# Patient Record
Sex: MALE | Race: WHITE | NOT HISPANIC OR LATINO | Employment: OTHER | URBAN - METROPOLITAN AREA
[De-identification: names, ages, dates, MRNs, and addresses within clinical notes are randomized per-mention and may not be internally consistent; named-entity substitution may affect disease eponyms.]

---

## 2017-01-03 ENCOUNTER — LAB (OUTPATIENT)
Dept: INTERNAL MEDICINE | Facility: CLINIC | Age: 67
End: 2017-01-03

## 2017-01-03 DIAGNOSIS — I48.91 ATRIAL FIBRILLATION, UNSPECIFIED TYPE (HCC): ICD-10-CM

## 2017-01-03 LAB — INR PPP: 4 (ref 2.5–3.5)

## 2017-01-03 PROCEDURE — 85610 PROTHROMBIN TIME: CPT | Performed by: INTERNAL MEDICINE

## 2017-01-03 PROCEDURE — 36415 COLL VENOUS BLD VENIPUNCTURE: CPT | Performed by: INTERNAL MEDICINE

## 2017-01-09 ENCOUNTER — LAB (OUTPATIENT)
Dept: INTERNAL MEDICINE | Facility: CLINIC | Age: 67
End: 2017-01-09

## 2017-01-09 DIAGNOSIS — I48.91 ATRIAL FIBRILLATION, UNSPECIFIED TYPE (HCC): ICD-10-CM

## 2017-01-09 LAB — INR PPP: 2.8 (ref 2.5–3.5)

## 2017-01-09 PROCEDURE — 36415 COLL VENOUS BLD VENIPUNCTURE: CPT | Performed by: INTERNAL MEDICINE

## 2017-01-09 PROCEDURE — 85610 PROTHROMBIN TIME: CPT | Performed by: INTERNAL MEDICINE

## 2017-01-26 ENCOUNTER — OFFICE VISIT (OUTPATIENT)
Dept: CARDIOLOGY | Facility: CLINIC | Age: 67
End: 2017-01-26

## 2017-01-26 VITALS
DIASTOLIC BLOOD PRESSURE: 98 MMHG | BODY MASS INDEX: 20.52 KG/M2 | WEIGHT: 146.6 LBS | HEIGHT: 71 IN | SYSTOLIC BLOOD PRESSURE: 166 MMHG | HEART RATE: 70 BPM

## 2017-01-26 DIAGNOSIS — I10 ESSENTIAL HYPERTENSION: ICD-10-CM

## 2017-01-26 DIAGNOSIS — I48.0 PAROXYSMAL ATRIAL FIBRILLATION (HCC): Primary | ICD-10-CM

## 2017-01-26 DIAGNOSIS — Z95.2 S/P AVR (AORTIC VALVE REPLACEMENT): ICD-10-CM

## 2017-01-26 DIAGNOSIS — E78.00 HYPERCHOLESTEROLEMIA: ICD-10-CM

## 2017-01-26 PROCEDURE — 93288 INTERROG EVL PM/LDLS PM IP: CPT | Performed by: INTERNAL MEDICINE

## 2017-01-26 PROCEDURE — 99212 OFFICE O/P EST SF 10 MIN: CPT | Performed by: INTERNAL MEDICINE

## 2017-01-26 NOTE — PROGRESS NOTES
OFFICE FOLLOW UP     Date of Encounter:2017     Name: Dillon Vo  : 1950  Address: [address] [facility] [address]  Phone: [phone]    PCP: Ez Cintron MD  [address] [place] [address] [address]  [facility] [address]    Dillon Vo is a 66 y.o. male.      Chief Complaint: follow up for Afib  PROBLEM LIST:  1. “Mixed” heart disease:  a. Aortic valve disease with mechanical prosthesis, IDB.  b. Everolimus-eluting stent placed mid-LAD for single-vessel CAD, 2013.    c. Ejection fraction less than 20% with global hypokinesia, 2013:  Only modestly reduced EF by echocardiogram, 2013.    d. “Absent” clopidogrel therapy following stent placement, “corrected,” 08/15/2013.  2. Atrial fibrillation, chronic:  a. Difficult to control rate.  b. RFA of AV node with implantation of left Bi-V pacemaker, 2013.  c. Hematoma requiring single-chamber pacemaker implanted, 2013.   3. Tobacco use, chronic.      No Known Allergies      Current Outpatient Prescriptions:   •  aspirin 81 MG EC tablet daily  •  carvedilol 25 MG tablet, Take 0.5 tablets by mouth 2 (two) times a day.  •  citalopram 20 MG tablet daily  •  digoxin 250 MCG tablet daily  •  famotidine 20 MG tablet daily  •  finasteride (PROSCAR) 5 MG tablet at bedtime  •  folic acid 400 MCG tablet daily  •  lisinopril 10 MG tablet daily   •  metroNIDAZOLE 500 MG tablet, Take one tablet twice daily for 14 days, then off for 3 days. Repeat x 3 courses.  •  Probiotic Product, daily  •  vitamin B-12 1000 MCG tablet daily  •  warfarin (COUMADIN) 5 MG tablet daily    History of Present Illness:            Mr. Vo presents today as a follow up for atrial fibrillation and also for a pacemaker check. Since last visit, patient has been feeling well. He has not had any chest pain. He has not had any issues with his pacemaker. He does not monitor his blood pressure at home. He notes a time when he was in [place] last year  "where his blood pressure dropped low. He remains active by walking his dog. 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 He is still smoking approximately half a pack per day. He has no SOA with his current activity level.    The following portions of the patient's history were reviewed and updated as appropriate: allergies, current medications and problem list.    HPI: Pertinent positives as listed in the HPI.  All other systems reviewed and negative.      Objective:    Vitals:    01/26/17 1303 01/26/17 1304   BP: 156/97 166/98   BP Location: Right arm Right arm   Patient Position: Sitting Standing   Pulse: 71 70   Weight: 146 lb 9.6 oz (66.5 kg)    Height: 71\" (180.3 cm)        Physical Exam   Constitutional: He is oriented to person, place, and time.   Neck: No JVD present. No thyromegaly present.   Cardiovascular: Intact distal pulses.    Murmur heard.   Systolic murmur is present with a grade of 1/6  At the base of the heart. Normal prosthetic valve sounds  Exam reveals no gallop, murmur, or rub.   Pulmonary/Chest:   No wheezes, crackles, or rhonchi.   Musculoskeletal:   No peripheral edema, no clubbing, or cyanosis    Neurological: He is alert and oriented to person, place, and time.   No focal abnormalities in sensation or strength    Vitals reviewed.         Diagnostic Data:      Lab Results   Component Value Date    INR 2.80 01/09/2017    INR 4.00 (A) 01/03/2017    INR 2.80 12/28/2016    PROTIME 23.1 (H) 05/09/2014     Lab Results   Component Value Date    GLUCOSE 101 (H) 12/08/2016    BUN 12 12/08/2016    CREATININE 0.70 12/08/2016    EGFRIFNONA 113 12/08/2016    BCR 17.1 12/08/2016    CO2 31.0 12/08/2016    CALCIUM 9.1 12/08/2016    ALBUMIN 3.60 12/08/2016    LABIL2 1.2 12/08/2016    AST 20 12/08/2016    ALT 11 12/08/2016     Lab Results   Component Value Date    WBC 3.68 12/08/2016    HGB 12.6 (L) 12/08/2016    HCT 38.3 (L) 12/08/2016    .3 (H) 12/08/2016    PLT 99 (L) 12/08/2016     Lab Results   Component " Value Date    CHLPL 189 01/21/2016    TRIG 52 06/16/2016    HDL 61 06/16/2016    LDLDIRECT 95 01/21/2016    AST 20 12/08/2016    ALT 11 12/08/2016        Procedures  DEVICE INTERROGATION:  1/26/2017, [facility] [place] PPM: RV pacing >99%. R wave with a threshold of 0.87 V at 0.5 msec and an impedance of 440 ohms. Battery voltage is 2.96.      Assessment and Plan:  He is instructed to stop smoking. His PM function is WNL. He has SBE protocol instructions. We will f/u on an annual basis.

## 2017-01-26 NOTE — MR AVS SNAPSHOT
Dillon Vo   1/26/2017 12:45 PM   Office Visit    Dept Phone:  [phone]   Encounter #:  [id number]    Provider:  Ez Tucker MD   Department:  [facility] CARDIOLOGY                Your Full Care Plan              Today's Medication Changes          These changes are accurate as of: 1/26/17  1:58 PM.  If you have any questions, ask your nurse or doctor.               Stop taking medication(s)listed here:     metroNIDAZOLE 500 MG tablet   Commonly known as:  FLAGYL   Stopped by:  Ez Tucker MD                      Your Updated Medication List          This list is accurate as of: 1/26/17  1:58 PM.  Always use your most recent med list.                aspirin 81 MG EC tablet       carvedilol 25 MG tablet   Commonly known as:  COREG       citalopram 20 MG tablet   Commonly known as:  CeleXA   TAKE ONE TABLET BY MOUTH ONCE DAILY       digoxin 250 MCG tablet   Commonly known as:  LANOXIN   TAKE ONE TABLET BY MOUTH ONCE DAILY       famotidine 20 MG tablet   Commonly known as:  PEPCID   Take 1 tablet by mouth daily.       finasteride 5 MG tablet   Commonly known as:  PROSCAR   TAKE ONE TABLET BY MOUTH AT BEDTIME       folic acid 400 MCG tablet   Commonly known as:  FOLVITE   Take 1 tablet by mouth Daily.       lisinopril 10 MG tablet   Commonly known as:  PRINIVIL,ZESTRIL       PROBIOTIC DAILY PO       vitamin B-12 1000 MCG tablet   Commonly known as:  CYANOCOBALAMIN   Take 1 tablet by mouth Daily.       warfarin 5 MG tablet   Commonly known as:  COUMADIN   TAKE ONE TABLET BY MOUTH ONCE DAILY               You Were Diagnosed With        Codes Comments    Paroxysmal atrial fibrillation    -  Primary ICD-10-CM: I48.0  ICD-9-CM: 427.31     Essential hypertension     ICD-10-CM: I10  ICD-9-CM: 401.9     Hypercholesterolemia     ICD-10-CM: E78.00  ICD-9-CM: 272.0       Instructions     None    Patient Instructions History      Upcoming Appointments     "Visit Type Date Time Department    FOLLOW UP 1/26/2017 12:45 PM [facility]      [facility] Signup     Our records indicate that you have an active [facility] [employer] account.    You can view your After Visit Summary by going to [employer] and logging in with your Daily[employer] username and password.  If you don't have a [employer] username and password but a parent or guardian has access to your record, the parent or guardian should login with their own [employer] username and password and access your record to view the After Visit Summary.    If you have questions, you can email [email or web address] or call [phone] to talk to our Daily[employer] staff.  Remember, Daily[employer] is NOT to be used for urgent needs.  For medical emergencies, dial 911.               Other Info from Your Visit           Your Appointments     Jan 30, 2018 12:30 PM EST   Follow Up with Ez Tucker MD   [facility] (--)    [address] [place] [address] [address] [place] [id number] [phone]           Arrive 15 minutes prior to appointment.              Allergies     No Known Allergies      Reason for Visit     Follow-up HTN/AFIB*SJM*      Vital Signs     Blood Pressure Pulse Height Weight Body Mass Index Smoking Status    166/98 (BP Location: Right arm, Patient Position: Standing) 70 71\" (180.3 cm) 146 lb 9.6 oz (66.5 kg) 20.45 kg/m2 Current Every Day Smoker      Problems and Diagnoses Noted     Atrial fibrillation (irregular heartbeat)    High blood pressure    High cholesterol            "

## 2017-02-06 ENCOUNTER — LAB (OUTPATIENT)
Dept: INTERNAL MEDICINE | Facility: CLINIC | Age: 67
End: 2017-02-06

## 2017-02-06 DIAGNOSIS — I48.91 ATRIAL FIBRILLATION, UNSPECIFIED TYPE (HCC): ICD-10-CM

## 2017-02-06 LAB — INR PPP: 3.3 (ref 2.5–3.5)

## 2017-02-06 PROCEDURE — 85610 PROTHROMBIN TIME: CPT | Performed by: INTERNAL MEDICINE

## 2017-02-06 RX ORDER — FINASTERIDE 5 MG/1
TABLET, FILM COATED ORAL
Qty: 30 TABLET | Refills: 5 | Status: SHIPPED | OUTPATIENT
Start: 2017-02-06 | End: 2018-01-23 | Stop reason: SDUPTHER

## 2017-03-06 ENCOUNTER — LAB (OUTPATIENT)
Dept: INTERNAL MEDICINE | Facility: CLINIC | Age: 67
End: 2017-03-06

## 2017-03-06 DIAGNOSIS — I48.91 ATRIAL FIBRILLATION, UNSPECIFIED TYPE (HCC): ICD-10-CM

## 2017-03-06 LAB — INR PPP: 3.6 (ref 2.5–3.5)

## 2017-03-06 PROCEDURE — 85610 PROTHROMBIN TIME: CPT | Performed by: INTERNAL MEDICINE

## 2017-04-17 RX ORDER — WARFARIN SODIUM 5 MG/1
TABLET ORAL
Qty: 30 TABLET | Refills: 5 | Status: SHIPPED | OUTPATIENT
Start: 2017-04-17 | End: 2017-04-19 | Stop reason: SDUPTHER

## 2017-04-19 RX ORDER — WARFARIN SODIUM 5 MG/1
5 TABLET ORAL
Qty: 30 TABLET | Refills: 5 | Status: SHIPPED | OUTPATIENT
Start: 2017-04-19 | End: 2018-05-12 | Stop reason: SDUPTHER

## 2017-05-09 ENCOUNTER — TELEPHONE (OUTPATIENT)
Dept: INTERNAL MEDICINE | Facility: CLINIC | Age: 67
End: 2017-05-09

## 2017-06-07 ENCOUNTER — TELEPHONE (OUTPATIENT)
Dept: INTERNAL MEDICINE | Facility: CLINIC | Age: 67
End: 2017-06-07

## 2017-06-14 NOTE — TELEPHONE ENCOUNTER
I have called patient several times and mailed a reminder letter.  I have not heard from patient.  Can you please mail a certified letter to patient .

## 2017-07-18 ENCOUNTER — LAB (OUTPATIENT)
Dept: INTERNAL MEDICINE | Facility: CLINIC | Age: 67
End: 2017-07-18

## 2017-07-18 DIAGNOSIS — I48.91 ATRIAL FIBRILLATION, UNSPECIFIED TYPE (HCC): Primary | ICD-10-CM

## 2017-07-18 LAB — INR PPP: 2.5 (ref 2.5–3.5)

## 2017-07-18 PROCEDURE — 85610 PROTHROMBIN TIME: CPT | Performed by: INTERNAL MEDICINE

## 2017-07-24 RX ORDER — CARVEDILOL 25 MG/1
12.5 TABLET ORAL 2 TIMES DAILY
Qty: 90 TABLET | Refills: 1 | Status: SHIPPED | OUTPATIENT
Start: 2017-07-24 | End: 2017-11-03 | Stop reason: SDUPTHER

## 2017-07-24 RX ORDER — FAMOTIDINE 20 MG/1
TABLET, FILM COATED ORAL
Qty: 30 TABLET | Refills: 5 | Status: SHIPPED | OUTPATIENT
Start: 2017-07-24 | End: 2018-02-09 | Stop reason: SDUPTHER

## 2017-08-21 ENCOUNTER — LAB (OUTPATIENT)
Dept: INTERNAL MEDICINE | Facility: CLINIC | Age: 67
End: 2017-08-21

## 2017-08-21 DIAGNOSIS — I48.91 ATRIAL FIBRILLATION, UNSPECIFIED TYPE (HCC): Primary | ICD-10-CM

## 2017-08-21 LAB — INR PPP: 2.3 (ref 2.5–3.5)

## 2017-08-21 PROCEDURE — 85610 PROTHROMBIN TIME: CPT | Performed by: INTERNAL MEDICINE

## 2017-08-28 RX ORDER — CITALOPRAM 20 MG/1
TABLET ORAL
Qty: 30 TABLET | Refills: 5 | Status: SHIPPED | OUTPATIENT
Start: 2017-08-28 | End: 2018-03-02 | Stop reason: SDUPTHER

## 2017-09-05 ENCOUNTER — LAB (OUTPATIENT)
Dept: INTERNAL MEDICINE | Facility: CLINIC | Age: 67
End: 2017-09-05

## 2017-09-05 DIAGNOSIS — I48.91 ATRIAL FIBRILLATION, UNSPECIFIED TYPE (HCC): Primary | ICD-10-CM

## 2017-09-05 LAB — INR PPP: 3.3 (ref 2.5–3.5)

## 2017-09-05 PROCEDURE — 85610 PROTHROMBIN TIME: CPT | Performed by: INTERNAL MEDICINE

## 2017-10-24 ENCOUNTER — CLINICAL SUPPORT (OUTPATIENT)
Dept: INTERNAL MEDICINE | Facility: CLINIC | Age: 67
End: 2017-10-24

## 2017-10-24 ENCOUNTER — LAB (OUTPATIENT)
Dept: INTERNAL MEDICINE | Facility: CLINIC | Age: 67
End: 2017-10-24

## 2017-10-24 DIAGNOSIS — I48.91 ATRIAL FIBRILLATION, UNSPECIFIED TYPE (HCC): Primary | ICD-10-CM

## 2017-10-24 LAB — INR PPP: 2.5 (ref 2.5–3.5)

## 2017-10-24 PROCEDURE — 90662 IIV NO PRSV INCREASED AG IM: CPT | Performed by: INTERNAL MEDICINE

## 2017-10-24 PROCEDURE — G0008 ADMIN INFLUENZA VIRUS VAC: HCPCS | Performed by: INTERNAL MEDICINE

## 2017-10-24 PROCEDURE — 85610 PROTHROMBIN TIME: CPT | Performed by: INTERNAL MEDICINE

## 2017-11-03 RX ORDER — LISINOPRIL 10 MG/1
TABLET ORAL
Qty: 180 TABLET | Refills: 1 | Status: SHIPPED | OUTPATIENT
Start: 2017-11-03 | End: 2018-02-27 | Stop reason: SDUPTHER

## 2017-11-06 RX ORDER — CARVEDILOL 25 MG/1
TABLET ORAL
Qty: 90 TABLET | Refills: 1 | Status: SHIPPED | OUTPATIENT
Start: 2017-11-06 | End: 2018-09-24 | Stop reason: SDUPTHER

## 2017-11-21 ENCOUNTER — TELEPHONE (OUTPATIENT)
Dept: CARDIOLOGY | Facility: CLINIC | Age: 67
End: 2017-11-21

## 2017-11-27 ENCOUNTER — TELEPHONE (OUTPATIENT)
Dept: CARDIOLOGY | Facility: CLINIC | Age: 67
End: 2017-11-27

## 2017-11-27 DIAGNOSIS — Z95.2 S/P AVR (AORTIC VALVE REPLACEMENT): Primary | ICD-10-CM

## 2017-11-27 NOTE — TELEPHONE ENCOUNTER
Called pt regarding [employer] home monitoring box.  Pt does not have plugged up and doesn't want to use at this time.

## 2017-12-12 ENCOUNTER — LAB (OUTPATIENT)
Dept: INTERNAL MEDICINE | Facility: CLINIC | Age: 67
End: 2017-12-12

## 2017-12-12 DIAGNOSIS — I48.91 ATRIAL FIBRILLATION, UNSPECIFIED TYPE (HCC): Primary | ICD-10-CM

## 2017-12-12 LAB — INR PPP: 3.3 (ref 2.5–3.5)

## 2017-12-12 PROCEDURE — 85610 PROTHROMBIN TIME: CPT | Performed by: INTERNAL MEDICINE

## 2018-01-17 ENCOUNTER — LAB (OUTPATIENT)
Dept: INTERNAL MEDICINE | Facility: CLINIC | Age: 68
End: 2018-01-17

## 2018-01-17 DIAGNOSIS — I48.91 ATRIAL FIBRILLATION, UNSPECIFIED TYPE (HCC): Primary | ICD-10-CM

## 2018-01-17 LAB — INR PPP: 2.3 (ref 2.5–3.5)

## 2018-01-17 PROCEDURE — 85610 PROTHROMBIN TIME: CPT | Performed by: INTERNAL MEDICINE

## 2018-01-23 RX ORDER — FINASTERIDE 5 MG/1
TABLET, FILM COATED ORAL
Qty: 30 TABLET | Refills: 5 | Status: SHIPPED | OUTPATIENT
Start: 2018-01-23 | End: 2018-08-17 | Stop reason: SDUPTHER

## 2018-01-30 ENCOUNTER — APPOINTMENT (OUTPATIENT)
Dept: CARDIOLOGY | Facility: HOSPITAL | Age: 68
End: 2018-01-30
Attending: INTERNAL MEDICINE

## 2018-02-09 RX ORDER — FAMOTIDINE 20 MG/1
20 TABLET, FILM COATED ORAL DAILY
Qty: 30 TABLET | Refills: 1 | Status: SHIPPED | OUTPATIENT
Start: 2018-02-09

## 2018-02-16 RX ORDER — FAMOTIDINE 20 MG/1
TABLET, FILM COATED ORAL
Qty: 90 TABLET | Refills: 0 | Status: SHIPPED | OUTPATIENT
Start: 2018-02-16 | End: 2018-02-27 | Stop reason: SDUPTHER

## 2018-02-19 ENCOUNTER — LAB (OUTPATIENT)
Dept: INTERNAL MEDICINE | Facility: CLINIC | Age: 68
End: 2018-02-19

## 2018-02-19 DIAGNOSIS — I48.91 ATRIAL FIBRILLATION, UNSPECIFIED TYPE (HCC): Primary | ICD-10-CM

## 2018-02-19 LAB — INR PPP: 3.1 (ref 2.5–3.5)

## 2018-02-19 PROCEDURE — 85610 PROTHROMBIN TIME: CPT | Performed by: INTERNAL MEDICINE

## 2018-02-27 ENCOUNTER — HOSPITAL ENCOUNTER (OUTPATIENT)
Dept: CARDIOLOGY | Facility: HOSPITAL | Age: 68
Discharge: HOME | End: 2018-02-27
Attending: INTERNAL MEDICINE | Admitting: INTERNAL MEDICINE

## 2018-02-27 ENCOUNTER — OFFICE VISIT (OUTPATIENT)
Dept: CARDIOLOGY | Facility: CLINIC | Age: 68
End: 2018-02-27

## 2018-02-27 VITALS
HEIGHT: 70 IN | DIASTOLIC BLOOD PRESSURE: 56 MMHG | WEIGHT: 167 LBS | HEART RATE: 73 BPM | BODY MASS INDEX: 23.91 KG/M2 | SYSTOLIC BLOOD PRESSURE: 90 MMHG

## 2018-02-27 VITALS — WEIGHT: 146 LBS | HEIGHT: 71 IN | BODY MASS INDEX: 20.44 KG/M2

## 2018-02-27 DIAGNOSIS — I25.10 CORONARY ARTERY DISEASE INVOLVING NATIVE CORONARY ARTERY OF NATIVE HEART WITHOUT ANGINA PECTORIS: ICD-10-CM

## 2018-02-27 DIAGNOSIS — I48.0 PAROXYSMAL ATRIAL FIBRILLATION (HCC): ICD-10-CM

## 2018-02-27 DIAGNOSIS — I10 ESSENTIAL HYPERTENSION: ICD-10-CM

## 2018-02-27 DIAGNOSIS — Z95.2 S/P AVR (AORTIC VALVE REPLACEMENT): ICD-10-CM

## 2018-02-27 DIAGNOSIS — Z95.2 H/O AORTIC VALVE REPLACEMENT: Primary | ICD-10-CM

## 2018-02-27 LAB
BH CV ECHO MEAS - AO MAX PG (FULL): 12.8 MMHG
BH CV ECHO MEAS - AO MAX PG: 15 MMHG
BH CV ECHO MEAS - AO MEAN PG (FULL): 6.8 MMHG
BH CV ECHO MEAS - AO MEAN PG: 8 MMHG
BH CV ECHO MEAS - AO ROOT AREA (BSA CORRECTED): 1.5
BH CV ECHO MEAS - AO ROOT AREA: 6.3 CM^2
BH CV ECHO MEAS - AO ROOT DIAM: 2.8 CM
BH CV ECHO MEAS - AO V2 MAX: 191.1 CM/SEC
BH CV ECHO MEAS - AO V2 MEAN: 129.6 CM/SEC
BH CV ECHO MEAS - AO V2 VTI: 38.3 CM
BH CV ECHO MEAS - AVA(I,A): 1.4 CM^2
BH CV ECHO MEAS - AVA(I,D): 1.4 CM^2
BH CV ECHO MEAS - AVA(V,A): 1.4 CM^2
BH CV ECHO MEAS - AVA(V,D): 1.4 CM^2
BH CV ECHO MEAS - BSA(HAYCOCK): 1.8 M^2
BH CV ECHO MEAS - BSA: 1.8 M^2
BH CV ECHO MEAS - BZI_BMI: 20.4 KILOGRAMS/M^2
BH CV ECHO MEAS - BZI_METRIC_HEIGHT: 180.3 CM
BH CV ECHO MEAS - BZI_METRIC_WEIGHT: 66.2 KG
BH CV ECHO MEAS - CONTRAST EF (2CH): 56.6 ML/M^2
BH CV ECHO MEAS - CONTRAST EF 4CH: 54.9 ML/M^2
BH CV ECHO MEAS - EDV(CUBED): 143.5 ML
BH CV ECHO MEAS - EDV(MOD-SP2): 83 ML
BH CV ECHO MEAS - EDV(MOD-SP4): 82 ML
BH CV ECHO MEAS - EDV(TEICH): 131.6 ML
BH CV ECHO MEAS - EF(CUBED): 74.6 %
BH CV ECHO MEAS - EF(MOD-SP2): 56.6 %
BH CV ECHO MEAS - EF(MOD-SP4): 55 %
BH CV ECHO MEAS - EF(TEICH): 66.1 %
BH CV ECHO MEAS - ESV(CUBED): 36.5 ML
BH CV ECHO MEAS - ESV(MOD-SP2): 36 ML
BH CV ECHO MEAS - ESV(MOD-SP4): 37 ML
BH CV ECHO MEAS - ESV(TEICH): 44.7 ML
BH CV ECHO MEAS - FS: 36.7 %
BH CV ECHO MEAS - IVS/LVPW: 0.99
BH CV ECHO MEAS - IVSD: 1.2 CM
BH CV ECHO MEAS - LA DIMENSION: 4.8 CM
BH CV ECHO MEAS - LA/AO: 1.7
BH CV ECHO MEAS - LAT PEAK E' VEL: 12.8 CM/SEC
BH CV ECHO MEAS - LV DIASTOLIC VOL/BSA (35-75): 44.4 ML/M^2
BH CV ECHO MEAS - LV MASS(C)D: 251.6 GRAMS
BH CV ECHO MEAS - LV MASS(C)DI: 136.4 GRAMS/M^2
BH CV ECHO MEAS - LV MAX PG: 2.2 MMHG
BH CV ECHO MEAS - LV MEAN PG: 1.2 MMHG
BH CV ECHO MEAS - LV SYSTOLIC VOL/BSA (12-30): 20.1 ML/M^2
BH CV ECHO MEAS - LV V1 MAX: 74.7 CM/SEC
BH CV ECHO MEAS - LV V1 MEAN: 49.6 CM/SEC
BH CV ECHO MEAS - LV V1 VTI: 14.7 CM
BH CV ECHO MEAS - LVIDD: 5.2 CM
BH CV ECHO MEAS - LVIDS: 3.3 CM
BH CV ECHO MEAS - LVLD AP2: 7.5 CM
BH CV ECHO MEAS - LVLD AP4: 7.4 CM
BH CV ECHO MEAS - LVLS AP2: 6.1 CM
BH CV ECHO MEAS - LVLS AP4: 5.9 CM
BH CV ECHO MEAS - LVOT AREA (M): 3.8 CM^2
BH CV ECHO MEAS - LVOT AREA: 3.7 CM^2
BH CV ECHO MEAS - LVOT DIAM: 2.2 CM
BH CV ECHO MEAS - LVPWD: 1.2 CM
BH CV ECHO MEAS - MED PEAK E' VEL: 8.12 CM/SEC
BH CV ECHO MEAS - MV E MAX VEL: 101.8 CM/SEC
BH CV ECHO MEAS - PA ACC SLOPE: 405.8 CM/SEC^2
BH CV ECHO MEAS - PA ACC TIME: 0.13 SEC
BH CV ECHO MEAS - PA PR(ACCEL): 18.8 MMHG
BH CV ECHO MEAS - RAP SYSTOLE: 15 MMHG
BH CV ECHO MEAS - RVDD: 2.6 CM
BH CV ECHO MEAS - RVSP: 43 MMHG
BH CV ECHO MEAS - SI(AO): 130.1 ML/M^2
BH CV ECHO MEAS - SI(CUBED): 58 ML/M^2
BH CV ECHO MEAS - SI(LVOT): 29.5 ML/M^2
BH CV ECHO MEAS - SI(MOD-SP2): 25.5 ML/M^2
BH CV ECHO MEAS - SI(MOD-SP4): 24.4 ML/M^2
BH CV ECHO MEAS - SI(TEICH): 47.1 ML/M^2
BH CV ECHO MEAS - SV(AO): 240.1 ML
BH CV ECHO MEAS - SV(CUBED): 107 ML
BH CV ECHO MEAS - SV(LVOT): 54.5 ML
BH CV ECHO MEAS - SV(MOD-SP2): 47 ML
BH CV ECHO MEAS - SV(MOD-SP4): 45 ML
BH CV ECHO MEAS - SV(TEICH): 86.9 ML
BH CV ECHO MEAS - TAPSE (>1.6): 1.5 CM2
BH CV ECHO MEAS - TR MAX VEL: 262.9 CM/SEC
BH CV VAS BP LEFT ARM: NORMAL MMHG
BH CV XLRA - RV BASE: 4.1 CM
BH CV XLRA - RV LENGTH: 7.9 CM
BH CV XLRA - RV MID: 3.2 CM
BH CV XLRA - TDI S': 12.9 CM/SEC
E/E' RATIO: 10.2
LEFT ATRIUM VOLUME INDEX: 39.7 ML/M2
LEFT ATRIUM VOLUME: 73 CM3
MAXIMAL PREDICTED HEART RATE: 153 BPM
STRESS TARGET HR: 130 BPM

## 2018-02-27 PROCEDURE — 93279 PRGRMG DEV EVAL PM/LDLS PM: CPT | Performed by: INTERNAL MEDICINE

## 2018-02-27 PROCEDURE — 93306 TTE W/DOPPLER COMPLETE: CPT | Performed by: INTERNAL MEDICINE

## 2018-02-27 PROCEDURE — 99213 OFFICE O/P EST LOW 20 MIN: CPT | Performed by: INTERNAL MEDICINE

## 2018-02-27 PROCEDURE — 93306 TTE W/DOPPLER COMPLETE: CPT

## 2018-03-01 DIAGNOSIS — D53.9 MACROCYTIC ANEMIA: ICD-10-CM

## 2018-03-01 RX ORDER — LANOLIN ALCOHOL/MO/W.PET/CERES
400 CREAM (GRAM) TOPICAL DAILY
Qty: 90 TABLET | Refills: 3 | Status: SHIPPED | OUTPATIENT
Start: 2018-03-01 | End: 2019-03-29 | Stop reason: SDUPTHER

## 2018-03-02 RX ORDER — CITALOPRAM 20 MG/1
TABLET ORAL
Qty: 30 TABLET | Refills: 5 | Status: SHIPPED | OUTPATIENT
Start: 2018-03-02 | End: 2018-10-21 | Stop reason: SDUPTHER

## 2018-04-17 LAB — INR PPP: 3.5 (ref 2.5–3.5)

## 2018-04-18 ENCOUNTER — LAB (OUTPATIENT)
Dept: INTERNAL MEDICINE | Facility: CLINIC | Age: 68
End: 2018-04-18

## 2018-04-18 DIAGNOSIS — I48.0 PAROXYSMAL ATRIAL FIBRILLATION (HCC): Primary | ICD-10-CM

## 2018-04-18 LAB
INR PPP: 3.5 (ref 2.5–3.5)
INR PPP: 3.8 (ref 2.5–3.5)

## 2018-04-18 PROCEDURE — 85610 PROTHROMBIN TIME: CPT | Performed by: INTERNAL MEDICINE

## 2018-04-23 ENCOUNTER — LAB (OUTPATIENT)
Dept: INTERNAL MEDICINE | Facility: CLINIC | Age: 68
End: 2018-04-23

## 2018-04-23 DIAGNOSIS — I48.0 PAROXYSMAL ATRIAL FIBRILLATION (HCC): Primary | ICD-10-CM

## 2018-04-23 LAB — INR PPP: 3.2 (ref 2.5–3.5)

## 2018-04-23 PROCEDURE — 85610 PROTHROMBIN TIME: CPT | Performed by: INTERNAL MEDICINE

## 2018-05-01 RX ORDER — LISINOPRIL 10 MG/1
TABLET ORAL
Qty: 180 TABLET | Refills: 1 | Status: SHIPPED | OUTPATIENT
Start: 2018-05-01 | End: 2018-12-26 | Stop reason: SDUPTHER

## 2018-05-14 RX ORDER — WARFARIN SODIUM 5 MG/1
TABLET ORAL
Qty: 30 TABLET | Refills: 5 | Status: SHIPPED | OUTPATIENT
Start: 2018-05-14 | End: 2019-02-22 | Stop reason: SDUPTHER

## 2018-05-21 ENCOUNTER — LAB (OUTPATIENT)
Dept: INTERNAL MEDICINE | Facility: CLINIC | Age: 68
End: 2018-05-21

## 2018-05-21 DIAGNOSIS — I48.0 PAROXYSMAL ATRIAL FIBRILLATION (HCC): Primary | ICD-10-CM

## 2018-05-21 LAB — INR PPP: 5 (ref 2.5–3.5)

## 2018-05-21 PROCEDURE — 85610 PROTHROMBIN TIME: CPT | Performed by: INTERNAL MEDICINE

## 2018-05-29 ENCOUNTER — LAB (OUTPATIENT)
Dept: INTERNAL MEDICINE | Facility: CLINIC | Age: 68
End: 2018-05-29

## 2018-05-29 DIAGNOSIS — I48.0 PAROXYSMAL ATRIAL FIBRILLATION (HCC): Primary | ICD-10-CM

## 2018-05-29 LAB — INR PPP: 6.4 (ref 2.5–3.5)

## 2018-05-29 PROCEDURE — 85610 PROTHROMBIN TIME: CPT | Performed by: INTERNAL MEDICINE

## 2018-06-01 ENCOUNTER — LAB (OUTPATIENT)
Dept: INTERNAL MEDICINE | Facility: CLINIC | Age: 68
End: 2018-06-01

## 2018-06-01 DIAGNOSIS — I48.0 PAROXYSMAL ATRIAL FIBRILLATION (HCC): Primary | ICD-10-CM

## 2018-06-01 LAB — INR PPP: 2.9 (ref 2.5–3.5)

## 2018-06-01 PROCEDURE — 85610 PROTHROMBIN TIME: CPT | Performed by: INTERNAL MEDICINE

## 2018-06-08 ENCOUNTER — LAB (OUTPATIENT)
Dept: INTERNAL MEDICINE | Facility: CLINIC | Age: 68
End: 2018-06-08

## 2018-06-08 DIAGNOSIS — I48.0 PAROXYSMAL ATRIAL FIBRILLATION (HCC): Primary | ICD-10-CM

## 2018-06-08 LAB — INR PPP: 6 (ref 2.5–3.5)

## 2018-06-08 PROCEDURE — 85610 PROTHROMBIN TIME: CPT | Performed by: INTERNAL MEDICINE

## 2018-06-11 ENCOUNTER — LAB (OUTPATIENT)
Dept: INTERNAL MEDICINE | Facility: CLINIC | Age: 68
End: 2018-06-11

## 2018-06-11 LAB — INR PPP: 2.7 (ref 2.5–3.5)

## 2018-06-11 PROCEDURE — 85610 PROTHROMBIN TIME: CPT | Performed by: INTERNAL MEDICINE

## 2018-06-21 ENCOUNTER — LAB (OUTPATIENT)
Dept: INTERNAL MEDICINE | Facility: CLINIC | Age: 68
End: 2018-06-21

## 2018-06-21 DIAGNOSIS — I48.91 ATRIAL FIBRILLATION, UNSPECIFIED TYPE (HCC): Primary | ICD-10-CM

## 2018-06-21 LAB — INR PPP: 3.2 (ref 2.5–3.5)

## 2018-06-21 PROCEDURE — 85610 PROTHROMBIN TIME: CPT | Performed by: INTERNAL MEDICINE

## 2018-06-29 ENCOUNTER — LAB (OUTPATIENT)
Dept: INTERNAL MEDICINE | Facility: CLINIC | Age: 68
End: 2018-06-29

## 2018-06-29 DIAGNOSIS — I48.91 ATRIAL FIBRILLATION, UNSPECIFIED TYPE (HCC): Primary | ICD-10-CM

## 2018-06-29 LAB — INR PPP: 2.9 (ref 2.5–3.5)

## 2018-06-29 PROCEDURE — 85610 PROTHROMBIN TIME: CPT | Performed by: INTERNAL MEDICINE

## 2018-07-16 ENCOUNTER — LAB (OUTPATIENT)
Dept: INTERNAL MEDICINE | Facility: CLINIC | Age: 68
End: 2018-07-16

## 2018-07-16 DIAGNOSIS — I48.91 ATRIAL FIBRILLATION, UNSPECIFIED TYPE (HCC): Primary | ICD-10-CM

## 2018-07-16 LAB — INR PPP: 2.4 (ref 2.5–3.5)

## 2018-07-16 PROCEDURE — 85610 PROTHROMBIN TIME: CPT | Performed by: INTERNAL MEDICINE

## 2018-07-31 ENCOUNTER — LAB (OUTPATIENT)
Dept: INTERNAL MEDICINE | Facility: CLINIC | Age: 68
End: 2018-07-31

## 2018-07-31 DIAGNOSIS — I48.91 ATRIAL FIBRILLATION, UNSPECIFIED TYPE (HCC): Primary | ICD-10-CM

## 2018-07-31 LAB — INR PPP: 2.2 (ref 2.5–3.5)

## 2018-07-31 PROCEDURE — 85610 PROTHROMBIN TIME: CPT | Performed by: INTERNAL MEDICINE

## 2018-08-05 ENCOUNTER — TELEPHONE (OUTPATIENT)
Dept: INTERNAL MEDICINE | Facility: CLINIC | Age: 68
End: 2018-08-05

## 2018-08-05 NOTE — TELEPHONE ENCOUNTER
----- Message from Marilee Perez sent at 8/2/2018  3:49 PM EDT -----  PATIENT CALLED AND STATED THAT HE'S HAVING A COLONOSCOPY ON 08/07/2018 AND IS NEEDING TO KNOW HOW LONG TO BE OFF warfarin (COUMADIN) 5 MG tablet.     PLEASE CALL PATIENT AT : [phone]    THANK YOU

## 2018-08-06 NOTE — TELEPHONE ENCOUNTER
S/W PT, INFORMED DR RIVERO SAID TO HOLD FOR 5 DAYS PRIOR.  STATES THE PAPER THAT THE GASTROENTEROLOGIST GAVE HIM SAID 3-5 DAYS SO HE STARTED HOLDING IT 3 DAYS AGO AND IS SCHEDULED FOR COLONOSCOPY TOMORROW.

## 2018-08-13 ENCOUNTER — LAB (OUTPATIENT)
Dept: INTERNAL MEDICINE | Facility: CLINIC | Age: 68
End: 2018-08-13

## 2018-08-13 DIAGNOSIS — I48.91 ATRIAL FIBRILLATION, UNSPECIFIED TYPE (HCC): Primary | ICD-10-CM

## 2018-08-13 LAB — INR PPP: 1.3 (ref 2.5–3.5)

## 2018-08-13 PROCEDURE — 85610 PROTHROMBIN TIME: CPT | Performed by: INTERNAL MEDICINE

## 2018-08-17 ENCOUNTER — LAB (OUTPATIENT)
Dept: INTERNAL MEDICINE | Facility: CLINIC | Age: 68
End: 2018-08-17

## 2018-08-17 DIAGNOSIS — I48.91 ATRIAL FIBRILLATION, UNSPECIFIED TYPE (HCC): Primary | ICD-10-CM

## 2018-08-17 LAB — INR PPP: 2.6 (ref 2.5–3.5)

## 2018-08-17 PROCEDURE — 85610 PROTHROMBIN TIME: CPT | Performed by: INTERNAL MEDICINE

## 2018-08-17 RX ORDER — FINASTERIDE 5 MG/1
5 TABLET, FILM COATED ORAL
Qty: 30 TABLET | Refills: 5 | Status: SHIPPED | OUTPATIENT
Start: 2018-08-17 | End: 2019-02-22 | Stop reason: SDUPTHER

## 2018-08-28 ENCOUNTER — CLINICAL SUPPORT NO REQUIREMENTS (OUTPATIENT)
Dept: CARDIOLOGY | Facility: CLINIC | Age: 68
End: 2018-08-28

## 2018-08-28 VITALS
BODY MASS INDEX: 22.84 KG/M2 | WEIGHT: 159.2 LBS | SYSTOLIC BLOOD PRESSURE: 150 MMHG | HEART RATE: 70 BPM | DIASTOLIC BLOOD PRESSURE: 80 MMHG

## 2018-08-28 DIAGNOSIS — I48.21 PERMANENT ATRIAL FIBRILLATION (HCC): Primary | ICD-10-CM

## 2018-08-28 DIAGNOSIS — I44.30 AV BLOCK: ICD-10-CM

## 2018-08-28 PROCEDURE — 93296 REM INTERROG EVL PM/IDS: CPT | Performed by: INTERNAL MEDICINE

## 2018-08-28 PROCEDURE — 93294 REM INTERROG EVL PM/LDLS PM: CPT | Performed by: INTERNAL MEDICINE

## 2018-09-04 ENCOUNTER — LAB (OUTPATIENT)
Dept: INTERNAL MEDICINE | Facility: CLINIC | Age: 68
End: 2018-09-04

## 2018-09-04 DIAGNOSIS — I48.21 PERMANENT ATRIAL FIBRILLATION (HCC): Primary | ICD-10-CM

## 2018-09-04 LAB — INR PPP: 5.5 (ref 2.5–3.5)

## 2018-09-04 PROCEDURE — 85610 PROTHROMBIN TIME: CPT | Performed by: INTERNAL MEDICINE

## 2018-09-07 ENCOUNTER — LAB (OUTPATIENT)
Dept: INTERNAL MEDICINE | Facility: CLINIC | Age: 68
End: 2018-09-07

## 2018-09-07 DIAGNOSIS — I48.21 PERMANENT ATRIAL FIBRILLATION (HCC): Primary | ICD-10-CM

## 2018-09-07 LAB — INR PPP: 2 (ref 2.5–3.5)

## 2018-09-07 PROCEDURE — 85610 PROTHROMBIN TIME: CPT | Performed by: INTERNAL MEDICINE

## 2018-09-14 ENCOUNTER — LAB (OUTPATIENT)
Dept: INTERNAL MEDICINE | Facility: CLINIC | Age: 68
End: 2018-09-14

## 2018-09-14 DIAGNOSIS — I48.21 PERMANENT ATRIAL FIBRILLATION (HCC): Primary | ICD-10-CM

## 2018-09-14 LAB — INR PPP: 1.5 (ref 2.5–3.5)

## 2018-09-14 PROCEDURE — 85610 PROTHROMBIN TIME: CPT | Performed by: INTERNAL MEDICINE

## 2018-09-20 ENCOUNTER — LAB (OUTPATIENT)
Dept: INTERNAL MEDICINE | Facility: CLINIC | Age: 68
End: 2018-09-20

## 2018-09-20 DIAGNOSIS — I48.21 PERMANENT ATRIAL FIBRILLATION (HCC): Primary | ICD-10-CM

## 2018-09-20 LAB — INR PPP: 2.1 (ref 2.5–3.5)

## 2018-09-20 PROCEDURE — 85610 PROTHROMBIN TIME: CPT | Performed by: INTERNAL MEDICINE

## 2018-09-24 RX ORDER — CARVEDILOL 25 MG/1
12.5 TABLET ORAL 2 TIMES DAILY
Qty: 90 TABLET | Refills: 1 | Status: SHIPPED | OUTPATIENT
Start: 2018-09-24 | End: 2019-03-29 | Stop reason: SDUPTHER

## 2018-09-27 ENCOUNTER — LAB (OUTPATIENT)
Dept: INTERNAL MEDICINE | Facility: CLINIC | Age: 68
End: 2018-09-27

## 2018-09-27 DIAGNOSIS — I48.21 PERMANENT ATRIAL FIBRILLATION (HCC): Primary | ICD-10-CM

## 2018-09-27 LAB — INR PPP: 7 (ref 2.5–3.5)

## 2018-09-27 PROCEDURE — 85610 PROTHROMBIN TIME: CPT | Performed by: INTERNAL MEDICINE

## 2018-10-01 ENCOUNTER — LAB (OUTPATIENT)
Dept: INTERNAL MEDICINE | Facility: CLINIC | Age: 68
End: 2018-10-01

## 2018-10-01 DIAGNOSIS — I48.21 PERMANENT ATRIAL FIBRILLATION (HCC): Primary | ICD-10-CM

## 2018-10-01 LAB — INR PPP: 2.2 (ref 2.5–3.5)

## 2018-10-01 PROCEDURE — 85610 PROTHROMBIN TIME: CPT | Performed by: INTERNAL MEDICINE

## 2018-10-08 ENCOUNTER — LAB (OUTPATIENT)
Dept: INTERNAL MEDICINE | Facility: CLINIC | Age: 68
End: 2018-10-08

## 2018-10-08 DIAGNOSIS — I48.21 PERMANENT ATRIAL FIBRILLATION (HCC): Primary | ICD-10-CM

## 2018-10-08 LAB — INR PPP: 2.8 (ref 2.5–3.5)

## 2018-10-08 PROCEDURE — 85610 PROTHROMBIN TIME: CPT | Performed by: INTERNAL MEDICINE

## 2018-10-22 RX ORDER — CITALOPRAM 20 MG/1
TABLET ORAL
Qty: 30 TABLET | Refills: 5 | Status: SHIPPED | OUTPATIENT
Start: 2018-10-22 | End: 2019-04-30 | Stop reason: SDUPTHER

## 2018-11-08 ENCOUNTER — LAB (OUTPATIENT)
Dept: INTERNAL MEDICINE | Facility: CLINIC | Age: 68
End: 2018-11-08

## 2018-11-08 DIAGNOSIS — I48.21 PERMANENT ATRIAL FIBRILLATION (HCC): Primary | ICD-10-CM

## 2018-11-08 LAB — INR PPP: 2.5 (ref 2.5–3.5)

## 2018-11-08 PROCEDURE — 85610 PROTHROMBIN TIME: CPT | Performed by: INTERNAL MEDICINE

## 2018-12-06 ENCOUNTER — LAB (OUTPATIENT)
Dept: INTERNAL MEDICINE | Facility: CLINIC | Age: 68
End: 2018-12-06

## 2018-12-06 DIAGNOSIS — I48.21 PERMANENT ATRIAL FIBRILLATION (HCC): Primary | ICD-10-CM

## 2018-12-06 LAB — INR PPP: 2.8 (ref 2.5–3.5)

## 2018-12-06 PROCEDURE — 85610 PROTHROMBIN TIME: CPT | Performed by: INTERNAL MEDICINE

## 2018-12-26 RX ORDER — LISINOPRIL 10 MG/1
TABLET ORAL
Qty: 180 TABLET | Refills: 1 | Status: SHIPPED | OUTPATIENT
Start: 2018-12-26 | End: 2019-06-25 | Stop reason: SDUPTHER

## 2019-01-09 ENCOUNTER — TELEPHONE (OUTPATIENT)
Dept: INTERNAL MEDICINE | Facility: CLINIC | Age: 69
End: 2019-01-09

## 2019-01-09 ENCOUNTER — LAB (OUTPATIENT)
Dept: INTERNAL MEDICINE | Facility: CLINIC | Age: 69
End: 2019-01-09

## 2019-01-09 DIAGNOSIS — I48.21 PERMANENT ATRIAL FIBRILLATION (HCC): Primary | ICD-10-CM

## 2019-01-09 LAB — INR PPP: 2.4 (ref 2.5–3.5)

## 2019-01-09 PROCEDURE — 85610 PROTHROMBIN TIME: CPT | Performed by: INTERNAL MEDICINE

## 2019-01-09 RX ORDER — AMOXICILLIN AND CLAVULANATE POTASSIUM 875; 125 MG/1; MG/1
1 TABLET, FILM COATED ORAL 2 TIMES DAILY
Qty: 20 TABLET | Refills: 0 | Status: SHIPPED | OUTPATIENT
Start: 2019-01-09 | End: 2019-01-19

## 2019-01-09 NOTE — TELEPHONE ENCOUNTER
Please call in augmentin 875 mg po BID x 10 days.   If symptoms worsen, don't improve, develops a fever needs to be seen.  Ez Cintron MD  5:23 PM  01/09/19

## 2019-01-09 NOTE — TELEPHONE ENCOUNTER
1-9-19  Patient came in today complaining of cold symptoms like sinus pressure .  Can you call something in for a sinus infection .  Pharmacy is [facility] [place] .  Thanks Patient is aware that you are calling something in and that he needs his INR checked every 2-3 days .  He gave verbal understanding.

## 2019-01-10 NOTE — TELEPHONE ENCOUNTER
ATTEMPTED TO REACH PT X 2 AND REACH VM BUT VM BOX IS FULL AND CANNOT ACCEPT NEW CALLS.      JUST NEED TO MAKE SURE PT PICKED UP RX AND TO CALL IF WORSENS OR DOESN'T IMPROVE.    CORINNE, CAN YOU FOLLOW UP WITH PT AS I WILL BE OUT OF OFC TOMORROW?  THANK YOU.

## 2019-01-11 NOTE — TELEPHONE ENCOUNTER
Called patients wife's cell number that was in chart she answered ask for patient she stated that I could tell her what information that was needed for Dillon. I informed her of information below she stated will relay and gave verbal understanding.

## 2019-01-14 ENCOUNTER — LAB (OUTPATIENT)
Dept: INTERNAL MEDICINE | Facility: CLINIC | Age: 69
End: 2019-01-14

## 2019-01-14 DIAGNOSIS — I48.21 PERMANENT ATRIAL FIBRILLATION (HCC): Primary | ICD-10-CM

## 2019-01-14 LAB — INR PPP: 2 (ref 2.5–3.5)

## 2019-01-14 PROCEDURE — 85610 PROTHROMBIN TIME: CPT | Performed by: INTERNAL MEDICINE

## 2019-01-17 ENCOUNTER — LAB (OUTPATIENT)
Dept: INTERNAL MEDICINE | Facility: CLINIC | Age: 69
End: 2019-01-17

## 2019-01-17 DIAGNOSIS — I48.21 PERMANENT ATRIAL FIBRILLATION (HCC): Primary | ICD-10-CM

## 2019-01-17 LAB — INR PPP: 2.5 (ref 2.5–3.5)

## 2019-01-17 PROCEDURE — 85610 PROTHROMBIN TIME: CPT | Performed by: INTERNAL MEDICINE

## 2019-01-21 ENCOUNTER — LAB (OUTPATIENT)
Dept: INTERNAL MEDICINE | Facility: CLINIC | Age: 69
End: 2019-01-21

## 2019-01-21 DIAGNOSIS — I48.21 PERMANENT ATRIAL FIBRILLATION (HCC): Primary | ICD-10-CM

## 2019-01-21 LAB — INR PPP: 1.7 (ref 2.5–3.5)

## 2019-01-21 PROCEDURE — 85610 PROTHROMBIN TIME: CPT | Performed by: INTERNAL MEDICINE

## 2019-01-24 ENCOUNTER — LAB (OUTPATIENT)
Dept: INTERNAL MEDICINE | Facility: CLINIC | Age: 69
End: 2019-01-24

## 2019-01-24 DIAGNOSIS — I48.21 PERMANENT ATRIAL FIBRILLATION (HCC): Primary | ICD-10-CM

## 2019-01-24 LAB — INR PPP: 2.1 (ref 2.5–3.5)

## 2019-01-24 PROCEDURE — 85610 PROTHROMBIN TIME: CPT | Performed by: INTERNAL MEDICINE

## 2019-01-31 ENCOUNTER — LAB (OUTPATIENT)
Dept: INTERNAL MEDICINE | Facility: CLINIC | Age: 69
End: 2019-01-31

## 2019-01-31 DIAGNOSIS — I48.21 PERMANENT ATRIAL FIBRILLATION (HCC): Primary | ICD-10-CM

## 2019-01-31 LAB — INR PPP: 2.7 (ref 2.5–3.5)

## 2019-01-31 PROCEDURE — 85610 PROTHROMBIN TIME: CPT | Performed by: INTERNAL MEDICINE

## 2019-02-13 ENCOUNTER — OFFICE VISIT (OUTPATIENT)
Dept: INTERNAL MEDICINE | Facility: CLINIC | Age: 69
End: 2019-02-13

## 2019-02-13 VITALS
HEART RATE: 72 BPM | BODY MASS INDEX: 23.39 KG/M2 | WEIGHT: 163 LBS | RESPIRATION RATE: 18 BRPM | TEMPERATURE: 98.1 F | SYSTOLIC BLOOD PRESSURE: 142 MMHG | DIASTOLIC BLOOD PRESSURE: 72 MMHG

## 2019-02-13 DIAGNOSIS — Z23 NEED FOR IMMUNIZATION AGAINST INFLUENZA: Primary | ICD-10-CM

## 2019-02-13 DIAGNOSIS — I48.91 ATRIAL FIBRILLATION, UNSPECIFIED TYPE (HCC): ICD-10-CM

## 2019-02-13 DIAGNOSIS — I10 ESSENTIAL HYPERTENSION: ICD-10-CM

## 2019-02-13 DIAGNOSIS — Z12.5 PROSTATE CANCER SCREENING: ICD-10-CM

## 2019-02-13 DIAGNOSIS — Z23 NEED FOR PNEUMOCOCCAL VACCINE: ICD-10-CM

## 2019-02-13 DIAGNOSIS — E78.5 HYPERLIPIDEMIA, UNSPECIFIED HYPERLIPIDEMIA TYPE: ICD-10-CM

## 2019-02-13 LAB
ALBUMIN SERPL-MCNC: 3.9 G/DL (ref 3.2–4.8)
ALBUMIN/GLOB SERPL: 1.6 G/DL (ref 1.5–2.5)
ALP SERPL-CCNC: 76 U/L (ref 25–100)
ALT SERPL W P-5'-P-CCNC: 13 U/L (ref 7–40)
ANION GAP SERPL CALCULATED.3IONS-SCNC: 2 MMOL/L (ref 3–11)
ARTICHOKE IGE QN: 75 MG/DL (ref 0–130)
AST SERPL-CCNC: 18 U/L (ref 0–33)
BILIRUB SERPL-MCNC: 1.4 MG/DL (ref 0.3–1.2)
BUN BLD-MCNC: 10 MG/DL (ref 9–23)
BUN/CREAT SERPL: 9.7 (ref 7–25)
CALCIUM SPEC-SCNC: 9.1 MG/DL (ref 8.7–10.4)
CHLORIDE SERPL-SCNC: 108 MMOL/L (ref 99–109)
CHOLEST SERPL-MCNC: 133 MG/DL (ref 0–200)
CO2 SERPL-SCNC: 29 MMOL/L (ref 20–31)
CREAT BLD-MCNC: 1.03 MG/DL (ref 0.6–1.3)
GFR SERPL CREATININE-BSD FRML MDRD: 72 ML/MIN/1.73
GLOBULIN UR ELPH-MCNC: 2.4 GM/DL
GLUCOSE BLD-MCNC: 116 MG/DL (ref 70–100)
HDLC SERPL-MCNC: 48 MG/DL (ref 40–60)
POTASSIUM BLD-SCNC: 4.2 MMOL/L (ref 3.5–5.5)
PROT SERPL-MCNC: 6.3 G/DL (ref 5.7–8.2)
PSA SERPL-MCNC: 0.11 NG/ML (ref 0–4)
SODIUM BLD-SCNC: 139 MMOL/L (ref 132–146)
T4 FREE SERPL-MCNC: 0.99 NG/DL (ref 0.89–1.76)
TRIGL SERPL-MCNC: 44 MG/DL (ref 0–150)
TSH SERPL DL<=0.05 MIU/L-ACNC: 1.04 MIU/ML (ref 0.35–5.35)

## 2019-02-13 PROCEDURE — 84443 ASSAY THYROID STIM HORMONE: CPT | Performed by: INTERNAL MEDICINE

## 2019-02-13 PROCEDURE — 80061 LIPID PANEL: CPT | Performed by: INTERNAL MEDICINE

## 2019-02-13 PROCEDURE — G0009 ADMIN PNEUMOCOCCAL VACCINE: HCPCS | Performed by: INTERNAL MEDICINE

## 2019-02-13 PROCEDURE — 80053 COMPREHEN METABOLIC PANEL: CPT | Performed by: INTERNAL MEDICINE

## 2019-02-13 PROCEDURE — 90662 IIV NO PRSV INCREASED AG IM: CPT | Performed by: INTERNAL MEDICINE

## 2019-02-13 PROCEDURE — G0103 PSA SCREENING: HCPCS | Performed by: INTERNAL MEDICINE

## 2019-02-13 PROCEDURE — G0008 ADMIN INFLUENZA VIRUS VAC: HCPCS | Performed by: INTERNAL MEDICINE

## 2019-02-13 PROCEDURE — 84439 ASSAY OF FREE THYROXINE: CPT | Performed by: INTERNAL MEDICINE

## 2019-02-13 PROCEDURE — 90670 PCV13 VACCINE IM: CPT | Performed by: INTERNAL MEDICINE

## 2019-02-13 PROCEDURE — 36415 COLL VENOUS BLD VENIPUNCTURE: CPT | Performed by: INTERNAL MEDICINE

## 2019-02-13 PROCEDURE — 99214 OFFICE O/P EST MOD 30 MIN: CPT | Performed by: INTERNAL MEDICINE

## 2019-02-13 NOTE — PROGRESS NOTES
Chief Complaint   Patient presents with   • Follow-up     FOLLOW UP CHRONIC MEDICAL PROBLEMS       History of Present Illness      The patient presents for a follow-up related to hypertension. The patient reports that he has had no headaches, chest pain, dyspnea, edema, syncope, blurred vision or palpitations. He states that he is taking his medication as prescribed. He is not having medication side effects.    The patient presents for a follow-up related to hyperlipidemia. He is following a low fat diet. He reports that he is exercising. He is not taking medication for hyperlipidemia. He denies orthopnea, paroxysmal nocturnal dyspnea or dyspnea on exertion.    The patient presents for a follow-up of chronic atrial fibrillation and he denies palpitations. The patient denies fatigue, dizziness, nausea or exercise intolerance.    Review of Systems    HENT- Denies Nasal Discharge, Sore Throat, Ear Pain, Ear Drainage, Sinus Pain, Nasal Congestion, Decreased Hearing or Tinnitus.    GASTROINTESTINAL- Denies Abdominal Pain, Vomiting, Diarrhea, Blood per Rectum, Constipation or Heartburn.    PULMONARY- Denies Wheezing, Sputum Production, Cough, Hemoptysis or Pleuritic Chest Pain.    CARDIOVASCULAR- Denies Claudication or Irregular Heart Beat.    Medications      Current Outpatient Medications:   •  aspirin 81 MG EC tablet, Take 1 tablet by mouth daily., Disp: , Rfl:   •  carvedilol (COREG) 25 MG tablet, Take 0.5 tablets by mouth 2 (Two) Times a Day., Disp: 90 tablet, Rfl: 1  •  citalopram (CeleXA) 20 MG tablet, TAKE 1 TABLET BY MOUTH ONCE DAILY, Disp: 30 tablet, Rfl: 5  •  famotidine (PEPCID) 20 MG tablet, Take 1 tablet by mouth Daily., Disp: 30 tablet, Rfl: 1  •  finasteride (PROSCAR) 5 MG tablet, Take 1 tablet by mouth every night at bedtime., Disp: 30 tablet, Rfl: 5  •  folic acid (FOLVITE) 400 MCG tablet, Take 1 tablet by mouth Daily., Disp: 90 tablet, Rfl: 3  •  lisinopril (PRINIVIL,ZESTRIL) 10 MG tablet, TAKE 1 TABLET  BY MOUTH TWICE DAILY, Disp: 180 tablet, Rfl: 1  •  Probiotic Product (PROBIOTIC DAILY PO), Take 1 capsule by mouth daily., Disp: , Rfl:   •  vitamin B-12 (CYANOCOBALAMIN) 1000 MCG tablet, Take 1 tablet by mouth Daily., Disp: 90 tablet, Rfl: 3  •  warfarin (COUMADIN) 5 MG tablet, TAKE ONE TABLET BY MOUTH ONCE DAILY OR  AS  DIRECTED  BY  PHYSICIAN  OFFICE, Disp: 30 tablet, Rfl: 5     Allergies    No Known Allergies    Problem List    Patient Active Problem List   Diagnosis   • Colitis due to Clostridium difficile   • Atrial fibrillation (CMS/HCC)   • Depression   • Hyperbilirubinemia   • Hyperlipidemia   • Hypertension   • Macrocytic anemia   • Orthostatic hypotension   • Malignant neoplasm of overlapping sites of colon (CMS/HCC)   • Dehydration   • History of artificial heart valve   • Neurogenic bladder   • Heart disease   • Tobacco use   • Coronary artery disease involving native coronary artery of native heart without angina pectoris   • H/O aortic valve replacement       Medications, Allergies, Problems List and Past History were reviewed and updated.    Physical Examination    /72 (BP Location: Left arm, Patient Position: Sitting, Cuff Size: Adult)   Pulse 72   Temp 98.1 °F (36.7 °C) (Temporal)   Resp 18   Wt 73.9 kg (163 lb)   BMI 23.39 kg/m²     HEENT: Head- Normocephalic Atraumatic. Facies- Within normal limits. Pinnas- Normal texture and shape bilaterally. Canals- Normal bilaterally. TMs- Normal bilaterally. Nares- Patent bilaterally. Nasal Septum- is normal. There is no tenderness to palpation over the frontal or maxillary sinuses. Lids- Normal bilaterally. Conjunctiva- Clear bilaterally. Sclera- Anicteric bilaterally. Oropharynx- Moist with no lesions. Tonsils- No enlargement, erythema or exudate.    Neck: Thyroid- non enlarged, symmetric and has no nodules. No bruits are detected. ROM- Normal Range of Motion with no rigidity.    Lungs: Auscultation- Clear to auscultation bilaterally. There are  no retractions, clubbing or cyanosis. The Expiratory to Inspiratory ratio is equal.    Cardiovascular: There are no carotid bruits. Heart- Normal Rate with Irregularly Irregular rhythm and no murmurs. There are no gallops. There are no rubs. In the lower extremities there is no edema. The upper extremities do not have edema.    Abdomen: Soft, benign, non-tender with no masses, hernias, organomegaly or scars.    Impression and Assessment    Encounter for Immunization Administration.    Essential Hypertension.    Hyperlipidemia.    Atrial Fibrillation.    Plan    Essential Hypertension Plan: The current plan was continued.    Hyperlipidemia Plan: The patient was instructed to exercise daily and eat a low fat diet.    Atrial Fibrillation Plan: The current plan was continued.    Counseled regarding immunizations and applicable VIS given.    Immunizations Ordered and Administered: Influenza and Prevnar 13.    Dillon was seen today for follow-up.    Diagnoses and all orders for this visit:    Need for immunization against influenza  -     Fluzone High Dose =>65Years; Standing  -     Fluzone High Dose =>65Years    Atrial fibrillation, unspecified type (CMS/HCC)  -     Comprehensive Metabolic Panel  -     TSH  -     T4, Free    Essential hypertension  -     Comprehensive Metabolic Panel  -     TSH  -     T4, Free    Hyperlipidemia, unspecified hyperlipidemia type  -     Comprehensive Metabolic Panel  -     Lipid Panel    Need for pneumococcal vaccine  -     Pneumococcal Conjugate Vaccine 13-Valent All (PCV13)    Prostate cancer screening  -     PSA Screen        Return to Office    The patient was instructed to return for follow-up in 6 months. Next Visit: Physical.    The patient was instructed to return sooner if the condition changes, worsens, or does not resolve.

## 2019-02-22 RX ORDER — FINASTERIDE 5 MG/1
TABLET, FILM COATED ORAL
Qty: 30 TABLET | Refills: 5 | Status: SHIPPED | OUTPATIENT
Start: 2019-02-22 | End: 2019-08-22 | Stop reason: SDUPTHER

## 2019-02-22 RX ORDER — WARFARIN SODIUM 5 MG/1
TABLET ORAL
Qty: 30 TABLET | Refills: 5 | Status: SHIPPED | OUTPATIENT
Start: 2019-02-22 | End: 2019-10-01 | Stop reason: SDUPTHER

## 2019-02-28 ENCOUNTER — OFFICE VISIT (OUTPATIENT)
Dept: CARDIOLOGY | Facility: CLINIC | Age: 69
End: 2019-02-28

## 2019-02-28 VITALS
DIASTOLIC BLOOD PRESSURE: 65 MMHG | HEART RATE: 71 BPM | BODY MASS INDEX: 22.75 KG/M2 | SYSTOLIC BLOOD PRESSURE: 104 MMHG | WEIGHT: 168 LBS | HEIGHT: 72 IN

## 2019-02-28 DIAGNOSIS — I10 ESSENTIAL HYPERTENSION: Primary | ICD-10-CM

## 2019-02-28 DIAGNOSIS — E78.5 HYPERLIPIDEMIA, UNSPECIFIED HYPERLIPIDEMIA TYPE: ICD-10-CM

## 2019-02-28 DIAGNOSIS — I51.9 HEART DISEASE: ICD-10-CM

## 2019-02-28 DIAGNOSIS — I48.21 PERMANENT ATRIAL FIBRILLATION (HCC): ICD-10-CM

## 2019-02-28 DIAGNOSIS — I25.10 CORONARY ARTERY DISEASE INVOLVING NATIVE CORONARY ARTERY OF NATIVE HEART WITHOUT ANGINA PECTORIS: ICD-10-CM

## 2019-02-28 PROCEDURE — 99214 OFFICE O/P EST MOD 30 MIN: CPT | Performed by: INTERNAL MEDICINE

## 2019-02-28 PROCEDURE — 93279 PRGRMG DEV EVAL PM/LDLS PM: CPT | Performed by: INTERNAL MEDICINE

## 2019-02-28 NOTE — PROGRESS NOTES
OFFICE FOLLOW UP     Date of Encounter:2019     Name: Dillon Vo  : 1950  Address: [address] [facility] [address]  Home Phone: [phone]    PCP: Ez Cintron MD  [address] [place] [address] 200  [facility] [address]    Dillon Vo is a 68 y.o. male.      Chief Complaint: Follow up of VHD, CAD, Afib    Problem List:   1. “Mixed” heart disease:  a. Aortic valve disease with mechanical prosthesis, IDB.  b. Everolimus-eluting stent placed mid-LAD for single-vessel CAD, 2013.    c. Ejection fraction less than 20% with global hypokinesia, 2013:  Only modestly reduced EF by echocardiogram, 2013.    d. “Absent” clopidogrel therapy following stent placement, “corrected,” 08/15/2013.  e. Echo, 2018: mild LVH, normal global and segmental LV function, normal functioning bileaflet aortic mechanical valve   2. Atrial fibrillation, chronic:  a. Difficult to control rate.  b. Chads-vasc= 3, chronic warfarin  c. RFA of AV node with implantation of left Bi-V pacemaker, 2013.  d. Hematoma requiring single-chamber pacemaker implanted, 2013.   3. Tobacco use, chronic.  4. Dyslipidemia      Allergies:  No Known Allergies    Current Medications:  •  aspirin 81 MG EC tablet, Take 1 tablet by mouth daily  •  carvedilol (COREG) 25 MG tablet, Take 0.5 tablets by mouth 2 (Two) Times a Day.  •  citalopram (CeleXA) 20 MG tablet, TAKE 1 TABLET BY MOUTH ONCE DAILY  •  famotidine (PEPCID) 20 MG tablet, Take 1 tablet by mouth Daily.  •  finasteride (PROSCAR) 5 MG tablet, TAKE 1 TABLET BY MOUTH AT NIGHT  •  folic acid (FOLVITE) 400 MCG tablet, Take 1 tablet by mouth Daily  •  lisinopril (PRINIVIL,ZESTRIL) 10 MG tablet, TAKE 1 TABLET BY MOUTH TWICE DAILY  •  Probiotic Product (PROBIOTIC DAILY PO), Take 1 capsule by mouth daily  •  vitamin B-12 (CYANOCOBALAMIN) 1000 MCG tablet, Take 1 tablet by mouth Daily  •  warfarin (COUMADIN) 5 MG tablet, TAKE 1 TABLET BY MOUTH ONCE DAILY  "OR  AS  DIRECTED  BY  YOUR  PHYSICIANS  OFFICE    History of Present Illness:   The patient returns for scheduled follow-up today.  He now has a part-time security job, working in the evenings.  He is active.  He is asymptomatic.  He has had no \"issues\" since I stopped his Lanoxin at the time of his last visit.  He has had no issues with Coumadin management.             The following portions of the patient's history were reviewed and updated as appropriate: allergies, current medications and problem list.    ROS: Pertinent positives as listed in the HPI.  All other systems reviewed and negative.    Objective:    Vitals:    02/28/19 1142 02/28/19 1143   BP: 103/72 104/65   BP Location: Right arm Right arm   Patient Position: Sitting Standing   Pulse: 78 71   Weight: 76.2 kg (168 lb) 76.2 kg (168 lb)   Height: 182.9 cm (72\") 182.9 cm (72\")       Physical Exam:  GENERAL: Alert, cooperative, in no acute distress.   HEENT: Normocephalic, no adenopathy, no jugular venous distention  HEART: No discrete PMI is noted. Regular rhythm, normal rate, and no murmurs, gallops, or rubs.   LUNGS: Clear to auscultation bilaterally. No wheezing, rales or ronchi.  ABDOMEN: Soft, bowel sounds present, non-tender   NEUROLOGIC: No focal abnormalities involving strength or sensation are noted.   EXTREMITIES: No clubbing, cyanosis, or edema noted.     Diagnostic Data:    Lab Results   Component Value Date    TSH 1.045 02/13/2019     Lab Results   Component Value Date    CHOL 133 02/13/2019    CHLPL 189 01/21/2016    TRIG 44 02/13/2019    HDL 48 02/13/2019    LDL 75 02/13/2019     Lab Results   Component Value Date    GLUCOSE 116 (H) 02/13/2019    BUN 10 02/13/2019    CREATININE 1.03 02/13/2019    EGFRIFNONA 72 02/13/2019    BCR 9.7 02/13/2019    K 4.2 02/13/2019    CO2 29.0 02/13/2019    CALCIUM 9.1 02/13/2019    ALBUMIN 3.90 02/13/2019    AST 18 02/13/2019    ALT 13 02/13/2019     Lab Results   Component Value Date    INR 2.70 (A) " 01/31/2019    INR 2.10 (A) 01/24/2019    INR 1.70 (A) 01/21/2019       Procedures  BiV PPM interrogation today: VVIR, RV paced >99%, battery life 10 years, No events    Assessment and Plan:   1.  CAD: NYHA I on BMT.  2.  VHD: Normally functioning valve on Warfarin for life. He knows about SBE prophylaxis.  3.  HLD: His LDL cholesterol is at target (75).  4.  Afib: Under treatment and NYHA I.    I, Ez uTcker MD, [facility], [facility], personally performed the services described in this documentation as scribed by the above named individual in my presence, and it is both accurate and complete. At 12:30 PM on 02/28/2019    I will see Dillon Vo back in one year or sooner on an as needed basis.        Scribed for Ez Tucker MD by Taylor Bellamy RN. 02/28/2019 11:45 AM.        EMR Dragon/Transcription Disclaimer:  Much of this encounter note is an electronic transcription/translation of spoken language to printed text.  The electronic translation of spoken language may permit erroneous, or at times, nonsensical words or phrases to be inadvertently transcribed.  Although I have reviewed the note for such errors, some may still exist.

## 2019-03-01 ENCOUNTER — LAB (OUTPATIENT)
Dept: INTERNAL MEDICINE | Facility: CLINIC | Age: 69
End: 2019-03-01

## 2019-03-01 DIAGNOSIS — I48.21 PERMANENT ATRIAL FIBRILLATION (HCC): Primary | ICD-10-CM

## 2019-03-01 LAB — INR PPP: 2.7 (ref 2.5–3.5)

## 2019-03-01 PROCEDURE — 85610 PROTHROMBIN TIME: CPT | Performed by: INTERNAL MEDICINE

## 2019-03-28 ENCOUNTER — LAB (OUTPATIENT)
Dept: INTERNAL MEDICINE | Facility: CLINIC | Age: 69
End: 2019-03-28

## 2019-03-28 DIAGNOSIS — I48.21 PERMANENT ATRIAL FIBRILLATION (HCC): Primary | ICD-10-CM

## 2019-03-28 LAB — INR PPP: 2.7 (ref 2.5–3.5)

## 2019-03-28 PROCEDURE — 85610 PROTHROMBIN TIME: CPT | Performed by: INTERNAL MEDICINE

## 2019-03-29 DIAGNOSIS — D53.9 MACROCYTIC ANEMIA: ICD-10-CM

## 2019-03-29 RX ORDER — LANOLIN ALCOHOL/MO/W.PET/CERES
CREAM (GRAM) TOPICAL
Qty: 90 TABLET | Refills: 3 | Status: SHIPPED | OUTPATIENT
Start: 2019-03-29

## 2019-03-29 RX ORDER — CARVEDILOL 25 MG/1
TABLET ORAL
Qty: 90 TABLET | Refills: 3 | Status: SHIPPED | OUTPATIENT
Start: 2019-03-29 | End: 2020-04-29

## 2019-04-29 ENCOUNTER — LAB (OUTPATIENT)
Dept: INTERNAL MEDICINE | Facility: CLINIC | Age: 69
End: 2019-04-29

## 2019-04-29 DIAGNOSIS — I48.21 PERMANENT ATRIAL FIBRILLATION (HCC): Primary | ICD-10-CM

## 2019-04-29 LAB — INR PPP: 3 (ref 2.5–3.5)

## 2019-04-29 PROCEDURE — 85610 PROTHROMBIN TIME: CPT | Performed by: INTERNAL MEDICINE

## 2019-04-30 RX ORDER — CITALOPRAM 20 MG/1
TABLET ORAL
Qty: 30 TABLET | Refills: 5 | Status: SHIPPED | OUTPATIENT
Start: 2019-04-30 | End: 2019-10-17 | Stop reason: SDUPTHER

## 2019-05-29 ENCOUNTER — LAB (OUTPATIENT)
Dept: INTERNAL MEDICINE | Facility: CLINIC | Age: 69
End: 2019-05-29

## 2019-05-29 DIAGNOSIS — I48.21 PERMANENT ATRIAL FIBRILLATION (HCC): Primary | ICD-10-CM

## 2019-05-29 LAB — INR PPP: 2.4 (ref 2.5–3.5)

## 2019-05-29 PROCEDURE — 85610 PROTHROMBIN TIME: CPT | Performed by: INTERNAL MEDICINE

## 2019-06-25 RX ORDER — LISINOPRIL 10 MG/1
TABLET ORAL
Qty: 180 TABLET | Refills: 1 | Status: SHIPPED | OUTPATIENT
Start: 2019-06-25 | End: 2019-12-16 | Stop reason: SDUPTHER

## 2019-07-01 ENCOUNTER — LAB (OUTPATIENT)
Dept: INTERNAL MEDICINE | Facility: CLINIC | Age: 69
End: 2019-07-01

## 2019-07-01 DIAGNOSIS — I48.21 PERMANENT ATRIAL FIBRILLATION (HCC): Primary | ICD-10-CM

## 2019-07-01 LAB — INR PPP: 2.5 (ref 2.5–3.5)

## 2019-07-01 PROCEDURE — 85610 PROTHROMBIN TIME: CPT | Performed by: INTERNAL MEDICINE

## 2019-07-31 ENCOUNTER — LAB (OUTPATIENT)
Dept: INTERNAL MEDICINE | Facility: CLINIC | Age: 69
End: 2019-07-31

## 2019-07-31 DIAGNOSIS — I48.21 PERMANENT ATRIAL FIBRILLATION (HCC): Primary | ICD-10-CM

## 2019-07-31 LAB — INR PPP: 2.3 (ref 2.5–3.5)

## 2019-07-31 PROCEDURE — 85610 PROTHROMBIN TIME: CPT | Performed by: INTERNAL MEDICINE

## 2019-08-15 ENCOUNTER — OFFICE VISIT (OUTPATIENT)
Dept: INTERNAL MEDICINE | Facility: CLINIC | Age: 69
End: 2019-08-15

## 2019-08-15 VITALS
RESPIRATION RATE: 18 BRPM | HEIGHT: 71 IN | SYSTOLIC BLOOD PRESSURE: 136 MMHG | WEIGHT: 161 LBS | BODY MASS INDEX: 22.54 KG/M2 | HEART RATE: 68 BPM | DIASTOLIC BLOOD PRESSURE: 68 MMHG | TEMPERATURE: 97.4 F

## 2019-08-15 DIAGNOSIS — N39.0 URINARY TRACT INFECTION WITH HEMATURIA, SITE UNSPECIFIED: ICD-10-CM

## 2019-08-15 DIAGNOSIS — I48.91 ATRIAL FIBRILLATION, UNSPECIFIED TYPE (HCC): ICD-10-CM

## 2019-08-15 DIAGNOSIS — L57.0 ACTINIC KERATOSIS: ICD-10-CM

## 2019-08-15 DIAGNOSIS — I10 ESSENTIAL HYPERTENSION: Primary | ICD-10-CM

## 2019-08-15 DIAGNOSIS — Z12.5 PROSTATE CANCER SCREENING: ICD-10-CM

## 2019-08-15 DIAGNOSIS — F17.210 CIGARETTE SMOKER: ICD-10-CM

## 2019-08-15 DIAGNOSIS — R31.9 URINARY TRACT INFECTION WITH HEMATURIA, SITE UNSPECIFIED: ICD-10-CM

## 2019-08-15 DIAGNOSIS — E78.5 HYPERLIPIDEMIA, UNSPECIFIED HYPERLIPIDEMIA TYPE: ICD-10-CM

## 2019-08-15 DIAGNOSIS — Z72.0 TOBACCO USE: ICD-10-CM

## 2019-08-15 LAB
ALBUMIN SERPL-MCNC: 4.2 G/DL (ref 3.5–5.2)
ALBUMIN/GLOB SERPL: 1.6 G/DL
ALP SERPL-CCNC: 59 U/L (ref 39–117)
ALT SERPL W P-5'-P-CCNC: 7 U/L (ref 1–41)
ANION GAP SERPL CALCULATED.3IONS-SCNC: 10.8 MMOL/L (ref 5–15)
ANISOCYTOSIS BLD QL: ABNORMAL
AST SERPL-CCNC: 15 U/L (ref 1–40)
BILIRUB BLD-MCNC: NEGATIVE MG/DL
BILIRUB SERPL-MCNC: 1 MG/DL (ref 0.2–1.2)
BUN BLD-MCNC: 9 MG/DL (ref 8–23)
BUN/CREAT SERPL: 8.9 (ref 7–25)
BURR CELLS BLD QL SMEAR: ABNORMAL
CALCIUM SPEC-SCNC: 9.6 MG/DL (ref 8.6–10.5)
CHLORIDE SERPL-SCNC: 104 MMOL/L (ref 98–107)
CHOLEST SERPL-MCNC: 117 MG/DL (ref 0–200)
CLARITY, POC: ABNORMAL
CLUMPED PLATELETS: PRESENT
CO2 SERPL-SCNC: 27.2 MMOL/L (ref 22–29)
COLOR UR: YELLOW
CREAT BLD-MCNC: 1.01 MG/DL (ref 0.76–1.27)
DEPRECATED RDW RBC AUTO: 55.5 FL (ref 37–54)
ERYTHROCYTE [DISTWIDTH] IN BLOOD BY AUTOMATED COUNT: 13.2 % (ref 12.3–15.4)
EXPIRATION DATE: ABNORMAL
GFR SERPL CREATININE-BSD FRML MDRD: 73 ML/MIN/1.73
GLOBULIN UR ELPH-MCNC: 2.7 GM/DL
GLUCOSE BLD-MCNC: 94 MG/DL (ref 65–99)
GLUCOSE UR STRIP-MCNC: NEGATIVE MG/DL
HCT VFR BLD AUTO: 43.6 % (ref 37.5–51)
HDLC SERPL-MCNC: 47 MG/DL (ref 40–60)
HGB BLD-MCNC: 13.8 G/DL (ref 13–17.7)
INR PPP: 2.6 (ref 2.5–3.5)
KETONES UR QL: NEGATIVE
LDLC SERPL CALC-MCNC: 60 MG/DL (ref 0–100)
LDLC/HDLC SERPL: 1.28 {RATIO}
LEUKOCYTE EST, POC: ABNORMAL
LYMPHOCYTES # BLD MANUAL: 1.31 10*3/MM3 (ref 0.7–3.1)
LYMPHOCYTES NFR BLD MANUAL: 14.4 % (ref 5–12)
LYMPHOCYTES NFR BLD MANUAL: 32 % (ref 19.6–45.3)
Lab: ABNORMAL
MACROCYTES BLD QL SMEAR: ABNORMAL
MCH RBC QN AUTO: 35.5 PG (ref 26.6–33)
MCHC RBC AUTO-ENTMCNC: 31.7 G/DL (ref 31.5–35.7)
MCV RBC AUTO: 112.1 FL (ref 79–97)
MONOCYTES # BLD AUTO: 0.59 10*3/MM3 (ref 0.1–0.9)
NEUTROPHILS # BLD AUTO: 2.19 10*3/MM3 (ref 1.7–7)
NEUTROPHILS NFR BLD MANUAL: 53.6 % (ref 42.7–76)
NITRITE UR-MCNC: POSITIVE MG/ML
PH UR: 5 [PH] (ref 5–8)
PLATELET # BLD AUTO: ABNORMAL 10*3/MM3 (ref 140–450)
PMV BLD AUTO: 13.3 FL (ref 6–12)
POIKILOCYTOSIS BLD QL SMEAR: ABNORMAL
POTASSIUM BLD-SCNC: 4.3 MMOL/L (ref 3.5–5.2)
PROT SERPL-MCNC: 6.9 G/DL (ref 6–8.5)
PROT UR STRIP-MCNC: NEGATIVE MG/DL
PSA SERPL-MCNC: 0.14 NG/ML (ref 0–4)
RBC # BLD AUTO: 3.89 10*6/MM3 (ref 4.14–5.8)
RBC # UR STRIP: ABNORMAL /UL
SODIUM BLD-SCNC: 142 MMOL/L (ref 136–145)
SP GR UR: 1.02 (ref 1–1.03)
TRIGL SERPL-MCNC: 50 MG/DL (ref 0–150)
TSH SERPL DL<=0.05 MIU/L-ACNC: 1.86 MIU/ML (ref 0.27–4.2)
UROBILINOGEN UR QL: ABNORMAL
VLDLC SERPL-MCNC: 10 MG/DL (ref 5–40)
WBC MORPH BLD: NORMAL
WBC NRBC COR # BLD: 4.09 10*3/MM3 (ref 3.4–10.8)

## 2019-08-15 PROCEDURE — 85610 PROTHROMBIN TIME: CPT | Performed by: INTERNAL MEDICINE

## 2019-08-15 PROCEDURE — 85007 BL SMEAR W/DIFF WBC COUNT: CPT | Performed by: INTERNAL MEDICINE

## 2019-08-15 PROCEDURE — 87086 URINE CULTURE/COLONY COUNT: CPT | Performed by: INTERNAL MEDICINE

## 2019-08-15 PROCEDURE — 85025 COMPLETE CBC W/AUTO DIFF WBC: CPT | Performed by: INTERNAL MEDICINE

## 2019-08-15 PROCEDURE — 36415 COLL VENOUS BLD VENIPUNCTURE: CPT | Performed by: INTERNAL MEDICINE

## 2019-08-15 PROCEDURE — 99406 BEHAV CHNG SMOKING 3-10 MIN: CPT | Performed by: INTERNAL MEDICINE

## 2019-08-15 PROCEDURE — 80053 COMPREHEN METABOLIC PANEL: CPT | Performed by: INTERNAL MEDICINE

## 2019-08-15 PROCEDURE — 87186 SC STD MICRODIL/AGAR DIL: CPT | Performed by: INTERNAL MEDICINE

## 2019-08-15 PROCEDURE — 81003 URINALYSIS AUTO W/O SCOPE: CPT | Performed by: INTERNAL MEDICINE

## 2019-08-15 PROCEDURE — G0103 PSA SCREENING: HCPCS | Performed by: INTERNAL MEDICINE

## 2019-08-15 PROCEDURE — 99214 OFFICE O/P EST MOD 30 MIN: CPT | Performed by: INTERNAL MEDICINE

## 2019-08-15 PROCEDURE — 80061 LIPID PANEL: CPT | Performed by: INTERNAL MEDICINE

## 2019-08-15 PROCEDURE — 87077 CULTURE AEROBIC IDENTIFY: CPT | Performed by: INTERNAL MEDICINE

## 2019-08-15 PROCEDURE — 84443 ASSAY THYROID STIM HORMONE: CPT | Performed by: INTERNAL MEDICINE

## 2019-08-15 NOTE — PROGRESS NOTES
Chief Complaint   Patient presents with   • Extended follow up       History of Present Illness    The patient presents for a follow-up related to hypertension. The patient reports that he has had no headaches, chest pain, dyspnea, edema, syncope, blurred vision or palpitations. He states that he is taking his medication as prescribed. He is not having medication side effects.    The patient presents for a follow-up related to hyperlipidemia. He is following a low fat diet. He reports that he is exercising. He is not taking medication for hyperlipidemia. He denies orthopnea, paroxysmal nocturnal dyspnea or dyspnea on exertion.    The patient presents for a follow-up of chronic atrial fibrillation and he denies palpitations. The patient denies fatigue, dizziness, nausea or exercise intolerance. The patient denies using diet pills, decongestants, alcohol, caffeine or cocaine.    The patient smokes. He smokes every day. He smokes 1 pack per day. He has smoked for more than 50 years.    Review of Systems    CONSTITUTIONAL- Denies Unexplained Weight Loss, Fever, Chills, Sweats, Weakness or Malaise.    NECK- Denies Decreased Range of Motion, Stiffness, Thyroid Nodules, Enlarged Lymph Nodes or Goiter.    EYES- Denies Eye Pain, Eye Drainage, Eye Redness, Eye Discharge, Decreased Vision, Visual Disturbance, Diplopia, Visual Loss or Swollen Eyelid.    HENT- Denies Nasal Discharge, Sore Throat, Ear Pain, Ear Drainage, Sinus Pain, Nasal Congestion, Decreased Hearing or Tinnitus.    PULMONARY- Denies Wheezing, Sputum Production, Cough, Hemoptysis or Pleuritic Chest Pain.    GASTROINTESTINAL- Denies Abdominal Pain, Vomiting, Diarrhea, Blood per Rectum, Constipation or Heartburn.    GENITOURINARY- Denies Penile Discharge, Erectile Dysfunction, Testicular Mass, Testicular Pain, Hernia, Nocturia, Decreased Urine Stream, Incomplete Voiding, Urinary Frequency, Urinary Urgency, Dysuria or Hematuria.    CARDIOVASCULAR- Denies  Claudication or Irregular Heart Beat.    ENDOCRINE- Denies Cold Intolerance, Depression, Hair Loss, Heat Intolerance, Memory Loss, Polydypsia, Polyphagia, Polyuria, Sleep Disturbance or Weight Gain.    NEUROLOGIC- Denies Seizures, Confusion or Excessive Daytime Sleepiness.    MUSCULOSKELETAL- Denies Joint Pain, Joint Stiffness, Decreased Range of Motion, Joint Swelling or Erythema of Joints.    INTEGUMENTARY- Denies Rash, Lumps, Sores, Itching, Dryness, Color Change, Changes in Hair, Brittle Nails, Discolored Nails, Thickened Nails, Growths or Alopecia.    Medications      Current Outpatient Medications:   •  aspirin 81 MG EC tablet, Take 1 tablet by mouth daily., Disp: , Rfl:   •  carvedilol (COREG) 25 MG tablet, TAKE 1/2 (ONE-HALF) TABLET BY MOUTH TWICE DAILY, Disp: 90 tablet, Rfl: 3  •  citalopram (CeleXA) 20 MG tablet, TAKE 1 TABLET BY MOUTH ONCE DAILY, Disp: 30 tablet, Rfl: 5  •  famotidine (PEPCID) 20 MG tablet, Take 1 tablet by mouth Daily., Disp: 30 tablet, Rfl: 1  •  finasteride (PROSCAR) 5 MG tablet, TAKE 1 TABLET BY MOUTH AT NIGHT, Disp: 30 tablet, Rfl: 5  •  folic acid (FOLVITE) 400 MCG tablet, TAKE 1 TABLET BY MOUTH ONCE DAILY, Disp: 90 tablet, Rfl: 3  •  lisinopril (PRINIVIL,ZESTRIL) 10 MG tablet, TAKE 1 TABLET BY MOUTH TWICE DAILY, Disp: 180 tablet, Rfl: 1  •  Probiotic Product (PROBIOTIC DAILY PO), Take 1 capsule by mouth daily., Disp: , Rfl:   •  vitamin B-12 (CYANOCOBALAMIN) 1000 MCG tablet, Take 1 tablet by mouth Daily., Disp: 90 tablet, Rfl: 3  •  warfarin (COUMADIN) 5 MG tablet, TAKE 1 TABLET BY MOUTH ONCE DAILY OR  AS  DIRECTED  BY  YOUR  PHYSICIANS  OFFICE, Disp: 30 tablet, Rfl: 5     Allergies    No Known Allergies    Problem List    Patient Active Problem List   Diagnosis   • Colitis due to Clostridium difficile   • Atrial fibrillation (CMS/HCC)   • Depression   • Hyperbilirubinemia   • Hyperlipidemia   • Hypertension   • Macrocytic anemia   • Orthostatic hypotension   • Malignant neoplasm  "of overlapping sites of colon (CMS/HCC)   • Dehydration   • History of artificial heart valve   • Neurogenic bladder   • Heart disease   • Tobacco use   • Coronary artery disease involving native coronary artery of native heart without angina pectoris   • H/O aortic valve replacement       Medications, Allergies, Problems List and Past History were reviewed and updated.    Physical Examination    /68   Pulse 68   Temp 97.4 °F (36.3 °C) (Temporal)   Resp 18   Ht 180.3 cm (71\")   Wt 73 kg (161 lb)   BMI 22.45 kg/m²     HEENT: Head- Normocephalic Atraumatic. Facies- Within normal limits. Pinnas- Normal texture and shape bilaterally. Canals- Normal bilaterally. TMs- Normal bilaterally. Nares- Patent bilaterally. Nasal Septum- is normal. There is no tenderness to palpation over the frontal or maxillary sinuses. Lids- Normal bilaterally. Conjunctiva- Clear bilaterally. Sclera- Anicteric bilaterally. Oropharynx- Moist with no lesions. Tonsils- No enlargement, erythema or exudate.    Neck: Thyroid- non enlarged, symmetric and has no nodules. No bruits are detected. ROM- Normal Range of Motion with no rigidity.    Lungs: Auscultation- Clear to auscultation bilaterally. There are no retractions, clubbing or cyanosis. The Expiratory to Inspiratory ratio is equal.    Lymph Nodes: Cervical- no enlarged lymph nodes noted. Clavicular- no enlarged supraclavicular lymph nodes noted. Axillary- no enlarged axillary lymph nodes noted. Inguinal- no enlarged inguinal lymph nodes noted.    Cardiovascular: There are no carotid bruits. Heart- Normal Rate with Irregularly Irregular rhythm and no murmurs. There are no gallops. There are no rubs. In the lower extremities there is no edema. The upper extremities do not have edema.    Abdomen: Soft, benign, non-tender with no masses, hernias, organomegaly or scars.    Male Genitourinary: The penis is circumcised with testicles found in the scrotum bilaterally. Testicular Size is " normal bilaterally. The penis has no anatomic abnormalities.    Rectal: reveals normal external sphincter tone with no external lesions.    Prostate: The prostate gland is symmetric and smooth with no nodularity.    Dermatologic: The patient has no worrisome or suspicious skin lesions noted. He has numerous actinic keratoses over his entire body.    Impression and Assessment    Encounter for Screening for Malignant Neoplasm of the Prostate.    Essential Hypertension.    Hyperlipidemia.    Atrial Fibrillation.    Tobacco Abuse Disorder.    Actinic keratosis.    Plan    Essential Hypertension Plan: The current plan was continued.    Hyperlipidemia Plan: The patient was instructed to exercise daily and eat a low fat diet.    Atrial Fibrillation Plan: The current plan was continued.    Tobacco Abuse Disorder Plan: Shared decision making regarding a lung cancer screening CT was performed. The patient was encouraged to stop smoking. The patient was counseled regarding stopping smoking for between 3 and 10 minutes. Chantix was discussed. Nicotine gum was discussed. Nicotine lozenges were discussed. The nicotine patch was discussed with the patient. Wellbutrin was discussed. The following tests were ordered: CT Chest Low Dose Lung Cancer Screening.       Lung Cancer Screening Checklist   Lung Cancer Screening Qualifications (must meet all):   The patient meets the following criteria:   • Age 55-80   • Current smoker or former smoker who quit within last 15 years   • 30 pack year smoking history   • Asymptomatic for lung cancer   • No personal history of lung cancer   • Healthy enough and willing to have lung cancer treatment      Due to meeting all criteria, the patient qualifies for a Low Dose Lung Cancer Screening CT.       Shared Decision Making Checklist   • Appropriate decision aids were utilized.   • Counseling was provided that annual screening will be needed until the patient has been smoking free for 15 years or  until age 80.      Decision aids included information regarding:   • Benefits of Screening   • Reduced Mortality   • Harms of Screening   • False Positive Results   • Need for Additional Tests   • Need for Additional Treatment   • Radiation Exposure   • Unknown Factors Including How Long to Test and Impact of Long Term Screening      Actinic keratosis Plan: A dermatology referral was arranged.    The following was ordered for screening and health maintenance: Low Dose Lung Cancer Screening CT and PSA.    Dillon was seen today for extended follow up.    Diagnoses and all orders for this visit:    Essential hypertension  -     Comprehensive Metabolic Panel  -     TSH  -     CBC & Differential  -     POC Urinalysis Dipstick, Automated    Hyperlipidemia, unspecified hyperlipidemia type  -     Comprehensive Metabolic Panel  -     Lipid Panel    Atrial fibrillation, unspecified type (CMS/HCC)  -     Comprehensive Metabolic Panel    Prostate cancer screening  -     PSA Screen    Tobacco use  -     CT Chest Low Dose Wo; Future    Cigarette smoker  -     CT Chest Low Dose Wo; Future    Actinic keratosis  -     Ambulatory Referral to Dermatology        Return to Office    The patient was instructed to return for follow-up in 6 months. Next Visit: Follow-up.    The patient was instructed to return sooner if the condition changes, worsens, or does not resolve.

## 2019-08-17 LAB — BACTERIA SPEC AEROBE CULT: ABNORMAL

## 2019-08-20 ENCOUNTER — HOSPITAL ENCOUNTER (OUTPATIENT)
Dept: CT IMAGING | Facility: HOSPITAL | Age: 69
Discharge: HOME | End: 2019-08-20
Admitting: INTERNAL MEDICINE

## 2019-08-20 DIAGNOSIS — Z72.0 TOBACCO USE: ICD-10-CM

## 2019-08-20 DIAGNOSIS — F17.210 CIGARETTE SMOKER: ICD-10-CM

## 2019-08-20 PROCEDURE — G0297 LDCT FOR LUNG CA SCREEN: HCPCS

## 2019-08-22 RX ORDER — FINASTERIDE 5 MG/1
TABLET, FILM COATED ORAL
Qty: 90 TABLET | Refills: 1 | Status: SHIPPED | OUTPATIENT
Start: 2019-08-22 | End: 2020-02-19

## 2019-08-26 DIAGNOSIS — R91.1 PULMONARY NODULE: Primary | ICD-10-CM

## 2019-09-03 ENCOUNTER — LAB (OUTPATIENT)
Dept: INTERNAL MEDICINE | Facility: CLINIC | Age: 69
End: 2019-09-03

## 2019-09-03 DIAGNOSIS — I48.21 PERMANENT ATRIAL FIBRILLATION (HCC): Primary | ICD-10-CM

## 2019-09-03 LAB — INR PPP: 5.2 (ref 2.5–3.5)

## 2019-09-03 PROCEDURE — 85610 PROTHROMBIN TIME: CPT | Performed by: INTERNAL MEDICINE

## 2019-09-06 ENCOUNTER — LAB (OUTPATIENT)
Dept: INTERNAL MEDICINE | Facility: CLINIC | Age: 69
End: 2019-09-06

## 2019-09-06 DIAGNOSIS — I48.21 PERMANENT ATRIAL FIBRILLATION (HCC): Primary | ICD-10-CM

## 2019-09-06 LAB — INR PPP: 2.6 (ref 2.5–3.5)

## 2019-09-06 PROCEDURE — 85610 PROTHROMBIN TIME: CPT | Performed by: INTERNAL MEDICINE

## 2019-09-11 ENCOUNTER — LAB (OUTPATIENT)
Dept: INTERNAL MEDICINE | Facility: CLINIC | Age: 69
End: 2019-09-11

## 2019-09-11 DIAGNOSIS — I48.21 PERMANENT ATRIAL FIBRILLATION (HCC): Primary | ICD-10-CM

## 2019-09-11 LAB — INR PPP: 3.6 (ref 2.5–3.5)

## 2019-09-11 PROCEDURE — 85610 PROTHROMBIN TIME: CPT | Performed by: INTERNAL MEDICINE

## 2019-09-16 ENCOUNTER — LAB (OUTPATIENT)
Dept: INTERNAL MEDICINE | Facility: CLINIC | Age: 69
End: 2019-09-16

## 2019-09-16 DIAGNOSIS — I48.21 PERMANENT ATRIAL FIBRILLATION (HCC): Primary | ICD-10-CM

## 2019-09-16 LAB — INR PPP: 2.1 (ref 2.5–3.5)

## 2019-09-16 PROCEDURE — 85610 PROTHROMBIN TIME: CPT | Performed by: INTERNAL MEDICINE

## 2019-09-17 ENCOUNTER — DOCUMENTATION (OUTPATIENT)
Dept: ONCOLOGY | Facility: CLINIC | Age: 69
End: 2019-09-17

## 2019-09-17 ENCOUNTER — OFFICE VISIT (OUTPATIENT)
Dept: PULMONOLOGY | Facility: CLINIC | Age: 69
End: 2019-09-17

## 2019-09-17 VITALS
SYSTOLIC BLOOD PRESSURE: 136 MMHG | OXYGEN SATURATION: 97 % | HEART RATE: 70 BPM | TEMPERATURE: 98.6 F | HEIGHT: 71 IN | DIASTOLIC BLOOD PRESSURE: 72 MMHG | BODY MASS INDEX: 21.98 KG/M2 | WEIGHT: 157 LBS

## 2019-09-17 DIAGNOSIS — Z72.0 TOBACCO ABUSE: ICD-10-CM

## 2019-09-17 DIAGNOSIS — R91.1 LUNG NODULE: Primary | ICD-10-CM

## 2019-09-17 PROCEDURE — 94726 PLETHYSMOGRAPHY LUNG VOLUMES: CPT | Performed by: INTERNAL MEDICINE

## 2019-09-17 PROCEDURE — 94060 EVALUATION OF WHEEZING: CPT | Performed by: INTERNAL MEDICINE

## 2019-09-17 PROCEDURE — 94729 DIFFUSING CAPACITY: CPT | Performed by: INTERNAL MEDICINE

## 2019-09-17 PROCEDURE — 99205 OFFICE O/P NEW HI 60 MIN: CPT | Performed by: INTERNAL MEDICINE

## 2019-09-17 RX ORDER — ALBUTEROL SULFATE 90 UG/1
4 AEROSOL, METERED RESPIRATORY (INHALATION) ONCE
Status: COMPLETED | OUTPATIENT
Start: 2019-09-17 | End: 2019-09-17

## 2019-09-17 RX ADMIN — ALBUTEROL SULFATE 4 PUFF: 90 AEROSOL, METERED RESPIRATORY (INHALATION) at 10:05

## 2019-09-17 NOTE — PROGRESS NOTES
Met patient in lung nodule clinic with Dr. KATIA Kenney. Patient started smoking at age 12 and smoked up to 4ppd. He has significant cardiac history with mechanical valve, stent and a-fib. He is on Coumadin. Smoking cessation discussed and he is interested in quitting. States he has quit previously using the nicotine gum and will do that again. History of colon cancer treated with surgery only. He denies new SOA, weight loss, or hemoptysis. LDCT done for lung cancer screening revealing 9mm MARKO spiculated nodule. Scans and PFT's reviewed. Per Dr. Kenney PET now with f/u in a couple weeks to review. If PET positive possible options are surgery and [facility]. Would need to discuss with cardiologist to help better determine if he would be a good surgical candidate. If PET is negative plan would be to repeat CT chest with a short 1mo interval. Introduced lung navigator role and provided contact information. He v/u and agreeable to plan. He knows to call with questions or concerns.

## 2019-09-17 NOTE — PROGRESS NOTES
CC:    Abnormal CT    HPI:    68 y/o WM w/ h/o tobacco abuse ~100 py plus, prior colon cancer s/p resection 2001, mechanical AVR, CAD w/ prior stent, Afib, who presents for evaluation of abnormal CT Chest. Patient had a lung cancer screening CT 8/20/19 showing a spiculated 12 mm (I disagree with radiology reading of 9 mm) spiculated MARKO lesion.  No mediastinal or hilar LAD.  No symptoms attributable to this nodule.    PMH:    Past Medical History:   Diagnosis Date   • A-fib (CMS/HCC)    • Anemia    • Atrial fibrillation (CMS/HCC) 5/5/2016    Difficult to control rate. RFA of AV node with implantation of left Bi-V pacemaker, 06/18/2013. Hematoma requiring single-chamber pacemaker implanted, 07/29/2013.    • Colitis    • Colon cancer (CMS/HCC)    • Depression    • HTN (hypertension)    • Hyperbilirubinemia    • Hyperlipidemia    • Orthostatic hypotension      PSH:    Past Surgical History:   Procedure Laterality Date   • APPENDECTOMY     • CARDIAC SURGERY     • COLON SURGERY     • EXTRACORPOREAL SHOCKWAVE LITHOTRIPSY (ESWL), STENT INSERTION/REMOVAL     • PACEMAKER IMPLANTATION     • VARICOSE VEIN SURGERY       FH:    Family History   Problem Relation Age of Onset   • Cancer Mother    • Hypertension Father    • Heart disease Father      SH:    Social History     Socioeconomic History   • Marital status:      Spouse name: Not on file   • Number of children: Not on file   • Years of education: Not on file   • Highest education level: Not on file   Tobacco Use   • Smoking status: Current Every Day Smoker     Packs/day: 1.50     Years: 57.00     Pack years: 85.50     Types: Cigarettes   • Smokeless tobacco: Never Used   Substance and Sexual Activity   • Alcohol use: Yes     Alcohol/week: 1.2 oz     Types: 2 Shots of liquor per week     Frequency: 4 or more times a week     Drinks per session: 1 or 2     Comment: Daily   • Drug use: No   • Sexual activity: Yes     Partners: Female     Comment:        ALLERGIES:    No Known Allergies  MEDICATIONS:      Current Outpatient Medications:   •  aspirin 81 MG EC tablet, Take 1 tablet by mouth daily., Disp: , Rfl:   •  carvedilol (COREG) 25 MG tablet, TAKE 1/2 (ONE-HALF) TABLET BY MOUTH TWICE DAILY, Disp: 90 tablet, Rfl: 3  •  citalopram (CeleXA) 20 MG tablet, TAKE 1 TABLET BY MOUTH ONCE DAILY, Disp: 30 tablet, Rfl: 5  •  famotidine (PEPCID) 20 MG tablet, Take 1 tablet by mouth Daily., Disp: 30 tablet, Rfl: 1  •  finasteride (PROSCAR) 5 MG tablet, TAKE 1 TABLET BY MOUTH AT NIGHT, Disp: 90 tablet, Rfl: 1  •  folic acid (FOLVITE) 400 MCG tablet, TAKE 1 TABLET BY MOUTH ONCE DAILY, Disp: 90 tablet, Rfl: 3  •  lisinopril (PRINIVIL,ZESTRIL) 10 MG tablet, TAKE 1 TABLET BY MOUTH TWICE DAILY, Disp: 180 tablet, Rfl: 1  •  Probiotic Product (PROBIOTIC DAILY PO), Take 1 capsule by mouth daily., Disp: , Rfl:   •  vitamin B-12 (CYANOCOBALAMIN) 1000 MCG tablet, Take 1 tablet by mouth Daily., Disp: 90 tablet, Rfl: 3  •  warfarin (COUMADIN) 5 MG tablet, TAKE 1 TABLET BY MOUTH ONCE DAILY OR  AS  DIRECTED  BY  YOUR  PHYSICIANS  OFFICE, Disp: 30 tablet, Rfl: 5  No current facility-administered medications for this visit.   ROS:  Per HPI, otherwise all systems reviewed and negative.    DIAGNOSTIC DATA (Reviewed and interpreted by me unless otherwise specified):    CT Chest 8/20/19 - emphysema, scarring RUL, scattered sub-centimeter nodules, 12 mm spiculated nodule in MARKO    PFT 9/17/19 - borderline mild obstruction, +BD change, hyperinflation, +air trapping, normal DLCO    Vitals:    09/17/19 1019   BP: 136/72   Pulse: 70   Temp: 98.6 °F (37 °C)   SpO2: 97%       Physical Exam   Constitutional: Oriented to person, place, and time. Appears well-developed and well-nourished.   Head: Normocephalic and atraumatic.   Nose: Nose normal.   Mouth/Throat: Oropharynx is clear and moist.   Eyes: Conjunctivae are normal.  Pupils normal.  Neck: No tracheal deviation present.   Cardiovascular: Normal  rate, regular rhythm, normal heart sounds and intact distal pulses.  Exam reveals no gallop and no friction rub.  No thrill.  No JVD.  No edema.  No murmur heard.  Pulmonary/Chest: Effort normal and breath sounds normal.  No tenderness to palpation.  No clubbing.   Abdominal: Soft. Bowel sounds are normal. No distension. No tenderness. There is no guarding.   Musculoskeletal: Normal range of motion.  No tenderness.  Lymphadenopathy:  No cervical adenopathy.   Neurological:  No new focal neurological deficits observed   Skin: Skin is warm and dry. No rash noted.   Psychiatric: Normal mood and affect.  Behavior is normal. Judgment normal.    Assessment/Plan     1)  Lung Nodule - pre-test probability for malignancy extremely high.  I disagree with radiology reading, I think the nodule is closer to 12 mm.  Will start with PET-CT and bring back in 2 weeks.  If PET positive, will need to discuss with Dr. Tucker to see if he is a surgical candidate (he is from a pulmonary standpoint).  If not a surgical candidate would do SBRT.  If PET negative will discuss roula bronch vs close interval scan in 6 weeks, then 3 months.    2) Tobacco Abuse - counseled patient, recommended NRT    RTC 2 weeks to go over PET-CT    Payam Kenney MD  Pulmonology and Critical Care Medicine  09/17/19 11:00 AM  Electronically Signed    C.C.:  Ez Cintron MD, Ez Cintron MD

## 2019-09-20 DIAGNOSIS — R91.1 PULMONARY NODULE: Primary | ICD-10-CM

## 2019-09-23 ENCOUNTER — LAB (OUTPATIENT)
Dept: INTERNAL MEDICINE | Facility: CLINIC | Age: 69
End: 2019-09-23

## 2019-09-23 DIAGNOSIS — I48.21 PERMANENT ATRIAL FIBRILLATION (HCC): Primary | ICD-10-CM

## 2019-09-23 LAB — INR PPP: 2.3 (ref 2.5–3.5)

## 2019-09-23 PROCEDURE — 85610 PROTHROMBIN TIME: CPT | Performed by: INTERNAL MEDICINE

## 2019-09-25 ENCOUNTER — APPOINTMENT (OUTPATIENT)
Dept: PET IMAGING | Facility: HOSPITAL | Age: 69
End: 2019-09-25

## 2019-09-25 ENCOUNTER — HOSPITAL ENCOUNTER (OUTPATIENT)
Dept: PET IMAGING | Facility: HOSPITAL | Age: 69
Discharge: HOME | End: 2019-09-25
Admitting: INTERNAL MEDICINE

## 2019-09-25 ENCOUNTER — HOSPITAL ENCOUNTER (OUTPATIENT)
Dept: PET IMAGING | Facility: HOSPITAL | Age: 69
Discharge: HOME | End: 2019-09-25

## 2019-09-25 DIAGNOSIS — R91.1 PULMONARY NODULE: ICD-10-CM

## 2019-09-25 LAB — GLUCOSE BLDC GLUCOMTR-MCNC: 101 MG/DL (ref 70–130)

## 2019-09-25 PROCEDURE — A9552 F18 FDG: HCPCS | Performed by: INTERNAL MEDICINE

## 2019-09-25 PROCEDURE — 0 FLUDEOXYGLUCOSE F18 SOLUTION: Performed by: INTERNAL MEDICINE

## 2019-09-25 PROCEDURE — 78815 PET IMAGE W/CT SKULL-THIGH: CPT

## 2019-09-25 PROCEDURE — 82962 GLUCOSE BLOOD TEST: CPT

## 2019-09-25 RX ADMIN — FLUDEOXYGLUCOSE F18 1 DOSE: 300 INJECTION INTRAVENOUS at 08:15

## 2019-09-27 ENCOUNTER — TELEPHONE (OUTPATIENT)
Dept: ONCOLOGY | Facility: CLINIC | Age: 69
End: 2019-09-27

## 2019-09-27 NOTE — TELEPHONE ENCOUNTER
Called patient per Dr. Kenney's request to review PET results and discuss further plan. No answer, LVM with return contact. Will notify office of need for iLogic CT x6-8wks with OV.

## 2019-10-01 RX ORDER — WARFARIN SODIUM 5 MG/1
TABLET ORAL
Qty: 90 TABLET | Refills: 1 | Status: SHIPPED | OUTPATIENT
Start: 2019-10-01 | End: 2020-02-24 | Stop reason: HOSPADM

## 2019-10-02 DIAGNOSIS — R91.1 NODULE OF UPPER LOBE OF LEFT LUNG: Primary | ICD-10-CM

## 2019-10-07 ENCOUNTER — TELEPHONE (OUTPATIENT)
Dept: ONCOLOGY | Facility: CLINIC | Age: 69
End: 2019-10-07

## 2019-10-07 NOTE — TELEPHONE ENCOUNTER
Confirmed patient's new appointment with Dr. Kenney 11/11/19 at 10am. He v/u and agreeable to plan. He knows to call with questions or concerns.

## 2019-10-17 RX ORDER — CITALOPRAM 20 MG/1
TABLET ORAL
Qty: 30 TABLET | Refills: 5 | Status: SHIPPED | OUTPATIENT
Start: 2019-10-17 | End: 2020-04-06

## 2019-10-23 ENCOUNTER — LAB (OUTPATIENT)
Dept: INTERNAL MEDICINE | Facility: CLINIC | Age: 69
End: 2019-10-23

## 2019-10-23 DIAGNOSIS — I48.21 PERMANENT ATRIAL FIBRILLATION (HCC): Primary | ICD-10-CM

## 2019-10-23 LAB — INR PPP: 2.9 (ref 2.5–3.5)

## 2019-10-23 PROCEDURE — 85610 PROTHROMBIN TIME: CPT | Performed by: INTERNAL MEDICINE

## 2019-11-06 ENCOUNTER — HOSPITAL ENCOUNTER (OUTPATIENT)
Dept: CT IMAGING | Facility: HOSPITAL | Age: 69
Discharge: HOME | End: 2019-11-06
Admitting: NURSE PRACTITIONER

## 2019-11-06 DIAGNOSIS — R91.1 NODULE OF UPPER LOBE OF LEFT LUNG: ICD-10-CM

## 2019-11-06 PROCEDURE — 71250 CT THORAX DX C-: CPT

## 2019-11-13 ENCOUNTER — OFFICE VISIT (OUTPATIENT)
Dept: PULMONOLOGY | Facility: CLINIC | Age: 69
End: 2019-11-13

## 2019-11-13 VITALS
HEIGHT: 71 IN | OXYGEN SATURATION: 92 % | TEMPERATURE: 97.9 F | BODY MASS INDEX: 22.51 KG/M2 | DIASTOLIC BLOOD PRESSURE: 70 MMHG | HEART RATE: 68 BPM | SYSTOLIC BLOOD PRESSURE: 110 MMHG | WEIGHT: 160.8 LBS

## 2019-11-13 DIAGNOSIS — J44.9 CHRONIC OBSTRUCTIVE PULMONARY DISEASE, UNSPECIFIED COPD TYPE (HCC): ICD-10-CM

## 2019-11-13 DIAGNOSIS — Z72.0 TOBACCO ABUSE: ICD-10-CM

## 2019-11-13 DIAGNOSIS — R91.1 LUNG NODULE: Primary | ICD-10-CM

## 2019-11-13 PROCEDURE — 99214 OFFICE O/P EST MOD 30 MIN: CPT | Performed by: INTERNAL MEDICINE

## 2019-11-13 RX ORDER — BUPROPION HYDROCHLORIDE 300 MG/1
300 TABLET ORAL DAILY
Qty: 30 TABLET | Refills: 4 | Status: SHIPPED | OUTPATIENT
Start: 2019-11-13 | End: 2020-02-18 | Stop reason: SDUPTHER

## 2019-11-13 NOTE — PROGRESS NOTES
CC:    Abnormal CT    HPI:    70 y/o WM w/ h/o tobacco abuse ~100 py plus, prior colon cancer s/p resection 2001, mechanical AVR, CAD w/ prior stent, Afib, who presents for evaluation of abnormal CT Chest. Patient had a lung cancer screening CT 8/20/19 showing a spiculated 12 mm (I disagree with radiology reading of 9 mm) spiculated MARKO lesion.  No mediastinal or hilar LAD.  No symptoms attributable to this nodule.    INTERVAL HISTORY:    Patient returns today for follow up.  No breathing issues.  Had a PET which was negative.  Repeat CT also stable.    PMH:    Past Medical History:   Diagnosis Date   • A-fib (CMS/HCC)    • Anemia    • Atrial fibrillation (CMS/HCC) 5/5/2016    Difficult to control rate. RFA of AV node with implantation of left Bi-V pacemaker, 06/18/2013. Hematoma requiring single-chamber pacemaker implanted, 07/29/2013.    • Colitis    • Colon cancer (CMS/HCC)    • Depression    • HTN (hypertension)    • Hyperbilirubinemia    • Hyperlipidemia    • Orthostatic hypotension      PSH:    Past Surgical History:   Procedure Laterality Date   • APPENDECTOMY     • CARDIAC SURGERY     • COLON SURGERY     • EXTRACORPOREAL SHOCKWAVE LITHOTRIPSY (ESWL), STENT INSERTION/REMOVAL     • PACEMAKER IMPLANTATION     • VARICOSE VEIN SURGERY       FH:    Family History   Problem Relation Age of Onset   • Cancer Mother    • Hypertension Father    • Heart disease Father      SH:    Social History     Socioeconomic History   • Marital status:      Spouse name: Not on file   • Number of children: Not on file   • Years of education: Not on file   • Highest education level: Not on file   Tobacco Use   • Smoking status: Current Every Day Smoker     Packs/day: 1.50     Years: 57.00     Pack years: 85.50     Types: Cigarettes   • Smokeless tobacco: Never Used   Substance and Sexual Activity   • Alcohol use: Yes     Alcohol/week: 1.2 oz     Types: 2 Shots of liquor per week     Frequency: 4 or more times a week     Drinks  per session: 1 or 2     Comment: Daily   • Drug use: No   • Sexual activity: Yes     Partners: Female     Comment:       ALLERGIES:    No Known Allergies  MEDICATIONS:      Current Outpatient Medications:   •  aspirin 81 MG EC tablet, Take 1 tablet by mouth daily., Disp: , Rfl:   •  carvedilol (COREG) 25 MG tablet, TAKE 1/2 (ONE-HALF) TABLET BY MOUTH TWICE DAILY, Disp: 90 tablet, Rfl: 3  •  citalopram (CeleXA) 20 MG tablet, TAKE 1 TABLET BY MOUTH ONCE DAILY, Disp: 30 tablet, Rfl: 5  •  famotidine (PEPCID) 20 MG tablet, Take 1 tablet by mouth Daily., Disp: 30 tablet, Rfl: 1  •  finasteride (PROSCAR) 5 MG tablet, TAKE 1 TABLET BY MOUTH AT NIGHT, Disp: 90 tablet, Rfl: 1  •  folic acid (FOLVITE) 400 MCG tablet, TAKE 1 TABLET BY MOUTH ONCE DAILY, Disp: 90 tablet, Rfl: 3  •  lisinopril (PRINIVIL,ZESTRIL) 10 MG tablet, TAKE 1 TABLET BY MOUTH TWICE DAILY, Disp: 180 tablet, Rfl: 1  •  Probiotic Product (PROBIOTIC DAILY PO), Take 1 capsule by mouth daily., Disp: , Rfl:   •  vitamin B-12 (CYANOCOBALAMIN) 1000 MCG tablet, Take 1 tablet by mouth Daily., Disp: 90 tablet, Rfl: 3  •  warfarin (COUMADIN) 5 MG tablet, TAKE 1 TABLET BY MOUTH ONCE DAILY OR  AS  DIRECTED  BY  YOUR  PHYSICIANS  OFFICE, Disp: 90 tablet, Rfl: 1  •  buPROPion XL (WELLBUTRIN XL) 300 MG 24 hr tablet, Take 1 tablet by mouth Daily., Disp: 30 tablet, Rfl: 4  ROS:  Per HPI, otherwise all systems reviewed and negative.    DIAGNOSTIC DATA (Reviewed and interpreted by me unless otherwise specified):    CT Chest 8/20/19 - emphysema, scarring RUL, scattered sub-centimeter nodules, 12 mm spiculated nodule in MARKO    CT PET 9/25/19 - SUV 1.0, negative    CT Chest 11/6/19 - stable MARKO nodule, also a RUL nodule that appears inflammatory that needs to be followed    PFT 9/17/19 - borderline mild obstruction, +BD change, hyperinflation, +air trapping, normal DLCO    Vitals:    11/13/19 1105   BP: 110/70   Pulse: 68   Temp: 97.9 °F (36.6 °C)   SpO2: 92%       Physical  Exam   Constitutional: Oriented to person, place, and time. Appears well-developed and well-nourished.   Head: Normocephalic and atraumatic.   Nose: Nose normal.   Mouth/Throat: Oropharynx is clear and moist.   Eyes: Conjunctivae are normal.  Pupils normal.  Neck: No tracheal deviation present.   Cardiovascular: Normal rate, regular rhythm, normal heart sounds and intact distal pulses.  Exam reveals no gallop and no friction rub.  No thrill.  No JVD.  No edema.  No murmur heard.  Pulmonary/Chest: Effort normal and breath sounds normal.  No tenderness to palpation.  No clubbing.   Abdominal: Soft. Bowel sounds are normal. No distension. No tenderness. There is no guarding.   Musculoskeletal: Normal range of motion.  No tenderness.  Lymphadenopathy:  No cervical adenopathy.   Neurological:  No new focal neurological deficits observed   Skin: Skin is warm and dry. No rash noted.   Psychiatric: Normal mood and affect.  Behavior is normal. Judgment normal.    Assessment/Plan     1)  Lung Nodule - pre-test probability for malignancy high.  Fortunately, PET negative (though on threshold of size for PET-avidity), and repeat CT 11/6/19.  This puts his post test probability in the intermediate range in my opinion.  Still a possibility of malignancy, but less likely.  He does have calcifications in spleen and calcified nodes making histo a possibility.  Will follow closely with another CT in 3 months.  If stable do one in 6 months, then go back to annual screening.  If any growth I would recommend resection.    2) Tobacco Abuse - counseled patient, recommended NRT and Wellbutrin.  He is agreeable.    RTC 3 months to f/u smoking cessation and ilogic protocol CT    Payam Kenney MD  Pulmonology and Critical Care Medicine  11/13/19 11:27 AM  Electronically Signed    C.C.:  Ez Cintron MD, Ez Cintron MD

## 2019-11-25 ENCOUNTER — HOSPITAL ENCOUNTER (OUTPATIENT)
Dept: GENERAL RADIOLOGY | Facility: HOSPITAL | Age: 69
Discharge: HOME | End: 2019-11-25
Admitting: INTERNAL MEDICINE

## 2019-11-25 ENCOUNTER — OFFICE VISIT (OUTPATIENT)
Dept: INTERNAL MEDICINE | Facility: CLINIC | Age: 69
End: 2019-11-25

## 2019-11-25 VITALS
HEART RATE: 70 BPM | RESPIRATION RATE: 18 BRPM | DIASTOLIC BLOOD PRESSURE: 80 MMHG | WEIGHT: 163 LBS | BODY MASS INDEX: 23.06 KG/M2 | TEMPERATURE: 98 F | SYSTOLIC BLOOD PRESSURE: 130 MMHG

## 2019-11-25 DIAGNOSIS — I48.21 PERMANENT ATRIAL FIBRILLATION (HCC): ICD-10-CM

## 2019-11-25 DIAGNOSIS — M79.671 RIGHT FOOT PAIN: Primary | ICD-10-CM

## 2019-11-25 LAB — INR PPP: 3.5 (ref 2.5–3.5)

## 2019-11-25 PROCEDURE — 85610 PROTHROMBIN TIME: CPT | Performed by: INTERNAL MEDICINE

## 2019-11-25 PROCEDURE — 99213 OFFICE O/P EST LOW 20 MIN: CPT | Performed by: INTERNAL MEDICINE

## 2019-11-25 PROCEDURE — 73630 X-RAY EXAM OF FOOT: CPT

## 2019-11-25 NOTE — PROGRESS NOTES
Subjective       Dillon Vo is a 69 y.o. male.     Chief Complaint   Patient presents with   • Foot Swelling     right foot/        History obtained from the patient.      Foot Injury    Incident onset: Last night. The incident occurred at home. The injury mechanism was a direct blow (dropped a log on the right foot). The pain is present in the right foot. Quality: was throbbing, now dull. The pain is mild. The pain has been improving since onset. Pertinent negatives include no inability to bear weight, loss of motion, loss of sensation, muscle weakness, numbness or tingling. Associated symptoms comments: He has had some swelling.. He reports no foreign bodies present. The symptoms are aggravated by weight bearing. He has tried ice for the symptoms. The treatment provided mild relief.        The following portions of the patient's history were reviewed and updated as appropriate: allergies, current medications, past family history, past medical history, past social history, past surgical history and problem list.      Review of Systems   Constitutional: Negative for chills and fever.   Musculoskeletal: Negative for back pain.   Skin: Negative for rash.   Neurological: Negative for tingling and numbness.           Objective     Blood pressure 130/80, pulse 70, temperature 98 °F (36.7 °C), temperature source Temporal, resp. rate 18, weight 73.9 kg (163 lb).    Physical Exam   Constitutional: He appears well-developed and well-nourished.   Cardiovascular:   Pulses:       Dorsalis pedis pulses are 2+ on the right side, and 2+ on the left side.        Posterior tibial pulses are 2+ on the right side, and 2+ on the left side.   Musculoskeletal:   Right foot bruised and edematous, and tender to touch, distally on the dorsal surface.  There is no pain with flexion and extension of the right ankle.  There is pain with eversion, but not inversion,  of the right foot.   Neurological: He is alert.   Skin: No rash noted.    Psychiatric: He has a normal mood and affect.   Nursing note and vitals reviewed.    Imaging:  Right foot x-ray showed no fracture, per my interpretation.  The patient was informed of this result at the time of the visit.        Assessment/Plan   Dillon was seen today for foot swelling.    Diagnoses and all orders for this visit:    Right foot pain  -     XR Foot 3+ View Right   Recommend Tylenol, rest, and ice.    Permanent atrial fibrillation  -     POC INR          Return if symptoms worsen or fail to improve.

## 2019-12-10 ENCOUNTER — TELEPHONE (OUTPATIENT)
Dept: ONCOLOGY | Facility: CLINIC | Age: 69
End: 2019-12-10

## 2019-12-10 NOTE — TELEPHONE ENCOUNTER
Per Dr. Kenney patient needs to be seen in clinic this week. Pulmonary office to schedule this and notify patient. Informed him of this plan. He is agreeable. He denies any new or worsening complaints but does continue to have hemoptysis.

## 2019-12-11 ENCOUNTER — OFFICE VISIT (OUTPATIENT)
Dept: PULMONOLOGY | Facility: CLINIC | Age: 69
End: 2019-12-11

## 2019-12-11 VITALS
BODY MASS INDEX: 23.18 KG/M2 | WEIGHT: 165.6 LBS | TEMPERATURE: 97.4 F | HEART RATE: 79 BPM | DIASTOLIC BLOOD PRESSURE: 62 MMHG | OXYGEN SATURATION: 97 % | HEIGHT: 71 IN | SYSTOLIC BLOOD PRESSURE: 120 MMHG

## 2019-12-11 DIAGNOSIS — R04.2 HEMOPTYSIS: Primary | ICD-10-CM

## 2019-12-11 LAB
INR PPP: 3.22 (ref 0.85–1.16)
PROTHROMBIN TIME: 31.6 SECONDS (ref 11.2–14.3)

## 2019-12-11 PROCEDURE — 87205 SMEAR GRAM STAIN: CPT | Performed by: INTERNAL MEDICINE

## 2019-12-11 PROCEDURE — 99214 OFFICE O/P EST MOD 30 MIN: CPT | Performed by: INTERNAL MEDICINE

## 2019-12-11 PROCEDURE — 87070 CULTURE OTHR SPECIMN AEROBIC: CPT | Performed by: INTERNAL MEDICINE

## 2019-12-11 PROCEDURE — 85610 PROTHROMBIN TIME: CPT | Performed by: INTERNAL MEDICINE

## 2019-12-11 RX ORDER — AMOXICILLIN AND CLAVULANATE POTASSIUM 875; 125 MG/1; MG/1
1 TABLET, FILM COATED ORAL 2 TIMES DAILY
Qty: 10 TABLET | Refills: 0 | Status: SHIPPED | OUTPATIENT
Start: 2019-12-11 | End: 2019-12-16

## 2019-12-11 RX ORDER — PREDNISONE 20 MG/1
40 TABLET ORAL DAILY
Qty: 10 TABLET | Refills: 0 | Status: SHIPPED | OUTPATIENT
Start: 2019-12-11 | End: 2020-02-19

## 2019-12-11 NOTE — PROGRESS NOTES
CC:    Abnormal CT    HPI:    68 y/o WM w/ h/o tobacco abuse ~100 py plus, prior colon cancer s/p resection 2001, mechanical AVR, CAD w/ prior stent, Afib, who presents for evaluation of abnormal CT Chest. Patient had a lung cancer screening CT 8/20/19 showing a spiculated 12 mm (I disagree with radiology reading of 9 mm) spiculated MARKO lesion.  No mediastinal or hilar LAD.  No symptoms attributable to this nodule.    INTERVAL HISTORY:    Patient returns today for sick visit.  Had been coughing up productive sputum a week or two ago.  For the past several days has had hemoptysis.  Initially blood streaked.  Now some bright red blood but small amount.  Was worse a day or two ago now improving.  Has MV on coumadin, last INR 3.5.  No fever, chills, chest pain, etc.    PMH:    Past Medical History:   Diagnosis Date   • A-fib (CMS/HCC)    • Anemia    • Atrial fibrillation (CMS/HCC) 5/5/2016    Difficult to control rate. RFA of AV node with implantation of left Bi-V pacemaker, 06/18/2013. Hematoma requiring single-chamber pacemaker implanted, 07/29/2013.    • Colitis    • Colon cancer (CMS/HCC)    • Depression    • HTN (hypertension)    • Hyperbilirubinemia    • Hyperlipidemia    • Orthostatic hypotension      PSH:    Past Surgical History:   Procedure Laterality Date   • APPENDECTOMY     • CARDIAC SURGERY     • COLON SURGERY     • EXTRACORPOREAL SHOCKWAVE LITHOTRIPSY (ESWL), STENT INSERTION/REMOVAL     • PACEMAKER IMPLANTATION     • VARICOSE VEIN SURGERY       FH:    Family History   Problem Relation Age of Onset   • Cancer Mother    • Hypertension Father    • Heart disease Father      SH:    Social History     Socioeconomic History   • Marital status:      Spouse name: Not on file   • Number of children: Not on file   • Years of education: Not on file   • Highest education level: Not on file   Tobacco Use   • Smoking status: Current Every Day Smoker     Packs/day: 1.50     Years: 57.00     Pack years: 85.50      Types: Cigarettes   • Smokeless tobacco: Never Used   Substance and Sexual Activity   • Alcohol use: Yes     Alcohol/week: 2.0 standard drinks     Types: 2 Shots of liquor per week     Frequency: 4 or more times a week     Drinks per session: 1 or 2     Comment: Daily   • Drug use: No   • Sexual activity: Yes     Partners: Female     Comment:       ALLERGIES:    No Known Allergies  MEDICATIONS:      Current Outpatient Medications:   •  aspirin 81 MG EC tablet, Take 1 tablet by mouth daily., Disp: , Rfl:   •  buPROPion XL (WELLBUTRIN XL) 300 MG 24 hr tablet, Take 1 tablet by mouth Daily., Disp: 30 tablet, Rfl: 4  •  carvedilol (COREG) 25 MG tablet, TAKE 1/2 (ONE-HALF) TABLET BY MOUTH TWICE DAILY, Disp: 90 tablet, Rfl: 3  •  citalopram (CeleXA) 20 MG tablet, TAKE 1 TABLET BY MOUTH ONCE DAILY, Disp: 30 tablet, Rfl: 5  •  famotidine (PEPCID) 20 MG tablet, Take 1 tablet by mouth Daily., Disp: 30 tablet, Rfl: 1  •  finasteride (PROSCAR) 5 MG tablet, TAKE 1 TABLET BY MOUTH AT NIGHT, Disp: 90 tablet, Rfl: 1  •  folic acid (FOLVITE) 400 MCG tablet, TAKE 1 TABLET BY MOUTH ONCE DAILY, Disp: 90 tablet, Rfl: 3  •  lisinopril (PRINIVIL,ZESTRIL) 10 MG tablet, TAKE 1 TABLET BY MOUTH TWICE DAILY, Disp: 180 tablet, Rfl: 1  •  Probiotic Product (PROBIOTIC DAILY PO), Take 1 capsule by mouth daily., Disp: , Rfl:   •  vitamin B-12 (CYANOCOBALAMIN) 1000 MCG tablet, Take 1 tablet by mouth Daily., Disp: 90 tablet, Rfl: 3  •  warfarin (COUMADIN) 5 MG tablet, TAKE 1 TABLET BY MOUTH ONCE DAILY OR  AS  DIRECTED  BY  YOUR  PHYSICIANS  OFFICE, Disp: 90 tablet, Rfl: 1  •  amoxicillin-clavulanate (AUGMENTIN) 875-125 MG per tablet, Take 1 tablet by mouth 2 (Two) Times a Day for 5 days., Disp: 10 tablet, Rfl: 0  •  predniSONE (DELTASONE) 20 MG tablet, Take 2 tablets by mouth Daily., Disp: 10 tablet, Rfl: 0  ROS:  Per HPI, otherwise all systems reviewed and negative.    DIAGNOSTIC DATA (Reviewed and interpreted by me unless otherwise  specified):    CT Chest 8/20/19 - emphysema, scarring RUL, scattered sub-centimeter nodules, 12 mm spiculated nodule in MARKO    CT PET 9/25/19 - SUV 1.0, negative    CT Chest 11/6/19 - stable MARKO nodule, also a RUL nodule that appears inflammatory that needs to be followed    CXR 12/11/19 - RUL and RML consolidation    PFT 9/17/19 - borderline mild obstruction, +BD change, hyperinflation, +air trapping, normal DLCO    Vitals:    12/11/19 1448   BP: 120/62   Pulse: 79   Temp: 97.4 °F (36.3 °C)   SpO2: 97%       Physical Exam   Constitutional: Oriented to person, place, and time. Appears well-developed and well-nourished.   Head: Normocephalic and atraumatic.   Nose: Nose normal.   Mouth/Throat: Oropharynx is clear and moist.   Eyes: Conjunctivae are normal.  Pupils normal.  Neck: No tracheal deviation present.   Cardiovascular: Normal rate, regular rhythm, normal heart sounds and intact distal pulses.  Exam reveals no gallop and no friction rub.  No thrill.  No JVD.  No edema.  No murmur heard.  Pulmonary/Chest: Effort normal and breath sounds normal.  No tenderness to palpation.  No clubbing.   Abdominal: Soft. Bowel sounds are normal. No distension. No tenderness. There is no guarding.   Musculoskeletal: Normal range of motion.  No tenderness.  Lymphadenopathy:  No cervical adenopathy.   Neurological:  No new focal neurological deficits observed   Skin: Skin is warm and dry. No rash noted.   Psychiatric: Normal mood and affect.  Behavior is normal. Judgment normal.    Assessment/Plan     1) Hemoptysis - likely CAP with bronchitis given recent normal airways on CT imaging.  Treat with augmentin and prednisone x 5 days.  Check INR now and again early next week (Tuesday).  Get sputum culture today.  Come back in 2 weeks with CXR, if no improvement plan on doing bronchoscopy.    2)  Lung Nodule - pre-test probability for malignancy high.  Fortunately, PET negative (though on threshold of size for PET-avidity), and  repeat CT 11/6/19.  This puts his post test probability in the intermediate range in my opinion.  Still a possibility of malignancy, but less likely.  He does have calcifications in spleen and calcified nodes making histo a possibility.  Will follow closely with another CT in 3 months.  If stable do one in 6 months, then go back to annual screening.  If any growth I would recommend resection.    3) Tobacco Abuse - counseled patient, recommended NRT and Wellbutrin.  He is agreeable.    RTC 2 weeks with CXR    then    RTC 3 months to f/u smoking cessation and ilogic protocol CT as originally planned    Payam Kenney MD  Pulmonology and Critical Care Medicine  12/11/19 5:27 PM  Electronically Signed    C.C.:  No ref. provider found, Ez Cintron MD

## 2019-12-13 ENCOUNTER — ANTICOAGULATION VISIT (OUTPATIENT)
Dept: INTERNAL MEDICINE | Facility: CLINIC | Age: 69
End: 2019-12-13

## 2019-12-13 DIAGNOSIS — I48.21 PERMANENT ATRIAL FIBRILLATION (HCC): Primary | ICD-10-CM

## 2019-12-13 LAB
BACTERIA SPEC RESP CULT: NORMAL
GRAM STN SPEC: NORMAL

## 2019-12-16 ENCOUNTER — LAB (OUTPATIENT)
Dept: INTERNAL MEDICINE | Facility: CLINIC | Age: 69
End: 2019-12-16

## 2019-12-16 DIAGNOSIS — I48.21 PERMANENT ATRIAL FIBRILLATION (HCC): Primary | ICD-10-CM

## 2019-12-16 LAB — INR PPP: 4.3 (ref 2.5–3.5)

## 2019-12-16 PROCEDURE — 85610 PROTHROMBIN TIME: CPT | Performed by: INTERNAL MEDICINE

## 2019-12-16 RX ORDER — LISINOPRIL 10 MG/1
TABLET ORAL
Qty: 180 TABLET | Refills: 1 | Status: SHIPPED | OUTPATIENT
Start: 2019-12-16 | End: 2020-04-20 | Stop reason: SDUPTHER

## 2019-12-19 ENCOUNTER — LAB (OUTPATIENT)
Dept: INTERNAL MEDICINE | Facility: CLINIC | Age: 69
End: 2019-12-19

## 2019-12-19 DIAGNOSIS — I48.21 PERMANENT ATRIAL FIBRILLATION (HCC): Primary | ICD-10-CM

## 2019-12-19 LAB — INR PPP: 5.2 (ref 2.5–3.5)

## 2019-12-19 PROCEDURE — 85610 PROTHROMBIN TIME: CPT | Performed by: INTERNAL MEDICINE

## 2019-12-23 ENCOUNTER — LAB (OUTPATIENT)
Dept: INTERNAL MEDICINE | Facility: CLINIC | Age: 69
End: 2019-12-23

## 2019-12-23 DIAGNOSIS — I48.21 PERMANENT ATRIAL FIBRILLATION (HCC): Primary | ICD-10-CM

## 2019-12-23 LAB — INR PPP: 2 (ref 2.5–3.5)

## 2019-12-23 PROCEDURE — 85610 PROTHROMBIN TIME: CPT | Performed by: INTERNAL MEDICINE

## 2019-12-27 ENCOUNTER — OFFICE VISIT (OUTPATIENT)
Dept: PULMONOLOGY | Facility: CLINIC | Age: 69
End: 2019-12-27

## 2019-12-27 ENCOUNTER — LAB (OUTPATIENT)
Dept: INTERNAL MEDICINE | Facility: CLINIC | Age: 69
End: 2019-12-27

## 2019-12-27 VITALS
TEMPERATURE: 97.6 F | BODY MASS INDEX: 23.1 KG/M2 | HEIGHT: 71 IN | OXYGEN SATURATION: 97 % | HEART RATE: 77 BPM | DIASTOLIC BLOOD PRESSURE: 76 MMHG | WEIGHT: 165 LBS | SYSTOLIC BLOOD PRESSURE: 124 MMHG

## 2019-12-27 DIAGNOSIS — J44.9 COPD, MILD (HCC): ICD-10-CM

## 2019-12-27 DIAGNOSIS — I48.21 PERMANENT ATRIAL FIBRILLATION (HCC): Primary | ICD-10-CM

## 2019-12-27 DIAGNOSIS — R04.2 HEMOPTYSIS: Primary | ICD-10-CM

## 2019-12-27 DIAGNOSIS — Z72.0 TOBACCO USE: ICD-10-CM

## 2019-12-27 LAB — INR PPP: 4.8 (ref 2.5–3.5)

## 2019-12-27 PROCEDURE — 99213 OFFICE O/P EST LOW 20 MIN: CPT | Performed by: NURSE PRACTITIONER

## 2019-12-27 PROCEDURE — 85610 PROTHROMBIN TIME: CPT | Performed by: INTERNAL MEDICINE

## 2019-12-27 NOTE — PROGRESS NOTES
[facility] Pulmonary Follow up    CHIEF COMPLAINT    2-week follow-up of CAP    HISTORY OF PRESENT ILLNESS    Dillon Vo is a 69 y.o.male here today for a two-week follow-up.  He was seen by Dr. Payam Kenney 2 weeks ago and was treated for community-acquired pneumonia with antibiotics and prednisone.  He is here today for follow-up.    He states that he feels much better than he did.  He completed his antibiotic and steroids and no longer has any hemoptysis.  He continues to have some shortness of breath with activity but is improved when compared to his last visit.    He denies fever, chills, sputum production, hemoptysis, night sweats, weight loss, chest pain or palpitations.  He denies any lower extremity edema.  He denies any sinus or allergy symptoms.  He denies any reflux symptoms.  He remains on Pepcid daily.    He has yet to receive his influenza vaccination.    He was originally seen in our office for an abnormal CT scan.  He has a repeat CT scan scheduled in February and follow-up with Dr. Kenney for a spiculated 9 mm left upper lobe lesion.  He also had a negative PET scan in September 2019.    He continues to smoke daily.  He has an 85.5-pack-year history.    Patient Active Problem List   Diagnosis   • Colitis due to Clostridium difficile   • Atrial fibrillation (CMS/HCC)   • Depression   • Hyperbilirubinemia   • Hyperlipidemia   • Hypertension   • Macrocytic anemia   • Orthostatic hypotension   • Malignant neoplasm of overlapping sites of colon (CMS/HCC)   • Dehydration   • History of artificial heart valve   • Neurogenic bladder   • Heart disease   • Tobacco use   • Coronary artery disease involving native coronary artery of native heart without angina pectoris   • H/O aortic valve replacement   • COPD, mild (CMS/HCC)       No Known Allergies    Current Outpatient Medications:   •  aspirin 81 MG EC tablet, Take 1 tablet by mouth daily., Disp: , Rfl:   •  buPROPion XL (WELLBUTRIN XL) 300 MG 24  hr tablet, Take 1 tablet by mouth Daily., Disp: 30 tablet, Rfl: 4  •  carvedilol (COREG) 25 MG tablet, TAKE 1/2 (ONE-HALF) TABLET BY MOUTH TWICE DAILY, Disp: 90 tablet, Rfl: 3  •  citalopram (CeleXA) 20 MG tablet, TAKE 1 TABLET BY MOUTH ONCE DAILY, Disp: 30 tablet, Rfl: 5  •  famotidine (PEPCID) 20 MG tablet, Take 1 tablet by mouth Daily., Disp: 30 tablet, Rfl: 1  •  finasteride (PROSCAR) 5 MG tablet, TAKE 1 TABLET BY MOUTH AT NIGHT, Disp: 90 tablet, Rfl: 1  •  folic acid (FOLVITE) 400 MCG tablet, TAKE 1 TABLET BY MOUTH ONCE DAILY, Disp: 90 tablet, Rfl: 3  •  lisinopril (PRINIVIL,ZESTRIL) 10 MG tablet, TAKE 1 TABLET BY MOUTH TWICE DAILY, Disp: 180 tablet, Rfl: 1  •  Probiotic Product (PROBIOTIC DAILY PO), Take 1 capsule by mouth daily., Disp: , Rfl:   •  vitamin B-12 (CYANOCOBALAMIN) 1000 MCG tablet, Take 1 tablet by mouth Daily., Disp: 90 tablet, Rfl: 3  •  warfarin (COUMADIN) 5 MG tablet, TAKE 1 TABLET BY MOUTH ONCE DAILY OR  AS  DIRECTED  BY  YOUR  PHYSICIANS  OFFICE, Disp: 90 tablet, Rfl: 1  •  predniSONE (DELTASONE) 20 MG tablet, Take 2 tablets by mouth Daily., Disp: 10 tablet, Rfl: 0  MEDICATION LIST AND ALLERGIES REVIEWED.    Social History     Tobacco Use   • Smoking status: Current Every Day Smoker     Packs/day: 1.50     Years: 57.00     Pack years: 85.50     Types: Cigarettes   • Smokeless tobacco: Never Used   Substance Use Topics   • Alcohol use: Yes     Alcohol/week: 2.0 standard drinks     Types: 2 Shots of liquor per week     Frequency: 4 or more times a week     Drinks per session: 1 or 2     Comment: Daily   • Drug use: No       FAMILY AND SOCIAL HISTORY REVIEWED.    Review of Systems   Constitutional: Negative for activity change, appetite change, fatigue, fever and unexpected weight change.   HENT: Negative for congestion, postnasal drip, rhinorrhea, sinus pressure, sore throat and voice change.    Eyes: Negative for visual disturbance.   Respiratory: Negative for cough, chest tightness,  "shortness of breath and wheezing.    Cardiovascular: Positive for palpitations. Negative for chest pain and leg swelling.   Gastrointestinal: Negative for abdominal distention, abdominal pain, nausea and vomiting.   Endocrine: Negative for cold intolerance and heat intolerance.   Genitourinary: Negative for difficulty urinating and urgency.   Musculoskeletal: Negative for arthralgias, back pain and neck pain.   Skin: Negative for color change and pallor.   Allergic/Immunologic: Negative for environmental allergies and food allergies.   Neurological: Negative for dizziness, syncope, weakness and light-headedness.   Hematological: Negative for adenopathy. Does not bruise/bleed easily.   Psychiatric/Behavioral: Negative for agitation and behavioral problems.   .    /76   Pulse 77   Temp 97.6 °F (36.4 °C)   Ht 179.1 cm (70.5\")   Wt 74.8 kg (165 lb)   SpO2 97% Comment: resting, room air  BMI 23.34 kg/m²     Immunization History   Administered Date(s) Administered   • Flu Mist 10/26/2015   • Fluzone High Dose =>65 Years ([facility] ONLY) 10/03/2016, 10/24/2017, 02/13/2019   • Influenza Quad Vaccine (Inpatient) 10/03/2014   • Pneumococcal Conjugate 13-Valent (PCV13) 02/13/2019   • Zostavax 12/30/2015       Physical Exam   Constitutional: He is oriented to person, place, and time. He appears well-developed and well-nourished.   HENT:   Head: Normocephalic and atraumatic.   Eyes: Pupils are equal, round, and reactive to light.   Neck: Normal range of motion. Neck supple. No thyromegaly present.   Cardiovascular: Normal rate, regular rhythm, normal heart sounds and intact distal pulses. Exam reveals no gallop and no friction rub.   No murmur heard.  Pulmonary/Chest: Effort normal and breath sounds normal. No respiratory distress. He has no wheezes. He has no rales. He exhibits no tenderness.   Abdominal: Soft. Bowel sounds are normal. There is no tenderness.   Musculoskeletal: Normal range of motion. "   Lymphadenopathy:     He has no cervical adenopathy.   Neurological: He is alert and oriented to person, place, and time.   Skin: Skin is warm and dry. Capillary refill takes less than 2 seconds. He is not diaphoretic.   Psychiatric: He has a normal mood and affect. His behavior is normal.   Nursing note and vitals reviewed.        RESULTS    Chest PA/lateral: Official report pending    PROBLEM LIST    Problem List Items Addressed This Visit        Respiratory    COPD, mild (CMS/HCC)       Other    Tobacco use      Other Visit Diagnoses     Hemoptysis    -  Primary    Relevant Orders    XR Chest PA & Lateral            DISCUSSION    Mr. Vo was here for follow-up of his community-acquired pneumonia.  We reviewed his chest x-ray in the office today and it appears that his chest x-ray has improved however the official report is pending.  We also looked at his previous chest x-ray and it looks like the antibiotics and steroids helped.  He seems to have recovered from his current illness.  He denies any breathing difficulties currently.    We discussed smoking cessation in the office for 3 to 10 minutes.  He does not have a stop date planned currently.  His neck CT scan is due in February before his follow-up with Dr. Kenney.    He will continue to follow-up in February with Dr. Kenney.  I did advise him to call with any worsening or new symptoms prior to this appointment.  He will call with any additional concerns or questions.  I spent 15 minutes with the patient. I spent > 50% percent of this time counseling and discussing diagnosis, prognosis, diagnostic testing, evaluation, current status, treatment options and management.    Sherron Bell, DAGOBERTO  12/27/20193:02 PM  Electronically signed     Please note that portions of this note were completed with a voice recognition program. Efforts were made to edit the dictations, but occasionally words are mistranscribed.      CC: Ez Cintron MD

## 2019-12-30 ENCOUNTER — LAB (OUTPATIENT)
Dept: INTERNAL MEDICINE | Facility: CLINIC | Age: 69
End: 2019-12-30

## 2019-12-30 DIAGNOSIS — Z23 NEED FOR INFLUENZA VACCINATION: ICD-10-CM

## 2019-12-30 DIAGNOSIS — I48.21 PERMANENT ATRIAL FIBRILLATION (HCC): Primary | ICD-10-CM

## 2019-12-30 LAB — INR PPP: 5.4 (ref 2.5–3.5)

## 2019-12-30 PROCEDURE — G0008 ADMIN INFLUENZA VIRUS VAC: HCPCS | Performed by: INTERNAL MEDICINE

## 2019-12-30 PROCEDURE — 90653 IIV ADJUVANT VACCINE IM: CPT | Performed by: INTERNAL MEDICINE

## 2019-12-30 PROCEDURE — 85610 PROTHROMBIN TIME: CPT | Performed by: INTERNAL MEDICINE

## 2020-01-02 ENCOUNTER — LAB (OUTPATIENT)
Dept: INTERNAL MEDICINE | Facility: CLINIC | Age: 70
End: 2020-01-02

## 2020-01-02 DIAGNOSIS — I48.21 PERMANENT ATRIAL FIBRILLATION (HCC): Primary | ICD-10-CM

## 2020-01-02 LAB — INR PPP: 5.6 (ref 2.5–3.5)

## 2020-01-02 PROCEDURE — 85610 PROTHROMBIN TIME: CPT | Performed by: INTERNAL MEDICINE

## 2020-01-06 ENCOUNTER — LAB (OUTPATIENT)
Dept: INTERNAL MEDICINE | Facility: CLINIC | Age: 70
End: 2020-01-06

## 2020-01-06 DIAGNOSIS — I48.21 PERMANENT ATRIAL FIBRILLATION (HCC): Primary | ICD-10-CM

## 2020-01-06 LAB — INR PPP: 1.5 (ref 2.5–3.5)

## 2020-01-06 PROCEDURE — 85610 PROTHROMBIN TIME: CPT | Performed by: INTERNAL MEDICINE

## 2020-01-13 ENCOUNTER — LAB (OUTPATIENT)
Dept: INTERNAL MEDICINE | Facility: CLINIC | Age: 70
End: 2020-01-13

## 2020-01-13 DIAGNOSIS — I48.21 PERMANENT ATRIAL FIBRILLATION (HCC): Primary | ICD-10-CM

## 2020-01-13 LAB — INR PPP: 2.5 (ref 2.5–3.5)

## 2020-01-13 PROCEDURE — 85610 PROTHROMBIN TIME: CPT | Performed by: INTERNAL MEDICINE

## 2020-01-27 ENCOUNTER — LAB (OUTPATIENT)
Dept: INTERNAL MEDICINE | Facility: CLINIC | Age: 70
End: 2020-01-27

## 2020-01-27 DIAGNOSIS — I48.21 PERMANENT ATRIAL FIBRILLATION (HCC): Primary | ICD-10-CM

## 2020-01-27 LAB — INR PPP: 2.4 (ref 2.5–3.5)

## 2020-01-27 PROCEDURE — 36416 COLLJ CAPILLARY BLOOD SPEC: CPT | Performed by: INTERNAL MEDICINE

## 2020-01-27 PROCEDURE — 85610 PROTHROMBIN TIME: CPT | Performed by: INTERNAL MEDICINE

## 2020-02-06 ENCOUNTER — HOSPITAL ENCOUNTER (OUTPATIENT)
Dept: CT IMAGING | Facility: HOSPITAL | Age: 70
Discharge: HOME | End: 2020-02-06
Admitting: INTERNAL MEDICINE

## 2020-02-06 DIAGNOSIS — R91.1 LUNG NODULE: ICD-10-CM

## 2020-02-06 PROCEDURE — 71250 CT THORAX DX C-: CPT

## 2020-02-18 ENCOUNTER — OFFICE VISIT (OUTPATIENT)
Dept: PULMONOLOGY | Facility: CLINIC | Age: 70
End: 2020-02-18

## 2020-02-18 VITALS
OXYGEN SATURATION: 96 % | WEIGHT: 166.38 LBS | HEART RATE: 70 BPM | RESPIRATION RATE: 16 BRPM | HEIGHT: 71 IN | BODY MASS INDEX: 23.29 KG/M2 | TEMPERATURE: 98.9 F | DIASTOLIC BLOOD PRESSURE: 66 MMHG | SYSTOLIC BLOOD PRESSURE: 130 MMHG

## 2020-02-18 DIAGNOSIS — R91.8 LUNG NODULES: Primary | ICD-10-CM

## 2020-02-18 PROCEDURE — 99214 OFFICE O/P EST MOD 30 MIN: CPT | Performed by: INTERNAL MEDICINE

## 2020-02-18 RX ORDER — BUPROPION HYDROCHLORIDE 300 MG/1
300 TABLET ORAL DAILY
Qty: 30 TABLET | Refills: 4 | Status: SHIPPED | OUTPATIENT
Start: 2020-02-18 | End: 2020-08-19 | Stop reason: SDUPTHER

## 2020-02-18 NOTE — PROGRESS NOTES
CC:    Abnormal CT    HPI:    70 y/o WM w/ h/o tobacco abuse ~100 py plus, prior colon cancer s/p resection 2001, mechanical AVR, CAD w/ prior stent, Afib, who presents for evaluation of abnormal CT Chest. Patient had a lung cancer screening CT 8/20/19 showing a spiculated 12 mm (I disagree with radiology reading of 9 mm) spiculated MARKO lesion.  No mediastinal or hilar LAD.  No symptoms attributable to this nodule.    Last time I saw patient in 12/11/19 he had some mild hemoptysis.  This was felt to be due to bronchitis and resolved with abx / steroids.  INR was slightly supra-therapeutic.  This resolved on follow up visit 12/27/19.    INTERVAL HISTORY:    Patient returns today for follow up after repeat CT Chest.      PMH:    Past Medical History:   Diagnosis Date   • A-fib (CMS/HCC)    • Anemia    • Atrial fibrillation (CMS/HCC) 5/5/2016    Difficult to control rate. RFA of AV node with implantation of left Bi-V pacemaker, 06/18/2013. Hematoma requiring single-chamber pacemaker implanted, 07/29/2013.    • Colitis    • Colon cancer (CMS/HCC)    • Depression    • HTN (hypertension)    • Hyperbilirubinemia    • Hyperlipidemia    • Orthostatic hypotension      PSH:    Past Surgical History:   Procedure Laterality Date   • APPENDECTOMY     • CARDIAC SURGERY     • COLON SURGERY     • EXTRACORPOREAL SHOCKWAVE LITHOTRIPSY (ESWL), STENT INSERTION/REMOVAL     • PACEMAKER IMPLANTATION     • VARICOSE VEIN SURGERY       FH:    Family History   Problem Relation Age of Onset   • Cancer Mother    • Hypertension Father    • Heart disease Father      SH:    Social History     Socioeconomic History   • Marital status:      Spouse name: Not on file   • Number of children: Not on file   • Years of education: Not on file   • Highest education level: Not on file   Tobacco Use   • Smoking status: Current Every Day Smoker     Packs/day: 1.50     Years: 57.00     Pack years: 85.50     Types: Cigarettes   • Smokeless tobacco: Never  Used   Substance and Sexual Activity   • Alcohol use: Yes     Alcohol/week: 2.0 standard drinks     Types: 2 Shots of liquor per week     Frequency: 4 or more times a week     Drinks per session: 1 or 2     Comment: Daily   • Drug use: No   • Sexual activity: Yes     Partners: Female     Comment:       ALLERGIES:    No Known Allergies  MEDICATIONS:      Current Outpatient Medications:   •  aspirin 81 MG EC tablet, Take 1 tablet by mouth daily., Disp: , Rfl:   •  buPROPion XL (WELLBUTRIN XL) 300 MG 24 hr tablet, Take 1 tablet by mouth Daily., Disp: 30 tablet, Rfl: 4  •  carvedilol (COREG) 25 MG tablet, TAKE 1/2 (ONE-HALF) TABLET BY MOUTH TWICE DAILY, Disp: 90 tablet, Rfl: 3  •  citalopram (CeleXA) 20 MG tablet, TAKE 1 TABLET BY MOUTH ONCE DAILY, Disp: 30 tablet, Rfl: 5  •  famotidine (PEPCID) 20 MG tablet, Take 1 tablet by mouth Daily., Disp: 30 tablet, Rfl: 1  •  finasteride (PROSCAR) 5 MG tablet, TAKE 1 TABLET BY MOUTH AT NIGHT, Disp: 90 tablet, Rfl: 1  •  folic acid (FOLVITE) 400 MCG tablet, TAKE 1 TABLET BY MOUTH ONCE DAILY, Disp: 90 tablet, Rfl: 3  •  lisinopril (PRINIVIL,ZESTRIL) 10 MG tablet, TAKE 1 TABLET BY MOUTH TWICE DAILY, Disp: 180 tablet, Rfl: 1  •  predniSONE (DELTASONE) 20 MG tablet, Take 2 tablets by mouth Daily., Disp: 10 tablet, Rfl: 0  •  Probiotic Product (PROBIOTIC DAILY PO), Take 1 capsule by mouth daily., Disp: , Rfl:   •  vitamin B-12 (CYANOCOBALAMIN) 1000 MCG tablet, Take 1 tablet by mouth Daily., Disp: 90 tablet, Rfl: 3  •  warfarin (COUMADIN) 5 MG tablet, TAKE 1 TABLET BY MOUTH ONCE DAILY OR  AS  DIRECTED  BY  YOUR  PHYSICIANS  OFFICE, Disp: 90 tablet, Rfl: 1  ROS:  Per HPI, otherwise all systems reviewed and negative.    DIAGNOSTIC DATA (Reviewed and interpreted by me unless otherwise specified):    CT Chest 8/20/19 - emphysema, scarring RUL, scattered sub-centimeter nodules, 12 mm spiculated nodule in MARKO    CT PET 9/25/19 - SUV 1.0, negative    CT Chest 11/6/19 - stable MARKO  nodule, also a RUL nodule that appears inflammatory that needs to be followed    CT Chest 2/6/20 - stable MARKO and RUL nodules when going back to 8/20/19    CXR 12/11/19 - RUL and RML consolidation    PFT 9/17/19 - borderline mild obstruction, +BD change, hyperinflation, +air trapping, normal DLCO    There were no vitals filed for this visit.    Physical Exam   Constitutional: Oriented to person, place, and time. Appears well-developed and well-nourished.   Head: Normocephalic and atraumatic.   Nose: Nose normal.   Mouth/Throat: Oropharynx is clear and moist.   Eyes: Conjunctivae are normal.  Pupils normal.  Neck: No tracheal deviation present.   Cardiovascular: Normal rate, regular rhythm, normal heart sounds and intact distal pulses.  Exam reveals no gallop and no friction rub.  No thrill.  No JVD.  No edema.  No murmur heard.  Pulmonary/Chest: Effort normal and breath sounds normal.  No tenderness to palpation.  No clubbing.   Abdominal: Soft. Bowel sounds are normal. No distension. No tenderness. There is no guarding.   Musculoskeletal: Normal range of motion.  No tenderness.  Lymphadenopathy:  No cervical adenopathy.   Neurological:  No new focal neurological deficits observed   Skin: Skin is warm and dry. No rash noted.   Psychiatric: Normal mood and affect.  Behavior is normal. Judgment normal.    Assessment/Plan     1)  Lung Nodules - stable from 8/2019 to 2/2020.  Will continue to follow.  Get repeat CT in 6 months (august 2020).  If they remain stable will switch to annual screening at that point.    2)  Hemoptysis - as mentioned in HPI patient had a bout of self-limited hemoptysis in December that resolved.  Last night patient had an episode with wheezing and very mild hemoptysis x 1 (slightly pink tinged sputum x 1).  This again has resolved.  Last INR 2.4.  I still think this is chronic bronchitis and he is still smoking - no change in CT imaging over 6 month period.  I think an airway exam would be low  yield.  Will add Trelegy.  Patient encouraged to call if hemoptysis re-occurs / persists and at that point we will plan on doing a bronch.    3) Tobacco Abuse - counseled patient, recommended NRT and Wellbutrin.  He is agreeable.  Currently down to 4-5 cigs a day.  I think this is contributing to mild intermittent hemoptysis.    RTC August 2020 with CT Chest w/o contrast    Payam Kenney MD  Pulmonology and Critical Care Medicine  02/18/20 10:52 AM  Electronically Signed    C.C.:  Ez Cintron MD, Ez Cintron MD

## 2020-02-19 ENCOUNTER — OFFICE VISIT (OUTPATIENT)
Dept: INTERNAL MEDICINE | Facility: CLINIC | Age: 70
End: 2020-02-19

## 2020-02-19 ENCOUNTER — HOSPITAL ENCOUNTER (OUTPATIENT)
Dept: GENERAL RADIOLOGY | Facility: HOSPITAL | Age: 70
Discharge: HOME | End: 2020-02-19

## 2020-02-19 ENCOUNTER — HOSPITAL ENCOUNTER (INPATIENT)
Facility: HOSPITAL | Age: 70
LOS: 5 days | Discharge: HOME | End: 2020-02-24
Attending: FAMILY MEDICINE | Admitting: HOSPITALIST

## 2020-02-19 VITALS
TEMPERATURE: 102.4 F | RESPIRATION RATE: 20 BRPM | BODY MASS INDEX: 23.48 KG/M2 | HEART RATE: 92 BPM | SYSTOLIC BLOOD PRESSURE: 156 MMHG | DIASTOLIC BLOOD PRESSURE: 70 MMHG | WEIGHT: 166 LBS

## 2020-02-19 DIAGNOSIS — J18.9 PNEUMONIA OF BOTH LUNGS DUE TO INFECTIOUS ORGANISM, UNSPECIFIED PART OF LUNG: Primary | ICD-10-CM

## 2020-02-19 DIAGNOSIS — R50.9 FEVER, UNSPECIFIED FEVER CAUSE: ICD-10-CM

## 2020-02-19 PROBLEM — A41.9 SEPSIS (HCC): Status: ACTIVE | Noted: 2020-02-19

## 2020-02-19 PROBLEM — R09.02 HYPOXIA: Status: ACTIVE | Noted: 2020-02-19

## 2020-02-19 LAB
ALBUMIN SERPL-MCNC: 3.5 G/DL (ref 3.5–5.2)
ALBUMIN/GLOB SERPL: 1.3 G/DL
ALP SERPL-CCNC: 57 U/L (ref 39–117)
ALT SERPL W P-5'-P-CCNC: 7 U/L (ref 1–41)
ANION GAP SERPL CALCULATED.3IONS-SCNC: 12 MMOL/L (ref 5–15)
AST SERPL-CCNC: 18 U/L (ref 1–40)
B PARAPERT DNA SPEC QL NAA+PROBE: NOT DETECTED
B PERT DNA SPEC QL NAA+PROBE: NOT DETECTED
BILIRUB SERPL-MCNC: 2.4 MG/DL (ref 0.2–1.2)
BUN BLD-MCNC: 13 MG/DL (ref 8–23)
BUN/CREAT SERPL: 15.3 (ref 7–25)
C PNEUM DNA NPH QL NAA+NON-PROBE: NOT DETECTED
CALCIUM SPEC-SCNC: 8.4 MG/DL (ref 8.6–10.5)
CHLORIDE SERPL-SCNC: 100 MMOL/L (ref 98–107)
CO2 SERPL-SCNC: 23 MMOL/L (ref 22–29)
CREAT BLD-MCNC: 0.85 MG/DL (ref 0.76–1.27)
D-LACTATE SERPL-SCNC: 1.1 MMOL/L (ref 0.5–2)
DEPRECATED RDW RBC AUTO: 52.6 FL (ref 37–54)
ERYTHROCYTE [DISTWIDTH] IN BLOOD BY AUTOMATED COUNT: 13.2 % (ref 12.3–15.4)
EXPIRATION DATE: NORMAL
FLUAV AG NPH QL: NEGATIVE
FLUAV H1 2009 PAND RNA NPH QL NAA+PROBE: DETECTED
FLUAV H1 HA GENE NPH QL NAA+PROBE: NOT DETECTED
FLUAV H3 RNA NPH QL NAA+PROBE: NOT DETECTED
FLUBV AG NPH QL: NEGATIVE
FLUBV RNA ISLT QL NAA+PROBE: NOT DETECTED
GFR SERPL CREATININE-BSD FRML MDRD: 89 ML/MIN/1.73
GLOBULIN UR ELPH-MCNC: 2.8 GM/DL
GLUCOSE BLD-MCNC: 102 MG/DL (ref 65–99)
HADV DNA SPEC NAA+PROBE: NOT DETECTED
HCOV 229E RNA SPEC QL NAA+PROBE: NOT DETECTED
HCOV HKU1 RNA SPEC QL NAA+PROBE: NOT DETECTED
HCOV NL63 RNA SPEC QL NAA+PROBE: NOT DETECTED
HCOV OC43 RNA SPEC QL NAA+PROBE: NOT DETECTED
HCT VFR BLD AUTO: 35.8 % (ref 37.5–51)
HGB BLD-MCNC: 11.6 G/DL (ref 13–17.7)
HMPV RNA NPH QL NAA+NON-PROBE: NOT DETECTED
HPIV1 RNA SPEC QL NAA+PROBE: NOT DETECTED
HPIV2 RNA SPEC QL NAA+PROBE: NOT DETECTED
HPIV3 RNA NPH QL NAA+PROBE: NOT DETECTED
HPIV4 P GENE NPH QL NAA+PROBE: NOT DETECTED
INR PPP: 2.28 (ref 0.85–1.16)
INTERNAL CONTROL: NORMAL
Lab: NORMAL
M PNEUMO IGG SER IA-ACNC: NOT DETECTED
MCH RBC QN AUTO: 35.3 PG (ref 26.6–33)
MCHC RBC AUTO-ENTMCNC: 32.4 G/DL (ref 31.5–35.7)
MCV RBC AUTO: 108.8 FL (ref 79–97)
PLATELET # BLD AUTO: 73 10*3/MM3 (ref 140–450)
PMV BLD AUTO: 12.1 FL (ref 6–12)
POTASSIUM BLD-SCNC: 3.5 MMOL/L (ref 3.5–5.2)
PROCALCITONIN SERPL-MCNC: 0.18 NG/ML (ref 0.1–0.25)
PROT SERPL-MCNC: 6.3 G/DL (ref 6–8.5)
PROTHROMBIN TIME: 24.2 SECONDS (ref 11.2–14.3)
RBC # BLD AUTO: 3.29 10*6/MM3 (ref 4.14–5.8)
RHINOVIRUS RNA SPEC NAA+PROBE: NOT DETECTED
RSV RNA NPH QL NAA+NON-PROBE: NOT DETECTED
SODIUM BLD-SCNC: 135 MMOL/L (ref 136–145)
WBC NRBC COR # BLD: 3.33 10*3/MM3 (ref 3.4–10.8)

## 2020-02-19 PROCEDURE — 85027 COMPLETE CBC AUTOMATED: CPT | Performed by: INTERNAL MEDICINE

## 2020-02-19 PROCEDURE — 0100U HC BIOFIRE FILMARRAY RESP PANEL 2: CPT | Performed by: INTERNAL MEDICINE

## 2020-02-19 PROCEDURE — 99223 1ST HOSP IP/OBS HIGH 75: CPT | Performed by: INTERNAL MEDICINE

## 2020-02-19 PROCEDURE — 25010000002 CEFTRIAXONE PER 250 MG: Performed by: INTERNAL MEDICINE

## 2020-02-19 PROCEDURE — 94640 AIRWAY INHALATION TREATMENT: CPT

## 2020-02-19 PROCEDURE — 83605 ASSAY OF LACTIC ACID: CPT | Performed by: INTERNAL MEDICINE

## 2020-02-19 PROCEDURE — 85610 PROTHROMBIN TIME: CPT | Performed by: INTERNAL MEDICINE

## 2020-02-19 PROCEDURE — 84145 PROCALCITONIN (PCT): CPT | Performed by: INTERNAL MEDICINE

## 2020-02-19 PROCEDURE — 87040 BLOOD CULTURE FOR BACTERIA: CPT | Performed by: INTERNAL MEDICINE

## 2020-02-19 PROCEDURE — 87804 INFLUENZA ASSAY W/OPTIC: CPT | Performed by: INTERNAL MEDICINE

## 2020-02-19 PROCEDURE — 99214 OFFICE O/P EST MOD 30 MIN: CPT | Performed by: INTERNAL MEDICINE

## 2020-02-19 PROCEDURE — 71046 X-RAY EXAM CHEST 2 VIEWS: CPT

## 2020-02-19 PROCEDURE — 80053 COMPREHEN METABOLIC PANEL: CPT | Performed by: INTERNAL MEDICINE

## 2020-02-19 RX ORDER — FINASTERIDE 5 MG/1
TABLET, FILM COATED ORAL
Qty: 90 TABLET | Refills: 0 | Status: SHIPPED | OUTPATIENT
Start: 2020-02-19 | End: 2020-06-12 | Stop reason: SDUPTHER

## 2020-02-19 RX ORDER — CARVEDILOL 12.5 MG/1
12.5 TABLET ORAL 2 TIMES DAILY
Status: DISCONTINUED | OUTPATIENT
Start: 2020-02-19 | End: 2020-02-24 | Stop reason: HOSPADM

## 2020-02-19 RX ORDER — FAMOTIDINE 20 MG/1
20 TABLET, FILM COATED ORAL DAILY
Status: DISCONTINUED | OUTPATIENT
Start: 2020-02-20 | End: 2020-02-24 | Stop reason: HOSPADM

## 2020-02-19 RX ORDER — LISINOPRIL 10 MG/1
10 TABLET ORAL 2 TIMES DAILY
Status: DISCONTINUED | OUTPATIENT
Start: 2020-02-19 | End: 2020-02-24 | Stop reason: HOSPADM

## 2020-02-19 RX ORDER — FINASTERIDE 5 MG/1
5 TABLET, FILM COATED ORAL DAILY
Status: DISCONTINUED | OUTPATIENT
Start: 2020-02-20 | End: 2020-02-24 | Stop reason: HOSPADM

## 2020-02-19 RX ORDER — ACETAMINOPHEN 325 MG/1
650 TABLET ORAL EVERY 6 HOURS PRN
Status: DISCONTINUED | OUTPATIENT
Start: 2020-02-19 | End: 2020-02-24 | Stop reason: HOSPADM

## 2020-02-19 RX ORDER — CITALOPRAM 20 MG/1
20 TABLET ORAL DAILY
Status: DISCONTINUED | OUTPATIENT
Start: 2020-02-20 | End: 2020-02-24 | Stop reason: HOSPADM

## 2020-02-19 RX ORDER — LANOLIN ALCOHOL/MO/W.PET/CERES
400 CREAM (GRAM) TOPICAL DAILY
Status: DISCONTINUED | OUTPATIENT
Start: 2020-02-20 | End: 2020-02-24 | Stop reason: HOSPADM

## 2020-02-19 RX ORDER — IPRATROPIUM BROMIDE AND ALBUTEROL SULFATE 2.5; .5 MG/3ML; MG/3ML
3 SOLUTION RESPIRATORY (INHALATION) EVERY 4 HOURS PRN
Status: DISCONTINUED | OUTPATIENT
Start: 2020-02-19 | End: 2020-02-24 | Stop reason: HOSPADM

## 2020-02-19 RX ORDER — SODIUM CHLORIDE 0.9 % (FLUSH) 0.9 %
10 SYRINGE (ML) INJECTION EVERY 12 HOURS SCHEDULED
Status: DISCONTINUED | OUTPATIENT
Start: 2020-02-19 | End: 2020-02-24 | Stop reason: HOSPADM

## 2020-02-19 RX ORDER — NICOTINE 21 MG/24HR
1 PATCH, TRANSDERMAL 24 HOURS TRANSDERMAL
Status: DISCONTINUED | OUTPATIENT
Start: 2020-02-19 | End: 2020-02-24 | Stop reason: HOSPADM

## 2020-02-19 RX ORDER — CHOLECALCIFEROL (VITAMIN D3) 125 MCG
5 CAPSULE ORAL NIGHTLY PRN
Status: DISCONTINUED | OUTPATIENT
Start: 2020-02-19 | End: 2020-02-24 | Stop reason: HOSPADM

## 2020-02-19 RX ORDER — BUPROPION HYDROCHLORIDE 150 MG/1
300 TABLET ORAL DAILY
Status: DISCONTINUED | OUTPATIENT
Start: 2020-02-20 | End: 2020-02-24 | Stop reason: HOSPADM

## 2020-02-19 RX ORDER — SODIUM CHLORIDE 0.9 % (FLUSH) 0.9 %
10 SYRINGE (ML) INJECTION AS NEEDED
Status: DISCONTINUED | OUTPATIENT
Start: 2020-02-19 | End: 2020-02-24 | Stop reason: HOSPADM

## 2020-02-19 RX ORDER — LANOLIN ALCOHOL/MO/W.PET/CERES
1000 CREAM (GRAM) TOPICAL DAILY
Status: DISCONTINUED | OUTPATIENT
Start: 2020-02-20 | End: 2020-02-24 | Stop reason: HOSPADM

## 2020-02-19 RX ORDER — ASPIRIN 81 MG/1
81 TABLET ORAL DAILY
Status: DISCONTINUED | OUTPATIENT
Start: 2020-02-20 | End: 2020-02-24 | Stop reason: HOSPADM

## 2020-02-19 RX ORDER — WARFARIN SODIUM 5 MG/1
5 TABLET ORAL
Status: DISCONTINUED | OUTPATIENT
Start: 2020-02-19 | End: 2020-02-22

## 2020-02-19 RX ORDER — KETOROLAC TROMETHAMINE 15 MG/ML
15 INJECTION, SOLUTION INTRAMUSCULAR; INTRAVENOUS ONCE
Status: COMPLETED | OUTPATIENT
Start: 2020-02-20 | End: 2020-02-19

## 2020-02-19 RX ORDER — ONDANSETRON 2 MG/ML
4 INJECTION INTRAMUSCULAR; INTRAVENOUS EVERY 6 HOURS PRN
Status: DISCONTINUED | OUTPATIENT
Start: 2020-02-19 | End: 2020-02-24 | Stop reason: HOSPADM

## 2020-02-19 RX ORDER — ONDANSETRON 4 MG/1
4 TABLET, FILM COATED ORAL EVERY 6 HOURS PRN
Status: DISCONTINUED | OUTPATIENT
Start: 2020-02-19 | End: 2020-02-24 | Stop reason: HOSPADM

## 2020-02-19 RX ADMIN — NICOTINE 1 PATCH: 14 PATCH, EXTENDED RELEASE TRANSDERMAL at 17:32

## 2020-02-19 RX ADMIN — CARVEDILOL 12.5 MG: 12.5 TABLET, FILM COATED ORAL at 20:31

## 2020-02-19 RX ADMIN — WARFARIN SODIUM 5 MG: 5 TABLET ORAL at 17:32

## 2020-02-19 RX ADMIN — CEFTRIAXONE SODIUM 1 G: 1 INJECTION, POWDER, FOR SOLUTION INTRAMUSCULAR; INTRAVENOUS at 18:16

## 2020-02-19 RX ADMIN — ACETAMINOPHEN 650 MG: 325 TABLET, FILM COATED ORAL at 23:50

## 2020-02-19 RX ADMIN — SODIUM CHLORIDE, PRESERVATIVE FREE 10 ML: 5 INJECTION INTRAVENOUS at 20:31

## 2020-02-19 RX ADMIN — ACETAMINOPHEN 650 MG: 325 TABLET, FILM COATED ORAL at 17:32

## 2020-02-19 RX ADMIN — MELATONIN TAB 5 MG 5 MG: 5 TAB at 23:50

## 2020-02-19 RX ADMIN — DOXYCYCLINE 100 MG: 100 INJECTION, POWDER, LYOPHILIZED, FOR SOLUTION INTRAVENOUS at 18:16

## 2020-02-19 RX ADMIN — LISINOPRIL 10 MG: 10 TABLET ORAL at 20:31

## 2020-02-19 RX ADMIN — KETOROLAC TROMETHAMINE 15 MG: 15 INJECTION, SOLUTION INTRAMUSCULAR; INTRAVENOUS at 23:50

## 2020-02-19 RX ADMIN — IPRATROPIUM BROMIDE AND ALBUTEROL SULFATE 3 ML: 2.5; .5 SOLUTION RESPIRATORY (INHALATION) at 23:37

## 2020-02-19 NOTE — PROGRESS NOTES
Chief Complaint   Patient presents with   • Follow-up     6 month follow up chronic medical   • Cough     Dr Kenney diagnosed with bronchitis yesterday       History of Present Illness    The patient presents with a 1 day history of feeling ill. He has a wet cough, wheezing, fever, myalgias, sore throat, nasal discharge, nasal congestion, decreased appetite and chills but does not have a dry cough, facial pain, a headache, eye drainage, ear pain, ear drainage, nausea, vomiting, diarrhea, abdominal pain or rash. The nasal discharge is clear. The fever is between 102-103. The patient reports that he has shortness of breath associated with the wheezing. He has not tried anything to treat his illness.  His Oxygen saturation was 96% RA yesterday at pulmonary office.    Review of Systems    CONSTITUTIONAL- Denies Unexplained Weight Loss, Sweats, Fatigue or Weakness.    HENT- Denies Sinus Pain or Decreased Hearing.    GASTROINTESTINAL- Denies Blood per Rectum, Constipation or Heartburn.    PULMONARY- Reports: Cough. Denies: Sputum Production or Hemoptysis.    CARDIOVASCULAR- Denies Chest Pain, Claudication, Edema, Syncope, Palpitations or Irregular Heart Beat.    Medications      Current Outpatient Medications:   •  aspirin 81 MG EC tablet, Take 1 tablet by mouth daily., Disp: , Rfl:   •  buPROPion XL (WELLBUTRIN XL) 300 MG 24 hr tablet, Take 1 tablet by mouth Daily., Disp: 30 tablet, Rfl: 4  •  carvedilol (COREG) 25 MG tablet, TAKE 1/2 (ONE-HALF) TABLET BY MOUTH TWICE DAILY, Disp: 90 tablet, Rfl: 3  •  citalopram (CeleXA) 20 MG tablet, TAKE 1 TABLET BY MOUTH ONCE DAILY, Disp: 30 tablet, Rfl: 5  •  famotidine (PEPCID) 20 MG tablet, Take 1 tablet by mouth Daily., Disp: 30 tablet, Rfl: 1  •  finasteride (PROSCAR) 5 MG tablet, TAKE 1 TABLET BY MOUTH AT NIGHT, Disp: 90 tablet, Rfl: 0  •  Fluticasone-Umeclidin-Vilant (TRELEGY ELLIPTA) 100-62.5-25 MCG/INH aerosol powder , Inhale 1 puff Daily., Disp: 1 each, Rfl: 11  •  folic  acid (FOLVITE) 400 MCG tablet, TAKE 1 TABLET BY MOUTH ONCE DAILY, Disp: 90 tablet, Rfl: 3  •  lisinopril (PRINIVIL,ZESTRIL) 10 MG tablet, TAKE 1 TABLET BY MOUTH TWICE DAILY, Disp: 180 tablet, Rfl: 1  •  Probiotic Product (PROBIOTIC DAILY PO), Take 1 capsule by mouth daily., Disp: , Rfl:   •  vitamin B-12 (CYANOCOBALAMIN) 1000 MCG tablet, Take 1 tablet by mouth Daily., Disp: 90 tablet, Rfl: 3  •  warfarin (COUMADIN) 5 MG tablet, TAKE 1 TABLET BY MOUTH ONCE DAILY OR  AS  DIRECTED  BY  YOUR  PHYSICIANS  OFFICE, Disp: 90 tablet, Rfl: 1     Allergies    No Known Allergies    Problem List    Patient Active Problem List   Diagnosis   • Atrial fibrillation (CMS/HCC)   • Depression   • Hyperbilirubinemia   • Hyperlipidemia   • Hypertension   • Macrocytic anemia   • Malignant neoplasm of overlapping sites of colon (CMS/HCC)   • History of artificial heart valve   • Neurogenic bladder   • Tobacco use   • Coronary artery disease involving native coronary artery of native heart without angina pectoris   • H/O aortic valve replacement   • COPD, mild (CMS/HCC)       Medications, Allergies, Problems List and Past History were reviewed and updated.    Physical Examination    /70 (BP Location: Right arm, Patient Position: Sitting, Cuff Size: Adult)   Pulse 92   Temp (!) 102.4 °F (39.1 °C) (Temporal)   Resp 20   Wt 75.3 kg (166 lb)   BMI 23.48 kg/m²     Oxygen Saturation: 87 % on Room Air.    HEENT: Head- Normocephalic Atraumatic. Facies- Within normal limits. Pinnas- Normal texture and shape bilaterally. Canals- Normal bilaterally. TMs- Normal bilaterally. Nares- Patent bilaterally. Nasal Septum- is normal. There is no tenderness to palpation over the frontal or maxillary sinuses. Lids- Normal bilaterally. Conjunctiva- Clear bilaterally. Sclera- Anicteric bilaterally. Oropharynx- Moist with no lesions. Tonsils- No enlargement, erythema or exudate.    Neck: Thyroid- non enlarged, symmetric and has no nodules. No bruits are  detected.    Lungs: Auscultation- Clear to auscultation bilaterally. There are no retractions, clubbing or cyanosis. The Expiratory to Inspiratory ratio is equal.    Lymph Nodes: Cervical- no enlarged lymph nodes noted.    Cardiovascular: There are no carotid bruits. Heart- Normal Rate with Regular rhythm and no murmurs. There are no gallops. There are no rubs. In the lower extremities there is no edema. The upper extremities do not have edema.    Abdomen: Soft, benign, non-tender with no masses, hernias, organomegaly or scars.    Laboratory Data    Influenza A (Rapid) 02/19/2020 : Negative.    Influenza B (Rapid) 02/19/2020 : Negative.    Radiology    My personal interpretation of the x-rays is as stated below:    Chest X-ray (PA and Lateral) 02/19/2020 : The CXR reveals a normal cardiac silhoutte, a normal mediastinum and multiple infiltrates and no other abnormalities. Infiltrates are seen in the right middle lobe, right lower lobe and left lower lobe.    Impression and Assessment    Pneumonia.    Plan    Pneumonia Plan: The patient was admitted.    Dillon was seen today for follow-up and cough.    Diagnoses and all orders for this visit:    Pneumonia of both lungs due to infectious organism, unspecified part of lung  -     POCT Influenza A/B  -     XR Chest PA & Lateral; Future        Return to Office    The patient was instructed to return for follow-up in 1 month.    The patient was instructed to return sooner if the condition changes, worsens, or does not resolve.

## 2020-02-20 PROBLEM — J18.9 BILATERAL PNEUMONIA: Status: ACTIVE | Noted: 2020-02-20

## 2020-02-20 PROBLEM — J10.1 INFLUENZA A: Status: ACTIVE | Noted: 2020-02-20

## 2020-02-20 LAB
INR PPP: 2.39 (ref 0.85–1.16)
PROTHROMBIN TIME: 25.1 SECONDS (ref 11.2–14.3)

## 2020-02-20 PROCEDURE — 94799 UNLISTED PULMONARY SVC/PX: CPT

## 2020-02-20 PROCEDURE — 85610 PROTHROMBIN TIME: CPT | Performed by: INTERNAL MEDICINE

## 2020-02-20 PROCEDURE — 25010000002 KETOROLAC TROMETHAMINE PER 15 MG: Performed by: NURSE PRACTITIONER

## 2020-02-20 PROCEDURE — 99233 SBSQ HOSP IP/OBS HIGH 50: CPT | Performed by: HOSPITALIST

## 2020-02-20 PROCEDURE — 25010000002 CEFTRIAXONE PER 250 MG: Performed by: INTERNAL MEDICINE

## 2020-02-20 RX ORDER — ARFORMOTEROL TARTRATE 15 UG/2ML
15 SOLUTION RESPIRATORY (INHALATION)
Status: DISCONTINUED | OUTPATIENT
Start: 2020-02-20 | End: 2020-02-24 | Stop reason: HOSPADM

## 2020-02-20 RX ORDER — BUDESONIDE 0.5 MG/2ML
0.5 INHALANT ORAL
Status: DISCONTINUED | OUTPATIENT
Start: 2020-02-20 | End: 2020-02-24 | Stop reason: HOSPADM

## 2020-02-20 RX ORDER — OSELTAMIVIR PHOSPHATE 75 MG/1
75 CAPSULE ORAL EVERY 12 HOURS SCHEDULED
Status: DISCONTINUED | OUTPATIENT
Start: 2020-02-20 | End: 2020-02-24 | Stop reason: HOSPADM

## 2020-02-20 RX ADMIN — WARFARIN SODIUM 5 MG: 5 TABLET ORAL at 17:54

## 2020-02-20 RX ADMIN — CARVEDILOL 12.5 MG: 12.5 TABLET, FILM COATED ORAL at 09:26

## 2020-02-20 RX ADMIN — FINASTERIDE 5 MG: 5 TABLET, FILM COATED ORAL at 09:27

## 2020-02-20 RX ADMIN — ARFORMOTEROL TARTRATE 15 MCG: 15 SOLUTION RESPIRATORY (INHALATION) at 19:19

## 2020-02-20 RX ADMIN — BUPROPION HYDROCHLORIDE 300 MG: 150 TABLET, FILM COATED, EXTENDED RELEASE ORAL at 09:26

## 2020-02-20 RX ADMIN — MELATONIN TAB 5 MG 5 MG: 5 TAB at 21:50

## 2020-02-20 RX ADMIN — LISINOPRIL 10 MG: 10 TABLET ORAL at 09:27

## 2020-02-20 RX ADMIN — DOXYCYCLINE 100 MG: 100 INJECTION, POWDER, LYOPHILIZED, FOR SOLUTION INTRAVENOUS at 17:54

## 2020-02-20 RX ADMIN — OSELTAMIVIR PHOSPHATE 75 MG: 75 CAPSULE ORAL at 21:43

## 2020-02-20 RX ADMIN — CEFTRIAXONE SODIUM 1 G: 1 INJECTION, POWDER, FOR SOLUTION INTRAMUSCULAR; INTRAVENOUS at 16:04

## 2020-02-20 RX ADMIN — CYANOCOBALAMIN TAB 1000 MCG 1000 MCG: 1000 TAB at 09:34

## 2020-02-20 RX ADMIN — NICOTINE 1 PATCH: 14 PATCH, EXTENDED RELEASE TRANSDERMAL at 09:28

## 2020-02-20 RX ADMIN — IPRATROPIUM BROMIDE 0.5 MG: 0.5 SOLUTION RESPIRATORY (INHALATION) at 19:20

## 2020-02-20 RX ADMIN — ARFORMOTEROL TARTRATE 15 MCG: 15 SOLUTION RESPIRATORY (INHALATION) at 13:34

## 2020-02-20 RX ADMIN — SODIUM CHLORIDE, PRESERVATIVE FREE 10 ML: 5 INJECTION INTRAVENOUS at 21:42

## 2020-02-20 RX ADMIN — LISINOPRIL 10 MG: 10 TABLET ORAL at 21:43

## 2020-02-20 RX ADMIN — FAMOTIDINE 20 MG: 20 TABLET ORAL at 09:26

## 2020-02-20 RX ADMIN — OSELTAMIVIR PHOSPHATE 75 MG: 75 CAPSULE ORAL at 09:26

## 2020-02-20 RX ADMIN — BUDESONIDE 0.5 MG: 0.5 INHALANT RESPIRATORY (INHALATION) at 13:35

## 2020-02-20 RX ADMIN — IPRATROPIUM BROMIDE 0.5 MG: 0.5 SOLUTION RESPIRATORY (INHALATION) at 13:35

## 2020-02-20 RX ADMIN — DOXYCYCLINE 100 MG: 100 INJECTION, POWDER, LYOPHILIZED, FOR SOLUTION INTRAVENOUS at 05:40

## 2020-02-20 RX ADMIN — ACETAMINOPHEN 650 MG: 325 TABLET, FILM COATED ORAL at 13:34

## 2020-02-20 RX ADMIN — FOLIC ACID TAB 400 MCG 400 MCG: 400 TAB at 09:26

## 2020-02-20 RX ADMIN — CITALOPRAM HYDROBROMIDE 20 MG: 20 TABLET ORAL at 09:25

## 2020-02-20 RX ADMIN — CARVEDILOL 12.5 MG: 12.5 TABLET, FILM COATED ORAL at 21:43

## 2020-02-20 RX ADMIN — ASPIRIN 81 MG: 81 TABLET, COATED ORAL at 09:26

## 2020-02-21 ENCOUNTER — APPOINTMENT (OUTPATIENT)
Dept: CARDIOLOGY | Facility: HOSPITAL | Age: 70
End: 2020-02-21

## 2020-02-21 PROBLEM — J96.01 SEPSIS WITH ACUTE HYPOXIC RESPIRATORY FAILURE: Status: ACTIVE | Noted: 2020-02-21

## 2020-02-21 PROBLEM — R04.2 HEMOPTYSIS: Status: ACTIVE | Noted: 2020-02-21

## 2020-02-21 PROBLEM — D69.6 THROMBOCYTOPENIA: Status: ACTIVE | Noted: 2020-02-21

## 2020-02-21 PROBLEM — IMO0001 LUNG NODULE < 6CM ON CT: Status: ACTIVE | Noted: 2020-02-21

## 2020-02-21 PROBLEM — A41.9 SEPSIS WITH ACUTE HYPOXIC RESPIRATORY FAILURE: Status: ACTIVE | Noted: 2020-02-21

## 2020-02-21 PROBLEM — R65.20 SEPSIS WITH ACUTE HYPOXIC RESPIRATORY FAILURE: Status: ACTIVE | Noted: 2020-02-21

## 2020-02-21 LAB
ANION GAP SERPL CALCULATED.3IONS-SCNC: 11 MMOL/L (ref 5–15)
BUN BLD-MCNC: 15 MG/DL (ref 8–23)
BUN/CREAT SERPL: 19.2 (ref 7–25)
CALCIUM SPEC-SCNC: 8.2 MG/DL (ref 8.6–10.5)
CHLORIDE SERPL-SCNC: 100 MMOL/L (ref 98–107)
CO2 SERPL-SCNC: 25 MMOL/L (ref 22–29)
CREAT BLD-MCNC: 0.78 MG/DL (ref 0.76–1.27)
DEPRECATED RDW RBC AUTO: 50.9 FL (ref 37–54)
ERYTHROCYTE [DISTWIDTH] IN BLOOD BY AUTOMATED COUNT: 13 % (ref 12.3–15.4)
GFR SERPL CREATININE-BSD FRML MDRD: 99 ML/MIN/1.73
GLUCOSE BLD-MCNC: 110 MG/DL (ref 65–99)
HCT VFR BLD AUTO: 33.4 % (ref 37.5–51)
HGB BLD-MCNC: 11.1 G/DL (ref 13–17.7)
INR PPP: 2.47 (ref 0.85–1.16)
MAGNESIUM SERPL-MCNC: 1.6 MG/DL (ref 1.6–2.4)
MCH RBC QN AUTO: 35.5 PG (ref 26.6–33)
MCHC RBC AUTO-ENTMCNC: 33.2 G/DL (ref 31.5–35.7)
MCV RBC AUTO: 106.7 FL (ref 79–97)
NT-PROBNP SERPL-MCNC: 3883 PG/ML (ref 5–900)
PLATELET # BLD AUTO: 63 10*3/MM3 (ref 140–450)
PMV BLD AUTO: 12.7 FL (ref 6–12)
POTASSIUM BLD-SCNC: 2.9 MMOL/L (ref 3.5–5.2)
POTASSIUM BLD-SCNC: 4.1 MMOL/L (ref 3.5–5.2)
PROTHROMBIN TIME: 25.7 SECONDS (ref 11.2–14.3)
RBC # BLD AUTO: 3.13 10*6/MM3 (ref 4.14–5.8)
SODIUM BLD-SCNC: 136 MMOL/L (ref 136–145)
WBC NRBC COR # BLD: 3.09 10*3/MM3 (ref 3.4–10.8)

## 2020-02-21 PROCEDURE — 80048 BASIC METABOLIC PNL TOTAL CA: CPT | Performed by: HOSPITALIST

## 2020-02-21 PROCEDURE — 93306 TTE W/DOPPLER COMPLETE: CPT

## 2020-02-21 PROCEDURE — 86256 FLUORESCENT ANTIBODY TITER: CPT | Performed by: INTERNAL MEDICINE

## 2020-02-21 PROCEDURE — 94799 UNLISTED PULMONARY SVC/PX: CPT

## 2020-02-21 PROCEDURE — 25010000002 CEFTRIAXONE PER 250 MG: Performed by: INTERNAL MEDICINE

## 2020-02-21 PROCEDURE — 84132 ASSAY OF SERUM POTASSIUM: CPT | Performed by: HOSPITALIST

## 2020-02-21 PROCEDURE — 83520 IMMUNOASSAY QUANT NOS NONAB: CPT | Performed by: INTERNAL MEDICINE

## 2020-02-21 PROCEDURE — 93306 TTE W/DOPPLER COMPLETE: CPT | Performed by: INTERNAL MEDICINE

## 2020-02-21 PROCEDURE — 85027 COMPLETE CBC AUTOMATED: CPT | Performed by: HOSPITALIST

## 2020-02-21 PROCEDURE — 83880 ASSAY OF NATRIURETIC PEPTIDE: CPT | Performed by: INTERNAL MEDICINE

## 2020-02-21 PROCEDURE — 87205 SMEAR GRAM STAIN: CPT | Performed by: INTERNAL MEDICINE

## 2020-02-21 PROCEDURE — 83516 IMMUNOASSAY NONANTIBODY: CPT | Performed by: INTERNAL MEDICINE

## 2020-02-21 PROCEDURE — 25010000003 MAGNESIUM SULFATE 4 GM/100ML SOLUTION: Performed by: HOSPITALIST

## 2020-02-21 PROCEDURE — 87070 CULTURE OTHR SPECIMN AEROBIC: CPT | Performed by: INTERNAL MEDICINE

## 2020-02-21 PROCEDURE — 99232 SBSQ HOSP IP/OBS MODERATE 35: CPT | Performed by: PHYSICIAN ASSISTANT

## 2020-02-21 PROCEDURE — 83735 ASSAY OF MAGNESIUM: CPT

## 2020-02-21 PROCEDURE — 99222 1ST HOSP IP/OBS MODERATE 55: CPT | Performed by: INTERNAL MEDICINE

## 2020-02-21 PROCEDURE — 85610 PROTHROMBIN TIME: CPT | Performed by: INTERNAL MEDICINE

## 2020-02-21 RX ORDER — POTASSIUM CHLORIDE 1.5 G/1.77G
40 POWDER, FOR SOLUTION ORAL AS NEEDED
Status: DISCONTINUED | OUTPATIENT
Start: 2020-02-21 | End: 2020-02-24 | Stop reason: HOSPADM

## 2020-02-21 RX ORDER — GUAIFENESIN AND CODEINE PHOSPHATE 100; 10 MG/5ML; MG/5ML
5 SOLUTION ORAL EVERY 4 HOURS PRN
Status: DISCONTINUED | OUTPATIENT
Start: 2020-02-21 | End: 2020-02-24 | Stop reason: HOSPADM

## 2020-02-21 RX ORDER — MAGNESIUM SULFATE HEPTAHYDRATE 40 MG/ML
4 INJECTION, SOLUTION INTRAVENOUS AS NEEDED
Status: DISCONTINUED | OUTPATIENT
Start: 2020-02-21 | End: 2020-02-24 | Stop reason: HOSPADM

## 2020-02-21 RX ORDER — MAGNESIUM SULFATE HEPTAHYDRATE 40 MG/ML
2 INJECTION, SOLUTION INTRAVENOUS AS NEEDED
Status: DISCONTINUED | OUTPATIENT
Start: 2020-02-21 | End: 2020-02-24 | Stop reason: HOSPADM

## 2020-02-21 RX ORDER — POTASSIUM CHLORIDE 750 MG/1
40 CAPSULE, EXTENDED RELEASE ORAL AS NEEDED
Status: DISCONTINUED | OUTPATIENT
Start: 2020-02-21 | End: 2020-02-24 | Stop reason: HOSPADM

## 2020-02-21 RX ORDER — BENZONATATE 100 MG/1
200 CAPSULE ORAL 3 TIMES DAILY PRN
Status: DISCONTINUED | OUTPATIENT
Start: 2020-02-21 | End: 2020-02-24 | Stop reason: HOSPADM

## 2020-02-21 RX ORDER — POTASSIUM CHLORIDE 7.45 MG/ML
10 INJECTION INTRAVENOUS
Status: DISCONTINUED | OUTPATIENT
Start: 2020-02-21 | End: 2020-02-24 | Stop reason: HOSPADM

## 2020-02-21 RX ADMIN — POTASSIUM CHLORIDE 40 MEQ: 750 CAPSULE, EXTENDED RELEASE ORAL at 08:33

## 2020-02-21 RX ADMIN — IPRATROPIUM BROMIDE AND ALBUTEROL SULFATE 3 ML: 2.5; .5 SOLUTION RESPIRATORY (INHALATION) at 14:11

## 2020-02-21 RX ADMIN — NICOTINE 1 PATCH: 14 PATCH, EXTENDED RELEASE TRANSDERMAL at 08:36

## 2020-02-21 RX ADMIN — POTASSIUM CHLORIDE 40 MEQ: 750 CAPSULE, EXTENDED RELEASE ORAL at 12:31

## 2020-02-21 RX ADMIN — POTASSIUM CHLORIDE 40 MEQ: 750 CAPSULE, EXTENDED RELEASE ORAL at 16:21

## 2020-02-21 RX ADMIN — IPRATROPIUM BROMIDE 0.5 MG: 0.5 SOLUTION RESPIRATORY (INHALATION) at 07:15

## 2020-02-21 RX ADMIN — MAGNESIUM SULFATE HEPTAHYDRATE 4 G: 40 INJECTION, SOLUTION INTRAVENOUS at 12:31

## 2020-02-21 RX ADMIN — ASPIRIN 81 MG: 81 TABLET, COATED ORAL at 08:36

## 2020-02-21 RX ADMIN — DOXYCYCLINE 100 MG: 100 INJECTION, POWDER, LYOPHILIZED, FOR SOLUTION INTRAVENOUS at 06:41

## 2020-02-21 RX ADMIN — BUPROPION HYDROCHLORIDE 300 MG: 150 TABLET, FILM COATED, EXTENDED RELEASE ORAL at 08:33

## 2020-02-21 RX ADMIN — BUDESONIDE 0.5 MG: 0.5 INHALANT RESPIRATORY (INHALATION) at 00:19

## 2020-02-21 RX ADMIN — BUDESONIDE 0.5 MG: 0.5 INHALANT RESPIRATORY (INHALATION) at 14:11

## 2020-02-21 RX ADMIN — SODIUM CHLORIDE, PRESERVATIVE FREE 10 ML: 5 INJECTION INTRAVENOUS at 08:37

## 2020-02-21 RX ADMIN — ARFORMOTEROL TARTRATE 15 MCG: 15 SOLUTION RESPIRATORY (INHALATION) at 07:16

## 2020-02-21 RX ADMIN — FAMOTIDINE 20 MG: 20 TABLET ORAL at 08:36

## 2020-02-21 RX ADMIN — DOXYCYCLINE 100 MG: 100 INJECTION, POWDER, LYOPHILIZED, FOR SOLUTION INTRAVENOUS at 17:14

## 2020-02-21 RX ADMIN — WARFARIN SODIUM 5 MG: 5 TABLET ORAL at 17:14

## 2020-02-21 RX ADMIN — CITALOPRAM HYDROBROMIDE 20 MG: 20 TABLET ORAL at 08:36

## 2020-02-21 RX ADMIN — LISINOPRIL 10 MG: 10 TABLET ORAL at 08:36

## 2020-02-21 RX ADMIN — OSELTAMIVIR PHOSPHATE 75 MG: 75 CAPSULE ORAL at 08:41

## 2020-02-21 RX ADMIN — BENZONATATE 200 MG: 100 CAPSULE ORAL at 16:20

## 2020-02-21 RX ADMIN — CARVEDILOL 12.5 MG: 12.5 TABLET, FILM COATED ORAL at 08:36

## 2020-02-21 RX ADMIN — FINASTERIDE 5 MG: 5 TABLET, FILM COATED ORAL at 08:36

## 2020-02-21 RX ADMIN — CEFTRIAXONE SODIUM 1 G: 1 INJECTION, POWDER, FOR SOLUTION INTRAMUSCULAR; INTRAVENOUS at 16:21

## 2020-02-21 RX ADMIN — IPRATROPIUM BROMIDE 0.5 MG: 0.5 SOLUTION RESPIRATORY (INHALATION) at 19:24

## 2020-02-21 RX ADMIN — FOLIC ACID TAB 400 MCG 400 MCG: 400 TAB at 08:36

## 2020-02-21 RX ADMIN — ARFORMOTEROL TARTRATE 15 MCG: 15 SOLUTION RESPIRATORY (INHALATION) at 19:25

## 2020-02-21 RX ADMIN — CYANOCOBALAMIN TAB 1000 MCG 1000 MCG: 1000 TAB at 08:36

## 2020-02-21 RX ADMIN — IPRATROPIUM BROMIDE 0.5 MG: 0.5 SOLUTION RESPIRATORY (INHALATION) at 00:19

## 2020-02-22 ENCOUNTER — APPOINTMENT (OUTPATIENT)
Dept: GENERAL RADIOLOGY | Facility: HOSPITAL | Age: 70
End: 2020-02-22

## 2020-02-22 LAB
ALBUMIN SERPL-MCNC: 2.9 G/DL (ref 3.5–5.2)
ALBUMIN/GLOB SERPL: 1 G/DL
ALP SERPL-CCNC: 44 U/L (ref 39–117)
ALT SERPL W P-5'-P-CCNC: 7 U/L (ref 1–41)
ANION GAP SERPL CALCULATED.3IONS-SCNC: 14 MMOL/L (ref 5–15)
AST SERPL-CCNC: 16 U/L (ref 1–40)
BACTERIA SPEC RESP CULT: ABNORMAL
BACTERIA SPEC RESP CULT: ABNORMAL
BASOPHILS # BLD AUTO: 0 10*3/MM3 (ref 0–0.2)
BASOPHILS NFR BLD AUTO: 0 % (ref 0–1.5)
BH CV ECHO MEAS - AI DEC SLOPE: 296 CM/SEC^2
BH CV ECHO MEAS - AI MAX PG: 82.4 MMHG
BH CV ECHO MEAS - AI MAX VEL: 454 CM/SEC
BH CV ECHO MEAS - AI P1/2T: 449.2 MSEC
BH CV ECHO MEAS - AO MAX PG (FULL): 24.7 MMHG
BH CV ECHO MEAS - AO MAX PG: 28.3 MMHG
BH CV ECHO MEAS - AO MEAN PG (FULL): 13 MMHG
BH CV ECHO MEAS - AO MEAN PG: 15 MMHG
BH CV ECHO MEAS - AO ROOT AREA (BSA CORRECTED): 1.7
BH CV ECHO MEAS - AO ROOT AREA: 8 CM^2
BH CV ECHO MEAS - AO ROOT DIAM: 3.2 CM
BH CV ECHO MEAS - AO V2 MAX: 266 CM/SEC
BH CV ECHO MEAS - AO V2 MEAN: 181 CM/SEC
BH CV ECHO MEAS - AO V2 VTI: 41.3 CM
BH CV ECHO MEAS - AVA(I,A): 1.2 CM^2
BH CV ECHO MEAS - AVA(I,D): 1.2 CM^2
BH CV ECHO MEAS - AVA(V,A): 1.1 CM^2
BH CV ECHO MEAS - AVA(V,D): 1.1 CM^2
BH CV ECHO MEAS - BSA(HAYCOCK): 1.9 M^2
BH CV ECHO MEAS - BSA: 1.9 M^2
BH CV ECHO MEAS - BZI_BMI: 21.9 KILOGRAMS/M^2
BH CV ECHO MEAS - BZI_METRIC_HEIGHT: 180.3 CM
BH CV ECHO MEAS - BZI_METRIC_WEIGHT: 71.2 KG
BH CV ECHO MEAS - EDV(CUBED): 147.2 ML
BH CV ECHO MEAS - EDV(MOD-SP2): 135 ML
BH CV ECHO MEAS - EDV(MOD-SP4): 89 ML
BH CV ECHO MEAS - EDV(TEICH): 134.2 ML
BH CV ECHO MEAS - EF(CUBED): 66.7 %
BH CV ECHO MEAS - EF(MOD-BP): 46 %
BH CV ECHO MEAS - EF(MOD-SP2): 52.6 %
BH CV ECHO MEAS - EF(MOD-SP4): 43.8 %
BH CV ECHO MEAS - EF(TEICH): 57.8 %
BH CV ECHO MEAS - ESV(CUBED): 49 ML
BH CV ECHO MEAS - ESV(MOD-SP2): 64 ML
BH CV ECHO MEAS - ESV(MOD-SP4): 50 ML
BH CV ECHO MEAS - ESV(TEICH): 56.6 ML
BH CV ECHO MEAS - FS: 30.7 %
BH CV ECHO MEAS - IVS/LVPW: 0.97
BH CV ECHO MEAS - IVSD: 1.2 CM
BH CV ECHO MEAS - LA DIMENSION: 5.9 CM
BH CV ECHO MEAS - LA/AO: 1.8
BH CV ECHO MEAS - LAD MAJOR: 7.7 CM
BH CV ECHO MEAS - LV DIASTOLIC VOL/BSA (35-75): 46.8 ML/M^2
BH CV ECHO MEAS - LV MASS(C)D: 269.9 GRAMS
BH CV ECHO MEAS - LV MASS(C)DI: 141.9 GRAMS/M^2
BH CV ECHO MEAS - LV MAX PG: 3.6 MMHG
BH CV ECHO MEAS - LV MEAN PG: 2 MMHG
BH CV ECHO MEAS - LV SYSTOLIC VOL/BSA (12-30): 26.3 ML/M^2
BH CV ECHO MEAS - LV V1 MAX: 95.5 CM/SEC
BH CV ECHO MEAS - LV V1 MEAN: 63.9 CM/SEC
BH CV ECHO MEAS - LV V1 VTI: 16.4 CM
BH CV ECHO MEAS - LVIDD: 5.3 CM
BH CV ECHO MEAS - LVIDS: 3.7 CM
BH CV ECHO MEAS - LVLD AP2: 8.3 CM
BH CV ECHO MEAS - LVLD AP4: 8.1 CM
BH CV ECHO MEAS - LVLS AP2: 8 CM
BH CV ECHO MEAS - LVLS AP4: 7.2 CM
BH CV ECHO MEAS - LVOT AREA (M): 3.1 CM^2
BH CV ECHO MEAS - LVOT AREA: 3.1 CM^2
BH CV ECHO MEAS - LVOT DIAM: 2 CM
BH CV ECHO MEAS - LVPWD: 1.3 CM
BH CV ECHO MEAS - MR ALIAS VEL: 37.4 CM/SEC
BH CV ECHO MEAS - MR ERO: 0.3 CM^2
BH CV ECHO MEAS - MR FLOW RATE: 150.4 CM^3/SEC
BH CV ECHO MEAS - MR MAX PG: 102 MMHG
BH CV ECHO MEAS - MR MAX VEL: 504.5 CM/SEC
BH CV ECHO MEAS - MR MEAN PG: 72 MMHG
BH CV ECHO MEAS - MR MEAN VEL: 403.5 CM/SEC
BH CV ECHO MEAS - MR PISA RADIUS: 0.8 CM
BH CV ECHO MEAS - MR PISA: 4 CM^2
BH CV ECHO MEAS - MR VOLUME: 43.1 ML
BH CV ECHO MEAS - MR VTI: 144.5 CM
BH CV ECHO MEAS - MV AREA (1 DIAM): 14.5 CM^2
BH CV ECHO MEAS - MV DEC TIME: 0.21 SEC
BH CV ECHO MEAS - MV DIAM: 4.3 CM
BH CV ECHO MEAS - MV FLOW AREA(1DIAM): 14.5 CM^2
BH CV ECHO MEAS - MV MAX PG: 8.5 MMHG
BH CV ECHO MEAS - MV MEAN PG: 3 MMHG
BH CV ECHO MEAS - MV V2 MAX: 146 CM/SEC
BH CV ECHO MEAS - MV V2 MEAN: 78.7 CM/SEC
BH CV ECHO MEAS - MV V2 VTI: 34.6 CM
BH CV ECHO MEAS - MVA(VTI): 1.5 CM^2
BH CV ECHO MEAS - PA ACC SLOPE: 620 CM/SEC^2
BH CV ECHO MEAS - PA ACC TIME: 0.09 SEC
BH CV ECHO MEAS - PA PR(ACCEL): 37.6 MMHG
BH CV ECHO MEAS - RAP SYSTOLE: 15 MMHG
BH CV ECHO MEAS - RF(MV,AO)(1 DIAM): 0.34
BH CV ECHO MEAS - RF(MV,LVOT)(1DIAM): 0.9
BH CV ECHO MEAS - RVSP: 59 MMHG
BH CV ECHO MEAS - SI(AO): 174.6 ML/M^2
BH CV ECHO MEAS - SI(CUBED): 51.6 ML/M^2
BH CV ECHO MEAS - SI(LVOT): 27.1 ML/M^2
BH CV ECHO MEAS - SI(MOD-SP2): 37.3 ML/M^2
BH CV ECHO MEAS - SI(MOD-SP4): 20.5 ML/M^2
BH CV ECHO MEAS - SI(MV 1 DIAM): 264.1 ML/M^2
BH CV ECHO MEAS - SI(TEICH): 40.7 ML/M^2
BH CV ECHO MEAS - SV(AO): 332.2 ML
BH CV ECHO MEAS - SV(CUBED): 98.2 ML
BH CV ECHO MEAS - SV(LVOT): 51.5 ML
BH CV ECHO MEAS - SV(MOD-SP2): 71 ML
BH CV ECHO MEAS - SV(MOD-SP4): 39 ML
BH CV ECHO MEAS - SV(MV 1 DIAM): 502.5 ML
BH CV ECHO MEAS - SV(TEICH): 77.5 ML
BH CV ECHO MEAS - TAPSE (>1.6): 1.7 CM2
BH CV ECHO MEAS - TR MAX PG: 44 MMHG
BH CV ECHO MEAS - TR MAX VEL: 331 CM/SEC
BH CV VAS BP RIGHT ARM: NORMAL MMHG
BH CV XLRA - RV BASE: 4.4 CM
BH CV XLRA - RV LENGTH: 8.4 CM
BH CV XLRA - RV MID: 3 CM
BH CV XLRA - TDI S': 7 CM/SEC
BILIRUB SERPL-MCNC: 1.8 MG/DL (ref 0.2–1.2)
BUN BLD-MCNC: 12 MG/DL (ref 8–23)
BUN/CREAT SERPL: 19.7 (ref 7–25)
CALCIUM SPEC-SCNC: 8.1 MG/DL (ref 8.6–10.5)
CHLORIDE SERPL-SCNC: 101 MMOL/L (ref 98–107)
CO2 SERPL-SCNC: 20 MMOL/L (ref 22–29)
CREAT BLD-MCNC: 0.61 MG/DL (ref 0.76–1.27)
DEPRECATED RDW RBC AUTO: 53.3 FL (ref 37–54)
EOSINOPHIL # BLD AUTO: 0 10*3/MM3 (ref 0–0.4)
EOSINOPHIL NFR BLD AUTO: 0 % (ref 0.3–6.2)
ERYTHROCYTE [DISTWIDTH] IN BLOOD BY AUTOMATED COUNT: 13.3 % (ref 12.3–15.4)
GFR SERPL CREATININE-BSD FRML MDRD: 131 ML/MIN/1.73
GLOBULIN UR ELPH-MCNC: 2.8 GM/DL
GLUCOSE BLD-MCNC: 110 MG/DL (ref 65–99)
GRAM STN SPEC: ABNORMAL
HCT VFR BLD AUTO: 34.3 % (ref 37.5–51)
HGB BLD-MCNC: 11.1 G/DL (ref 13–17.7)
IMM GRANULOCYTES # BLD AUTO: 0.02 10*3/MM3 (ref 0–0.05)
IMM GRANULOCYTES NFR BLD AUTO: 0.5 % (ref 0–0.5)
INR PPP: 4.03 (ref 0.85–1.16)
LYMPHOCYTES # BLD AUTO: 0.55 10*3/MM3 (ref 0.7–3.1)
LYMPHOCYTES NFR BLD AUTO: 12.6 % (ref 19.6–45.3)
MAXIMAL PREDICTED HEART RATE: 151 BPM
MCH RBC QN AUTO: 35.2 PG (ref 26.6–33)
MCHC RBC AUTO-ENTMCNC: 32.4 G/DL (ref 31.5–35.7)
MCV RBC AUTO: 108.9 FL (ref 79–97)
MONOCYTES # BLD AUTO: 0.4 10*3/MM3 (ref 0.1–0.9)
MONOCYTES NFR BLD AUTO: 9.2 % (ref 5–12)
NEUTROPHILS # BLD AUTO: 3.4 10*3/MM3 (ref 1.7–7)
NEUTROPHILS NFR BLD AUTO: 77.7 % (ref 42.7–76)
NRBC BLD AUTO-RTO: 0 /100 WBC (ref 0–0.2)
NT-PROBNP SERPL-MCNC: 3782 PG/ML (ref 5–900)
PLATELET # BLD AUTO: 61 10*3/MM3 (ref 140–450)
PMV BLD AUTO: 12.9 FL (ref 6–12)
POTASSIUM BLD-SCNC: 3.9 MMOL/L (ref 3.5–5.2)
PROT SERPL-MCNC: 5.7 G/DL (ref 6–8.5)
PROTHROMBIN TIME: 37.7 SECONDS (ref 11.2–14.3)
RBC # BLD AUTO: 3.15 10*6/MM3 (ref 4.14–5.8)
SODIUM BLD-SCNC: 135 MMOL/L (ref 136–145)
STRESS TARGET HR: 128 BPM
WBC NRBC COR # BLD: 4.37 10*3/MM3 (ref 3.4–10.8)

## 2020-02-22 PROCEDURE — 25010000002 CEFTRIAXONE PER 250 MG: Performed by: INTERNAL MEDICINE

## 2020-02-22 PROCEDURE — 85025 COMPLETE CBC W/AUTO DIFF WBC: CPT | Performed by: PHYSICIAN ASSISTANT

## 2020-02-22 PROCEDURE — 71045 X-RAY EXAM CHEST 1 VIEW: CPT

## 2020-02-22 PROCEDURE — 99233 SBSQ HOSP IP/OBS HIGH 50: CPT | Performed by: HOSPITALIST

## 2020-02-22 PROCEDURE — 94799 UNLISTED PULMONARY SVC/PX: CPT

## 2020-02-22 PROCEDURE — 85610 PROTHROMBIN TIME: CPT | Performed by: INTERNAL MEDICINE

## 2020-02-22 PROCEDURE — 25010000002 FUROSEMIDE PER 20 MG: Performed by: NURSE PRACTITIONER

## 2020-02-22 PROCEDURE — 83880 ASSAY OF NATRIURETIC PEPTIDE: CPT | Performed by: INTERNAL MEDICINE

## 2020-02-22 PROCEDURE — 80053 COMPREHEN METABOLIC PANEL: CPT | Performed by: PHYSICIAN ASSISTANT

## 2020-02-22 PROCEDURE — 99232 SBSQ HOSP IP/OBS MODERATE 35: CPT | Performed by: INTERNAL MEDICINE

## 2020-02-22 RX ORDER — FUROSEMIDE 10 MG/ML
40 INJECTION INTRAMUSCULAR; INTRAVENOUS ONCE
Status: COMPLETED | OUTPATIENT
Start: 2020-02-22 | End: 2020-02-22

## 2020-02-22 RX ADMIN — GUAIFENESIN AND CODEINE PHOSPHATE 5 ML: 100; 10 SOLUTION ORAL at 20:54

## 2020-02-22 RX ADMIN — GUAIFENESIN AND CODEINE PHOSPHATE 5 ML: 100; 10 SOLUTION ORAL at 00:07

## 2020-02-22 RX ADMIN — FINASTERIDE 5 MG: 5 TABLET, FILM COATED ORAL at 09:42

## 2020-02-22 RX ADMIN — BUDESONIDE 0.5 MG: 0.5 INHALANT RESPIRATORY (INHALATION) at 00:41

## 2020-02-22 RX ADMIN — DOXYCYCLINE 100 MG: 100 INJECTION, POWDER, LYOPHILIZED, FOR SOLUTION INTRAVENOUS at 20:05

## 2020-02-22 RX ADMIN — LISINOPRIL 10 MG: 10 TABLET ORAL at 09:41

## 2020-02-22 RX ADMIN — MELATONIN TAB 5 MG 5 MG: 5 TAB at 00:07

## 2020-02-22 RX ADMIN — OSELTAMIVIR PHOSPHATE 75 MG: 75 CAPSULE ORAL at 09:40

## 2020-02-22 RX ADMIN — FAMOTIDINE 20 MG: 20 TABLET ORAL at 09:42

## 2020-02-22 RX ADMIN — LISINOPRIL 10 MG: 10 TABLET ORAL at 00:07

## 2020-02-22 RX ADMIN — OSELTAMIVIR PHOSPHATE 75 MG: 75 CAPSULE ORAL at 20:45

## 2020-02-22 RX ADMIN — IPRATROPIUM BROMIDE 0.5 MG: 0.5 SOLUTION RESPIRATORY (INHALATION) at 13:51

## 2020-02-22 RX ADMIN — CARVEDILOL 12.5 MG: 12.5 TABLET, FILM COATED ORAL at 00:07

## 2020-02-22 RX ADMIN — BUPROPION HYDROCHLORIDE 300 MG: 150 TABLET, FILM COATED, EXTENDED RELEASE ORAL at 09:42

## 2020-02-22 RX ADMIN — NICOTINE 1 PATCH: 14 PATCH, EXTENDED RELEASE TRANSDERMAL at 09:44

## 2020-02-22 RX ADMIN — IPRATROPIUM BROMIDE 0.5 MG: 0.5 SOLUTION RESPIRATORY (INHALATION) at 07:16

## 2020-02-22 RX ADMIN — BUDESONIDE 0.5 MG: 0.5 INHALANT RESPIRATORY (INHALATION) at 13:50

## 2020-02-22 RX ADMIN — SODIUM CHLORIDE, PRESERVATIVE FREE 10 ML: 5 INJECTION INTRAVENOUS at 00:07

## 2020-02-22 RX ADMIN — LISINOPRIL 10 MG: 10 TABLET ORAL at 20:45

## 2020-02-22 RX ADMIN — CYANOCOBALAMIN TAB 1000 MCG 1000 MCG: 1000 TAB at 09:42

## 2020-02-22 RX ADMIN — FOLIC ACID TAB 400 MCG 400 MCG: 400 TAB at 09:42

## 2020-02-22 RX ADMIN — CITALOPRAM HYDROBROMIDE 20 MG: 20 TABLET ORAL at 09:42

## 2020-02-22 RX ADMIN — CARVEDILOL 12.5 MG: 12.5 TABLET, FILM COATED ORAL at 20:45

## 2020-02-22 RX ADMIN — CARVEDILOL 12.5 MG: 12.5 TABLET, FILM COATED ORAL at 09:41

## 2020-02-22 RX ADMIN — OSELTAMIVIR PHOSPHATE 75 MG: 75 CAPSULE ORAL at 00:07

## 2020-02-22 RX ADMIN — ASPIRIN 81 MG: 81 TABLET, COATED ORAL at 09:41

## 2020-02-22 RX ADMIN — IPRATROPIUM BROMIDE 0.5 MG: 0.5 SOLUTION RESPIRATORY (INHALATION) at 00:41

## 2020-02-22 RX ADMIN — ARFORMOTEROL TARTRATE 15 MCG: 15 SOLUTION RESPIRATORY (INHALATION) at 07:16

## 2020-02-22 RX ADMIN — BENZONATATE 200 MG: 100 CAPSULE ORAL at 20:51

## 2020-02-22 RX ADMIN — FUROSEMIDE 40 MG: 10 INJECTION, SOLUTION INTRAMUSCULAR; INTRAVENOUS at 14:15

## 2020-02-22 RX ADMIN — CEFTRIAXONE SODIUM 1 G: 1 INJECTION, POWDER, FOR SOLUTION INTRAMUSCULAR; INTRAVENOUS at 21:40

## 2020-02-22 RX ADMIN — MELATONIN TAB 5 MG 5 MG: 5 TAB at 20:45

## 2020-02-22 RX ADMIN — DOXYCYCLINE 100 MG: 100 INJECTION, POWDER, LYOPHILIZED, FOR SOLUTION INTRAVENOUS at 05:06

## 2020-02-23 ENCOUNTER — APPOINTMENT (OUTPATIENT)
Dept: GENERAL RADIOLOGY | Facility: HOSPITAL | Age: 70
End: 2020-02-23

## 2020-02-23 LAB
ANION GAP SERPL CALCULATED.3IONS-SCNC: 12 MMOL/L (ref 5–15)
BUN BLD-MCNC: 13 MG/DL (ref 8–23)
BUN/CREAT SERPL: 20.3 (ref 7–25)
CALCIUM SPEC-SCNC: 8.4 MG/DL (ref 8.6–10.5)
CHLORIDE SERPL-SCNC: 98 MMOL/L (ref 98–107)
CO2 SERPL-SCNC: 24 MMOL/L (ref 22–29)
CREAT BLD-MCNC: 0.64 MG/DL (ref 0.76–1.27)
DEPRECATED RDW RBC AUTO: 50.7 FL (ref 37–54)
ERYTHROCYTE [DISTWIDTH] IN BLOOD BY AUTOMATED COUNT: 13 % (ref 12.3–15.4)
GFR SERPL CREATININE-BSD FRML MDRD: 124 ML/MIN/1.73
GLUCOSE BLD-MCNC: 104 MG/DL (ref 65–99)
HCT VFR BLD AUTO: 34.6 % (ref 37.5–51)
HGB BLD-MCNC: 11.3 G/DL (ref 13–17.7)
INR PPP: 4.39 (ref 0.85–1.16)
MAGNESIUM SERPL-MCNC: 1.8 MG/DL (ref 1.6–2.4)
MCH RBC QN AUTO: 34.2 PG (ref 26.6–33)
MCHC RBC AUTO-ENTMCNC: 32.7 G/DL (ref 31.5–35.7)
MCV RBC AUTO: 104.8 FL (ref 79–97)
PLATELET # BLD AUTO: 65 10*3/MM3 (ref 140–450)
PMV BLD AUTO: 13.3 FL (ref 6–12)
POTASSIUM BLD-SCNC: 3.3 MMOL/L (ref 3.5–5.2)
PROCALCITONIN SERPL-MCNC: 0.36 NG/ML (ref 0.1–0.25)
PROTHROMBIN TIME: 40.3 SECONDS (ref 11.2–14.3)
RBC # BLD AUTO: 3.3 10*6/MM3 (ref 4.14–5.8)
SODIUM BLD-SCNC: 134 MMOL/L (ref 136–145)
WBC NRBC COR # BLD: 4.84 10*3/MM3 (ref 3.4–10.8)

## 2020-02-23 PROCEDURE — 99232 SBSQ HOSP IP/OBS MODERATE 35: CPT | Performed by: HOSPITALIST

## 2020-02-23 PROCEDURE — 25010000003 MAGNESIUM SULFATE 4 GM/100ML SOLUTION: Performed by: HOSPITALIST

## 2020-02-23 PROCEDURE — 94799 UNLISTED PULMONARY SVC/PX: CPT

## 2020-02-23 PROCEDURE — 80048 BASIC METABOLIC PNL TOTAL CA: CPT | Performed by: HOSPITALIST

## 2020-02-23 PROCEDURE — 85610 PROTHROMBIN TIME: CPT | Performed by: INTERNAL MEDICINE

## 2020-02-23 PROCEDURE — 25010000002 FUROSEMIDE PER 20 MG: Performed by: HOSPITALIST

## 2020-02-23 PROCEDURE — 85027 COMPLETE CBC AUTOMATED: CPT | Performed by: HOSPITALIST

## 2020-02-23 PROCEDURE — 84145 PROCALCITONIN (PCT): CPT | Performed by: HOSPITALIST

## 2020-02-23 PROCEDURE — 71045 X-RAY EXAM CHEST 1 VIEW: CPT

## 2020-02-23 PROCEDURE — 25010000002 CEFTRIAXONE PER 250 MG: Performed by: INTERNAL MEDICINE

## 2020-02-23 PROCEDURE — 83735 ASSAY OF MAGNESIUM: CPT | Performed by: HOSPITALIST

## 2020-02-23 RX ORDER — FUROSEMIDE 10 MG/ML
40 INJECTION INTRAMUSCULAR; INTRAVENOUS ONCE
Status: COMPLETED | OUTPATIENT
Start: 2020-02-23 | End: 2020-02-23

## 2020-02-23 RX ADMIN — BUPROPION HYDROCHLORIDE 300 MG: 150 TABLET, FILM COATED, EXTENDED RELEASE ORAL at 09:57

## 2020-02-23 RX ADMIN — NICOTINE 1 PATCH: 14 PATCH, EXTENDED RELEASE TRANSDERMAL at 09:56

## 2020-02-23 RX ADMIN — FUROSEMIDE 40 MG: 10 INJECTION, SOLUTION INTRAMUSCULAR; INTRAVENOUS at 12:46

## 2020-02-23 RX ADMIN — BUDESONIDE 0.5 MG: 0.5 INHALANT RESPIRATORY (INHALATION) at 13:43

## 2020-02-23 RX ADMIN — POTASSIUM CHLORIDE 40 MEQ: 750 CAPSULE, EXTENDED RELEASE ORAL at 19:19

## 2020-02-23 RX ADMIN — LISINOPRIL 10 MG: 10 TABLET ORAL at 09:57

## 2020-02-23 RX ADMIN — FINASTERIDE 5 MG: 5 TABLET, FILM COATED ORAL at 09:58

## 2020-02-23 RX ADMIN — IPRATROPIUM BROMIDE 0.5 MG: 0.5 SOLUTION RESPIRATORY (INHALATION) at 23:35

## 2020-02-23 RX ADMIN — CITALOPRAM HYDROBROMIDE 20 MG: 20 TABLET ORAL at 09:57

## 2020-02-23 RX ADMIN — DOXYCYCLINE 100 MG: 100 INJECTION, POWDER, LYOPHILIZED, FOR SOLUTION INTRAVENOUS at 18:28

## 2020-02-23 RX ADMIN — IPRATROPIUM BROMIDE 0.5 MG: 0.5 SOLUTION RESPIRATORY (INHALATION) at 13:43

## 2020-02-23 RX ADMIN — ARFORMOTEROL TARTRATE 15 MCG: 15 SOLUTION RESPIRATORY (INHALATION) at 07:37

## 2020-02-23 RX ADMIN — CARVEDILOL 12.5 MG: 12.5 TABLET, FILM COATED ORAL at 09:56

## 2020-02-23 RX ADMIN — ARFORMOTEROL TARTRATE 15 MCG: 15 SOLUTION RESPIRATORY (INHALATION) at 19:42

## 2020-02-23 RX ADMIN — OSELTAMIVIR PHOSPHATE 75 MG: 75 CAPSULE ORAL at 21:49

## 2020-02-23 RX ADMIN — DOXYCYCLINE 100 MG: 100 INJECTION, POWDER, LYOPHILIZED, FOR SOLUTION INTRAVENOUS at 07:22

## 2020-02-23 RX ADMIN — FOLIC ACID TAB 400 MCG 400 MCG: 400 TAB at 09:57

## 2020-02-23 RX ADMIN — CARVEDILOL 12.5 MG: 12.5 TABLET, FILM COATED ORAL at 21:49

## 2020-02-23 RX ADMIN — CYANOCOBALAMIN TAB 1000 MCG 1000 MCG: 1000 TAB at 09:58

## 2020-02-23 RX ADMIN — CEFTRIAXONE SODIUM 1 G: 1 INJECTION, POWDER, FOR SOLUTION INTRAMUSCULAR; INTRAVENOUS at 17:26

## 2020-02-23 RX ADMIN — BUDESONIDE 0.5 MG: 0.5 INHALANT RESPIRATORY (INHALATION) at 00:41

## 2020-02-23 RX ADMIN — IPRATROPIUM BROMIDE 0.5 MG: 0.5 SOLUTION RESPIRATORY (INHALATION) at 00:41

## 2020-02-23 RX ADMIN — LISINOPRIL 10 MG: 10 TABLET ORAL at 21:49

## 2020-02-23 RX ADMIN — IPRATROPIUM BROMIDE 0.5 MG: 0.5 SOLUTION RESPIRATORY (INHALATION) at 07:37

## 2020-02-23 RX ADMIN — ASPIRIN 81 MG: 81 TABLET, COATED ORAL at 09:56

## 2020-02-23 RX ADMIN — OSELTAMIVIR PHOSPHATE 75 MG: 75 CAPSULE ORAL at 09:58

## 2020-02-23 RX ADMIN — BUDESONIDE 0.5 MG: 0.5 INHALANT RESPIRATORY (INHALATION) at 23:35

## 2020-02-23 RX ADMIN — FAMOTIDINE 20 MG: 20 TABLET ORAL at 09:57

## 2020-02-23 RX ADMIN — IPRATROPIUM BROMIDE 0.5 MG: 0.5 SOLUTION RESPIRATORY (INHALATION) at 19:42

## 2020-02-23 RX ADMIN — MAGNESIUM SULFATE HEPTAHYDRATE 4 G: 40 INJECTION, SOLUTION INTRAVENOUS at 19:19

## 2020-02-23 RX ADMIN — SODIUM CHLORIDE, PRESERVATIVE FREE 10 ML: 5 INJECTION INTRAVENOUS at 21:50

## 2020-02-24 VITALS
OXYGEN SATURATION: 95 % | WEIGHT: 157 LBS | DIASTOLIC BLOOD PRESSURE: 83 MMHG | RESPIRATION RATE: 18 BRPM | BODY MASS INDEX: 21.98 KG/M2 | HEIGHT: 71 IN | HEART RATE: 69 BPM | TEMPERATURE: 97.8 F | SYSTOLIC BLOOD PRESSURE: 145 MMHG

## 2020-02-24 PROBLEM — A41.9 SEPSIS: Status: RESOLVED | Noted: 2020-02-19 | Resolved: 2020-02-24

## 2020-02-24 PROBLEM — R09.02 HYPOXIA: Status: RESOLVED | Noted: 2020-02-19 | Resolved: 2020-02-24

## 2020-02-24 LAB
ANION GAP SERPL CALCULATED.3IONS-SCNC: 12 MMOL/L (ref 5–15)
BACTERIA SPEC AEROBE CULT: NORMAL
BACTERIA SPEC AEROBE CULT: NORMAL
BUN BLD-MCNC: 13 MG/DL (ref 8–23)
BUN/CREAT SERPL: 20.6 (ref 7–25)
C-ANCA TITR SER IF: NORMAL TITER
CALCIUM SPEC-SCNC: 8 MG/DL (ref 8.6–10.5)
CHLORIDE SERPL-SCNC: 98 MMOL/L (ref 98–107)
CO2 SERPL-SCNC: 26 MMOL/L (ref 22–29)
CREAT BLD-MCNC: 0.63 MG/DL (ref 0.76–1.27)
DEPRECATED RDW RBC AUTO: 51.3 FL (ref 37–54)
ERYTHROCYTE [DISTWIDTH] IN BLOOD BY AUTOMATED COUNT: 13.1 % (ref 12.3–15.4)
GFR SERPL CREATININE-BSD FRML MDRD: 126 ML/MIN/1.73
GLUCOSE BLD-MCNC: 96 MG/DL (ref 65–99)
HCT VFR BLD AUTO: 34.8 % (ref 37.5–51)
HGB BLD-MCNC: 11.6 G/DL (ref 13–17.7)
INR PPP: 3.71 (ref 0.85–1.16)
MAGNESIUM SERPL-MCNC: 2.6 MG/DL (ref 1.6–2.4)
MCH RBC QN AUTO: 35 PG (ref 26.6–33)
MCHC RBC AUTO-ENTMCNC: 33.3 G/DL (ref 31.5–35.7)
MCV RBC AUTO: 105.1 FL (ref 79–97)
MYELOPEROXIDASE AB SER-ACNC: <9 U/ML (ref 0–9)
P-ANCA ATYPICAL TITR SER IF: NORMAL TITER
P-ANCA TITR SER IF: NORMAL TITER
PLATELET # BLD AUTO: 86 10*3/MM3 (ref 140–450)
PMV BLD AUTO: 13.1 FL (ref 6–12)
POTASSIUM BLD-SCNC: 3.9 MMOL/L (ref 3.5–5.2)
PROTEINASE3 AB SER IA-ACNC: <3.5 U/ML (ref 0–3.5)
PROTHROMBIN TIME: 35.4 SECONDS (ref 11.2–14.3)
RBC # BLD AUTO: 3.31 10*6/MM3 (ref 4.14–5.8)
SODIUM BLD-SCNC: 136 MMOL/L (ref 136–145)
WBC NRBC COR # BLD: 4.92 10*3/MM3 (ref 3.4–10.8)

## 2020-02-24 PROCEDURE — 97162 PT EVAL MOD COMPLEX 30 MIN: CPT

## 2020-02-24 PROCEDURE — 99239 HOSP IP/OBS DSCHRG MGMT >30: CPT | Performed by: HOSPITALIST

## 2020-02-24 PROCEDURE — 94799 UNLISTED PULMONARY SVC/PX: CPT

## 2020-02-24 PROCEDURE — 83735 ASSAY OF MAGNESIUM: CPT | Performed by: HOSPITALIST

## 2020-02-24 PROCEDURE — 85610 PROTHROMBIN TIME: CPT | Performed by: INTERNAL MEDICINE

## 2020-02-24 PROCEDURE — 85027 COMPLETE CBC AUTOMATED: CPT | Performed by: HOSPITALIST

## 2020-02-24 PROCEDURE — 80048 BASIC METABOLIC PNL TOTAL CA: CPT | Performed by: HOSPITALIST

## 2020-02-24 PROCEDURE — 99232 SBSQ HOSP IP/OBS MODERATE 35: CPT | Performed by: INTERNAL MEDICINE

## 2020-02-24 RX ORDER — DOXYCYCLINE HYCLATE 100 MG/1
100 TABLET, DELAYED RELEASE ORAL 2 TIMES DAILY
Qty: 4 TABLET | Refills: 0 | Status: SHIPPED | OUTPATIENT
Start: 2020-02-24 | End: 2020-03-07 | Stop reason: HOSPADM

## 2020-02-24 RX ORDER — WARFARIN SODIUM 2.5 MG/1
2.5 TABLET ORAL
Status: DISCONTINUED | OUTPATIENT
Start: 2020-02-24 | End: 2020-02-24 | Stop reason: HOSPADM

## 2020-02-24 RX ORDER — CEFDINIR 300 MG/1
300 CAPSULE ORAL 2 TIMES DAILY
Qty: 4 CAPSULE | Refills: 0 | Status: SHIPPED | OUTPATIENT
Start: 2020-02-24 | End: 2020-03-07 | Stop reason: HOSPADM

## 2020-02-24 RX ORDER — OSELTAMIVIR PHOSPHATE 75 MG/1
75 CAPSULE ORAL NIGHTLY
Qty: 1 CAPSULE | Refills: 0 | Status: SHIPPED | OUTPATIENT
Start: 2020-02-24 | End: 2020-03-07 | Stop reason: HOSPADM

## 2020-02-24 RX ORDER — WARFARIN SODIUM 2.5 MG/1
TABLET ORAL
Qty: 30 TABLET | Refills: 0 | Status: SHIPPED | OUTPATIENT
Start: 2020-02-24 | End: 2020-03-07 | Stop reason: HOSPADM

## 2020-02-24 RX ORDER — DOXYCYCLINE 100 MG/1
100 CAPSULE ORAL EVERY 12 HOURS SCHEDULED
Status: DISCONTINUED | OUTPATIENT
Start: 2020-02-24 | End: 2020-02-24 | Stop reason: HOSPADM

## 2020-02-24 RX ORDER — ALBUTEROL SULFATE 90 UG/1
2 AEROSOL, METERED RESPIRATORY (INHALATION) EVERY 4 HOURS PRN
Qty: 1 INHALER | Refills: 0 | Status: SHIPPED | OUTPATIENT
Start: 2020-02-24 | End: 2021-04-06

## 2020-02-24 RX ADMIN — IPRATROPIUM BROMIDE 0.5 MG: 0.5 SOLUTION RESPIRATORY (INHALATION) at 07:02

## 2020-02-24 RX ADMIN — CITALOPRAM HYDROBROMIDE 20 MG: 20 TABLET ORAL at 08:30

## 2020-02-24 RX ADMIN — SODIUM CHLORIDE, PRESERVATIVE FREE 10 ML: 5 INJECTION INTRAVENOUS at 08:31

## 2020-02-24 RX ADMIN — ASPIRIN 81 MG: 81 TABLET, COATED ORAL at 08:30

## 2020-02-24 RX ADMIN — FAMOTIDINE 20 MG: 20 TABLET ORAL at 08:30

## 2020-02-24 RX ADMIN — POTASSIUM CHLORIDE 40 MEQ: 750 CAPSULE, EXTENDED RELEASE ORAL at 00:17

## 2020-02-24 RX ADMIN — CYANOCOBALAMIN TAB 1000 MCG 1000 MCG: 1000 TAB at 08:30

## 2020-02-24 RX ADMIN — IPRATROPIUM BROMIDE 0.5 MG: 0.5 SOLUTION RESPIRATORY (INHALATION) at 12:58

## 2020-02-24 RX ADMIN — OSELTAMIVIR PHOSPHATE 75 MG: 75 CAPSULE ORAL at 08:36

## 2020-02-24 RX ADMIN — NICOTINE 1 PATCH: 14 PATCH, EXTENDED RELEASE TRANSDERMAL at 08:31

## 2020-02-24 RX ADMIN — CARVEDILOL 12.5 MG: 12.5 TABLET, FILM COATED ORAL at 08:30

## 2020-02-24 RX ADMIN — BUPROPION HYDROCHLORIDE 300 MG: 150 TABLET, FILM COATED, EXTENDED RELEASE ORAL at 08:30

## 2020-02-24 RX ADMIN — ARFORMOTEROL TARTRATE 15 MCG: 15 SOLUTION RESPIRATORY (INHALATION) at 07:02

## 2020-02-24 RX ADMIN — LISINOPRIL 10 MG: 10 TABLET ORAL at 08:30

## 2020-02-24 RX ADMIN — FOLIC ACID TAB 400 MCG 400 MCG: 400 TAB at 08:30

## 2020-02-24 RX ADMIN — BUDESONIDE 0.5 MG: 0.5 INHALANT RESPIRATORY (INHALATION) at 12:58

## 2020-02-24 RX ADMIN — FINASTERIDE 5 MG: 5 TABLET, FILM COATED ORAL at 08:30

## 2020-02-24 RX ADMIN — DOXYCYCLINE 100 MG: 100 INJECTION, POWDER, LYOPHILIZED, FOR SOLUTION INTRAVENOUS at 05:12

## 2020-02-25 ENCOUNTER — READMISSION MANAGEMENT (OUTPATIENT)
Dept: CALL CENTER | Facility: HOSPITAL | Age: 70
End: 2020-02-25

## 2020-02-25 ENCOUNTER — TRANSITIONAL CARE MANAGEMENT TELEPHONE ENCOUNTER (OUTPATIENT)
Dept: CALL CENTER | Facility: HOSPITAL | Age: 70
End: 2020-02-25

## 2020-02-25 LAB — GBM IGG SER-ACNC: 2 UNITS (ref 0–20)

## 2020-02-25 NOTE — OUTREACH NOTE
TCM call completed.  Patient says he is feeling some better, noted generalized weakness and fatigue but states he is taking it easy.  Able to eat and drink, no nausea or vomiting noted, no complaints of pain.  Patient says he was able to get all medications ordered and has been taking them as directed.  Reviewed signs and symptoms of when to call MD or go to ER.  Reviewed follow up appts, will see Dr. Cintron on 2/27 at 8 am.  No questions or concerns at this time.

## 2020-02-26 ENCOUNTER — READMISSION MANAGEMENT (OUTPATIENT)
Dept: CALL CENTER | Facility: HOSPITAL | Age: 70
End: 2020-02-26

## 2020-02-26 ENCOUNTER — OFFICE VISIT (OUTPATIENT)
Dept: INTERNAL MEDICINE | Facility: CLINIC | Age: 70
End: 2020-02-26

## 2020-02-26 ENCOUNTER — HOSPITAL ENCOUNTER (INPATIENT)
Facility: HOSPITAL | Age: 70
LOS: 10 days | Discharge: HOME | End: 2020-03-07
Attending: EMERGENCY MEDICINE | Admitting: FAMILY MEDICINE

## 2020-02-26 ENCOUNTER — APPOINTMENT (OUTPATIENT)
Dept: CT IMAGING | Facility: HOSPITAL | Age: 70
End: 2020-02-26

## 2020-02-26 VITALS
WEIGHT: 157 LBS | HEART RATE: 72 BPM | RESPIRATION RATE: 18 BRPM | DIASTOLIC BLOOD PRESSURE: 52 MMHG | TEMPERATURE: 97.7 F | SYSTOLIC BLOOD PRESSURE: 90 MMHG | BODY MASS INDEX: 21.9 KG/M2 | OXYGEN SATURATION: 95 %

## 2020-02-26 DIAGNOSIS — T45.515A ADVERSE EFFECT OF ANTICOAGULANT, INITIAL ENCOUNTER: ICD-10-CM

## 2020-02-26 DIAGNOSIS — I48.21 PERMANENT ATRIAL FIBRILLATION (HCC): ICD-10-CM

## 2020-02-26 DIAGNOSIS — J18.9 PNEUMONIA OF BOTH LUNGS DUE TO INFECTIOUS ORGANISM, UNSPECIFIED PART OF LUNG: ICD-10-CM

## 2020-02-26 DIAGNOSIS — S30.1XXA HEMATOMA OF RECTUS SHEATH, INITIAL ENCOUNTER: Primary | ICD-10-CM

## 2020-02-26 DIAGNOSIS — R10.31 RLQ ABDOMINAL PAIN: Primary | ICD-10-CM

## 2020-02-26 PROBLEM — I27.20 PULMONARY HYPERTENSION (HCC): Status: ACTIVE | Noted: 2020-02-26

## 2020-02-26 PROBLEM — R79.1 SUPRATHERAPEUTIC INR: Status: ACTIVE | Noted: 2020-02-26

## 2020-02-26 PROBLEM — Z78.9 SELF-CATHETERIZES URINARY BLADDER: Status: ACTIVE | Noted: 2020-02-26

## 2020-02-26 PROBLEM — D75.89 MACROCYTOSIS: Status: ACTIVE | Noted: 2020-02-26

## 2020-02-26 PROBLEM — Z79.01 CHRONIC ANTICOAGULATION: Status: ACTIVE | Noted: 2020-02-26

## 2020-02-26 LAB
ABO GROUP BLD: NORMAL
ABO GROUP BLD: NORMAL
ALBUMIN SERPL-MCNC: 3 G/DL (ref 3.5–5.2)
ALBUMIN SERPL-MCNC: 3.2 G/DL (ref 3.5–5.2)
ALBUMIN/GLOB SERPL: 0.9 G/DL
ALBUMIN/GLOB SERPL: 0.9 G/DL
ALP SERPL-CCNC: 55 U/L (ref 39–117)
ALP SERPL-CCNC: 56 U/L (ref 39–117)
ALT SERPL W P-5'-P-CCNC: 23 U/L (ref 1–41)
ALT SERPL W P-5'-P-CCNC: 24 U/L (ref 1–41)
ANION GAP SERPL CALCULATED.3IONS-SCNC: 10 MMOL/L (ref 5–15)
ANION GAP SERPL CALCULATED.3IONS-SCNC: 11 MMOL/L (ref 5–15)
AST SERPL-CCNC: 29 U/L (ref 1–40)
AST SERPL-CCNC: 41 U/L (ref 1–40)
BASOPHILS # BLD AUTO: 0.04 10*3/MM3 (ref 0–0.2)
BASOPHILS # BLD AUTO: 0.06 10*3/MM3 (ref 0–0.2)
BASOPHILS NFR BLD AUTO: 0.6 % (ref 0–1.5)
BASOPHILS NFR BLD AUTO: 0.8 % (ref 0–1.5)
BILIRUB SERPL-MCNC: 1.2 MG/DL (ref 0.2–1.2)
BILIRUB SERPL-MCNC: 1.4 MG/DL (ref 0.2–1.2)
BLD GP AB SCN SERPL QL: NEGATIVE
BUN BLD-MCNC: 11 MG/DL (ref 8–23)
BUN BLD-MCNC: 11 MG/DL (ref 8–23)
BUN/CREAT SERPL: 12.6 (ref 7–25)
BUN/CREAT SERPL: 13.4 (ref 7–25)
CALCIUM SPEC-SCNC: 8.5 MG/DL (ref 8.6–10.5)
CALCIUM SPEC-SCNC: 8.8 MG/DL (ref 8.6–10.5)
CHLORIDE SERPL-SCNC: 100 MMOL/L (ref 98–107)
CHLORIDE SERPL-SCNC: 101 MMOL/L (ref 98–107)
CO2 SERPL-SCNC: 26 MMOL/L (ref 22–29)
CO2 SERPL-SCNC: 26 MMOL/L (ref 22–29)
CREAT BLD-MCNC: 0.82 MG/DL (ref 0.76–1.27)
CREAT BLD-MCNC: 0.87 MG/DL (ref 0.76–1.27)
DEPRECATED RDW RBC AUTO: 52.6 FL (ref 37–54)
DEPRECATED RDW RBC AUTO: 54 FL (ref 37–54)
EOSINOPHIL # BLD AUTO: 0.12 10*3/MM3 (ref 0–0.4)
EOSINOPHIL # BLD AUTO: 0.15 10*3/MM3 (ref 0–0.4)
EOSINOPHIL NFR BLD AUTO: 1.8 % (ref 0.3–6.2)
EOSINOPHIL NFR BLD AUTO: 1.9 % (ref 0.3–6.2)
ERYTHROCYTE [DISTWIDTH] IN BLOOD BY AUTOMATED COUNT: 13.3 % (ref 12.3–15.4)
ERYTHROCYTE [DISTWIDTH] IN BLOOD BY AUTOMATED COUNT: 13.5 % (ref 12.3–15.4)
ETHANOL BLD-MCNC: <10 MG/DL (ref 0–10)
EXPIRATION DATE: ABNORMAL
GFR SERPL CREATININE-BSD FRML MDRD: 87 ML/MIN/1.73
GFR SERPL CREATININE-BSD FRML MDRD: 93 ML/MIN/1.73
GLOBULIN UR ELPH-MCNC: 3.4 GM/DL
GLOBULIN UR ELPH-MCNC: 3.5 GM/DL
GLUCOSE BLD-MCNC: 148 MG/DL (ref 65–99)
GLUCOSE BLD-MCNC: 99 MG/DL (ref 65–99)
HCT VFR BLD AUTO: 31.6 % (ref 37.5–51)
HCT VFR BLD AUTO: 32.4 % (ref 37.5–51)
HCT VFR BLD AUTO: 34 % (ref 37.5–51)
HGB BLD-MCNC: 10.3 G/DL (ref 13–17.7)
HGB BLD-MCNC: 11.1 G/DL (ref 13–17.7)
HGB BLD-MCNC: 11.4 G/DL (ref 13–17.7)
HGB BLDA-MCNC: 9.2 G/DL (ref 12–17)
HOLD SPECIMEN: NORMAL
HOLD SPECIMEN: NORMAL
IMM GRANULOCYTES # BLD AUTO: 0.03 10*3/MM3 (ref 0–0.05)
IMM GRANULOCYTES # BLD AUTO: 0.05 10*3/MM3 (ref 0–0.05)
IMM GRANULOCYTES NFR BLD AUTO: 0.5 % (ref 0–0.5)
IMM GRANULOCYTES NFR BLD AUTO: 0.6 % (ref 0–0.5)
INR PPP: 5 (ref 0.85–1.16)
INR PPP: 7.1 (ref 2.5–3.5)
LYMPHOCYTES # BLD AUTO: 1.02 10*3/MM3 (ref 0.7–3.1)
LYMPHOCYTES # BLD AUTO: 1.1 10*3/MM3 (ref 0.7–3.1)
LYMPHOCYTES NFR BLD AUTO: 13.9 % (ref 19.6–45.3)
LYMPHOCYTES NFR BLD AUTO: 15.7 % (ref 19.6–45.3)
Lab: ABNORMAL
MCH RBC QN AUTO: 34.9 PG (ref 26.6–33)
MCH RBC QN AUTO: 38.6 PG (ref 26.6–33)
MCHC RBC AUTO-ENTMCNC: 32.6 G/DL (ref 31.5–35.7)
MCHC RBC AUTO-ENTMCNC: 35.2 G/DL (ref 31.5–35.7)
MCV RBC AUTO: 106.9 FL (ref 79–97)
MCV RBC AUTO: 109.8 FL (ref 79–97)
MONOCYTES # BLD AUTO: 0.69 10*3/MM3 (ref 0.1–0.9)
MONOCYTES # BLD AUTO: 0.7 10*3/MM3 (ref 0.1–0.9)
MONOCYTES NFR BLD AUTO: 10.8 % (ref 5–12)
MONOCYTES NFR BLD AUTO: 8.7 % (ref 5–12)
NEUTROPHILS # BLD AUTO: 4.58 10*3/MM3 (ref 1.7–7)
NEUTROPHILS # BLD AUTO: 5.86 10*3/MM3 (ref 1.7–7)
NEUTROPHILS NFR BLD AUTO: 70.6 % (ref 42.7–76)
NEUTROPHILS NFR BLD AUTO: 74.1 % (ref 42.7–76)
NRBC BLD AUTO-RTO: 0 /100 WBC (ref 0–0.2)
NRBC BLD AUTO-RTO: 0 /100 WBC (ref 0–0.2)
NT-PROBNP SERPL-MCNC: 1341 PG/ML (ref 5–900)
PLATELET # BLD AUTO: 190 10*3/MM3 (ref 140–450)
PLATELET # BLD AUTO: 218 10*3/MM3 (ref 140–450)
PMV BLD AUTO: 12.2 FL (ref 6–12)
PMV BLD AUTO: 12.9 FL (ref 6–12)
POTASSIUM BLD-SCNC: 4.2 MMOL/L (ref 3.5–5.2)
POTASSIUM BLD-SCNC: 4.7 MMOL/L (ref 3.5–5.2)
PROT SERPL-MCNC: 6.5 G/DL (ref 6–8.5)
PROT SERPL-MCNC: 6.6 G/DL (ref 6–8.5)
PROTHROMBIN TIME: 46.3 SECONDS (ref 11.5–14)
RBC # BLD AUTO: 2.95 10*6/MM3 (ref 4.14–5.8)
RBC # BLD AUTO: 3.18 10*6/MM3 (ref 4.14–5.8)
RH BLD: POSITIVE
RH BLD: POSITIVE
SODIUM BLD-SCNC: 137 MMOL/L (ref 136–145)
SODIUM BLD-SCNC: 137 MMOL/L (ref 136–145)
T&S EXPIRATION DATE: NORMAL
WBC NRBC COR # BLD: 6.49 10*3/MM3 (ref 3.4–10.8)
WBC NRBC COR # BLD: 7.91 10*3/MM3 (ref 3.4–10.8)
WHOLE BLOOD HOLD SPECIMEN: NORMAL
WHOLE BLOOD HOLD SPECIMEN: NORMAL

## 2020-02-26 PROCEDURE — 86850 RBC ANTIBODY SCREEN: CPT | Performed by: EMERGENCY MEDICINE

## 2020-02-26 PROCEDURE — 85014 HEMATOCRIT: CPT | Performed by: INTERNAL MEDICINE

## 2020-02-26 PROCEDURE — 99285 EMERGENCY DEPT VISIT HI MDM: CPT

## 2020-02-26 PROCEDURE — 86901 BLOOD TYPING SEROLOGIC RH(D): CPT | Performed by: EMERGENCY MEDICINE

## 2020-02-26 PROCEDURE — 85018 HEMOGLOBIN: CPT | Performed by: INTERNAL MEDICINE

## 2020-02-26 PROCEDURE — 85610 PROTHROMBIN TIME: CPT | Performed by: EMERGENCY MEDICINE

## 2020-02-26 PROCEDURE — C9132 KCENTRA, PER I.U.: HCPCS | Performed by: EMERGENCY MEDICINE

## 2020-02-26 PROCEDURE — 80053 COMPREHEN METABOLIC PANEL: CPT | Performed by: INTERNAL MEDICINE

## 2020-02-26 PROCEDURE — 25010000002 VITAMIN K1 PER 1 MG: Performed by: EMERGENCY MEDICINE

## 2020-02-26 PROCEDURE — 85025 COMPLETE CBC W/AUTO DIFF WBC: CPT | Performed by: EMERGENCY MEDICINE

## 2020-02-26 PROCEDURE — 36415 COLL VENOUS BLD VENIPUNCTURE: CPT | Performed by: INTERNAL MEDICINE

## 2020-02-26 PROCEDURE — 25010000002 PROTHROMBIN COMPLEX CONC HUMAN 1000 UNITS KIT: Performed by: EMERGENCY MEDICINE

## 2020-02-26 PROCEDURE — 30283B1 TRANSFUSION OF NONAUTOLOGOUS 4-FACTOR PROTHROMBIN COMPLEX CONCENTRATE INTO VEIN, PERCUTANEOUS APPROACH: ICD-10-PCS | Performed by: EMERGENCY MEDICINE

## 2020-02-26 PROCEDURE — 86900 BLOOD TYPING SEROLOGIC ABO: CPT | Performed by: EMERGENCY MEDICINE

## 2020-02-26 PROCEDURE — 99291 CRITICAL CARE FIRST HOUR: CPT | Performed by: INTERNAL MEDICINE

## 2020-02-26 PROCEDURE — 85610 PROTHROMBIN TIME: CPT | Performed by: INTERNAL MEDICINE

## 2020-02-26 PROCEDURE — 99214 OFFICE O/P EST MOD 30 MIN: CPT | Performed by: INTERNAL MEDICINE

## 2020-02-26 PROCEDURE — 25010000002 PROTHROMBIN COMPLEX CONC HUMAN 500 UNITS KIT: Performed by: EMERGENCY MEDICINE

## 2020-02-26 PROCEDURE — 80053 COMPREHEN METABOLIC PANEL: CPT | Performed by: EMERGENCY MEDICINE

## 2020-02-26 PROCEDURE — 86900 BLOOD TYPING SEROLOGIC ABO: CPT

## 2020-02-26 PROCEDURE — 74174 CTA ABD&PLVS W/CONTRAST: CPT

## 2020-02-26 PROCEDURE — 86901 BLOOD TYPING SEROLOGIC RH(D): CPT

## 2020-02-26 PROCEDURE — 93005 ELECTROCARDIOGRAM TRACING: CPT | Performed by: EMERGENCY MEDICINE

## 2020-02-26 PROCEDURE — 80307 DRUG TEST PRSMV CHEM ANLYZR: CPT | Performed by: NURSE PRACTITIONER

## 2020-02-26 PROCEDURE — 86923 COMPATIBILITY TEST ELECTRIC: CPT

## 2020-02-26 PROCEDURE — 83880 ASSAY OF NATRIURETIC PEPTIDE: CPT | Performed by: EMERGENCY MEDICINE

## 2020-02-26 PROCEDURE — 0 IOPAMIDOL PER 1 ML: Performed by: INTERNAL MEDICINE

## 2020-02-26 PROCEDURE — 85025 COMPLETE CBC W/AUTO DIFF WBC: CPT | Performed by: INTERNAL MEDICINE

## 2020-02-26 RX ORDER — IPRATROPIUM BROMIDE AND ALBUTEROL SULFATE 2.5; .5 MG/3ML; MG/3ML
3 SOLUTION RESPIRATORY (INHALATION) EVERY 6 HOURS PRN
Status: DISCONTINUED | OUTPATIENT
Start: 2020-02-26 | End: 2020-03-07 | Stop reason: HOSPADM

## 2020-02-26 RX ORDER — FINASTERIDE 5 MG/1
5 TABLET, FILM COATED ORAL DAILY
Status: DISCONTINUED | OUTPATIENT
Start: 2020-02-27 | End: 2020-03-07 | Stop reason: HOSPADM

## 2020-02-26 RX ORDER — SODIUM CHLORIDE 0.9 % (FLUSH) 0.9 %
10 SYRINGE (ML) INJECTION AS NEEDED
Status: DISCONTINUED | OUTPATIENT
Start: 2020-02-26 | End: 2020-03-07 | Stop reason: HOSPADM

## 2020-02-26 RX ORDER — ONDANSETRON 2 MG/ML
4 INJECTION INTRAMUSCULAR; INTRAVENOUS EVERY 6 HOURS PRN
Status: DISCONTINUED | OUTPATIENT
Start: 2020-02-26 | End: 2020-03-07 | Stop reason: HOSPADM

## 2020-02-26 RX ORDER — LIDOCAINE HYDROCHLORIDE 20 MG/ML
JELLY TOPICAL ONCE
Status: COMPLETED | OUTPATIENT
Start: 2020-02-26 | End: 2020-02-26

## 2020-02-26 RX ORDER — CITALOPRAM 20 MG/1
20 TABLET ORAL DAILY
Status: DISCONTINUED | OUTPATIENT
Start: 2020-02-27 | End: 2020-03-07 | Stop reason: HOSPADM

## 2020-02-26 RX ADMIN — SODIUM CHLORIDE 1000 ML: 9 INJECTION, SOLUTION INTRAVENOUS at 15:56

## 2020-02-26 RX ADMIN — PHYTONADIONE 10 MG: 10 INJECTION, EMULSION INTRAMUSCULAR; INTRAVENOUS; SUBCUTANEOUS at 15:30

## 2020-02-26 RX ADMIN — IOPAMIDOL 99 ML: 755 INJECTION, SOLUTION INTRAVENOUS at 22:30

## 2020-02-26 RX ADMIN — LIDOCAINE HYDROCHLORIDE 10 ML: 20 JELLY TOPICAL at 23:04

## 2020-02-26 RX ADMIN — LIDOCAINE HYDROCHLORIDE: 20 JELLY TOPICAL at 20:56

## 2020-02-26 NOTE — OUTREACH NOTE
Sepsis Week 2 Survey      Responses   Facility patient discharged from?  [place]   Does the patient have one of the following disease processes/diagnoses(primary or secondary)?  Sepsis   Week 2 attempt successful?  No   Revoke  Readmitted          Lisa Hamm RN

## 2020-02-26 NOTE — OUTREACH NOTE
Sepsis Week 1 Survey      Responses   Facility patient discharged from?  [place]   Does the patient have one of the following disease processes/diagnoses(primary or secondary)?  Sepsis   Is there a successful TCM telephone encounter documented?  Yes          Maryjane Schneider RN

## 2020-02-26 NOTE — OUTREACH NOTE
Prep Survey      Responses   Facility patient discharged from?  [place]   Is patient eligible?  Yes   Discharge diagnosis  Sepsis   Does the patient have one of the following disease processes/diagnoses(primary or secondary)?  Sepsis   Does the patient have Home health ordered?  No   Is there a DME ordered?  No   Prep survey completed?  Yes          Carmelina Patel RN

## 2020-02-27 ENCOUNTER — APPOINTMENT (OUTPATIENT)
Dept: GENERAL RADIOLOGY | Facility: HOSPITAL | Age: 70
End: 2020-02-27

## 2020-02-27 LAB
AMPHET+METHAMPHET UR QL: NEGATIVE
AMPHETAMINES UR QL: NEGATIVE
ANION GAP SERPL CALCULATED.3IONS-SCNC: 11 MMOL/L (ref 5–15)
APTT PPP: 34.7 SECONDS (ref 24–37)
BARBITURATES UR QL SCN: NEGATIVE
BENZODIAZ UR QL SCN: NEGATIVE
BUN BLD-MCNC: 8 MG/DL (ref 8–23)
BUN/CREAT SERPL: 13.8 (ref 7–25)
BUPRENORPHINE SERPL-MCNC: NEGATIVE NG/ML
CALCIUM SPEC-SCNC: 8.4 MG/DL (ref 8.6–10.5)
CANNABINOIDS SERPL QL: NEGATIVE
CHLORIDE SERPL-SCNC: 100 MMOL/L (ref 98–107)
CO2 SERPL-SCNC: 22 MMOL/L (ref 22–29)
COCAINE UR QL: NEGATIVE
CREAT BLD-MCNC: 0.58 MG/DL (ref 0.76–1.27)
DEPRECATED RDW RBC AUTO: 51.8 FL (ref 37–54)
ERYTHROCYTE [DISTWIDTH] IN BLOOD BY AUTOMATED COUNT: 13.2 % (ref 12.3–15.4)
GFR SERPL CREATININE-BSD FRML MDRD: 139 ML/MIN/1.73
GLUCOSE BLD-MCNC: 91 MG/DL (ref 65–99)
HCT VFR BLD AUTO: 30 % (ref 37.5–51)
HCT VFR BLD AUTO: 30.4 % (ref 37.5–51)
HCT VFR BLD AUTO: 30.9 % (ref 37.5–51)
HCT VFR BLD AUTO: 32 % (ref 37.5–51)
HCT VFR BLD AUTO: 32.1 % (ref 37.5–51)
HGB BLD-MCNC: 10.2 G/DL (ref 13–17.7)
HGB BLD-MCNC: 10.2 G/DL (ref 13–17.7)
HGB BLD-MCNC: 10.5 G/DL (ref 13–17.7)
HGB BLD-MCNC: 10.6 G/DL (ref 13–17.7)
HGB BLD-MCNC: 10.8 G/DL (ref 13–17.7)
INR PPP: 1.25 (ref 0.85–1.16)
INR PPP: 1.27 (ref 0.85–1.16)
MAGNESIUM SERPL-MCNC: 1.8 MG/DL (ref 1.6–2.4)
MCH RBC QN AUTO: 37.1 PG (ref 26.6–33)
MCHC RBC AUTO-ENTMCNC: 34.3 G/DL (ref 31.5–35.7)
MCV RBC AUTO: 108 FL (ref 79–97)
METHADONE UR QL SCN: NEGATIVE
OPIATES UR QL: NEGATIVE
OXYCODONE UR QL SCN: POSITIVE
PCP UR QL SCN: NEGATIVE
PHOSPHATE SERPL-MCNC: 2.9 MG/DL (ref 2.5–4.5)
PLATELET # BLD AUTO: 178 10*3/MM3 (ref 140–450)
PMV BLD AUTO: 12 FL (ref 6–12)
POTASSIUM BLD-SCNC: 4.1 MMOL/L (ref 3.5–5.2)
PROPOXYPH UR QL: NEGATIVE
PROTHROMBIN TIME: 15.4 SECONDS (ref 11.5–14)
PROTHROMBIN TIME: 15.6 SECONDS (ref 11.5–14)
RBC # BLD AUTO: 2.86 10*6/MM3 (ref 4.14–5.8)
SODIUM BLD-SCNC: 133 MMOL/L (ref 136–145)
TRICYCLICS UR QL SCN: NEGATIVE
WBC NRBC COR # BLD: 5.81 10*3/MM3 (ref 3.4–10.8)

## 2020-02-27 PROCEDURE — 85014 HEMATOCRIT: CPT | Performed by: INTERNAL MEDICINE

## 2020-02-27 PROCEDURE — 85610 PROTHROMBIN TIME: CPT | Performed by: NURSE PRACTITIONER

## 2020-02-27 PROCEDURE — 99233 SBSQ HOSP IP/OBS HIGH 50: CPT | Performed by: INTERNAL MEDICINE

## 2020-02-27 PROCEDURE — 85730 THROMBOPLASTIN TIME PARTIAL: CPT | Performed by: NURSE PRACTITIONER

## 2020-02-27 PROCEDURE — 84100 ASSAY OF PHOSPHORUS: CPT | Performed by: NURSE PRACTITIONER

## 2020-02-27 PROCEDURE — 85610 PROTHROMBIN TIME: CPT | Performed by: EMERGENCY MEDICINE

## 2020-02-27 PROCEDURE — 80048 BASIC METABOLIC PNL TOTAL CA: CPT | Performed by: NURSE PRACTITIONER

## 2020-02-27 PROCEDURE — 71045 X-RAY EXAM CHEST 1 VIEW: CPT

## 2020-02-27 PROCEDURE — 85027 COMPLETE CBC AUTOMATED: CPT | Performed by: NURSE PRACTITIONER

## 2020-02-27 PROCEDURE — 83735 ASSAY OF MAGNESIUM: CPT | Performed by: NURSE PRACTITIONER

## 2020-02-27 PROCEDURE — 85018 HEMOGLOBIN: CPT | Performed by: INTERNAL MEDICINE

## 2020-02-27 PROCEDURE — 80306 DRUG TEST PRSMV INSTRMNT: CPT | Performed by: NURSE PRACTITIONER

## 2020-02-27 RX ORDER — BUPROPION HYDROCHLORIDE 150 MG/1
300 TABLET ORAL DAILY
Status: DISCONTINUED | OUTPATIENT
Start: 2020-02-27 | End: 2020-03-07 | Stop reason: HOSPADM

## 2020-02-27 RX ORDER — LISINOPRIL 10 MG/1
10 TABLET ORAL 2 TIMES DAILY
Status: DISCONTINUED | OUTPATIENT
Start: 2020-02-27 | End: 2020-03-07 | Stop reason: HOSPADM

## 2020-02-27 RX ORDER — CARVEDILOL 12.5 MG/1
12.5 TABLET ORAL 2 TIMES DAILY
Status: DISCONTINUED | OUTPATIENT
Start: 2020-02-27 | End: 2020-03-07 | Stop reason: HOSPADM

## 2020-02-27 RX ADMIN — LISINOPRIL 10 MG: 10 TABLET ORAL at 21:28

## 2020-02-27 RX ADMIN — CITALOPRAM HYDROBROMIDE 20 MG: 20 TABLET ORAL at 09:28

## 2020-02-27 RX ADMIN — FINASTERIDE 5 MG: 5 TABLET, FILM COATED ORAL at 09:29

## 2020-02-27 RX ADMIN — CARVEDILOL 12.5 MG: 12.5 TABLET, FILM COATED ORAL at 09:29

## 2020-02-27 RX ADMIN — BUPROPION HYDROCHLORIDE 300 MG: 150 TABLET, FILM COATED, EXTENDED RELEASE ORAL at 09:28

## 2020-02-27 RX ADMIN — LISINOPRIL 10 MG: 10 TABLET ORAL at 09:28

## 2020-02-27 RX ADMIN — CARVEDILOL 12.5 MG: 12.5 TABLET, FILM COATED ORAL at 21:28

## 2020-02-28 LAB
APTT PPP: 33.7 SECONDS (ref 85–120)
HCT VFR BLD AUTO: 30.8 % (ref 37.5–51)
HCT VFR BLD AUTO: 31.7 % (ref 37.5–51)
HCT VFR BLD AUTO: 32.4 % (ref 37.5–51)
HGB BLD-MCNC: 10.3 G/DL (ref 13–17.7)
HGB BLD-MCNC: 10.6 G/DL (ref 13–17.7)
HGB BLD-MCNC: 10.7 G/DL (ref 13–17.7)
INR PPP: 1.27 (ref 0.85–1.16)
PROTHROMBIN TIME: 15.6 SECONDS (ref 11.5–14)
UFH PPP CHRO-ACNC: 0.1 IU/ML (ref 0.3–0.7)
UFH PPP CHRO-ACNC: 0.1 IU/ML (ref 0.3–0.7)

## 2020-02-28 PROCEDURE — 85520 HEPARIN ASSAY: CPT

## 2020-02-28 PROCEDURE — 85018 HEMOGLOBIN: CPT | Performed by: INTERNAL MEDICINE

## 2020-02-28 PROCEDURE — 99232 SBSQ HOSP IP/OBS MODERATE 35: CPT | Performed by: INTERNAL MEDICINE

## 2020-02-28 PROCEDURE — 85520 HEPARIN ASSAY: CPT | Performed by: INTERNAL MEDICINE

## 2020-02-28 PROCEDURE — 85014 HEMATOCRIT: CPT | Performed by: INTERNAL MEDICINE

## 2020-02-28 PROCEDURE — 25010000002 HEPARIN (PORCINE) 25000-0.45 UT/250ML-% SOLUTION: Performed by: INTERNAL MEDICINE

## 2020-02-28 PROCEDURE — 85730 THROMBOPLASTIN TIME PARTIAL: CPT | Performed by: INTERNAL MEDICINE

## 2020-02-28 PROCEDURE — 85610 PROTHROMBIN TIME: CPT | Performed by: INTERNAL MEDICINE

## 2020-02-28 PROCEDURE — 25010000002 HEPARIN (PORCINE) PER 1000 UNITS

## 2020-02-28 RX ORDER — HEPARIN SODIUM 1000 [USP'U]/ML
2000 INJECTION, SOLUTION INTRAVENOUS; SUBCUTANEOUS ONCE
Status: COMPLETED | OUTPATIENT
Start: 2020-02-28 | End: 2020-02-28

## 2020-02-28 RX ORDER — LIDOCAINE HYDROCHLORIDE 20 MG/ML
JELLY TOPICAL AS NEEDED
Status: DISCONTINUED | OUTPATIENT
Start: 2020-02-28 | End: 2020-03-07 | Stop reason: HOSPADM

## 2020-02-28 RX ORDER — HEPARIN SODIUM 10000 [USP'U]/100ML
20 INJECTION, SOLUTION INTRAVENOUS
Status: DISCONTINUED | OUTPATIENT
Start: 2020-02-28 | End: 2020-02-29

## 2020-02-28 RX ADMIN — FINASTERIDE 5 MG: 5 TABLET, FILM COATED ORAL at 08:10

## 2020-02-28 RX ADMIN — HEPARIN SODIUM 2000 UNITS: 1000 INJECTION, SOLUTION INTRAVENOUS; SUBCUTANEOUS at 22:03

## 2020-02-28 RX ADMIN — HEPARIN SODIUM 12 UNITS/KG/HR: 10000 INJECTION, SOLUTION INTRAVENOUS at 12:52

## 2020-02-28 RX ADMIN — LISINOPRIL 10 MG: 10 TABLET ORAL at 08:10

## 2020-02-28 RX ADMIN — LIDOCAINE HYDROCHLORIDE 1 ML: 20 JELLY TOPICAL at 11:20

## 2020-02-28 RX ADMIN — CARVEDILOL 12.5 MG: 12.5 TABLET, FILM COATED ORAL at 20:26

## 2020-02-28 RX ADMIN — CITALOPRAM HYDROBROMIDE 20 MG: 20 TABLET ORAL at 08:10

## 2020-02-28 RX ADMIN — BUPROPION HYDROCHLORIDE 300 MG: 150 TABLET, FILM COATED, EXTENDED RELEASE ORAL at 08:10

## 2020-02-28 RX ADMIN — LISINOPRIL 10 MG: 10 TABLET ORAL at 20:25

## 2020-02-28 RX ADMIN — CARVEDILOL 12.5 MG: 12.5 TABLET, FILM COATED ORAL at 08:10

## 2020-02-29 LAB
ANION GAP SERPL CALCULATED.3IONS-SCNC: 12 MMOL/L (ref 5–15)
BUN BLD-MCNC: 9 MG/DL (ref 8–23)
BUN/CREAT SERPL: 14.1 (ref 7–25)
CALCIUM SPEC-SCNC: 8.7 MG/DL (ref 8.6–10.5)
CHLORIDE SERPL-SCNC: 99 MMOL/L (ref 98–107)
CO2 SERPL-SCNC: 23 MMOL/L (ref 22–29)
CREAT BLD-MCNC: 0.64 MG/DL (ref 0.76–1.27)
DEPRECATED RDW RBC AUTO: 52.2 FL (ref 37–54)
ERYTHROCYTE [DISTWIDTH] IN BLOOD BY AUTOMATED COUNT: 13.2 % (ref 12.3–15.4)
GFR SERPL CREATININE-BSD FRML MDRD: 124 ML/MIN/1.73
GLUCOSE BLD-MCNC: 101 MG/DL (ref 65–99)
HCT VFR BLD AUTO: 27.3 % (ref 37.5–51)
HCT VFR BLD AUTO: 30.4 % (ref 37.5–51)
HCT VFR BLD AUTO: 32 % (ref 37.5–51)
HCT VFR BLD AUTO: 32.1 % (ref 37.5–51)
HGB BLD-MCNC: 10 G/DL (ref 13–17.7)
HGB BLD-MCNC: 10.2 G/DL (ref 13–17.7)
HGB BLD-MCNC: 10.8 G/DL (ref 13–17.7)
HGB BLD-MCNC: 10.8 G/DL (ref 13–17.7)
INR PPP: 1.27 (ref 0.85–1.16)
MAGNESIUM SERPL-MCNC: 1.9 MG/DL (ref 1.6–2.4)
MCH RBC QN AUTO: 36.7 PG (ref 26.6–33)
MCHC RBC AUTO-ENTMCNC: 33.6 G/DL (ref 31.5–35.7)
MCV RBC AUTO: 109.4 FL (ref 79–97)
PHOSPHATE SERPL-MCNC: 3.4 MG/DL (ref 2.5–4.5)
PLATELET # BLD AUTO: 188 10*3/MM3 (ref 140–450)
PMV BLD AUTO: 11.5 FL (ref 6–12)
POTASSIUM BLD-SCNC: 3.8 MMOL/L (ref 3.5–5.2)
PROTHROMBIN TIME: 15.6 SECONDS (ref 11.5–14)
RBC # BLD AUTO: 2.78 10*6/MM3 (ref 4.14–5.8)
SODIUM BLD-SCNC: 134 MMOL/L (ref 136–145)
UFH PPP CHRO-ACNC: 0.19 IU/ML (ref 0.3–0.7)
UFH PPP CHRO-ACNC: 0.35 IU/ML (ref 0.3–0.7)
UFH PPP CHRO-ACNC: 0.52 IU/ML (ref 0.3–0.7)
WBC NRBC COR # BLD: 5.28 10*3/MM3 (ref 3.4–10.8)

## 2020-02-29 PROCEDURE — 85027 COMPLETE CBC AUTOMATED: CPT | Performed by: INTERNAL MEDICINE

## 2020-02-29 PROCEDURE — 85520 HEPARIN ASSAY: CPT

## 2020-02-29 PROCEDURE — 85018 HEMOGLOBIN: CPT | Performed by: INTERNAL MEDICINE

## 2020-02-29 PROCEDURE — 80048 BASIC METABOLIC PNL TOTAL CA: CPT | Performed by: INTERNAL MEDICINE

## 2020-02-29 PROCEDURE — 99232 SBSQ HOSP IP/OBS MODERATE 35: CPT | Performed by: INTERNAL MEDICINE

## 2020-02-29 PROCEDURE — 85014 HEMATOCRIT: CPT | Performed by: INTERNAL MEDICINE

## 2020-02-29 PROCEDURE — 25010000003 MAGNESIUM SULFATE 4 GM/100ML SOLUTION: Performed by: NURSE PRACTITIONER

## 2020-02-29 PROCEDURE — 84100 ASSAY OF PHOSPHORUS: CPT | Performed by: INTERNAL MEDICINE

## 2020-02-29 PROCEDURE — 25010000002 HEPARIN (PORCINE) 25000-0.45 UT/250ML-% SOLUTION

## 2020-02-29 PROCEDURE — 83735 ASSAY OF MAGNESIUM: CPT | Performed by: INTERNAL MEDICINE

## 2020-02-29 PROCEDURE — 25010000002 HEPARIN (PORCINE) PER 1000 UNITS

## 2020-02-29 PROCEDURE — 85610 PROTHROMBIN TIME: CPT | Performed by: INTERNAL MEDICINE

## 2020-02-29 RX ORDER — MAGNESIUM SULFATE HEPTAHYDRATE 40 MG/ML
2 INJECTION, SOLUTION INTRAVENOUS AS NEEDED
Status: DISCONTINUED | OUTPATIENT
Start: 2020-02-29 | End: 2020-03-07 | Stop reason: HOSPADM

## 2020-02-29 RX ORDER — MAGNESIUM SULFATE HEPTAHYDRATE 40 MG/ML
4 INJECTION, SOLUTION INTRAVENOUS AS NEEDED
Status: DISCONTINUED | OUTPATIENT
Start: 2020-02-29 | End: 2020-03-07 | Stop reason: HOSPADM

## 2020-02-29 RX ORDER — WARFARIN SODIUM 5 MG/1
5 TABLET ORAL
Status: DISCONTINUED | OUTPATIENT
Start: 2020-02-29 | End: 2020-03-04

## 2020-02-29 RX ORDER — HEPARIN SODIUM 1000 [USP'U]/ML
3500 INJECTION, SOLUTION INTRAVENOUS; SUBCUTANEOUS ONCE
Status: COMPLETED | OUTPATIENT
Start: 2020-02-29 | End: 2020-02-29

## 2020-02-29 RX ORDER — HEPARIN SODIUM 10000 [USP'U]/100ML
21 INJECTION, SOLUTION INTRAVENOUS
Status: DISCONTINUED | OUTPATIENT
Start: 2020-02-29 | End: 2020-03-02

## 2020-02-29 RX ORDER — WARFARIN SODIUM 2.5 MG/1
2.5 TABLET ORAL
Status: DISCONTINUED | OUTPATIENT
Start: 2020-03-01 | End: 2020-03-04 | Stop reason: DRUGHIGH

## 2020-02-29 RX ADMIN — LISINOPRIL 10 MG: 10 TABLET ORAL at 09:04

## 2020-02-29 RX ADMIN — MAGNESIUM SULFATE HEPTAHYDRATE 4 G: 40 INJECTION, SOLUTION INTRAVENOUS at 12:03

## 2020-02-29 RX ADMIN — WARFARIN SODIUM 5 MG: 5 TABLET ORAL at 18:01

## 2020-02-29 RX ADMIN — FINASTERIDE 5 MG: 5 TABLET, FILM COATED ORAL at 09:01

## 2020-02-29 RX ADMIN — CITALOPRAM HYDROBROMIDE 20 MG: 20 TABLET ORAL at 09:01

## 2020-02-29 RX ADMIN — CARVEDILOL 12.5 MG: 12.5 TABLET, FILM COATED ORAL at 09:01

## 2020-02-29 RX ADMIN — BUPROPION HYDROCHLORIDE 300 MG: 150 TABLET, FILM COATED, EXTENDED RELEASE ORAL at 09:01

## 2020-02-29 RX ADMIN — CARVEDILOL 12.5 MG: 12.5 TABLET, FILM COATED ORAL at 20:28

## 2020-02-29 RX ADMIN — LISINOPRIL 10 MG: 10 TABLET ORAL at 20:28

## 2020-02-29 RX ADMIN — HEPARIN SODIUM 3500 UNITS: 1000 INJECTION, SOLUTION INTRAVENOUS; SUBCUTANEOUS at 06:04

## 2020-02-29 RX ADMIN — HEPARIN SODIUM 20 UNITS/KG/HR: 10000 INJECTION, SOLUTION INTRAVENOUS at 08:36

## 2020-03-01 LAB
ABO + RH BLD: NORMAL
ABO + RH BLD: NORMAL
ANION GAP SERPL CALCULATED.3IONS-SCNC: 10 MMOL/L (ref 5–15)
BH BB BLOOD EXPIRATION DATE: NORMAL
BH BB BLOOD EXPIRATION DATE: NORMAL
BH BB BLOOD TYPE BARCODE: 8400
BH BB BLOOD TYPE BARCODE: 8400
BH BB DISPENSE STATUS: NORMAL
BH BB DISPENSE STATUS: NORMAL
BH BB PRODUCT CODE: NORMAL
BH BB PRODUCT CODE: NORMAL
BH BB UNIT NUMBER: NORMAL
BH BB UNIT NUMBER: NORMAL
BUN BLD-MCNC: 10 MG/DL (ref 8–23)
BUN/CREAT SERPL: 13.9 (ref 7–25)
CALCIUM SPEC-SCNC: 8.8 MG/DL (ref 8.6–10.5)
CHLORIDE SERPL-SCNC: 100 MMOL/L (ref 98–107)
CO2 SERPL-SCNC: 23 MMOL/L (ref 22–29)
CREAT BLD-MCNC: 0.72 MG/DL (ref 0.76–1.27)
DEPRECATED RDW RBC AUTO: 54.1 FL (ref 37–54)
ERYTHROCYTE [DISTWIDTH] IN BLOOD BY AUTOMATED COUNT: 13.6 % (ref 12.3–15.4)
GFR SERPL CREATININE-BSD FRML MDRD: 108 ML/MIN/1.73
GLUCOSE BLD-MCNC: 102 MG/DL (ref 65–99)
HCT VFR BLD AUTO: 29.7 % (ref 37.5–51)
HCT VFR BLD AUTO: 32.8 % (ref 37.5–51)
HGB BLD-MCNC: 10.7 G/DL (ref 13–17.7)
HGB BLD-MCNC: 9.8 G/DL (ref 13–17.7)
INR PPP: 1.27 (ref 0.85–1.16)
MAGNESIUM SERPL-MCNC: 2.2 MG/DL (ref 1.6–2.4)
MCH RBC QN AUTO: 35.8 PG (ref 26.6–33)
MCHC RBC AUTO-ENTMCNC: 32.6 G/DL (ref 31.5–35.7)
MCV RBC AUTO: 109.7 FL (ref 79–97)
PHOSPHATE SERPL-MCNC: 3.5 MG/DL (ref 2.5–4.5)
PLATELET # BLD AUTO: 232 10*3/MM3 (ref 140–450)
PMV BLD AUTO: 11.3 FL (ref 6–12)
POTASSIUM BLD-SCNC: 4.3 MMOL/L (ref 3.5–5.2)
PROTHROMBIN TIME: 15.6 SECONDS (ref 11.5–14)
RBC # BLD AUTO: 2.99 10*6/MM3 (ref 4.14–5.8)
SODIUM BLD-SCNC: 133 MMOL/L (ref 136–145)
UFH PPP CHRO-ACNC: 0.56 IU/ML (ref 0.3–0.7)
UFH PPP CHRO-ACNC: 0.57 IU/ML (ref 0.3–0.7)
UNIT  ABO: NORMAL
UNIT  ABO: NORMAL
UNIT  RH: NORMAL
UNIT  RH: NORMAL
WBC NRBC COR # BLD: 7.22 10*3/MM3 (ref 3.4–10.8)

## 2020-03-01 PROCEDURE — 84100 ASSAY OF PHOSPHORUS: CPT | Performed by: INTERNAL MEDICINE

## 2020-03-01 PROCEDURE — 85018 HEMOGLOBIN: CPT | Performed by: INTERNAL MEDICINE

## 2020-03-01 PROCEDURE — 80048 BASIC METABOLIC PNL TOTAL CA: CPT | Performed by: INTERNAL MEDICINE

## 2020-03-01 PROCEDURE — 25010000002 HEPARIN (PORCINE) 25000-0.45 UT/250ML-% SOLUTION

## 2020-03-01 PROCEDURE — 25010000002 HEPARIN (PORCINE) 25000-0.45 UT/250ML-% SOLUTION: Performed by: INTERNAL MEDICINE

## 2020-03-01 PROCEDURE — 83735 ASSAY OF MAGNESIUM: CPT | Performed by: INTERNAL MEDICINE

## 2020-03-01 PROCEDURE — 85520 HEPARIN ASSAY: CPT

## 2020-03-01 PROCEDURE — 85014 HEMATOCRIT: CPT | Performed by: INTERNAL MEDICINE

## 2020-03-01 PROCEDURE — 99232 SBSQ HOSP IP/OBS MODERATE 35: CPT | Performed by: INTERNAL MEDICINE

## 2020-03-01 PROCEDURE — 85610 PROTHROMBIN TIME: CPT | Performed by: INTERNAL MEDICINE

## 2020-03-01 PROCEDURE — 85027 COMPLETE CBC AUTOMATED: CPT | Performed by: INTERNAL MEDICINE

## 2020-03-01 RX ORDER — WARFARIN SODIUM 2.5 MG/1
2.5 TABLET ORAL
Status: COMPLETED | OUTPATIENT
Start: 2020-03-01 | End: 2020-03-01

## 2020-03-01 RX ADMIN — WARFARIN SODIUM 2.5 MG: 2.5 TABLET ORAL at 09:34

## 2020-03-01 RX ADMIN — FINASTERIDE 5 MG: 5 TABLET, FILM COATED ORAL at 08:47

## 2020-03-01 RX ADMIN — LISINOPRIL 10 MG: 10 TABLET ORAL at 08:47

## 2020-03-01 RX ADMIN — HEPARIN SODIUM 21 UNITS/KG/HR: 10000 INJECTION, SOLUTION INTRAVENOUS at 00:03

## 2020-03-01 RX ADMIN — CITALOPRAM HYDROBROMIDE 20 MG: 20 TABLET ORAL at 08:47

## 2020-03-01 RX ADMIN — CARVEDILOL 12.5 MG: 12.5 TABLET, FILM COATED ORAL at 08:47

## 2020-03-01 RX ADMIN — SODIUM CHLORIDE, PRESERVATIVE FREE 10 ML: 5 INJECTION INTRAVENOUS at 20:20

## 2020-03-01 RX ADMIN — BUPROPION HYDROCHLORIDE 300 MG: 150 TABLET, FILM COATED, EXTENDED RELEASE ORAL at 08:47

## 2020-03-01 RX ADMIN — WARFARIN SODIUM 2.5 MG: 2.5 TABLET ORAL at 18:01

## 2020-03-01 RX ADMIN — CARVEDILOL 12.5 MG: 12.5 TABLET, FILM COATED ORAL at 20:19

## 2020-03-01 RX ADMIN — HEPARIN SODIUM 21 UNITS/KG/HR: 10000 INJECTION, SOLUTION INTRAVENOUS at 17:59

## 2020-03-01 NOTE — PLAN OF CARE
Pt VSS. !00% V-paced on monitor with underlying a-flutter. Pt sleeping between care. UOP > 800. Heparin therapeutic x 2. Currently infusing at 21 u/kg/h. Received coumadin 5mg yesterday. Plan to monitor labs to dose anticoagulants. Penis and scrotum remain bruised, but pt states pain is improved.

## 2020-03-01 NOTE — PROGRESS NOTES
"Pharmacy Consult  -  Warfarin  Dillon Vo is a  69 y.o. male   Height - 182.9 cm (72\")  Weight - 73.4 kg (161 lb 13.1 oz)    Consulting Provider: Case  Indication: Mechanical Aortic Valve  Goal INR: 2.5 - 3.5  Home Regimen:   - 5mg daily except 2.5 mg on Sundays and Tuesdays     Bridge Therapy: Yes   and unfractionated heparin    Drug-Drug Interactions with current regimen:       Warfarin Dosing During Admission:  Date  2/29 3/1          INR  1.27 1.27          Dose  5 mg 2.5 + 2.5             Education Provided:    Discharge Follow up: [facility]   Following Provider - Shruti Sigala, for Dr Arrington   Follow up time range or appointment - 2-3 post discharge    Labs:  Results from last 7 days   Lab Units 03/01/20  0806 02/29/20  1807 02/29/20  1125 02/29/20  0849 02/29/20  0338 02/28/20  2349 02/28/20  1637 02/28/20  1155 02/28/20  0853  02/27/20  0346 02/27/20  0000  02/26/20  1437  02/26/20  0926 02/26/20  0913 02/24/20  0413   INR  1.27*  --  1.27*  --   --   --   --  1.27*  --   --  1.25* 1.27*  --  5.00*  --  7.10*  --  3.71*   APTT seconds  --   --   --   --   --   --   --  33.7*  --   --  34.7  --   --   --   --   --   --   --    HEMOGLOBIN g/dL 10.7* 10.8*  --  10.8* 10.2* 10.0* 10.6*  --  10.7*   < > 10.6* 10.2*   < > 11.1*  --   --  11.4* 11.6*   HEMOGLOBIN, POC   --   --   --   --   --   --   --   --   --   --   --   --   --   --    < >  --   --   --    HEMATOCRIT % 32.8* 32.1*  --  32.0* 30.4* 27.3* 32.4*  --  31.7*   < > 30.9* 30.4*   < > 34.0*  --   --  32.4* 34.8*   PLATELETS 10*3/mm3 232  --   --   --  188  --   --   --   --   --  178  --   --  190  --   --  218 86*    < > = values in this interval not displayed.     Results from last 7 days   Lab Units 03/01/20  0806 02/29/20  0338 02/27/20  0346 02/26/20  1437 02/26/20  0913   SODIUM mmol/L 133* 134* 133* 137 137   POTASSIUM mmol/L 4.3 3.8 4.1 4.7 4.2   CHLORIDE mmol/L 100 99 100 100 101   CO2 mmol/L 23.0 " 23.0 22.0 26.0 26.0   BUN mg/dL 10 9 8 11 11   CREATININE mg/dL 0.72* 0.64* 0.58* 0.82 0.87   CALCIUM mg/dL 8.8 8.7 8.4* 8.5* 8.8   BILIRUBIN mg/dL  --   --   --  1.4* 1.2   ALK PHOS U/L  --   --   --  56 55   ALT (SGPT) U/L  --   --   --  23 24   AST (SGOT) U/L  --   --   --  41* 29   GLUCOSE mg/dL 102* 101* 91 148* 99     2/26 Received 3500 units of KCentra, 10mg IV VitK    Assessment/Plan:   Add an extra 2.5mg warfarin this AM  Continue home dosing of warfarin     Thank you  Marine BajwaD BCPS

## 2020-03-01 NOTE — PROGRESS NOTES
HEPARIN INFUSION  Dillon Vo is a  69 y.o. male receiving heparin infusion.     Therapy for (VTE/Cardiac):   cardiac  Patient Weight: 77.9 kg  Initial Bolus (Y/N):   N  Any Bolus (Y/N):  Y     Signs or Symptoms of Bleeding: admitted with hematoma; monitor closely  Cardiac or Other (Not VTE)   Initial Bolus: 60 units/kg (Max 4,000 units)  Initial rate: 12 units/kg/hr (Max 1,000 units/hr)   Anti-Xa (IU/mL) Bolus Dose Stop Infusion Rate Change Repeat Anti-Xa      ?0.19 60 units/kg 0 hrs Increase rate by   4 units/kg/hr 6 hrs       0.2 - 0.29  30 units/kg 0 hrs Increase rate by 2 units/kg/hr 6 hrs    0.3 - 0.7 0 0 hrs No change 6 hrs      0.71 - 0.99 0  0 hrs Decrease rate by 2 units/kg/hr 6 hrs            ?1 0 Hold 1 hr Decrease rate by 3 units/kg/hr 6 hrs      Results from last 7 days   Lab Units 03/01/20  0806 02/29/20  1807 02/29/20  1125 02/29/20  0849 02/29/20  0338  02/28/20  1155  02/27/20  0346   INR  1.27*  --  1.27*  --   --   --  1.27*  --  1.25*   HEMOGLOBIN g/dL 10.7* 10.8*  --  10.8* 10.2*   < >  --    < > 10.6*   HEMATOCRIT % 32.8* 32.1*  --  32.0* 30.4*   < >  --    < > 30.9*   PLATELETS 10*3/mm3 232  --   --   --  188  --   --   --  178    < > = values in this interval not displayed.        Date   Time   Anti-Xa Current Rate (Unit/kg/hr) Bolus   (Units) Rate Change   (Unit/kg/hr) New Rate (Unit/kg/hr) Next   Anti-Xa Comments  Pump Check Daily   2/28 1154 0.10 +12u  -- -- +12u 1900  S/w Argelia   2/28 1937 0.1 12 2000 +4 16 0400 Dw RN   2/29 0338 0.19 16 3500 +4 20 1200 Reny 2461 -acb   2/29 1125 0.52 20 - - 20 1800 [address] Carol   2/29 1807 0.35 20 -- +1 21 0100 DW RN Reny   3/1 0103 0.57 21 -- -- 21 0600 Reny 8289 -b   3/1 0806 0.56 21 - - 21 0600 Sw Carol                                                                                                                                                             Marine Calderon [facility]  3/1/2020  8:58 AM

## 2020-03-01 NOTE — PLAN OF CARE
Problem: Patient Care Overview  Goal: Plan of Care Review  Outcome: Ongoing (interventions implemented as appropriate)  Flowsheets (Taken 3/1/2020 2973)  Outcome Summary: patient was received from ICU around noon. a/o x4 pleasent. Patient aware INR needs to be therapic. continue on heparin gtt. No s/s of bleeding. wife at the bedside.

## 2020-03-01 NOTE — PROGRESS NOTES
INTENSIVIST   PROGRESS NOTE     Hospital:  LOS: 4 days     Mr. Dillon Vo, 69 y.o. male is followed for a Chief Complaint of: Acute Hemorrhage      Subjective   S     Interval History:  No events overnight. Hemoglobin is stable. INR 1.27 this AM.        The patient's relevant past medical, surgical and social history were reviewed and updated in Epic as appropriate.      ROS:   Constitutional: Negative for fever.   Respiratory: Negative for dyspnea.   Cardiovascular: Negative for chest pain.   Gastrointestinal: Negative for  nausea, vomiting and diarrhea.     Objective   O     Vitals:  Temp  Min: 97.5 °F (36.4 °C)  Max: 98.4 °F (36.9 °C)  BP  Min: 85/49  Max: 132/69  Pulse  Min: 70  Max: 73  Resp  Min: 16  Max: 18  SpO2  Min: 94 %  Max: 99 % No data recorded    Intake/Ouptut 24 hrs (7:00AM - 6:59 AM)  Intake & Output (last 3 days)       02/27 0701 - 02/28 0700 02/28 0701 - 02/29 0700 02/29 0701 - 03/01 0700 03/01 0701 - 03/02 0700    P.O.  30 1030     I.V. (mL/kg)  177.2 (2.9) 452.6 (6.2)     Total Intake(mL/kg)  207.2 (3.3) 1482.6 (20.2)     Urine (mL/kg/hr) 2460 (1.3) 1800 (1.2) 1835 (1) 375 (1.4)    Stool    0    Total Output 2460 1800 1835 375    Net -2460 -1592.8 -352.4 -375            Stool Unmeasured Occurrence    1 x            Physical Examination  Telemetry:  Normal sinus rhythm.    Constitutional:  No acute distress.  Sitting up in a chair.     Cardiovascular: Normal rate, regular and rhythm. Mechanical click.   No murmurs, gallop or rub.   Respiratory: No respiratory distress. Normal respiratory effort.  Normal breath sounds  Clear to ascultation   Abdominal:  Soft. No masses. Non-tender. No distension. No HSM.   Extremities: No digital cyanosis. No clubbing.  No peripheral edema.   Neurological:   Alert and Oriented to person, place, and time.              Results from last 7 days   Lab Units 03/01/20  0806 02/29/20  1807 02/29/20  0849 02/29/20  0338  02/27/20  0346   WBC 10*3/mm3 7.22  --    --  5.28  --  5.81   HEMOGLOBIN g/dL 10.7* 10.8* 10.8* 10.2*   < > 10.6*   MCV fL 109.7*  --   --  109.4*  --  108.0*   PLATELETS 10*3/mm3 232  --   --  188  --  178    < > = values in this interval not displayed.     Results from last 7 days   Lab Units 03/01/20  0806 02/29/20  0338 02/27/20  0346   SODIUM mmol/L 133* 134* 133*   POTASSIUM mmol/L 4.3 3.8 4.1   CO2 mmol/L 23.0 23.0 22.0   CREATININE mg/dL 0.72* 0.64* 0.58*   GLUCOSE mg/dL 102* 101* 91   MAGNESIUM mg/dL 2.2 1.9 1.8   PHOSPHORUS mg/dL 3.5 3.4 2.9     Estimated Creatinine Clearance: 90.5 mL/min (A) (by C-G formula based on SCr of 0.72 mg/dL (L)).  Results from last 7 days   Lab Units 02/26/20  1437 02/26/20  0913   ALK PHOS U/L 56 55   BILIRUBIN mg/dL 1.4* 1.2   ALT (SGPT) U/L 23 24   AST (SGOT) U/L 41* 29             Images:  Imaging Results (Last 24 Hours)     ** No results found for the last 24 hours. **            Results: Reviewed.  I reviewed the patient's new laboratory and imaging results.  I independently reviewed the patient's new images.    Medications: Reviewed.    Assessment/Plan   A / P     Mr. Vo is a 70yo M who presented to [facility] on 2/26 with abdominal pain. He was recently admitted to [facility] for acute respiratory failure secondary to pneumonia and influenza and was discharged on 2/19/20. He was seen by his PCP on the day of admission for a rectus sheath hematoma and was found to be hypotensive with an INR of 7.1. His repeat hemoglobin decreased to 9.6 within 3 hours and he was instructed to come to the ED for further evaluation. He is on warfarin for a mechanical aortic valve. Upon arrival to the ED, he had repeat labs which showed a INR of 5 and a hemoglobin of 11.1. He was administered Kcentra and vitamin K and admitted to the ICU. He has not had any further signs of bleeding and his hemoglobin has been stable. He was started on a Heparin drip on 2/28. Coumadin was restarted on 2/29.     Nutrition:   Diet Regular; Cardiac,  Consistent Carbohydrate  Advance Directives:   Code Status and Medical Interventions:   Ordered at: 02/26/20 1817     Limited Support to NOT Include:    Intubation     Level Of Support Discussed With:    Patient     Code Status:    No CPR     Medical Interventions (Level of Support Prior to Arrest):    Limited       Active Hospital Problems    Diagnosis   • **Large rectus sheath hematoma   • Chronic anticoagulation on warfarin    • Supratherapeutic INR    • Suspected pulmonary HTN      Echo 2/21/2020: The estimated RV systolic pressure is 59 mmHg, consistent with SEVERE pulmonary artery hypertension.          • Macrocytosis   • Self-catheterizes urinary bladder   • Hematoma of rectus sheath   • Recent influenza A PNA (treated w/Tamiflu)    • CAD    • H/O mechanical AVR    • AF s/p RFA and BiV PPM      · Difficult to control rate.  · RFA of AV node with implantation of left Bi-V pacemaker, 06/18/2013.  · Hematoma requiring single-chamber pacemaker implanted, 07/29/2013.      • Dyslipidemia   • Hypertension       Assessment / Plan:    1. Continue heparin drip until INR therapeutic x 2 days.   2. Increase Coumadin.    3. Continue to monitor H/H  4. Continue chen catheter.    5. Pain control as needed  6. OOB to chair  7. AM labs  8. Okay to transfer to telemetry when a bed is available.     High level of risk due to:  severe exacerbation of chronic illness and illness with threat to life or bodily function.    Plan of care and goals reviewed during interdisciplinary rounds.  I discussed the patient's findings and my recommendations with patient and nursing staff      Margot Wakefield,     Intensive Care Medicine and Pulmonary Medicine

## 2020-03-02 LAB
DEPRECATED RDW RBC AUTO: 52.7 FL (ref 37–54)
ERYTHROCYTE [DISTWIDTH] IN BLOOD BY AUTOMATED COUNT: 13.3 % (ref 12.3–15.4)
HCT VFR BLD AUTO: 29.9 % (ref 37.5–51)
HCT VFR BLD AUTO: 31.4 % (ref 37.5–51)
HGB BLD-MCNC: 10.2 G/DL (ref 13–17.7)
HGB BLD-MCNC: 10.4 G/DL (ref 13–17.7)
INR PPP: 1.47 (ref 0.85–1.16)
MCH RBC QN AUTO: 36 PG (ref 26.6–33)
MCHC RBC AUTO-ENTMCNC: 33.1 G/DL (ref 31.5–35.7)
MCV RBC AUTO: 108.7 FL (ref 79–97)
PLATELET # BLD AUTO: 198 10*3/MM3 (ref 140–450)
PMV BLD AUTO: 11.5 FL (ref 6–12)
PROTHROMBIN TIME: 17.5 SECONDS (ref 11.5–14)
RBC # BLD AUTO: 2.89 10*6/MM3 (ref 4.14–5.8)
UFH PPP CHRO-ACNC: 0.26 IU/ML (ref 0.3–0.7)
UFH PPP CHRO-ACNC: 0.38 IU/ML (ref 0.3–0.7)
WBC NRBC COR # BLD: 6.44 10*3/MM3 (ref 3.4–10.8)

## 2020-03-02 PROCEDURE — 85520 HEPARIN ASSAY: CPT | Performed by: INTERNAL MEDICINE

## 2020-03-02 PROCEDURE — 25010000002 HEPARIN (PORCINE) 25000-0.45 UT/250ML-% SOLUTION: Performed by: INTERNAL MEDICINE

## 2020-03-02 PROCEDURE — 85520 HEPARIN ASSAY: CPT

## 2020-03-02 PROCEDURE — 99232 SBSQ HOSP IP/OBS MODERATE 35: CPT | Performed by: INTERNAL MEDICINE

## 2020-03-02 PROCEDURE — 85610 PROTHROMBIN TIME: CPT | Performed by: INTERNAL MEDICINE

## 2020-03-02 PROCEDURE — 85014 HEMATOCRIT: CPT | Performed by: INTERNAL MEDICINE

## 2020-03-02 PROCEDURE — 85027 COMPLETE CBC AUTOMATED: CPT | Performed by: INTERNAL MEDICINE

## 2020-03-02 PROCEDURE — 85018 HEMOGLOBIN: CPT | Performed by: INTERNAL MEDICINE

## 2020-03-02 RX ORDER — HEPARIN SODIUM 10000 [USP'U]/100ML
20 INJECTION, SOLUTION INTRAVENOUS
Status: DISCONTINUED | OUTPATIENT
Start: 2020-03-02 | End: 2020-03-07 | Stop reason: HOSPADM

## 2020-03-02 RX ADMIN — BUPROPION HYDROCHLORIDE 300 MG: 150 TABLET, FILM COATED, EXTENDED RELEASE ORAL at 08:04

## 2020-03-02 RX ADMIN — CARVEDILOL 12.5 MG: 12.5 TABLET, FILM COATED ORAL at 08:04

## 2020-03-02 RX ADMIN — LISINOPRIL 10 MG: 10 TABLET ORAL at 21:34

## 2020-03-02 RX ADMIN — CITALOPRAM HYDROBROMIDE 20 MG: 20 TABLET ORAL at 08:04

## 2020-03-02 RX ADMIN — SODIUM CHLORIDE, PRESERVATIVE FREE 10 ML: 5 INJECTION INTRAVENOUS at 21:35

## 2020-03-02 RX ADMIN — LISINOPRIL 10 MG: 10 TABLET ORAL at 08:04

## 2020-03-02 RX ADMIN — HEPARIN SODIUM 21 UNITS/KG/HR: 10000 INJECTION, SOLUTION INTRAVENOUS at 11:05

## 2020-03-02 RX ADMIN — SODIUM CHLORIDE, PRESERVATIVE FREE 10 ML: 5 INJECTION INTRAVENOUS at 08:04

## 2020-03-02 RX ADMIN — WARFARIN SODIUM 5 MG: 5 TABLET ORAL at 17:27

## 2020-03-02 RX ADMIN — FINASTERIDE 5 MG: 5 TABLET, FILM COATED ORAL at 08:04

## 2020-03-02 NOTE — PROGRESS NOTES
"Pharmacy Consult - Warfarin    Dillon Vo is a 69 y.o. male on warfarin therapy.    Height - 182.9 cm (72\")  Weight - 73.4 kg (161 lb 13.1 oz)    Consulting Provider:  Case  Indication: Mechanical AVR, atrial fibrillation  Goal INR: 2.5-3.5  Home Regimen:   -Warfarin 5mg daily except 2.5mg on Sundays and Tuesdays     Bridge Therapy: Yes - heparin drip    Drug-Drug Interactions with current regimen:  No major DDIs    Warfarin Dosing During Admission:  Date  2/26 2/27 2/28 2/29 3/1 3/2      INR  5 1.25 1.27 1.27 1.27 1.47      Dose  Kcentra + Vit K 10mg IV HOLD Hep gtt started 5mg 5mg (5mg)        Education Provided: Patient is on warfarin prior to admission with appropriate follow-up. Written education provided on 2/20/20 during recent last admission.       Discharge Follow up:    Following Provider - Shruti Sigala (Dr. Cintron's office) - [facility] [place] [facility]   Follow up time range or appointment - 2-3 days after discharge    Labs:  Results from last 7 days   Lab Units 03/02/20  0540 03/01/20  1932 03/01/20  0806 02/29/20  1807 02/29/20  1125 02/29/20  0849 02/29/20  0338 02/28/20  2349  02/28/20  1155  02/27/20  0346 02/27/20  0000  02/26/20  1437  02/26/20  0913   INR  1.47*  --  1.27*  --  1.27*  --   --   --   --  1.27*  --  1.25* 1.27*  --  5.00*   < >  --    APTT seconds  --   --   --   --   --   --   --   --   --  33.7*  --  34.7  --   --   --   --   --    HEMOGLOBIN g/dL 10.4* 9.8* 10.7* 10.8*  --  10.8* 10.2* 10.0*   < >  --    < > 10.6* 10.2*   < > 11.1*  --  11.4*   HEMOGLOBIN, POC   --   --   --   --   --   --   --   --   --   --   --   --   --   --   --    < >  --    HEMATOCRIT % 31.4* 29.7* 32.8* 32.1*  --  32.0* 30.4* 27.3*   < >  --    < > 30.9* 30.4*   < > 34.0*  --  32.4*   PLATELETS 10*3/mm3 198  --  232  --   --   --  188  --   --   --   --  178  --   --  190  --  218    < > = values in this interval not displayed.     Results from last 7 days   Lab Units " 03/01/20  0806 02/29/20  0338 02/27/20  0346 02/26/20  1437 02/26/20  0913   SODIUM mmol/L 133* 134* 133* 137 137   POTASSIUM mmol/L 4.3 3.8 4.1 4.7 4.2   CHLORIDE mmol/L 100 99 100 100 101   CO2 mmol/L 23.0 23.0 22.0 26.0 26.0   BUN mg/dL 10 9 8 11 11   CREATININE mg/dL 0.72* 0.64* 0.58* 0.82 0.87   CALCIUM mg/dL 8.8 8.7 8.4* 8.5* 8.8   BILIRUBIN mg/dL  --   --   --  1.4* 1.2   ALK PHOS U/L  --   --   --  56 55   ALT (SGPT) U/L  --   --   --  23 24   AST (SGOT) U/L  --   --   --  41* 29   GLUCOSE mg/dL 102* 101* 91 148* 99     Current dietary intake: ~75% of documented meals  Diet Order   Procedures    Diet Regular; Cardiac, Consistent Carbohydrate     Assessment/Plan:   1. Pharmacy dosing warfarin for mechanical AVR + atrial fibrillation, goal INR 2.5-3.5. Warfarin restarted 2/29 following hematoma w/ supratherapeutic INR requiring reversal.  2. Patient's INR is subtherapeutic today at 1.49 but increasing. Will continue home regimen with warfarin 5mg due tonight.  3. Will follow daily PT/INR.  4. Pharmacy will continue to follow closely and adjust warfarin as needed based on drug interactions, daily INR trend, and clinical status.    Thank you,  Jean Johnson, PharmD, BCPS  3/2/2020  11:34 AM

## 2020-03-02 NOTE — PROGRESS NOTES
HEPARIN INFUSION    Dillon Vo is a 69 y.o. male receiving heparin infusion.     Therapy for (VTE/Cardiac): Cardiac (mechanical AVR, bridge therapy for warfarin)  Patient Weight: 73.4 kg  Initial Bolus (Y/N): No  Any Bolus (Y/N): Yes     Signs or Symptoms of Bleeding: admitted with hematoma; monitor closely    Cardiac or Other (Not VTE)   Initial Bolus: 60 units/kg (Max 4,000 units)  Initial rate: 12 units/kg/hr (Max 1,000 units/hr)   Anti-Xa (IU/mL) Bolus Dose Stop Infusion Rate Change Repeat Anti-Xa      ?0.19 60 units/kg 0 hrs Increase rate by 4 units/kg/hr 6 hrs       0.2 - 0.29  30 units/kg 0 hrs Increase rate by 2 units/kg/hr 6 hrs    0.3 - 0.7 0 0 hrs No change 6 hrs      0.71 - 0.99 0  0 hrs Decrease rate by 2 units/kg/hr 6 hrs            ?1 0 Hold 1 hr Decrease rate by 3 units/kg/hr 6 hrs      Results from last 7 days   Lab Units 03/02/20  0540 03/01/20  1932 03/01/20  0806  02/29/20  1125  02/29/20  0338   INR  1.47*  --  1.27*  --  1.27*  --   --    HEMOGLOBIN g/dL 10.4* 9.8* 10.7*   < >  --    < > 10.2*   HEMATOCRIT % 31.4* 29.7* 32.8*   < >  --    < > 30.4*   PLATELETS 10*3/mm3 198  --  232  --   --   --  188    < > = values in this interval not displayed.        Date   Time   Anti-Xa Current Rate (Unit/kg/hr) Bolus   (Units) Rate Change   (Unit/kg/hr) New Rate (Unit/kg/hr) Next   Anti-Xa Comments  Pump Check Daily   2/28 1154 0.10 New start  -- -- 12 1900  S/w Argelia   2/28 1937 0.1 12 2000 +4 16 0400 Dale LIMON   2/29 0338 0.19 16 3500 +4 20 1200 Reny 2461 -acb   2/29 1125 0.52 20 -- -- 20 1800 [address]   2/29 1807 0.35 20 -- +1 21 0100 DALE Oglesby   3/1 0103 0.57 21 -- -- 21 0600 Reny 8289 -acb   3/1 0806 0.56 21 -- -- 21 0600 Micha Medina   3/2 0540 0.38 21 -- -- 21 1800 Recheck tonight given trend down; d/w RN; pump verified - wt on order updated                                                                                                                                                    Jean  Alex, PharmD, BCPS  3/2/2020  8:32 AM

## 2020-03-02 NOTE — PROGRESS NOTES
[facility] Medicine Services  PROGRESS NOTE    Patient Name: Dillon Vo  : 1950  MRN: [id number]    Date of Admission: 2020  Primary Care Physician: Ez Cintron MD    Subjective   Subjective     CC:  F/u rectus sheath hematoma    HPI:  Patient resting in bed. No acute events overnight. He has no complaints. No abdominal pain.    Review of Systems  Gen- No fevers, chills  CV- No chest pain, palpitations  Resp- No cough, dyspnea  GI- No N/V/D, abd pain    Objective   Objective     Vital Signs:   Temp:  [98 °F (36.7 °C)-98.4 °F (36.9 °C)] 98.2 °F (36.8 °C)  Heart Rate:  [69] 69  Resp:  [16-18] 16  BP: (104-127)/(57-77) 127/73        Physical Exam:  Constitutional: No acute distress, awake, alert, thin appearing male  HENT: NCAT, mucous membranes moist  Respiratory: Clear to auscultation bilaterally, respiratory effort normal   Cardiovascular: RRR, no murmurs, rubs, or gallops, palpable pedal pulses bilaterally  Gastrointestinal: Positive bowel sounds, soft, nontender, nondistended  Musculoskeletal: No bilateral ankle edema  Psychiatric: Appropriate affect, cooperative  Neurologic: Oriented x 3, strength symmetric in all extremities, Cranial Nerves grossly intact to confrontation, speech clear  Skin: No rashes      Results Reviewed:  Results from last 7 days   Lab Units 20  0540 20  1932 20  0806  20  1125  20  0338   WBC 10*3/mm3 6.44  --  7.22  --   --   --  5.28   HEMOGLOBIN g/dL 10.4* 9.8* 10.7*   < >  --    < > 10.2*   HEMATOCRIT % 31.4* 29.7* 32.8*   < >  --    < > 30.4*   PLATELETS 10*3/mm3 198  --  232  --   --   --  188   INR  1.47*  --  1.27*  --  1.27*  --   --     < > = values in this interval not displayed.     Results from last 7 days   Lab Units 20  0806 20  0338 20  0346 20  1437 20  0913   SODIUM mmol/L 133* 134* 133* 137 137   POTASSIUM mmol/L 4.3 3.8 4.1 4.7 4.2   CHLORIDE mmol/L  100 99 100 100 101   CO2 mmol/L 23.0 23.0 22.0 26.0 26.0   BUN mg/dL 10 9 8 11 11   CREATININE mg/dL 0.72* 0.64* 0.58* 0.82 0.87   GLUCOSE mg/dL 102* 101* 91 148* 99   CALCIUM mg/dL 8.8 8.7 8.4* 8.5* 8.8   ALT (SGPT) U/L  --   --   --  23 24   AST (SGOT) U/L  --   --   --  41* 29   PROBNP pg/mL  --   --   --  1,341.0*  --      Estimated Creatinine Clearance: 90.5 mL/min (A) (by C-G formula based on SCr of 0.72 mg/dL (L)).    Microbiology Results Abnormal     None          Imaging Results (Last 24 Hours)     ** No results found for the last 24 hours. **          Results for orders placed during the hospital encounter of 02/19/20   Adult Transthoracic Echo Complete W/ Cont if Necessary Per Protocol    Narrative · There is biatrial and right ventricular enlargement.  · Mild concentric LVH is present.  · There is mild global hypokinesia of the left ventricle. RV systolic   function appears normal.  · An electronic lead is noted in the right ventricle.  · There is a normally functioning mechanical aortic valve.  · There is mitral annular calcification. MV visualization is subotpimal;   however, mild-moderate mitral regurgitation is noted.  · There is moderate tricuspid regurgitation. The estimated RV systolic   pressure is 59 mmHg, consistent with SEVERE pulmonary artery hypertension.  · No other important findings are noted on this study.          I have reviewed the medications:  Scheduled Meds:  buPROPion  mg Oral Daily   carvedilol 12.5 mg Oral BID   citalopram 20 mg Oral Daily   finasteride 5 mg Oral Daily   lisinopril 10 mg Oral BID   warfarin 2.5 mg Oral Once per day on Sun Tue   warfarin 5 mg Oral Once per day on Mon Wed Thu Fri Sat     Continuous Infusions:  heparin 21 Units/kg/hr Last Rate: 21 Units/kg/hr (03/02/20 1105)   Pharmacy to Dose Heparin     Pharmacy to dose warfarin       PRN Meds:.ipratropium-albuterol  •  Lidocaine HCl Urethral/Mucosal 2%  •  magnesium sulfate **OR** magnesium sulfate **OR**  magnesium sulfate  •  ondansetron  •  Pharmacy to Dose Heparin  •  Pharmacy to dose warfarin  •  sodium chloride    Assessment/Plan   Assessment & Plan     Active Hospital Problems    Diagnosis  POA   • **Large rectus sheath hematoma [S30.1XXA]  Yes   • Chronic anticoagulation on warfarin  [Z79.01]  Not Applicable   • Supratherapeutic INR  [R79.1]  Yes   • Suspected pulmonary HTN  [I27.20]  Yes   • Macrocytosis [D75.89]  Yes   • Self-catheterizes urinary bladder [Z78.9]  Yes   • Hematoma of rectus sheath [S30.1XXA]  Yes   • Recent influenza A PNA (treated w/Tamiflu)  [J10.1]  Yes   • CAD  [I25.10]  Yes   • H/O mechanical AVR  [Z95.2]  Not Applicable   • AF s/p RFA and BiV PPM  [I48.91]  Yes   • Dyslipidemia [E78.5]  Yes   • Hypertension [I10]  Yes      Resolved Hospital Problems   No resolved problems to display.        Brief Hospital Course to date:  Dillon Vo is a 69 y.o. male with PMHx significant for mechanical Ao Valve on Coumadin, PAF sp RFA and BiV PPM, tobacco abuse, and recent admission for Flu A and secondary pneumonia who presented to the ED with large rectus sheath hematoma with drop in hemoglobin and hypotension. CT abdomen/pelvis with contrast performed showed a large rectus sheath hematoma with apparent active bleeding compressing the bladder with mesenteric extension. He was given Kcentra and Vitamin K on admission. He was initially managed in the ICU and downgraded  To the floor on 3/2 for hospital medicine to resume care.    Large Rectus Sheath Hematoma   Mechanical Ao Valve on Coumadin  - s/p Kcentra and Vitamin K on admission, now back on heparin gtt with bridge to coumadin with goal INR 2.5-3.5, pharmacy following  - Hemoglobin remains stable, monitor    Urinary Retention:  - self caths at home, remove chen today and monitor  - continue proscar    PAF s/p RFA and BiV PPM:  - continue coumadin  - continue coreg    DVT Prophylaxis:  Heparin bridge to coumadin    Disposition: I expect  the patient to be discharged when INR therapeutic    CODE STATUS:   Code Status and Medical Interventions:   Ordered at: 02/26/20 1817     Limited Support to NOT Include:    Intubation     Level Of Support Discussed With:    Patient     Code Status:    No CPR     Medical Interventions (Level of Support Prior to Arrest):    Limited         Electronically signed by Johanna Slaughter DO, 03/02/20, 12:40 PM.

## 2020-03-02 NOTE — PLAN OF CARE
Problem: Patient Care Overview  Goal: Plan of Care Review  Outcome: Ongoing (interventions implemented as appropriate)  Flowsheets (Taken 3/2/2020 0249)  Outcome Summary: Marium d/c'd this am around 1100. Patient sates he only self cathes twice a day. Wife brought home catheter at bedside. Patient states he will self cathes tonight.

## 2020-03-02 NOTE — PROGRESS NOTES
Continued Stay Note  [facility]     Patient Name: Dillon Vo  MRN: [id number]  Today's Date: 3/2/2020    Admit Date: 2/26/2020    Discharge Plan     Row Name 03/02/20 1357       Plan    Plan  Home    Patient/Family in Agreement with Plan  yes    Plan Comments  Spoke with pt at bedside. pt reports plan remains at this time to discharge home when medically ready. Pt had no current discharge needs or concerns.     Final Discharge Disposition Code  01 - home or self-care        Discharge Codes    No documentation.       Expected Discharge Date and Time     Expected Discharge Date Expected Discharge Time    Mar 3, 2020             ADRIAN Leblanc

## 2020-03-03 LAB
BASOPHILS # BLD AUTO: 0.05 10*3/MM3 (ref 0–0.2)
BASOPHILS NFR BLD AUTO: 0.8 % (ref 0–1.5)
DEPRECATED RDW RBC AUTO: 52.7 FL (ref 37–54)
EOSINOPHIL # BLD AUTO: 0.05 10*3/MM3 (ref 0–0.4)
EOSINOPHIL NFR BLD AUTO: 0.8 % (ref 0.3–6.2)
ERYTHROCYTE [DISTWIDTH] IN BLOOD BY AUTOMATED COUNT: 13.4 % (ref 12.3–15.4)
HCT VFR BLD AUTO: 31.2 % (ref 37.5–51)
HGB BLD-MCNC: 10.3 G/DL (ref 13–17.7)
IMM GRANULOCYTES # BLD AUTO: 0.05 10*3/MM3 (ref 0–0.05)
IMM GRANULOCYTES NFR BLD AUTO: 0.8 % (ref 0–0.5)
INR PPP: 1.66 (ref 0.85–1.16)
LYMPHOCYTES # BLD AUTO: 1.22 10*3/MM3 (ref 0.7–3.1)
LYMPHOCYTES NFR BLD AUTO: 20.3 % (ref 19.6–45.3)
MCH RBC QN AUTO: 35.3 PG (ref 26.6–33)
MCHC RBC AUTO-ENTMCNC: 33 G/DL (ref 31.5–35.7)
MCV RBC AUTO: 106.8 FL (ref 79–97)
MONOCYTES # BLD AUTO: 0.65 10*3/MM3 (ref 0.1–0.9)
MONOCYTES NFR BLD AUTO: 10.8 % (ref 5–12)
NEUTROPHILS # BLD AUTO: 4 10*3/MM3 (ref 1.7–7)
NEUTROPHILS NFR BLD AUTO: 66.5 % (ref 42.7–76)
NRBC BLD AUTO-RTO: 0 /100 WBC (ref 0–0.2)
PLATELET # BLD AUTO: 209 10*3/MM3 (ref 140–450)
PMV BLD AUTO: 11.7 FL (ref 6–12)
PROTHROMBIN TIME: 19.2 SECONDS (ref 11.5–14)
RBC # BLD AUTO: 2.92 10*6/MM3 (ref 4.14–5.8)
UFH PPP CHRO-ACNC: 0.29 IU/ML (ref 0.3–0.7)
UFH PPP CHRO-ACNC: 0.37 IU/ML (ref 0.3–0.7)
UFH PPP CHRO-ACNC: 0.4 IU/ML (ref 0.3–0.7)
WBC NRBC COR # BLD: 6.02 10*3/MM3 (ref 3.4–10.8)

## 2020-03-03 PROCEDURE — 99231 SBSQ HOSP IP/OBS SF/LOW 25: CPT | Performed by: HOSPITALIST

## 2020-03-03 PROCEDURE — 25010000002 HEPARIN (PORCINE) 25000-0.45 UT/250ML-% SOLUTION

## 2020-03-03 PROCEDURE — 85520 HEPARIN ASSAY: CPT

## 2020-03-03 PROCEDURE — 85025 COMPLETE CBC W/AUTO DIFF WBC: CPT | Performed by: INTERNAL MEDICINE

## 2020-03-03 PROCEDURE — 85610 PROTHROMBIN TIME: CPT | Performed by: INTERNAL MEDICINE

## 2020-03-03 RX ORDER — WARFARIN SODIUM 2.5 MG/1
2.5 TABLET ORAL
Status: COMPLETED | OUTPATIENT
Start: 2020-03-03 | End: 2020-03-03

## 2020-03-03 RX ADMIN — CARVEDILOL 12.5 MG: 12.5 TABLET, FILM COATED ORAL at 21:29

## 2020-03-03 RX ADMIN — WARFARIN SODIUM 2.5 MG: 2.5 TABLET ORAL at 17:18

## 2020-03-03 RX ADMIN — HEPARIN SODIUM 25 UNITS/KG/HR: 10000 INJECTION, SOLUTION INTRAVENOUS at 02:22

## 2020-03-03 RX ADMIN — LISINOPRIL 10 MG: 10 TABLET ORAL at 21:29

## 2020-03-03 RX ADMIN — BUPROPION HYDROCHLORIDE 300 MG: 150 TABLET, FILM COATED, EXTENDED RELEASE ORAL at 09:47

## 2020-03-03 RX ADMIN — LISINOPRIL 10 MG: 10 TABLET ORAL at 09:47

## 2020-03-03 RX ADMIN — CITALOPRAM HYDROBROMIDE 20 MG: 20 TABLET ORAL at 09:47

## 2020-03-03 RX ADMIN — FINASTERIDE 5 MG: 5 TABLET, FILM COATED ORAL at 09:47

## 2020-03-03 RX ADMIN — HEPARIN SODIUM 25 UNITS/KG/HR: 10000 INJECTION, SOLUTION INTRAVENOUS at 17:22

## 2020-03-03 RX ADMIN — CARVEDILOL 12.5 MG: 12.5 TABLET, FILM COATED ORAL at 09:48

## 2020-03-03 NOTE — PROGRESS NOTES
[facility] Medicine Services  PROGRESS NOTE    Patient Name: Dillon Vo  : 1950  MRN: [id number]    Date of Admission: 2020  Primary Care Physician: Ez Cintron MD    Subjective   Subjective     CC:  Follow-up rectus sheath hematoma    HPI:  Patient resting comfortably in bed.  He denies significant abdominal pain.  No new bruising or bleeding.  No active cough or recurrent hemoptysis.    Review of Systems  General: No fevers or chills  Respiratory: No cough or dyspnea  GI: No nausea or vomiting    Objective   Objective     Vital Signs:   Temp:  [98.2 °F (36.8 °C)] 98.2 °F (36.8 °C)  Heart Rate:  [69-72] 69  Resp:  [18] 18  BP: (100)/(50) 100/50        Physical Exam:  Constitutional: No acute distress, awake, alert  HENT: NCAT, mucous membranes moist  Respiratory: Clear to auscultation bilaterally, no wheezing, nonlabored  Cardiovascular: RRR, no murmurs, rubs, or gallops, palpable pedal pulses bilaterally, A2 mechanical click  Gastrointestinal: Positive bowel sounds, soft, nontender, nondistended  Musculoskeletal: No bilateral ankle edema  Psychiatric: Appropriate affect, cooperative  Neurologic: Oriented x 3, strength symmetric in all extremities  Skin: Midline suprapubic incisional scar with some ecchymosis but otherwise no clear ecchymosis to abdominal wall      Results Reviewed:  Results from last 7 days   Lab Units 20  0458 20  1948 20  0540  20  0806   WBC 10*3/mm3 6.02  --  6.44  --  7.22   HEMOGLOBIN g/dL 10.3* 10.2* 10.4*   < > 10.7*   HEMATOCRIT % 31.2* 29.9* 31.4*   < > 32.8*   PLATELETS 10*3/mm3 209  --  198  --  232   INR  1.66*  --  1.47*  --  1.27*    < > = values in this interval not displayed.     Results from last 7 days   Lab Units 20  0806 20  0338 20  0346 20  1437 20  0913   SODIUM mmol/L 133* 134* 133* 137 137   POTASSIUM mmol/L 4.3 3.8 4.1 4.7 4.2   CHLORIDE mmol/L 100 99 100  100 101   CO2 mmol/L 23.0 23.0 22.0 26.0 26.0   BUN mg/dL 10 9 8 11 11   CREATININE mg/dL 0.72* 0.64* 0.58* 0.82 0.87   GLUCOSE mg/dL 102* 101* 91 148* 99   CALCIUM mg/dL 8.8 8.7 8.4* 8.5* 8.8   ALT (SGPT) U/L  --   --   --  23 24   AST (SGOT) U/L  --   --   --  41* 29   PROBNP pg/mL  --   --   --  1,341.0*  --      Estimated Creatinine Clearance: 92.2 mL/min (A) (by C-G formula based on SCr of 0.72 mg/dL (L)).    Microbiology Results Abnormal     None          Imaging Results (Last 24 Hours)     ** No results found for the last 24 hours. **          Results for orders placed during the hospital encounter of 02/19/20   Adult Transthoracic Echo Complete W/ Cont if Necessary Per Protocol    Narrative · There is biatrial and right ventricular enlargement.  · Mild concentric LVH is present.  · There is mild global hypokinesia of the left ventricle. RV systolic   function appears normal.  · An electronic lead is noted in the right ventricle.  · There is a normally functioning mechanical aortic valve.  · There is mitral annular calcification. MV visualization is subotpimal;   however, mild-moderate mitral regurgitation is noted.  · There is moderate tricuspid regurgitation. The estimated RV systolic   pressure is 59 mmHg, consistent with SEVERE pulmonary artery hypertension.  · No other important findings are noted on this study.          I have reviewed the medications:  Scheduled Meds:  buPROPion  mg Oral Daily   carvedilol 12.5 mg Oral BID   citalopram 20 mg Oral Daily   finasteride 5 mg Oral Daily   lisinopril 10 mg Oral BID   warfarin 2.5 mg Oral Once per day on Sun Tue   warfarin 5 mg Oral Once per day on Mon Wed Thu Fri Sat     Continuous Infusions:  heparin 25 Units/kg/hr Last Rate: 25 Units/kg/hr (03/03/20 0222)   Pharmacy to Dose Heparin     Pharmacy to dose warfarin       PRN Meds:.ipratropium-albuterol  •  Lidocaine HCl Urethral/Mucosal 2%  •  magnesium sulfate **OR** magnesium sulfate **OR** magnesium  sulfate  •  ondansetron  •  Pharmacy to Dose Heparin  •  Pharmacy to dose warfarin  •  sodium chloride    Assessment/Plan   Assessment & Plan     Active Hospital Problems    Diagnosis  POA   • **Large rectus sheath hematoma [S30.1XXA]  Yes   • Chronic anticoagulation on warfarin  [Z79.01]  Not Applicable   • Supratherapeutic INR  [R79.1]  Yes   • Suspected pulmonary HTN  [I27.20]  Yes   • Macrocytosis [D75.89]  Yes   • Self-catheterizes urinary bladder [Z78.9]  Yes   • Hematoma of rectus sheath [S30.1XXA]  Yes   • Recent influenza A PNA (treated w/Tamiflu)  [J10.1]  Yes   • CAD  [I25.10]  Yes   • H/O mechanical AVR  [Z95.2]  Not Applicable   • AF s/p RFA and BiV PPM  [I48.91]  Yes   • Dyslipidemia [E78.5]  Yes   • Hypertension [I10]  Yes      Resolved Hospital Problems   No resolved problems to display.        Brief Hospital Course to date:  Dillon Vo is a 69 y.o. male with a past medical history of hypertension, hyperlipidemia, chronic tobacco abuse, atrial fibrillation, CAD status post CABG, VHD with history of mechanical aortic valve who was admitted on 2/26 with supratherapeutic INR and rectus sheath hematoma, requiring Kcentra and reversal of anticoagulation.  Patient was monitored in the intensive care unit and been restarted on anticoagulation with heparin infusion and warfarin.  Plan to monitor until INR is therapeutic with close outpatient follow-up with PCP.  He was recently admitted from 2/19-2/24 with respiratory failure, pneumonia, and influenza A complicated by hemoptysis.    Right Rectus Sheath Hematoma, improved  Supratherapeutic INR, s/p Kcentra and Vitamin K on admission  - monitor on heparin gtt, warfarin restarted. Goal INR 2.5-3.5. Pharmacy following  - The cause of hematoma appears to be spontaneous due to anticoagulation with supratherapeutic INR.  I took care of patient during last hospitalization and we closely discussed his anticoagulation at time of discharge with input of  pharmacist.  He was to take 2.5 mg nightly until repeat INR (half of home dose).  On discussion today, he believes that he may not have taken any Coumadin after discharge.  He denies change in his diet that would have caused the INR to increase.    Pulmonary Hypertension  VHD with hx of Mechanical Aortic Valve  Moderate Tricuspid Regurgitation   - respiratory status stable, euvolemic  - ambulate as able    Chronic Urinary Retention with self cath  - chen out  - continue proscar    PAF with hx of RFA  BiV PPM  - continue carvedilol, lisinopril     Chronic Pulmonary Nodule  Chronic tobacco Abuse, continue bupropion  Macrocytic Anemia, stable since admission  CAD with hx of CABG, last LVEF 46%    DVT Prophylaxis:  Heparin gtt   Disposition: I expect the patient to be discharged home once INR is therapeutic.    CODE STATUS:   Code Status and Medical Interventions:   Ordered at: 02/26/20 1817     Limited Support to NOT Include:    Intubation     Level Of Support Discussed With:    Patient     Code Status:    No CPR     Medical Interventions (Level of Support Prior to Arrest):    Limited         Electronically signed by Paco Barraza MD, 03/03/20, 9:21 AM.

## 2020-03-03 NOTE — PROGRESS NOTES
HEPARIN INFUSION    Dillon Vo is a 69 y.o. male receiving heparin infusion.     Therapy for (VTE/Cardiac): Cardiac (mechanical AVR, bridge therapy for warfarin)  Patient Weight: 73.4 kg  Initial Bolus (Y/N): No  Any Bolus (Y/N): Yes     Signs or Symptoms of Bleeding: admitted with hematoma; monitor closely    Cardiac or Other (Not VTE)   Initial Bolus: 60 units/kg (Max 4,000 units)  Initial rate: 12 units/kg/hr (Max 1,000 units/hr)   Anti-Xa (IU/mL) Bolus Dose Stop Infusion Rate Change Repeat Anti-Xa      ?0.19 60 units/kg 0 hrs Increase rate by 4 units/kg/hr 6 hrs       0.2 - 0.29  30 units/kg 0 hrs Increase rate by 2 units/kg/hr 6 hrs    0.3 - 0.7 0 0 hrs No change 6 hrs      0.71 - 0.99 0  0 hrs Decrease rate by 2 units/kg/hr 6 hrs            ?1 0 Hold 1 hr Decrease rate by 3 units/kg/hr 6 hrs      Results from last 7 days   Lab Units 03/03/20  0458 03/02/20  1948 03/02/20  0540  03/01/20  0806   INR  1.66*  --  1.47*  --  1.27*   HEMOGLOBIN g/dL 10.3* 10.2* 10.4*   < > 10.7*   HEMATOCRIT % 31.2* 29.9* 31.4*   < > 32.8*   PLATELETS 10*3/mm3 209  --  198  --  232    < > = values in this interval not displayed.        Date   Time   Anti-Xa Current Rate (Unit/kg/hr) Bolus   (Units) Rate Change   (Unit/kg/hr) New Rate (Unit/kg/hr) Next   Anti-Xa Comments  Pump Check Daily   2/28 1154 0.10 New start  -- -- 12 1900  S/w [place]   2/28 1937 0.1 12 2000 +4 16 0400 Dale RN   2/29 0338 0.19 16 3500 +4 20 [address] [address] [id number] [facility]   2/29 1125 0.52 20 -- -- 20 [address]   2/29 1807 0.35 20 -- +1 21 0100 DALE [address]   3/1 0103 0.57 21 -- -- 21 0600 [address] [facility]   3/1 0806 0.56 21 -- -- 21 [address]   3/2 0540 0.38 21 -- -- 21 1800 Recheck tonight given trend down; d/w RN; pump verified - wt on order updated   3/2 1754 0.26 21 -- +2 23 0100 DW  RN   3/3 0028 0.29 23 -- +2 25 0800 Discussed w/ nurse   3/3 0817 0.37 25 -- -- 25 1400 D/w RN   3/3 1349 0.4 25 -- -- 25 0600 D/w RN; pump verified                                                                                                         Jean Johnson, PharmD, BCPS  3/3/2020  3:08 PM

## 2020-03-03 NOTE — PROGRESS NOTES
"Pharmacy Consult - Warfarin    Dillon Vo is a 69 y.o. male on warfarin therapy.    Height - 182.9 cm (72\")  Weight - 74.8 kg (164 lb 12.8 oz)    Consulting Provider:  Case  Indication: Mechanical AVR, atrial fibrillation  Goal INR: 2.5-3.5  Home Regimen:   -Warfarin 5mg daily except 2.5mg on Sundays and Tuesdays     Bridge Therapy: Yes - heparin drip    Drug-Drug Interactions with current regimen:  No major DDIs    Warfarin Dosing During Admission:  Date  2/26 2/27 2/28 2/29 3/1 3/2 3/3     INR  5 1.25 1.27 1.27 1.27 1.47 1.66     Dose  Kcentra + Vit K 10mg IV HOLD Hep gtt started 5mg 5mg 5mg (5mg)       Education Provided: Patient is on warfarin prior to admission with appropriate follow-up. Written education provided on 2/20/20 during recent last admission.    Discharge Follow up:    Following Provider - Shruti Sigala (Dr. Cintron's office) - [facility] at [facility]   Follow up time range or appointment - 2-3 days after discharge    Labs:  Results from last 7 days   Lab Units 03/03/20  0458 03/02/20  1948 03/02/20  0540 03/01/20  1932 03/01/20  0806 02/29/20  1807 02/29/20  1125 02/29/20  0849 02/29/20  0338  02/28/20  1155  02/27/20  0346 02/27/20  0000  02/26/20  1437  02/26/20  0913   INR  1.66*  --  1.47*  --  1.27*  --  1.27*  --   --   --  1.27*  --  1.25* 1.27*  --  5.00*   < >  --    APTT seconds  --   --   --   --   --   --   --   --   --   --  33.7*  --  34.7  --   --   --   --   --    HEMOGLOBIN g/dL 10.3* 10.2* 10.4* 9.8* 10.7* 10.8*  --  10.8* 10.2*   < >  --    < > 10.6* 10.2*   < > 11.1*  --  11.4*   HEMOGLOBIN, POC   --   --   --   --   --   --   --   --   --   --   --   --   --   --   --   --    < >  --    HEMATOCRIT % 31.2* 29.9* 31.4* 29.7* 32.8* 32.1*  --  32.0* 30.4*   < >  --    < > 30.9* 30.4*   < > 34.0*  --  32.4*   PLATELETS 10*3/mm3 209  --  198  --  232  --   --   --  188  --   --   --  178  --   --  190  --  218    < > = values in this interval not " displayed.     Results from last 7 days   Lab Units 03/01/20  0806 02/29/20  0338 02/27/20  0346 02/26/20  1437 02/26/20  0913   SODIUM mmol/L 133* 134* 133* 137 137   POTASSIUM mmol/L 4.3 3.8 4.1 4.7 4.2   CHLORIDE mmol/L 100 99 100 100 101   CO2 mmol/L 23.0 23.0 22.0 26.0 26.0   BUN mg/dL 10 9 8 11 11   CREATININE mg/dL 0.72* 0.64* 0.58* 0.82 0.87   CALCIUM mg/dL 8.8 8.7 8.4* 8.5* 8.8   BILIRUBIN mg/dL  --   --   --  1.4* 1.2   ALK PHOS U/L  --   --   --  56 55   ALT (SGPT) U/L  --   --   --  23 24   AST (SGOT) U/L  --   --   --  41* 29   GLUCOSE mg/dL 102* 101* 91 148* 99     Current dietary intake: ~75% of documented meals  Diet Order   Procedures    Diet Regular; Cardiac, Consistent Carbohydrate     Assessment/Plan:   1. Pharmacy dosing warfarin for mechanical AVR + atrial fibrillation, goal INR 2.5-3.5. Warfarin restarted 2/29 following hematoma w/ supratherapeutic INR requiring reversal.  2. Patient's INR is subtherapeutic today at 1.66 but continues to increase. Will give warfarin 5mg again tonight.  3. Will follow daily PT/INR.  4. Pharmacy will continue to follow closely and adjust warfarin as needed based on drug interactions, daily INR trend, and clinical status.    Thank you,  Jean Johnosn, PharmD, BCPS  3/3/2020  1:15 PM

## 2020-03-03 NOTE — PLAN OF CARE
Problem: Patient Care Overview  Goal: Plan of Care Review  Outcome: Ongoing (interventions implemented as appropriate)  Flowsheets  Taken 3/3/2020 7046  Progress: improving  Outcome Summary: Patient self catheritized himself around 2100. urine dark sohan in color. Oral hydration encouragd. Patient slept throughout night and did not complain of pain or n/v. Will attempt to get patient to self catherize in the AM. Will continue to monitor.  Taken 3/2/2020 2000  Plan of Care Reviewed With: patient

## 2020-03-04 ENCOUNTER — APPOINTMENT (OUTPATIENT)
Dept: CT IMAGING | Facility: HOSPITAL | Age: 70
End: 2020-03-04

## 2020-03-04 LAB
HCT VFR BLD AUTO: 32 % (ref 37.5–51)
HGB BLD-MCNC: 10.5 G/DL (ref 13–17.7)
INR PPP: 1.8 (ref 0.85–1.16)
PROTHROMBIN TIME: 20.6 SECONDS (ref 11.5–14)
UFH PPP CHRO-ACNC: 0.41 IU/ML (ref 0.3–0.7)

## 2020-03-04 PROCEDURE — 99232 SBSQ HOSP IP/OBS MODERATE 35: CPT | Performed by: HOSPITALIST

## 2020-03-04 PROCEDURE — 85018 HEMOGLOBIN: CPT | Performed by: HOSPITALIST

## 2020-03-04 PROCEDURE — 85014 HEMATOCRIT: CPT | Performed by: HOSPITALIST

## 2020-03-04 PROCEDURE — 25010000002 HEPARIN (PORCINE) 25000-0.45 UT/250ML-% SOLUTION

## 2020-03-04 PROCEDURE — 85610 PROTHROMBIN TIME: CPT | Performed by: INTERNAL MEDICINE

## 2020-03-04 PROCEDURE — 70450 CT HEAD/BRAIN W/O DYE: CPT

## 2020-03-04 PROCEDURE — 85520 HEPARIN ASSAY: CPT

## 2020-03-04 RX ORDER — WARFARIN SODIUM 5 MG/1
5 TABLET ORAL
Status: DISCONTINUED | OUTPATIENT
Start: 2020-03-05 | End: 2020-03-06

## 2020-03-04 RX ORDER — WARFARIN SODIUM 7.5 MG/1
7.5 TABLET ORAL
Status: COMPLETED | OUTPATIENT
Start: 2020-03-04 | End: 2020-03-04

## 2020-03-04 RX ADMIN — WARFARIN SODIUM 7.5 MG: 7.5 TABLET ORAL at 17:12

## 2020-03-04 RX ADMIN — CARVEDILOL 12.5 MG: 12.5 TABLET, FILM COATED ORAL at 21:32

## 2020-03-04 RX ADMIN — FINASTERIDE 5 MG: 5 TABLET, FILM COATED ORAL at 09:16

## 2020-03-04 RX ADMIN — HEPARIN SODIUM 25 UNITS/KG/HR: 10000 INJECTION, SOLUTION INTRAVENOUS at 23:11

## 2020-03-04 RX ADMIN — LISINOPRIL 10 MG: 10 TABLET ORAL at 09:16

## 2020-03-04 RX ADMIN — BUPROPION HYDROCHLORIDE 300 MG: 150 TABLET, FILM COATED, EXTENDED RELEASE ORAL at 09:16

## 2020-03-04 RX ADMIN — CITALOPRAM HYDROBROMIDE 20 MG: 20 TABLET ORAL at 09:17

## 2020-03-04 RX ADMIN — LISINOPRIL 10 MG: 10 TABLET ORAL at 21:32

## 2020-03-04 RX ADMIN — HEPARIN SODIUM 25 UNITS/KG/HR: 10000 INJECTION, SOLUTION INTRAVENOUS at 09:19

## 2020-03-04 RX ADMIN — CARVEDILOL 12.5 MG: 12.5 TABLET, FILM COATED ORAL at 09:17

## 2020-03-04 NOTE — PLAN OF CARE
Problem: Patient Care Overview  Goal: Plan of Care Review  Outcome: Ongoing (interventions implemented as appropriate)  Flowsheets  Taken 3/4/2020 0306  Progress: improving  Outcome Summary: Patient rested througout shift. Patient refused to self cath before bed and stated he would self cath himself in the AM. VSS. Will continue to monitor.  Taken 3/3/2020 2000  Plan of Care Reviewed With: patient

## 2020-03-04 NOTE — PROGRESS NOTES
"Pharmacy Consult - Warfarin    Dillon Vo is a 69 y.o. male on warfarin therapy.    Height - 182.9 cm (72\")  Weight - 74.8 kg (164 lb 12.8 oz)    Consulting Provider:  Case  Indication: Mechanical AVR, atrial fibrillation  Goal INR: 2.5-3.5  Home Regimen:   -Warfarin 5mg daily except 2.5mg on Sundays and Tuesdays     Bridge Therapy: Yes - heparin drip    Drug-Drug Interactions with current regimen:  No major DDIs    Warfarin Dosing During Admission:  Date  2/26 2/27 2/28 2/29 3/1 3/2 3/3 3/4    INR  5 1.25 1.27 1.27 1.27 1.47 1.66 1.80    Dose  Kcentra + Vit K 10mg IV HOLD Hep gtt started 5mg 5mg 5mg 5mg (7.5mg)      Education Provided: Patient is on warfarin prior to admission with appropriate follow-up. Written education provided on 2/20/20 during recent last admission.    Discharge Follow up:    Following Provider - Shruti Sigala (Dr. Cintron's office) - [facility] at [facility]   Follow up time range or appointment - 2-3 days after discharge    Labs:  Results from last 7 days   Lab Units 03/04/20  0501 03/04/20  0500 03/03/20  0458 03/02/20  1948 03/02/20  0540 03/01/20  1932 03/01/20  0806 02/29/20  1807 02/29/20  1125  02/29/20  0338  02/28/20  1155  02/27/20  0346  02/26/20  1437   INR   --  1.80* 1.66*  --  1.47*  --  1.27*  --  1.27*  --   --   --  1.27*  --  1.25*   < > 5.00*   APTT seconds  --   --   --   --   --   --   --   --   --   --   --   --  33.7*  --  34.7  --   --    HEMOGLOBIN g/dL 10.5*  --  10.3* 10.2* 10.4* 9.8* 10.7* 10.8*  --    < > 10.2*   < >  --    < > 10.6*   < > 11.1*   HEMATOCRIT % 32.0*  --  31.2* 29.9* 31.4* 29.7* 32.8* 32.1*  --    < > 30.4*   < >  --    < > 30.9*   < > 34.0*   PLATELETS 10*3/mm3  --   --  209  --  198  --  232  --   --   --  188  --   --   --  178  --  190    < > = values in this interval not displayed.     Results from last 7 days   Lab Units 03/01/20  0806 02/29/20  0338 02/27/20  0346 02/26/20  1437   SODIUM mmol/L 133* 134* 133* " 137   POTASSIUM mmol/L 4.3 3.8 4.1 4.7   CHLORIDE mmol/L 100 99 100 100   CO2 mmol/L 23.0 23.0 22.0 26.0   BUN mg/dL 10 9 8 11   CREATININE mg/dL 0.72* 0.64* 0.58* 0.82   CALCIUM mg/dL 8.8 8.7 8.4* 8.5*   BILIRUBIN mg/dL  --   --   --  1.4*   ALK PHOS U/L  --   --   --  56   ALT (SGPT) U/L  --   --   --  23   AST (SGOT) U/L  --   --   --  41*   GLUCOSE mg/dL 102* 101* 91 148*     Current dietary intake: ~25-50% of documented meals  Diet Order   Procedures    Diet Regular; Cardiac, Consistent Carbohydrate     Assessment/Plan:   1. Pharmacy dosing warfarin for mechanical AVR + atrial fibrillation, goal INR 2.5-3.5. Warfarin restarted 2/29 following hematoma w/ supratherapeutic INR requiring reversal.  2. Patient's INR is subtherapeutic today at 1.8 but continues to increase. Given slow increase toward therapeutic range, will give boosted dose of warfarin 7.5mg tonight.  3. Will follow daily PT/INR.  4. Pharmacy will continue to follow closely and adjust warfarin as needed based on drug interactions, daily INR trend, and clinical status.    Thank you,  Jean Johnson, PharmD, BCPS  3/4/2020  12:23 PM

## 2020-03-04 NOTE — PROGRESS NOTES
Continued Stay Note   [place]     Patient Name: Dillon Vo  MRN: [id number]  Today's Date: 3/4/2020    Admit Date: 2/26/2020    Discharge Plan     Row Name 03/04/20 1434       Plan    Plan  Home with family    Plan Comments  Spoke with patient at bedside to discuss discharge plan. He explains he plans to go home with his spouse who will transport. Accord to physician note patient to be discharged home once INR is therapeutic.  Patient denied needing home health and denied any concerns at this time.        Discharge Codes    No documentation.       Expected Discharge Date and Time     Expected Discharge Date Expected Discharge Time    Mar 3, 2020             ADRIAN Neves (Kay)

## 2020-03-04 NOTE — PROGRESS NOTES
HEPARIN INFUSION    Dillon Vo is a 69 y.o. male receiving heparin infusion.     Therapy for (VTE/Cardiac): Cardiac (mechanical AVR, bridge therapy for warfarin)  Patient Weight: 73.4 kg  Initial Bolus (Y/N): No  Any Bolus (Y/N): Yes     Signs or Symptoms of Bleeding: admitted with hematoma; monitor closely    Cardiac or Other (Not VTE)   Initial Bolus: 60 units/kg (Max 4,000 units)  Initial rate: 12 units/kg/hr (Max 1,000 units/hr)   Anti-Xa (IU/mL) Bolus Dose Stop Infusion Rate Change Repeat Anti-Xa      ?0.19 60 units/kg 0 hrs Increase rate by 4 units/kg/hr 6 hrs       0.2 - 0.29  30 units/kg 0 hrs Increase rate by 2 units/kg/hr 6 hrs    0.3 - 0.7 0 0 hrs No change 6 hrs      0.71 - 0.99 0  0 hrs Decrease rate by 2 units/kg/hr 6 hrs            ?1 0 Hold 1 hr Decrease rate by 3 units/kg/hr 6 hrs      Results from last 7 days   Lab Units 03/04/20  0501 03/04/20  0500 03/03/20  0458 03/02/20  1948 03/02/20  0540  03/01/20  0806   INR   --  1.80* 1.66*  --  1.47*  --  1.27*   HEMOGLOBIN g/dL 10.5*  --  10.3* 10.2* 10.4*   < > 10.7*   HEMATOCRIT % 32.0*  --  31.2* 29.9* 31.4*   < > 32.8*   PLATELETS 10*3/mm3  --   --  209  --  198  --  232    < > = values in this interval not displayed.        Date   Time   Anti-Xa Current Rate (Unit/kg/hr) Bolus   (Units) Rate Change   (Unit/kg/hr) New Rate (Unit/kg/hr) Next   Anti-Xa Comments  Pump Check Daily   2/28 1154 0.10 New start  -- -- 12 1900  S/w Argelia   2/28 1937 0.1 12 2000 +4 16 0400 Dw RN   2/29 0338 0.19 16 3500 +4 20 1200 Reny 2461 -acb   2/29 1125 0.52 20 -- -- 20 1800 Sw Carol   2/29 1807 0.35 20 -- +1 21 0100 DW RN Reny   3/1 0103 0.57 21 -- -- 21 0600 Reny 8289 -acb   3/1 0806 0.56 21 -- -- 21 0600 Sw Carol   3/2 0540 0.38 21 -- -- 21 1800 Recheck tonight given trend down; d/w RN; pump verified - wt on order updated   3/2 1754 0.26 21 -- +2 23 0100 DW  RN   3/3 0028 0.29 23 -- +2 25 0800 Discussed w/ nurse   3/3 0817 0.37 25 -- -- 25 1400 D/w RN   3/3 1349  0.4 25 -- -- 25 0600 D/w RN; pump verified   3/4 0500 0.41 25 -- -- 25 0600 D/w RN; pump verified                                                                                             Jean Johnson PharmD, BCPS  3/4/2020  1:02 PM

## 2020-03-04 NOTE — PROGRESS NOTES
Clinical Nutrition   Reason For Visit: LOS    Patient Name: Dillon Vo  YOB: 1950  MRN: [id number]  Date of Encounter: 03/04/20 10:18 AM  Admission date: 2/26/2020      Nutrition Assessment     Admission Problem List:  Large rectus sheath hematoma with decreased H&H  Supratherapeutic INR  Pulmonary HTN      PMH: He  has a past medical history of A-fib (CMS/HCC), Anemia, Atrial fibrillation (CMS/HCC) (5/5/2016), Colitis, Colon cancer (CMS/HCC), Depression, HTN (hypertension), Hyperbilirubinemia, Hyperlipidemia, and Orthostatic hypotension.   PSxH: He  has a past surgical history that includes Cardiac surgery; Pacemaker Implantation; Colon surgery; Varicose vein surgery; extracorporeal shockwave lithotripsy (eswl), stent insertion/removal; and Appendectomy.        Reported/Observed/Food/Nutrition Related History       Anthropometrics   Height: 72 in  Weight: 164 lbs (bed scale weight 3/3 per ns doc)  BMI: 22.4  BMI classification: Normal: 18.5-24.9kg/m2   IBW: 178 lbs    Labs reviewed   Labs reviewed: Yes    Medications reviewed   Medications reviewed: Yes  Pertinent: coumadin    Current Nutrition Prescription   PO: Diet Regular; Cardiac, Consistent Carbohydrate    Evaluation of Received Nutrient/Fluid Intake: 77% / 12 meals      Nutrition Diagnosis     Problem No nutrition diagnosis at this time   Etiology    Signs/Symptoms      Intervention   Intervention: Follow treatment progress, Care plan reviewed    Goal:   General: Nutrition support treatment  PO: Maintain intake    Monitoring/Evaluation:       Monitoring/Evaluation: Per protocol, PO intake, Weight  Will Continue to follow per protocol  Kanika Menjivar RD  Time Spent: 15 min

## 2020-03-04 NOTE — PROGRESS NOTES
[facility]  PROGRESS NOTE    Patient Name: Dillon Vo  : 1950  MRN: [id number]    Date of Admission: 2020  Primary Care Physician: Ez Cintron MD    Subjective   Subjective     CC:  Follow-up rectus sheath hematoma    HPI:  Pt and family have noted some intermittent confusion and memory loss over the past few days. During my visit today, he is awake, alert, and fully oriented, able to provide details of current events and recall our discussions yesterday. No fevers or chills. No cough or dyspnea. Abdominal pain is minimal. No weakness to arms or legs.       Review of Systems  Gen- No fevers, chills  Resp- No cough, dyspnea      Objective   Objective     Vital Signs:   Temp:  [97.8 °F (36.6 °C)-98.2 °F (36.8 °C)] 97.8 °F (36.6 °C)  Heart Rate:  [71] 71  Resp:  [18] 18  BP: (109-122)/(62-67) 122/67    Physical Exam:  Constitutional: No acute distress, awake, alert  HENT: NCAT, mucous membranes moist  Respiratory: Clear to auscultation bilaterally, no wheezing, nonlabored  Cardiovascular: RRR, no murmurs, rubs, or gallops, palpable pedal pulses bilaterally, A2 mechanical click  Gastrointestinal: Positive bowel sounds, soft, nontender, nondistended  Musculoskeletal: No bilateral ankle edema  Psychiatric: Appropriate affect, cooperative  Neurologic: Oriented x 3 (able to to tell me name, , date, current events), strength symmetric in all extremities  Skin: Midline suprapubic incisional scar with some ecchymosis but otherwise no clear ecchymosis to abdominal wall (stable)      Results Reviewed:  Results from last 7 days   Lab Units 20  0501 20  0500 20  0458 20  1948 20  0540  20  0806   WBC 10*3/mm3  --   --  6.02  --  6.44  --  7.22   HEMOGLOBIN g/dL 10.5*  --  10.3* 10.2* 10.4*   < > 10.7*   HEMATOCRIT % 32.0*  --  31.2* 29.9* 31.4*   < > 32.8*   PLATELETS 10*3/mm3  --   --  209  --  198  --  232   INR    --  1.80* 1.66*  --  1.47*  --  1.27*    < > = values in this interval not displayed.     Results from last 7 days   Lab Units 03/01/20  0806 02/29/20  0338 02/27/20  0346   SODIUM mmol/L 133* 134* 133*   POTASSIUM mmol/L 4.3 3.8 4.1   CHLORIDE mmol/L 100 99 100   CO2 mmol/L 23.0 23.0 22.0   BUN mg/dL 10 9 8   CREATININE mg/dL 0.72* 0.64* 0.58*   GLUCOSE mg/dL 102* 101* 91   CALCIUM mg/dL 8.8 8.7 8.4*     Estimated Creatinine Clearance: 92.2 mL/min (A) (by C-G formula based on SCr of 0.72 mg/dL (L)).    Microbiology Results Abnormal     None          Imaging Results (Last 24 Hours)     ** No results found for the last 24 hours. **          Results for orders placed during the hospital encounter of 02/19/20   Adult Transthoracic Echo Complete W/ Cont if Necessary Per Protocol    Narrative · There is biatrial and right ventricular enlargement.  · Mild concentric LVH is present.  · There is mild global hypokinesia of the left ventricle. RV systolic   function appears normal.  · An electronic lead is noted in the right ventricle.  · There is a normally functioning mechanical aortic valve.  · There is mitral annular calcification. MV visualization is subotpimal;   however, mild-moderate mitral regurgitation is noted.  · There is moderate tricuspid regurgitation. The estimated RV systolic   pressure is 59 mmHg, consistent with SEVERE pulmonary artery hypertension.  · No other important findings are noted on this study.          I have reviewed the medications:  Scheduled Meds:    buPROPion  mg Oral Daily   carvedilol 12.5 mg Oral BID   citalopram 20 mg Oral Daily   finasteride 5 mg Oral Daily   lisinopril 10 mg Oral BID   [START ON 3/5/2020] warfarin 5 mg Oral Daily     Continuous Infusions:    heparin 25 Units/kg/hr Last Rate: 25 Units/kg/hr (03/04/20 0919)   Pharmacy to Dose Heparin     Pharmacy to dose warfarin       PRN Meds:.ipratropium-albuterol  •  Lidocaine HCl Urethral/Mucosal 2%  •  magnesium sulfate  **OR** magnesium sulfate **OR** magnesium sulfate  •  ondansetron  •  Pharmacy to Dose Heparin  •  Pharmacy to dose warfarin  •  sodium chloride    Assessment/Plan   Assessment & Plan     Active Hospital Problems    Diagnosis  POA   • **Large rectus sheath hematoma [S30.1XXA]  Yes   • Chronic anticoagulation on warfarin  [Z79.01]  Not Applicable   • Supratherapeutic INR  [R79.1]  Yes   • Suspected pulmonary HTN  [I27.20]  Yes   • Macrocytosis [D75.89]  Yes   • Self-catheterizes urinary bladder [Z78.9]  Yes   • Hematoma of rectus sheath [S30.1XXA]  Yes   • Recent influenza A PNA (treated w/Tamiflu)  [J10.1]  Yes   • CAD  [I25.10]  Yes   • H/O mechanical AVR  [Z95.2]  Not Applicable   • AF s/p RFA and BiV PPM  [I48.91]  Yes   • Dyslipidemia [E78.5]  Yes   • Hypertension [I10]  Yes      Resolved Hospital Problems   No resolved problems to display.        Brief Hospital Course to date:  Dillon Vo is a 69 y.o. male with a past medical history of hypertension, hyperlipidemia, chronic tobacco abuse, atrial fibrillation, CAD status post CABG, VHD with history of mechanical aortic valve who was admitted on 2/26 with supratherapeutic INR and rectus sheath hematoma, requiring Kcentra and reversal of anticoagulation.  Patient was monitored in the intensive care unit and been restarted on anticoagulation with heparin infusion and warfarin.  Plan to monitor until INR is therapeutic with close outpatient follow-up with PCP.  He was recently admitted from 2/19-2/24 with respiratory failure, pneumonia, and influenza A complicated by hemoptysis.    Hospitalization has been complicated by some intermittent confusion.     Right Rectus Sheath Hematoma, improved  Supratherapeutic INR, s/p Kcentra and Vitamin K on admission  - monitor on heparin gtt, pharmacy dosing warfarin. Goal INR 2.5-3.5. Pharmacy following. D/w them today   - The cause of hematoma appears to be spontaneous due to anticoagulation with supratherapeutic  INR and coughing. He may not have even taken coumadin after dc    Intermittent Confusion  - no findings on my exam, will monitor. D/w brother yesterday and wife today. Suspect this is due to recurrent hospitalizations and sleep deprivation however will monitor closely for clinical changes  - UPDATE: Notified by RN that patient has some peripheral visual field deficit in right peripheral vision. Will check CT Head     Pulmonary Hypertension  VHD with hx of Mechanical Aortic Valve  Moderate Tricuspid Regurgitation   -Remains on room air, continue carvedilol and lisinopril  -Anticoagulation as above    Chronic Urinary Retention with self cath  - chen out, continue intermittent self-catheterization  - continue proscar    PAF with hx of RFA  BiV PPM  - continue rate control with carvedilol, full anticoagulation as noted above    Chronic Pulmonary Nodule  Chronic tobacco Abuse, continue bupropion, patient plans to stop smoking at discharge  Macrocytic Anemia, remained stable  CAD with hx of CABG, last LVEF 46%    DVT Prophylaxis:  Heparin gtt   Disposition: I expect the patient to be discharged home once INR is therapeutic.  Hopefully in the next 1-2 days.    CODE STATUS:   Code Status and Medical Interventions:   Ordered at: 02/26/20 1817     Limited Support to NOT Include:    Intubation     Level Of Support Discussed With:    Patient     Code Status:    No CPR     Medical Interventions (Level of Support Prior to Arrest):    Limited     I have prepared this progress note with copied portions of the prior day's progress note of my own authorship to preserve accuracy and maintain consistency of documentation. I have reviewed these portions and edited them for correctness. I verify that the above documentation accurately and truly represents the evaluation and management performed on today's date.       Electronically signed by Paco Barraza MD, 03/04/20, 5:31 PM.

## 2020-03-05 ENCOUNTER — APPOINTMENT (OUTPATIENT)
Dept: CT IMAGING | Facility: HOSPITAL | Age: 70
End: 2020-03-05

## 2020-03-05 PROBLEM — I63.9 ISCHEMIC STROKE (HCC): Status: ACTIVE | Noted: 2020-03-05

## 2020-03-05 LAB
CHOLEST SERPL-MCNC: 132 MG/DL (ref 0–200)
HCT VFR BLD AUTO: 32.5 % (ref 37.5–51)
HDLC SERPL-MCNC: 46 MG/DL (ref 40–60)
HGB BLD-MCNC: 10.6 G/DL (ref 13–17.7)
INR PPP: 2.05 (ref 0.85–1.16)
LDLC SERPL CALC-MCNC: 75 MG/DL (ref 0–100)
LDLC/HDLC SERPL: 1.63 {RATIO}
PROTHROMBIN TIME: 22.8 SECONDS (ref 11.5–14)
TRIGL SERPL-MCNC: 55 MG/DL (ref 0–150)
UFH PPP CHRO-ACNC: 0.71 IU/ML (ref 0.3–0.7)
UFH PPP CHRO-ACNC: 0.72 IU/ML (ref 0.3–0.7)
UFH PPP CHRO-ACNC: 0.76 IU/ML (ref 0.3–0.7)
VLDLC SERPL-MCNC: 11 MG/DL

## 2020-03-05 PROCEDURE — 70498 CT ANGIOGRAPHY NECK: CPT

## 2020-03-05 PROCEDURE — 85018 HEMOGLOBIN: CPT | Performed by: HOSPITALIST

## 2020-03-05 PROCEDURE — 80061 LIPID PANEL: CPT | Performed by: HOSPITALIST

## 2020-03-05 PROCEDURE — 0 IOPAMIDOL PER 1 ML: Performed by: HOSPITALIST

## 2020-03-05 PROCEDURE — 85014 HEMATOCRIT: CPT | Performed by: HOSPITALIST

## 2020-03-05 PROCEDURE — 25010000002 HEPARIN (PORCINE) 25000-0.45 UT/250ML-% SOLUTION

## 2020-03-05 PROCEDURE — 85520 HEPARIN ASSAY: CPT

## 2020-03-05 PROCEDURE — 70496 CT ANGIOGRAPHY HEAD: CPT

## 2020-03-05 PROCEDURE — 85610 PROTHROMBIN TIME: CPT | Performed by: INTERNAL MEDICINE

## 2020-03-05 PROCEDURE — 99223 1ST HOSP IP/OBS HIGH 75: CPT | Performed by: PSYCHIATRY & NEUROLOGY

## 2020-03-05 PROCEDURE — 99233 SBSQ HOSP IP/OBS HIGH 50: CPT | Performed by: HOSPITALIST

## 2020-03-05 RX ORDER — CARVEDILOL 25 MG/1
TABLET ORAL
Qty: 90 TABLET | Refills: 0 | OUTPATIENT
Start: 2020-03-05

## 2020-03-05 RX ORDER — ATORVASTATIN CALCIUM 40 MG/1
80 TABLET, FILM COATED ORAL NIGHTLY
Status: DISCONTINUED | OUTPATIENT
Start: 2020-03-05 | End: 2020-03-07 | Stop reason: HOSPADM

## 2020-03-05 RX ORDER — ATORVASTATIN CALCIUM 40 MG/1
40 TABLET, FILM COATED ORAL NIGHTLY
Status: DISCONTINUED | OUTPATIENT
Start: 2020-03-05 | End: 2020-03-05

## 2020-03-05 RX ORDER — ASPIRIN 81 MG/1
81 TABLET ORAL DAILY
Status: DISCONTINUED | OUTPATIENT
Start: 2020-03-06 | End: 2020-03-07 | Stop reason: HOSPADM

## 2020-03-05 RX ADMIN — HEPARIN SODIUM 24 UNITS/KG/HR: 10000 INJECTION, SOLUTION INTRAVENOUS at 12:28

## 2020-03-05 RX ADMIN — FINASTERIDE 5 MG: 5 TABLET, FILM COATED ORAL at 09:30

## 2020-03-05 RX ADMIN — CARVEDILOL 12.5 MG: 12.5 TABLET, FILM COATED ORAL at 09:30

## 2020-03-05 RX ADMIN — LISINOPRIL 10 MG: 10 TABLET ORAL at 09:30

## 2020-03-05 RX ADMIN — CARVEDILOL 12.5 MG: 12.5 TABLET, FILM COATED ORAL at 20:18

## 2020-03-05 RX ADMIN — ATORVASTATIN CALCIUM 80 MG: 40 TABLET, FILM COATED ORAL at 20:18

## 2020-03-05 RX ADMIN — LISINOPRIL 10 MG: 10 TABLET ORAL at 20:18

## 2020-03-05 RX ADMIN — WARFARIN SODIUM 5 MG: 5 TABLET ORAL at 17:22

## 2020-03-05 RX ADMIN — IOPAMIDOL 75 ML: 755 INJECTION, SOLUTION INTRAVENOUS at 06:15

## 2020-03-05 RX ADMIN — BUPROPION HYDROCHLORIDE 300 MG: 150 TABLET, FILM COATED, EXTENDED RELEASE ORAL at 09:30

## 2020-03-05 RX ADMIN — CITALOPRAM HYDROBROMIDE 20 MG: 20 TABLET ORAL at 09:30

## 2020-03-05 NOTE — PLAN OF CARE
Problem: Patient Care Overview  Goal: Plan of Care Review  Outcome: Ongoing (interventions implemented as appropriate)  Flowsheets  Taken 3/5/2020 0432  Progress: improving  Outcome Summary: Patient slept throughout shift. Patient stated he would self cath himself during morning med pass. VSS. Will continue to monitor.  Taken 3/4/2020 2000  Plan of Care Reviewed With: patient

## 2020-03-05 NOTE — CONSULTS
Neurology    Referring provider:   Johanna Slaughter,   [address]  [place] [address]    Reason for Consultation: Stroke    Chief complaint: Vision trouble    History of present illness: 69-year-old man seen for Dr. white for evaluation of stroke.    The has been hospitalized since  with rectus sheath hematoma secondary to hyper anticoagulation with warfarin.  He has mechanical heart valve and atrial fibrillation.    He is been confused during the hospital and realizes that that he has a new difficulty seeing off to the right-hand side.    He denies headaches or other symptoms.    He is also aware that somewhere in the last month or 2 he was having more difficulty with his coordination and a bit of imbalance in his gait.    Both of these events occurred when he was off of his warfarin for various medical procedures.    Otherwise he smokes a pack of cigarettes a day.    He has atrial fibrillation with biventricular permanent pacemaker.  He is hypertension coronary artery disease dyslipidemia.  His mechanical aortic valve.  He is a recent bout of influenza and suspected pulmonary hypertension.            Review of Systems: All other systems reviewed and are negative    Home meds:   Medications Prior to Admission   Medication Sig Dispense Refill Last Dose   • albuterol sulfate  (90 Base) MCG/ACT inhaler Inhale 2 puffs Every 4 (Four) Hours As Needed for Wheezing. 1 inhaler 0 Taking   • aspirin 81 MG EC tablet Take 1 tablet by mouth daily.   Taking   • buPROPion XL (WELLBUTRIN XL) 300 MG 24 hr tablet Take 1 tablet by mouth Daily. 30 tablet 4 Taking   • carvedilol (COREG) 25 MG tablet TAKE 1/2 (ONE-HALF) TABLET BY MOUTH TWICE DAILY 90 tablet 3 Taking   • [] cefdinir (OMNICEF) 300 MG capsule Take 1 capsule by mouth 2 (Two) Times a Day for 2 days. 4 capsule 0 Taking   • citalopram (CeleXA) 20 MG tablet TAKE 1 TABLET BY MOUTH ONCE DAILY 30 tablet 5 Taking   • doxycycline  (DORYX) 100 MG enteric coated tablet Take 1 tablet by mouth 2 (Two) Times a Day. 4 tablet 0 Taking   • famotidine (PEPCID) 20 MG tablet Take 1 tablet by mouth Daily. 30 tablet 1 Taking   • finasteride (PROSCAR) 5 MG tablet TAKE 1 TABLET BY MOUTH AT NIGHT 90 tablet 0 Taking   • Fluticasone-Umeclidin-Vilant (TRELEGY ELLIPTA) 100-62.5-25 MCG/INH aerosol powder  Inhale 1 puff Daily. 1 each 11 Taking   • folic acid (FOLVITE) 400 MCG tablet TAKE 1 TABLET BY MOUTH ONCE DAILY 90 tablet 3 Taking   • lisinopril (PRINIVIL,ZESTRIL) 10 MG tablet TAKE 1 TABLET BY MOUTH TWICE DAILY 180 tablet 1 Taking   • [] oseltamivir (TAMIFLU) 75 MG capsule Take 1 capsule by mouth Every Night for 1 dose. Last dose of 5 day course is tonight,  1 capsule 0    • Probiotic Product (PROBIOTIC DAILY PO) Take 1 capsule by mouth daily.   Taking   • vitamin B-12 (CYANOCOBALAMIN) 1000 MCG tablet Take 1 tablet by mouth Daily. 90 tablet 3 Taking   • warfarin (COUMADIN) 2.5 MG tablet Take 2.5mg nightly until instructed to return to previous dose by PCP  Indications: Presence of Mechanical Artificial Heart Valve 30 tablet 0 Taking       History  Past Medical History:   Diagnosis Date   • A-fib (CMS/HCC)    • Anemia    • Atrial fibrillation (CMS/HCC) 2016    Difficult to control rate. RFA of AV node with implantation of left Bi-V pacemaker, 2013. Hematoma requiring single-chamber pacemaker implanted, 2013.    • Colitis    • Colon cancer (CMS/HCC)    • Depression    • HTN (hypertension)    • Hyperbilirubinemia    • Hyperlipidemia    • Orthostatic hypotension    ,   Past Surgical History:   Procedure Laterality Date   • APPENDECTOMY     • CARDIAC SURGERY     • COLON SURGERY     • EXTRACORPOREAL SHOCKWAVE LITHOTRIPSY (ESWL), STENT INSERTION/REMOVAL     • PACEMAKER IMPLANTATION     • VARICOSE VEIN SURGERY     ,   Family History   Problem Relation Age of Onset   • Cancer Mother    • Hypertension Father    • Heart disease Father    ,    "  Social History     Tobacco Use   • Smoking status: Current Every Day Smoker     Packs/day: 1.50     Years: 57.00     Pack years: 85.50     Types: Cigarettes   • Smokeless tobacco: Never Used   Substance Use Topics   • Alcohol use: Yes     Alcohol/week: 2.0 standard drinks     Types: 2 Shots of liquor per week     Frequency: 4 or more times a week     Drinks per session: 1 or 2     Comment: Daily   • Drug use: No    and Allergies:  Patient has no known allergies.,    Vital Signs   Blood pressure 122/74, pulse 70, temperature 97.7 °F (36.5 °C), temperature source Oral, resp. rate 18, height 182.9 cm (72\"), weight 63.7 kg (140 lb 6.4 oz), SpO2 95 %.  Body mass index is 19.04 kg/m².    Physical Exam:   General: Pleasant white male              Head: No trauma              Neck: No bruit              Resp: Normal breath sound              Cor: Regular rhythm              Extremities: No edema              Skin: Warm and dry              Neuro: Mentally alert and oriented with normal memory attention and concentration.    Speech is articulate with no word finding problems.    Coordination is normal on finger-to-nose testing bilaterally.  Fine finger movements are normal.  Hall testing is done poorly on the left-hand side.        Cranial nerves show benign fundi equal pupils visual fields show a right homonymous hemianopsia.  He has mild right medial rectus weakness on eye movements with equal pupils with no ptosis.    Facial movement and sensation are normal.    Palate elevates normally tongue protrudes normally.    Reflexes are plus minus throughout.    Motor testing shows normal power and tone in all muscle groups.    Sensory testing shows a hint left leg parietal neglect which is inconsistent.  There is no neglect in his face or arm.          Results Review: CT of the head shows infarcts in both the left cerebellar hemisphere and in the left occipital parietal lobe in the PCA distribution.    CT angiogram of the " head and neck show a prominent arteriosclerotic calcification involving the extracranial carotids with no flow-limiting stenosis.  The intracranial proportions show prominent calcifications without flow-limiting stenosis.  The anterior and posterior circulation demonstrate symmetrical runoff with no flow-limiting stenosis    Labs:  Lab Results (last 72 hours)     Procedure Component Value Units Date/Time    Lipid Panel [[id number]] Collected:  03/05/20 0649    Specimen:  Blood Updated:  03/05/20 0719     Total Cholesterol 132 mg/dL      Triglycerides 55 mg/dL      HDL Cholesterol 46 mg/dL      LDL Cholesterol  75 mg/dL      VLDL Cholesterol 11 mg/dL      LDL/HDL Ratio 1.63    Narrative:       Cholesterol Reference Ranges  (U.S. Department of Health and Human Services ATP III Classifications)    Desirable          <200 mg/dL  Borderline High    200-239 mg/dL  High Risk          >240 mg/dL      Triglyceride Reference Ranges  (U.S. Department of Health and Human Services ATP III Classifications)    Normal           <150 mg/dL  Borderline High  150-199 mg/dL  High             200-499 mg/dL  Very High        >500 mg/dL    HDL Reference Ranges  (U.S. Department of Health and Human Services ATP III Classifcations)    Low     <40 mg/dl (major risk factor for CHD)  High    >60 mg/dl ('negative' risk factor for CHD)        LDL Reference Ranges  (U.S. Department of Health and Human Services ATP III Classifcations)    Optimal          <100 mg/dL  Near Optimal     100-129 mg/dL  Borderline High  130-159 mg/dL  High             160-189 mg/dL  Very High        >189 mg/dL    Heparin Anti-Xa [[id number]]  (Abnormal) Collected:  03/05/20 0430    Specimen:  Blood Updated:  03/05/20 0518     Heparin Anti-Xa (UFH) 0.71 IU/ml     Protime-INR [[id number]]  (Abnormal) Collected:  03/05/20 0430    Specimen:  Blood Updated:  03/05/20 0517     Protime 22.8 Seconds      INR 2.05    Hemoglobin & Hematocrit, Blood [[id number]]  (Abnormal) Collected:   03/05/20 0430    Specimen:  Blood Updated:  03/05/20 0501     Hemoglobin 10.6 g/dL      Hematocrit 32.5 %     Protime-INR [[id number]]  (Abnormal) Collected:  03/04/20 0500    Specimen:  Blood Updated:  03/04/20 0657     Protime 20.6 Seconds      INR 1.80    Heparin Anti-Xa [[id number]]  (Normal) Collected:  03/04/20 0500    Specimen:  Blood Updated:  03/04/20 0641     Heparin Anti-Xa (UFH) 0.41 IU/ml     Hemoglobin & Hematocrit, Blood [[id number]]  (Abnormal) Collected:  03/04/20 0501    Specimen:  Blood Updated:  03/04/20 0606     Hemoglobin 10.5 g/dL      Hematocrit 32.0 %     Heparin Anti-Xa [[id number]]  (Normal) Collected:  03/03/20 1349    Specimen:  Blood Updated:  03/03/20 1501     Heparin Anti-Xa (UFH) 0.40 IU/ml     Heparin Anti-Xa [[id number]]  (Normal) Collected:  03/03/20 0817    Specimen:  Blood Updated:  03/03/20 0857     Heparin Anti-Xa (UFH) 0.37 IU/ml     CBC & Differential [[id number]] Collected:  03/03/20 0458    Specimen:  Blood Updated:  03/03/20 0653    Narrative:       The following orders were created for panel order CBC & Differential.  Procedure                               Abnormality         Status                     ---------                               -----------         ------                     CBC Auto Differential[[id number]]        Abnormal            Final result                 Please view results for these tests on the individual orders.    CBC Auto Differential [[id number]]  (Abnormal) Collected:  03/03/20 0458    Specimen:  Blood Updated:  03/03/20 0653     WBC 6.02 10*3/mm3      RBC 2.92 10*6/mm3      Hemoglobin 10.3 g/dL      Hematocrit 31.2 %      .8 fL      MCH 35.3 pg      MCHC 33.0 g/dL      RDW 13.4 %      RDW-SD 52.7 fl      MPV 11.7 fL      Platelets 209 10*3/mm3      Neutrophil % 66.5 %      Lymphocyte % 20.3 %      Monocyte % 10.8 %      Eosinophil % 0.8 %      Basophil % 0.8 %      Immature Grans % 0.8 %      Neutrophils, Absolute 4.00 10*3/mm3       Lymphocytes, Absolute 1.22 10*3/mm3      Monocytes, Absolute 0.65 10*3/mm3      Eosinophils, Absolute 0.05 10*3/mm3      Basophils, Absolute 0.05 10*3/mm3      Immature Grans, Absolute 0.05 10*3/mm3      nRBC 0.0 /100 WBC     Protime-INR [[id number]]  (Abnormal) Collected:  03/03/20 0458    Specimen:  Blood Updated:  03/03/20 0602     Protime 19.2 Seconds      INR 1.66    Heparin Anti-Xa [[id number]]  (Abnormal) Collected:  03/03/20 0028    Specimen:  Blood Updated:  03/03/20 0114     Heparin Anti-Xa (UFH) 0.29 IU/ml     Hemoglobin & Hematocrit, Blood [[id number]]  (Abnormal) Collected:  03/02/20 1948    Specimen:  Blood Updated:  03/02/20 2010     Hemoglobin 10.2 g/dL      Hematocrit 29.9 %     Heparin Anti-Xa [[id number]]  (Abnormal) Collected:  03/02/20 1754    Specimen:  Blood Updated:  03/02/20 1825     Heparin Anti-Xa (UFH) 0.26 IU/ml           Rads:  Imaging Results (Last 72 Hours)     Procedure Component Value Units Date/Time    CT Angiogram Head [[id number]] Collected:  03/05/20 0712     Updated:  03/05/20 0958    Narrative:       CTA Head, CTA Neck     INDICATION:    69-year-old male with stroke and loss of right peripheral vision. Abnormal head CT last night. Involving medial temporal and occipital infarct in the left PCA distribution.    Head and neck CTA:    Neck - Patent cervical CCA/ICA/vertebral arteries. No apparent dissection. There is diffuse atherosclerotic change.  Head - No hemodynamically critical central stenosis or vessel cut off. No apparent aneurysm. No dural sinus occlusion. Better demonstrated on prior noncontrast imaging of the brain is an evolving subacute infarct in the medial temporal and occipital  lobes involving the left PCA distribution. To the extent visualized the posterior cerebral arteries appear symmetric. The source of the evolving infarct in the left PCA distribution is not clearly identified. There is extensive atherosclerotic change of  the cavernous and supraclinoid internal  carotid arteries bilaterally.    This is a preliminary wet read by Dr. Yahir Cotton at 0702 hours. Final interpretation will be provided by neuroradiology. Please refer to final interpretation for detailed findings and any further recommendations.    Final interpretation by neuroradiology:      CTA of the head and neck    Technique: CT angio head and neck following administration of 100 cc Isovue-370 IV contrast, including coronal and sagittal MIP 3-D reformatted images.     Indication: Acute onset of stroke with loss of right peripheral vision.    Comparison: No pertinent prior study    TECHNIQUE:   CTA of the head and neck with contrast. Coronal and sagittal reconstructions were obtained.  Radiation dose reduction techniques included automated exposure control or exposure modulation based on body size. Radiation audit for number of CT and nuclear  cardiology exams performed in the last year: 5.     Evaluation for a significant carotid arterial stenosis is based on the NASCET criteria.    Findings:      CT angiography neck:     The visualized aortic arch and its major branch vessels demonstrate prominent atherosclerotic calcification without convincing evidence of a flow-limiting stenosis.    Bilateral common and internal carotid arteries as well as both carotid bifurcations demonstrate atherosclerotic calcification without evidence of a flow-limiting stenosis. Moderate tortuosity of the vessels.    Extracranial vertebral arteries are of normal course and caliber. The vertebral arteries are codominant and both contribute to the basilar artery      CT angiography head:     The intracranial portions of the internal carotid arteries demonstrate prominent atherosclerotic calcification without convincing evidence of a flow-limiting stenosis.    MCA and TALAT vessels are patent bilaterally. There is a symmetric distal runoff intracranially without evidence of aneurysm or flow-limiting stenosis.    Basilar artery is widely  patent. Bilateral posterior cerebral arteries demonstrate symmetric distal runoff without evidence of aneurysm or flow-limiting stenosis.     Major dural venous sinuses show no evidence of filling defect.         Impression:           Prominent atherosclerotic calcification involving the extracranial carotid arteries without evidence of flow-limiting stenosis. The intracranial portions of the internal carotid arteries show prominent calcification without convincing evidence of  flow-limiting stenosis.    Anterior and posterior circulations demonstrate symmetric runoff without evidence of aneurysm or flow-limiting stenosis.    Signer Name: Nura Michaels MD   Signed: 3/5/2020 9:56 AM   Workstation Name: [id number]    [facility]    CT Angiogram Neck [[id number]] Collected:  03/05/20 0712     Updated:  03/05/20 0958    Narrative:       CTA Head, CTA Neck     INDICATION:    69-year-old male with stroke and loss of right peripheral vision. Abnormal head CT last night. Involving medial temporal and occipital infarct in the left PCA distribution.    Head and neck CTA:    Neck - Patent cervical CCA/ICA/vertebral arteries. No apparent dissection. There is diffuse atherosclerotic change.  Head - No hemodynamically critical central stenosis or vessel cut off. No apparent aneurysm. No dural sinus occlusion. Better demonstrated on prior noncontrast imaging of the brain is an evolving subacute infarct in the medial temporal and occipital  lobes involving the left PCA distribution. To the extent visualized the posterior cerebral arteries appear symmetric. The source of the evolving infarct in the left PCA distribution is not clearly identified. There is extensive atherosclerotic change of  the cavernous and supraclinoid internal carotid arteries bilaterally.    This is a preliminary wet read by Dr. Yahir Cotton at 0702 hours. Final interpretation will be provided by neuroradiology. Please refer to final  interpretation for detailed findings and any further recommendations.    Final interpretation by neuroradiology:      CTA of the head and neck    Technique: CT angio head and neck following administration of 100 cc Isovue-370 IV contrast, including coronal and sagittal MIP 3-D reformatted images.     Indication: Acute onset of stroke with loss of right peripheral vision.    Comparison: No pertinent prior study    TECHNIQUE:   CTA of the head and neck with contrast. Coronal and sagittal reconstructions were obtained.  Radiation dose reduction techniques included automated exposure control or exposure modulation based on body size. Radiation audit for number of CT and nuclear  cardiology exams performed in the last year: 5.     Evaluation for a significant carotid arterial stenosis is based on the NASCET criteria.    Findings:      CT angiography neck:     The visualized aortic arch and its major branch vessels demonstrate prominent atherosclerotic calcification without convincing evidence of a flow-limiting stenosis.    Bilateral common and internal carotid arteries as well as both carotid bifurcations demonstrate atherosclerotic calcification without evidence of a flow-limiting stenosis. Moderate tortuosity of the vessels.    Extracranial vertebral arteries are of normal course and caliber. The vertebral arteries are codominant and both contribute to the basilar artery      CT angiography head:     The intracranial portions of the internal carotid arteries demonstrate prominent atherosclerotic calcification without convincing evidence of a flow-limiting stenosis.    MCA and TALAT vessels are patent bilaterally. There is a symmetric distal runoff intracranially without evidence of aneurysm or flow-limiting stenosis.    Basilar artery is widely patent. Bilateral posterior cerebral arteries demonstrate symmetric distal runoff without evidence of aneurysm or flow-limiting stenosis.     Major dural venous sinuses show no  evidence of filling defect.         Impression:           Prominent atherosclerotic calcification involving the extracranial carotid arteries without evidence of flow-limiting stenosis. The intracranial portions of the internal carotid arteries show prominent calcification without convincing evidence of  flow-limiting stenosis.    Anterior and posterior circulations demonstrate symmetric runoff without evidence of aneurysm or flow-limiting stenosis.    Signer Name: Nura Michaels MD   Signed: 3/5/2020 9:56 AM   Workstation Name: [id number]    [facility]    CT Head Without Contrast [[id number]] Collected:  03/04/20 1915     Updated:  03/04/20 1917    Narrative:       CT HEAD, NONCONTRAST, 3/4/2020    HISTORY:  69-year-old male hospital inpatient with newly noted loss of a right-sided visual field.    TECHNIQUE:  CT imaging of the head without IV contrast. Radiation dose reduction techniques included automated exposure control. Radiation audit for CT and nuclear cardiology exams in the last 12 months: 5.    FINDINGS:  The images show a large subacute left medial temporal and occipital infarct involving posterior cerebral artery territory. There is a smaller subacute infarct involving the posterior portion of the left mid cerebellar hemisphere. No evidence of  hemorrhagic transformation.    Chronic infarcts involving the head and body of the left caudate nucleus and the right caudate nucleus head.    Mild chronic generalized cerebral volume loss. Mild diffuse chronic small vessel type white matter changes.    No evidence of acute intracranial hemorrhage, mass, mass effect, extra-axial fluid collection or hydrocephalus.      Impression:       1.  Subacute left medial temporal and occipital infarct in the left PCA distribution. No evidence of hemorrhagic transformation.  2.  Subacute or chronic small left posterior cerebellar infarct.  3.  Chronic appearing bilateral caudate nucleus infarcts.  4.   Diffuse chronic changes as noted above.    Signer Name: Kyaw Callaway MD   Signed: 3/4/2020 7:15 PM   Workstation Name: [email or web address]    [facility]            Assessment: Subacute left occipitoparietal infarct.    Subacute left cerebellar infarct.    Diffuse arterial sclerosis with no flow-limiting lesions in the head or neck       Plan:    PT and OT consult.    Increase Lipitor to 80 mg daily.    Consider adding aspirin 81 mg to his regimen when he appears to be stable given his significant degree of atherosclerosis    Comment:   It sounds like he had cerebellar infarct several weeks ago with collapsing of the Coumadin and again on this admission with his recent onset hemianopsia.    These are most likely cardioembolic given the temporal profile.    He certainly has arterial sclerosis based on coronary disease and I think he should probably be on an aspirin on a daily basis in addition to the warfarin.    I discussed the patients findings and my recommendations with patient and primary care team      Kapil Suresh MD  03/05/20  10:15 AM

## 2020-03-05 NOTE — PROGRESS NOTES
[facility] Medicine Services  PROGRESS NOTE    Patient Name: Dillon Vo  : 1950  MRN: [id number]    Date of Admission: 2020  Primary Care Physician: Ez Cintron MD    Subjective   Subjective     CC:  Right visual field deficit    HPI:  Yesterday afternoon, patient reported to nursing that he has been having loss of right visual field.  He thinks that this has been present since before his current hospitalization but is not sure.  He denies prior history of stroke.  During my visit he denies headache, double vision, dysarthria, aphasia, dysphagia, extremity paresthesias, difficulty walking, or extremity weakness.  No abdominal pain today.  No bruising/bleeding.  Looking forward to leaving hospital soon.    His memory seems to be improving and he has better recall of events today.    Review of Systems  Gen- No fevers, chills  CV- No chest pain, palpitations  Resp- No cough, dyspnea  GI- No N/V/D, abd pain, good appetite    Objective   Objective     Vital Signs:   Temp:  [97.7 °F (36.5 °C)] 97.7 °F (36.5 °C)  Heart Rate:  [70] 70  Resp:  [18] 18  BP: (111-122)/(63-74) 122/74    Physical Exam:  Constitutional: No acute distress, awake, alert  HENT: NCAT, mucous membranes moist  Respiratory: Clear to auscultation bilaterally, no wheezing, nonlabored  Cardiovascular: RRR, no murmurs, rubs, or gallops, palpable pedal pulses bilaterally, A2 mechanical click  Gastrointestinal: Positive bowel sounds, soft, nontender, nondistended  Musculoskeletal: No bilateral ankle edema  Psychiatric: Appropriate affect, cooperative  Neurologic: Oriented x 4, strength symmetric in all extremities, cranial nerves II through XII intact to confrontation, right homonymous hemianopsia on visual field testing, no pronator drift, finger-to-nose intact bilaterally, sensation intact to fine touch in all extremities  Skin: Midline suprapubic incisional scar with some ecchymosis but otherwise  no clear ecchymosis to abdominal wall (no change)      Results Reviewed:  Results from last 7 days   Lab Units 03/05/20  0430 03/04/20  0501 03/04/20  0500 03/03/20  0458  03/02/20  0540  03/01/20  0806   WBC 10*3/mm3  --   --   --  6.02  --  6.44  --  7.22   HEMOGLOBIN g/dL 10.6* 10.5*  --  10.3*   < > 10.4*   < > 10.7*   HEMATOCRIT % 32.5* 32.0*  --  31.2*   < > 31.4*   < > 32.8*   PLATELETS 10*3/mm3  --   --   --  209  --  198  --  232   INR  2.05*  --  1.80* 1.66*  --  1.47*  --  1.27*    < > = values in this interval not displayed.     Results from last 7 days   Lab Units 03/01/20  0806 02/29/20  0338   SODIUM mmol/L 133* 134*   POTASSIUM mmol/L 4.3 3.8   CHLORIDE mmol/L 100 99   CO2 mmol/L 23.0 23.0   BUN mg/dL 10 9   CREATININE mg/dL 0.72* 0.64*   GLUCOSE mg/dL 102* 101*   CALCIUM mg/dL 8.8 8.7     Estimated Creatinine Clearance: 78.5 mL/min (A) (by C-G formula based on SCr of 0.72 mg/dL (L)).    Microbiology Results Abnormal     None          Imaging Results (Last 24 Hours)     Procedure Component Value Units Date/Time    CT Angiogram Head [[id number]] Collected:  03/05/20 0712     Updated:  03/05/20 0958    Narrative:       CTA Head, CTA Neck     INDICATION:    69-year-old male with stroke and loss of right peripheral vision. Abnormal head CT last night. Involving medial temporal and occipital infarct in the left PCA distribution.    Head and neck CTA:    Neck - Patent cervical CCA/ICA/vertebral arteries. No apparent dissection. There is diffuse atherosclerotic change.  Head - No hemodynamically critical central stenosis or vessel cut off. No apparent aneurysm. No dural sinus occlusion. Better demonstrated on prior noncontrast imaging of the brain is an evolving subacute infarct in the medial temporal and occipital  lobes involving the left PCA distribution. To the extent visualized the posterior cerebral arteries appear symmetric. The source of the evolving infarct in the left PCA distribution is not  clearly identified. There is extensive atherosclerotic change of  the cavernous and supraclinoid internal carotid arteries bilaterally.    This is a preliminary wet read by Dr. Yahir Cotton at 0702 hours. Final interpretation will be provided by neuroradiology. Please refer to final interpretation for detailed findings and any further recommendations.    Final interpretation by neuroradiology:      CTA of the head and neck    Technique: CT angio head and neck following administration of 100 cc Isovue-370 IV contrast, including coronal and sagittal MIP 3-D reformatted images.     Indication: Acute onset of stroke with loss of right peripheral vision.    Comparison: No pertinent prior study    TECHNIQUE:   CTA of the head and neck with contrast. Coronal and sagittal reconstructions were obtained.  Radiation dose reduction techniques included automated exposure control or exposure modulation based on body size. Radiation audit for number of CT and nuclear  cardiology exams performed in the last year: 5.     Evaluation for a significant carotid arterial stenosis is based on the NASCET criteria.    Findings:      CT angiography neck:     The visualized aortic arch and its major branch vessels demonstrate prominent atherosclerotic calcification without convincing evidence of a flow-limiting stenosis.    Bilateral common and internal carotid arteries as well as both carotid bifurcations demonstrate atherosclerotic calcification without evidence of a flow-limiting stenosis. Moderate tortuosity of the vessels.    Extracranial vertebral arteries are of normal course and caliber. The vertebral arteries are codominant and both contribute to the basilar artery      CT angiography head:     The intracranial portions of the internal carotid arteries demonstrate prominent atherosclerotic calcification without convincing evidence of a flow-limiting stenosis.    MCA and TALAT vessels are patent bilaterally. There is a symmetric distal  runoff intracranially without evidence of aneurysm or flow-limiting stenosis.    Basilar artery is widely patent. Bilateral posterior cerebral arteries demonstrate symmetric distal runoff without evidence of aneurysm or flow-limiting stenosis.     Major dural venous sinuses show no evidence of filling defect.         Impression:           Prominent atherosclerotic calcification involving the extracranial carotid arteries without evidence of flow-limiting stenosis. The intracranial portions of the internal carotid arteries show prominent calcification without convincing evidence of  flow-limiting stenosis.    Anterior and posterior circulations demonstrate symmetric runoff without evidence of aneurysm or flow-limiting stenosis.    Signer Name: Nura Michaels MD   Signed: 3/5/2020 9:56 AM   Workstation Name: [id number]    [facility]    CT Angiogram Neck [[id number]] Collected:  03/05/20 0712     Updated:  03/05/20 0958    Narrative:       CTA Head, CTA Neck     INDICATION:    69-year-old male with stroke and loss of right peripheral vision. Abnormal head CT last night. Involving medial temporal and occipital infarct in the left PCA distribution.    Head and neck CTA:    Neck - Patent cervical CCA/ICA/vertebral arteries. No apparent dissection. There is diffuse atherosclerotic change.  Head - No hemodynamically critical central stenosis or vessel cut off. No apparent aneurysm. No dural sinus occlusion. Better demonstrated on prior noncontrast imaging of the brain is an evolving subacute infarct in the medial temporal and occipital  lobes involving the left PCA distribution. To the extent visualized the posterior cerebral arteries appear symmetric. The source of the evolving infarct in the left PCA distribution is not clearly identified. There is extensive atherosclerotic change of  the cavernous and supraclinoid internal carotid arteries bilaterally.    This is a preliminary wet read by Dr. Sinclair  Yury at 0702 hours. Final interpretation will be provided by neuroradiology. Please refer to final interpretation for detailed findings and any further recommendations.    Final interpretation by neuroradiology:      CTA of the head and neck    Technique: CT angio head and neck following administration of 100 cc Isovue-370 IV contrast, including coronal and sagittal MIP 3-D reformatted images.     Indication: Acute onset of stroke with loss of right peripheral vision.    Comparison: No pertinent prior study    TECHNIQUE:   CTA of the head and neck with contrast. Coronal and sagittal reconstructions were obtained.  Radiation dose reduction techniques included automated exposure control or exposure modulation based on body size. Radiation audit for number of CT and nuclear  cardiology exams performed in the last year: 5.     Evaluation for a significant carotid arterial stenosis is based on the NASCET criteria.    Findings:      CT angiography neck:     The visualized aortic arch and its major branch vessels demonstrate prominent atherosclerotic calcification without convincing evidence of a flow-limiting stenosis.    Bilateral common and internal carotid arteries as well as both carotid bifurcations demonstrate atherosclerotic calcification without evidence of a flow-limiting stenosis. Moderate tortuosity of the vessels.    Extracranial vertebral arteries are of normal course and caliber. The vertebral arteries are codominant and both contribute to the basilar artery      CT angiography head:     The intracranial portions of the internal carotid arteries demonstrate prominent atherosclerotic calcification without convincing evidence of a flow-limiting stenosis.    MCA and TALAT vessels are patent bilaterally. There is a symmetric distal runoff intracranially without evidence of aneurysm or flow-limiting stenosis.    Basilar artery is widely patent. Bilateral posterior cerebral arteries demonstrate symmetric distal  runoff without evidence of aneurysm or flow-limiting stenosis.     Major dural venous sinuses show no evidence of filling defect.         Impression:           Prominent atherosclerotic calcification involving the extracranial carotid arteries without evidence of flow-limiting stenosis. The intracranial portions of the internal carotid arteries show prominent calcification without convincing evidence of  flow-limiting stenosis.    Anterior and posterior circulations demonstrate symmetric runoff without evidence of aneurysm or flow-limiting stenosis.    Signer Name: Nura Michaels MD   Signed: 3/5/2020 9:56 AM   Workstation Name: [id number]    [facility]    CT Head Without Contrast [[id number]] Collected:  03/04/20 1915     Updated:  03/04/20 1917    Narrative:       CT HEAD, NONCONTRAST, 3/4/2020    HISTORY:  69-year-old male hospital inpatient with newly noted loss of a right-sided visual field.    TECHNIQUE:  CT imaging of the head without IV contrast. Radiation dose reduction techniques included automated exposure control. Radiation audit for CT and nuclear cardiology exams in the last 12 months: 5.    FINDINGS:  The images show a large subacute left medial temporal and occipital infarct involving posterior cerebral artery territory. There is a smaller subacute infarct involving the posterior portion of the left mid cerebellar hemisphere. No evidence of  hemorrhagic transformation.    Chronic infarcts involving the head and body of the left caudate nucleus and the right caudate nucleus head.    Mild chronic generalized cerebral volume loss. Mild diffuse chronic small vessel type white matter changes.    No evidence of acute intracranial hemorrhage, mass, mass effect, extra-axial fluid collection or hydrocephalus.      Impression:       1.  Subacute left medial temporal and occipital infarct in the left PCA distribution. No evidence of hemorrhagic transformation.  2.  Subacute or chronic small  left posterior cerebellar infarct.  3.  Chronic appearing bilateral caudate nucleus infarcts.  4.  Diffuse chronic changes as noted above.    Signer Name: Kyaw Callaway MD   Signed: 3/4/2020 7:15 PM   Workstation Name: [id number]    [facility] [place] [facility]          Results for orders placed during the hospital encounter of 02/19/20   Adult Transthoracic Echo Complete W/ Cont if Necessary Per Protocol    Narrative · There is biatrial and right ventricular enlargement.  · Mild concentric LVH is present.  · There is mild global hypokinesia of the left ventricle. RV systolic   function appears normal.  · An electronic lead is noted in the right ventricle.  · There is a normally functioning mechanical aortic valve.  · There is mitral annular calcification. MV visualization is subotpimal;   however, mild-moderate mitral regurgitation is noted.  · There is moderate tricuspid regurgitation. The estimated RV systolic   pressure is 59 mmHg, consistent with SEVERE pulmonary artery hypertension.  · No other important findings are noted on this study.          I have reviewed the medications:  Scheduled Meds:    atorvastatin 80 mg Oral Nightly   buPROPion  mg Oral Daily   carvedilol 12.5 mg Oral BID   citalopram 20 mg Oral Daily   finasteride 5 mg Oral Daily   lisinopril 10 mg Oral BID   warfarin 5 mg Oral Daily     Continuous Infusions:    heparin 22 Units/kg/hr Last Rate: 22 Units/kg/hr (03/05/20 1538)   Pharmacy to Dose Heparin     Pharmacy to dose warfarin       PRN Meds:.ipratropium-albuterol  •  Lidocaine HCl Urethral/Mucosal 2%  •  magnesium sulfate **OR** magnesium sulfate **OR** magnesium sulfate  •  ondansetron  •  Pharmacy to Dose Heparin  •  Pharmacy to dose warfarin  •  sodium chloride    Assessment/Plan   Assessment & Plan     Active Hospital Problems    Diagnosis  POA   • **Large rectus sheath hematoma [S30.1XXA]  Yes   • Subacute, multifocal ischemic strokes due to cardioembolism [I63.9]   Yes   • Chronic anticoagulation on warfarin  [Z79.01]  Not Applicable   • Supratherapeutic INR  [R79.1]  Yes   • Suspected pulmonary HTN  [I27.20]  Yes   • Macrocytosis [D75.89]  Yes   • Self-catheterizes urinary bladder [Z78.9]  Yes   • Hematoma of rectus sheath [S30.1XXA]  Yes   • Recent influenza A PNA (treated w/Tamiflu)  [J10.1]  Yes   • CAD  [I25.10]  Yes   • H/O mechanical AVR  [Z95.2]  Not Applicable   • AF s/p RFA and BiV PPM  [I48.91]  Yes   • Dyslipidemia [E78.5]  Yes   • Hypertension [I10]  Yes      Resolved Hospital Problems   No resolved problems to display.        Brief Hospital Course to date:  Dillon Vo is a 69 y.o. male with a past medical history of hypertension, hyperlipidemia, chronic tobacco abuse, atrial fibrillation, CAD status post CABG, VHD with history of mechanical aortic valve who was admitted on 2/26 with supratherapeutic INR and rectus sheath hematoma, requiring Kcentra and reversal of anticoagulation.  Patient was monitored in the intensive care unit and been restarted on anticoagulation with heparin infusion and warfarin.  Plan to monitor until INR is therapeutic with close outpatient follow-up with PCP.  He was recently admitted from 2/19-2/24 with respiratory failure, pneumonia, and influenza A complicated by hemoptysis. INR has slowly trended up and is nearing goal of 2.5-3.5.  Both staff and family have noted some intermittent confusion with short-term memory loss.  This is improved over the course of his stay however, on the afternoon of 3/4, patient noted some right visual field deficits, leading to CT of the head which shows evidence of multifocal cardioembolic subacute ischemic strokes.  He has no other clear neurologic deficits.  NIH stroke scale of 2.  Both neurology and stroke navigator have seen patient on today.    Right Rectus Sheath Hematoma, improved  Supratherapeutic INR, s/p Kcentra and Vitamin K on admission  -Continue heparin drip until INR 2.5-3.5.   Pharmacy dosing warfarin.  INR may be therapeutic tomorrow  - Because of rectus sheath hematoma appears to be spontaneous in the setting of significant coughing from recent pneumonia and supratherapeutic INR.  Patient is uncertain how much Coumadin he took or if he took any at all after discharge on 2/24.  His dose has been adjusted down due to antibiotics.    Subacute, cardioembolic CVAs of the left medial temporal and occipital lobe and PCA distribution, left posterior cerebellum, and bilateral caudate nuclei (new today)  Right homonymous hemianopsia  -It is unknown when these strokes occurred, likely within the past several weeks  -Remarkably, patient has few symptoms outside of hemianopsia  -We discussed that he should likely have formal evaluation of his ability to drive at [facility] after discharge  - CTA head and neck ordered this morning and negative for hemodynamically significant stenosis or atherosclerotic disease  -Imaging and patient's case discussed in detail with neurologist and stroke navigator  - Aspirin, high intensity atorvastatin, full anticoagulation  - PT/OT consult  - Lipid panel reviewed  -TTE from 2/19 reviewed    Pulmonary Hypertension  VHD with hx of Mechanical Aortic Valve  Moderate Tricuspid Regurgitation   -stable on carvedilol and lisinopril    Chronic Urinary Retention with self cath, continue intermittent self cath     PAF with hx of RFA  BiV PPM  - continue rate control with carvedilol, full anticoagulation as noted above    Chronic Pulmonary Nodule  Chronic tobacco Abuse, continue bupropion, patient plans to stop smoking  Macrocytic Anemia, Hgb stable  CAD with hx of CABG, last LVEF 46%    DVT Prophylaxis:  Heparin gtt   Disposition: I expect the patient to be discharged home in next 1-2 days pending therapeutic INR    CODE STATUS:   Code Status and Medical Interventions:   Ordered at: 02/26/20 8518     Limited Support to NOT Include:    Intubation     Level Of Support  Discussed With:    Patient     Code Status:    No CPR     Medical Interventions (Level of Support Prior to Arrest):    Limited     I have prepared this progress note with copied portions of the prior day's progress note of my own authorship to preserve accuracy and maintain consistency of documentation. I have reviewed these portions and edited them for correctness. I verify that the above documentation accurately and truly represents the evaluation and management performed on today's date.       Electronically signed by Paco Barraza MD, 03/05/20, 5:27 PM.

## 2020-03-05 NOTE — PROGRESS NOTES
HEPARIN INFUSION    Dillon Vo is a 69 y.o. male receiving heparin infusion.     Therapy for (VTE/Cardiac): Cardiac (mechanical AVR, bridge therapy for warfarin)  Patient Weight: 73.4 kg  Initial Bolus (Y/N): No  Any Bolus (Y/N): Yes     Signs or Symptoms of Bleeding: admitted with hematoma; monitor closely    Cardiac or Other (Not VTE)   Initial Bolus: 60 units/kg (Max 4,000 units)  Initial rate: 12 units/kg/hr (Max 1,000 units/hr)   Anti-Xa (IU/mL) Bolus Dose Stop Infusion Rate Change Repeat Anti-Xa      ?0.19 60 units/kg 0 hrs Increase rate by 4 units/kg/hr 6 hrs       0.2 - 0.29  30 units/kg 0 hrs Increase rate by 2 units/kg/hr 6 hrs    0.3 - 0.7 0 0 hrs No change 6 hrs      0.71 - 0.99 0  0 hrs Decrease rate by 2 units/kg/hr 6 hrs            ?1 0 Hold 1 hr Decrease rate by 3 units/kg/hr 6 hrs      Results from last 7 days   Lab Units 03/05/20  0430 03/04/20  0501 03/04/20  0500 03/03/20  0458  03/02/20  0540  03/01/20  0806   INR  2.05*  --  1.80* 1.66*  --  1.47*  --  1.27*   HEMOGLOBIN g/dL 10.6* 10.5*  --  10.3*   < > 10.4*   < > 10.7*   HEMATOCRIT % 32.5* 32.0*  --  31.2*   < > 31.4*   < > 32.8*   PLATELETS 10*3/mm3  --   --   --  209  --  198  --  232    < > = values in this interval not displayed.        Date   Time   Anti-Xa Current Rate (Unit/kg/hr) Bolus   (Units) Rate Change   (Unit/kg/hr) New Rate (Unit/kg/hr) Next   Anti-Xa Comments  Pump Check Daily   2/28 1154 0.10 New start  -- -- 12 1900  S/w [place]   2/28 1937 0.1 12 2000 +4 16 0400 Dw RN   2/29 0338 0.19 16 3500 +4 20 [address] [place] [id number] -b   2/29 1125 0.52 20 -- -- 20 [address]   2/29 1807 0.35 20 -- +1 21 0100 DW RN Reny   3/1 0103 0.57 21 -- -- 21 0600 Reny 8289 -acb   3/1 0806 0.56 21 -- -- 21 0600 [address]   3/2 0540 0.38 21 -- -- 21 1800 Recheck tonight given trend down; d/w RN; pump verified - wt on order updated   3/2 1754 0.26 21 -- +2 23 0100 DW  RN   3/3 0028 0.29 23 -- +2 25 0800 Discussed w/ nurse   3/3 0817 0.37 25 -- -- 25  1400 D/w RN   3/3 1349 0.4 25 -- -- 25 0600 D/w RN; pump verified   3/4 0500 0.41 25 -- -- 25 0600 D/w RN; pump verified   3/5 0430 0.71 25 -- -1 24 1400 D/w RN   3/5 1357 0.76 24 -- -2 22 2100 D/w RN; pump verified                                                                       Jean Johnson PharmD, BCPS  3/5/2020  2:53 PM

## 2020-03-05 NOTE — PROGRESS NOTES
"Pharmacy Consult - Warfarin    Dillon Vo is a 69 y.o. male on warfarin therapy.    Height - 182.9 cm (72\")  Weight - 63.7 kg (140 lb 6.4 oz)    Consulting Provider:  Case  Indication: Mechanical AVR, atrial fibrillation  Goal INR: 2.5-3.5  Home Regimen:   -Warfarin 5mg daily except 2.5mg on Sundays and Tuesdays     Bridge Therapy: Yes - heparin drip    Drug-Drug Interactions with current regimen:  No major DDIs    Warfarin Dosing During Admission:  Date  2/26 2/27 2/28 2/29 3/1 3/2 3/3 3/4 3/5   INR  5 1.25 1.27 1.27 1.27 1.47 1.66 1.80 2.05   Dose  Kcentra + Vit K 10mg IV HOLD Hep gtt started 5mg 5mg 5mg 5mg 7.5mg (5mg)     Education Provided: Patient is on warfarin prior to admission with appropriate follow-up. Written education provided on 2/20/20 during recent last admission.    Discharge Follow up:    Following Provider - Shruti Sigala (Dr. Cintron's office) - [facility] at [facility]   Follow up time range or appointment - 2-3 days after discharge    Labs:  Results from last 7 days   Lab Units 03/05/20  0430 03/04/20  0501 03/04/20  0500 03/03/20  0458 03/02/20  1948 03/02/20  0540 03/01/20  1932 03/01/20  0806  02/29/20  1125  02/29/20  0338  02/28/20  1155   INR  2.05*  --  1.80* 1.66*  --  1.47*  --  1.27*  --  1.27*  --   --   --  1.27*   APTT seconds  --   --   --   --   --   --   --   --   --   --   --   --   --  33.7*   HEMOGLOBIN g/dL 10.6* 10.5*  --  10.3* 10.2* 10.4* 9.8* 10.7*   < >  --    < > 10.2*   < >  --    HEMATOCRIT % 32.5* 32.0*  --  31.2* 29.9* 31.4* 29.7* 32.8*   < >  --    < > 30.4*   < >  --    PLATELETS 10*3/mm3  --   --   --  209  --  198  --  232  --   --   --  188  --   --     < > = values in this interval not displayed.     Results from last 7 days   Lab Units 03/01/20  0806 02/29/20  0338   SODIUM mmol/L 133* 134*   POTASSIUM mmol/L 4.3 3.8   CHLORIDE mmol/L 100 99   CO2 mmol/L 23.0 23.0   BUN mg/dL 10 9   CREATININE mg/dL 0.72* 0.64*   CALCIUM mg/dL " 8.8 8.7   GLUCOSE mg/dL 102* 101*     Current dietary intake: ~50-75% of documented meals  Diet Order   Procedures    Diet Regular; Cardiac, Consistent Carbohydrate     Assessment/Plan:   1. Pharmacy dosing warfarin for mechanical AVR + atrial fibrillation, goal INR 2.5-3.5. Warfarin restarted 2/29 following hematoma w/ supratherapeutic INR requiring reversal.  2. Patient's INR is subtherapeutic today at 2.05 but continues to increase. Received boosted dose of warfarin 7.5mg yesterday. Will resume warfarin 5mg tonight.  3. Will follow daily PT/INR.  4. Pharmacy will continue to follow closely and adjust warfarin as needed based on drug interactions, daily INR trend, and clinical status.    Thank you,  Jean Johnson, PharmD, BCPS  3/5/2020  11:48 AM

## 2020-03-05 NOTE — PLAN OF CARE
Heparin gtt titrated, see mar. Pt ready to get out of hospital. I&O cath performed by pt 1x this shift. Pt states he doesn't cath but 2-3xs a day. Will continue to monitor.

## 2020-03-06 LAB
HCT VFR BLD AUTO: 32.8 % (ref 37.5–51)
HGB BLD-MCNC: 10.7 G/DL (ref 13–17.7)
INR PPP: 2.26 (ref 0.85–1.16)
INR PPP: 2.36 (ref 0.85–1.16)
PROTHROMBIN TIME: 24.7 SECONDS (ref 11.5–14)
PROTHROMBIN TIME: 25.5 SECONDS (ref 11.5–14)
UFH PPP CHRO-ACNC: 0.54 IU/ML (ref 0.3–0.7)
UFH PPP CHRO-ACNC: 0.63 IU/ML (ref 0.3–0.7)

## 2020-03-06 PROCEDURE — 97166 OT EVAL MOD COMPLEX 45 MIN: CPT

## 2020-03-06 PROCEDURE — 25010000002 HEPARIN (PORCINE) 25000-0.45 UT/250ML-% SOLUTION

## 2020-03-06 PROCEDURE — 99233 SBSQ HOSP IP/OBS HIGH 50: CPT | Performed by: FAMILY MEDICINE

## 2020-03-06 PROCEDURE — 85520 HEPARIN ASSAY: CPT

## 2020-03-06 PROCEDURE — 97162 PT EVAL MOD COMPLEX 30 MIN: CPT | Performed by: PHYSICAL THERAPIST

## 2020-03-06 PROCEDURE — 85014 HEMATOCRIT: CPT | Performed by: HOSPITALIST

## 2020-03-06 PROCEDURE — 85018 HEMOGLOBIN: CPT | Performed by: HOSPITALIST

## 2020-03-06 PROCEDURE — 85610 PROTHROMBIN TIME: CPT

## 2020-03-06 PROCEDURE — 85610 PROTHROMBIN TIME: CPT | Performed by: INTERNAL MEDICINE

## 2020-03-06 RX ORDER — WARFARIN SODIUM 5 MG/1
5 TABLET ORAL
Status: DISCONTINUED | OUTPATIENT
Start: 2020-03-06 | End: 2020-03-07 | Stop reason: HOSPADM

## 2020-03-06 RX ADMIN — CITALOPRAM HYDROBROMIDE 20 MG: 20 TABLET ORAL at 09:07

## 2020-03-06 RX ADMIN — HEPARIN SODIUM 20 UNITS/KG/HR: 10000 INJECTION, SOLUTION INTRAVENOUS at 04:04

## 2020-03-06 RX ADMIN — CARVEDILOL 12.5 MG: 12.5 TABLET, FILM COATED ORAL at 21:37

## 2020-03-06 RX ADMIN — LISINOPRIL 10 MG: 10 TABLET ORAL at 09:06

## 2020-03-06 RX ADMIN — CARVEDILOL 12.5 MG: 12.5 TABLET, FILM COATED ORAL at 09:06

## 2020-03-06 RX ADMIN — WARFARIN SODIUM 5 MG: 5 TABLET ORAL at 15:43

## 2020-03-06 RX ADMIN — BUPROPION HYDROCHLORIDE 300 MG: 150 TABLET, FILM COATED, EXTENDED RELEASE ORAL at 09:06

## 2020-03-06 RX ADMIN — FINASTERIDE 5 MG: 5 TABLET, FILM COATED ORAL at 09:07

## 2020-03-06 RX ADMIN — ATORVASTATIN CALCIUM 80 MG: 40 TABLET, FILM COATED ORAL at 21:37

## 2020-03-06 RX ADMIN — HEPARIN SODIUM 20 UNITS/KG/HR: 10000 INJECTION, SOLUTION INTRAVENOUS at 21:17

## 2020-03-06 RX ADMIN — ASPIRIN 81 MG: 81 TABLET, COATED ORAL at 09:07

## 2020-03-06 RX ADMIN — LISINOPRIL 10 MG: 10 TABLET ORAL at 21:38

## 2020-03-06 NOTE — PLAN OF CARE
Problem: Patient Care Overview  Goal: Plan of Care Review  Outcome: Ongoing (interventions implemented as appropriate)  Flowsheets  Taken 3/6/2020 3673  Progress: improving  Outcome Summary: Patient asleep between care. patient self catheterized himself twice this shift. Heparin drip titrated down to 20 units/kg/hr. Patient denies pain and n/v. VSS. Will continue to monitor.  Taken 3/5/2020 2000  Plan of Care Reviewed With: patient

## 2020-03-06 NOTE — PROGRESS NOTES
Continued Stay Note  [facility]     Patient Name: Dillon Vo  MRN: [id number]  Today's Date: 3/6/2020    Admit Date: 2/26/2020    Discharge Plan     Row Name 03/06/20 1307       Plan    Plan  Home with family    Plan Comments  Met with patient at bedside to discuss discharge planning. Patient confirmed discharge plan as Home with spouse Meghana [phone] who will transport. Patient not medically ready medical staff monitoring INR levels.    Final Discharge Disposition Code  01 - home or self-care        Discharge Codes    No documentation.       Expected Discharge Date and Time     Expected Discharge Date Expected Discharge Time    Mar 3, 2020             ADRIAN Neves (Kay)

## 2020-03-06 NOTE — PLAN OF CARE
Problem: Patient Care Overview  Goal: Plan of Care Review  Outcome: Ongoing (interventions implemented as appropriate)  Flowsheets (Taken 3/6/2020 2930)  Outcome Summary: PT evaluation completed on this date.  Pt demonstrated conditional independence with supine-->sit, stood with SBA, and ambulated 500 feet with CGAx1 - pt slightly unsteady at times, but no LOB noted.  No strength, sensation, or coordination deficits noted.  Biggest deficit is pt's R homonymous hemianopsia - pt heavily educated on scanning environment during ambulation.  Skilled PT services warranted to improve mobility and safety.  Recommend outpatient neuro PT at d/c.

## 2020-03-06 NOTE — THERAPY EVALUATION
Patient Name: Dillon Vo  : 1950    MRN: [id number]                              Today's Date: 3/6/2020       Admit Date: 2020    Visit Dx:     ICD-10-CM ICD-9-CM   1. Hematoma of rectus sheath, initial encounter S30.1XXA 922.2   2. Adverse effect of anticoagulant, initial encounter T45.515A E934.2     Patient Active Problem List   Diagnosis   • AF s/p RFA and BiV PPM    • Depression   • Hyperbilirubinemia   • Dyslipidemia   • Hypertension   • Malignant neoplasm of overlapping sites of colon (CMS/HCC)   • History of artificial heart valve   • Neurogenic bladder   • Tobacco use   • CAD    • H/O mechanical AVR    • COPD, mild (CMS/HCC)   • Bilateral pneumonia   • Recent influenza A PNA (treated w/Tamiflu)    • Thrombocytopenia (CMS/HCC)   • Hemoptysis   • Lung nodule < 6cm on CT   • Chronic anticoagulation on warfarin    • Supratherapeutic INR    • Suspected pulmonary HTN    • Large rectus sheath hematoma   • Macrocytosis   • Self-catheterizes urinary bladder   • Hematoma of rectus sheath   • Subacute, multifocal ischemic strokes due to cardioembolism     Past Medical History:   Diagnosis Date   • A-fib (CMS/HCC)    • Anemia    • Atrial fibrillation (CMS/HCC) 2016    Difficult to control rate. RFA of AV node with implantation of left Bi-V pacemaker, 2013. Hematoma requiring single-chamber pacemaker implanted, 2013.    • Colitis    • Colon cancer (CMS/HCC)    • Depression    • HTN (hypertension)    • Hyperbilirubinemia    • Hyperlipidemia    • Orthostatic hypotension      Past Surgical History:   Procedure Laterality Date   • APPENDECTOMY     • CARDIAC SURGERY     • COLON SURGERY     • EXTRACORPOREAL SHOCKWAVE LITHOTRIPSY (ESWL), STENT INSERTION/REMOVAL     • PACEMAKER IMPLANTATION     • VARICOSE VEIN SURGERY       General Information     Row Name 20 0830          PT Evaluation Time/Intention    Document Type  evaluation  -LM     Mode of Treatment  individual  therapy;physical therapy  -LM     Row Name 03/06/20 0830          General Information    Patient Profile Reviewed?  yes  -LM     Prior Level of Function  independent:;all household mobility;gait;ADL's  -LM     Existing Precautions/Restrictions  fall;other (see comments) R homonymous hemianopsia  -LM     Barriers to Rehab  (S) visual deficit  -LM     Row Name 03/06/20 0830          Relationship/Environment    Lives With  spouse  -LM     Row Name 03/06/20 0830          Resource/Environmental Concerns    Current Living Arrangements  home/apartment/condo  -LM     Row Name 03/06/20 0830          Home Main Entrance    Number of Stairs, Main Entrance  five  -LM     Stair Railings, Main Entrance  -- 1 HR - however, pt states he usually does not use it  -LM     Row Name 03/06/20 0830          Stairs Within Home, Primary    Stairs, Within Home, Primary  Basement  -LM     Number of Stairs, Within Home, Primary  other (see comments) 12  -LM     Stair Railings, Within Home, Primary  railing on right side (ascending)  -LM     Row Name 03/06/20 0830          Cognitive Assessment/Intervention- PT/OT    Orientation Status (Cognition)  oriented x 3  -LM     Row Name 03/06/20 0830          Safety Issues, Functional Mobility    Safety Issues Affecting Function (Mobility)  safety precautions follow-through/compliance  -LM     Impairments Affecting Function (Mobility)  balance;visual/perceptual  -LM     Comment, Safety Issues/Impairments (Mobility)  R homonymous hemianopsia  -LM       User Key  (r) = Recorded By, (t) = Taken By, (c) = Cosigned By    Initials Name Provider Type    LM Sierra Stacy, PT Physical Therapist        Mobility     Row Name 03/06/20 0830          Bed Mobility Assessment/Treatment    Bed Mobility Assessment/Treatment  supine-sit  -LM     Supine-Sit [place] (Bed Mobility)  conditional independence  -LM     Assistive Device (Bed Mobility)  bed rails;head of bed elevated  -LM     Comment (Bed Mobility)  No issues  noted with bed mobility.  -LM     Row Name 03/06/20 0830          Transfer Assessment/Treatment    Comment (Transfers)  Good hand placement.  -LM     Row Name 03/06/20 0830          Sit-Stand Transfer    Sit-Stand [place] (Transfers)  stand by assist  -LM     Assistive Device (Sit-Stand Transfers)  -- No AD  -LM     Row Name 03/06/20 0830          Gait/Stairs Assessment/Training    Gait/Stairs Assessment/Training  gait/ambulation independence  -LM     [place] Level (Gait)  contact guard;1 person assist  -LM     Assistive Device (Gait)  -- No AD  -LM     Distance in Feet (Gait)  500 feet  -LM     Deviations/Abnormal Patterns (Gait)  frank decreased;stride length decreased  -LM     Comment (Gait/Stairs)  Due to R homonymous hemianopsia - pt heavily educated on scanning environment on the R side before turning - this was worked on multiple times throughout gait.  Pt slightly unsteady at times, but no LOB noted.  -LM       User Key  (r) = Recorded By, (t) = Taken By, (c) = Cosigned By    Initials Name Provider Type    LM Sierra Stacy, PT Physical Therapist        Obj/Interventions     Row Name 03/06/20 0830          General ROM    GENERAL ROM COMMENTS  BLE AROM WFL  -LM     Row Name 03/06/20 0830          MMT (Manual Muscle Testing)    General MMT Comments  BLEs grossly 5/5 throughout  -LM     Row Name 03/06/20 0830          Static Sitting Balance    Level of [place] (Unsupported Sitting, Static Balance)  independent  -LM     Sitting Position (Unsupported Sitting, Static Balance)  sitting on edge of bed  -LM     Time Able to Maintain Position (Unsupported Sitting, Static Balance)  2 to 3 minutes  -LM     Row Name 03/06/20 0830          Static Standing Balance    Level of [place] (Supported Standing, Static Balance)  standby assist  -LM     Time Able to Maintain Position (Supported Standing, Static Balance)  15 to 30 seconds  -LM     Row Name 03/06/20 0830          Dynamic Standing Balance    Level  of [place], Reaches Outside Midline (Standing, Dynamic Balance)  contact guard assist;1 person assist  -LM     Time Able to Maintain Position, Reaches Outside Midline (Standing, Dynamic Balance)  2 to 3 minutes  -LM     Assistive Device Utilized (Supported Standing, Dynamic Balance)  -- No AD  -LM     Row Name 03/06/20 0830          Sensory Assessment/Intervention    Sensory General Assessment  no sensation deficits identified BLEs  -LM       User Key  (r) = Recorded By, (t) = Taken By, (c) = Cosigned By    Initials Name Provider Type    LM Sierra Stacy PT Physical Therapist        Goals/Plan     Row Name 03/06/20 0830          Transfer Goal 1 (PT)    Activity/Assistive Device (Transfer Goal 1, PT)  bed-to-chair/chair-to-bed  -LM     [place] Level/Cues Needed (Transfer Goal 1, PT)  independent  -LM     Time Frame (Transfer Goal 1, PT)  long term goal (LTG);5 days  -LM     Row Name 03/06/20 0830          Gait Training Goal 1 (PT)    Activity/Assistive Device (Gait Training Goal 1, PT)  gait (walking locomotion)  -LM     [place] Level (Gait Training Goal 1, PT)  independent  -LM     Distance (Gait Goal 1, PT)  600 feet  -LM     Time Frame (Gait Training Goal 1, PT)  long term goal (LTG);1 week  -LM     Row Name 03/06/20 0830          Stairs Goal 1 (PT)    Activity/Assistive Device (Stairs Goal 1, PT)  ascending stairs;descending stairs;using handrail, right  -LM     [place] Level/Cues Needed (Stairs Goal 1, PT)  supervision required  -LM     Number of Stairs (Stairs Goal 1, PT)  12  -LM     Time Frame (Stairs Goal 1, PT)  long term goal (LTG);1 week  -LM       User Key  (r) = Recorded By, (t) = Taken By, (c) = Cosigned By    Initials Name Provider Type    Sierra Martinez PT Physical Therapist        Clinical Impression     Row Name 03/06/20 0830          Pain Assessment    Additional Documentation  Pain Scale: Numbers Pre/Post-Treatment (Group)  -LM     Row Name 03/06/20 0830          Pain  Scale: Numbers Pre/Post-Treatment    Pain Scale: Numbers, Pretreatment  0/10 - no pain  -LM     Pain Scale: Numbers, Post-Treatment  0/10 - no pain  -LM     Row Name 03/06/20 0830          Plan of Care Review    Plan of Care Reviewed With  patient  -LM     Row Name 03/06/20 0830          Physical Therapy Clinical Impression    Criteria for Skilled Interventions Met (PT Clinical Impression)  yes;treatment indicated  -LM     Rehab Potential (PT Clinical Summary)  good, to achieve stated therapy goals  -LM     Row Name 03/06/20 0830          Vital Signs    Pre Systolic BP Rehab  111  -LM     Pre Treatment Diastolic BP  69  -LM     Post Systolic BP Rehab  110  -LM     Post Treatment Diastolic BP  60  -LM     Pretreatment Heart Rate (beats/min)  69  -LM     Posttreatment Heart Rate (beats/min)  69  -LM     Pre Patient Position  Supine  -LM     Intra Patient Position  Standing  -LM     Post Patient Position  Sitting  -LM     Row Name 03/06/20 0830          Positioning and Restraints    Pre-Treatment Position  in bed  -LM     Post Treatment Position  chair  -LM     In Chair  reclined;call light within reach;encouraged to call for assist;notified nsg  -LM       User Key  (r) = Recorded By, (t) = Taken By, (c) = Cosigned By    Initials Name Provider Type    LM Sierra Stacy, PT Physical Therapist        Outcome Measures     Row Name 03/06/20 0830          How much help from another person do you currently need...    Turning from your back to your side while in flat bed without using bedrails?  4  -LM     Moving from lying on back to sitting on the side of a flat bed without bedrails?  4  -LM     Moving to and from a bed to a chair (including a wheelchair)?  3  -LM     Standing up from a chair using your arms (e.g., wheelchair, bedside chair)?  3  -LM     Climbing 3-5 steps with a railing?  3  -LM     To walk in hospital room?  3  -LM     AM-PAC 6 Clicks Score (PT)  20  -LM     Row Name 03/06/20 0830          Functional  Assessment    Outcome Measure Options  AM-PAC 6 Clicks Basic Mobility (PT)  -LM       User Key  (r) = Recorded By, (t) = Taken By, (c) = Cosigned By    Initials Name Provider Type    Sierra Martinez PT Physical Therapist          PT Recommendation and Plan  Planned Therapy Interventions (PT Eval): balance training, bed mobility training, gait training, home exercise program, motor coordination training, neuromuscular re-education, patient/family education, postural re-education, ROM (range of motion), stair training, strengthening, stretching, transfer training  Outcome Summary/Treatment Plan (PT)  Anticipated Discharge Disposition (PT): home with OP services  Plan of Care Reviewed With: patient  Outcome Summary: PT evaluation completed on this date.  Pt demonstrated conditional independence with supine-->sit, stood with SBA, and ambulated 500 feet with CGAx1 - pt slightly unsteady at times, but no LOB noted.  No strength, sensation, or coordination deficits noted.  Biggest deficit is pt's R homonymous hemianopsia - pt heavily educated on scanning environment during ambulation.  Skilled PT services warranted to improve mobility and safety.  Recommend outpatient neuro PT at d/c.     Time Calculation:   PT Charges     Row Name 03/06/20 0830             Time Calculation    Start Time  0830  -LM      PT Received On  03/06/20  -LM      PT Goal Re-Cert Due Date  03/16/20  -LM        User Key  (r) = Recorded By, (t) = Taken By, (c) = Cosigned By    Initials Name Provider Type    Sierra Martinez PT Physical Therapist        Therapy Charges for Today     Code Description Service Date Service Provider Modifiers Qty    [id number]  PT EVAL MOD COMPLEXITY 4 3/6/2020 Sierra Stacy PT GP 1          PT G-Codes  Outcome Measure Options: AM-PAC 6 Clicks Basic Mobility (PT)  AM-PAC 6 Clicks Score (PT): 20    Sierra Stacy PT  3/6/2020

## 2020-03-06 NOTE — PROGRESS NOTES
"Pharmacy Consult - Warfarin    Dillon Vo is a 69 y.o. male on warfarin therapy.    Height - 182.9 cm (72\")  Weight - 63 kg (138 lb 12.8 oz)    Consulting Provider:  Case  Indication: Mechanical AVR, atrial fibrillation  Goal INR: 2.5-3.5  Home Regimen:   -Warfarin 5mg daily except 2.5mg on Sundays and Tuesdays     Bridge Therapy: Yes - heparin drip    Drug-Drug Interactions with current regimen:  No major DDIs    Warfarin Dosing During Admission:  Date  2/26 2/27 2/28 2/29 3/1 3/2 3/3 3/4 3/5   INR  5 1.25 1.27 1.27 1.27 1.47 1.66 1.80 2.05   Dose  Kcentra + Vit K 10mg IV HOLD Hep gtt started 5mg 5mg 5mg 5mg 7.5mg 5mg     Date  3/6           INR  2.26, 2.36           Dose  (5mg)             Education Provided: Patient is on warfarin prior to admission with appropriate follow-up. Written education provided on 2/20/20 during recent last admission.    Discharge Follow up:    Following Provider - Shruti Sigala (Dr. Cintron's office) - [facility]   Follow up time range or appointment - 2-3 days after discharge    Labs:  Results from last 7 days   Lab Units 03/06/20  1320 03/06/20  0552 03/05/20  0430 03/04/20  0501 03/04/20  0500 03/03/20  0458 03/02/20  1948 03/02/20  0540 03/01/20  1932 03/01/20  0806  02/29/20  0338   INR  2.36* 2.26* 2.05*  --  1.80* 1.66*  --  1.47*  --  1.27*   < >  --    HEMOGLOBIN g/dL  --  10.7* 10.6* 10.5*  --  10.3* 10.2* 10.4* 9.8* 10.7*   < > 10.2*   HEMATOCRIT %  --  32.8* 32.5* 32.0*  --  31.2* 29.9* 31.4* 29.7* 32.8*   < > 30.4*   PLATELETS 10*3/mm3  --   --   --   --   --  209  --  198  --  232  --  188    < > = values in this interval not displayed.     Results from last 7 days   Lab Units 03/01/20  0806 02/29/20  0338   SODIUM mmol/L 133* 134*   POTASSIUM mmol/L 4.3 3.8   CHLORIDE mmol/L 100 99   CO2 mmol/L 23.0 23.0   BUN mg/dL 10 9   CREATININE mg/dL 0.72* 0.64*   CALCIUM mg/dL 8.8 8.7   GLUCOSE mg/dL 102* 101*     Current dietary intake: " ~25% of documented meals  Diet Order   Procedures    Diet Regular; Cardiac, Consistent Carbohydrate     Assessment/Plan:   1. Pharmacy dosing warfarin for mechanical AVR + atrial fibrillation, goal INR 2.5-3.5. Warfarin restarted 2/29 following hematoma w/ supratherapeutic INR requiring reversal.  2. Patient's INR is slightly subtherapeutic today but close to therapeutic range. Will give warfarin 5mg today.  3. Will follow daily PT/INR.  4. Pharmacy will continue to follow closely and adjust warfarin as needed based on drug interactions, daily INR trend, and clinical status.    Thank you,  Jean Johnson, PharmD, BCPS  3/6/2020  2:08 PM

## 2020-03-06 NOTE — PLAN OF CARE
Problem: Patient Care Overview  Goal: Plan of Care Review  Flowsheets (Taken 3/6/2020 0901)  Plan of Care Reviewed With: patient  Outcome Summary: OT eval complete.  Pt presents with decreased balance and R visual deficit limiting independence and safety with ADLs.  Pt required supervision to don/doff socks,  supervison STS,  CGA to ambulate 20 ft without AD.  Pt educated in visual compensatory strategies to increase safety and independence with self care.  OT will follow to advance pt toward increased independence with ADLs.  Recommend home with assist and OP OT upon d/c.

## 2020-03-06 NOTE — THERAPY EVALUATION
Acute Care - Occupational Therapy Initial Evaluation   [place]     Patient Name: Dillon Vo  : 1950  MRN: [id number]  Today's Date: 3/6/2020     Date of Referral to OT: 20  Referring Physician: MD Kerwin    Admit Date: 2020       ICD-10-CM ICD-9-CM   1. Hematoma of rectus sheath, initial encounter S30.1XXA 922.2   2. Adverse effect of anticoagulant, initial encounter T45.515A E934.2     Patient Active Problem List   Diagnosis   • AF s/p RFA and BiV PPM    • Depression   • Hyperbilirubinemia   • Dyslipidemia   • Hypertension   • Malignant neoplasm of overlapping sites of colon (CMS/HCC)   • History of artificial heart valve   • Neurogenic bladder   • Tobacco use   • CAD    • H/O mechanical AVR    • COPD, mild (CMS/HCC)   • Bilateral pneumonia   • Recent influenza A PNA (treated w/Tamiflu)    • Thrombocytopenia (CMS/HCC)   • Hemoptysis   • Lung nodule < 6cm on CT   • Chronic anticoagulation on warfarin    • Supratherapeutic INR    • Suspected pulmonary HTN    • Large rectus sheath hematoma   • Macrocytosis   • Self-catheterizes urinary bladder   • Hematoma of rectus sheath   • Subacute, multifocal ischemic strokes due to cardioembolism     Past Medical History:   Diagnosis Date   • A-fib (CMS/HCC)    • Anemia    • Atrial fibrillation (CMS/HCC) 2016    Difficult to control rate. RFA of AV node with implantation of left Bi-V pacemaker, 2013. Hematoma requiring single-chamber pacemaker implanted, 2013.    • Colitis    • Colon cancer (CMS/HCC)    • Depression    • HTN (hypertension)    • Hyperbilirubinemia    • Hyperlipidemia    • Orthostatic hypotension      Past Surgical History:   Procedure Laterality Date   • APPENDECTOMY     • CARDIAC SURGERY     • COLON SURGERY     • EXTRACORPOREAL SHOCKWAVE LITHOTRIPSY (ESWL), STENT INSERTION/REMOVAL     • PACEMAKER IMPLANTATION     • VARICOSE VEIN SURGERY            OT ASSESSMENT FLOWSHEET (last 12 hours)      Occupational  Therapy Evaluation     Row Name 03/06/20 0901                   OT Evaluation Time/Intention    Document Type  evaluation  -AC        Mode of Treatment  occupational therapy  -AC        Patient Effort  good  -AC           General Information    Patient Profile Reviewed?  yes  -AC        Referring Physician  MD Kerwin  -        Prior Level of Function  independent:;all household mobility;ADL's;driving  -        Pertinent History of Current Functional Problem   L CVA  -AC        Existing Precautions/Restrictions  (S) fall R homonymous hemianopsia  -AC        Barriers to Rehab  (S) visual deficit  -AC           Relationship/Environment    Lives With  spouse  -AC        Concerns About Impact on Relationships  spouse works during the day  -AC           Resource/Environmental Concerns    Current Living Arrangements  home/apartment/condo  -           Home Main Entrance    Number of Stairs, Main Entrance  five  -AC        Stair Railings, Main Entrance  railing on right side (ascending)  -AC           Stairs Within Home, Primary    Stairs, Within Home, Primary  basement  -AC        Number of Stairs, Within Home, Primary  other (see comments) 12  -AC        Stair Railings, Within Home, Primary  railings on both sides of stairs  -AC           Cognitive Assessment/Intervention- PT/OT    Orientation Status (Cognition)  oriented x 3  -AC        Follows Commands (Cognition)  WFL  -AC        Cognitive Assessment/Intervention Comment  difficulty finding words at times  -AC           Safety Issues, Functional Mobility    Safety Issues Affecting Function (Mobility)  safety precaution awareness;safety precautions follow-through/compliance  -        Impairments Affecting Function (Mobility)  balance;visual/perceptual  -        Comment, Safety Issues/Impairments (Mobility)  R homonymous hemianopsia  -AC           Bed Mobility Assessment/Treatment    Comment (Bed Mobility)  UIC  -           Functional Mobility    Functional  Mobility- Ind. Level  contact guard assist  -        Functional Mobility- Device  -- gt belt  -AC        Functional Mobility-Distance (Feet)  20  -AC           Transfer Assessment/Treatment    Transfer Assessment/Treatment  sit-stand transfer;stand-sit transfer  -           Sit-Stand Transfer    Sit-Stand [place] (Transfers)  supervision  -        Assistive Device (Sit-Stand Transfers)  -- gt belt  -AC           Stand-Sit Transfer    Stand-Sit [place] (Transfers)  supervision  -           ADL Assessment/Intervention    BADL Assessment/Intervention  lower body dressing  -           Lower Body Dressing Assessment/Training    Lower Body Dressing [place] Level  doff;don;socks;supervision  -        Lower Body Dressing Position  unsupported sitting  -           BADL Safety/Performance    Impairments, BADL Safety/Performance  balance;visual/perceptual  -        Skilled BADL Treatment/Intervention  sensory/visual deficit compensatory training  -           General ROM    GENERAL ROM COMMENTS  BUE AROM WFL  -AC           MMT (Manual Muscle Testing)    General MMT Comments  BUE grossly 4+/5  -AC           Sensory Assessment/Intervention    Sensory General Assessment  no sensation deficits identified BUE  -        Additional Documentation  Vision Assessment/Intervention (Group)  -           Vision Assessment/Intervention    Visual Impairment/Limitations  peripheral vision impaired right able to id objects ~25 degrees to R of midline  -        Visual Field Deficit  homonymous hemianopsia, right  -AC           Positioning and Restraints    Pre-Treatment Position  sitting in chair/recliner  -        Post Treatment Position  chair  -AC        In Chair  reclined;call light within reach;encouraged to call for assist  -           Pain Scale: Numbers Pre/Post-Treatment    Pain Scale: Numbers, Pretreatment  0/10 - no pain  -        Pain Scale: Numbers, Post-Treatment  0/10 - no pain  -            Plan of Care Review    Plan of Care Reviewed With  patient  -AC        Outcome Summary  OT eval complete.  Pt presents with decreased balance and R visuall deficit limiting independence and safety with ADLs.  Pt required supervision to don/doff socks,  supervison STS,  CGA to ambulate 20 ft without AD.  Pt educated in visual compensatory strategies to increase safety and independence with self care.  OT will follow to advance pt toward increased independence with ADLs.  Recommend home wiht assist and OP OT upon d/c.   -AC           Clinical Impression (OT)    Date of Referral to OT  03/05/20  -AC        OT Diagnosis  ADL decline  -AC        Patient/Family Goals Statement (OT Eval)  return home  -AC        Criteria for Skilled Therapeutic Interventions Met (OT Eval)  yes;treatment indicated  -AC        Rehab Potential (OT Eval)  good, to achieve stated therapy goals  -AC        Therapy Frequency (OT Eval)  daily  -AC        Care Plan Review (OT)  evaluation/treatment results reviewed  -AC        Anticipated Discharge Disposition (OT)  home with assist;home with OP services  -AC           Vital Signs    Intra Systolic BP Rehab  119  -AC        Intra Treatment Diastolic BP  65  -AC        Pretreatment Heart Rate (beats/min)  69  -AC        Posttreatment Heart Rate (beats/min)  69  -AC        Post SpO2 (%)  98  -AC        O2 Delivery Post Treatment  room air  -AC        Pre Patient Position  Sitting  -AC        Intra Patient Position  Standing  -AC        Post Patient Position  Sitting  -AC           Planned OT Interventions    Planned Therapy Interventions (OT Eval)  BADL retraining;functional balance retraining;IADL retraining;occupation/activity based interventions;patient/caregiver education/training;transfer/mobility retraining  -AC           OT Goals    Transfer Goal Selection (OT)  transfer, OT goal 1  -AC        Dressing Goal Selection (OT)  dressing, OT goal 1  -AC        Additional Documentation  Problem  Specific Goal Selection (OT) (Row)  -AC           Transfer Goal 1 (OT)    Activity/Assistive Device (Transfer Goal 1, OT)  bed-to-chair/chair-to-bed;toilet  -AC        [place] Level/Cues Needed (Transfer Goal 1, OT)  independent  -AC        Time Frame (Transfer Goal 1, OT)  by discharge  -AC        Progress/Outcome (Transfer Goal 1, OT)  goal ongoing  -AC           Dressing Goal 1 (OT)    Activity/Assistive Device (Dressing Goal 1, OT)  upper body dressing;lower body dressing  -AC        [place]/Cues Needed (Dressing Goal 1, OT)  set-up required  -AC        Time Frame (Dressing Goal 1, OT)  by discharge  -AC        Progress/Outcome (Dressing Goal 1, OT)  goal ongoing  -AC           Patient Education Goal (OT)    Activity (Patient Education Goal, OT)  Pt will incorporate visual compensatory strategies into self care and mobility to increase safety and independence with completion of daily tasks  -AC        [place]/Cues/Accuracy (Memory Goal 2, OT)  verbalizes understanding;demonstrates adequately  -AC        Time Frame (Patient Education Goal, OT)  by discharge  -AC        Progress/Outcome (Patient Education Goal, OT)  goal ongoing  -AC           Living Environment    Home Accessibility  tub/shower is not walk in;stairs to enter home  -AC          User Key  (r) = Recorded By, (t) = Taken By, (c) = Cosigned By    Initials Name Effective Dates    AC Marine Jo, OT 06/23/15 -                OT Recommendation and Plan  Outcome Summary/Treatment Plan (OT)  Anticipated Discharge Disposition (OT): home with assist, home with OP services  Planned Therapy Interventions (OT Eval): BADL retraining, functional balance retraining, IADL retraining, occupation/activity based interventions, patient/caregiver education/training, transfer/mobility retraining  Therapy Frequency (OT Eval): daily  Plan of Care Review  Plan of Care Reviewed With: patient  Plan of Care Reviewed With: patient  Outcome Summary: OT eval  complete.  Pt presents with decreased balance and R visuall deficit limiting independence and safety with ADLs.  Pt required supervision to don/doff socks,  supervison STS,  CGA to ambulate 20 ft without AD.  Pt educated in visual compensatory strategies to increase safety and independence with self care.  OT will follow to advance pt toward increased independence with ADLs.  Recommend home wiht assist and OP OT upon d/c.     Outcome Measures     Row Name 03/06/20 0901             How much help from another is currently needed...    Putting on and taking off regular lower body clothing?  3  -AC      Bathing (including washing, rinsing, and drying)  3  -AC      Toileting (which includes using toilet bed pan or urinal)  3  -AC      Putting on and taking off regular upper body clothing  4  -AC      Taking care of personal grooming (such as brushing teeth)  3  -AC      Eating meals  4  -AC      AM-PAC 6 Clicks Score (OT)  20  -AC         Functional Assessment    Outcome Measure Options  AM-PAC 6 Clicks Daily Activity (OT)  -AC        User Key  (r) = Recorded By, (t) = Taken By, (c) = Cosigned By    Initials Name Provider Type    Marine Taylor OT Occupational Therapist          Time Calculation:   Time Calculation- OT     Row Name 03/06/20 0901             Time Calculation- OT    OT Start Time  0901  -      OT Received On  03/06/20  -      OT Goal Re-Cert Due Date  03/16/20  -        User Key  (r) = Recorded By, (t) = Taken By, (c) = Cosigned By    Initials Name Provider Type    Marine Taylor OT Occupational Therapist        Therapy Charges for Today     Code Description Service Date Service Provider Modifiers Qty    [id number]  OT EVAL MOD COMPLEXITY 4 3/6/2020 Marine Jo OT GO 1               Marine Jo OT  3/6/2020

## 2020-03-06 NOTE — PROGRESS NOTES
HEPARIN INFUSION    Dillon Vo is a 69 y.o. male receiving heparin infusion.     Therapy for (VTE/Cardiac): Cardiac (mechanical AVR, bridge therapy for warfarin)  Patient Weight: 73.4 kg  Initial Bolus (Y/N): No  Any Bolus (Y/N): Yes     Signs or Symptoms of Bleeding: admitted with hematoma; monitor closely    Cardiac or Other (Not VTE)   Initial Bolus: 60 units/kg (Max 4,000 units)  Initial rate: 12 units/kg/hr (Max 1,000 units/hr)   Anti-Xa (IU/mL) Bolus Dose Stop Infusion Rate Change Repeat Anti-Xa      ?0.19 60 units/kg 0 hrs Increase rate by 4 units/kg/hr 6 hrs       0.2 - 0.29  30 units/kg 0 hrs Increase rate by 2 units/kg/hr 6 hrs    0.3 - 0.7 0 0 hrs No change 6 hrs      0.71 - 0.99 0  0 hrs Decrease rate by 2 units/kg/hr 6 hrs            ?1 0 Hold 1 hr Decrease rate by 3 units/kg/hr 6 hrs      Results from last 7 days   Lab Units 03/05/20  0430 03/04/20  0501 03/04/20  0500 03/03/20  0458  03/02/20  0540  03/01/20  0806   INR  2.05*  --  1.80* 1.66*  --  1.47*  --  1.27*   HEMOGLOBIN g/dL 10.6* 10.5*  --  10.3*   < > 10.4*   < > 10.7*   HEMATOCRIT % 32.5* 32.0*  --  31.2*   < > 31.4*   < > 32.8*   PLATELETS 10*3/mm3  --   --   --  209  --  198  --  232    < > = values in this interval not displayed.        Date   Time   Anti-Xa Current Rate (Unit/kg/hr) Bolus   (Units) Rate Change   (Unit/kg/hr) New Rate (Unit/kg/hr) Next   Anti-Xa Comments  Pump Check Daily   2/28 1154 0.10 New start  -- -- 12 1900  S/w [place]   2/28 1937 0.1 12 2000 +4 16 0400 Dw RN   2/29 0338 0.19 16 3500 +4 20 1200 [place] [id number] -b   2/29 1125 0.52 20 -- -- 20 [address]   2/29 1807 0.35 20 -- +1 21 0100 DW RN Reny   3/1 0103 0.57 21 -- -- 21 0600 Reny 8289 -acb   3/1 0806 0.56 21 -- -- 21 0[address] [place]   3/2 0540 0.38 21 -- -- 21 1800 Recheck tonight given trend down; d/w RN; pump verified - wt on order updated   3/2 1754 0.26 21 -- +2 23 0100 DW  RN   3/3 0028 0.29 23 -- +2 25 0800 Discussed w/ nurse   3/3 0817 0.37 25 -- -- 25  1400 D/w RN   3/3 1349 0.4 25 -- -- 25 0600 D/w RN; pump verified   3/4 0500 0.41 25 -- -- 25 0600 D/w RN; pump verified   3/5 0430 0.71 25 -- -1 24 1400 D/w RN   3/5 1357 0.76 24 -- -2 22 2100 D/w RN; pump verified   3/5 2034 0.72 22 -- - 2 20 0600 Anders Torres [id number]   3/6 0552 0.63 20 -- -- 20 1300 D/w Agapito RN; pump verified                                                 Jean Johnson PharmD, BCPS  3/6/2020  12:25 PM

## 2020-03-06 NOTE — PLAN OF CARE
Pt remains on heparin gtt. Labs are showing improvement. No complaints from pt. Will continue to monitor.

## 2020-03-06 NOTE — PROGRESS NOTES
Neurology       Patient Care Team:  Ez Cintron MD as PCP - General  Payam Kenney MD as PCP - Claims [facility]  Ez Tucker MD as Consulting Physician (Cardiology)    Chief complaint: No complaints    History: Doing well.    No change in his vision.    Working with physical therapy.  Past Medical History:   Diagnosis Date   • A-fib (CMS/[facility])    • Anemia    • Atrial fibrillation (CMS/HCC) 5/5/2016    Difficult to control rate. RFA of AV node with implantation of left Bi-V pacemaker, 06/18/2013. Hematoma requiring single-chamber pacemaker implanted, 07/29/2013.    • Colitis    • Colon cancer (CMS/[facility])    • Depression    • HTN (hypertension)    • Hyperbilirubinemia    • Hyperlipidemia    • Orthostatic hypotension        Vital Signs   Vitals:    03/06/20 0300 03/06/20 0410 03/06/20 0500 03/06/20 0810   BP:    111/69   BP Location:    Left arm   Patient Position:    Lying   Pulse: 69  69    Resp:    16   Temp:       TempSrc:       SpO2:       Weight:  63 kg (138 lb 12.8 oz)     Height:           Physical Exam:   General: Awake and alert              Neuro: Began with right field cut.  Working on [employer] puzzles.    Moves all extremities.        Results Review:  INR is 2.36  Results from last 7 days   Lab Units 03/06/20  0552  03/03/20  0458   WBC 10*3/mm3  --   --  6.02   HEMOGLOBIN g/dL 10.7*   < > 10.3*   HEMATOCRIT % 32.8*   < > 31.2*   PLATELETS 10*3/mm3  --   --  209    < > = values in this interval not displayed.     Results from last 7 days   Lab Units 03/01/20  0806 02/29/20  0338   SODIUM mmol/L 133* 134*   POTASSIUM mmol/L 4.3 3.8   CHLORIDE mmol/L 100 99   CO2 mmol/L 23.0 23.0   BUN mg/dL 10 9   CREATININE mg/dL 0.72* 0.64*   CALCIUM mg/dL 8.8 8.7   GLUCOSE mg/dL 102* 101*       Imaging Results (Last 24 Hours)     ** No results found for the last 24 hours. **          Assessment:  Acute stroke with right homonymous hemianopsia.    Subacute stroke with severe incoordination left  side    Plan:  Okay to discharge anytime with outpatient PT and OT.    Suggested that he see Dr. Cesar Barkley for visual field testing to help decide if he should be restricted in his driving.    In the meantime he was recommended not to operate a motor vehicle.    Comment:  Patient is doing fine on aspirin and statin.    See the patient on request         I discussed the patients findings and my recommendations with patient    Kapil Suresh MD  03/06/20  4:49 PM    followup

## 2020-03-07 ENCOUNTER — READMISSION MANAGEMENT (OUTPATIENT)
Dept: CALL CENTER | Facility: HOSPITAL | Age: 70
End: 2020-03-07

## 2020-03-07 VITALS
RESPIRATION RATE: 16 BRPM | HEART RATE: 69 BPM | DIASTOLIC BLOOD PRESSURE: 81 MMHG | OXYGEN SATURATION: 95 % | WEIGHT: 140.2 LBS | TEMPERATURE: 97.9 F | SYSTOLIC BLOOD PRESSURE: 125 MMHG | HEIGHT: 72 IN | BODY MASS INDEX: 18.99 KG/M2

## 2020-03-07 PROBLEM — S30.1XXA RECTUS SHEATH HEMATOMA: Status: RESOLVED | Noted: 2020-02-26 | Resolved: 2020-03-07

## 2020-03-07 PROBLEM — J10.1 INFLUENZA A: Status: RESOLVED | Noted: 2020-02-20 | Resolved: 2020-03-07

## 2020-03-07 PROBLEM — R79.1 SUPRATHERAPEUTIC INR: Status: RESOLVED | Noted: 2020-02-26 | Resolved: 2020-03-07

## 2020-03-07 PROBLEM — S30.1XXA HEMATOMA OF RECTUS SHEATH: Status: RESOLVED | Noted: 2020-02-26 | Resolved: 2020-03-07

## 2020-03-07 LAB
BASOPHILS # BLD AUTO: 0.05 10*3/MM3 (ref 0–0.2)
BASOPHILS NFR BLD AUTO: 1.2 % (ref 0–1.5)
DEPRECATED RDW RBC AUTO: 54.5 FL (ref 37–54)
EOSINOPHIL # BLD AUTO: 0.04 10*3/MM3 (ref 0–0.4)
EOSINOPHIL NFR BLD AUTO: 1 % (ref 0.3–6.2)
ERYTHROCYTE [DISTWIDTH] IN BLOOD BY AUTOMATED COUNT: 13.4 % (ref 12.3–15.4)
HCT VFR BLD AUTO: 32.5 % (ref 37.5–51)
HGB BLD-MCNC: 10.6 G/DL (ref 13–17.7)
IMM GRANULOCYTES # BLD AUTO: 0.03 10*3/MM3 (ref 0–0.05)
IMM GRANULOCYTES NFR BLD AUTO: 0.7 % (ref 0–0.5)
INR PPP: 2.54 (ref 0.85–1.16)
LYMPHOCYTES # BLD AUTO: 0.96 10*3/MM3 (ref 0.7–3.1)
LYMPHOCYTES NFR BLD AUTO: 23.3 % (ref 19.6–45.3)
MACROCYTES BLD QL SMEAR: NORMAL
MCH RBC QN AUTO: 36.2 PG (ref 26.6–33)
MCHC RBC AUTO-ENTMCNC: 32.6 G/DL (ref 31.5–35.7)
MCV RBC AUTO: 110.9 FL (ref 79–97)
MONOCYTES # BLD AUTO: 0.36 10*3/MM3 (ref 0.1–0.9)
MONOCYTES NFR BLD AUTO: 8.7 % (ref 5–12)
NEUTROPHILS # BLD AUTO: 2.68 10*3/MM3 (ref 1.7–7)
NEUTROPHILS NFR BLD AUTO: 65.1 % (ref 42.7–76)
NRBC BLD AUTO-RTO: 0 /100 WBC (ref 0–0.2)
PLAT MORPH BLD: NORMAL
PLATELET # BLD AUTO: 185 10*3/MM3 (ref 140–450)
PMV BLD AUTO: 11.1 FL (ref 6–12)
PROTHROMBIN TIME: 27 SECONDS (ref 11.5–14)
RBC # BLD AUTO: 2.93 10*6/MM3 (ref 4.14–5.8)
UFH PPP CHRO-ACNC: 0.56 IU/ML (ref 0.3–0.7)
WBC MORPH BLD: NORMAL
WBC NRBC COR # BLD: 4.12 10*3/MM3 (ref 3.4–10.8)

## 2020-03-07 PROCEDURE — 99239 HOSP IP/OBS DSCHRG MGMT >30: CPT | Performed by: FAMILY MEDICINE

## 2020-03-07 PROCEDURE — 85025 COMPLETE CBC W/AUTO DIFF WBC: CPT | Performed by: FAMILY MEDICINE

## 2020-03-07 PROCEDURE — 85610 PROTHROMBIN TIME: CPT | Performed by: INTERNAL MEDICINE

## 2020-03-07 PROCEDURE — 85007 BL SMEAR W/DIFF WBC COUNT: CPT | Performed by: FAMILY MEDICINE

## 2020-03-07 PROCEDURE — 85520 HEPARIN ASSAY: CPT

## 2020-03-07 RX ORDER — ATORVASTATIN CALCIUM 80 MG/1
80 TABLET, FILM COATED ORAL NIGHTLY
Qty: 30 TABLET | Refills: 0 | Status: SHIPPED | OUTPATIENT
Start: 2020-03-07 | End: 2021-02-12 | Stop reason: SDUPTHER

## 2020-03-07 RX ORDER — WARFARIN SODIUM 5 MG/1
TABLET ORAL
Qty: 30 TABLET | Refills: 0 | Status: SHIPPED | OUTPATIENT
Start: 2020-03-07 | End: 2020-08-03 | Stop reason: SDUPTHER

## 2020-03-07 RX ADMIN — LISINOPRIL 10 MG: 10 TABLET ORAL at 08:25

## 2020-03-07 RX ADMIN — CITALOPRAM HYDROBROMIDE 20 MG: 20 TABLET ORAL at 08:26

## 2020-03-07 RX ADMIN — BUPROPION HYDROCHLORIDE 300 MG: 150 TABLET, FILM COATED, EXTENDED RELEASE ORAL at 08:25

## 2020-03-07 RX ADMIN — CARVEDILOL 12.5 MG: 12.5 TABLET, FILM COATED ORAL at 08:25

## 2020-03-07 RX ADMIN — FINASTERIDE 5 MG: 5 TABLET, FILM COATED ORAL at 08:25

## 2020-03-07 RX ADMIN — ASPIRIN 81 MG: 81 TABLET, COATED ORAL at 08:25

## 2020-03-07 NOTE — PROGRESS NOTES
Case Management Discharge Note      Final Note: Plan is for pt. to dc to home with family. Voiced no needs. Family to transport.          Destination      No service has been selected for the patient.      Durable Medical Equipment      No service has been selected for the patient.      Dialysis/Infusion      No service has been selected for the patient.      Home Medical Care      No service has been selected for the patient.      Therapy      No service has been selected for the patient.      Community Resources      No service has been selected for the patient.             Final Discharge Disposition Code: 01 - home or self-care

## 2020-03-07 NOTE — DISCHARGE SUMMARY
[facility] Medicine Services  DISCHARGE SUMMARY    Patient Name: Dillon Vo  : 1950  MRN: [id number]    Date of Admission: 2020  2:12 PM  Date of Discharge:  3/7/2020  Primary Care Physician: Ez Cintron MD    Consults     Date and Time Order Name Status Description    3/5/2020 0534 Inpatient Neurology Consult Stroke Completed     2020 1221 Inpatient Pulmonology Consult Completed           Hospital Course     Presenting Problem:   Hematoma of rectus sheath, initial encounter [S30.1XXA]    Active Hospital Problems    Diagnosis  POA   • Subacute, multifocal ischemic strokes due to cardioembolism [I63.9]  Yes   • Chronic anticoagulation on warfarin  [Z79.01]  Not Applicable   • Suspected pulmonary HTN  [I27.20]  Yes   • Macrocytosis [D75.89]  Yes   • Self-catheterizes urinary bladder [Z78.9]  Yes   • CAD  [I25.10]  Yes   • H/O mechanical AVR  [Z95.2]  Not Applicable   • AF s/p RFA and BiV PPM  [I48.91]  Yes   • Dyslipidemia [E78.5]  Yes   • Hypertension [I10]  Yes      Resolved Hospital Problems    Diagnosis Date Resolved POA   • **Large rectus sheath hematoma [S30.1XXA] 2020 Yes   • Supratherapeutic INR  [R79.1] 2020 Yes   • Hematoma of rectus sheath [S30.1XXA] 2020 Yes   • Recent influenza A PNA (treated w/Tamiflu)  [J10.1] 2020 Yes          Hospital Course:  Dillon Vo is a 69 y.o. male with a past medical history of hypertension, hyperlipidemia, chronic tobacco abuse, atrial fibrillation, CAD status post CABG, VHD with history of mechanical aortic valve who was admitted on  with supratherapeutic INR and rectus sheath hematoma, requiring Kcentra and reversal of anticoagulation. He was initially  in the intensive care unit but improved and has  been restarted on anticoagulation with heparin infusion and warfarin. He was recently admitted from - with respiratory failure, pneumonia, and influenza A  complicated by hemoptysis.  Both staff and family have noted some intermittent confusion with short-term memory loss that did  improved over the course of his stay however, on the afternoon of 3/4, patient noted some right visual field deficits, leading to CT of the head which shows evidence of multifocal cardioembolic subacute ischemic strokes.  He has no other clear neurologic deficits. Neurology has evaluated and recommends continuing Asprin and statin. He also needs to be evaluated by dr paredes prior to being approved to drive. On day of discharge pt INR was at goal at 2.5. He will follow up for INR check and then later with pcp.       Discharge Follow Up Recommendations for outpatient labs/diagnostics:   INR check on 3/10/2020  PCP on 3/17/2020  Dr Cesar Paredes   Follow up with Neurology    Day of Discharge     HPI:   Patient is sitting up in bed. He continues to have vision loss of the right eye.     Review of Systems  Gen- No fevers, chills  CV- No chest pain, palpitations  Resp- No cough, dyspnea  GI- No N/V/D, abd pain        Vital Signs:   Temp:  [97.9 °F (36.6 °C)] 97.9 °F (36.6 °C)  Heart Rate:  [69-72] 69  Resp:  [16] 16  BP: (111-127)/(63-81) 125/81     Physical Exam:  Constitutional: No acute distress, awake, alert, male   HENT: NCAT, mucous membranes moist  Respiratory: Clear to auscultation bilaterally, respiratory effort normal, +click   Cardiovascular: RRR, no murmurs, rubs, or gallops, palpable pedal pulses bilaterally  Gastrointestinal: Positive bowel sounds, soft, nontender, nondistended  Musculoskeletal: No bilateral ankle edema  Psychiatric: Appropriate affect, cooperative  Neurologic: Oriented x 3, strength symmetric in all extremities, Cranial Nerves grossly intact to confrontation, speech clear  Skin: No rashes      Pertinent  and/or Most Recent Results     Results from last 7 days   Lab Units 03/07/20  0659 03/06/20  0552 03/05/20  0430 03/04/20  0501 03/03/20  0458 03/02/20  8151  03/02/20  0540  03/01/20  0806   WBC 10*3/mm3 4.12  --   --   --  6.02  --  6.44  --  7.22   HEMOGLOBIN g/dL 10.6* 10.7* 10.6* 10.5* 10.3* 10.2* 10.4*   < > 10.7*   HEMATOCRIT % 32.5* 32.8* 32.5* 32.0* 31.2* 29.9* 31.4*   < > 32.8*   PLATELETS 10*3/mm3 185  --   --   --  209  --  198  --  232   SODIUM mmol/L  --   --   --   --   --   --   --   --  133*   POTASSIUM mmol/L  --   --   --   --   --   --   --   --  4.3   CHLORIDE mmol/L  --   --   --   --   --   --   --   --  100   CO2 mmol/L  --   --   --   --   --   --   --   --  23.0   BUN mg/dL  --   --   --   --   --   --   --   --  10   CREATININE mg/dL  --   --   --   --   --   --   --   --  0.72*   GLUCOSE mg/dL  --   --   --   --   --   --   --   --  102*   CALCIUM mg/dL  --   --   --   --   --   --   --   --  8.8    < > = values in this interval not displayed.     Results from last 7 days   Lab Units 03/07/20  0659 03/06/20  1320 03/06/20  0552 03/05/20  0430 03/04/20  0500 03/03/20  0458 03/02/20  0540   PROTIME Seconds 27.0* 25.5* 24.7* 22.8* 20.6* 19.2* 17.5*   INR  2.54* 2.36* 2.26* 2.05* 1.80* 1.66* 1.47*     Results from last 7 days   Lab Units 03/05/20  0649   CHOLESTEROL mg/dL 132   TRIGLYCERIDES mg/dL 55   HDL CHOL mg/dL 46           Brief Urine Lab Results  (Last result in the past 365 days)      Color   Clarity   Blood   Leuk Est   Nitrite   Protein   CREAT   Urine HCG        08/15/19 0932 Yellow Cloudy 250 Charli/ul 500 Casey/ul Positive Negative               Microbiology Results Abnormal     None          Imaging Results (All)     Procedure Component Value Units Date/Time    CT Angiogram Head [[id number]] Collected:  03/05/20 0712     Updated:  03/05/20 0958    Narrative:       CTA Head, CTA Neck     INDICATION:    69-year-old male with stroke and loss of right peripheral vision. Abnormal head CT last night. Involving medial temporal and occipital infarct in the left PCA distribution.    Head and neck CTA:    Neck - Patent cervical CCA/ICA/vertebral  arteries. No apparent dissection. There is diffuse atherosclerotic change.  Head - No hemodynamically critical central stenosis or vessel cut off. No apparent aneurysm. No dural sinus occlusion. Better demonstrated on prior noncontrast imaging of the brain is an evolving subacute infarct in the medial temporal and occipital  lobes involving the left PCA distribution. To the extent visualized the posterior cerebral arteries appear symmetric. The source of the evolving infarct in the left PCA distribution is not clearly identified. There is extensive atherosclerotic change of  the cavernous and supraclinoid internal carotid arteries bilaterally.    This is a preliminary wet read by Dr. Yahir Cotton at 0702 hours. Final interpretation will be provided by neuroradiology. Please refer to final interpretation for detailed findings and any further recommendations.    Final interpretation by neuroradiology:      CTA of the head and neck    Technique: CT angio head and neck following administration of 100 cc Isovue-370 IV contrast, including coronal and sagittal MIP 3-D reformatted images.     Indication: Acute onset of stroke with loss of right peripheral vision.    Comparison: No pertinent prior study    TECHNIQUE:   CTA of the head and neck with contrast. Coronal and sagittal reconstructions were obtained.  Radiation dose reduction techniques included automated exposure control or exposure modulation based on body size. Radiation audit for number of CT and nuclear  cardiology exams performed in the last year: 5.     Evaluation for a significant carotid arterial stenosis is based on the NASCET criteria.    Findings:      CT angiography neck:     The visualized aortic arch and its major branch vessels demonstrate prominent atherosclerotic calcification without convincing evidence of a flow-limiting stenosis.    Bilateral common and internal carotid arteries as well as both carotid bifurcations demonstrate atherosclerotic  calcification without evidence of a flow-limiting stenosis. Moderate tortuosity of the vessels.    Extracranial vertebral arteries are of normal course and caliber. The vertebral arteries are codominant and both contribute to the basilar artery      CT angiography head:     The intracranial portions of the internal carotid arteries demonstrate prominent atherosclerotic calcification without convincing evidence of a flow-limiting stenosis.    MCA and TALAT vessels are patent bilaterally. There is a symmetric distal runoff intracranially without evidence of aneurysm or flow-limiting stenosis.    Basilar artery is widely patent. Bilateral posterior cerebral arteries demonstrate symmetric distal runoff without evidence of aneurysm or flow-limiting stenosis.     Major dural venous sinuses show no evidence of filling defect.         Impression:           Prominent atherosclerotic calcification involving the extracranial carotid arteries without evidence of flow-limiting stenosis. The intracranial portions of the internal carotid arteries show prominent calcification without convincing evidence of  flow-limiting stenosis.    Anterior and posterior circulations demonstrate symmetric runoff without evidence of aneurysm or flow-limiting stenosis.    Signer Name: Nura Michaels MD   Signed: 3/5/2020 9:56 AM   Workstation Name: [email or web address]    [facility]    CT Angiogram Neck [[id number]] Collected:  03/05/20 0712     Updated:  03/05/20 0958    Narrative:       CTA Head, CTA Neck     INDICATION:    69-year-old male with stroke and loss of right peripheral vision. Abnormal head CT last night. Involving medial temporal and occipital infarct in the left PCA distribution.    Head and neck CTA:    Neck - Patent cervical CCA/ICA/vertebral arteries. No apparent dissection. There is diffuse atherosclerotic change.  Head - No hemodynamically critical central stenosis or vessel cut off. No apparent aneurysm. No dural sinus  occlusion. Better demonstrated on prior noncontrast imaging of the brain is an evolving subacute infarct in the medial temporal and occipital  lobes involving the left PCA distribution. To the extent visualized the posterior cerebral arteries appear symmetric. The source of the evolving infarct in the left PCA distribution is not clearly identified. There is extensive atherosclerotic change of  the cavernous and supraclinoid internal carotid arteries bilaterally.    This is a preliminary wet read by Dr. Yahir Cotton at 0702 hours. Final interpretation will be provided by neuroradiology. Please refer to final interpretation for detailed findings and any further recommendations.    Final interpretation by neuroradiology:      CTA of the head and neck    Technique: CT angio head and neck following administration of 100 cc Isovue-370 IV contrast, including coronal and sagittal MIP 3-D reformatted images.     Indication: Acute onset of stroke with loss of right peripheral vision.    Comparison: No pertinent prior study    TECHNIQUE:   CTA of the head and neck with contrast. Coronal and sagittal reconstructions were obtained.  Radiation dose reduction techniques included automated exposure control or exposure modulation based on body size. Radiation audit for number of CT and nuclear  cardiology exams performed in the last year: 5.     Evaluation for a significant carotid arterial stenosis is based on the NASCET criteria.    Findings:      CT angiography neck:     The visualized aortic arch and its major branch vessels demonstrate prominent atherosclerotic calcification without convincing evidence of a flow-limiting stenosis.    Bilateral common and internal carotid arteries as well as both carotid bifurcations demonstrate atherosclerotic calcification without evidence of a flow-limiting stenosis. Moderate tortuosity of the vessels.    Extracranial vertebral arteries are of normal course and caliber. The vertebral  arteries are codominant and both contribute to the basilar artery      CT angiography head:     The intracranial portions of the internal carotid arteries demonstrate prominent atherosclerotic calcification without convincing evidence of a flow-limiting stenosis.    MCA and TALAT vessels are patent bilaterally. There is a symmetric distal runoff intracranially without evidence of aneurysm or flow-limiting stenosis.    Basilar artery is widely patent. Bilateral posterior cerebral arteries demonstrate symmetric distal runoff without evidence of aneurysm or flow-limiting stenosis.     Major dural venous sinuses show no evidence of filling defect.         Impression:           Prominent atherosclerotic calcification involving the extracranial carotid arteries without evidence of flow-limiting stenosis. The intracranial portions of the internal carotid arteries show prominent calcification without convincing evidence of  flow-limiting stenosis.    Anterior and posterior circulations demonstrate symmetric runoff without evidence of aneurysm or flow-limiting stenosis.    Signer Name: Nura Michaels MD   Signed: 3/5/2020 9:56 AM   Workstation Name: [id number]    [facility]    CT Head Without Contrast [[id number]] Collected:  03/04/20 1915     Updated:  03/04/20 1917    Narrative:       CT HEAD, NONCONTRAST, 3/4/2020    HISTORY:  69-year-old male hospital inpatient with newly noted loss of a right-sided visual field.    TECHNIQUE:  CT imaging of the head without IV contrast. Radiation dose reduction techniques included automated exposure control. Radiation audit for CT and nuclear cardiology exams in the last 12 months: 5.    FINDINGS:  The images show a large subacute left medial temporal and occipital infarct involving posterior cerebral artery territory. There is a smaller subacute infarct involving the posterior portion of the left mid cerebellar hemisphere. No evidence of  hemorrhagic  transformation.    Chronic infarcts involving the head and body of the left caudate nucleus and the right caudate nucleus head.    Mild chronic generalized cerebral volume loss. Mild diffuse chronic small vessel type white matter changes.    No evidence of acute intracranial hemorrhage, mass, mass effect, extra-axial fluid collection or hydrocephalus.      Impression:       1.  Subacute left medial temporal and occipital infarct in the left PCA distribution. No evidence of hemorrhagic transformation.  2.  Subacute or chronic small left posterior cerebellar infarct.  3.  Chronic appearing bilateral caudate nucleus infarcts.  4.  Diffuse chronic changes as noted above.    Signer Name: Kyaw Callaway MD   Signed: 3/4/2020 7:15 PM   Workstation Name: [email or web address]    [facility]    XR Chest 1 View [[id number]] Collected:  02/27/20 0748     Updated:  02/27/20 1747    Narrative:       EXAMINATION: XR CHEST 1 VW- 02/27/2020     INDICATION: Recent PNA/Flu A; S30.1XXA-Contusion of abdominal wall,  initial encounter; T45.515A-Adverse effect of anticoagulants, initial  encounter      COMPARISON: 02/23/2020     FINDINGS: Cardiac size enlarged. Decrease in midlung predominant  pulmonary opacifications greatest improvement on the left of improving  airspace disease. No pneumothorax or pleural effusion.       Impression:       Cardiac size enlarged. Decrease in midlung predominant  pulmonary opacifications greatest improvement on the left of improving  airspace disease. No pneumothorax or pleural effusion.     D:  02/27/2020  E:  02/27/2020     This report was finalized on 2/27/2020 5:44 PM by Dr. Maxim Herrera.       CT Angiogram Abdomen Pelvis [[id number]] Collected:  02/26/20 2226     Updated:  02/26/20 2229    Narrative:       CTA Abdomen Pelvis    INDICATION:   Patient on anticoagulation with abdominal rectus hematoma seen on prior CT scan with active bleeding.    TECHNIQUE:   CT angiogram of the  abdomen and pelvis without and with IV contrast. 3-D reconstructions were obtained and reviewed. Radiation dose reduction techniques included automated exposure control or exposure modulation based on body size. Count of known CT and  cardiac nuc med studies performed in previous 12 months: 4.     COMPARISON:   PET CT scan 9/25/2019    FINDINGS:   There is minimal bibasilar atelectasis. The liver, gallbladder, spleen, pancreas, adrenal glands and kidneys are normal except for a paracentesis in the left kidney measuring up to 2 cm in diameter. There is no adenopathy.    The upper abdominal aorta is normal in size. The celiac artery and SMA are patent. The renal arteries are patent. There are calcifications at the origins of these vessels. There is fusiform enlargement of the infrarenal abdominal aorta measuring up to  3.1 cm in transverse dimension.    There is a large hematoma in the lower right abdominal rectus muscle. No active contrast extravasation is visible on these angiographic images. On the precontrast images the hematoma margins are better appreciated. The hematoma appears to measure about  7.5 cm in width, 3.8 cm in AP dimension and about 7 cm from superior to inferior. There is some less well-defined hemorrhage in the soft tissues between the bladder and abdominal rectus muscle and there is mild compression of the bladder from the right  side.     PELVIS:   Bladder is distended.The bladder is distended measuring up to 15 cm from superior to inferior and 15 cm from right to left. It is filled with contrast. The prostate gland is normal.    No acute osseous abnormalities.      Impression:       There is a large inferior right abdominal rectus hematoma measuring about 7.5 x 3.8 x 7.0 cm.. I do not have the previous CT examination for comparison. Correlation with measurements on that study is recommended.    There is optimal opacification of the arterial structures and there is no evidence of active bleeding  "on this CT examination    There is also some ill-defined hemorrhage in the preperitoneal space between the abdominal wall and the bladder. There is mild compression of the anterior right side of the bladder.    Bladder is distended and filled with contrast from the previous CT examination.    History mentions \"mesenteric extension \". The mesentery is normal on this study. There is no hemorrhage within it    Signer Name: Anibal Turner MD   Signed: 2/26/2020 10:26 PM   Workstation Name: [email or web address]    [facility]          Results for orders placed in visit on 12/08/16   SCANNED VASCULAR STUDIES       Results for orders placed in visit on 12/08/16   SCANNED VASCULAR STUDIES       Results for orders placed during the hospital encounter of 02/19/20   Adult Transthoracic Echo Complete W/ Cont if Necessary Per Protocol    Narrative · There is biatrial and right ventricular enlargement.  · Mild concentric LVH is present.  · There is mild global hypokinesia of the left ventricle. RV systolic   function appears normal.  · An electronic lead is noted in the right ventricle.  · There is a normally functioning mechanical aortic valve.  · There is mitral annular calcification. MV visualization is subotpimal;   however, mild-moderate mitral regurgitation is noted.  · There is moderate tricuspid regurgitation. The estimated RV systolic   pressure is 59 mmHg, consistent with SEVERE pulmonary artery hypertension.  · No other important findings are noted on this study.          Plan for Follow-up of Pending Labs/Results:    Discharge Details        Discharge Medications      New Medications      Instructions Start Date   atorvastatin 80 MG tablet  Commonly known as:  LIPITOR   80 mg, Oral, Nightly         Changes to Medications      Instructions Start Date   warfarin 5 MG tablet  Commonly known as:  COUMADIN  What changed:    · medication strength  · additional instructions   Take one by mouth daily       "   Continue These Medications      Instructions Start Date   albuterol sulfate  (90 Base) MCG/ACT inhaler  Commonly known as:  PROVENTIL HFA;VENTOLIN HFA;PROAIR HFA   2 puffs, Inhalation, Every 4 Hours PRN      aspirin 81 MG EC tablet   1 tablet, Oral, Daily      buPROPion  MG 24 hr tablet  Commonly known as:  WELLBUTRIN XL   300 mg, Oral, Daily      carvedilol 25 MG tablet  Commonly known as:  COREG   TAKE 1/2 (ONE-HALF) TABLET BY MOUTH TWICE DAILY      citalopram 20 MG tablet  Commonly known as:  CeleXA   TAKE 1 TABLET BY MOUTH ONCE DAILY      famotidine 20 MG tablet  Commonly known as:  PEPCID   20 mg, Oral, Daily      finasteride 5 MG tablet  Commonly known as:  PROSCAR   TAKE 1 TABLET BY MOUTH AT NIGHT      Fluticasone-Umeclidin-Vilant 100-62.5-25 MCG/INH aerosol powder   Commonly known as:  TRELEGY ELLIPTA   1 puff, Inhalation, Daily      folic acid 400 MCG tablet  Commonly known as:  FOLVITE   TAKE 1 TABLET BY MOUTH ONCE DAILY      lisinopril 10 MG tablet  Commonly known as:  PRINIVIL,ZESTRIL   TAKE 1 TABLET BY MOUTH TWICE DAILY      PROBIOTIC DAILY PO   1 capsule, Oral, Daily      vitamin B-12 1000 MCG tablet  Commonly known as:  CYANOCOBALAMIN   1,000 mcg, Oral, Daily         Stop These Medications    cefdinir 300 MG capsule  Commonly known as:  OMNICEF     doxycycline 100 MG enteric coated tablet  Commonly known as:  DORYX     oseltamivir 75 MG capsule  Commonly known as:  TAMIFLU            No Known Allergies      Discharge Disposition:  Home or Self Care    Diet:  Hospital:  Diet Order   Procedures   • Diet Regular; Cardiac, Consistent Carbohydrate       Activity:  Activity Instructions     Activity as Tolerated      Driving Restrictions      Must be cleared by physician prior to driving again    Type of Restriction:  Driving    Driving Restrictions:  No Driving          Restrictions or Other Recommendations:         CODE STATUS:    Code Status and Medical Interventions:   Ordered at:  02/26/20 1817     Limited Support to NOT Include:    Intubation     Level Of Support Discussed With:    Patient     Code Status:    No CPR     Medical Interventions (Level of Support Prior to Arrest):    Limited       Future Appointments   Date Time Provider Department Center   3/17/2020 10:15 AM Ez Arrington MD [facility] None   3/26/2020 11:30 AM [facility] None   3/26/2020 12:15 PM Rj Kenney MD [facility] None       Additional Instructions for the Follow-ups that You Need to Schedule     Discharge Follow-up with PCP   As directed       Currently Documented PCP:    Ez Arrington MD    PCP Phone Number:    [phone]     Follow Up Details:  March 17th, already scheduled         Discharge Follow-up with Specialty: Dr Arrington office on 3/10/20 for INR check   As directed      Specialty:  Dr Arrington office on 3/10/20 for INR check         Discharge Follow-up with Specified Provider: Dr Cesar Barkley   As directed      To:  Dr Cesar Barkley    Follow Up Details:  first available, for vision field testing         Discharge Follow-up with Specified Provider: neurology; 1 Month   As directed      To:  neurology    Follow Up:  1 Month               Time Spent on Discharge:  35 minutes    Electronically signed by Gena Ulloa DO, 03/07/20, 1:25 PM.

## 2020-03-07 NOTE — PROGRESS NOTES
[facility] Medicine Services  PROGRESS NOTE    Patient Name: Dillon Vo  : 1950  MRN: [id number]    Date of Admission: 2020  Primary Care Physician: Ez Cintron MD    Subjective   Subjective     CC:  Right visual field deficit    HPI:  Patient is resting in chair he continues to have right vision field defect. He denies any pain. He did say he wishes he would remember more.         Review of Systems  Gen- No fevers, chills  CV- No chest pain, palpitations  Resp- No cough, dyspnea  GI- No N/V/D, abd pain    Objective   Objective     Vital Signs:   Heart Rate:  [69-72] 71  Resp:  [16] 16  BP: (111)/(63-69) 111/63    Physical Exam:  Constitutional: No acute distress, awake, alert, male  HENT: NCAT, mucous membranes moist  Respiratory: Clear to auscultation bilaterally, no wheezing, nonlabored  Cardiovascular: RRR, no murmurs, rubs, or gallops, palpable pedal pulses bilaterally, A2 mechanical click  Gastrointestinal: Positive bowel sounds, soft, nontender, nondistended  Musculoskeletal: No bilateral ankle edema  Psychiatric: Appropriate affect, cooperative  Neurologic: Oriented x 4, strength symmetric in all extremities, right homonymous hemianopsia on visual field testing  Skin: no rash       Results Reviewed:  Results from last 7 days   Lab Units 20  1320 20  0552 20  0430 20  0501  20  0458  20  0540  20  0806   WBC 10*3/mm3  --   --   --   --   --  6.02  --  6.44  --  7.22   HEMOGLOBIN g/dL  --  10.7* 10.6* 10.5*  --  10.3*   < > 10.4*   < > 10.7*   HEMATOCRIT %  --  32.8* 32.5* 32.0*  --  31.2*   < > 31.4*   < > 32.8*   PLATELETS 10*3/mm3  --   --   --   --   --  209  --  198  --  232   INR  2.36* 2.26* 2.05*  --    < > 1.66*  --  1.47*  --  1.27*    < > = values in this interval not displayed.     Results from last 7 days   Lab Units 20  0806 20  0338   SODIUM mmol/L 133* 134*   POTASSIUM mmol/L 4.3  3.8   CHLORIDE mmol/L 100 99   CO2 mmol/L 23.0 23.0   BUN mg/dL 10 9   CREATININE mg/dL 0.72* 0.64*   GLUCOSE mg/dL 102* 101*   CALCIUM mg/dL 8.8 8.7     Estimated Creatinine Clearance: 77.7 mL/min (A) (by C-G formula based on SCr of 0.72 mg/dL (L)).    Microbiology Results Abnormal     None          Imaging Results (Last 24 Hours)     ** No results found for the last 24 hours. **          Results for orders placed during the hospital encounter of 02/19/20   Adult Transthoracic Echo Complete W/ Cont if Necessary Per Protocol    Narrative · There is biatrial and right ventricular enlargement.  · Mild concentric LVH is present.  · There is mild global hypokinesia of the left ventricle. RV systolic   function appears normal.  · An electronic lead is noted in the right ventricle.  · There is a normally functioning mechanical aortic valve.  · There is mitral annular calcification. MV visualization is subotpimal;   however, mild-moderate mitral regurgitation is noted.  · There is moderate tricuspid regurgitation. The estimated RV systolic   pressure is 59 mmHg, consistent with SEVERE pulmonary artery hypertension.  · No other important findings are noted on this study.          I have reviewed the medications:  Scheduled Meds:    aspirin 81 mg Oral Daily   atorvastatin 80 mg Oral Nightly   buPROPion  mg Oral Daily   carvedilol 12.5 mg Oral BID   citalopram 20 mg Oral Daily   finasteride 5 mg Oral Daily   lisinopril 10 mg Oral BID   warfarin 5 mg Oral Daily     Continuous Infusions:    heparin 20 Units/kg/hr Last Rate: 20 Units/kg/hr (03/06/20 2117)   Pharmacy to Dose Heparin     Pharmacy to dose warfarin       PRN Meds:.ipratropium-albuterol  •  Lidocaine HCl Urethral/Mucosal 2%  •  magnesium sulfate **OR** magnesium sulfate **OR** magnesium sulfate  •  ondansetron  •  Pharmacy to Dose Heparin  •  Pharmacy to dose warfarin  •  sodium chloride    Assessment/Plan   Assessment & Plan     Active Hospital Problems     Diagnosis  POA   • **Large rectus sheath hematoma [S30.1XXA]  Yes   • Subacute, multifocal ischemic strokes due to cardioembolism [I63.9]  Yes   • Chronic anticoagulation on warfarin  [Z79.01]  Not Applicable   • Supratherapeutic INR  [R79.1]  Yes   • Suspected pulmonary HTN  [I27.20]  Yes   • Macrocytosis [D75.89]  Yes   • Self-catheterizes urinary bladder [Z78.9]  Yes   • Hematoma of rectus sheath [S30.1XXA]  Yes   • Recent influenza A PNA (treated w/Tamiflu)  [J10.1]  Yes   • CAD  [I25.10]  Yes   • H/O mechanical AVR  [Z95.2]  Not Applicable   • AF s/p RFA and BiV PPM  [I48.91]  Yes   • Dyslipidemia [E78.5]  Yes   • Hypertension [I10]  Yes      Resolved Hospital Problems   No resolved problems to display.        Brief Hospital Course to date:  Dillon Vo is a 69 y.o. male with a past medical history of hypertension, hyperlipidemia, chronic tobacco abuse, atrial fibrillation, CAD status post CABG, VHD with history of mechanical aortic valve who was admitted on 2/26 with supratherapeutic INR and rectus sheath hematoma, requiring Kcentra and reversal of anticoagulation.  Patient was monitored in the intensive care unit and been restarted on anticoagulation with heparin infusion and warfarin.  Plan to monitor until INR is therapeutic with close outpatient follow-up with PCP.  He was recently admitted from 2/19-2/24 with respiratory failure, pneumonia, and influenza A complicated by hemoptysis. INR has slowly trended up and is nearing goal of 2.5-3.5.  Both staff and family have noted some intermittent confusion with short-term memory loss.  This is improved over the course of his stay however, on the afternoon of 3/4, patient noted some right visual field deficits, leading to CT of the head which shows evidence of multifocal cardioembolic subacute ischemic strokes.  He has no other clear neurologic deficits.  NIH stroke scale of 2.  Both neurology and stroke navigator have seen patient on today.    Right  Rectus Sheath Hematoma, improved  Supratherapeutic INR, s/p Kcentra and Vitamin K on admission  -Continue heparin drip until INR 2.5-3.5.  Pharmacy dosing warfarin.  INR 2.3 today  -anticipate it being theraputic tomorrow, he can get INR checked any day next week at PCP office   - Because of rectus sheath hematoma appears to be spontaneous in the setting of significant coughing from recent pneumonia and supratherapeutic INR.  Patient is uncertain how much Coumadin he took or if he took any at all after discharge on 2/24.  His dose has been adjusted down due to antibiotics.    Subacute, cardioembolic CVAs of the left medial temporal and occipital lobe and PCA distribution, left posterior cerebellum, and bilateral caudate nuclei (new today)  Right homonymous hemianopsia  -It is unknown when these strokes occurred, likely within the past several weeks  -Remarkably, patient has few symptoms outside of hemianopsia  -We discussed that he should likely have formal evaluation of his ability to drive at [facility] after discharge  - CTA head and neck  negative for hemodynamically significant stenosis or atherosclerotic disease  - Aspirin, high intensity atorvastatin, full anticoagulation  - PT/OT consult  - Lipid panel reviewed  -TTE from 2/19 reviewed    Pulmonary Hypertension  VHD with hx of Mechanical Aortic Valve  Moderate Tricuspid Regurgitation   -stable on carvedilol and lisinopril    Chronic Urinary Retention with self cath, continue intermittent self cath     PAF with hx of RFA  BiV PPM  - continue rate control with carvedilol, full anticoagulation as noted above    Chronic Pulmonary Nodule  Chronic tobacco Abuse, continue bupropion, patient plans to stop smoking  Macrocytic Anemia, Hgb stable  CAD with hx of CABG, last LVEF 46%    DVT Prophylaxis:  Heparin gtt   Disposition: I expect the patient to be discharged home tomorrow    CODE STATUS:   Code Status and Medical Interventions:   Ordered at: 02/26/20 9660      Limited Support to NOT Include:    Intubation     Level Of Support Discussed With:    Patient     Code Status:    No CPR     Medical Interventions (Level of Support Prior to Arrest):    Limited            Electronically signed by Gena Ulloa DO, 03/06/20, 9:18 PM.

## 2020-03-07 NOTE — OUTREACH NOTE
Prep Survey      Responses   [facility] facility patient discharged from?  [place]   Is LACE score < 7 ?  No   Eligibility  [facility]   Date of Admission  02/26/20   Date of Discharge  03/07/20   Discharge Disposition  Home or Self Care   Discharge diagnosis  Hematoma of Rectus Shealth, Stroke   Does the patient have one of the following disease processes/diagnoses(primary or secondary)?  Stroke (TIA)   Does the patient have Home health ordered?  No   Is there a DME ordered?  No   Prep survey completed?  Yes          Jitendra Tai RN

## 2020-03-07 NOTE — PLAN OF CARE
"VSS; paced on monitor; A&O is forgetful at times,, states \" has lost his peripheral vision in his right eye; ambulates without difficulty with stand by assist of one; Still needing to I/O cath states \"about every 12 hours, refused to cath around 2200 this shift stated he would wait until around 4 am; is still on heparin gtt at present, levels to be checked this am; call light within his reach, and will continue to monitor   "

## 2020-03-07 NOTE — PROGRESS NOTES
"Pharmacy Consult - Warfarin    Dillon Vo is a 69 y.o. male on warfarin therapy.    Height - 182.9 cm (72\")  Weight - 63.6 kg (140 lb 3.2 oz)    Consulting Provider:  Case  Indication: Mechanical AVR, atrial fibrillation  Goal INR: 2.5-3.5  Home Regimen:   -Warfarin 5mg daily except 2.5mg on Sundays and Tuesdays     Bridge Therapy: Yes - heparin drip    Drug-Drug Interactions with current regimen:  No major DDIs    Warfarin Dosing During Admission:  Date  2/26 2/27 2/28 2/29 3/1 3/2 3/3 3/4 3/5   INR  5 1.25 1.27 1.27 1.27 1.47 1.66 1.80 2.05   Dose  Kcentra + Vit K 10mg IV HOLD Hep gtt started 5mg 5mg 5mg 5mg 7.5mg 5mg     Date  3/6 3/7          INR  2.26, 2.36 2.54          Dose  5 mg 5 mg            Education Provided: Patient is on warfarin prior to admission with appropriate follow-up. Written education provided on 2/20/20 during recent last admission.    Discharge Follow up:    Following Provider - Shruti Sigala (Dr. Cintron's office) - [facility]   Follow up time range or appointment - 2-3 days after discharge    Labs:  Results from last 7 days   Lab Units 03/07/20  0659 03/06/20  1320 03/06/20  0552 03/05/20  0430 03/04/20  0501 03/04/20  0500 03/03/20  0458 03/02/20  1948 03/02/20  0540  03/01/20  0806   INR  2.54* 2.36* 2.26* 2.05*  --  1.80* 1.66*  --  1.47*  --  1.27*   HEMOGLOBIN g/dL 10.6*  --  10.7* 10.6* 10.5*  --  10.3* 10.2* 10.4*   < > 10.7*   HEMATOCRIT % 32.5*  --  32.8* 32.5* 32.0*  --  31.2* 29.9* 31.4*   < > 32.8*   PLATELETS 10*3/mm3 185  --   --   --   --   --  209  --  198  --  232    < > = values in this interval not displayed.     Results from last 7 days   Lab Units 03/01/20  0806   SODIUM mmol/L 133*   POTASSIUM mmol/L 4.3   CHLORIDE mmol/L 100   CO2 mmol/L 23.0   BUN mg/dL 10   CREATININE mg/dL 0.72*   CALCIUM mg/dL 8.8   GLUCOSE mg/dL 102*     Current dietary intake: ~25% of documented meals  Diet Order   Procedures    Diet Regular; " Cardiac, Consistent Carbohydrate     Assessment/Plan:   1. Pharmacy dosing warfarin for mechanical AVR + atrial fibrillation, goal INR 2.5-3.5. Warfarin restarted 2/29 following hematoma w/ supratherapeutic INR requiring reversal.  2. Discharge plan: Patient's INR is now therapeutic today at 2.54, low end of therapeutic range. Patient will likely be discharged today. Would recommend continuing previous home regimen of warfarin 5 mg daily except 2.5mg on Sundays and Tuesdays for now with follow up INR and possible adjustment Monday or Tuesday at Dr. Cintron's office. Would consider decreasing home dose at that time to 5 mg daily except 2.5mg on Sun, Tues, Thurs but will defer to outpatient management since these days have already past this week. Patient's supratherapeutic INR at admission is likely at least partially related to interaction with antibiotics.  3. Will follow daily PT/INR.  4. Pharmacy will continue to follow closely and adjust warfarin as needed based on drug interactions, daily INR trend, and clinical status.    Thank you,  Faisal Franklin, PharmD  3/7/2020  8:35 AM

## 2020-03-09 ENCOUNTER — TRANSITIONAL CARE MANAGEMENT TELEPHONE ENCOUNTER (OUTPATIENT)
Dept: CALL CENTER | Facility: HOSPITAL | Age: 70
End: 2020-03-09

## 2020-03-09 NOTE — OUTREACH NOTE
Call Center TCM Note      Responses   [facility] patient discharged from?  [place]   Does the patient have one of the following disease processes/diagnoses(primary or secondary)?  Stroke (TIA)   TCM attempt successful?  Yes   Call start time  0942   Call end time  0953   Discharge diagnosis  Hematoma of Rectus Shealth, Stroke   Person spoke with today (if not patient) and relationship  Meghana and patient   Meds reviewed with patient/caregiver?  Yes   Is the patient having any side effects they believe may be caused by any medication additions or changes?  No   Does the patient have all medications ordered at discharge?  Yes   Is the patient taking all medications as directed (includes completed medication regime)?  Yes   Comments regarding appointments  PT/INR check on 3/10/20 at PCP office,  Spirometry test 3/26,  Neurology appt needs to be scheduled.   Does the patient have a primary care provider?   Yes   Does the patient have an appointment with their PCP within 7 days of discharge?  Greater than 7 days   Comments regarding PCP  PCP appt 3/17/20 at 10:15am   What is preventing the patient from scheduling follow up appointments within 7 days of discharge?  Earlier appointment not available   Nursing Interventions  Educated patient on importance of making appointment, Verified appointment date/time/provider   Has the patient kept scheduled appointments due by today?  N/A   Psychosocial issues?  No   Does the patient require any assistance with activities of daily living such as eating, bathing, dressing, walking, etc.?  No   Does the patient have any residual symptoms from stroke/TIA?  Yes   Residual symptoms comments  Pt's peripheral vision is effected in his right eye   Does the patient understand the diet ordered at discharge?  No   Did the patient receive a copy of their discharge instructions?  Yes   Nursing interventions  Reviewed instructions with patient, Educated on Lorenat   What is the patient's  perception of their health status since discharge?  Improving   Nursing interventions  Nurse provided patient education   Is the patient able to teach back FAST for Stroke?  Yes   Is the patient/caregiver able to teach back the risk factors for a stroke?  High blood pressure-goal below 120/80, Smoking, Diabetes, High Cholesterol [Pt quit smoking with hospital admission on 2/26/20. Not a diabetic.]   Is the patient/caregiver able to teach back signs and symptoms related to disease process for when to call PCP?  Yes   Is the patient/caregiver able to teach back signs and symptoms related to disease process for when to call 911?  Yes   If the patient is a current smoker, are they able to teach back resources for cessation?  Smoking cessation medications [Smoker. Pt quit smoking with hospital admission on 2/26/20.]   Is the patient/caregiver able to teach back the hierarchy of who to call/visit for symptoms/problems? PCP, Specialist, Home health nurse, Urgent Care, ED, 911  Yes   TCM call completed?  Yes          Maryjane Schneider RN    3/9/2020, 9:53 AM

## 2020-03-10 ENCOUNTER — LAB (OUTPATIENT)
Dept: INTERNAL MEDICINE | Facility: CLINIC | Age: 70
End: 2020-03-10

## 2020-03-10 DIAGNOSIS — I48.21 PERMANENT ATRIAL FIBRILLATION (HCC): Primary | ICD-10-CM

## 2020-03-10 LAB — INR PPP: 3.1 (ref 2.5–3.5)

## 2020-03-10 PROCEDURE — 36416 COLLJ CAPILLARY BLOOD SPEC: CPT | Performed by: INTERNAL MEDICINE

## 2020-03-10 PROCEDURE — 85610 PROTHROMBIN TIME: CPT | Performed by: INTERNAL MEDICINE

## 2020-03-16 ENCOUNTER — READMISSION MANAGEMENT (OUTPATIENT)
Dept: CALL CENTER | Facility: HOSPITAL | Age: 70
End: 2020-03-16

## 2020-03-16 NOTE — OUTREACH NOTE
Stroke Week 2 Survey      Responses   [facility] patient discharged from?  [place]   Does the patient have one of the following disease processes/diagnoses(primary or secondary)?  Stroke (TIA)   Week 2 attempt successful?  Yes   Call start time  1813   Call end time  1819   Discharge diagnosis  Hematoma of Rectus Shealth, Stroke   Meds reviewed with patient/caregiver?  Yes   Is the patient taking all medications as directed (includes completed medication regime)?  Yes   Comments regarding appointments  Eye appt 3/16/20   Comments regarding PCP  PCP appt 3/17/20 at 10:15am   Has the patient kept scheduled appointments due by today?  Yes   Does the patient require any assistance with activities of daily living such as eating, bathing, dressing, walking, etc.?  No   Does the patient have any residual symptoms from stroke/TIA?  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 DX by MD on 3/16/20.   Does the patient understand the diet ordered at discharge?  Yes   What is the patient's perception of their health status since discharge?  Improving   Nursing interventions  Nurse provided patient education   Is the patient able to teach back FAST for Stroke?  Yes   Is the patient/caregiver able to teach back the risk factors for a stroke?  Sleep apnea, Illegal drug use, Excessive alcohol intake   Week 2 call completed?  Yes   Revoked  No further contact(revokes)-requires comment   Graduated/Revoked comments  Pt reports he's doing well. He's following up with providers.          Maryjane Schneider RN

## 2020-03-17 ENCOUNTER — OFFICE VISIT (OUTPATIENT)
Dept: INTERNAL MEDICINE | Facility: CLINIC | Age: 70
End: 2020-03-17

## 2020-03-17 VITALS
DIASTOLIC BLOOD PRESSURE: 74 MMHG | HEART RATE: 80 BPM | TEMPERATURE: 98 F | SYSTOLIC BLOOD PRESSURE: 142 MMHG | RESPIRATION RATE: 18 BRPM | BODY MASS INDEX: 20.07 KG/M2 | WEIGHT: 148 LBS

## 2020-03-17 DIAGNOSIS — R31.9 HEMATURIA, UNSPECIFIED TYPE: ICD-10-CM

## 2020-03-17 DIAGNOSIS — S30.1XXD HEMATOMA OF RECTUS SHEATH, SUBSEQUENT ENCOUNTER: ICD-10-CM

## 2020-03-17 DIAGNOSIS — I48.21 PERMANENT ATRIAL FIBRILLATION (HCC): ICD-10-CM

## 2020-03-17 DIAGNOSIS — D64.9 ANEMIA, UNSPECIFIED TYPE: Primary | ICD-10-CM

## 2020-03-17 LAB
BACTERIA UR QL AUTO: NORMAL /HPF
BASOPHILS # BLD AUTO: 0.07 10*3/MM3 (ref 0–0.2)
BASOPHILS NFR BLD AUTO: 1.4 % (ref 0–1.5)
BILIRUB BLD-MCNC: NEGATIVE MG/DL
CLARITY, POC: CLEAR
COLOR UR: YELLOW
DEPRECATED RDW RBC AUTO: 48.4 FL (ref 37–54)
EOSINOPHIL # BLD AUTO: 0.08 10*3/MM3 (ref 0–0.4)
EOSINOPHIL NFR BLD AUTO: 1.6 % (ref 0.3–6.2)
ERYTHROCYTE [DISTWIDTH] IN BLOOD BY AUTOMATED COUNT: 13.1 % (ref 12.3–15.4)
EXPIRATION DATE: ABNORMAL
GLUCOSE UR STRIP-MCNC: NEGATIVE MG/DL
HCT VFR BLD AUTO: 34.6 % (ref 37.5–51)
HGB BLD-MCNC: 11.9 G/DL (ref 13–17.7)
HYALINE CASTS UR QL AUTO: NORMAL /LPF
IMM GRANULOCYTES # BLD AUTO: 0.01 10*3/MM3 (ref 0–0.05)
IMM GRANULOCYTES NFR BLD AUTO: 0.2 % (ref 0–0.5)
INR PPP: 2.1 (ref 2.5–3.5)
KETONES UR QL: NEGATIVE
LEUKOCYTE EST, POC: NEGATIVE
LYMPHOCYTES # BLD AUTO: 1.2 10*3/MM3 (ref 0.7–3.1)
LYMPHOCYTES NFR BLD AUTO: 24.1 % (ref 19.6–45.3)
Lab: ABNORMAL
MCH RBC QN AUTO: 34.8 PG (ref 26.6–33)
MCHC RBC AUTO-ENTMCNC: 34.4 G/DL (ref 31.5–35.7)
MCV RBC AUTO: 101.2 FL (ref 79–97)
MONOCYTES # BLD AUTO: 0.53 10*3/MM3 (ref 0.1–0.9)
MONOCYTES NFR BLD AUTO: 10.7 % (ref 5–12)
NEUTROPHILS # BLD AUTO: 3.08 10*3/MM3 (ref 1.7–7)
NEUTROPHILS NFR BLD AUTO: 62 % (ref 42.7–76)
NITRITE UR-MCNC: NEGATIVE MG/ML
NRBC BLD AUTO-RTO: 0.2 /100 WBC (ref 0–0.2)
PH UR: 6 [PH] (ref 5–8)
PLATELET # BLD AUTO: 157 10*3/MM3 (ref 140–450)
PMV BLD AUTO: 12.1 FL (ref 6–12)
PROT UR STRIP-MCNC: NEGATIVE MG/DL
RBC # BLD AUTO: 3.42 10*6/MM3 (ref 4.14–5.8)
RBC # UR STRIP: ABNORMAL /UL
RBC # UR: NORMAL /HPF
REF LAB TEST METHOD: NORMAL
SP GR UR: 1.01 (ref 1–1.03)
SQUAMOUS #/AREA URNS HPF: NORMAL /HPF
UROBILINOGEN UR QL: NORMAL
WBC NRBC COR # BLD: 4.97 10*3/MM3 (ref 3.4–10.8)
WBC UR QL AUTO: NORMAL /HPF

## 2020-03-17 PROCEDURE — 81015 MICROSCOPIC EXAM OF URINE: CPT | Performed by: INTERNAL MEDICINE

## 2020-03-17 PROCEDURE — 81003 URINALYSIS AUTO W/O SCOPE: CPT | Performed by: INTERNAL MEDICINE

## 2020-03-17 PROCEDURE — 85610 PROTHROMBIN TIME: CPT | Performed by: INTERNAL MEDICINE

## 2020-03-17 PROCEDURE — 85025 COMPLETE CBC W/AUTO DIFF WBC: CPT | Performed by: INTERNAL MEDICINE

## 2020-03-17 PROCEDURE — 36415 COLL VENOUS BLD VENIPUNCTURE: CPT | Performed by: INTERNAL MEDICINE

## 2020-03-17 PROCEDURE — 99495 TRANSJ CARE MGMT MOD F2F 14D: CPT | Performed by: INTERNAL MEDICINE

## 2020-03-23 NOTE — PROGRESS NOTES
Chief Complaint   Patient presents with   • Hospital follow up     Adm to [facility] 2/26 - 3/7       History of Present Illness      The patient presents to the office for a follow-up visit from a recent hospitalization. The patient was hospitalized from 26-Feb-2020 through 07-Mar-2020 with atrial fibrillation, an ischemic cerebrovascular accident, a large rectus sheath hematoma and a supratherapeutic INR. The records have been received and reviewed. The medication list has been reconciled. The hospital stay was uncomplicated.    Hospital Course (copied and pasted from hospital discharge summary- reviewed in detail with patient):  Dillon Vo is a 69 y.o. male with a past medical history of hypertension, hyperlipidemia, chronic tobacco abuse, atrial fibrillation, CAD status post CABG, VHD with history of mechanical aortic valve who was admitted on 2/26 with supratherapeutic INR and rectus sheath hematoma, requiring Kcentra and reversal of anticoagulation. He was initially  in the intensive care unit but improved and has  been restarted on anticoagulation with heparin infusion and warfarin. He was recently admitted from 2/19-2/24 with respiratory failure, pneumonia, and influenza A complicated by hemoptysis.  Both staff and family have noted some intermittent confusion with short-term memory loss that did  improved over the course of his stay however, on the afternoon of 3/4, patient noted some right visual field deficits, leading to CT of the head which shows evidence of multifocal cardioembolic subacute ischemic strokes.  He has no other clear neurologic deficits. Neurology has evaluated and recommends continuing Asprin and statin. He also needs to be evaluated by dr paredes prior to being approved to drive. On day of discharge pt INR was at goal at 2.5. He will follow up for INR check and then later with pcp.      Since leaving the hospital he reports that he is back to normal. He denies abdominal pain,  itchy watery eyes, bone pain, chills, congestion, a dry cough, a wet cough, decreased appetite, diarrhea, dysuria, ear drainage, ear pain, eye drainage, facial pain, fever, a headache, joint pain, myalgias, nasal discharge, nausea, neck stiffness, night sweats, a rash, sneezing, a sore throat, vomiting or wheezing. He also reports that he does not have chest pain, dyspnea, edema, syncope, blurred vision or palpitations.    The patient presents for a follow-up of chronic atrial fibrillation and he denies palpitations. The patient denies fatigue, dizziness or exercise intolerance. The patient denies using diet pills, decongestants, alcohol, caffeine or cocaine.    Review of Systems    CONSTITUTIONAL- Denies Unexplained Weight Loss, Fever, Sweats, Weakness or Malaise.    HENT- Denies Sinus Pain, Decreased Hearing or Tinnitus.    GASTROINTESTINAL- Denies Blood per Rectum, Constipation or Heartburn.    PULMONARY- Denies Sputum Production, Cough, Hemoptysis or Pleuritic Chest Pain.    CARDIOVASCULAR- Denies Claudication or Irregular Heart Beat.    Medications      Current Outpatient Medications:   •  albuterol sulfate  (90 Base) MCG/ACT inhaler, Inhale 2 puffs Every 4 (Four) Hours As Needed for Wheezing., Disp: 1 inhaler, Rfl: 0  •  aspirin 81 MG EC tablet, Take 1 tablet by mouth daily., Disp: , Rfl:   •  atorvastatin (LIPITOR) 80 MG tablet, Take 1 tablet by mouth Every Night., Disp: 30 tablet, Rfl: 0  •  buPROPion XL (WELLBUTRIN XL) 300 MG 24 hr tablet, Take 1 tablet by mouth Daily., Disp: 30 tablet, Rfl: 4  •  carvedilol (COREG) 25 MG tablet, TAKE 1/2 (ONE-HALF) TABLET BY MOUTH TWICE DAILY, Disp: 90 tablet, Rfl: 3  •  citalopram (CeleXA) 20 MG tablet, TAKE 1 TABLET BY MOUTH ONCE DAILY, Disp: 30 tablet, Rfl: 5  •  famotidine (PEPCID) 20 MG tablet, Take 1 tablet by mouth Daily., Disp: 30 tablet, Rfl: 1  •  finasteride (PROSCAR) 5 MG tablet, TAKE 1 TABLET BY MOUTH AT NIGHT, Disp: 90 tablet, Rfl: 0  •   Fluticasone-Umeclidin-Vilant (TRELEGY ELLIPTA) 100-62.5-25 MCG/INH aerosol powder , Inhale 1 puff Daily., Disp: 1 each, Rfl: 11  •  folic acid (FOLVITE) 400 MCG tablet, TAKE 1 TABLET BY MOUTH ONCE DAILY, Disp: 90 tablet, Rfl: 3  •  lisinopril (PRINIVIL,ZESTRIL) 10 MG tablet, TAKE 1 TABLET BY MOUTH TWICE DAILY, Disp: 180 tablet, Rfl: 1  •  Probiotic Product (PROBIOTIC DAILY PO), Take 1 capsule by mouth daily., Disp: , Rfl:   •  vitamin B-12 (CYANOCOBALAMIN) 1000 MCG tablet, Take 1 tablet by mouth Daily., Disp: 90 tablet, Rfl: 3  •  warfarin (COUMADIN) 5 MG tablet, Take one by mouth daily  Indications: Atrial Fibrillation, Presence of Mechanical Artificial Heart Valve, Disp: 30 tablet, Rfl: 0     Current outpatient and discharge medications have been reconciled for the patient.  Reviewed by: Ez Cintron MD    Allergies    No Known Allergies    Problem List    Patient Active Problem List   Diagnosis   • AF s/p RFA and BiV PPM    • Depression   • Hyperbilirubinemia   • Dyslipidemia   • Hypertension   • Malignant neoplasm of overlapping sites of colon (CMS/HCC)   • History of artificial heart valve   • Neurogenic bladder   • Tobacco use   • CAD    • H/O mechanical AVR    • COPD, mild (CMS/HCC)   • Bilateral pneumonia   • Thrombocytopenia (CMS/HCC)   • Hemoptysis   • Lung nodule < 6cm on CT   • Chronic anticoagulation on warfarin    • Suspected pulmonary HTN    • Macrocytosis   • Self-catheterizes urinary bladder   • Subacute, multifocal ischemic strokes due to cardioembolism       Medications, Allergies, Problems List and Past History were reviewed and updated.    Physical Examination    /74 (BP Location: Left arm, Patient Position: Sitting, Cuff Size: Adult)   Pulse 80   Temp 98 °F (36.7 °C) (Temporal)   Resp 18   Wt 67.1 kg (148 lb)   BMI 20.07 kg/m²     HEENT: Head- Normocephalic Atraumatic. Facies- Within normal limits. Pinnas- Normal texture and shape bilaterally. Canals- Normal bilaterally. TMs-  Normal bilaterally. Nares- Patent bilaterally. Nasal Septum- is normal. There is no tenderness to palpation over the frontal or maxillary sinuses. Lids- Normal bilaterally. Conjunctiva- Clear bilaterally. Sclera- Anicteric bilaterally. Oropharynx- Moist with no lesions. Tonsils- No enlargement, erythema or exudate.    Neck: Thyroid- non enlarged, symmetric and has no nodules. No bruits are detected. ROM- Normal Range of Motion with no rigidity.    Lungs: Auscultation- Clear to auscultation bilaterally. There are no retractions, clubbing or cyanosis. The Expiratory to Inspiratory ratio is equal.    Lymph Nodes: Cervical- no enlarged lymph nodes noted.    Cardiovascular: There are no carotid bruits. Heart- Normal Rate with Irregularly Irregular rhythm and no murmurs. There are no gallops. There are no rubs. In the lower extremities there is no edema. The upper extremities do not have edema.    Abdomen: Soft, benign, non-tender with no masses, hernias, organomegaly or scars.    Impression and Assessment    Rectus Sheath Hematoma.    Atrial Fibrillation.    Cerebrovascular Disease.    Plan    Atrial Fibrillation Plan: The current plan was continued.    Cerebrovascular Disease Plan: He was referred to neurology.    Rectus Sheath Hematoma Plan: Monitor the symptoms closely.    Dillon was seen today for hospital follow up.    Diagnoses and all orders for this visit:    Anemia, unspecified type  -     CBC & Differential  -     POC Urinalysis Dipstick, Automated  -     CBC Auto Differential  -     POC INR    Hematoma of rectus sheath, subsequent encounter  -     CBC & Differential  -     POC Urinalysis Dipstick, Automated  -     CBC Auto Differential  -     POC INR    Permanent atrial fibrillation  -     POC INR    Hematuria, unspecified type  -     Urinalysis, Microscopic Only - Urine, Clean Catch      Transitional Care Management Certification  I certify that the following are true:  1. Communication was made within 2  business days of discharge.  2. Complexity of Medical Decision Making is moderate.  3. Face to face visit occurred within 10 days.    *Note: 95993 is for high complexity patients with a face to face visit within 7 days of discharge.  68502 is for high complexity patients with a face to face on days 8-14 post discharge or medium complexity with face to face visit within 14 days post discharge.      Return to Office    The patient was instructed to return for follow-up in 1 month.    The patient was instructed to return sooner if the condition changes, worsens, or does not resolve.

## 2020-04-06 RX ORDER — CITALOPRAM 20 MG/1
TABLET ORAL
Qty: 90 TABLET | Refills: 1 | Status: SHIPPED | OUTPATIENT
Start: 2020-04-06 | End: 2020-12-01

## 2020-04-09 ENCOUNTER — OFFICE VISIT (OUTPATIENT)
Dept: INTERNAL MEDICINE | Facility: CLINIC | Age: 70
End: 2020-04-09

## 2020-04-09 DIAGNOSIS — I48.91 ATRIAL FIBRILLATION, UNSPECIFIED TYPE (HCC): ICD-10-CM

## 2020-04-09 DIAGNOSIS — D64.9 ANEMIA, UNSPECIFIED TYPE: ICD-10-CM

## 2020-04-09 DIAGNOSIS — E78.5 HYPERLIPIDEMIA, UNSPECIFIED HYPERLIPIDEMIA TYPE: ICD-10-CM

## 2020-04-09 DIAGNOSIS — I10 ESSENTIAL HYPERTENSION: Primary | ICD-10-CM

## 2020-04-09 PROCEDURE — 99442 PR PHYS/QHP TELEPHONE EVALUATION 11-20 MIN: CPT | Performed by: INTERNAL MEDICINE

## 2020-04-09 NOTE — PROGRESS NOTES
Chief Complaint   Patient presents with   • Follow-up     You have chosen to receive care through a telephone visit today. Do you consent to use a telephone visit for your medical care today? Yes    This was an audio enabled telemedicine encounter.  The total time of the encounter was greater than 15 minutes.      History of Present Illness    The patient presents for a telephone encounter for anemia. There are no reports of blood loss. The patient has no symptoms of a dry cough, a wet cough, wheezing, fever, a headache, nausea, vomiting, diarrhea, myalgias, abdominal pain, sweats, weight loss, decreased appetite, chills, fatigue or paresthesias. The patient's energy level is normal. There are no reports of chest pain, shortness of breath, palpitations or syncope.    The patient presents for a follow-up related to hypertension. The patient reports that he has had no edema or blurred vision. He states that he is taking his medication as prescribed. He is not having medication side effects.    The patient presents for a follow-up related to hyperlipidemia. He is following a low fat diet. He reports that he is not exercising. He is taking atorvastatin. The patient is taking his medication as prescribed. He reports no medication side effects, including muscle cramps, abdominal pain, headaches or weakness. He denies orthopnea, paroxysmal nocturnal dyspnea or dyspnea on exertion.    The patient presents for a telephone encounter for chronic atrial fibrillation. He denies palpitations. The patient denies dizziness or exercise intolerance.    Review of Systems    CONSTITUTIONAL- Denies Weakness or Malaise.    PULMONARY- Denies Sputum Production, Cough, Hemoptysis or Pleuritic Chest Pain.    CARDIOVASCULAR- Denies Claudication or Irregular Heart Beat.    Medications      Current Outpatient Medications:   •  albuterol sulfate  (90 Base) MCG/ACT inhaler, Inhale 2 puffs Every 4 (Four) Hours As Needed for Wheezing., Disp:  1 inhaler, Rfl: 0  •  aspirin 81 MG EC tablet, Take 1 tablet by mouth daily., Disp: , Rfl:   •  atorvastatin (LIPITOR) 80 MG tablet, Take 1 tablet by mouth Every Night., Disp: 30 tablet, Rfl: 0  •  buPROPion XL (WELLBUTRIN XL) 300 MG 24 hr tablet, Take 1 tablet by mouth Daily., Disp: 30 tablet, Rfl: 4  •  carvedilol (COREG) 25 MG tablet, TAKE 1/2 (ONE-HALF) TABLET BY MOUTH TWICE DAILY, Disp: 90 tablet, Rfl: 3  •  citalopram (CeleXA) 20 MG tablet, Take 1 tablet by mouth once daily, Disp: 90 tablet, Rfl: 1  •  famotidine (PEPCID) 20 MG tablet, Take 1 tablet by mouth Daily., Disp: 30 tablet, Rfl: 1  •  finasteride (PROSCAR) 5 MG tablet, TAKE 1 TABLET BY MOUTH AT NIGHT, Disp: 90 tablet, Rfl: 0  •  Fluticasone-Umeclidin-Vilant (TRELEGY ELLIPTA) 100-62.5-25 MCG/INH aerosol powder , Inhale 1 puff Daily., Disp: 1 each, Rfl: 11  •  folic acid (FOLVITE) 400 MCG tablet, TAKE 1 TABLET BY MOUTH ONCE DAILY, Disp: 90 tablet, Rfl: 3  •  lisinopril (PRINIVIL,ZESTRIL) 10 MG tablet, TAKE 1 TABLET BY MOUTH TWICE DAILY, Disp: 180 tablet, Rfl: 1  •  Probiotic Product (PROBIOTIC DAILY PO), Take 1 capsule by mouth daily., Disp: , Rfl:   •  vitamin B-12 (CYANOCOBALAMIN) 1000 MCG tablet, Take 1 tablet by mouth Daily., Disp: 90 tablet, Rfl: 3  •  warfarin (COUMADIN) 5 MG tablet, Take one by mouth daily  Indications: Atrial Fibrillation, Presence of Mechanical Artificial Heart Valve, Disp: 30 tablet, Rfl: 0     Allergies    No Known Allergies    Problem List    Patient Active Problem List   Diagnosis   • AF s/p RFA and BiV PPM    • Depression   • Hyperbilirubinemia   • Dyslipidemia   • Hypertension   • Malignant neoplasm of overlapping sites of colon (CMS/HCC)   • History of artificial heart valve   • Neurogenic bladder   • Tobacco use   • CAD    • H/O mechanical AVR    • COPD, mild (CMS/HCC)   • Bilateral pneumonia   • Thrombocytopenia (CMS/HCC)   • Hemoptysis   • Lung nodule < 6cm on CT   • Chronic anticoagulation on warfarin    • Suspected  pulmonary HTN    • Macrocytosis   • Self-catheterizes urinary bladder   • Subacute, multifocal ischemic strokes due to cardioembolism       Medications, Allergies, Problems List and Past History were reviewed and updated.    Physical Examination    There were no vitals taken for this visit.    General: The patient is alert and oriented.    HEENT: Facies- Within normal limits. Lids- Normal bilaterally. Conjunctiva- Clear bilaterally. Sclera- Anicteric bilaterally.    Psychiatric Examination: The patient appears appropriate, clean and tidy with a level of consciousness that is appropriate and alert. The facial expression is appropriate and he makes good eye contact. The patient is talkative and the speech volume is appropriate. The words are articulated clearly with a normal flow. There are no circumlocutions and the patient does not paraphrase.       The mood is appropriate. There are no abnormal thought processes and the thought content is normal. There are no delusions or hallucinations noted.    Impression and Assessment    Anemia.    Essential Hypertension.    Hyperlipidemia.    Atrial Fibrillation.    Plan    Essential Hypertension Plan: The current plan was continued.    Hyperlipidemia Plan: The patient was instructed to exercise daily, eat a low fat diet and continue his medications.    Atrial Fibrillation Plan: The current plan was continued.    Anemia Plan: Further plans will be made after results of testing are available.    Dillon was seen today for follow-up.    Diagnoses and all orders for this visit:    Essential hypertension  -     CBC & Differential  -     Comprehensive Metabolic Panel    Hyperlipidemia, unspecified hyperlipidemia type  -     Comprehensive Metabolic Panel  -     Lipid Panel    Atrial fibrillation, unspecified type (CMS/HCC)  -     CBC & Differential    Anemia, unspecified type  -     CBC & Differential        Return to Office    The patient was instructed to return for follow-up in 3  months.    The patient was instructed to return sooner if the condition changes, worsens, or does not resolve.

## 2020-04-20 RX ORDER — LISINOPRIL 10 MG/1
10 TABLET ORAL 2 TIMES DAILY
Qty: 180 TABLET | Refills: 0 | Status: SHIPPED | OUTPATIENT
Start: 2020-04-20 | End: 2020-07-29 | Stop reason: SDUPTHER

## 2020-04-29 RX ORDER — CARVEDILOL 12.5 MG/1
12.5 TABLET ORAL 2 TIMES DAILY
Qty: 180 TABLET | Refills: 0 | Status: SHIPPED | OUTPATIENT
Start: 2020-04-29 | End: 2020-07-29 | Stop reason: SDUPTHER

## 2020-05-19 ENCOUNTER — TELEPHONE (OUTPATIENT)
Dept: INTERNAL MEDICINE | Facility: CLINIC | Age: 70
End: 2020-05-19

## 2020-05-19 NOTE — TELEPHONE ENCOUNTER
FYI: Patient has not had INR checked in over a month I have mailed a reminder letter but no response .  You may want to send a certified letter.

## 2020-05-19 NOTE — TELEPHONE ENCOUNTER
Please call him and remind him that he needs his INR checked this week.  Ez Cintron MD  13:03  05/19/20

## 2020-05-20 ENCOUNTER — LAB (OUTPATIENT)
Dept: INTERNAL MEDICINE | Facility: CLINIC | Age: 70
End: 2020-05-20

## 2020-05-20 DIAGNOSIS — I48.21 PERMANENT ATRIAL FIBRILLATION (HCC): Primary | ICD-10-CM

## 2020-05-20 LAB — INR PPP: 2.5 (ref 2.5–3.5)

## 2020-05-20 PROCEDURE — 85610 PROTHROMBIN TIME: CPT | Performed by: INTERNAL MEDICINE

## 2020-05-20 PROCEDURE — 36416 COLLJ CAPILLARY BLOOD SPEC: CPT | Performed by: INTERNAL MEDICINE

## 2020-06-12 ENCOUNTER — TELEPHONE (OUTPATIENT)
Dept: INTERNAL MEDICINE | Facility: CLINIC | Age: 70
End: 2020-06-12

## 2020-06-12 RX ORDER — FINASTERIDE 5 MG/1
5 TABLET, FILM COATED ORAL
Qty: 90 TABLET | Refills: 0 | Status: SHIPPED | OUTPATIENT
Start: 2020-06-12 | End: 2020-09-23

## 2020-06-23 ENCOUNTER — LAB (OUTPATIENT)
Dept: INTERNAL MEDICINE | Facility: CLINIC | Age: 70
End: 2020-06-23

## 2020-06-23 DIAGNOSIS — I48.21 PERMANENT ATRIAL FIBRILLATION (HCC): Primary | ICD-10-CM

## 2020-06-23 LAB — INR PPP: 2 (ref 2.5–3.5)

## 2020-06-23 PROCEDURE — 36416 COLLJ CAPILLARY BLOOD SPEC: CPT | Performed by: INTERNAL MEDICINE

## 2020-06-23 PROCEDURE — 85610 PROTHROMBIN TIME: CPT | Performed by: INTERNAL MEDICINE

## 2020-06-29 ENCOUNTER — LAB (OUTPATIENT)
Dept: INTERNAL MEDICINE | Facility: CLINIC | Age: 70
End: 2020-06-29

## 2020-06-29 DIAGNOSIS — I48.21 PERMANENT ATRIAL FIBRILLATION (HCC): Primary | ICD-10-CM

## 2020-06-29 LAB — INR PPP: 2.4 (ref 2.5–3.5)

## 2020-06-29 PROCEDURE — 36416 COLLJ CAPILLARY BLOOD SPEC: CPT | Performed by: INTERNAL MEDICINE

## 2020-06-29 PROCEDURE — 85610 PROTHROMBIN TIME: CPT | Performed by: INTERNAL MEDICINE

## 2020-07-06 ENCOUNTER — LAB (OUTPATIENT)
Dept: INTERNAL MEDICINE | Facility: CLINIC | Age: 70
End: 2020-07-06

## 2020-07-06 DIAGNOSIS — I48.21 PERMANENT ATRIAL FIBRILLATION (HCC): Primary | ICD-10-CM

## 2020-07-06 LAB — INR PPP: 2.8 (ref 2.5–3.5)

## 2020-07-06 PROCEDURE — 36416 COLLJ CAPILLARY BLOOD SPEC: CPT | Performed by: INTERNAL MEDICINE

## 2020-07-06 PROCEDURE — 85610 PROTHROMBIN TIME: CPT | Performed by: INTERNAL MEDICINE

## 2020-07-20 ENCOUNTER — LAB (OUTPATIENT)
Dept: INTERNAL MEDICINE | Facility: CLINIC | Age: 70
End: 2020-07-20

## 2020-07-20 DIAGNOSIS — I48.21 PERMANENT ATRIAL FIBRILLATION (HCC): Primary | ICD-10-CM

## 2020-07-20 LAB — INR PPP: 3.2 (ref 2.5–3.5)

## 2020-07-20 PROCEDURE — 85610 PROTHROMBIN TIME: CPT | Performed by: INTERNAL MEDICINE

## 2020-07-20 PROCEDURE — 36416 COLLJ CAPILLARY BLOOD SPEC: CPT | Performed by: INTERNAL MEDICINE

## 2020-07-29 RX ORDER — LISINOPRIL 10 MG/1
10 TABLET ORAL 2 TIMES DAILY
Qty: 180 TABLET | Refills: 0 | Status: SHIPPED | OUTPATIENT
Start: 2020-07-29 | End: 2020-11-05 | Stop reason: SDUPTHER

## 2020-07-29 RX ORDER — CARVEDILOL 12.5 MG/1
12.5 TABLET ORAL 2 TIMES DAILY
Qty: 180 TABLET | Refills: 0 | Status: SHIPPED | OUTPATIENT
Start: 2020-07-29 | End: 2020-11-05 | Stop reason: SDUPTHER

## 2020-08-03 ENCOUNTER — LAB (OUTPATIENT)
Dept: INTERNAL MEDICINE | Facility: CLINIC | Age: 70
End: 2020-08-03

## 2020-08-03 DIAGNOSIS — I48.21 PERMANENT ATRIAL FIBRILLATION (HCC): Primary | ICD-10-CM

## 2020-08-03 LAB — INR PPP: 4.5 (ref 2.5–3.5)

## 2020-08-03 PROCEDURE — 36416 COLLJ CAPILLARY BLOOD SPEC: CPT | Performed by: INTERNAL MEDICINE

## 2020-08-03 PROCEDURE — 85610 PROTHROMBIN TIME: CPT | Performed by: INTERNAL MEDICINE

## 2020-08-03 RX ORDER — WARFARIN SODIUM 5 MG/1
TABLET ORAL
Qty: 30 TABLET | Refills: 5 | Status: SHIPPED | OUTPATIENT
Start: 2020-08-03 | End: 2021-01-11

## 2020-08-06 ENCOUNTER — LAB (OUTPATIENT)
Dept: INTERNAL MEDICINE | Facility: CLINIC | Age: 70
End: 2020-08-06

## 2020-08-06 DIAGNOSIS — I10 ESSENTIAL HYPERTENSION: ICD-10-CM

## 2020-08-06 DIAGNOSIS — I48.91 ATRIAL FIBRILLATION, UNSPECIFIED TYPE (HCC): ICD-10-CM

## 2020-08-06 DIAGNOSIS — E78.5 HYPERLIPIDEMIA, UNSPECIFIED HYPERLIPIDEMIA TYPE: ICD-10-CM

## 2020-08-06 DIAGNOSIS — D64.9 ANEMIA, UNSPECIFIED TYPE: ICD-10-CM

## 2020-08-06 LAB
ALBUMIN SERPL-MCNC: 4 G/DL (ref 3.5–5.2)
ALBUMIN/GLOB SERPL: 1.5 G/DL
ALP SERPL-CCNC: 61 U/L (ref 39–117)
ALT SERPL W P-5'-P-CCNC: 11 U/L (ref 1–41)
ANION GAP SERPL CALCULATED.3IONS-SCNC: 11.2 MMOL/L (ref 5–15)
ANISOCYTOSIS BLD QL: ABNORMAL
AST SERPL-CCNC: 17 U/L (ref 1–40)
BASOPHILS # BLD MANUAL: 0.08 10*3/MM3 (ref 0–0.2)
BASOPHILS NFR BLD AUTO: 2 % (ref 0–1.5)
BILIRUB SERPL-MCNC: 1.5 MG/DL (ref 0–1.2)
BUN SERPL-MCNC: 8 MG/DL (ref 8–23)
BUN/CREAT SERPL: 7.7 (ref 7–25)
CALCIUM SPEC-SCNC: 9.2 MG/DL (ref 8.6–10.5)
CHLORIDE SERPL-SCNC: 103 MMOL/L (ref 98–107)
CHOLEST SERPL-MCNC: 118 MG/DL (ref 0–200)
CO2 SERPL-SCNC: 26.8 MMOL/L (ref 22–29)
CREAT SERPL-MCNC: 1.04 MG/DL (ref 0.76–1.27)
DEPRECATED RDW RBC AUTO: 52.7 FL (ref 37–54)
EOSINOPHIL # BLD MANUAL: 0.04 10*3/MM3 (ref 0–0.4)
EOSINOPHIL NFR BLD MANUAL: 1 % (ref 0.3–6.2)
ERYTHROCYTE [DISTWIDTH] IN BLOOD BY AUTOMATED COUNT: 13.3 % (ref 12.3–15.4)
GFR SERPL CREATININE-BSD FRML MDRD: 71 ML/MIN/1.73
GLOBULIN UR ELPH-MCNC: 2.7 GM/DL
GLUCOSE SERPL-MCNC: 134 MG/DL (ref 65–99)
HCT VFR BLD AUTO: 41.3 % (ref 37.5–51)
HDLC SERPL-MCNC: 59 MG/DL (ref 40–60)
HGB BLD-MCNC: 14.5 G/DL (ref 13–17.7)
INR PPP: 3 (ref 2.5–3.5)
LDLC SERPL CALC-MCNC: 47 MG/DL (ref 0–100)
LDLC/HDLC SERPL: 0.8 {RATIO}
LYMPHOCYTES # BLD MANUAL: 0.77 10*3/MM3 (ref 0.7–3.1)
LYMPHOCYTES NFR BLD MANUAL: 18.2 % (ref 19.6–45.3)
LYMPHOCYTES NFR BLD MANUAL: 2 % (ref 5–12)
MACROCYTES BLD QL SMEAR: ABNORMAL
MCH RBC QN AUTO: 36.8 PG (ref 26.6–33)
MCHC RBC AUTO-ENTMCNC: 35.1 G/DL (ref 31.5–35.7)
MCV RBC AUTO: 104.8 FL (ref 79–97)
MONOCYTES # BLD AUTO: 0.08 10*3/MM3 (ref 0.1–0.9)
NEUTROPHILS # BLD AUTO: 3.23 10*3/MM3 (ref 1.7–7)
NEUTROPHILS NFR BLD MANUAL: 76.8 % (ref 42.7–76)
PLAT MORPH BLD: NORMAL
PLATELET # BLD AUTO: 96 10*3/MM3 (ref 140–450)
PMV BLD AUTO: 13.2 FL (ref 6–12)
POIKILOCYTOSIS BLD QL SMEAR: ABNORMAL
POTASSIUM SERPL-SCNC: 4.5 MMOL/L (ref 3.5–5.2)
PROT SERPL-MCNC: 6.7 G/DL (ref 6–8.5)
RBC # BLD AUTO: 3.94 10*6/MM3 (ref 4.14–5.8)
SODIUM SERPL-SCNC: 141 MMOL/L (ref 136–145)
TRIGL SERPL-MCNC: 60 MG/DL (ref 0–150)
VLDLC SERPL-MCNC: 12 MG/DL (ref 5–40)
WBC # BLD AUTO: 4.21 10*3/MM3 (ref 3.4–10.8)
WBC MORPH BLD: NORMAL

## 2020-08-06 PROCEDURE — 85007 BL SMEAR W/DIFF WBC COUNT: CPT

## 2020-08-06 PROCEDURE — 85610 PROTHROMBIN TIME: CPT | Performed by: INTERNAL MEDICINE

## 2020-08-06 PROCEDURE — 80061 LIPID PANEL: CPT | Performed by: INTERNAL MEDICINE

## 2020-08-06 PROCEDURE — 85025 COMPLETE CBC W/AUTO DIFF WBC: CPT | Performed by: INTERNAL MEDICINE

## 2020-08-06 PROCEDURE — 80053 COMPREHEN METABOLIC PANEL: CPT | Performed by: INTERNAL MEDICINE

## 2020-08-06 PROCEDURE — 36415 COLL VENOUS BLD VENIPUNCTURE: CPT | Performed by: INTERNAL MEDICINE

## 2020-08-07 ENCOUNTER — TELEPHONE (OUTPATIENT)
Dept: INTERNAL MEDICINE | Facility: CLINIC | Age: 70
End: 2020-08-07

## 2020-08-07 NOTE — TELEPHONE ENCOUNTER
PATIENT STATES HE PICKED UP HIS PRESCRIPTION FOR WARFARIN YESTERDAY AND HE HAS LOST IT OR MISPLACED IT   HE IS REQUESTING ANOTHER REFILL      warfarin (COUMADIN) 5 MG tablet  PATIENT IS COMPLETELY OUT OF THE MEDICATION     PATIENT CALL BACK [phone]    FLETCHER JONES

## 2020-08-07 NOTE — TELEPHONE ENCOUNTER
Spoke with Kwesi at pharmacy verified patient has refills and can be refilled but patient will have to pay out of pocket. Spoke with patient advised medication can be refilled but insurance will not pay due to being just refilled so he will be responsible for 4.00 for the medication. Good verbal understanding.

## 2020-08-17 ENCOUNTER — LAB (OUTPATIENT)
Dept: INTERNAL MEDICINE | Facility: CLINIC | Age: 70
End: 2020-08-17

## 2020-08-18 ENCOUNTER — APPOINTMENT (OUTPATIENT)
Dept: CT IMAGING | Facility: HOSPITAL | Age: 70
End: 2020-08-18

## 2020-08-18 ENCOUNTER — HOSPITAL ENCOUNTER (OUTPATIENT)
Dept: CT IMAGING | Facility: HOSPITAL | Age: 70
Discharge: HOME | End: 2020-08-18
Admitting: INTERNAL MEDICINE

## 2020-08-18 ENCOUNTER — LAB (OUTPATIENT)
Dept: INTERNAL MEDICINE | Facility: CLINIC | Age: 70
End: 2020-08-18

## 2020-08-18 DIAGNOSIS — R91.8 LUNG NODULES: ICD-10-CM

## 2020-08-18 DIAGNOSIS — I48.21 PERMANENT ATRIAL FIBRILLATION (HCC): Primary | ICD-10-CM

## 2020-08-18 LAB — INR PPP: 2.5 (ref 2.5–3.5)

## 2020-08-18 PROCEDURE — 85610 PROTHROMBIN TIME: CPT | Performed by: INTERNAL MEDICINE

## 2020-08-18 PROCEDURE — 71250 CT THORAX DX C-: CPT

## 2020-08-18 PROCEDURE — 36416 COLLJ CAPILLARY BLOOD SPEC: CPT | Performed by: INTERNAL MEDICINE

## 2020-08-19 ENCOUNTER — OFFICE VISIT (OUTPATIENT)
Dept: PULMONOLOGY | Facility: CLINIC | Age: 70
End: 2020-08-19

## 2020-08-19 VITALS
OXYGEN SATURATION: 98 % | HEIGHT: 72 IN | SYSTOLIC BLOOD PRESSURE: 158 MMHG | BODY MASS INDEX: 19.94 KG/M2 | HEART RATE: 70 BPM | RESPIRATION RATE: 16 BRPM | DIASTOLIC BLOOD PRESSURE: 84 MMHG | TEMPERATURE: 99.1 F | WEIGHT: 147.25 LBS

## 2020-08-19 DIAGNOSIS — R91.1 LUNG NODULE: Primary | ICD-10-CM

## 2020-08-19 PROCEDURE — 99214 OFFICE O/P EST MOD 30 MIN: CPT | Performed by: INTERNAL MEDICINE

## 2020-08-19 RX ORDER — BUPROPION HYDROCHLORIDE 300 MG/1
300 TABLET ORAL DAILY
Qty: 30 TABLET | Refills: 6 | Status: SHIPPED | OUTPATIENT
Start: 2020-08-19 | End: 2020-11-25 | Stop reason: SDUPTHER

## 2020-08-19 NOTE — PROGRESS NOTES
CC:    Abnormal CT    HPI:    68 y/o WM w/ h/o tobacco abuse ~100 py plus, prior colon cancer s/p resection 2001, mechanical AVR, CAD w/ prior stent, Afib, who presents for evaluation of abnormal CT Chest. Patient had a lung cancer screening CT 8/20/19 showing a spiculated 12 mm (I disagree with radiology reading of 9 mm) spiculated MARKO lesion.  No mediastinal or hilar LAD.  No symptoms attributable to this nodule.    Last time I saw patient in 12/11/19 he had some mild hemoptysis.  This was felt to be due to bronchitis and resolved with abx / steroids.  INR was slightly supra-therapeutic.  This resolved on follow up visit 12/27/19.    Patient was admitted in 2/19-2/24/20 w/ H1N1 flu / pna / hemoptysis.  INR was 7 at the time.  CXR abnormalities cleared rapidly.  ANCA and GBM antibodies were negative.  Echo showed EF 46%, LVH, dilated LA, dilated RV, mild to mod MR, functioning mechanical AVR, RVSP > 55.  Echo was done in setting of marked abnormalities on CXR.    Had another admision 2/26-3/7/20 with supratherapeutic INR and large rectus sheath hematoma.  This was complicated by reversal of AC and subsequent embolic strokes.    INTERVAL HISTORY:    Patient returns today for follow up after repeat CT Chest.  No hemoptysis.  No SOA.  Seems to have returned to baseline.  No cough / wheezing etc.  INR has been less erratic.    PMH:    Past Medical History:   Diagnosis Date   • A-fib (CMS/HCC)    • Anemia    • Atrial fibrillation (CMS/HCC) 5/5/2016    Difficult to control rate. RFA of AV node with implantation of left Bi-V pacemaker, 06/18/2013. Hematoma requiring single-chamber pacemaker implanted, 07/29/2013.    • Colitis    • Colon cancer (CMS/HCC)    • Depression    • HTN (hypertension)    • Hyperbilirubinemia    • Hyperlipidemia    • Orthostatic hypotension      PSH:    Past Surgical History:   Procedure Laterality Date   • APPENDECTOMY     • CARDIAC SURGERY     • COLON SURGERY     • EXTRACORPOREAL SHOCKWAVE  LITHOTRIPSY (ESWL), STENT INSERTION/REMOVAL     • PACEMAKER IMPLANTATION     • VARICOSE VEIN SURGERY       FH:    Family History   Problem Relation Age of Onset   • Cancer Mother    • Hypertension Father    • Heart disease Father      SH:    Social History     Socioeconomic History   • Marital status:      Spouse name: Not on file   • Number of children: Not on file   • Years of education: Not on file   • Highest education level: Not on file   Tobacco Use   • Smoking status: Current Every Day Smoker     Packs/day: 1.50     Years: 57.00     Pack years: 85.50     Types: Cigarettes   • Smokeless tobacco: Never Used   Substance and Sexual Activity   • Alcohol use: Yes     Alcohol/week: 2.0 standard drinks     Types: 2 Shots of liquor per week     Frequency: 4 or more times a week     Drinks per session: 1 or 2     Comment: Daily   • Drug use: No   • Sexual activity: Yes     Partners: Female     Comment:       ALLERGIES:    No Known Allergies  MEDICATIONS:      Current Outpatient Medications:   •  albuterol sulfate  (90 Base) MCG/ACT inhaler, Inhale 2 puffs Every 4 (Four) Hours As Needed for Wheezing., Disp: 1 inhaler, Rfl: 0  •  aspirin 81 MG EC tablet, Take 1 tablet by mouth daily., Disp: , Rfl:   •  atorvastatin (LIPITOR) 80 MG tablet, Take 1 tablet by mouth Every Night., Disp: 30 tablet, Rfl: 0  •  buPROPion XL (WELLBUTRIN XL) 300 MG 24 hr tablet, Take 1 tablet by mouth Daily., Disp: 30 tablet, Rfl: 4  •  carvedilol (COREG) 12.5 MG tablet, Take 1 tablet by mouth 2 (Two) Times a Day., Disp: 180 tablet, Rfl: 0  •  citalopram (CeleXA) 20 MG tablet, Take 1 tablet by mouth once daily, Disp: 90 tablet, Rfl: 1  •  famotidine (PEPCID) 20 MG tablet, Take 1 tablet by mouth Daily., Disp: 30 tablet, Rfl: 1  •  finasteride (PROSCAR) 5 MG tablet, Take 1 tablet by mouth every night at bedtime., Disp: 90 tablet, Rfl: 0  •  Fluticasone-Umeclidin-Vilant (TRELEGY ELLIPTA) 100-62.5-25 MCG/INH aerosol powder , Inhale  1 puff Daily., Disp: 1 each, Rfl: 11  •  folic acid (FOLVITE) 400 MCG tablet, TAKE 1 TABLET BY MOUTH ONCE DAILY, Disp: 90 tablet, Rfl: 3  •  lisinopril (PRINIVIL,ZESTRIL) 10 MG tablet, Take 1 tablet by mouth 2 (Two) Times a Day., Disp: 180 tablet, Rfl: 0  •  Probiotic Product (PROBIOTIC DAILY PO), Take 1 capsule by mouth daily., Disp: , Rfl:   •  vitamin B-12 (CYANOCOBALAMIN) 1000 MCG tablet, Take 1 tablet by mouth Daily., Disp: 90 tablet, Rfl: 3  •  warfarin (COUMADIN) 5 MG tablet, Take one by mouth daily  Indications: Atrial Fibrillation, Presence of Mechanical Artificial Heart Valve, Disp: 30 tablet, Rfl: 5  ROS:  Per HPI, otherwise all systems reviewed and negative.    DIAGNOSTIC DATA (Reviewed and interpreted by me unless otherwise specified):    CT Chest 8/20/19 - emphysema, scarring RUL, scattered sub-centimeter nodules, 12 mm spiculated nodule in MARKO    CT PET 9/25/19 - SUV 1.0, negative    CT Chest 11/6/19 - stable MARKO nodule, also a RUL nodule that appears inflammatory that needs to be followed    CT Chest 2/6/20 - stable MARKO and RUL nodules when going back to 8/20/19    CT Chest 8/18/20 - relatively stable MARKO nodules and RUL scarring.  Prior neg PET-CT.      CXR 12/11/19 - RUL and RML consolidation    PFT 9/17/19 - borderline mild obstruction, +BD change, hyperinflation, +air trapping, normal DLCO    Vitals:    08/19/20 1434   BP: 158/84   Pulse: 70   Resp: 16   Temp: 99.1 °F (37.3 °C)   SpO2: 98%       Physical Exam   Constitutional: Oriented to person, place, and time. Appears well-developed and well-nourished.   Head: Normocephalic and atraumatic.   Nose: Nose normal.   Mouth/Throat: Oropharynx is clear and moist.   Eyes: Conjunctivae are normal.  Pupils normal.  Neck: No tracheal deviation present.   Cardiovascular: Normal rate, regular rhythm, normal heart sounds with mechanical click and intact distal pulses.  Exam reveals no gallop and no friction rub.  No thrill.  No JVD.  No edema.  No murmur  heard.  Pulmonary/Chest: Effort normal and breath sounds normal.  No tenderness to palpation.  No clubbing.   Abdominal: Soft. Bowel sounds are normal. No distension. No tenderness. There is no guarding.   Musculoskeletal: Normal range of motion.  No tenderness.  Lymphadenopathy:  No cervical adenopathy.   Neurological:  No new focal neurological deficits observed   Skin: Skin is warm and dry. No rash noted.   Psychiatric: Normal mood and affect.  Behavior is normal. Judgment normal.    Assessment/Plan     1)  Lung Nodules - relatively stable and PET-neg.  Given appearance and smoking history would like to continue close surveillance.  Coumadin use for mechanical AVR complicates situation.  Get repeat CT in 6 months w/ ilogic protocol.    2)  Hemoptysis - resolved.  On coumadin.    3) Tobacco Abuse - counseled patient, recommended NRT and Wellbutrin.  He is agreeable.  Currently down to 5-10 cigs a day.     RTC 6 months w/ CT Chest in [facility] protocol    Payam Kenney MD  Pulmonology and Critical Care Medicine  08/19/20 14:57  Electronically Signed    C.C.:  Ez Cintron MD, Ez Cintron MD

## 2020-08-25 ENCOUNTER — OFFICE VISIT (OUTPATIENT)
Dept: CARDIOLOGY | Facility: CLINIC | Age: 70
End: 2020-08-25

## 2020-08-25 VITALS
WEIGHT: 148 LBS | SYSTOLIC BLOOD PRESSURE: 138 MMHG | HEIGHT: 72 IN | BODY MASS INDEX: 20.05 KG/M2 | HEART RATE: 73 BPM | OXYGEN SATURATION: 96 % | TEMPERATURE: 98 F | DIASTOLIC BLOOD PRESSURE: 80 MMHG

## 2020-08-25 DIAGNOSIS — Z95.2 H/O AORTIC VALVE REPLACEMENT: Primary | ICD-10-CM

## 2020-08-25 DIAGNOSIS — I25.10 CORONARY ARTERY DISEASE INVOLVING NATIVE CORONARY ARTERY OF NATIVE HEART WITHOUT ANGINA PECTORIS: ICD-10-CM

## 2020-08-25 DIAGNOSIS — I10 ESSENTIAL HYPERTENSION: ICD-10-CM

## 2020-08-25 DIAGNOSIS — Z72.0 TOBACCO USE: ICD-10-CM

## 2020-08-25 PROCEDURE — 99214 OFFICE O/P EST MOD 30 MIN: CPT | Performed by: INTERNAL MEDICINE

## 2020-08-25 PROCEDURE — 93279 PRGRMG DEV EVAL PM/LDLS PM: CPT | Performed by: INTERNAL MEDICINE

## 2020-08-25 NOTE — PROGRESS NOTES
OFFICE FOLLOW UP     Date of Encounter:2020     Name: Dillon Vo  : 1950  Address: [address] [facility] [address]    PCP: Ez Cintron MD  [address]  [facility] [address]    Dillon Vo is a 70 y.o. male.    Chief Complaint: Follow up of VHD, CAD, Afib    Problem List:   1. “Mixed” heart disease:  a. Aortic valve disease with mechanical prosthesis, IDB.  b. Everolimus-eluting stent placed mid-LAD for single-vessel CAD, 2013.    c. Ejection fraction less than 20% with global hypokinesia, 2013:  Only modestly reduced EF by echocardiogram, 2013.    d. “Absent” clopidogrel therapy following stent placement, “corrected,” 08/15/2013.  e. Echo, 2018: mild LVH, normal global and segmental LV function, normal functioning bileaflet aortic mechanical valve   f. Echo 2020: mild global hypokinesia of the LV, normally functioning mechanical aortic valve, RVSP 59mmHg   2. Atrial fibrillation, chronic:  a. Difficult to control rate.  b. Chads-vasc= 3, chronic warfarin  c. RFA of AV node with implantation of left Bi-V pacemaker, 2013.  d. Hematoma requiring single-chamber pacemaker implanted, 2013.   3. Tobacco use, chronic.  4. Dyslipidemia  5. MARKO spiculated lung nodule, followed by Dr. Payam Kenney  6. [facility] admission for supratherapeutic INR 2020 with large rectus sheath hematoma  a. S/p reversal  b. Multiple subacute/chronic small embolic cerebral infarcts by CT head during same admission     Allergies:  No Known Allergies    Current Medications:  •  albuterol sulfate  (90 Base) MCG/ACT inhaler, Inhale 2 puffs Every 4 (Four) Hours As Needed for Wheezing  •  aspirin 81 MG EC tablet, Take 1 tablet by mouth daily.  •  atorvastatin (LIPITOR) 80 MG tablet, Take 1 tablet by mouth Every Night  •  buPROPion XL (Wellbutrin XL) 300 MG 24 hr tablet, Take 1 tablet by mouth Daily.  •  carvedilol (COREG) 12.5 MG tablet,  "Take 1 tablet by mouth 2 (Two) Times a Day.  •  citalopram (CeleXA) 20 MG tablet, Take 1 tablet by mouth once daily  •  famotidine (PEPCID) 20 MG tablet, Take 1 tablet by mouth Daily.  •  finasteride (PROSCAR) 5 MG tablet, Take 1 tablet by mouth every night at bedtime.  •  Fluticasone-Umeclidin-Vilant (TRELEGY ELLIPTA) 100-62.5-25 MCG/INH aerosol powder , Inhale 1 puff Daily  •  folic acid (FOLVITE) 400 MCG tablet, TAKE 1 TABLET BY MOUTH ONCE DAILY   •  lisinopril (PRINIVIL,ZESTRIL) 10 MG tablet, Take 1 tablet by mouth 2 (Two) Times a Day.  •  Probiotic Product (PROBIOTIC DAILY PO), Take 1 capsule by mouth daily.  •  vitamin B-12 (CYANOCOBALAMIN) 1000 MCG tablet, Take 1 tablet by mouth Daily.  •  warfarin (COUMADIN) 5 MG tablet, Take one by mouth daily  Indications: Atrial Fibrillation, Presence of Mechanical Artificial Heart Valve    History of Present Illness:           Mr. Vo returns for scheduled follow up today. His recent hospitalization records were reviewed. He is followed by Dr. Kenney in terms of his left lung nodule, however he continues to smoke 1/2 PPD. He denies any angina, unusual dyspnea, palpitations, syncope or heart failure symptoms. He continues to work as a . His INRs are followed in Dr. Cintron's office.       The following portions of the patient's history were reviewed and updated as appropriate: allergies, current medications and problem list.    ROS: Pertinent positives as listed in the HPI.  All other systems reviewed and negative.    Objective:  Vitals:    08/25/20 1531 08/25/20 1532   BP: 148/82 138/80   BP Location: Right arm Right arm   Patient Position: Sitting Standing   Pulse: 70 73   Temp: 98 °F (36.7 °C)    SpO2: 96%    Weight: 67.1 kg (148 lb)    Height: 182.9 cm (72\")      Physical Exam:  GENERAL: Alert, cooperative, in no acute distress.   HEENT: Normocephalic, no adenopathy, no jugular venous distention  HEART: No discrete PMI is noted. Regular rhythm, " normal rate, mechanical valve clicks, no murmur, gallops, or rubs.   LUNGS: Clear to auscultation bilaterally. No wheezing, rales or ronchi.  ABDOMEN: Soft, bowel sounds present, non-tender   NEUROLOGIC: No focal abnormalities involving strength or sensation are noted.   EXTREMITIES: No clubbing, cyanosis, or edema noted.     Diagnostic Data:    Lab Results   Component Value Date    WBC 4.21 08/06/2020    HGB 14.5 08/06/2020    HCT 41.3 08/06/2020    .8 (H) 08/06/2020    PLT 96 (L) 08/06/2020     Lab Results   Component Value Date    GLUCOSE 134 (H) 08/06/2020    CALCIUM 9.2 08/06/2020     08/06/2020    K 4.5 08/06/2020    CO2 26.8 08/06/2020     08/06/2020    BUN 8 08/06/2020    CREATININE 1.04 08/06/2020    EGFRIFNONA 71 08/06/2020    BCR 7.7 08/06/2020    ANIONGAP 11.2 08/06/2020     Lab Results   Component Value Date    INR 2.50 (A) 08/18/2020    INR 3.00 (A) 08/06/2020    INR 4.50 (A) 08/03/2020    PROTIME 27.0 (H) 03/07/2020    PROTIME 25.5 (H) 03/06/2020    PROTIME 24.7 (H) 03/06/2020     Procedures    [facility] [facility] PPM interrogation: RV >99%, one episode of high ventricular rate in May 2019, ~9 years remaining battery life    Assessment and Plan:     1. VHD, s/p mechanical AVR: stable without symptoms of dyspnea or heart failure. Recent echo is noted with normal functioning mechanical valve. His INR is followed in Dr. Cintron's office with last few readings within range.   2. CAD: asymptomatic without angina. Continue aspirin and statin.   3. Afib: asymptomatic, and anticoagulated with Warfarin.  4. Lung nodule and tobacco abuse: have strongly recommended he stop smoking. Follow up with Dr. Kenney per his recommendations.  5. Follow up in 1 year or sooner if needed.     Scribed for Ez Tucker MD by Veronica Avery PA-C. 8/25/2020  16:34

## 2020-09-17 ENCOUNTER — LAB (OUTPATIENT)
Dept: INTERNAL MEDICINE | Facility: CLINIC | Age: 70
End: 2020-09-17

## 2020-09-17 DIAGNOSIS — I48.21 PERMANENT ATRIAL FIBRILLATION (HCC): Primary | ICD-10-CM

## 2020-09-17 LAB — INR PPP: 2.3 (ref 2.5–3.5)

## 2020-09-17 PROCEDURE — 85610 PROTHROMBIN TIME: CPT | Performed by: INTERNAL MEDICINE

## 2020-09-17 PROCEDURE — 36416 COLLJ CAPILLARY BLOOD SPEC: CPT | Performed by: INTERNAL MEDICINE

## 2020-09-23 RX ORDER — FINASTERIDE 5 MG/1
TABLET, FILM COATED ORAL
Qty: 90 TABLET | Refills: 0 | Status: SHIPPED | OUTPATIENT
Start: 2020-09-23 | End: 2020-12-17

## 2020-10-19 ENCOUNTER — LAB (OUTPATIENT)
Dept: INTERNAL MEDICINE | Facility: CLINIC | Age: 70
End: 2020-10-19

## 2020-10-19 DIAGNOSIS — I48.21 PERMANENT ATRIAL FIBRILLATION (HCC): Primary | ICD-10-CM

## 2020-10-19 LAB — INR PPP: 2.5 (ref 2.5–3.5)

## 2020-10-19 PROCEDURE — 36416 COLLJ CAPILLARY BLOOD SPEC: CPT | Performed by: INTERNAL MEDICINE

## 2020-10-19 PROCEDURE — 85610 PROTHROMBIN TIME: CPT | Performed by: INTERNAL MEDICINE

## 2020-11-05 RX ORDER — LISINOPRIL 10 MG/1
10 TABLET ORAL 2 TIMES DAILY
Qty: 180 TABLET | Refills: 0 | Status: SHIPPED | OUTPATIENT
Start: 2020-11-05 | End: 2021-02-12 | Stop reason: SDUPTHER

## 2020-11-05 RX ORDER — CARVEDILOL 12.5 MG/1
12.5 TABLET ORAL 2 TIMES DAILY
Qty: 180 TABLET | Refills: 0 | Status: SHIPPED | OUTPATIENT
Start: 2020-11-05 | End: 2021-02-12 | Stop reason: SDUPTHER

## 2020-11-17 ENCOUNTER — LAB (OUTPATIENT)
Dept: INTERNAL MEDICINE | Facility: CLINIC | Age: 70
End: 2020-11-17

## 2020-11-17 DIAGNOSIS — I48.21 PERMANENT ATRIAL FIBRILLATION (HCC): Primary | ICD-10-CM

## 2020-11-17 LAB — INR PPP: 1.9 (ref 2.5–3.5)

## 2020-11-17 PROCEDURE — 85610 PROTHROMBIN TIME: CPT | Performed by: INTERNAL MEDICINE

## 2020-11-17 PROCEDURE — 36416 COLLJ CAPILLARY BLOOD SPEC: CPT | Performed by: INTERNAL MEDICINE

## 2020-11-25 ENCOUNTER — OFFICE VISIT (OUTPATIENT)
Dept: INTERNAL MEDICINE | Facility: CLINIC | Age: 70
End: 2020-11-25

## 2020-11-25 VITALS
WEIGHT: 152 LBS | HEART RATE: 72 BPM | RESPIRATION RATE: 20 BRPM | DIASTOLIC BLOOD PRESSURE: 68 MMHG | TEMPERATURE: 97.5 F | SYSTOLIC BLOOD PRESSURE: 138 MMHG | BODY MASS INDEX: 20.61 KG/M2

## 2020-11-25 DIAGNOSIS — E78.5 HYPERLIPIDEMIA, UNSPECIFIED HYPERLIPIDEMIA TYPE: Primary | ICD-10-CM

## 2020-11-25 DIAGNOSIS — R41.3 MEMORY LOSS: ICD-10-CM

## 2020-11-25 DIAGNOSIS — D64.9 ANEMIA, UNSPECIFIED TYPE: ICD-10-CM

## 2020-11-25 DIAGNOSIS — I10 ESSENTIAL HYPERTENSION: ICD-10-CM

## 2020-11-25 DIAGNOSIS — I48.21 PERMANENT ATRIAL FIBRILLATION (HCC): ICD-10-CM

## 2020-11-25 LAB
ALBUMIN SERPL-MCNC: 4 G/DL (ref 3.5–5.2)
ALBUMIN/GLOB SERPL: 1.5 G/DL
ALP SERPL-CCNC: 75 U/L (ref 39–117)
ALT SERPL W P-5'-P-CCNC: 13 U/L (ref 1–41)
ANION GAP SERPL CALCULATED.3IONS-SCNC: 8.4 MMOL/L (ref 5–15)
AST SERPL-CCNC: 18 U/L (ref 1–40)
BASOPHILS # BLD AUTO: 0.04 10*3/MM3 (ref 0–0.2)
BASOPHILS NFR BLD AUTO: 1.1 % (ref 0–1.5)
BILIRUB SERPL-MCNC: 0.8 MG/DL (ref 0–1.2)
BUN SERPL-MCNC: 13 MG/DL (ref 8–23)
BUN/CREAT SERPL: 12.6 (ref 7–25)
CALCIUM SPEC-SCNC: 9.3 MG/DL (ref 8.6–10.5)
CHLORIDE SERPL-SCNC: 102 MMOL/L (ref 98–107)
CHOLEST SERPL-MCNC: 121 MG/DL (ref 0–200)
CO2 SERPL-SCNC: 29.6 MMOL/L (ref 22–29)
CREAT SERPL-MCNC: 1.03 MG/DL (ref 0.76–1.27)
DEPRECATED RDW RBC AUTO: 46.9 FL (ref 37–54)
EOSINOPHIL # BLD AUTO: 0.08 10*3/MM3 (ref 0–0.4)
EOSINOPHIL NFR BLD AUTO: 2.2 % (ref 0.3–6.2)
ERYTHROCYTE [DISTWIDTH] IN BLOOD BY AUTOMATED COUNT: 11.8 % (ref 12.3–15.4)
GFR SERPL CREATININE-BSD FRML MDRD: 71 ML/MIN/1.73
GLOBULIN UR ELPH-MCNC: 2.6 GM/DL
GLUCOSE SERPL-MCNC: 69 MG/DL (ref 65–99)
HCT VFR BLD AUTO: 43.2 % (ref 37.5–51)
HDLC SERPL-MCNC: 61 MG/DL (ref 40–60)
HGB BLD-MCNC: 14.7 G/DL (ref 13–17.7)
IMM GRANULOCYTES # BLD AUTO: 0.01 10*3/MM3 (ref 0–0.05)
IMM GRANULOCYTES NFR BLD AUTO: 0.3 % (ref 0–0.5)
INR PPP: 2.8 (ref 2.5–3.5)
LDLC SERPL CALC-MCNC: 50 MG/DL (ref 0–100)
LDLC/HDLC SERPL: 0.85 {RATIO}
LYMPHOCYTES # BLD AUTO: 0.88 10*3/MM3 (ref 0.7–3.1)
LYMPHOCYTES NFR BLD AUTO: 23.9 % (ref 19.6–45.3)
MCH RBC QN AUTO: 36 PG (ref 26.6–33)
MCHC RBC AUTO-ENTMCNC: 34 G/DL (ref 31.5–35.7)
MCV RBC AUTO: 105.9 FL (ref 79–97)
MONOCYTES # BLD AUTO: 0.44 10*3/MM3 (ref 0.1–0.9)
MONOCYTES NFR BLD AUTO: 12 % (ref 5–12)
NEUTROPHILS NFR BLD AUTO: 2.23 10*3/MM3 (ref 1.7–7)
NEUTROPHILS NFR BLD AUTO: 60.5 % (ref 42.7–76)
NRBC BLD AUTO-RTO: 0 /100 WBC (ref 0–0.2)
PLATELET # BLD AUTO: 89 10*3/MM3 (ref 140–450)
PMV BLD AUTO: 13.4 FL (ref 6–12)
POTASSIUM SERPL-SCNC: 4.9 MMOL/L (ref 3.5–5.2)
PROT SERPL-MCNC: 6.6 G/DL (ref 6–8.5)
RBC # BLD AUTO: 4.08 10*6/MM3 (ref 4.14–5.8)
SODIUM SERPL-SCNC: 140 MMOL/L (ref 136–145)
T4 FREE SERPL-MCNC: 1.18 NG/DL (ref 0.93–1.7)
TRIGL SERPL-MCNC: 40 MG/DL (ref 0–150)
TSH SERPL DL<=0.05 MIU/L-ACNC: 2.05 UIU/ML (ref 0.27–4.2)
VIT B12 BLD-MCNC: 1628 PG/ML (ref 211–946)
VLDLC SERPL-MCNC: 10 MG/DL (ref 5–40)
WBC # BLD AUTO: 3.68 10*3/MM3 (ref 3.4–10.8)

## 2020-11-25 PROCEDURE — 99214 OFFICE O/P EST MOD 30 MIN: CPT | Performed by: INTERNAL MEDICINE

## 2020-11-25 PROCEDURE — 85025 COMPLETE CBC W/AUTO DIFF WBC: CPT | Performed by: INTERNAL MEDICINE

## 2020-11-25 PROCEDURE — 82607 VITAMIN B-12: CPT | Performed by: INTERNAL MEDICINE

## 2020-11-25 PROCEDURE — 36416 COLLJ CAPILLARY BLOOD SPEC: CPT | Performed by: INTERNAL MEDICINE

## 2020-11-25 PROCEDURE — 36415 COLL VENOUS BLD VENIPUNCTURE: CPT | Performed by: INTERNAL MEDICINE

## 2020-11-25 PROCEDURE — 84443 ASSAY THYROID STIM HORMONE: CPT | Performed by: INTERNAL MEDICINE

## 2020-11-25 PROCEDURE — 80061 LIPID PANEL: CPT | Performed by: INTERNAL MEDICINE

## 2020-11-25 PROCEDURE — 85610 PROTHROMBIN TIME: CPT | Performed by: INTERNAL MEDICINE

## 2020-11-25 PROCEDURE — 80053 COMPREHEN METABOLIC PANEL: CPT | Performed by: INTERNAL MEDICINE

## 2020-11-25 PROCEDURE — 84439 ASSAY OF FREE THYROXINE: CPT | Performed by: INTERNAL MEDICINE

## 2020-11-25 RX ORDER — BUPROPION HYDROCHLORIDE 300 MG/1
300 TABLET ORAL DAILY
Qty: 30 TABLET | Refills: 6 | Status: SHIPPED | OUTPATIENT
Start: 2020-11-25 | End: 2021-03-11 | Stop reason: SDUPTHER

## 2020-11-25 NOTE — PROGRESS NOTES
Chief Complaint   Patient presents with   • Follow-up     Follow up chronic medical problems       History of Present Illness      The patient presents for a follow-up related to hypertension. The patient reports that he has had no headaches, chest pain, dyspnea, edema, syncope, blurred vision or palpitations. He states that he is taking his medication as prescribed. He is not having medication side effects.    The patient presents for a follow-up related to hyperlipidemia. He is following a low fat diet. He reports that he is exercising. He is taking atorvastatin. The patient is taking his medication as prescribed. He reports no medication side effects, including muscle cramps, abdominal pain, headaches or weakness. He denies orthopnea, paroxysmal nocturnal dyspnea or dyspnea on exertion.    The patient presents for a follow-up related to anemia. There are no reports of blood loss. The patient has no symptoms of a dry cough, a wet cough, wheezing, fever, nausea, vomiting, diarrhea, myalgias, abdominal pain, sweats, weight loss, decreased appetite, chills, fatigue or paresthesias. The patient's energy level is normal.    The patient presents for a follow-up related to chronic atrial fibrillation. He denies palpitations. The patient denies dizziness or exercise intolerance. The patient denies using diet pills, decongestants, alcohol, caffeine, cocaine or stimulant medications.    The patient complains of memory loss over 1 year. The primary type of memory affected is learning and retention. The patient still drives. He states that he does not get lost. He lives alone. There are no symptoms of depression.    Review of Systems    PULMONARY- Denies Sputum Production, Cough, Hemoptysis or Pleuritic Chest Pain.    CARDIOVASCULAR- Denies Claudication or Irregular Heart Beat.    NEUROLOGIC- Reports: Memory Loss. Denies: Seizures, Confusion or Depression.    Medications      Current Outpatient Medications:   •  aspirin 81 MG  EC tablet, Take 1 tablet by mouth daily., Disp: , Rfl:   •  atorvastatin (LIPITOR) 80 MG tablet, Take 1 tablet by mouth Every Night., Disp: 30 tablet, Rfl: 0  •  buPROPion XL (Wellbutrin XL) 300 MG 24 hr tablet, Take 1 tablet by mouth Daily., Disp: 30 tablet, Rfl: 6  •  carvedilol (COREG) 12.5 MG tablet, Take 1 tablet by mouth 2 (Two) Times a Day., Disp: 180 tablet, Rfl: 0  •  citalopram (CeleXA) 20 MG tablet, Take 1 tablet by mouth once daily, Disp: 90 tablet, Rfl: 1  •  famotidine (PEPCID) 20 MG tablet, Take 1 tablet by mouth Daily., Disp: 30 tablet, Rfl: 1  •  finasteride (PROSCAR) 5 MG tablet, TAKE 1 TABLET BY MOUTH EVERY DAY AT BEDTIME, Disp: 90 tablet, Rfl: 0  •  folic acid (FOLVITE) 400 MCG tablet, TAKE 1 TABLET BY MOUTH ONCE DAILY, Disp: 90 tablet, Rfl: 3  •  lisinopril (PRINIVIL,ZESTRIL) 10 MG tablet, Take 1 tablet by mouth 2 (Two) Times a Day., Disp: 180 tablet, Rfl: 0  •  Probiotic Product (PROBIOTIC DAILY PO), Take 1 capsule by mouth daily., Disp: , Rfl:   •  vitamin B-12 (CYANOCOBALAMIN) 1000 MCG tablet, Take 1 tablet by mouth Daily., Disp: 90 tablet, Rfl: 3  •  warfarin (COUMADIN) 5 MG tablet, Take one by mouth daily  Indications: Atrial Fibrillation, Presence of Mechanical Artificial Heart Valve, Disp: 30 tablet, Rfl: 5  •  albuterol sulfate  (90 Base) MCG/ACT inhaler, Inhale 2 puffs Every 4 (Four) Hours As Needed for Wheezing., Disp: 1 inhaler, Rfl: 0  •  Fluticasone-Umeclidin-Vilant (TRELEGY ELLIPTA) 100-62.5-25 MCG/INH aerosol powder , Inhale 1 puff Daily., Disp: 1 each, Rfl: 11     Allergies    No Known Allergies    Problem List    Patient Active Problem List   Diagnosis   • AF s/p RFA and BiV PPM    • Depression   • Hyperbilirubinemia   • Dyslipidemia   • Hypertension   • Malignant neoplasm of overlapping sites of colon (CMS/HCC)   • History of artificial heart valve   • Neurogenic bladder   • Tobacco use   • CAD    • H/O mechanical AVR    • COPD, mild (CMS/HCC)   • Bilateral pneumonia   •  Thrombocytopenia (CMS/HCC)   • Hemoptysis   • Lung nodule < 6cm on CT   • Chronic anticoagulation on warfarin    • Suspected pulmonary HTN    • Macrocytosis   • Self-catheterizes urinary bladder   • Subacute, multifocal ischemic strokes due to cardioembolism       Medications, Allergies, Problems List and Past History were reviewed and updated.    Physical Examination    /68 (BP Location: Left arm, Patient Position: Sitting, Cuff Size: Adult)   Pulse 72   Temp 97.5 °F (36.4 °C) (Infrared)   Resp 20   Wt 68.9 kg (152 lb)   BMI 20.61 kg/m²     HEENT: Head- Normocephalic Atraumatic. Facies- Within normal limits. Pinnas- Normal texture and shape bilaterally. Canals- Normal bilaterally. TMs- Normal bilaterally. Nares- Patent bilaterally. Nasal Septum- is normal. There is no tenderness to palpation over the frontal or maxillary sinuses. Lids- Normal bilaterally. Conjunctiva- Clear bilaterally. Sclera- Anicteric bilaterally. Oropharynx- Moist with no lesions. Tonsils- No enlargement, erythema or exudate.    Neck: Thyroid- non enlarged, symmetric and has no nodules. No bruits are detected. ROM- Normal Range of Motion with no rigidity.    Lungs: Auscultation- Clear to auscultation bilaterally. There are no retractions, clubbing or cyanosis. The Expiratory to Inspiratory ratio is equal.    Lymph Nodes: Cervical- no enlarged lymph nodes noted. Clavicular- Deferred. Axillary- Deferred. Inguinal- Deferred.    Cardiovascular: There are no carotid bruits. Heart- Normal Rate with Irregularly Irregular rhythm and no murmurs. There are no gallops. There are no rubs. In the lower extremities there is no edema. The upper extremities do not have edema.    Abdomen: Soft, benign, non-tender with no masses, hernias, organomegaly or scars.    Neurologic Exam:    Cranial Nerves II-XII: Intact    MMSE: 28/30    Gait: Normal.    Impression and Assessment    Essential Hypertension.    Hyperlipidemia.    Anemia.    Atrial  Fibrillation.    Memory Loss.    Plan    Essential Hypertension Plan: The current plan was continued.    Hyperlipidemia Plan: The patient was instructed to exercise daily, eat a low fat diet and continue his medications.    Atrial Fibrillation Plan: The condition is stable. No change was made in the current plan.    Memory Loss Plan: Monitor the symptoms closely.    Anemia Plan: Further plans will be made after results of testing are available.    Diagnoses and all orders for this visit:    1. Hyperlipidemia, unspecified hyperlipidemia type (Primary)  -     Comprehensive Metabolic Panel  -     Lipid Panel  -     POC INR    2. Essential hypertension  -     Comprehensive Metabolic Panel  -     POC INR    3. Anemia, unspecified type  -     CBC & Differential  -     Comprehensive Metabolic Panel  -     Vitamin B12  -     CBC Auto Differential  -     POC INR    4. Permanent atrial fibrillation (CMS/HCC)  -     POC INR    5. Memory loss  -     CBC & Differential  -     Comprehensive Metabolic Panel  -     TSH  -     T4, Free  -     CBC Auto Differential  -     POC INR    Other orders  -     buPROPion XL (Wellbutrin XL) 300 MG 24 hr tablet; Take 1 tablet by mouth Daily.  Dispense: 30 tablet; Refill: 6        Return to Office    The patient was instructed to return for follow-up in 3 months.    The patient was instructed to return sooner if the condition changes, worsens, or does not resolve.

## 2020-12-01 RX ORDER — CITALOPRAM 20 MG/1
TABLET ORAL
Qty: 90 TABLET | Refills: 1 | Status: SHIPPED | OUTPATIENT
Start: 2020-12-01 | End: 2021-08-02

## 2020-12-02 ENCOUNTER — LAB (OUTPATIENT)
Dept: INTERNAL MEDICINE | Facility: CLINIC | Age: 70
End: 2020-12-02

## 2020-12-02 DIAGNOSIS — I48.21 PERMANENT ATRIAL FIBRILLATION (HCC): Primary | ICD-10-CM

## 2020-12-02 LAB — INR PPP: 2.9 (ref 2.5–3.5)

## 2020-12-02 PROCEDURE — 36416 COLLJ CAPILLARY BLOOD SPEC: CPT | Performed by: INTERNAL MEDICINE

## 2020-12-02 PROCEDURE — 85610 PROTHROMBIN TIME: CPT | Performed by: INTERNAL MEDICINE

## 2020-12-17 ENCOUNTER — LAB (OUTPATIENT)
Dept: INTERNAL MEDICINE | Facility: CLINIC | Age: 70
End: 2020-12-17

## 2020-12-17 DIAGNOSIS — I48.21 PERMANENT ATRIAL FIBRILLATION (HCC): Primary | ICD-10-CM

## 2020-12-17 LAB — INR PPP: 2 (ref 2.5–3.5)

## 2020-12-17 PROCEDURE — 85610 PROTHROMBIN TIME: CPT | Performed by: INTERNAL MEDICINE

## 2020-12-17 PROCEDURE — 36416 COLLJ CAPILLARY BLOOD SPEC: CPT | Performed by: INTERNAL MEDICINE

## 2020-12-17 RX ORDER — FINASTERIDE 5 MG/1
TABLET, FILM COATED ORAL
Qty: 90 TABLET | Refills: 1 | Status: SHIPPED | OUTPATIENT
Start: 2020-12-17 | End: 2021-09-16

## 2020-12-23 ENCOUNTER — LAB (OUTPATIENT)
Dept: INTERNAL MEDICINE | Facility: CLINIC | Age: 70
End: 2020-12-23

## 2020-12-23 DIAGNOSIS — I48.21 PERMANENT ATRIAL FIBRILLATION (HCC): Primary | ICD-10-CM

## 2020-12-23 LAB — INR PPP: 2.3 (ref 2.5–3.5)

## 2020-12-23 PROCEDURE — 36416 COLLJ CAPILLARY BLOOD SPEC: CPT | Performed by: INTERNAL MEDICINE

## 2020-12-23 PROCEDURE — 85610 PROTHROMBIN TIME: CPT | Performed by: INTERNAL MEDICINE

## 2020-12-30 ENCOUNTER — LAB (OUTPATIENT)
Dept: INTERNAL MEDICINE | Facility: CLINIC | Age: 70
End: 2020-12-30

## 2020-12-30 DIAGNOSIS — I48.21 PERMANENT ATRIAL FIBRILLATION (HCC): Primary | ICD-10-CM

## 2020-12-30 LAB — INR PPP: 2.6 (ref 2.5–3.5)

## 2020-12-30 PROCEDURE — 85610 PROTHROMBIN TIME: CPT | Performed by: INTERNAL MEDICINE

## 2020-12-30 PROCEDURE — 36416 COLLJ CAPILLARY BLOOD SPEC: CPT | Performed by: INTERNAL MEDICINE

## 2021-01-11 RX ORDER — WARFARIN SODIUM 5 MG/1
TABLET ORAL
Qty: 30 TABLET | Refills: 5 | Status: SHIPPED | OUTPATIENT
Start: 2021-01-11 | End: 2021-08-12

## 2021-01-29 ENCOUNTER — LAB (OUTPATIENT)
Dept: INTERNAL MEDICINE | Facility: CLINIC | Age: 71
End: 2021-01-29

## 2021-01-29 DIAGNOSIS — I48.21 PERMANENT ATRIAL FIBRILLATION (HCC): Primary | ICD-10-CM

## 2021-01-29 LAB — INR PPP: 2 (ref 2.5–3.5)

## 2021-01-29 PROCEDURE — 85610 PROTHROMBIN TIME: CPT | Performed by: INTERNAL MEDICINE

## 2021-01-29 PROCEDURE — 36416 COLLJ CAPILLARY BLOOD SPEC: CPT | Performed by: INTERNAL MEDICINE

## 2021-02-05 ENCOUNTER — LAB (OUTPATIENT)
Dept: INTERNAL MEDICINE | Facility: CLINIC | Age: 71
End: 2021-02-05

## 2021-02-05 DIAGNOSIS — I48.21 PERMANENT ATRIAL FIBRILLATION (HCC): Primary | ICD-10-CM

## 2021-02-05 LAB — INR PPP: 2.1 (ref 2.5–3.5)

## 2021-02-05 PROCEDURE — 85610 PROTHROMBIN TIME: CPT | Performed by: INTERNAL MEDICINE

## 2021-02-05 PROCEDURE — 36416 COLLJ CAPILLARY BLOOD SPEC: CPT | Performed by: INTERNAL MEDICINE

## 2021-02-12 ENCOUNTER — HOSPITAL ENCOUNTER (OUTPATIENT)
Dept: CT IMAGING | Facility: HOSPITAL | Age: 71
Discharge: HOME | End: 2021-02-12
Admitting: INTERNAL MEDICINE

## 2021-02-12 DIAGNOSIS — R91.1 LUNG NODULE: ICD-10-CM

## 2021-02-12 PROCEDURE — 71250 CT THORAX DX C-: CPT

## 2021-02-12 RX ORDER — CARVEDILOL 12.5 MG/1
12.5 TABLET ORAL 2 TIMES DAILY
Qty: 180 TABLET | Refills: 2 | Status: SHIPPED | OUTPATIENT
Start: 2021-02-12 | End: 2021-05-29 | Stop reason: HOSPADM

## 2021-02-12 RX ORDER — ATORVASTATIN CALCIUM 80 MG/1
80 TABLET, FILM COATED ORAL NIGHTLY
Qty: 90 TABLET | Refills: 2 | Status: SHIPPED | OUTPATIENT
Start: 2021-02-12 | End: 2021-12-29

## 2021-02-12 RX ORDER — LISINOPRIL 10 MG/1
10 TABLET ORAL 2 TIMES DAILY
Qty: 180 TABLET | Refills: 2 | Status: SHIPPED | OUTPATIENT
Start: 2021-02-12 | End: 2021-05-29 | Stop reason: HOSPADM

## 2021-02-12 NOTE — TELEPHONE ENCOUNTER
NEXT APPT 8/2021  Lab Results   Component Value Date    CHOL 121 11/25/2020    TRIG 40 11/25/2020    HDL 61 (H) 11/25/2020    LDL 50 11/25/2020     Lab Results   Component Value Date    GLUCOSE 69 11/25/2020    BUN 13 11/25/2020    CREATININE 1.03 11/25/2020    EGFRIFNONA 71 11/25/2020    BCR 12.6 11/25/2020    K 4.9 11/25/2020    CO2 29.6 (H) 11/25/2020    CALCIUM 9.3 11/25/2020    ALBUMIN 4.00 11/25/2020    AST 18 11/25/2020    ALT 13 11/25/2020

## 2021-02-15 ENCOUNTER — LAB (OUTPATIENT)
Dept: INTERNAL MEDICINE | Facility: CLINIC | Age: 71
End: 2021-02-15

## 2021-02-15 DIAGNOSIS — I48.21 PERMANENT ATRIAL FIBRILLATION (HCC): Primary | ICD-10-CM

## 2021-02-15 LAB — INR PPP: 2.6 (ref 2.5–3.5)

## 2021-02-15 PROCEDURE — 36416 COLLJ CAPILLARY BLOOD SPEC: CPT | Performed by: INTERNAL MEDICINE

## 2021-02-15 PROCEDURE — 85610 PROTHROMBIN TIME: CPT | Performed by: INTERNAL MEDICINE

## 2021-02-18 ENCOUNTER — OFFICE VISIT (OUTPATIENT)
Dept: PULMONOLOGY | Facility: CLINIC | Age: 71
End: 2021-02-18

## 2021-02-18 VITALS
WEIGHT: 151 LBS | HEART RATE: 70 BPM | DIASTOLIC BLOOD PRESSURE: 80 MMHG | TEMPERATURE: 97.1 F | BODY MASS INDEX: 20.45 KG/M2 | RESPIRATION RATE: 16 BRPM | OXYGEN SATURATION: 94 % | HEIGHT: 72 IN | SYSTOLIC BLOOD PRESSURE: 142 MMHG

## 2021-02-18 DIAGNOSIS — R91.1 LUNG NODULE: ICD-10-CM

## 2021-02-18 DIAGNOSIS — C34.92 NON-SMALL CELL CANCER OF LEFT LUNG (HCC): Primary | ICD-10-CM

## 2021-02-18 PROCEDURE — 99214 OFFICE O/P EST MOD 30 MIN: CPT | Performed by: INTERNAL MEDICINE

## 2021-02-18 NOTE — PROGRESS NOTES
CC:    Abnormal CT    HPI:    68 y/o WM w/ h/o tobacco abuse ~100 py plus, prior colon cancer s/p resection 2001, mechanical AVR, CAD w/ prior stent, Afib, who presents for evaluation of abnormal CT Chest. Patient had a lung cancer screening CT 8/20/19 showing a spiculated 12 mm (I disagree with radiology reading of 9 mm) spiculated MARKO lesion.  No mediastinal or hilar LAD.  No symptoms attributable to this nodule.    Last time I saw patient in 12/11/19 he had some mild hemoptysis.  This was felt to be due to bronchitis and resolved with abx / steroids.  INR was slightly supra-therapeutic.  This resolved on follow up visit 12/27/19.    Patient was admitted in 2/19-2/24/20 w/ H1N1 flu / pna / hemoptysis.  INR was 7 at the time.  CXR abnormalities cleared rapidly.  ANCA and GBM antibodies were negative.  Echo showed EF 46%, LVH, dilated LA, dilated RV, mild to mod MR, functioning mechanical AVR, RVSP > 55.  Echo was done in setting of marked abnormalities on CXR.    Had another admision 2/26-3/7/20 with supratherapeutic INR and large rectus sheath hematoma.  This was complicated by reversal of AC and subsequent embolic strokes.    INTERVAL HISTORY:    Patient returns today for follow up after repeat CT Chest.  No hemoptysis.  No SOA.  Seems to have returned to baseline.  No cough / wheezing etc.  Still smoking.    PMH:    Past Medical History:   Diagnosis Date   • A-fib (CMS/HCC)    • Anemia    • Atrial fibrillation (CMS/HCC) 5/5/2016    Difficult to control rate. RFA of AV node with implantation of left Bi-V pacemaker, 06/18/2013. Hematoma requiring single-chamber pacemaker implanted, 07/29/2013.    • Colitis    • Colon cancer (CMS/HCC)    • Depression    • HTN (hypertension)    • Hyperbilirubinemia    • Hyperlipidemia    • Orthostatic hypotension      PSH:    Past Surgical History:   Procedure Laterality Date   • APPENDECTOMY     • CARDIAC SURGERY     • COLON SURGERY     • EXTRACORPOREAL SHOCKWAVE LITHOTRIPSY  (ESWL), STENT INSERTION/REMOVAL     • PACEMAKER IMPLANTATION     • VARICOSE VEIN SURGERY       FH:    Family History   Problem Relation Age of Onset   • Cancer Mother    • Hypertension Father    • Heart disease Father      SH:    Social History     Socioeconomic History   • Marital status:      Spouse name: Not on file   • Number of children: Not on file   • Years of education: Not on file   • Highest education level: Not on file   Tobacco Use   • Smoking status: Current Every Day Smoker     Packs/day: 1.50     Years: 57.00     Pack years: 85.50     Types: Cigarettes   • Smokeless tobacco: Never Used   Substance and Sexual Activity   • Alcohol use: Yes     Alcohol/week: 2.0 standard drinks     Types: 2 Shots of liquor per week     Frequency: 4 or more times a week     Drinks per session: 1 or 2     Comment: Daily   • Drug use: No   • Sexual activity: Yes     Partners: Female     Comment:       ALLERGIES:    No Known Allergies  MEDICATIONS:      Current Outpatient Medications:   •  albuterol sulfate  (90 Base) MCG/ACT inhaler, Inhale 2 puffs Every 4 (Four) Hours As Needed for Wheezing., Disp: 1 inhaler, Rfl: 0  •  aspirin 81 MG EC tablet, Take 1 tablet by mouth daily., Disp: , Rfl:   •  atorvastatin (LIPITOR) 80 MG tablet, Take 1 tablet by mouth Every Night., Disp: 90 tablet, Rfl: 2  •  buPROPion XL (Wellbutrin XL) 300 MG 24 hr tablet, Take 1 tablet by mouth Daily., Disp: 30 tablet, Rfl: 6  •  carvedilol (COREG) 12.5 MG tablet, Take 1 tablet by mouth 2 (Two) Times a Day., Disp: 180 tablet, Rfl: 2  •  citalopram (CeleXA) 20 MG tablet, Take 1 tablet by mouth once daily, Disp: 90 tablet, Rfl: 1  •  famotidine (PEPCID) 20 MG tablet, Take 1 tablet by mouth Daily., Disp: 30 tablet, Rfl: 1  •  finasteride (PROSCAR) 5 MG tablet, TAKE 1 TABLET BY MOUTH ONCE DAILY AT BEDTIME, Disp: 90 tablet, Rfl: 1  •  Fluticasone-Umeclidin-Vilant (TRELEGY ELLIPTA) 100-62.5-25 MCG/INH aerosol powder , Inhale 1 puff  Daily., Disp: 1 each, Rfl: 11  •  folic acid (FOLVITE) 400 MCG tablet, TAKE 1 TABLET BY MOUTH ONCE DAILY, Disp: 90 tablet, Rfl: 3  •  lisinopril (PRINIVIL,ZESTRIL) 10 MG tablet, Take 1 tablet by mouth 2 (Two) Times a Day., Disp: 180 tablet, Rfl: 2  •  Probiotic Product (PROBIOTIC DAILY PO), Take 1 capsule by mouth daily., Disp: , Rfl:   •  vitamin B-12 (CYANOCOBALAMIN) 1000 MCG tablet, Take 1 tablet by mouth Daily., Disp: 90 tablet, Rfl: 3  •  warfarin (COUMADIN) 5 MG tablet, Take 1 tablet by mouth once daily, Disp: 30 tablet, Rfl: 5  ROS:  Per HPI, otherwise all systems reviewed and negative.    DIAGNOSTIC DATA (Reviewed and interpreted by me unless otherwise specified):    CT Chest 8/20/19 - emphysema, scarring RUL, scattered sub-centimeter nodules, 12 mm spiculated nodule in MARKO    CT PET 9/25/19 - SUV 1.0, negative    CT Chest 11/6/19 - stable MARKO nodule, also a RUL nodule that appears inflammatory that needs to be followed    CT Chest 2/6/20 - stable MARKO and RUL nodules when going back to 8/20/19    CT Chest 8/18/20 - relatively stable MARKO nodules and RUL scarring.  Prior neg PET-CT.      CT Chest 2/12/21 - relative stable MARKO nodules, however maybe some slight growth going back to 1 year ago overall.    CXR 12/11/19 - RUL and RML consolidation    PFT 9/17/19 - borderline mild obstruction, +BD change, hyperinflation, +air trapping, normal DLCO    Vitals:    02/18/21 1336   BP: 142/80   Pulse: 70   Resp: 16   Temp: 97.1 °F (36.2 °C)   SpO2: 94%       Physical Exam   Constitutional: Oriented to person, place, and time. Appears well-developed and well-nourished.   Head: Normocephalic and atraumatic.   Nose: Nose normal.   Mouth/Throat: Oropharynx is clear and moist.   Eyes: Conjunctivae are normal.  Pupils normal.  Neck: No tracheal deviation present.   Cardiovascular: Normal rate, regular rhythm, normal heart sounds with mechanical click and intact distal pulses.  Exam reveals no gallop and no friction rub.  No  thrill.  No JVD.  No edema.  No murmur heard.  Pulmonary/Chest: Effort normal and breath sounds normal.  No tenderness to palpation.  No clubbing.   Abdominal: Soft. Bowel sounds are normal. No distension. No tenderness. There is no guarding.   Musculoskeletal: Normal range of motion.  No tenderness.  Lymphadenopathy:  No cervical adenopathy.   Neurological:  No new focal neurological deficits observed   Skin: Skin is warm and dry. No rash noted.   Psychiatric: Normal mood and affect.  Behavior is normal. Judgment normal.    Assessment/Plan     1)  Lung Nodules - relatively stable and PET-neg.  Given appearance and smoking history would like to continue close surveillance.  Coumadin use for mechanical AVR complicates situation.  I'm still concerned about a slow growing lung cancer.  I do think there has been some slight growth over the past year.  Relatively stable since August and previously PET neg.   Will repeat PET-CT now.  If no change in SUV, plan to continue observation.  If more PET avid will need likely a CT guided biopsy.  Would need cardiac clearance prior to considering surgery.  He is resectable from a PFT standpoint.    2)  Hemoptysis - resolved.  On coumadin.    3) Tobacco Abuse - counseled patient, recommended NRT and Wellbutrin.  He is agreeable. Will continue.    RTC ~1 month to go over PET and subsequent plan    Payam Kenney MD  Pulmonology and Critical Care Medicine  02/18/21 15:58 EST  Electronically Signed    C.C.:  No ref. provider found, Ez Cintron MD

## 2021-02-24 ENCOUNTER — TELEPHONE (OUTPATIENT)
Dept: INTERNAL MEDICINE | Facility: CLINIC | Age: 71
End: 2021-02-24

## 2021-02-24 ENCOUNTER — OFFICE VISIT (OUTPATIENT)
Dept: INTERNAL MEDICINE | Facility: CLINIC | Age: 71
End: 2021-02-24

## 2021-02-24 VITALS
RESPIRATION RATE: 18 BRPM | SYSTOLIC BLOOD PRESSURE: 128 MMHG | BODY MASS INDEX: 20.48 KG/M2 | WEIGHT: 151 LBS | DIASTOLIC BLOOD PRESSURE: 68 MMHG | HEART RATE: 76 BPM | TEMPERATURE: 97.8 F

## 2021-02-24 DIAGNOSIS — R91.1 PULMONARY NODULE: ICD-10-CM

## 2021-02-24 DIAGNOSIS — I10 ESSENTIAL HYPERTENSION: ICD-10-CM

## 2021-02-24 DIAGNOSIS — D64.9 ANEMIA, UNSPECIFIED TYPE: ICD-10-CM

## 2021-02-24 DIAGNOSIS — H91.93 DECREASED HEARING OF BOTH EARS: ICD-10-CM

## 2021-02-24 DIAGNOSIS — N52.9 ERECTILE DYSFUNCTION, UNSPECIFIED ERECTILE DYSFUNCTION TYPE: ICD-10-CM

## 2021-02-24 DIAGNOSIS — I48.91 ATRIAL FIBRILLATION, UNSPECIFIED TYPE (HCC): ICD-10-CM

## 2021-02-24 DIAGNOSIS — Z12.5 PROSTATE CANCER SCREENING: ICD-10-CM

## 2021-02-24 DIAGNOSIS — E78.5 HYPERLIPIDEMIA, UNSPECIFIED HYPERLIPIDEMIA TYPE: Primary | ICD-10-CM

## 2021-02-24 DIAGNOSIS — Z00.00 HEALTHCARE MAINTENANCE: ICD-10-CM

## 2021-02-24 LAB
ALBUMIN SERPL-MCNC: 4.1 G/DL (ref 3.5–5.2)
ALBUMIN/GLOB SERPL: 1.4 G/DL
ALP SERPL-CCNC: 79 U/L (ref 39–117)
ALT SERPL W P-5'-P-CCNC: 20 U/L (ref 1–41)
ANION GAP SERPL CALCULATED.3IONS-SCNC: 9.1 MMOL/L (ref 5–15)
AST SERPL-CCNC: 31 U/L (ref 1–40)
BASOPHILS # BLD AUTO: 0.04 10*3/MM3 (ref 0–0.2)
BASOPHILS NFR BLD AUTO: 0.9 % (ref 0–1.5)
BILIRUB SERPL-MCNC: 0.9 MG/DL (ref 0–1.2)
BUN SERPL-MCNC: 15 MG/DL (ref 8–23)
BUN/CREAT SERPL: 12.7 (ref 7–25)
CALCIUM SPEC-SCNC: 8.7 MG/DL (ref 8.6–10.5)
CHLORIDE SERPL-SCNC: 103 MMOL/L (ref 98–107)
CHOLEST SERPL-MCNC: 101 MG/DL (ref 0–200)
CO2 SERPL-SCNC: 26.9 MMOL/L (ref 22–29)
CREAT SERPL-MCNC: 1.18 MG/DL (ref 0.76–1.27)
DEPRECATED RDW RBC AUTO: 46.1 FL (ref 37–54)
EOSINOPHIL # BLD AUTO: 0.08 10*3/MM3 (ref 0–0.4)
EOSINOPHIL NFR BLD AUTO: 1.8 % (ref 0.3–6.2)
ERYTHROCYTE [DISTWIDTH] IN BLOOD BY AUTOMATED COUNT: 12.2 % (ref 12.3–15.4)
GFR SERPL CREATININE-BSD FRML MDRD: 61 ML/MIN/1.73
GLOBULIN UR ELPH-MCNC: 2.9 GM/DL
GLUCOSE SERPL-MCNC: 91 MG/DL (ref 65–99)
HCT VFR BLD AUTO: 42.3 % (ref 37.5–51)
HCV AB SER DONR QL: NORMAL
HDLC SERPL-MCNC: 75 MG/DL (ref 40–60)
HGB BLD-MCNC: 14.8 G/DL (ref 13–17.7)
IMM GRANULOCYTES # BLD AUTO: 0.01 10*3/MM3 (ref 0–0.05)
IMM GRANULOCYTES NFR BLD AUTO: 0.2 % (ref 0–0.5)
LDLC SERPL CALC-MCNC: 15 MG/DL (ref 0–100)
LDLC/HDLC SERPL: 0.25 {RATIO}
LYMPHOCYTES # BLD AUTO: 0.89 10*3/MM3 (ref 0.7–3.1)
LYMPHOCYTES NFR BLD AUTO: 20.6 % (ref 19.6–45.3)
MCH RBC QN AUTO: 36.4 PG (ref 26.6–33)
MCHC RBC AUTO-ENTMCNC: 35 G/DL (ref 31.5–35.7)
MCV RBC AUTO: 103.9 FL (ref 79–97)
MONOCYTES # BLD AUTO: 0.47 10*3/MM3 (ref 0.1–0.9)
MONOCYTES NFR BLD AUTO: 10.9 % (ref 5–12)
NEUTROPHILS NFR BLD AUTO: 2.84 10*3/MM3 (ref 1.7–7)
NEUTROPHILS NFR BLD AUTO: 65.6 % (ref 42.7–76)
NRBC BLD AUTO-RTO: 0 /100 WBC (ref 0–0.2)
PLATELET # BLD AUTO: 93 10*3/MM3 (ref 140–450)
PMV BLD AUTO: 12.7 FL (ref 6–12)
POTASSIUM SERPL-SCNC: 4.4 MMOL/L (ref 3.5–5.2)
PROT SERPL-MCNC: 7 G/DL (ref 6–8.5)
PSA SERPL-MCNC: 0.2 NG/ML (ref 0–4)
RBC # BLD AUTO: 4.07 10*6/MM3 (ref 4.14–5.8)
SODIUM SERPL-SCNC: 139 MMOL/L (ref 136–145)
TRIGL SERPL-MCNC: 36 MG/DL (ref 0–150)
TSH SERPL DL<=0.05 MIU/L-ACNC: 1.34 UIU/ML (ref 0.27–4.2)
VLDLC SERPL-MCNC: 11 MG/DL (ref 5–40)
WBC # BLD AUTO: 4.33 10*3/MM3 (ref 3.4–10.8)

## 2021-02-24 PROCEDURE — 36415 COLL VENOUS BLD VENIPUNCTURE: CPT | Performed by: INTERNAL MEDICINE

## 2021-02-24 PROCEDURE — 84443 ASSAY THYROID STIM HORMONE: CPT | Performed by: INTERNAL MEDICINE

## 2021-02-24 PROCEDURE — 99214 OFFICE O/P EST MOD 30 MIN: CPT | Performed by: INTERNAL MEDICINE

## 2021-02-24 PROCEDURE — 86803 HEPATITIS C AB TEST: CPT | Performed by: INTERNAL MEDICINE

## 2021-02-24 PROCEDURE — G0103 PSA SCREENING: HCPCS | Performed by: INTERNAL MEDICINE

## 2021-02-24 PROCEDURE — 85025 COMPLETE CBC W/AUTO DIFF WBC: CPT | Performed by: INTERNAL MEDICINE

## 2021-02-24 PROCEDURE — 80061 LIPID PANEL: CPT | Performed by: INTERNAL MEDICINE

## 2021-02-24 PROCEDURE — 80053 COMPREHEN METABOLIC PANEL: CPT | Performed by: INTERNAL MEDICINE

## 2021-02-24 RX ORDER — TADALAFIL 20 MG/1
20 TABLET ORAL DAILY PRN
Qty: 30 TABLET | Refills: 2 | Status: SHIPPED | OUTPATIENT
Start: 2021-02-24 | End: 2023-03-22

## 2021-02-24 NOTE — PROGRESS NOTES
Chief Complaint   Patient presents with   • Follow-up     3 MONTH FOLLOW UP CHRONIC MEDICAL PROBLEMS       History of Present Illness      The patient presents for a follow-up related to chronic atrial fibrillation. He denies palpitations. The patient denies fatigue, dizziness, nausea, dyspnea, edema, chest pain, syncope or exercise intolerance.    The patient presents for a follow-up related to hypertension. The patient reports that he has had no headaches or blurred vision. He states that he is taking his medication as prescribed. He is not having medication side effects.    The patient presents for an acute visit for hearing loss. He reports symptoms in both ears. The symptoms have been present for twelve months. There is no ear pain. He denies tinnitis. He has not had exposure to loud noises throughout his life. There is no family history of hearing loss reported. There are no other associated symptoms of a dry cough, a wet cough, wheezing, fever, facial pain, eye drainage, ear drainage, nasal congestion, a rash, vomiting, diarrhea, chills, decreased appetite, abdominal pain, sore throat or myalgias.    The patient presents for a follow-up related to hyperlipidemia. He is following a low fat diet. He reports that he is exercising. He is taking atorvastatin. The patient is taking his medication as prescribed. He reports no medication side effects, including muscle cramps, abdominal pain, headaches or weakness. He denies orthopnea, paroxysmal nocturnal dyspnea or dyspnea on exertion.    The patient reports erectile dysfunction of between six and12 month duration. The patient states that he can seldom achieve penetration. He does not have morning erections. The patient's libido is normal.    The patient presents for a follow-up related to anemia. There are no reports of blood loss. The patient has no symptoms of sweats, weight loss or paresthesias. The patient's energy level is normal.    The patient presents for a  follow-up related to a non-calcified pulmonary nodule which was first noted on a CXR Date First Noted. The patient denies a cough, sputum production, night sweats, hemoptysis, bone pain or confusion. He does not have a history of tobacco abuse. He is following with pulmonary medicine.    Review of Systems    CONSTITUTIONAL- Denies Weakness or Malaise.    NECK- Denies Decreased Range of Motion, Stiffness, Thyroid Nodules, Enlarged Lymph Nodes or Goiter.    EYES- Denies Eye Pain, Eye Redness, Eye Discharge, Decreased Vision, Visual Disturbance, Diplopia, Visual Loss or Swollen Eyelid.    HENT- Denies Nasal Discharge, Sinus Pain, Decreased Hearing or Tinnitus.    PULMONARY- Denies Pleuritic Chest Pain.    GASTROINTESTINAL- Denies Blood per Rectum, Constipation or Heartburn.    GENITOURINARY- Reports: Erectile Dysfunction. Denies: Penile Discharge, Testicular Mass, Testicular Pain, Hernia, Nocturia, Decreased Urine Stream, Incomplete Voiding, Urinary Frequency, Urinary Urgency or Dysuria.    CARDIOVASCULAR- Denies Claudication or Irregular Heart Beat.    ENDOCRINE- Denies Cold Intolerance, Hair Loss, Heat Intolerance, Memory Loss, Polydypsia, Polyphagia, Polyuria, Sleep Disturbance or Weight Gain.    NEUROLOGIC- Denies Seizures or Excessive Daytime Sleepiness.    MUSCULOSKELETAL- Denies Joint Pain, Joint Stiffness, Decreased Range of Motion, Joint Swelling or Erythema of Joints.    INTEGUMENTARY- Denies Lumps, Sores, Itching, Dryness, Color Change, Changes in Hair, Brittle Nails, Discolored Nails, Thickened Nails, Growths or Alopecia.    Medications      Current Outpatient Medications:   •  albuterol sulfate  (90 Base) MCG/ACT inhaler, Inhale 2 puffs Every 4 (Four) Hours As Needed for Wheezing., Disp: 1 inhaler, Rfl: 0  •  aspirin 81 MG EC tablet, Take 1 tablet by mouth daily., Disp: , Rfl:   •  atorvastatin (LIPITOR) 80 MG tablet, Take 1 tablet by mouth Every Night., Disp: 90 tablet, Rfl: 2  •  buPROPion XL  (Wellbutrin XL) 300 MG 24 hr tablet, Take 1 tablet by mouth Daily., Disp: 30 tablet, Rfl: 6  •  carvedilol (COREG) 12.5 MG tablet, Take 1 tablet by mouth 2 (Two) Times a Day., Disp: 180 tablet, Rfl: 2  •  citalopram (CeleXA) 20 MG tablet, Take 1 tablet by mouth once daily, Disp: 90 tablet, Rfl: 1  •  famotidine (PEPCID) 20 MG tablet, Take 1 tablet by mouth Daily., Disp: 30 tablet, Rfl: 1  •  finasteride (PROSCAR) 5 MG tablet, TAKE 1 TABLET BY MOUTH ONCE DAILY AT BEDTIME, Disp: 90 tablet, Rfl: 1  •  folic acid (FOLVITE) 400 MCG tablet, TAKE 1 TABLET BY MOUTH ONCE DAILY, Disp: 90 tablet, Rfl: 3  •  lisinopril (PRINIVIL,ZESTRIL) 10 MG tablet, Take 1 tablet by mouth 2 (Two) Times a Day., Disp: 180 tablet, Rfl: 2  •  Probiotic Product (PROBIOTIC DAILY PO), Take 1 capsule by mouth daily., Disp: , Rfl:   •  vitamin B-12 (CYANOCOBALAMIN) 1000 MCG tablet, Take 1 tablet by mouth Daily., Disp: 90 tablet, Rfl: 3  •  warfarin (COUMADIN) 5 MG tablet, Take 1 tablet by mouth once daily, Disp: 30 tablet, Rfl: 5  •  tadalafil (Cialis) 20 MG tablet, Take 1 tablet by mouth Daily As Needed for Erectile Dysfunction., Disp: 30 tablet, Rfl: 2     Allergies    No Known Allergies    Problem List    Patient Active Problem List   Diagnosis   • AF s/p RFA and BiV PPM    • Depression   • Hyperbilirubinemia   • Dyslipidemia   • Hypertension   • Malignant neoplasm of overlapping sites of colon (CMS/HCC)   • History of artificial heart valve   • Neurogenic bladder   • Tobacco use   • CAD    • H/O mechanical AVR    • COPD, mild (CMS/HCC)   • Bilateral pneumonia   • Thrombocytopenia (CMS/HCC)   • Hemoptysis   • Lung nodule < 6cm on CT   • Chronic anticoagulation on warfarin    • Suspected pulmonary HTN    • Macrocytosis   • Self-catheterizes urinary bladder   • Subacute, multifocal ischemic strokes due to cardioembolism       Medications, Allergies, Problems List and Past History were reviewed and updated.    Physical Examination    /68 (BP  Location: Right arm, Patient Position: Sitting, Cuff Size: Adult)   Pulse 76   Temp 97.8 °F (36.6 °C) (Infrared)   Resp 18   Wt 68.5 kg (151 lb)   BMI 20.48 kg/m²     HEENT: Head- Normocephalic Atraumatic. Facies- Within normal limits. Pinnas- Normal texture and shape bilaterally. Canals- Normal bilaterally. TMs- Normal bilaterally. Nares- Patent bilaterally. Nasal Septum- is normal. There is no tenderness to palpation over the frontal or maxillary sinuses. Lids- Normal bilaterally. Conjunctiva- Clear bilaterally. Sclera- Anicteric bilaterally. Oropharynx- Moist with no lesions. Tonsils- No enlargement, erythema or exudate.    Neck: Thyroid- non enlarged, symmetric and has no nodules. No bruits are detected. ROM- Normal Range of Motion with no rigidity.    Lungs: Auscultation- Clear to auscultation bilaterally. There are no retractions, clubbing or cyanosis. The Expiratory to Inspiratory ratio is equal.    Lymph Nodes: Cervical- no enlarged lymph nodes noted. Clavicular- no enlarged supraclavicular lymph nodes noted. Axillary- no enlarged axillary lymph nodes noted. Inguinal- no enlarged inguinal lymph nodes noted.    Cardiovascular: There are no carotid bruits. Heart- Normal Rate with Irregularly Irregular rhythm and no murmurs. There are no gallops. There are no rubs. In the lower extremities there is no edema. The upper extremities do not have edema.    Abdomen: Soft, benign, non-tender with no masses, hernias, organomegaly or scars.    Male Genitourinary: The penis is circumcised with testicles found in the scrotum bilaterally. Testicular Size is normal bilaterally. The penis has no anatomic abnormalities.    Rectal: reveals normal external sphincter tone with no external lesions.    Prostate: The prostate gland is symmetric and smooth with no nodularity.    Impression and Assessment    Encounter for Screening for Malignant Neoplasm of the Prostate.    Atrial Fibrillation.    Essential  Hypertension.    Hearing Loss.    Hyperlipidemia.    Erectile Dysfunction.    Anemia.    Pulmonary Nodule.    Plan    Atrial Fibrillation Plan: The patient will follow-up with his cardiologist. The patient was instructed to continue the current medications.    Essential Hypertension Plan: The current plan was continued.    Hyperlipidemia Plan: The patient was instructed to exercise daily, eat a low fat diet and continue his medications.    Pulmonary Nodule Plan: He will follow-up with pulmonology.    Hearing Loss Plan: He was referred to audiology.    Erectile Dysfunction Plan: Medication was added as noted below.    Anemia Plan: Further plans will be made after results of testing are available.    The following was ordered for screening and health maintenance: PSA.    Diagnoses and all orders for this visit:    1. Hyperlipidemia, unspecified hyperlipidemia type (Primary)  -     Comprehensive Metabolic Panel  -     Lipid Panel    2. Essential hypertension  -     CBC & Differential  -     Comprehensive Metabolic Panel  -     TSH  -     CBC Auto Differential    3. Anemia, unspecified type  -     CBC & Differential  -     CBC Auto Differential    4. Atrial fibrillation, unspecified type (CMS/HCC)  -     Comprehensive Metabolic Panel    5. Pulmonary nodule    6. Prostate cancer screening  -     PSA Screen    7. Erectile dysfunction, unspecified erectile dysfunction type  -     tadalafil (Cialis) 20 MG tablet; Take 1 tablet by mouth Daily As Needed for Erectile Dysfunction.  Dispense: 30 tablet; Refill: 2    8. Decreased hearing of both ears  -     Ambulatory Referral to Audiology    9. Healthcare maintenance  -     Hepatitis C Antibody        Return to Office    The patient was instructed to return for follow-up in 6 months. The patient was instructed to return sooner if the condition changes, worsens, or does not resolve.

## 2021-02-24 NOTE — TELEPHONE ENCOUNTER
PATIENT CALLED BACK FOR MARCELLO TO GIVE HER THE NAME OF A MEDICATION THAT DR VALENTINE REQUESTED:    ATORVASTATIN 80 MG    [phone]

## 2021-03-02 ENCOUNTER — HOSPITAL ENCOUNTER (OUTPATIENT)
Dept: PET IMAGING | Facility: HOSPITAL | Age: 71
Discharge: HOME | End: 2021-03-02

## 2021-03-02 DIAGNOSIS — C34.92 NON-SMALL CELL CANCER OF LEFT LUNG (HCC): ICD-10-CM

## 2021-03-02 DIAGNOSIS — R91.1 LUNG NODULE: ICD-10-CM

## 2021-03-02 LAB — GLUCOSE BLDC GLUCOMTR-MCNC: 97 MG/DL (ref 70–130)

## 2021-03-02 PROCEDURE — 82962 GLUCOSE BLOOD TEST: CPT

## 2021-03-02 PROCEDURE — 0 FLUDEOXYGLUCOSE F18 SOLUTION: Performed by: INTERNAL MEDICINE

## 2021-03-02 PROCEDURE — A9552 F18 FDG: HCPCS | Performed by: INTERNAL MEDICINE

## 2021-03-02 PROCEDURE — 78815 PET IMAGE W/CT SKULL-THIGH: CPT

## 2021-03-02 RX ADMIN — FLUDEOXYGLUCOSE F18 1 DOSE: 300 INJECTION INTRAVENOUS at 09:45

## 2021-03-03 ENCOUNTER — TELEPHONE (OUTPATIENT)
Dept: PULMONOLOGY | Facility: CLINIC | Age: 71
End: 2021-03-03

## 2021-03-03 DIAGNOSIS — R91.1 LUNG NODULE: Primary | ICD-10-CM

## 2021-03-03 NOTE — TELEPHONE ENCOUNTER
Attempted to call patient regarding PET-CT. Got voicemail which is full.  He has appointment to see me March 11th.  I am concerned that he has NSCLC as MARKO nodule is enlarging and more PET avid.  There is a satellite nodule a little more superior that may or may not be involved.  He is resectable from a pulmonary standpoint - this may be best option since there are two nodules relatively close to the pleural surface.  SBRT is also a valid option.  He needs cardiac clearance prior to consideration of surgery.  He has SEVERE pulmonary htn on last echo.  If he goes the SBRT route I think we should do a CT guided biopsy to confirm the diagnosis first.    Payam Kenney MD  Pulmonology and Critical Care Medicine  03/03/21 10:44 EST  Electronically Signed

## 2021-03-05 DIAGNOSIS — IMO0001 LUNG NODULE < 6CM ON CT: Primary | ICD-10-CM

## 2021-03-08 ENCOUNTER — LAB (OUTPATIENT)
Dept: INTERNAL MEDICINE | Facility: CLINIC | Age: 71
End: 2021-03-08

## 2021-03-08 DIAGNOSIS — I48.21 PERMANENT ATRIAL FIBRILLATION (HCC): Primary | ICD-10-CM

## 2021-03-08 LAB — INR PPP: 4 (ref 2.5–3.5)

## 2021-03-08 PROCEDURE — 85610 PROTHROMBIN TIME: CPT | Performed by: INTERNAL MEDICINE

## 2021-03-08 PROCEDURE — 36416 COLLJ CAPILLARY BLOOD SPEC: CPT | Performed by: INTERNAL MEDICINE

## 2021-03-11 ENCOUNTER — OFFICE VISIT (OUTPATIENT)
Dept: PULMONOLOGY | Facility: CLINIC | Age: 71
End: 2021-03-11

## 2021-03-11 VITALS
WEIGHT: 153.25 LBS | DIASTOLIC BLOOD PRESSURE: 66 MMHG | HEART RATE: 68 BPM | RESPIRATION RATE: 16 BRPM | OXYGEN SATURATION: 98 % | HEIGHT: 72 IN | BODY MASS INDEX: 20.76 KG/M2 | SYSTOLIC BLOOD PRESSURE: 140 MMHG | TEMPERATURE: 98 F

## 2021-03-11 DIAGNOSIS — I27.20 PULMONARY HTN (HCC): Primary | ICD-10-CM

## 2021-03-11 PROCEDURE — 99214 OFFICE O/P EST MOD 30 MIN: CPT | Performed by: INTERNAL MEDICINE

## 2021-03-11 RX ORDER — BUPROPION HYDROCHLORIDE 300 MG/1
300 TABLET ORAL DAILY
Qty: 30 TABLET | Refills: 6 | Status: SHIPPED | OUTPATIENT
Start: 2021-03-11 | End: 2022-01-17

## 2021-03-11 NOTE — PROGRESS NOTES
CC:    Abnormal CT    HPI:    70 y/o WM w/ h/o tobacco abuse ~100 py plus, prior colon cancer s/p resection 2001, mechanical AVR, CAD w/ prior stent, Afib, who presents for evaluation of abnormal CT Chest. Patient had a lung cancer screening CT 8/20/19 showing a spiculated 12 mm (I disagree with radiology reading of 9 mm) spiculated MARKO lesion.  No mediastinal or hilar LAD.  No symptoms attributable to this nodule.    Last time I saw patient in 12/11/19 he had some mild hemoptysis.  This was felt to be due to bronchitis and resolved with abx / steroids.  INR was slightly supra-therapeutic.  This resolved on follow up visit 12/27/19.    Patient was admitted in 2/19-2/24/20 w/ H1N1 flu / pna / hemoptysis.  INR was 7 at the time.  CXR abnormalities cleared rapidly.  ANCA and GBM antibodies were negative.  Echo showed EF 46%, LVH, dilated LA, dilated RV, mild to mod MR, functioning mechanical AVR, RVSP > 55.  Echo was done in setting of marked abnormalities on CXR.    Had another admision 2/26-3/7/20 with supratherapeutic INR and large rectus sheath hematoma.  This was complicated by reversal of AC and subsequent embolic strokes.    INTERVAL HISTORY:    Patient returns today for follow up after PET CT, no new symptoms.    PMH:    Past Medical History:   Diagnosis Date   • A-fib (CMS/HCC)    • Anemia    • Atrial fibrillation (CMS/HCC) 5/5/2016    Difficult to control rate. RFA of AV node with implantation of left Bi-V pacemaker, 06/18/2013. Hematoma requiring single-chamber pacemaker implanted, 07/29/2013.    • Colitis    • Colon cancer (CMS/HCC)    • Depression    • HTN (hypertension)    • Hyperbilirubinemia    • Hyperlipidemia    • Orthostatic hypotension      PSH:    Past Surgical History:   Procedure Laterality Date   • APPENDECTOMY     • CARDIAC SURGERY     • COLON SURGERY     • EXTRACORPOREAL SHOCKWAVE LITHOTRIPSY (ESWL), STENT INSERTION/REMOVAL     • PACEMAKER IMPLANTATION     • VARICOSE VEIN SURGERY        FH:    Family History   Problem Relation Age of Onset   • Cancer Mother    • Hypertension Father    • Heart disease Father      SH:    Social History     Socioeconomic History   • Marital status:      Spouse name: Not on file   • Number of children: Not on file   • Years of education: Not on file   • Highest education level: Not on file   Tobacco Use   • Smoking status: Current Every Day Smoker     Packs/day: 1.50     Years: 57.00     Pack years: 85.50     Types: Cigarettes   • Smokeless tobacco: Never Used   Substance and Sexual Activity   • Alcohol use: Yes     Alcohol/week: 2.0 standard drinks     Types: 2 Shots of liquor per week     Comment: Daily   • Drug use: No   • Sexual activity: Yes     Partners: Female     Comment:       ALLERGIES:    No Known Allergies  MEDICATIONS:      Current Outpatient Medications:   •  albuterol sulfate  (90 Base) MCG/ACT inhaler, Inhale 2 puffs Every 4 (Four) Hours As Needed for Wheezing., Disp: 1 inhaler, Rfl: 0  •  aspirin 81 MG EC tablet, Take 1 tablet by mouth daily., Disp: , Rfl:   •  atorvastatin (LIPITOR) 80 MG tablet, Take 1 tablet by mouth Every Night., Disp: 90 tablet, Rfl: 2  •  buPROPion XL (Wellbutrin XL) 300 MG 24 hr tablet, Take 1 tablet by mouth Daily., Disp: 30 tablet, Rfl: 6  •  carvedilol (COREG) 12.5 MG tablet, Take 1 tablet by mouth 2 (Two) Times a Day., Disp: 180 tablet, Rfl: 2  •  citalopram (CeleXA) 20 MG tablet, Take 1 tablet by mouth once daily, Disp: 90 tablet, Rfl: 1  •  famotidine (PEPCID) 20 MG tablet, Take 1 tablet by mouth Daily., Disp: 30 tablet, Rfl: 1  •  finasteride (PROSCAR) 5 MG tablet, TAKE 1 TABLET BY MOUTH ONCE DAILY AT BEDTIME, Disp: 90 tablet, Rfl: 1  •  folic acid (FOLVITE) 400 MCG tablet, TAKE 1 TABLET BY MOUTH ONCE DAILY, Disp: 90 tablet, Rfl: 3  •  lisinopril (PRINIVIL,ZESTRIL) 10 MG tablet, Take 1 tablet by mouth 2 (Two) Times a Day., Disp: 180 tablet, Rfl: 2  •  Probiotic Product (PROBIOTIC DAILY PO), Take 1  capsule by mouth daily., Disp: , Rfl:   •  tadalafil (Cialis) 20 MG tablet, Take 1 tablet by mouth Daily As Needed for Erectile Dysfunction., Disp: 30 tablet, Rfl: 2  •  vitamin B-12 (CYANOCOBALAMIN) 1000 MCG tablet, Take 1 tablet by mouth Daily., Disp: 90 tablet, Rfl: 3  •  warfarin (COUMADIN) 5 MG tablet, Take 1 tablet by mouth once daily, Disp: 30 tablet, Rfl: 5  ROS:  Per HPI, otherwise all systems reviewed and negative.    DIAGNOSTIC DATA (Reviewed and interpreted by me unless otherwise specified):    CT Chest 8/20/19 - emphysema, scarring RUL, scattered sub-centimeter nodules, 12 mm spiculated nodule in MARKO    CT PET 9/25/19 - SUV 1.0, negative    CT Chest 11/6/19 - stable MARKO nodule, also a RUL nodule that appears inflammatory that needs to be followed    CT Chest 2/6/20 - stable MARKO and RUL nodules when going back to 8/20/19    CT Chest 8/18/20 - relatively stable MARKO nodules and RUL scarring.  Prior neg PET-CT.      CT Chest 2/12/21 - relative stable MARKO nodules, however maybe some slight growth going back to 1 year ago overall.    PET CT 3/2/21 - interval growth of primary MARKO nodule with higher SUV compared to prior    CXR 12/11/19 - RUL and RML consolidation    PFT 9/17/19 - borderline mild obstruction, +BD change, hyperinflation, +air trapping, normal DLCO    Vitals:    03/11/21 1605   BP: 140/66   Pulse: 68   Resp: 16   Temp: 98 °F (36.7 °C)   SpO2: 98%       Physical Exam   Constitutional: Oriented to person, place, and time. Appears well-developed and well-nourished.   Head: Normocephalic and atraumatic.   Nose: Nose normal.   Mouth/Throat: Oropharynx is clear and moist.   Eyes: Conjunctivae are normal.  Pupils normal.  Neck: No tracheal deviation present.   Cardiovascular: Normal rate, regular rhythm, normal heart sounds with mechanical click and intact distal pulses.  Exam reveals no gallop and no friction rub.  No thrill.  No JVD.  No edema.  No murmur heard.  Pulmonary/Chest: Effort normal and  breath sounds normal.  No tenderness to palpation.  No clubbing.   Abdominal: Soft. Bowel sounds are normal. No distension. No tenderness. There is no guarding.   Musculoskeletal: Normal range of motion.  No tenderness.  Lymphadenopathy:  No cervical adenopathy.   Neurological:  No new focal neurological deficits observed   Skin: Skin is warm and dry. No rash noted.   Psychiatric: Normal mood and affect.  Behavior is normal. Judgment normal.    Assessment/Plan     1)  Lung Nodules - the dominant nodule has had slight growth and is more PET avid.  I think this is NSCLC until proven otherwise.  Has been relatively indolent over 2 year period, nonetheless I think at this point needs to be addressed.  I don't think a CT guided biopsy will  and would require bridging coumadin.  Had Echo last year with severe pulmonary htn in setting of severe pneumonia.  He is resectable from pulmonary standpoint, but I do have some reservations about how he may do post op based on his complex hospitalizations in February 2020, his mechanical valve, and echo w/ prior severe pulmonary htn.    I will plan to repeat Echo and ask Dr. Tucker to evaluate patient for pre-op clearance for Lobectomy.  Will also refer to CT Surgery and Radiation Oncology.  I will plan to present patient in tumor board next Tuesday as this is a complex case.  I think the more superior satellite nodule needs to be addressed simultaneously whether that is with surgery or SBRT.    2)  Hemoptysis - resolved.  On coumadin.    3) Tobacco Abuse - counseled patient, recommended NRT and Wellbutrin.  He is agreeable. Will continue.  He is more motivated to quit now that we are dealing with potential lung cancer.    RTC 3 months, though will call him with outcome of tumor board    Payam Kenney MD  Pulmonology and Critical Care Medicine  03/11/21 16:45 EST  Electronically Signed    C.C.:  No ref. provider found, Ez Cintron MD

## 2021-03-12 ENCOUNTER — LAB (OUTPATIENT)
Dept: INTERNAL MEDICINE | Facility: CLINIC | Age: 71
End: 2021-03-12

## 2021-03-12 DIAGNOSIS — I48.21 PERMANENT ATRIAL FIBRILLATION (HCC): Primary | ICD-10-CM

## 2021-03-12 DIAGNOSIS — I27.20 PULMONARY HYPERTENSION (HCC): ICD-10-CM

## 2021-03-12 DIAGNOSIS — I27.20 PULMONARY HTN (HCC): ICD-10-CM

## 2021-03-12 DIAGNOSIS — Z95.2 H/O AORTIC VALVE REPLACEMENT: Primary | ICD-10-CM

## 2021-03-12 LAB — INR PPP: 3 (ref 2.5–3.5)

## 2021-03-12 PROCEDURE — 36416 COLLJ CAPILLARY BLOOD SPEC: CPT | Performed by: INTERNAL MEDICINE

## 2021-03-12 PROCEDURE — 85610 PROTHROMBIN TIME: CPT | Performed by: INTERNAL MEDICINE

## 2021-03-15 ENCOUNTER — OFFICE VISIT (OUTPATIENT)
Dept: RADIATION ONCOLOGY | Facility: HOSPITAL | Age: 71
End: 2021-03-15

## 2021-03-15 ENCOUNTER — HOSPITAL ENCOUNTER (OUTPATIENT)
Dept: RADIATION ONCOLOGY | Facility: HOSPITAL | Age: 71
Setting detail: RADIATION/ONCOLOGY SERIES
Discharge: HOME | End: 2021-03-15

## 2021-03-15 ENCOUNTER — MDT ASSESSMENT (OUTPATIENT)
Dept: ONCOLOGY | Facility: CLINIC | Age: 71
End: 2021-03-15

## 2021-03-15 VITALS
WEIGHT: 154.1 LBS | HEART RATE: 75 BPM | DIASTOLIC BLOOD PRESSURE: 75 MMHG | OXYGEN SATURATION: 94 % | RESPIRATION RATE: 18 BRPM | BODY MASS INDEX: 20.9 KG/M2 | TEMPERATURE: 96.4 F | SYSTOLIC BLOOD PRESSURE: 140 MMHG

## 2021-03-15 DIAGNOSIS — Z95.2 H/O AORTIC VALVE REPLACEMENT: ICD-10-CM

## 2021-03-15 DIAGNOSIS — J44.9 COPD, MILD (HCC): ICD-10-CM

## 2021-03-15 DIAGNOSIS — N31.9 NEUROGENIC BLADDER: ICD-10-CM

## 2021-03-15 DIAGNOSIS — Z78.9 SELF-CATHETERIZES URINARY BLADDER: ICD-10-CM

## 2021-03-15 DIAGNOSIS — I27.20 PULMONARY HYPERTENSION (HCC): ICD-10-CM

## 2021-03-15 DIAGNOSIS — IMO0001 LUNG NODULE < 6CM ON CT: ICD-10-CM

## 2021-03-15 DIAGNOSIS — I25.10 CORONARY ARTERY DISEASE INVOLVING NATIVE CORONARY ARTERY OF NATIVE HEART WITHOUT ANGINA PECTORIS: Primary | ICD-10-CM

## 2021-03-15 PROBLEM — C34.90 LUNG CANCER (HCC): Status: ACTIVE | Noted: 2021-03-15

## 2021-03-15 PROCEDURE — G0463 HOSPITAL OUTPT CLINIC VISIT: HCPCS

## 2021-03-15 PROCEDURE — G0463 HOSPITAL OUTPT CLINIC VISIT: HCPCS | Performed by: RADIOLOGY

## 2021-03-15 NOTE — PROGRESS NOTES
CANCER CONFERENCE AGENDA  DATE:  2021      SPECIALTY:  Thoracic  PRESENTER:   Payam Kenney M.D.  SITE:   Lung           HPI:  [id number] who presented with progressive growth of MARKO nodule initially discovered on screening CT chest (2019)       REFERRING PROVIDER:  Ez Cintron M.D. (Internal Medicine - [place], [place])       PLACE OF RESIDENCE:  [place], [place]           SOCIAL HISTORY:  Current every day smoker 1.5 PPD x57 years.  He is .       FAMILY HISTORY:  Non-contributory       PAST MEDICAL HISTORY:  Colon cancer s/p resection (), coronary artery disease w/prior stent, AFib, colitis, hypertension.       PAST SURGICAL HISTORY:  Pacemaker implantation, varicose vein surgery, lithotripsy.   PFTs (2019):  FEV1 - 82%               DLCO - 91%     NEXT GEN SEQUENCING: Not available     IMAGIN2019 ([facility]):  Low-dose CT chest - There is a non-calcified spiculated nodule identified within the left lung measuring on lung windows 9 mm.        2021 ([facility]):  CT chest - Extended lung windows reveal bullous formation right lung apex and centrilobular emphysema with a left upper lobe groundglass irregular marginated pulmonary nodule at the lateral periphery without pleural involvement,12 mm, unchanged from prior comparison, with superiorly lateral adjacent pleural scarring focus of nodularity also unchanged in overall appearance.        2021 ([facility]): PET w/CT - The area of concern nodular focus left upper lobe has enlarged from prior PET/CT as seen on recent diagnostic CT chest examination with borderline hypermetabolic activity maximum SUV 2.3 versus 0.9 on prior comparison 2019 examination.     CLINICAL STAGE:    T 1b, N 0, M 0 and stage IA2     SURGICAL PROCEDURE: Not available     PATHOLOGY: Not available     TREATMENT PLAN DISCUSSION:      Clinical Trials: No       Treatment Recommendations/Referrals: [employer] without fiducials followed by active  surveillance     EVIDENCE BASED NATIONAL TREATMENT GUIDELINES:  National Comprehensive Cancer Network      Please discuss clinical/working stage, TNM, Stage Group, National Treatment Guidelines, and Prognostic Indicators.      Privileged and Confidential Patient Safety Work Product:  The information contained herein has been compiled as part of the [facility] Patient Safety Evaluation System and is deemed to be a Patient Safety Work Product and is privileged and confidential.  Disclaimer:  Multidisciplinary cancer conferences provide consultative services to formulate an effective treatment plan for patients and include physician representation from medical oncology, radiation oncology, radiology, surgery, and pathology.  AJCC or other appropriate staging is discussed, along with site-specific prognostic indicators and treatment planning used by evidence-based treatment guidelines.  Treatment plans which are discussed during conference may/may not necessarily be followed by the patient's managing physician(s), as there may be other factors taken into consideration which impact the treatment plan.  The specific treatment plan is ultimately determined on an individual basis by the patient and the physician(s) involved in his/her care.

## 2021-03-16 ENCOUNTER — TELEPHONE (OUTPATIENT)
Dept: RADIATION ONCOLOGY | Facility: HOSPITAL | Age: 71
End: 2021-03-16

## 2021-03-19 ENCOUNTER — HOSPITAL ENCOUNTER (OUTPATIENT)
Dept: CARDIOLOGY | Facility: HOSPITAL | Age: 71
Discharge: HOME | End: 2021-03-19
Admitting: INTERNAL MEDICINE

## 2021-03-19 VITALS — HEIGHT: 72 IN | WEIGHT: 154 LBS | BODY MASS INDEX: 20.86 KG/M2

## 2021-03-19 DIAGNOSIS — I27.20 PULMONARY HTN (HCC): ICD-10-CM

## 2021-03-19 DIAGNOSIS — Z95.2 H/O AORTIC VALVE REPLACEMENT: ICD-10-CM

## 2021-03-19 PROCEDURE — 93306 TTE W/DOPPLER COMPLETE: CPT | Performed by: INTERNAL MEDICINE

## 2021-03-19 PROCEDURE — 93306 TTE W/DOPPLER COMPLETE: CPT

## 2021-03-21 LAB
ASCENDING AORTA: 3.1 CM
BH CV ECHO MEAS - AI DEC SLOPE: 257.5 CM/SEC^2
BH CV ECHO MEAS - AI MAX PG: 63.7 MMHG
BH CV ECHO MEAS - AI MAX VEL: 399 CM/SEC
BH CV ECHO MEAS - AI P1/2T: 453.8 MSEC
BH CV ECHO MEAS - AO MAX PG (FULL): 16.3 MMHG
BH CV ECHO MEAS - AO MAX PG: 18 MMHG
BH CV ECHO MEAS - AO MEAN PG (FULL): 7 MMHG
BH CV ECHO MEAS - AO MEAN PG: 8 MMHG
BH CV ECHO MEAS - AO ROOT AREA (BSA CORRECTED): 1.7
BH CV ECHO MEAS - AO ROOT AREA: 8.6 CM^2
BH CV ECHO MEAS - AO ROOT DIAM: 3.3 CM
BH CV ECHO MEAS - AO V2 MAX: 210 CM/SEC
BH CV ECHO MEAS - AO V2 MEAN: 129 CM/SEC
BH CV ECHO MEAS - AO V2 VTI: 37.2 CM
BH CV ECHO MEAS - ASC AORTA: 3.1 CM
BH CV ECHO MEAS - AVA(I,A): 1.2 CM^2
BH CV ECHO MEAS - AVA(I,D): 1.2 CM^2
BH CV ECHO MEAS - AVA(V,A): 1.2 CM^2
BH CV ECHO MEAS - AVA(V,D): 1.2 CM^2
BH CV ECHO MEAS - BSA(HAYCOCK): 1.9 M^2
BH CV ECHO MEAS - BSA: 1.9 M^2
BH CV ECHO MEAS - BZI_BMI: 20.9 KILOGRAMS/M^2
BH CV ECHO MEAS - BZI_METRIC_HEIGHT: 182.9 CM
BH CV ECHO MEAS - BZI_METRIC_WEIGHT: 69.9 KG
BH CV ECHO MEAS - EDV(CUBED): 162.8 ML
BH CV ECHO MEAS - EDV(MOD-SP2): 93 ML
BH CV ECHO MEAS - EDV(MOD-SP4): 82 ML
BH CV ECHO MEAS - EDV(TEICH): 145 ML
BH CV ECHO MEAS - EF(CUBED): 74.3 %
BH CV ECHO MEAS - EF(MOD-BP): 53 %
BH CV ECHO MEAS - EF(MOD-SP2): 50.5 %
BH CV ECHO MEAS - EF(MOD-SP4): 50 %
BH CV ECHO MEAS - EF(TEICH): 65.6 %
BH CV ECHO MEAS - ESV(CUBED): 41.8 ML
BH CV ECHO MEAS - ESV(MOD-SP2): 46 ML
BH CV ECHO MEAS - ESV(MOD-SP4): 41 ML
BH CV ECHO MEAS - ESV(TEICH): 49.8 ML
BH CV ECHO MEAS - FS: 36.4 %
BH CV ECHO MEAS - IVS/LVPW: 1.1
BH CV ECHO MEAS - IVSD: 0.97 CM
BH CV ECHO MEAS - LA DIMENSION: 4.1 CM
BH CV ECHO MEAS - LA/AO: 1.2
BH CV ECHO MEAS - LAD MAJOR: 7.9 CM
BH CV ECHO MEAS - LAT PEAK E' VEL: 11.4 CM/SEC
BH CV ECHO MEAS - LATERAL E/E' RATIO: 7.1
BH CV ECHO MEAS - LV DIASTOLIC VOL/BSA (35-75): 43 ML/M^2
BH CV ECHO MEAS - LV IVRT: 0.11 SEC
BH CV ECHO MEAS - LV MASS(C)D: 196.1 GRAMS
BH CV ECHO MEAS - LV MASS(C)DI: 102.8 GRAMS/M^2
BH CV ECHO MEAS - LV MAX PG: 1.7 MMHG
BH CV ECHO MEAS - LV MEAN PG: 1 MMHG
BH CV ECHO MEAS - LV SYSTOLIC VOL/BSA (12-30): 21.5 ML/M^2
BH CV ECHO MEAS - LV V1 MAX: 65.6 CM/SEC
BH CV ECHO MEAS - LV V1 MEAN: 43.2 CM/SEC
BH CV ECHO MEAS - LV V1 VTI: 11.5 CM
BH CV ECHO MEAS - LVIDD: 5.5 CM
BH CV ECHO MEAS - LVIDS: 3.5 CM
BH CV ECHO MEAS - LVLD AP2: 7.6 CM
BH CV ECHO MEAS - LVLD AP4: 8.2 CM
BH CV ECHO MEAS - LVLS AP2: 7.3 CM
BH CV ECHO MEAS - LVLS AP4: 7.3 CM
BH CV ECHO MEAS - LVOT AREA (M): 3.8 CM^2
BH CV ECHO MEAS - LVOT AREA: 3.8 CM^2
BH CV ECHO MEAS - LVOT DIAM: 2.2 CM
BH CV ECHO MEAS - LVPWD: 0.92 CM
BH CV ECHO MEAS - MED PEAK E' VEL: 6.3 CM/SEC
BH CV ECHO MEAS - MEDIAL E/E' RATIO: 13
BH CV ECHO MEAS - MR ALIAS VEL: 30.8 CM/SEC
BH CV ECHO MEAS - MR ERO: 0.12 CM^2
BH CV ECHO MEAS - MR FLOW RATE: 69.7 CM^3/SEC
BH CV ECHO MEAS - MR MAX PG: 125 MMHG
BH CV ECHO MEAS - MR MAX VEL: 559 CM/SEC
BH CV ECHO MEAS - MR MEAN PG: 84 MMHG
BH CV ECHO MEAS - MR MEAN VEL: 429 CM/SEC
BH CV ECHO MEAS - MR PISA RADIUS: 0.6 CM
BH CV ECHO MEAS - MR PISA: 2.3 CM^2
BH CV ECHO MEAS - MR VOLUME: 23.7 ML
BH CV ECHO MEAS - MR VTI: 190 CM
BH CV ECHO MEAS - MV A MAX VEL: 28.7 CM/SEC
BH CV ECHO MEAS - MV AREA (1 DIAM): 7.1 CM^2
BH CV ECHO MEAS - MV DEC SLOPE: 396 CM/SEC^2
BH CV ECHO MEAS - MV DEC TIME: 0.2 SEC
BH CV ECHO MEAS - MV DIAM: 3 CM
BH CV ECHO MEAS - MV E MAX VEL: 81.4 CM/SEC
BH CV ECHO MEAS - MV E/A: 2.8
BH CV ECHO MEAS - MV FLOW AREA(1DIAM): 7.1 CM^2
BH CV ECHO MEAS - MV MAX PG: 6.1 MMHG
BH CV ECHO MEAS - MV MEAN PG: 2 MMHG
BH CV ECHO MEAS - MV P1/2T MAX VEL: 123 CM/SEC
BH CV ECHO MEAS - MV P1/2T: 91 MSEC
BH CV ECHO MEAS - MV V2 MAX: 123 CM/SEC
BH CV ECHO MEAS - MV V2 MEAN: 60.7 CM/SEC
BH CV ECHO MEAS - MV V2 VTI: 26.7 CM
BH CV ECHO MEAS - MVA P1/2T LCG: 1.8 CM^2
BH CV ECHO MEAS - MVA(P1/2T): 2.4 CM^2
BH CV ECHO MEAS - MVA(VTI): 1.6 CM^2
BH CV ECHO MEAS - PA ACC SLOPE: 387 CM/SEC^2
BH CV ECHO MEAS - PA ACC TIME: 0.1 SEC
BH CV ECHO MEAS - PA MAX PG: 1.8 MMHG
BH CV ECHO MEAS - PA PR(ACCEL): 33.1 MMHG
BH CV ECHO MEAS - PA V2 MAX: 67.1 CM/SEC
BH CV ECHO MEAS - RAP SYSTOLE: 15 MMHG
BH CV ECHO MEAS - RF(MV,AO)(1 DIAM): -0.69
BH CV ECHO MEAS - RF(MV,LVOT)(1DIAM): 0.77
BH CV ECHO MEAS - RVSP: 52.5 MMHG
BH CV ECHO MEAS - SI(AO): 166.9 ML/M^2
BH CV ECHO MEAS - SI(CUBED): 63.5 ML/M^2
BH CV ECHO MEAS - SI(LVOT): 22.9 ML/M^2
BH CV ECHO MEAS - SI(MOD-SP2): 24.7 ML/M^2
BH CV ECHO MEAS - SI(MOD-SP4): 21.5 ML/M^2
BH CV ECHO MEAS - SI(MV 1 DIAM): 99 ML/M^2
BH CV ECHO MEAS - SI(TEICH): 49.9 ML/M^2
BH CV ECHO MEAS - SV(AO): 318.2 ML
BH CV ECHO MEAS - SV(CUBED): 121 ML
BH CV ECHO MEAS - SV(LVOT): 43.7 ML
BH CV ECHO MEAS - SV(MOD-SP2): 47 ML
BH CV ECHO MEAS - SV(MOD-SP4): 41 ML
BH CV ECHO MEAS - SV(MV 1 DIAM): 188.7 ML
BH CV ECHO MEAS - SV(TEICH): 95.1 ML
BH CV ECHO MEAS - TAPSE (>1.6): 1.6 CM
BH CV ECHO MEAS - TR MAX PG: 37.5 MMHG
BH CV ECHO MEAS - TR MAX VEL: 306 CM/SEC
BH CV ECHO MEASUREMENTS AVERAGE E/E' RATIO: 9.2
BH CV VAS BP RIGHT ARM: NORMAL MMHG
BH CV XLRA - RV BASE: 4.4 CM
BH CV XLRA - RV LENGTH: 7.1 CM
BH CV XLRA - RV MID: 3 CM
BH CV XLRA - TDI S': 11.5 CM/SEC
IVRT: 109 MSEC
LEFT ATRIUM VOLUME INDEX: 69.2 ML/M^2
LEFT ATRIUM VOLUME: 132 ML
MR PISA EROA: 0.12 CM2
MV REGURGITANT FRACTION: 12 %
MV REGURGITANT VOLUME: 23 CC
MV VENA CONTRACTA: 0.5 CM
PISA ALIASING VEL: 30.8 M/S
PISA RADIUS: 0.6 CM

## 2021-03-22 ENCOUNTER — OFFICE VISIT (OUTPATIENT)
Dept: CARDIAC SURGERY | Facility: CLINIC | Age: 71
End: 2021-03-22

## 2021-03-22 VITALS
WEIGHT: 150 LBS | OXYGEN SATURATION: 96 % | DIASTOLIC BLOOD PRESSURE: 79 MMHG | BODY MASS INDEX: 21 KG/M2 | SYSTOLIC BLOOD PRESSURE: 129 MMHG | HEART RATE: 77 BPM | HEIGHT: 71 IN | TEMPERATURE: 98.4 F

## 2021-03-22 DIAGNOSIS — R91.1 LUNG NODULE: Primary | ICD-10-CM

## 2021-03-22 PROCEDURE — 99205 OFFICE O/P NEW HI 60 MIN: CPT | Performed by: THORACIC SURGERY (CARDIOTHORACIC VASCULAR SURGERY)

## 2021-03-22 NOTE — PROGRESS NOTES
03/22/2021  Patient Information  Dillon Vo                                                                                          [address]  [facility] [address]   1950  'PCP/Referring Physician'  Ez Cintron MD  [phone]  Payam Kenney*  [phone]  Chief Complaint   Patient presents with   • Consult     Np referred for a lung nodule that has increased in size,complains of some shortness of breath.   • Lung Nodule       History of Present Illness:   The patient is a 70-year-old male who is being referred for evaluation of a left upper lobe mass.  The nodule has shown an enlargement and also increased in SUV.  The patient has been seen by Dr. Payam Kenney.  He has been a smoker and continues to smoke.  He has had a heart valve replaced in the past as well.  He has had no significant weight loss or hemoptysis.      Patient Active Problem List   Diagnosis   • AF s/p RFA and BiV PPM    • Depression   • Hyperbilirubinemia   • Dyslipidemia   • Hypertension   • Malignant neoplasm of overlapping sites of colon (CMS/HCC)   • History of artificial heart valve   • Neurogenic bladder   • Tobacco use   • CAD    • H/O mechanical AVR    • COPD, mild (CMS/HCC)   • Bilateral pneumonia   • Thrombocytopenia (CMS/HCC)   • Hemoptysis   • Lung nodule   • Chronic anticoagulation on warfarin    • Suspected pulmonary HTN    • Macrocytosis   • Self-catheterizes urinary bladder   • Subacute, multifocal ischemic strokes due to cardioembolism   • Lung cancer (CMS/HCC)     Past Medical History:   Diagnosis Date   • A-fib (CMS/HCC)    • Anemia    • Atrial fibrillation (CMS/HCC) 5/5/2016    Difficult to control rate. RFA of AV node with implantation of left Bi-V pacemaker, 06/18/2013. Hematoma requiring single-chamber pacemaker implanted, 07/29/2013.    • Colitis    • Colon cancer (CMS/HCC)    • Coronary artery disease    • Depression    • Heart valve disease    • Hiatal hernia    • HTN  (hypertension)    • Hyperbilirubinemia    • Hyperlipidemia    • Infectious viral hepatitis    • Kidney stone    • Orthostatic hypotension    • Sick sinus syndrome (CMS/HCC)    • Skin cancer      Past Surgical History:   Procedure Laterality Date   • APPENDECTOMY     • CARDIAC SURGERY     • COLON SURGERY     • CORONARY STENT PLACEMENT     • EXTRACORPOREAL SHOCKWAVE LITHOTRIPSY (ESWL), STENT INSERTION/REMOVAL     • PACEMAKER IMPLANTATION     • VARICOSE VEIN SURGERY         Current Outpatient Medications:   •  albuterol sulfate  (90 Base) MCG/ACT inhaler, Inhale 2 puffs Every 4 (Four) Hours As Needed for Wheezing., Disp: 1 inhaler, Rfl: 0  •  aspirin 81 MG EC tablet, Take 1 tablet by mouth daily., Disp: , Rfl:   •  atorvastatin (LIPITOR) 80 MG tablet, Take 1 tablet by mouth Every Night., Disp: 90 tablet, Rfl: 2  •  buPROPion XL (Wellbutrin XL) 300 MG 24 hr tablet, Take 1 tablet by mouth Daily., Disp: 30 tablet, Rfl: 6  •  carvedilol (COREG) 12.5 MG tablet, Take 1 tablet by mouth 2 (Two) Times a Day., Disp: 180 tablet, Rfl: 2  •  citalopram (CeleXA) 20 MG tablet, Take 1 tablet by mouth once daily, Disp: 90 tablet, Rfl: 1  •  famotidine (PEPCID) 20 MG tablet, Take 1 tablet by mouth Daily., Disp: 30 tablet, Rfl: 1  •  finasteride (PROSCAR) 5 MG tablet, TAKE 1 TABLET BY MOUTH ONCE DAILY AT BEDTIME, Disp: 90 tablet, Rfl: 1  •  folic acid (FOLVITE) 400 MCG tablet, TAKE 1 TABLET BY MOUTH ONCE DAILY, Disp: 90 tablet, Rfl: 3  •  lisinopril (PRINIVIL,ZESTRIL) 10 MG tablet, Take 1 tablet by mouth 2 (Two) Times a Day., Disp: 180 tablet, Rfl: 2  •  Probiotic Product (PROBIOTIC DAILY PO), Take 1 capsule by mouth daily., Disp: , Rfl:   •  tadalafil (Cialis) 20 MG tablet, Take 1 tablet by mouth Daily As Needed for Erectile Dysfunction., Disp: 30 tablet, Rfl: 2  •  vitamin B-12 (CYANOCOBALAMIN) 1000 MCG tablet, Take 1 tablet by mouth Daily., Disp: 90 tablet, Rfl: 3  •  warfarin (COUMADIN) 5 MG tablet, Take 1 tablet by mouth once  daily, Disp: 30 tablet, Rfl: 5  No Known Allergies  Social History     Socioeconomic History   • Marital status:      Spouse name: Not on file   • Number of children: 0   • Years of education: Not on file   • Highest education level: Not on file   Tobacco Use   • Smoking status: Current Every Day Smoker     Packs/day: 1.00     Years: 57.00     Pack years: 57.00     Types: Cigarettes   • Smokeless tobacco: Never Used   Substance and Sexual Activity   • Alcohol use: Yes     Alcohol/week: 2.0 standard drinks     Types: 2 Shots of liquor per week     Comment: Daily   • Drug use: No   • Sexual activity: Yes     Partners: Female     Comment:       Family History   Problem Relation Age of Onset   • Cancer Mother    • Hypertension Father    • Heart disease Father      Review of Systems   Constitutional: Negative. Negative for chills, fever, malaise/fatigue, night sweats and weight loss.   HENT: Positive for hearing loss. Negative for odynophagia and sore throat.    Cardiovascular: Positive for dyspnea on exertion. Negative for chest pain, leg swelling, orthopnea and palpitations.   Respiratory: Positive for cough and shortness of breath. Negative for hemoptysis.    Endocrine: Negative for cold intolerance, heat intolerance, polydipsia, polyphagia and polyuria.   Hematologic/Lymphatic: Bruises/bleeds easily.   Skin: Negative.  Negative for itching and rash.   Musculoskeletal: Negative.  Negative for joint pain, joint swelling and myalgias.   Gastrointestinal: Negative.  Negative for abdominal pain, constipation, diarrhea, hematemesis, hematochezia, melena, nausea and vomiting.   Genitourinary: Positive for incomplete emptying. Negative for dysuria, frequency and hematuria.   Neurological: Negative.  Negative for focal weakness, headaches, numbness and seizures.   Psychiatric/Behavioral: Negative for suicidal ideas.   All other systems reviewed and are negative.    Vitals:    03/22/21 1316   BP: 129/79   BP  "Location: Right arm   Patient Position: Sitting   Pulse: 77   Temp: 98.4 °F (36.9 °C)   SpO2: 96%   Weight: 68 kg (150 lb)   Height: 180.3 cm (71\")      Physical Exam  Vitals and nursing note reviewed.   Constitutional:       General: He is not in acute distress.     Appearance: He is well-developed.   HENT:      Head: Normocephalic.   Eyes:      Conjunctiva/sclera: Conjunctivae normal.      Pupils: Pupils are equal, round, and reactive to light.   Neck:      Thyroid: No thyroid mass or thyromegaly.   Cardiovascular:      Rate and Rhythm: Normal rate. Rhythm irregular.      Heart sounds: Murmur heard.   No friction rub. No gallop.    Pulmonary:      Breath sounds: No wheezing, rhonchi or rales.   Abdominal:      General: Bowel sounds are normal. There is no distension.      Palpations: Abdomen is soft. There is no mass.      Tenderness: There is no abdominal tenderness.   Musculoskeletal:         General: No deformity. Normal range of motion.      Cervical back: Normal range of motion.   Skin:     Findings: No petechiae.      Nails: There is no clubbing.   Neurological:      Mental Status: He is oriented to person, place, and time.      Cranial Nerves: No cranial nerve deficit.      Sensory: No sensory deficit.   Psychiatric:         Behavior: Behavior normal.         The ROS, past medical history, surgical history, family history, social history and vitals were reviewed by myself and corrected as needed.      Labs/Imaging:  I have obtained and reviewed the medical records from Dr. Kenney, including the CT and PET scan demonstrating an enlarging left upper lobe nodule.    Assessment/Plan:   The patient is a 70-year-old male who has been referred for a left upper lobe mass. He has history of smoking and continues to smoke.  He does have acceptable pulmonary function.  He has an enlarging left upper lobe nodule which has shown and increased uptake on his SUV of the PET scan.  He has been referred to me for further " evaluation. He has been presented at tumor conference and offered CyberKnife.  I had a very uriah discussion with him about the option of radiation versus surgery.  I personally feel that surgery is his best option.  I have discussed the pros and cons of this with him.  I discussed the risks of surgery which includes, risk to his life, bleeding infection, stroke, heart attack as well as other risk factors.  I also indicated to him that we will have to take him off the Coumadin and put him on Lovenox starting the third day.  He understands that as well.  He, after thorough discussion and evaluation has elected to proceed with surgery, which I think is, personally, the best option.  This appears to be a stage Ia tumor.  He has no further questions.  We will place him on Lovenox 75 mg every 12 hours starting the third day up to surgery.    Patient Active Problem List   Diagnosis   • AF s/p RFA and BiV PPM    • Depression   • Hyperbilirubinemia   • Dyslipidemia   • Hypertension   • Malignant neoplasm of overlapping sites of colon (CMS/HCC)   • History of artificial heart valve   • Neurogenic bladder   • Tobacco use   • CAD    • H/O mechanical AVR    • COPD, mild (CMS/HCC)   • Bilateral pneumonia   • Thrombocytopenia (CMS/HCC)   • Hemoptysis   • Lung nodule   • Chronic anticoagulation on warfarin    • Suspected pulmonary HTN    • Macrocytosis   • Self-catheterizes urinary bladder   • Subacute, multifocal ischemic strokes due to cardioembolism   • Lung cancer (CMS/HCC)       CC: MD Payam Jenkins MD Regina Fugate editing for Chi Post MD    I, Chi Post MD, have read and agree with the editing done by Arcelia Couch, .

## 2021-03-23 ENCOUNTER — PREP FOR SURGERY (OUTPATIENT)
Dept: OTHER | Facility: HOSPITAL | Age: 71
End: 2021-03-23

## 2021-03-23 DIAGNOSIS — IMO0001 LUNG NODULE < 6CM ON CT: Primary | ICD-10-CM

## 2021-03-23 PROBLEM — R91.1 LUNG NODULE: Status: ACTIVE | Noted: 2020-02-21

## 2021-03-26 ENCOUNTER — PREP FOR SURGERY (OUTPATIENT)
Dept: OTHER | Facility: HOSPITAL | Age: 71
End: 2021-03-26

## 2021-03-26 ENCOUNTER — OFFICE VISIT (OUTPATIENT)
Dept: RADIATION ONCOLOGY | Facility: HOSPITAL | Age: 71
End: 2021-03-26

## 2021-03-26 VITALS
WEIGHT: 148.9 LBS | SYSTOLIC BLOOD PRESSURE: 186 MMHG | TEMPERATURE: 97.1 F | HEIGHT: 71 IN | BODY MASS INDEX: 20.85 KG/M2 | HEART RATE: 79 BPM | RESPIRATION RATE: 17 BRPM | OXYGEN SATURATION: 98 % | DIASTOLIC BLOOD PRESSURE: 87 MMHG

## 2021-03-26 DIAGNOSIS — R91.1 LUNG NODULE: Primary | ICD-10-CM

## 2021-03-26 DIAGNOSIS — E78.5 DYSLIPIDEMIA: ICD-10-CM

## 2021-03-26 DIAGNOSIS — Z95.2 HISTORY OF ARTIFICIAL HEART VALVE: ICD-10-CM

## 2021-03-26 DIAGNOSIS — C34.12 MALIGNANT NEOPLASM OF UPPER LOBE OF LEFT LUNG (HCC): Primary | ICD-10-CM

## 2021-03-26 DIAGNOSIS — J44.9 COPD, MILD (HCC): ICD-10-CM

## 2021-03-26 DIAGNOSIS — N31.9 NEUROGENIC BLADDER: ICD-10-CM

## 2021-03-26 DIAGNOSIS — Z72.0 TOBACCO USE: ICD-10-CM

## 2021-03-26 DIAGNOSIS — I25.10 CORONARY ARTERY DISEASE INVOLVING NATIVE CORONARY ARTERY OF NATIVE HEART WITHOUT ANGINA PECTORIS: ICD-10-CM

## 2021-03-26 DIAGNOSIS — I48.21 PERMANENT ATRIAL FIBRILLATION (HCC): ICD-10-CM

## 2021-03-26 DIAGNOSIS — D69.6 THROMBOCYTOPENIA (HCC): ICD-10-CM

## 2021-03-26 DIAGNOSIS — I10 ESSENTIAL HYPERTENSION: ICD-10-CM

## 2021-03-26 RX ORDER — CHLORHEXIDINE GLUCONATE 500 MG/1
1 CLOTH TOPICAL EVERY 12 HOURS PRN
Status: CANCELLED | OUTPATIENT
Start: 2021-03-26

## 2021-03-26 NOTE — PROGRESS NOTES
RE-EVALUATION    PATIENT:                                                      Dillon Vo  :                                                          1950  DATE:                          3/26/2021   DIAGNOSIS:     Lung cancer (CMS/HCC)  - Stage IA2 (cT1b, cN0, cM0)         BRIEF HISTORY: Dillon Vo  is a very pleasant 70 y.o. male  with history of a neurogenic bladder, A. fib, mechanical valve, colon cancer in , COPD, coronary artery disease who presents with an enlarging left upper lobe lung nodule concerning for malignancy and presents for discussion regarding SBRT versus resection.  The patient initially had a CT scan in  that showed a left upper lobe lesion potentially concerning for malignancy.  The patient had PET scan 2019 that showed a small lesion with an SUV of 0.9.  The patient had an FEV1 of 74% in 2019.  The patient's DLCO was good.  The patient was followed with serial scans including one in the 2020 that showed a 1.1 cm left upper lobe lesion.  The patient then had a repeat CT scan of the chest in 2021 that showed that the lesion increase in size to 1.2 cm.  Patient was sent for a PET scan in 3/2/2021 that showed that the left upper lobe lesion had increased to 1.3 cm and now had an SUV of 2.3 versus 0.9 in 2019.  Patient had no hypermetabolic activity or enlarged lymph nodes in the mediastinum.    Pulmonology referred the patient to consider Cyberknife vs Surgery. I talked with him in the office 3/15/21, and then we discussed him at Tumor Board 3/16/21. The TB consensus was Cyberknife, so we then got him consented and booked for planning for Cyberknife for 3/26/21. From the original referral, he went to see Dr Post on 3/23/21, and he consented to surgery and is booked Dr Post for surgery.     The patient presented day to discuss his options further.  Patient reports that he is very nervous about undergoing another intrathoracic  "operation and being hospitalized thereafter.    He recently had an echo that showed that his EF was maintained.    He reports that he is still smoking and is actually smoking little more than when he was trying to quit a few weeks to months ago.    No Known Allergies    Review of Systems - Oncology      A full 14 point review of systems was performed and was negative except as noted in the HPI.  Objective   VITAL SIGNS:   Vitals:    03/26/21 0852   BP: (!) 186/87  Comment: patient forgot to take his BP medication this AM   Pulse: 79   Resp: 17   Temp: 97.1 °F (36.2 °C)   SpO2: 98%  Comment: RA   Weight: 67.5 kg (148 lb 14.4 oz)   Height: 180.3 cm (70.98\")   PainSc: 0-No pain        KPS 80%    Physical Exam  Constitutional:       General: He is not in acute distress.     Appearance: He is well-developed.   HENT:      Head: Normocephalic and atraumatic.   Eyes:      Conjunctiva/sclera: Conjunctivae normal.      Pupils: Pupils are equal, round, and reactive to light.   Neck:      Trachea: No tracheal deviation.   Pulmonary:      Effort: Pulmonary effort is normal. No respiratory distress.      Breath sounds: No wheezing.   Abdominal:      General: There is no distension.      Palpations: Abdomen is soft.   Musculoskeletal:         General: Normal range of motion.      Cervical back: Normal range of motion.   Skin:     General: Skin is warm and dry.   Neurological:      Mental Status: He is alert.      Cranial Nerves: No cranial nerve deficit.      Coordination: Coordination normal.   Psychiatric:         Behavior: Behavior normal.         Judgment: Judgment normal.              The following portions of the patient's history were reviewed and updated as appropriate: allergies, current medications, past family history, past medical history, past social history, past surgical history and problem list.    I have personally reviewed the patient's images and radiology reports and pathology reports listed below:  The " patient's PET scan from 3/2/2021 reviewed showing the right upper lobe lesion that increased in size and SUV now with SUV of 2.3 measuring 1.3 cm.  This concerning for primary bronchogenic carcinoma given his history.  Patient also had no evidence of hypermetabolic adenopathy in the chest.  Patient had no obviously enlarged lymph nodes in the mediastinum clear targets for an EBUS.  Patient has no pathology due to his high risk for collapse lung from biopsy and would have difficult time with navigational bronchoscopy.     The patient's echo was reviewed showing maintained EF.    There are no diagnoses linked to this encounter.  IMPRESSION:  Mr. Vo is a very pleasant 70 old gentleman with multiple comorbidities including pacemaker/defibrillator placement, hypertension, A. fib, mechanical valve, and a remote history of colon cancer in 2001 who has an enlarging and increasingly hypermetabolic right upper lobe lesion measuring 1.3 cm with an SUV of now 2.3.  This concerning for primary bronchogenic carcinoma given the patient's history and his active smoking status.  The patient has discussed CyberKnife and surgery and the patient is having a difficult time making a decision.  He is very fearful of an additional operation and the recovery required.  He also reports that he is unlikely to stop smoking as his efforts to cut backs have been unsuccessful thus far.    RECOMMENDATIONS:     Presumed cT1b N0 M0 left upper lobe primary bronchogenic carcinoma  -Lesion slowly enlarging initially identified in 2019  -Increased to 1.3 cm with increased SUV  -Patient has high risk history given smoking history and present active smoking  -Patient is candidate for surgery and for CyberKnife  -The patient reports that he is leaning toward CyberKnife today if both would offer him a reasonable chance of cure, given the need for an invasive operation and hospitalization.  -Sent message to Dr. Post to discuss this case further  -I  have not rebooked the patient for simulation at this time    Coronary artery disease  -Complicates care  -Continue present regimen  -Most recent echo shows EF is maintained with left atrial dilation     COPD  -Most recent PFTs in 2019  -Recommend repeating PFTs as it may prove useful to determine patient's operable candidacy from a pulmonary standpoint  -Complicates care     A. Fib  -Status post pacemaker/defibrillator  -Would need clearance prior to SBRT  -on contralateral chest and is unlikely be an issue     Mechanical atrial valve  -On anticoagulation  -Continue present regimen  -Not an issue with SBRT        Luis Alberto Carter MD

## 2021-04-06 ENCOUNTER — HOSPITAL ENCOUNTER (OUTPATIENT)
Dept: PULMONOLOGY | Facility: HOSPITAL | Age: 71
Discharge: HOME | End: 2021-04-06

## 2021-04-06 ENCOUNTER — HOSPITAL ENCOUNTER (OUTPATIENT)
Dept: GENERAL RADIOLOGY | Facility: HOSPITAL | Age: 71
Discharge: HOME | End: 2021-04-06

## 2021-04-06 ENCOUNTER — PRE-ADMISSION TESTING (OUTPATIENT)
Dept: PREADMISSION TESTING | Facility: HOSPITAL | Age: 71
End: 2021-04-06

## 2021-04-06 VITALS — OXYGEN SATURATION: 100 % | WEIGHT: 143.52 LBS | BODY MASS INDEX: 20.09 KG/M2 | HEIGHT: 71 IN

## 2021-04-06 DIAGNOSIS — R91.1 LUNG NODULE: ICD-10-CM

## 2021-04-06 LAB
ABO GROUP BLD: NORMAL
ALBUMIN SERPL-MCNC: 4 G/DL (ref 3.5–5.2)
ALP SERPL-CCNC: 87 U/L (ref 39–117)
ALT SERPL W P-5'-P-CCNC: 16 U/L (ref 1–41)
ANION GAP SERPL CALCULATED.3IONS-SCNC: 6 MMOL/L (ref 5–15)
AST SERPL-CCNC: 27 U/L (ref 1–40)
BASOPHILS # BLD AUTO: 0.05 10*3/MM3 (ref 0–0.2)
BASOPHILS NFR BLD AUTO: 1.4 % (ref 0–1.5)
BILIRUB CONJ SERPL-MCNC: 0.5 MG/DL (ref 0–0.3)
BILIRUB INDIRECT SERPL-MCNC: 1.1 MG/DL
BILIRUB SERPL-MCNC: 1.6 MG/DL (ref 0–1.2)
BLD GP AB SCN SERPL QL: NEGATIVE
BUN SERPL-MCNC: 11 MG/DL (ref 8–23)
BUN/CREAT SERPL: 12.2 (ref 7–25)
CALCIUM SPEC-SCNC: 9.1 MG/DL (ref 8.6–10.5)
CHLORIDE SERPL-SCNC: 100 MMOL/L (ref 98–107)
CO2 SERPL-SCNC: 36 MMOL/L (ref 22–29)
CREAT SERPL-MCNC: 0.9 MG/DL (ref 0.76–1.27)
DEPRECATED RDW RBC AUTO: 60.9 FL (ref 37–54)
EOSINOPHIL # BLD AUTO: 0.08 10*3/MM3 (ref 0–0.4)
EOSINOPHIL NFR BLD AUTO: 2.2 % (ref 0.3–6.2)
ERYTHROCYTE [DISTWIDTH] IN BLOOD BY AUTOMATED COUNT: 14.8 % (ref 12.3–15.4)
FLUAV RNA RESP QL NAA+PROBE: NOT DETECTED
FLUBV RNA RESP QL NAA+PROBE: NOT DETECTED
GFR SERPL CREATININE-BSD FRML MDRD: 83 ML/MIN/1.73
GLUCOSE SERPL-MCNC: 90 MG/DL (ref 65–99)
HBA1C MFR BLD: 5.4 % (ref 4.8–5.6)
HCT VFR BLD AUTO: 39 % (ref 37.5–51)
HGB BLD-MCNC: 12.8 G/DL (ref 13–17.7)
IMM GRANULOCYTES # BLD AUTO: 0.01 10*3/MM3 (ref 0–0.05)
IMM GRANULOCYTES NFR BLD AUTO: 0.3 % (ref 0–0.5)
INR PPP: 1.18 (ref 0.85–1.16)
LYMPHOCYTES # BLD AUTO: 1.01 10*3/MM3 (ref 0.7–3.1)
LYMPHOCYTES NFR BLD AUTO: 28 % (ref 19.6–45.3)
MACROCYTES BLD QL SMEAR: NORMAL
MCH RBC QN AUTO: 36.2 PG (ref 26.6–33)
MCHC RBC AUTO-ENTMCNC: 32.8 G/DL (ref 31.5–35.7)
MCV RBC AUTO: 110.2 FL (ref 79–97)
MONOCYTES # BLD AUTO: 0.36 10*3/MM3 (ref 0.1–0.9)
MONOCYTES NFR BLD AUTO: 10 % (ref 5–12)
NEUTROPHILS NFR BLD AUTO: 2.1 10*3/MM3 (ref 1.7–7)
NEUTROPHILS NFR BLD AUTO: 58.1 % (ref 42.7–76)
NRBC BLD AUTO-RTO: 0 /100 WBC (ref 0–0.2)
PLAT MORPH BLD: NORMAL
PLATELET # BLD AUTO: 103 10*3/MM3 (ref 140–450)
PMV BLD AUTO: 12.1 FL (ref 6–12)
POTASSIUM SERPL-SCNC: 4 MMOL/L (ref 3.5–5.2)
PROT SERPL-MCNC: 6.4 G/DL (ref 6–8.5)
PROTHROMBIN TIME: 14.6 SECONDS (ref 11.4–14.4)
QT INTERVAL: 520 MS
QTC INTERVAL: 561 MS
RBC # BLD AUTO: 3.54 10*6/MM3 (ref 4.14–5.8)
RH BLD: POSITIVE
SARS-COV-2 RNA RESP QL NAA+PROBE: NOT DETECTED
SODIUM SERPL-SCNC: 142 MMOL/L (ref 136–145)
T&S EXPIRATION DATE: NORMAL
WBC # BLD AUTO: 3.61 10*3/MM3 (ref 3.4–10.8)
WBC MORPH BLD: NORMAL

## 2021-04-06 PROCEDURE — 80076 HEPATIC FUNCTION PANEL: CPT

## 2021-04-06 PROCEDURE — 86850 RBC ANTIBODY SCREEN: CPT

## 2021-04-06 PROCEDURE — 83036 HEMOGLOBIN GLYCOSYLATED A1C: CPT

## 2021-04-06 PROCEDURE — 93005 ELECTROCARDIOGRAM TRACING: CPT

## 2021-04-06 PROCEDURE — 36415 COLL VENOUS BLD VENIPUNCTURE: CPT

## 2021-04-06 PROCEDURE — 85007 BL SMEAR W/DIFF WBC COUNT: CPT

## 2021-04-06 PROCEDURE — 87636 SARSCOV2 & INF A&B AMP PRB: CPT

## 2021-04-06 PROCEDURE — 85025 COMPLETE CBC W/AUTO DIFF WBC: CPT

## 2021-04-06 PROCEDURE — 86900 BLOOD TYPING SEROLOGIC ABO: CPT

## 2021-04-06 PROCEDURE — C9803 HOPD COVID-19 SPEC COLLECT: HCPCS

## 2021-04-06 PROCEDURE — 80048 BASIC METABOLIC PNL TOTAL CA: CPT

## 2021-04-06 PROCEDURE — 86901 BLOOD TYPING SEROLOGIC RH(D): CPT

## 2021-04-06 PROCEDURE — 94010 BREATHING CAPACITY TEST: CPT | Performed by: INTERNAL MEDICINE

## 2021-04-06 PROCEDURE — 94010 BREATHING CAPACITY TEST: CPT

## 2021-04-06 PROCEDURE — 71046 X-RAY EXAM CHEST 2 VIEWS: CPT

## 2021-04-06 PROCEDURE — 93010 ELECTROCARDIOGRAM REPORT: CPT | Performed by: INTERNAL MEDICINE

## 2021-04-06 PROCEDURE — 86923 COMPATIBILITY TEST ELECTRIC: CPT

## 2021-04-06 PROCEDURE — 85610 PROTHROMBIN TIME: CPT

## 2021-04-06 NOTE — PAT
Patient to apply Chlorhexadine wipes  to surgical area (as instructed) the night before procedure and the AM of procedure. Wipes provided.    Per Anesthesia Request, patient instructed not to take their ACE/ARB medications on the AM of surgery.    Blood bank bracelet applied to patient during Pre Admission Testing visit.  Patient instructed not to remove from arm until after procedure and they are discharged from the hospital.  Explained to patient that they may shower and get the bracelet wet, but not to immerse under water for longer periods (bathing, swimming, hand dishwashing, etc).  Patient verbalized understanding.    Pacemaker interrogated in PAT.    Patient directed to Radiology Department for CXR after Pre Admission Testing Appointment.     Patient provided written and verbal instructions on going to RT for PFT's following CXR, verbalized understanding.    Spo2 100% on room air, ABG not indicated.     Patient reports last dose of Warfarin on Friday (4/2/21/) per MD, has been using Lovenox to bridge.

## 2021-04-07 ENCOUNTER — ANESTHESIA (OUTPATIENT)
Dept: PERIOP | Facility: HOSPITAL | Age: 71
End: 2021-04-07

## 2021-04-07 ENCOUNTER — HOSPITAL ENCOUNTER (INPATIENT)
Facility: HOSPITAL | Age: 71
LOS: 7 days | Discharge: HOME | End: 2021-04-14
Attending: THORACIC SURGERY (CARDIOTHORACIC VASCULAR SURGERY) | Admitting: THORACIC SURGERY (CARDIOTHORACIC VASCULAR SURGERY)

## 2021-04-07 ENCOUNTER — APPOINTMENT (OUTPATIENT)
Dept: GENERAL RADIOLOGY | Facility: HOSPITAL | Age: 71
End: 2021-04-07

## 2021-04-07 ENCOUNTER — ANESTHESIA EVENT (OUTPATIENT)
Dept: PERIOP | Facility: HOSPITAL | Age: 71
End: 2021-04-07

## 2021-04-07 DIAGNOSIS — IMO0001 LUNG NODULE < 6CM ON CT: ICD-10-CM

## 2021-04-07 DIAGNOSIS — R91.1 LUNG NODULE: ICD-10-CM

## 2021-04-07 LAB
INR PPP: 1.16 (ref 0.85–1.16)
PROTHROMBIN TIME: 14.4 SECONDS (ref 11.4–14.4)

## 2021-04-07 PROCEDURE — 88312 SPECIAL STAINS GROUP 1: CPT | Performed by: THORACIC SURGERY (CARDIOTHORACIC VASCULAR SURGERY)

## 2021-04-07 PROCEDURE — 25010000002 HYDROMORPHONE HCL-NACL 30-0.9 MG/30ML-% SOLUTION PREFILLED SYRINGE: Performed by: THORACIC SURGERY (CARDIOTHORACIC VASCULAR SURGERY)

## 2021-04-07 PROCEDURE — 25010000003 CEFUROXIME SODIUM 1.5 G RECONSTITUTED SOLUTION: Performed by: PHYSICIAN ASSISTANT

## 2021-04-07 PROCEDURE — 25010000002 CEFUROXIME: Performed by: PHYSICIAN ASSISTANT

## 2021-04-07 PROCEDURE — 25010000002 FENTANYL CITRATE (PF) 100 MCG/2ML SOLUTION: Performed by: NURSE ANESTHETIST, CERTIFIED REGISTERED

## 2021-04-07 PROCEDURE — 99233 SBSQ HOSP IP/OBS HIGH 50: CPT | Performed by: INTERNAL MEDICINE

## 2021-04-07 PROCEDURE — C1729 CATH, DRAINAGE: HCPCS | Performed by: THORACIC SURGERY (CARDIOTHORACIC VASCULAR SURGERY)

## 2021-04-07 PROCEDURE — 85610 PROTHROMBIN TIME: CPT | Performed by: PHYSICIAN ASSISTANT

## 2021-04-07 PROCEDURE — 71045 X-RAY EXAM CHEST 1 VIEW: CPT

## 2021-04-07 PROCEDURE — 32480 PARTIAL REMOVAL OF LUNG: CPT | Performed by: THORACIC SURGERY (CARDIOTHORACIC VASCULAR SURGERY)

## 2021-04-07 PROCEDURE — 38746 REMOVE THORACIC LYMPH NODES: CPT | Performed by: THORACIC SURGERY (CARDIOTHORACIC VASCULAR SURGERY)

## 2021-04-07 PROCEDURE — 07B70ZX EXCISION OF THORAX LYMPHATIC, OPEN APPROACH, DIAGNOSTIC: ICD-10-PCS | Performed by: THORACIC SURGERY (CARDIOTHORACIC VASCULAR SURGERY)

## 2021-04-07 PROCEDURE — 0BTG0ZZ RESECTION OF LEFT UPPER LUNG LOBE, OPEN APPROACH: ICD-10-PCS | Performed by: THORACIC SURGERY (CARDIOTHORACIC VASCULAR SURGERY)

## 2021-04-07 PROCEDURE — 0BJ08ZZ INSPECTION OF TRACHEOBRONCHIAL TREE, VIA NATURAL OR ARTIFICIAL OPENING ENDOSCOPIC: ICD-10-PCS | Performed by: THORACIC SURGERY (CARDIOTHORACIC VASCULAR SURGERY)

## 2021-04-07 PROCEDURE — 88332 PATH CONSLTJ SURG EA ADD BLK: CPT | Performed by: PATHOLOGY

## 2021-04-07 PROCEDURE — 88331 PATH CONSLTJ SURG 1 BLK 1SPC: CPT | Performed by: PATHOLOGY

## 2021-04-07 PROCEDURE — 32480 PARTIAL REMOVAL OF LUNG: CPT | Performed by: PHYSICIAN ASSISTANT

## 2021-04-07 PROCEDURE — 25010000002 NEOSTIGMINE 10 MG/10ML SOLUTION: Performed by: NURSE ANESTHETIST, CERTIFIED REGISTERED

## 2021-04-07 PROCEDURE — 25010000002 PROPOFOL 10 MG/ML EMULSION: Performed by: NURSE ANESTHETIST, CERTIFIED REGISTERED

## 2021-04-07 PROCEDURE — 25010000002 ROPIVACAINE PER 1 MG: Performed by: THORACIC SURGERY (CARDIOTHORACIC VASCULAR SURGERY)

## 2021-04-07 PROCEDURE — 88309 TISSUE EXAM BY PATHOLOGIST: CPT | Performed by: THORACIC SURGERY (CARDIOTHORACIC VASCULAR SURGERY)

## 2021-04-07 PROCEDURE — 25010000002 HYDROMORPHONE PER 4 MG: Performed by: NURSE ANESTHETIST, CERTIFIED REGISTERED

## 2021-04-07 PROCEDURE — 25010000002 DEXAMETHASONE PER 1 MG: Performed by: NURSE ANESTHETIST, CERTIFIED REGISTERED

## 2021-04-07 PROCEDURE — 25010000002 ONDANSETRON PER 1 MG: Performed by: NURSE ANESTHETIST, CERTIFIED REGISTERED

## 2021-04-07 PROCEDURE — 88305 TISSUE EXAM BY PATHOLOGIST: CPT | Performed by: THORACIC SURGERY (CARDIOTHORACIC VASCULAR SURGERY)

## 2021-04-07 PROCEDURE — 31622 DX BRONCHOSCOPE/WASH: CPT | Performed by: THORACIC SURGERY (CARDIOTHORACIC VASCULAR SURGERY)

## 2021-04-07 DEVICE — BLACK INTELLIGENT RELOAD
Type: IMPLANTABLE DEVICE | Site: BRONCHUS | Status: FUNCTIONAL
Brand: TRI-STAPLE 2.0

## 2021-04-07 DEVICE — ARTICULATION RELOAD WITH TRI-STAPLE TECHNOLOGY
Type: IMPLANTABLE DEVICE | Site: BRONCHUS | Status: FUNCTIONAL
Brand: ENDO GIA

## 2021-04-07 DEVICE — LOADING UNIT WITH DST SERIES TECHNOLOGY
Type: IMPLANTABLE DEVICE | Site: BRONCHUS | Status: FUNCTIONAL
Brand: TA

## 2021-04-07 DEVICE — ARTICULATING RELOAD WITH TRI-STAPLE TECHNOLOGY
Type: IMPLANTABLE DEVICE | Site: BRONCHUS | Status: FUNCTIONAL
Brand: ENDO GIA

## 2021-04-07 DEVICE — CURVED TIP INTELLIGENT RELOAD AND INTRODUCER
Type: IMPLANTABLE DEVICE | Site: BRONCHUS | Status: FUNCTIONAL
Brand: TRI-STAPLE 2.0

## 2021-04-07 RX ORDER — FAMOTIDINE 20 MG/1
20 TABLET, FILM COATED ORAL
Status: DISCONTINUED | OUTPATIENT
Start: 2021-04-07 | End: 2021-04-07 | Stop reason: HOSPADM

## 2021-04-07 RX ORDER — SODIUM CHLORIDE 0.9 % (FLUSH) 0.9 %
10 SYRINGE (ML) INJECTION EVERY 12 HOURS SCHEDULED
Status: DISCONTINUED | OUTPATIENT
Start: 2021-04-07 | End: 2021-04-07 | Stop reason: HOSPADM

## 2021-04-07 RX ORDER — FENTANYL CITRATE 50 UG/ML
50 INJECTION, SOLUTION INTRAMUSCULAR; INTRAVENOUS
Status: DISCONTINUED | OUTPATIENT
Start: 2021-04-07 | End: 2021-04-07 | Stop reason: HOSPADM

## 2021-04-07 RX ORDER — NEOSTIGMINE METHYLSULFATE 1 MG/ML
INJECTION, SOLUTION INTRAVENOUS AS NEEDED
Status: DISCONTINUED | OUTPATIENT
Start: 2021-04-07 | End: 2021-04-07 | Stop reason: SURG

## 2021-04-07 RX ORDER — PROPOFOL 10 MG/ML
VIAL (ML) INTRAVENOUS AS NEEDED
Status: DISCONTINUED | OUTPATIENT
Start: 2021-04-07 | End: 2021-04-07 | Stop reason: SURG

## 2021-04-07 RX ORDER — HYDROMORPHONE HYDROCHLORIDE 1 MG/ML
0.5 INJECTION, SOLUTION INTRAMUSCULAR; INTRAVENOUS; SUBCUTANEOUS
Status: DISCONTINUED | OUTPATIENT
Start: 2021-04-07 | End: 2021-04-07 | Stop reason: HOSPADM

## 2021-04-07 RX ORDER — SODIUM CHLORIDE 9 MG/ML
INJECTION, SOLUTION INTRAVENOUS CONTINUOUS PRN
Status: DISCONTINUED | OUTPATIENT
Start: 2021-04-07 | End: 2021-04-07 | Stop reason: SURG

## 2021-04-07 RX ORDER — MAGNESIUM HYDROXIDE 1200 MG/15ML
LIQUID ORAL AS NEEDED
Status: DISCONTINUED | OUTPATIENT
Start: 2021-04-07 | End: 2021-04-07 | Stop reason: HOSPADM

## 2021-04-07 RX ORDER — SODIUM CHLORIDE, SODIUM LACTATE, POTASSIUM CHLORIDE, CALCIUM CHLORIDE 600; 310; 30; 20 MG/100ML; MG/100ML; MG/100ML; MG/100ML
9 INJECTION, SOLUTION INTRAVENOUS CONTINUOUS PRN
Status: DISCONTINUED | OUTPATIENT
Start: 2021-04-07 | End: 2021-04-14 | Stop reason: HOSPADM

## 2021-04-07 RX ORDER — ONDANSETRON 2 MG/ML
4 INJECTION INTRAMUSCULAR; INTRAVENOUS ONCE AS NEEDED
Status: DISCONTINUED | OUTPATIENT
Start: 2021-04-07 | End: 2021-04-07 | Stop reason: HOSPADM

## 2021-04-07 RX ORDER — LABETALOL HYDROCHLORIDE 5 MG/ML
5 INJECTION, SOLUTION INTRAVENOUS
Status: DISCONTINUED | OUTPATIENT
Start: 2021-04-07 | End: 2021-04-07 | Stop reason: HOSPADM

## 2021-04-07 RX ORDER — SODIUM CHLORIDE 0.9 % (FLUSH) 0.9 %
10 SYRINGE (ML) INJECTION AS NEEDED
Status: DISCONTINUED | OUTPATIENT
Start: 2021-04-07 | End: 2021-04-07 | Stop reason: HOSPADM

## 2021-04-07 RX ORDER — CITALOPRAM 20 MG/1
20 TABLET ORAL DAILY
Status: DISCONTINUED | OUTPATIENT
Start: 2021-04-08 | End: 2021-04-14 | Stop reason: HOSPADM

## 2021-04-07 RX ORDER — LIDOCAINE HYDROCHLORIDE 10 MG/ML
0.5 INJECTION, SOLUTION EPIDURAL; INFILTRATION; INTRACAUDAL; PERINEURAL ONCE AS NEEDED
Status: COMPLETED | OUTPATIENT
Start: 2021-04-07 | End: 2021-04-07

## 2021-04-07 RX ORDER — ONDANSETRON 2 MG/ML
INJECTION INTRAMUSCULAR; INTRAVENOUS AS NEEDED
Status: DISCONTINUED | OUTPATIENT
Start: 2021-04-07 | End: 2021-04-07 | Stop reason: SURG

## 2021-04-07 RX ORDER — FENTANYL CITRATE 50 UG/ML
INJECTION, SOLUTION INTRAMUSCULAR; INTRAVENOUS AS NEEDED
Status: DISCONTINUED | OUTPATIENT
Start: 2021-04-07 | End: 2021-04-07 | Stop reason: SURG

## 2021-04-07 RX ORDER — LANOLIN ALCOHOL/MO/W.PET/CERES
400 CREAM (GRAM) TOPICAL DAILY
Status: DISCONTINUED | OUTPATIENT
Start: 2021-04-08 | End: 2021-04-14 | Stop reason: HOSPADM

## 2021-04-07 RX ORDER — BUPIVACAINE HCL/0.9 % NACL/PF 0.125 %
PLASTIC BAG, INJECTION (ML) EPIDURAL AS NEEDED
Status: DISCONTINUED | OUTPATIENT
Start: 2021-04-07 | End: 2021-04-07 | Stop reason: SURG

## 2021-04-07 RX ORDER — BUPROPION HYDROCHLORIDE 150 MG/1
300 TABLET ORAL DAILY
Status: DISCONTINUED | OUTPATIENT
Start: 2021-04-08 | End: 2021-04-14 | Stop reason: HOSPADM

## 2021-04-07 RX ORDER — FAMOTIDINE 20 MG/1
20 TABLET, FILM COATED ORAL DAILY
Status: DISCONTINUED | OUTPATIENT
Start: 2021-04-08 | End: 2021-04-14 | Stop reason: HOSPADM

## 2021-04-07 RX ORDER — ROCURONIUM BROMIDE 10 MG/ML
INJECTION, SOLUTION INTRAVENOUS AS NEEDED
Status: DISCONTINUED | OUTPATIENT
Start: 2021-04-07 | End: 2021-04-07 | Stop reason: SURG

## 2021-04-07 RX ORDER — LISINOPRIL 10 MG/1
10 TABLET ORAL 2 TIMES DAILY
Status: DISCONTINUED | OUTPATIENT
Start: 2021-04-07 | End: 2021-04-14 | Stop reason: HOSPADM

## 2021-04-07 RX ORDER — ROPIVACAINE HYDROCHLORIDE 5 MG/ML
INJECTION, SOLUTION EPIDURAL; INFILTRATION; PERINEURAL AS NEEDED
Status: DISCONTINUED | OUTPATIENT
Start: 2021-04-07 | End: 2021-04-07 | Stop reason: HOSPADM

## 2021-04-07 RX ORDER — GLYCOPYRROLATE 0.2 MG/ML
INJECTION INTRAMUSCULAR; INTRAVENOUS AS NEEDED
Status: DISCONTINUED | OUTPATIENT
Start: 2021-04-07 | End: 2021-04-07 | Stop reason: SURG

## 2021-04-07 RX ORDER — CARVEDILOL 12.5 MG/1
12.5 TABLET ORAL 2 TIMES DAILY
Status: DISCONTINUED | OUTPATIENT
Start: 2021-04-07 | End: 2021-04-14 | Stop reason: HOSPADM

## 2021-04-07 RX ORDER — ONDANSETRON 4 MG/1
4 TABLET, FILM COATED ORAL EVERY 6 HOURS PRN
Status: DISCONTINUED | OUTPATIENT
Start: 2021-04-07 | End: 2021-04-14 | Stop reason: HOSPADM

## 2021-04-07 RX ORDER — WARFARIN SODIUM 5 MG/1
5 TABLET ORAL
Status: DISCONTINUED | OUTPATIENT
Start: 2021-04-07 | End: 2021-04-08

## 2021-04-07 RX ORDER — NALOXONE HCL 0.4 MG/ML
0.1 VIAL (ML) INJECTION
Status: DISCONTINUED | OUTPATIENT
Start: 2021-04-07 | End: 2021-04-14 | Stop reason: HOSPADM

## 2021-04-07 RX ORDER — ATORVASTATIN CALCIUM 40 MG/1
80 TABLET, FILM COATED ORAL NIGHTLY
Status: DISCONTINUED | OUTPATIENT
Start: 2021-04-08 | End: 2021-04-14 | Stop reason: HOSPADM

## 2021-04-07 RX ORDER — ACETAMINOPHEN 325 MG/1
650 TABLET ORAL EVERY 4 HOURS PRN
Status: DISCONTINUED | OUTPATIENT
Start: 2021-04-07 | End: 2021-04-14 | Stop reason: HOSPADM

## 2021-04-07 RX ORDER — IPRATROPIUM BROMIDE AND ALBUTEROL SULFATE 2.5; .5 MG/3ML; MG/3ML
3 SOLUTION RESPIRATORY (INHALATION) ONCE AS NEEDED
Status: DISCONTINUED | OUTPATIENT
Start: 2021-04-07 | End: 2021-04-07 | Stop reason: HOSPADM

## 2021-04-07 RX ORDER — FINASTERIDE 5 MG/1
5 TABLET, FILM COATED ORAL DAILY
Status: DISCONTINUED | OUTPATIENT
Start: 2021-04-08 | End: 2021-04-14 | Stop reason: HOSPADM

## 2021-04-07 RX ORDER — LIDOCAINE HYDROCHLORIDE 10 MG/ML
INJECTION, SOLUTION EPIDURAL; INFILTRATION; INTRACAUDAL; PERINEURAL AS NEEDED
Status: DISCONTINUED | OUTPATIENT
Start: 2021-04-07 | End: 2021-04-07 | Stop reason: SURG

## 2021-04-07 RX ORDER — DEXAMETHASONE SODIUM PHOSPHATE 4 MG/ML
INJECTION, SOLUTION INTRA-ARTICULAR; INTRALESIONAL; INTRAMUSCULAR; INTRAVENOUS; SOFT TISSUE AS NEEDED
Status: DISCONTINUED | OUTPATIENT
Start: 2021-04-07 | End: 2021-04-07 | Stop reason: SURG

## 2021-04-07 RX ORDER — ONDANSETRON 2 MG/ML
4 INJECTION INTRAMUSCULAR; INTRAVENOUS EVERY 6 HOURS PRN
Status: DISCONTINUED | OUTPATIENT
Start: 2021-04-07 | End: 2021-04-14 | Stop reason: HOSPADM

## 2021-04-07 RX ORDER — ASPIRIN 81 MG/1
81 TABLET ORAL DAILY
Status: DISCONTINUED | OUTPATIENT
Start: 2021-04-08 | End: 2021-04-14 | Stop reason: HOSPADM

## 2021-04-07 RX ORDER — HEPARIN SODIUM 5000 [USP'U]/ML
5000 INJECTION, SOLUTION INTRAVENOUS; SUBCUTANEOUS EVERY 12 HOURS SCHEDULED
Status: DISCONTINUED | OUTPATIENT
Start: 2021-04-08 | End: 2021-04-07

## 2021-04-07 RX ORDER — LANOLIN ALCOHOL/MO/W.PET/CERES
1000 CREAM (GRAM) TOPICAL DAILY
Status: DISCONTINUED | OUTPATIENT
Start: 2021-04-08 | End: 2021-04-14 | Stop reason: HOSPADM

## 2021-04-07 RX ORDER — SODIUM CHLORIDE 9 MG/ML
30 INJECTION, SOLUTION INTRAVENOUS CONTINUOUS
Status: DISCONTINUED | OUTPATIENT
Start: 2021-04-07 | End: 2021-04-12

## 2021-04-07 RX ORDER — CHLORHEXIDINE GLUCONATE 500 MG/1
1 CLOTH TOPICAL EVERY 12 HOURS PRN
Status: DISCONTINUED | OUTPATIENT
Start: 2021-04-07 | End: 2021-04-07 | Stop reason: HOSPADM

## 2021-04-07 RX ADMIN — CEFUROXIME 1.5 G: 1.5 INJECTION, POWDER, FOR SOLUTION INTRAVENOUS at 12:08

## 2021-04-07 RX ADMIN — LISINOPRIL 10 MG: 10 TABLET ORAL at 20:30

## 2021-04-07 RX ADMIN — NEOSTIGMINE 3 MG: 1 INJECTION INTRAVENOUS at 13:46

## 2021-04-07 RX ADMIN — FENTANYL CITRATE 50 MCG: 50 INJECTION, SOLUTION INTRAMUSCULAR; INTRAVENOUS at 14:33

## 2021-04-07 RX ADMIN — SODIUM CHLORIDE, POTASSIUM CHLORIDE, SODIUM LACTATE AND CALCIUM CHLORIDE 9 ML/HR: 600; 310; 30; 20 INJECTION, SOLUTION INTRAVENOUS at 09:50

## 2021-04-07 RX ADMIN — HYDROMORPHONE HYDROCHLORIDE 0.5 MG: 1 INJECTION, SOLUTION INTRAMUSCULAR; INTRAVENOUS; SUBCUTANEOUS at 15:34

## 2021-04-07 RX ADMIN — FENTANYL CITRATE 50 MCG: 50 INJECTION, SOLUTION INTRAMUSCULAR; INTRAVENOUS at 12:11

## 2021-04-07 RX ADMIN — GLYCOPYRROLATE 0.4 MG: 0.4 INJECTION INTRAMUSCULAR; INTRAVENOUS at 13:46

## 2021-04-07 RX ADMIN — FENTANYL CITRATE 50 MCG: 50 INJECTION, SOLUTION INTRAMUSCULAR; INTRAVENOUS at 13:26

## 2021-04-07 RX ADMIN — HYDROMORPHONE HYDROCHLORIDE 0.5 MG: 1 INJECTION, SOLUTION INTRAMUSCULAR; INTRAVENOUS; SUBCUTANEOUS at 16:17

## 2021-04-07 RX ADMIN — DEXAMETHASONE SODIUM PHOSPHATE 8 MG: 4 INJECTION, SOLUTION INTRA-ARTICULAR; INTRALESIONAL; INTRAMUSCULAR; INTRAVENOUS; SOFT TISSUE at 12:20

## 2021-04-07 RX ADMIN — PROPOFOL 150 MG: 10 INJECTION, EMULSION INTRAVENOUS at 12:11

## 2021-04-07 RX ADMIN — SODIUM CHLORIDE 30 ML/HR: 9 INJECTION, SOLUTION INTRAVENOUS at 16:53

## 2021-04-07 RX ADMIN — ROCURONIUM BROMIDE 50 MG: 10 INJECTION INTRAVENOUS at 12:11

## 2021-04-07 RX ADMIN — FENTANYL CITRATE 50 MCG: 50 INJECTION, SOLUTION INTRAMUSCULAR; INTRAVENOUS at 14:21

## 2021-04-07 RX ADMIN — WARFARIN SODIUM 5 MG: 5 TABLET ORAL at 18:42

## 2021-04-07 RX ADMIN — Medication: at 15:08

## 2021-04-07 RX ADMIN — ONDANSETRON 4 MG: 2 INJECTION INTRAMUSCULAR; INTRAVENOUS at 13:29

## 2021-04-07 RX ADMIN — CARVEDILOL 12.5 MG: 12.5 TABLET, FILM COATED ORAL at 20:30

## 2021-04-07 RX ADMIN — FENTANYL CITRATE 50 MCG: 50 INJECTION, SOLUTION INTRAMUSCULAR; INTRAVENOUS at 13:29

## 2021-04-07 RX ADMIN — LIDOCAINE HYDROCHLORIDE 50 MG: 10 INJECTION, SOLUTION EPIDURAL; INFILTRATION; INTRACAUDAL; PERINEURAL at 12:11

## 2021-04-07 RX ADMIN — CEFUROXIME SODIUM 1.5 G: 1.5 INJECTION, POWDER, FOR SOLUTION INTRAVENOUS at 20:30

## 2021-04-07 RX ADMIN — SODIUM CHLORIDE: 9 INJECTION, SOLUTION INTRAVENOUS at 13:26

## 2021-04-07 RX ADMIN — LIDOCAINE HYDROCHLORIDE 0.2 ML: 10 INJECTION, SOLUTION EPIDURAL; INFILTRATION; INTRACAUDAL; PERINEURAL at 09:50

## 2021-04-07 RX ADMIN — Medication 160 MCG: at 12:53

## 2021-04-07 RX ADMIN — FENTANYL CITRATE 50 MCG: 50 INJECTION, SOLUTION INTRAMUSCULAR; INTRAVENOUS at 12:34

## 2021-04-07 RX ADMIN — MUPIROCIN 1 APPLICATION: 20 OINTMENT TOPICAL at 10:23

## 2021-04-07 NOTE — ANESTHESIA PROCEDURE NOTES
Airway  Urgency: elective    Date/Time: 4/7/2021 12:12 PM  Airway not difficult    General Information and Staff    Patient location during procedure: OR  CRNA: Chichi Andrade CRNA    Indications and Patient Condition  Indications for airway management: airway protection    Preoxygenated: yes  MILS not maintained throughout  Mask difficulty assessment: 2 - vent by mask + OA or adjuvant +/- NMBA    Final Airway Details  Final airway type: endotracheal airway      Successful airway: EBT - double lumen left  Cuffed: yes   Successful intubation technique: direct laryngoscopy  Endotracheal tube insertion site: oral  Blade: Dwayne  Blade size: 4  EBT DL size (fr): 39  Cormack-Lehane Classification: grade I - full view of glottis  Placement verified by: chest auscultation and capnometry   Measured from: lips  Number of attempts at approach: 1  Assessment: lips, teeth, and gum same as pre-op and atraumatic intubation    Additional Comments  Negative epigastric sounds, Breath sound equal bilaterally with symmetric chest rise and fall    Placement confirmed with bronchoscope

## 2021-04-07 NOTE — INTERVAL H&P NOTE
"[facility] Pre-op    Full history and physical note from office is up to date.     BP (!) 189/101 (BP Location: Right arm, Patient Position: Lying)   Pulse 70   Temp 97.1 °F (36.2 °C) (Temporal)   Resp 18   Ht 180.3 cm (71\")   Wt 64.9 kg (143 lb)   SpO2 100%   BMI 19.94 kg/m²       LAB Results:  Lab Results   Component Value Date    WBC 3.61 04/06/2021    HGB 12.8 (L) 04/06/2021    HCT 39.0 04/06/2021    .2 (H) 04/06/2021     (L) 04/06/2021    NEUTROABS 2.10 04/06/2021    GLUCOSE 90 04/06/2021    BUN 11 04/06/2021    CREATININE 0.90 04/06/2021    EGFRIFNONA 83 04/06/2021     04/06/2021    K 4.0 04/06/2021     04/06/2021    CO2 36.0 (H) 04/06/2021    MG 2.2 03/01/2020    PHOS 3.5 03/01/2020    CALCIUM 9.1 04/06/2021    ALBUMIN 4.00 04/06/2021    AST 27 04/06/2021    ALT 16 04/06/2021    BILITOT 1.6 (H) 04/06/2021    PTT 33.7 (L) 02/28/2020    INR 1.18 (H) 04/06/2021       Cancer Staging (if applicable)  Cancer Patient: _X_ yes __no __unknown__N/A; If yes, clinical stage T:__ N:__M:__, stage group or __N/A    Assessment: Lung nodule    Plan:   Bronchoscopy with left thoracotomy  Anesthesia aware of elevated BP. Will continue to monitor closely.      Mayte Mcadams, DAGOBERTO 4/7/2021 10:15 EDT   "

## 2021-04-07 NOTE — PROGRESS NOTES
"Pharmacy Consult  -  Warfarin    Dillon Vo is a  70 y.o. male   Height - 180.3 cm (71\")  Weight - 64.9 kg (143 lb)    Consulting Provider: - CTS  Indication: - Afib, mechanical valve  Goal INR: - 2.5-3.5  Home Regimen:   - Warfarin 5mg daily except 2.5mg Wed    Bridge Therapy:    Drug-Drug Interactions with current regimen:  Citalopram - may increase bleeding risk    Warfarin Dosing During Admission:    Date  4/7           INR  1.16           Dose  5mg                Education Provided:    Discharge Follow up:   Following Provider - Dr. Cintron (PCP)   Follow up time range or appointment - 2-3 days post DC      Labs:    Results from last 7 days   Lab Units 04/07/21  1717 04/06/21  0912   INR  1.16 1.18*   HEMOGLOBIN g/dL  --  12.8*   HEMATOCRIT %  --  39.0     Results from last 7 days   Lab Units 04/06/21  0912   SODIUM mmol/L 142   POTASSIUM mmol/L 4.0   CHLORIDE mmol/L 100   CO2 mmol/L 36.0*   BUN mg/dL 11   CREATININE mg/dL 0.90   CALCIUM mg/dL 9.1   BILIRUBIN mg/dL 1.6*   ALK PHOS U/L 87   ALT (SGPT) U/L 16   AST (SGOT) U/L 27   GLUCOSE mg/dL 90       Current dietary intake:   Diet Order   Procedures    Diet Regular; Cardiac       Assessment/Plan:     Pharmacy dosing Warfarin for Afib, mechanical valve. Goal INR 2.5-3.5  INR upon admission is 1.16.  Give Warfarin 5mg tonight.  Daily PT/INR ordered.  Monitor signs/symptoms of bleeding, dietary intake, and drug-drug interactions. Make dose adjustments as necessary.  Pharmacy will continue to follow.    Chon Moses, PharmD  4/7/2021  17:48 EDT        "

## 2021-04-07 NOTE — PLAN OF CARE
Goal Outcome Evaluation:  Plan of Care Reviewed With: patient, spouse  Progress: no change  Outcome Summary: pt arrived from PACU,  on 2 LNC, maintaining sats, CT drsg CDI, CT output 40ml so far, on dilaudid PCA, pain under control, afebrile, UOP 425ml, spouse at bedside and updated.

## 2021-04-07 NOTE — ANESTHESIA POSTPROCEDURE EVALUATION
Patient: Dillon Vo    Procedure Summary     Date: 04/07/21 Room / Location:  [facility] /  [facility]    Anesthesia Start: 1208 Anesthesia Stop:     Procedure: BRONCHOSCOPY LEFT THORACOTOMY, LEFT LUNG RESECTION. (Left Bronchus) Diagnosis:       Lung nodule < 6cm on CT      (Lung nodule < 6cm on CT [R91.1])    Surgeons: Chi Post MD Provider: Paco Zaman MD    Anesthesia Type: general ASA Status: 4          Anesthesia Type: general    Vitals  Vitals Value Taken Time   BP     Temp     Pulse     Resp     SpO2 99 % 04/07/21 1406   Vitals shown include unvalidated device data.        Post Anesthesia Care and Evaluation    Patient location during evaluation: PACU  Patient participation: complete - patient participated  Level of consciousness: awake and alert  Pain management: adequate  Airway patency: patent  Anesthetic complications: No anesthetic complications  PONV Status: none  Cardiovascular status: hemodynamically stable and acceptable  Respiratory status: nonlabored ventilation, acceptable and nasal cannula  Hydration status: acceptable

## 2021-04-07 NOTE — ANESTHESIA PREPROCEDURE EVALUATION
Anesthesia Evaluation                  Airway   Mallampati: I  TM distance: >3 FB  Neck ROM: full  No difficulty expected  Dental      Pulmonary    (+) pneumonia , lung cancer, COPD,   Cardiovascular     ECG reviewed    (+) hypertension, valvular problems/murmurs, CAD, dysrhythmias Atrial Fib, hyperlipidemia,     ROS comment: Ef 53%  AVR    Neuro/Psych  (+) CVA, psychiatric history,     GI/Hepatic/Renal/Endo    (+)  hiatal hernia, GERD,  hepatitis, liver disease, renal disease,     Musculoskeletal     Abdominal    Substance History      OB/GYN          Other                        Anesthesia Plan    ASA 4     general   (Coags and plt count present risk too great to place epidural)  intravenous induction     Anesthetic plan, all risks, benefits, and alternatives have been provided, discussed and informed consent has been obtained with: patient.    Plan discussed with CRNA.

## 2021-04-07 NOTE — OP NOTE
Operative Report  Dillon Vo  [id number]  1950    Preop Diagnosis: Left upper lobe nodule        Postop Diagnosis same, malignant        Procedure: #1 diagnostic fiberoptic bronchoscopy #2 left thoracotomy #3 left upper lobectomy #4 lymph node sampling #5 intercostal nerve block with ropivacaine        Surgeons: Chi Post        Assistant: Denise Tong  Assistant: Denise Tong PA-C was responsible for performing the following activities: Retraction, Suction, Irrigation, Suturing, Closing and Placing Dressing and their skilled assistance was necessary for the success of this case.       Operative Findings: Frozen section malignant margins free of tumor        Description: Patient was brought to the operating room placed under general anesthesia.  A diagnostic fiberoptic bronchoscopy was carried out.  The scope was advanced on the right side the right upper right middle and right lower lobe segmental bronchi were all visualized.  The scope was drawn advanced on the left side.  The left upper and left lower lobes were carefully inspected.  These all appear to be normal.  The patient was placed in a right lateral decubitus position with axillary roll in place.  Left chest was prepped and draped in usual sterile fashion.  A left posterior lateral thoracotomy incision was then made.  Proceed dissect down through the platysma.  Entered chest cavity in the fifth interspace.  Rib  was inserted.  At this time a careful inspection was carried out adhesions were taken down sharply.  At this time the major fissure was developed using covidien purple staplers.  The pulmonary artery branches were carefully dissected out and divided using Covidien vascular stapler.  The vein was divided using Covidien vascular stapler.  The bronchus was divided using a Covidien 4.8 green stapler.  Specimen was sent for frozen section which revealed this to be malignant with a margin free of tumor.  At this time  lymph node sampling was carried out and the inferior pulmonary ligament was divided.  The bronchus was checked to 20 cm pressure with no evidence of leak.  Using 30 cc of half percent ropivacaine intercostal nerve block was carried out to spaces above into spaces below.  2 32 chest tubes were placed inferiorly and sutured in place.  The ribs were reapproximated 4 pericostal #1 Vicryl sutures.  The muscle layer was closed with 0 Vicryl suture.  The subcutaneous tissues were closed with 2-0 Vicryl, 3-0 Vicryl, 3-0 Monocryl subcuticular stitch.        EBL: Less than 100 cc      Please note that portions of this note were completed with a voice recognition program. Efforts were made to edit the dictations, but words may be mistranscribed      Chi Post MD  04/07/21 13:39 EDT

## 2021-04-07 NOTE — PROGRESS NOTES
Intensive Care Follow-up     Hospital:  LOS: 0 days   Mr. Dillon Vo, 70 y.o. male is followed for: Postoperative medical management           History of present illness:   Dillon Vo is a 70 y.o. male with PMH MARKO pulmonary nodule (followed by Dr. Payam Kenney), COPD, 1PPD Smoker, Mechanical AVR on Warfarin, Atrial Fibrillation s/p RFA/PPM, CAD prior cardiac stents, Secondary Pulm HTN, HTN, HLP, GERD and Depression who was admitted today for an elective Bronch, Thor and Lobectomy by Dr. Post.  He had a lung cancer screening CT 8/20/19 that revealed 12 mm MARKO spiculated lung lesion w/o LAD.   PET 9/25/19 revealed nonhypermetabolic activity.  He had some mild hemoptysis 12/2019 felt to be due to bronchitis and supratherapeutic INR.  It resolved with Abx/steroids.  He was then hospitalized 2/19 - 2/24/20 with H1N1 Flu/pneumonia/hemoptysis with supratherapeutic INR.  ANCA and GBM antibodies were negative.  TTE revealed EF 46%, RVSP >55, biatrial dilation, mild-mod MR and functioning mechanical AV.  He was then hospitalized again 2/26 - 3/7/20 with large rectus sheath hematoma and supratherapeutic INR.  Coumadin was reversed and he subsequently had embolic strokes.  Serial CTs in the interim have revealed stable MARKO and RUL nodules until CT 2/12/21 that revealed some slight growth in MARKO nodule.  Repeat PET 3/2/21 confirmed growth and now had hypermetabolic activity.  He was referred to CTS for resection.  His case was also presented to the tumor board who feels this is probable Stage 1A2 and he would be a candidate for cyberknife.  Patient opted for surgical resection, which was scheduled for today.    In the intensive care unit the patient is awake and alert.  Complaining of incisional pain that is helped with his PCA  Small air leak from chest tube.  No active bleeding    Subjective   Interval History:  He was transferred to the CT PCU post operatively.         The patient's past medical,  surgical and social history were reviewed and updated in Epic as appropriate.     Objective     Infusions:  HYDROmorphone HCl-NaCl,   lactated ringers, 9 mL/hr, Last Rate: Stopped (04/07/21 1326)  [START ON 4/8/2021] Pharmacy to dose warfarin,   sodium chloride, 30 mL/hr, Last Rate: 30 mL/hr (04/07/21 1653)      Medications:  [START ON 4/8/2021] aspirin, 81 mg, Oral, Daily  [START ON 4/8/2021] atorvastatin, 80 mg, Oral, Nightly  [START ON 4/8/2021] buPROPion XL, 300 mg, Oral, Daily  carvedilol, 12.5 mg, Oral, BID  cefuroxime, 1.5 g, Intravenous, Q8H  [START ON 4/8/2021] citalopram, 20 mg, Oral, Daily  [START ON 4/8/2021] famotidine, 20 mg, Oral, Daily  [START ON 4/8/2021] finasteride, 5 mg, Oral, Daily  [START ON 4/8/2021] folic acid, 400 mcg, Oral, Daily  [START ON 4/8/2021] heparin (porcine), 5,000 Units, Subcutaneous, Q12H  lisinopril, 10 mg, Oral, BID  [START ON 4/8/2021] vitamin B-12, 1,000 mcg, Oral, Daily  warfarin, 5 mg, Oral, Daily      Vital Sign Min/Max for last 24 hours  Temp  Min: 97 °F (36.1 °C)  Max: 97.7 °F (36.5 °C)   BP  Min: 120/74  Max: 189/101   Pulse  Min: 69  Max: 74   Resp  Min: 16  Max: 18   SpO2  Min: 98 %  Max: 100 %   Flow (L/min)  Min: 2  Max: 4   Physical Exam:   GENERAL: Awake, no distress   HEENT: No adenopathy or thyromegaly   LUNGS: Decreased breath sounds on the left.  Left chest tube in place.  Small air leak   HEART: Regular rate and rhythm with crisp prosthetic valve sounds   GI: Soft, nontender   EXTREMITIES: No clubbing, cyanosis, or edema   NEURO/PSYCH: Awake, alert, appropriate      Input/Output for last 24 hour shift  No intake/output data recorded.      Objective    Results from last 7 days   Lab Units 04/06/21  0912   WBC 10*3/mm3 3.61   HEMOGLOBIN g/dL 12.8*   PLATELETS 10*3/mm3 103*     Results from last 7 days   Lab Units 04/06/21  0912   SODIUM mmol/L 142   POTASSIUM mmol/L 4.0   CO2 mmol/L 36.0*   BUN mg/dL 11   CREATININE mg/dL 0.90   GLUCOSE mg/dL 90     Estimated  Creatinine Clearance: 70.1 mL/min (by C-G formula based on SCr of 0.9 mg/dL).          Images: Post procedure chest x-ray reveals no pneumothorax.  Chest tubes in good position    I reviewed the patient's results and images.     Assessment/Plan   Impression        Lung nodule < 6cm on CT (S/P Left thoracotomy and MARKO Lobectomy)    AF s/p RFA and BiV PPM     Depression    Dyslipidemia    Hypertension    Tobacco use    CAD     H/O mechanical AVR     COPD, mild (CMS/HCC)    Moderate pulmonary HTN  (Est RVSP 53mmHg 2/2021)    GERD (gastroesophageal reflux disease)       Plan        Follow-up electrolytes and replace as necessary  Follow blood sugars and use sliding scale for blood sugar control  Push respiratory therapy  Follow-up on final pathology  Reinstitute full anticoagulation when okay with DAGOBERTO De Santiago, [facility]-BC, FNP-BC  Pulmonary and Critical Care Service    I discussed the patient's findings and my recommendations with patient, family and nursing staff     High level of risk due to:  illness with threat to life or bodily function and parenteral controlled substances.    GARRICK Kenney MD  Pulmonary and Critical Care Medicine

## 2021-04-08 ENCOUNTER — APPOINTMENT (OUTPATIENT)
Dept: GENERAL RADIOLOGY | Facility: HOSPITAL | Age: 71
End: 2021-04-08

## 2021-04-08 LAB
ANION GAP SERPL CALCULATED.3IONS-SCNC: 7 MMOL/L (ref 5–15)
BASOPHILS # BLD AUTO: 0.03 10*3/MM3 (ref 0–0.2)
BASOPHILS NFR BLD AUTO: 0.4 % (ref 0–1.5)
BUN SERPL-MCNC: 11 MG/DL (ref 8–23)
BUN/CREAT SERPL: 12.5 (ref 7–25)
CALCIUM SPEC-SCNC: 8.6 MG/DL (ref 8.6–10.5)
CHLORIDE SERPL-SCNC: 97 MMOL/L (ref 98–107)
CO2 SERPL-SCNC: 30 MMOL/L (ref 22–29)
CREAT SERPL-MCNC: 0.88 MG/DL (ref 0.76–1.27)
DEPRECATED RDW RBC AUTO: 60.5 FL (ref 37–54)
EOSINOPHIL # BLD AUTO: 0.05 10*3/MM3 (ref 0–0.4)
EOSINOPHIL NFR BLD AUTO: 0.6 % (ref 0.3–6.2)
ERYTHROCYTE [DISTWIDTH] IN BLOOD BY AUTOMATED COUNT: 14.6 % (ref 12.3–15.4)
GFR SERPL CREATININE-BSD FRML MDRD: 86 ML/MIN/1.73
GLUCOSE SERPL-MCNC: 125 MG/DL (ref 65–99)
HCT VFR BLD AUTO: 38.1 % (ref 37.5–51)
HGB BLD-MCNC: 12.4 G/DL (ref 13–17.7)
IMM GRANULOCYTES # BLD AUTO: 0.05 10*3/MM3 (ref 0–0.05)
IMM GRANULOCYTES NFR BLD AUTO: 0.6 % (ref 0–0.5)
INR PPP: 1.14 (ref 0.85–1.16)
LYMPHOCYTES # BLD AUTO: 0.39 10*3/MM3 (ref 0.7–3.1)
LYMPHOCYTES NFR BLD AUTO: 4.7 % (ref 19.6–45.3)
MACROCYTES BLD QL SMEAR: NORMAL
MCH RBC QN AUTO: 36.3 PG (ref 26.6–33)
MCHC RBC AUTO-ENTMCNC: 32.5 G/DL (ref 31.5–35.7)
MCV RBC AUTO: 111.4 FL (ref 79–97)
MONOCYTES # BLD AUTO: 0.67 10*3/MM3 (ref 0.1–0.9)
MONOCYTES NFR BLD AUTO: 8.1 % (ref 5–12)
NEUTROPHILS NFR BLD AUTO: 7.08 10*3/MM3 (ref 1.7–7)
NEUTROPHILS NFR BLD AUTO: 85.6 % (ref 42.7–76)
NRBC BLD AUTO-RTO: 0 /100 WBC (ref 0–0.2)
PLAT MORPH BLD: NORMAL
PLATELET # BLD AUTO: 109 10*3/MM3 (ref 140–450)
PMV BLD AUTO: 12.8 FL (ref 6–12)
POTASSIUM SERPL-SCNC: 3.7 MMOL/L (ref 3.5–5.2)
PROTHROMBIN TIME: 14.3 SECONDS (ref 11.4–14.4)
RBC # BLD AUTO: 3.42 10*6/MM3 (ref 4.14–5.8)
SODIUM SERPL-SCNC: 134 MMOL/L (ref 136–145)
WBC # BLD AUTO: 8.27 10*3/MM3 (ref 3.4–10.8)
WBC MORPH BLD: NORMAL

## 2021-04-08 PROCEDURE — 85025 COMPLETE CBC W/AUTO DIFF WBC: CPT | Performed by: PHYSICIAN ASSISTANT

## 2021-04-08 PROCEDURE — 85610 PROTHROMBIN TIME: CPT | Performed by: PHYSICIAN ASSISTANT

## 2021-04-08 PROCEDURE — 99232 SBSQ HOSP IP/OBS MODERATE 35: CPT | Performed by: INTERNAL MEDICINE

## 2021-04-08 PROCEDURE — 85007 BL SMEAR W/DIFF WBC COUNT: CPT | Performed by: PHYSICIAN ASSISTANT

## 2021-04-08 PROCEDURE — 25010000003 CEFUROXIME SODIUM 1.5 G RECONSTITUTED SOLUTION: Performed by: PHYSICIAN ASSISTANT

## 2021-04-08 PROCEDURE — 71045 X-RAY EXAM CHEST 1 VIEW: CPT

## 2021-04-08 PROCEDURE — 94640 AIRWAY INHALATION TREATMENT: CPT

## 2021-04-08 PROCEDURE — 80048 BASIC METABOLIC PNL TOTAL CA: CPT | Performed by: PHYSICIAN ASSISTANT

## 2021-04-08 RX ORDER — IPRATROPIUM BROMIDE AND ALBUTEROL SULFATE 2.5; .5 MG/3ML; MG/3ML
3 SOLUTION RESPIRATORY (INHALATION)
Status: DISCONTINUED | OUTPATIENT
Start: 2021-04-08 | End: 2021-04-13

## 2021-04-08 RX ORDER — WARFARIN SODIUM 7.5 MG/1
7.5 TABLET ORAL
Status: DISCONTINUED | OUTPATIENT
Start: 2021-04-08 | End: 2021-04-09

## 2021-04-08 RX ADMIN — Medication 1000 MCG: at 09:49

## 2021-04-08 RX ADMIN — LISINOPRIL 10 MG: 10 TABLET ORAL at 20:23

## 2021-04-08 RX ADMIN — ATORVASTATIN CALCIUM 80 MG: 40 TABLET, FILM COATED ORAL at 20:23

## 2021-04-08 RX ADMIN — CARVEDILOL 12.5 MG: 12.5 TABLET, FILM COATED ORAL at 20:23

## 2021-04-08 RX ADMIN — BUPROPION HYDROCHLORIDE 300 MG: 150 TABLET, FILM COATED, EXTENDED RELEASE ORAL at 09:49

## 2021-04-08 RX ADMIN — CEFUROXIME SODIUM 1.5 G: 1.5 INJECTION, POWDER, FOR SOLUTION INTRAVENOUS at 03:10

## 2021-04-08 RX ADMIN — Medication 400 MCG: at 09:49

## 2021-04-08 RX ADMIN — LISINOPRIL 10 MG: 10 TABLET ORAL at 09:49

## 2021-04-08 RX ADMIN — FINASTERIDE 5 MG: 5 TABLET, FILM COATED ORAL at 09:49

## 2021-04-08 RX ADMIN — IPRATROPIUM BROMIDE AND ALBUTEROL SULFATE 3 ML: 2.5; .5 SOLUTION RESPIRATORY (INHALATION) at 20:46

## 2021-04-08 RX ADMIN — WARFARIN SODIUM 7.5 MG: 7.5 TABLET ORAL at 18:04

## 2021-04-08 RX ADMIN — CITALOPRAM HYDROBROMIDE 20 MG: 20 TABLET ORAL at 09:49

## 2021-04-08 RX ADMIN — SODIUM CHLORIDE 30 ML/HR: 9 INJECTION, SOLUTION INTRAVENOUS at 02:16

## 2021-04-08 RX ADMIN — CARVEDILOL 12.5 MG: 12.5 TABLET, FILM COATED ORAL at 09:49

## 2021-04-08 RX ADMIN — FAMOTIDINE 20 MG: 20 TABLET, FILM COATED ORAL at 09:49

## 2021-04-08 RX ADMIN — ASPIRIN 81 MG: 81 TABLET, COATED ORAL at 09:49

## 2021-04-08 NOTE — PROGRESS NOTES
Discharge Planning Assessment  [facility]     Patient Name: Dillon Vo  MRN: [id number]  Today's Date: 4/8/2021    Admit Date: 4/7/2021    Discharge Needs Assessment     Row Name 04/08/21 1505       Living Environment    Lives With  spouse    Current Living Arrangements  home/apartment/condo    Primary Care Provided by  self    Provides Primary Care For  no one, unable/limited ability to care for self    Family Caregiver if Needed  spouse    Quality of Family Relationships  supportive    Able to Return to Prior Arrangements  yes       Resource/Environmental Concerns    Resource/Environmental Concerns  none    Transportation Concerns  car, none       Transition Planning    Patient/Family Anticipates Transition to  home with family    Patient/Family Anticipated Services at Transition  none    Transportation Anticipated  family or friend will provide       Discharge Needs Assessment    Readmission Within the Last 30 Days  no previous admission in last 30 days    Equipment Currently Used at Home  none    Concerns to be Addressed  denies needs/concerns at this time    Anticipated Changes Related to Illness  none    Equipment Needed After Discharge  none        Discharge Plan     Row Name 04/08/21 1505       Plan    Plan  Home    Patient/Family in Agreement with Plan  yes    Plan Comments  Spoke with patient at bedside.  Patient resides in [facility] and lives with his wife.  Prior to admission patient states he was independent with ADL's and mobility.  Patient does not have any DME and has never used home health. Patient does have an advanced directive and it is on file here.  patient does not have a POA.  Patient plans to return home upon discharge and denies any needs.  CM will continue to follow.        Continued Care and Services - Admitted Since 4/7/2021    Coordination has not been started for this encounter.       Expected Discharge Date and Time     Expected Discharge Date Expected Discharge  Time    Apr 12, 2021         Demographic Summary     Row Name 04/08/21 1505       General Information    Admission Type  inpatient    Arrived From  home    Referral Source  admission list    Reason for Consult  discharge planning    Preferred Language  English       Contact Information    Permission Granted to Share Info With          Functional Status    No documentation.       Psychosocial    No documentation.       Abuse/Neglect    No documentation.       Legal    No documentation.       Substance Abuse    No documentation.       Patient Forms    No documentation.           Nancy Molina RN

## 2021-04-08 NOTE — PROGRESS NOTES
"Dillon Vo  [id number]  1950     LOS: 1 day   Patient Care Team:  Ez Cintron MD as PCP - General  Ez Tucker MD as Consulting Physician (Cardiology)  Payam Kenney MD as Referring Physician (Pulmonary Disease)  Luis Alberto Carter MD as Consulting Physician (Radiation Oncology)    Chief Complaint: Bronchogenic carcinoma      Subjective: No complaints    Objective:     Vital Sign Min/Max for last 24 hours  Temp  Min: 97 °F (36.1 °C)  Max: 97.7 °F (36.5 °C)   BP  Min: 120/74  Max: 189/101   Pulse  Min: 69  Max: 74   Resp  Min: 14  Max: 20   SpO2  Min: 93 %  Max: 100 %   No data recorded   Weight  Min: 64.9 kg (143 lb)  Max: 64.9 kg (143 lb)     Flowsheet Rows      First Filed Value   Admission Height  180.3 cm (71\") Documented at 04/07/2021 0950   Admission Weight  64.9 kg (143 lb) Documented at 04/07/2021 0950          Physical Exam:    Wound:    Pulses:     Mediastinal and Chest Tube Drainage: Small air leak       Results Review:   Results from last 7 days   Lab Units 04/06/21  0912   WBC 10*3/mm3 3.61   HEMOGLOBIN g/dL 12.8*   HEMATOCRIT % 39.0   PLATELETS 10*3/mm3 103*     Results from last 7 days   Lab Units 04/08/21  0318   SODIUM mmol/L 134*   POTASSIUM mmol/L 3.7   CHLORIDE mmol/L 97*   CO2 mmol/L 30.0*   BUN mg/dL 11   CREATININE mg/dL 0.88   GLUCOSE mg/dL 125*   CALCIUM mg/dL 8.6             Assessment      Lung nodule < 6cm on CT (S/P Left thoracotomy and MARKO Lobectomy)    AF s/p RFA and BiV PPM     Depression    Dyslipidemia    Hypertension    Tobacco use    CAD     H/O mechanical AVR     COPD, mild (CMS/HCC)    Moderate pulmonary HTN  (Est RVSP 53mmHg 2/2021)    GERD (gastroesophageal reflux disease)      Overall doing satisfactory.  Chest x-ray satisfactory        Chi Post MD  04/08/21  07:11 EDT      Please note that portions of this note were completed with a voice recognition program. Efforts were made to edit the dictations, but words may be " mistranscribed

## 2021-04-08 NOTE — PROGRESS NOTES
Pulmonary/Critical Care Follow-up     LOS: 1 day   Patient Care Team:  zE Cintron MD as PCP - General  Ez Tucker MD as Consulting Physician (Cardiology)  Payam Kenney MD as Referring Physician (Pulmonary Disease)  Luis Alberto Carter MD as Consulting Physician (Radiation Oncology)    Chief Complaint: Postoperative medical management after left upper lobectomy of hypertension, GERD, COPD, electrolyte and glycemic management..      Subjective      Initial history (on arrival to the intensive care unit 4/7/2021):    Dillon Vo is a 70 y.o. male with PMH MARKO pulmonary nodule (followed by Dr. Payam Kenney), COPD, 1PPD Smoker, Mechanical AVR on Warfarin, Atrial Fibrillation s/p RFA/PPM, CAD prior cardiac stents, Secondary Pulm HTN, HTN, HLP, GERD and Depression who was admitted today for an elective Bronch, Thor and Lobectomy by Dr. Post.  He had a lung cancer screening CT 8/20/19 that revealed 12 mm MARKO spiculated lung lesion w/o LAD.   PET 9/25/19 revealed nonhypermetabolic activity.  He had some mild hemoptysis 12/2019 felt to be due to bronchitis and supratherapeutic INR.  It resolved with Abx/steroids.  He was then hospitalized 2/19 - 2/24/20 with H1N1 Flu/pneumonia/hemoptysis with supratherapeutic INR.  ANCA and GBM antibodies were negative.  TTE revealed EF 46%, RVSP >55, biatrial dilation, mild-mod MR and functioning mechanical AV.  He was then hospitalized again 2/26 - 3/7/20 with large rectus sheath hematoma and supratherapeutic INR.  Coumadin was reversed and he subsequently had embolic strokes.  Serial CTs in the interim have revealed stable MARKO and RUL nodules until CT 2/12/21 that revealed some slight growth in MARKO nodule.  Repeat PET 3/2/21 confirmed growth and now had hypermetabolic activity.  He was referred to CTS for resection.  His case was also presented to the tumor board who feels this is probable Stage 1A2 and he would be a candidate for cyberknife.   Patient opted for surgical resection, which was scheduled for today.     In the intensive care unit the patient is awake and alert.  Complaining of incisional pain that is helped with his PCA  Small air leak from chest tube.  No active bleeding.    Interval History:     Pain is adequately controlled.  No nausea or vomiting.  No fever or chills.    History taken from: Patient    PMH/FH/Social History were reviewed and updated appropriately in the electronic medical record.     Review of Systems:    Review of 14 systems was completed with positives and pertinent negatives noted in the subjective section.  All other systems reviewed and are negative.         Objective     Vital Signs  Temp:  [97.6 °F (36.4 °C)-98 °F (36.7 °C)] 97.8 °F (36.6 °C)  Heart Rate:  [68-78] 70  Resp:  [14-20] 14  BP: (109-168)/() 124/77  04/07 0701 - 04/08 0700  In: 1448.8 [P.O.:600; I.V.:748.8]  Out: 1785 [Urine:1130]  Body mass index is 19.94 kg/m².     IV drips:  HYDROmorphone HCl-NaCl  lactated ringers, Last Rate: Stopped (04/07/21 1326)  Pharmacy to dose warfarin  sodium chloride, Last Rate: 30 mL/hr (04/08/21 0216)       Physical Exam:     Constitutional:   Alert, cooperative, in no acute distress   Head:   Normocephalic, without obvious abnormality, atraumatic   Eyes:           Lids and lashes normal, conjunctivae and sclerae normal.  No icterus, no pallor, corneas clear, PER   ENMT:  Ears appear intact with no abnormalities noted     No oral lesions, no thrush, oral mucosa moist   Neck:  No adenopathy, supple, trachea midline, no thyromegaly, no JVD   Lungs/Resp:    Normal effort, symmetric chest rise, no crepitus, mild rhonchi and air leak auscultated on left with positive leak from chest tubes on left.               Heart/CV:   Regular rhythm and normal rate, normal S1 and S2, no            murmur   Abdomen/GI:    Normal bowel sounds, no masses, no organomegaly, soft        nontender, nondistended   :    Deferred    Extremities/MSK:  No clubbing or cyanosis.  No edema.  Normal tone.    No deformities.    Pulses:  Pulses palpable and equal bilaterally   Skin:  No bleeding, bruising or rash   Heme/Lymph:  No cervical or supraclavicular adenopathy.   Neurologic:    Psychiatric:    Moves all extremities with no obvious focal motor deficit.  Cranial nerves 2 - 12 grossly intact  Oriented x 3, normal affect.    The above physical exam findings were reviewed and reflect exam findings as of today's exam.    Chest x-ray 4/8/2021:  Results Review:     I reviewed the patient's new clinical results.   Results from last 7 days   Lab Units 04/08/21  0318 04/06/21  0912   SODIUM mmol/L 134* 142   POTASSIUM mmol/L 3.7 4.0   CHLORIDE mmol/L 97* 100   CO2 mmol/L 30.0* 36.0*   BUN mg/dL 11 11   CREATININE mg/dL 0.88 0.90   CALCIUM mg/dL 8.6 9.1   BILIRUBIN mg/dL  --  1.6*   ALK PHOS U/L  --  87   ALT (SGPT) U/L  --  16   AST (SGOT) U/L  --  27   GLUCOSE mg/dL 125* 90     Results from last 7 days   Lab Units 04/08/21  0318 04/06/21  0912   WBC 10*3/mm3 8.27 3.61   HEMOGLOBIN g/dL 12.4* 12.8*   HEMATOCRIT % 38.1 39.0   PLATELETS 10*3/mm3 109* 103*               I reviewed the patient's new imaging including images and reports.    Chest x-ray 4/8/2021 shows 2 left-sided chest tubes in place with cardiomegaly and volume loss on the left and no definite residual pneumothorax.      Medication Review:   aspirin, 81 mg, Oral, Daily  atorvastatin, 80 mg, Oral, Nightly  buPROPion XL, 300 mg, Oral, Daily  carvedilol, 12.5 mg, Oral, BID  citalopram, 20 mg, Oral, Daily  famotidine, 20 mg, Oral, Daily  finasteride, 5 mg, Oral, Daily  folic acid, 400 mcg, Oral, Daily  lisinopril, 10 mg, Oral, BID  vitamin B-12, 1,000 mcg, Oral, Daily  warfarin, 7.5 mg, Oral, Daily      HYDROmorphone HCl-NaCl,   lactated ringers, 9 mL/hr, Last Rate: Stopped (04/07/21 1326)  Pharmacy to dose warfarin,   sodium chloride, 30 mL/hr, Last Rate: 30 mL/hr (04/08/21  0216)        Assessment/Plan         Lung nodule < 6cm on CT (S/P Left thoracotomy and MARKO Lobectomy)    AF s/p RFA and BiV PPM     Depression    Dyslipidemia    Hypertension    Tobacco use    CAD     H/O mechanical AVR     COPD, mild (CMS/HCC)    Moderate pulmonary HTN  (Est RVSP 53mmHg 2/2021)    GERD (gastroesophageal reflux disease)    70 y.o. male with left lower lobe pulmonary nodule, COPD, active smoker, history of mechanical AVR on warfarin, atrial fibrillation status post radiofrequency ablation/permanent pacemaker, coronary artery disease with prior stents, secondary pulmonary hypertension, hypertension, hyperlipidemia, GERD, depression admitted to the intensive care unit after elective left upper lobectomy by Dr. Post.    Patient has an active air leak on the left.  No fevers or chills.    Plan:  1.  Chest tube management per surgery.  2.  Scheduled bronchodilators.  3.  Smoking cessation counseling.  4.  Warfarin restarted per surgery.  5.  Discontinue Causey catheter.  6.  We will follow while in the intensive care unit.    Dylan Gonzalez MD  04/08/21  19:31 EDT      *. Please note that portions of this note were completed with [employer] - a voice recognition program.

## 2021-04-08 NOTE — PROGRESS NOTES
"Pharmacy Consult  -  Warfarin    Dillon Vo is a  70 y.o. male   Height - 180.3 cm (71\")  Weight - 64.9 kg (143 lb)    Consulting Provider: - CTS  Indication: - Afib, mechanical valve  Goal INR: - 2.5-3.5  Home Regimen:   - Warfarin 5mg daily except 2.5mg Wed    Bridge Therapy:    Drug-Drug Interactions with current regimen:  Citalopram - may increase bleeding risk    Warfarin Dosing During Admission:    Date  4/7 4/8          INR  1.16 1.14          Dose  5mg 7.5mg             Education Provided: Warfarin education provided on 4/8 verbally and in writing.  Discussed effects of warfarin, importance of checking INR, drug-drug and drug-food interactions, and signs/symptoms of bleeding and clotting.  Patient verbalized understanding through teach back.  All pertinent questions were answered.        Discharge Follow up:   Following Provider - Dr. Cintron (PCP)   Follow up time range or appointment - 2-3 days post DC    Labs:    Results from last 7 days   Lab Units 04/08/21  0318 04/07/21  1717 04/06/21  0912   INR  1.14 1.16 1.18*   HEMOGLOBIN g/dL 12.4*  --  12.8*   HEMATOCRIT % 38.1  --  39.0     Results from last 7 days   Lab Units 04/08/21  0318 04/06/21  0912   SODIUM mmol/L 134* 142   POTASSIUM mmol/L 3.7 4.0   CHLORIDE mmol/L 97* 100   CO2 mmol/L 30.0* 36.0*   BUN mg/dL 11 11   CREATININE mg/dL 0.88 0.90   CALCIUM mg/dL 8.6 9.1   BILIRUBIN mg/dL  --  1.6*   ALK PHOS U/L  --  87   ALT (SGPT) U/L  --  16   AST (SGOT) U/L  --  27   GLUCOSE mg/dL 125* 90       Current dietary intake:   Diet Order   Procedures    Diet Regular; Cardiac       Assessment/Plan:     Pharmacy dosing Warfarin for Afib, mechanical valve. Goal INR 2.5-3.5  INR is subtherapeutic, no change from yesterday  Give Warfarin 7.5mg tonight.  Daily PT/INR ordered.  Monitor signs/symptoms of bleeding, dietary intake, and drug-drug interactions. Make dose adjustments as necessary.  Pharmacy will continue to follow.    Tony Whittaker, " RP  4/8/2021  16:04 EDT

## 2021-04-08 NOTE — PLAN OF CARE
Goal Outcome Evaluation:  Plan of Care Reviewed With: patient     Outcome Summary: patient did well through the evening, however he did have trouble sleeping/resting and was awake unitl 5AM. Increased pain this morning requiring PCA dose to increase, tolerated well. VSS, 270 output from chest tube.

## 2021-04-08 NOTE — PROGRESS NOTES
Multidisciplinary Rounds    Time: 20min  Patient Name: Dillon Vo  Date of Encounter: 04/08/21 09:29 EDT  MRN: [id number]  Admission date: 4/7/2021      Reason for visit: MDR. RD to continue to follow per protocol.     Additional information obtained during MDR: Pt s/p bronchoscopy, left thoracotomy, left upper lobectomy yesterday (4/7).     Current diet: Diet Regular; Cardiac      Intervention:  Follow treatment plan  Care plan reviewed    Follow up:   Per protocol      Reina Perkins MS RD/LD [facility]  09:29 EDT

## 2021-04-09 ENCOUNTER — APPOINTMENT (OUTPATIENT)
Dept: GENERAL RADIOLOGY | Facility: HOSPITAL | Age: 71
End: 2021-04-09

## 2021-04-09 LAB
INR PPP: 1.51 (ref 0.85–1.16)
PROTHROMBIN TIME: 17.7 SECONDS (ref 11.4–14.4)

## 2021-04-09 PROCEDURE — 99232 SBSQ HOSP IP/OBS MODERATE 35: CPT | Performed by: INTERNAL MEDICINE

## 2021-04-09 PROCEDURE — 85610 PROTHROMBIN TIME: CPT | Performed by: PHYSICIAN ASSISTANT

## 2021-04-09 PROCEDURE — 94799 UNLISTED PULMONARY SVC/PX: CPT

## 2021-04-09 PROCEDURE — 71045 X-RAY EXAM CHEST 1 VIEW: CPT

## 2021-04-09 RX ORDER — WARFARIN SODIUM 5 MG/1
5 TABLET ORAL
Status: DISCONTINUED | OUTPATIENT
Start: 2021-04-09 | End: 2021-04-10

## 2021-04-09 RX ADMIN — IPRATROPIUM BROMIDE AND ALBUTEROL SULFATE 3 ML: 2.5; .5 SOLUTION RESPIRATORY (INHALATION) at 16:35

## 2021-04-09 RX ADMIN — Medication 400 MCG: at 08:10

## 2021-04-09 RX ADMIN — CITALOPRAM HYDROBROMIDE 20 MG: 20 TABLET ORAL at 08:10

## 2021-04-09 RX ADMIN — BUPROPION HYDROCHLORIDE 300 MG: 150 TABLET, FILM COATED, EXTENDED RELEASE ORAL at 08:10

## 2021-04-09 RX ADMIN — IPRATROPIUM BROMIDE AND ALBUTEROL SULFATE 3 ML: 2.5; .5 SOLUTION RESPIRATORY (INHALATION) at 07:18

## 2021-04-09 RX ADMIN — SODIUM CHLORIDE 30 ML/HR: 9 INJECTION, SOLUTION INTRAVENOUS at 20:17

## 2021-04-09 RX ADMIN — LISINOPRIL 10 MG: 10 TABLET ORAL at 08:10

## 2021-04-09 RX ADMIN — FAMOTIDINE 20 MG: 20 TABLET, FILM COATED ORAL at 08:10

## 2021-04-09 RX ADMIN — FINASTERIDE 5 MG: 5 TABLET, FILM COATED ORAL at 08:10

## 2021-04-09 RX ADMIN — Medication 1000 MCG: at 08:10

## 2021-04-09 RX ADMIN — WARFARIN SODIUM 5 MG: 5 TABLET ORAL at 17:13

## 2021-04-09 RX ADMIN — CARVEDILOL 12.5 MG: 12.5 TABLET, FILM COATED ORAL at 08:10

## 2021-04-09 RX ADMIN — LISINOPRIL 10 MG: 10 TABLET ORAL at 20:08

## 2021-04-09 RX ADMIN — CARVEDILOL 12.5 MG: 12.5 TABLET, FILM COATED ORAL at 20:08

## 2021-04-09 RX ADMIN — ATORVASTATIN CALCIUM 80 MG: 40 TABLET, FILM COATED ORAL at 20:08

## 2021-04-09 RX ADMIN — IPRATROPIUM BROMIDE AND ALBUTEROL SULFATE 3 ML: 2.5; .5 SOLUTION RESPIRATORY (INHALATION) at 21:16

## 2021-04-09 RX ADMIN — IPRATROPIUM BROMIDE AND ALBUTEROL SULFATE 3 ML: 2.5; .5 SOLUTION RESPIRATORY (INHALATION) at 12:51

## 2021-04-09 RX ADMIN — ASPIRIN 81 MG: 81 TABLET, COATED ORAL at 08:10

## 2021-04-09 NOTE — PROGRESS NOTES
Multidisciplinary Rounds    Time: 20min  Patient Name: Dillon Vo  Date of Encounter: 04/09/21 09:59 EDT  MRN: [id number]  Admission date: 4/7/2021      Reason for visit: MDR. RD to continue to follow per protocol.     Additional information obtained during MDR: RN reports that pt is requesting chocolate oral nutrition supplements- will order chocolate Boost Plus 3x/day.     Current diet: Diet Regular; Cardiac      Intervention:  Follow treatment plan  Care plan reviewed    Follow up:   Per protocol      Reina Perkins MS RD/LD [facility]  09:59 EDT

## 2021-04-09 NOTE — PROGRESS NOTES
"Pharmacy Consult  -  Warfarin    Dillon Vo is a  70 y.o. male   Height - 180.3 cm (71\")  Weight - 64.9 kg (143 lb)    Consulting Provider: - CTS  Indication: - Afib, mechanical valve  Goal INR: - 2.5-3.5  Home Regimen:   - Warfarin 5mg daily except 2.5mg Wed    Bridge Therapy:    Drug-Drug Interactions with current regimen:  Citalopram - may increase bleeding risk    Warfarin Dosing During Admission:    Date  4/7 4/8 4/9         INR  1.16 1.14 1.51         Dose  5mg 7.5mg 5 mg               Education Provided: Warfarin education provided on 4/8 verbally and in writing.  Discussed effects of warfarin, importance of checking INR, drug-drug and drug-food interactions, and signs/symptoms of bleeding and clotting.  Patient verbalized understanding through teach back.  All pertinent questions were answered.        Discharge Follow up:   Following Provider - Dr. Cintron (PCP)   Follow up time range or appointment - 2-3 days post DC      Labs:    Results from last 7 days   Lab Units 04/09/21  0203 04/08/21  0318 04/07/21  1717 04/06/21  0912   INR  1.51* 1.14 1.16 1.18*   HEMOGLOBIN g/dL  --  12.4*  --  12.8*   HEMATOCRIT %  --  38.1  --  39.0     Results from last 7 days   Lab Units 04/08/21  0318 04/06/21  0912   SODIUM mmol/L 134* 142   POTASSIUM mmol/L 3.7 4.0   CHLORIDE mmol/L 97* 100   CO2 mmol/L 30.0* 36.0*   BUN mg/dL 11 11   CREATININE mg/dL 0.88 0.90   CALCIUM mg/dL 8.6 9.1   BILIRUBIN mg/dL  --  1.6*   ALK PHOS U/L  --  87   ALT (SGPT) U/L  --  16   AST (SGOT) U/L  --  27   GLUCOSE mg/dL 125* 90       Current dietary intake:   Diet Order   Procedures    Diet Regular; Cardiac       Assessment/Plan:     Pharmacy dosing Warfarin for Afib, mechanical valve. Goal INR 2.5-3.5  INR is subtherapeutic, but did increase from 1.14. Gave a higher dose on 4/8 due to a slight decrease in INR from the previous day   Give Warfarin 5 mg tonight, resume patient's home regimen.  Daily PT/INR ordered.  Monitor " signs/symptoms of bleeding, dietary intake, and drug-drug interactions. Make dose adjustments as necessary.  Pharmacy will continue to follow.    Reina Braun, Pharmacy Intern  4/9/2021  11:03 EDT

## 2021-04-09 NOTE — NURSING NOTE
VSS.  Ambulated in steve. Up in chair for majority of day.   CT output 430ml.  uop 525  Pain controlled with PCA pump  Coumadin 7.5 started this evening  
VSS. Remains afebrile  Ambulated in steve x2. Up in chair most of day  Appetite remains minimal. Boost added.  CT output 250ml.  Pain controlled well with PCA pump  
negative...

## 2021-04-09 NOTE — PLAN OF CARE
Goal Outcome Evaluation:  Plan of Care Reviewed With: patient     Outcome Summary: patient did well through the night, was able to rest significantly better than prev night. Pain improved with PCA. VSS, offered several times to I/O cath pt, stated that he usually only caths self 1-2 times per day at home, stated he could feel when bladder full but unable to void. Declined I/O cath. CT output ~200

## 2021-04-09 NOTE — PROGRESS NOTES
Pulmonary/Critical Care Follow-up     LOS: 2 days   Patient Care Team:  Ez Cintron MD as PCP - General  zE Tucker MD as Consulting Physician (Cardiology)  Payam Kenney MD as Referring Physician (Pulmonary Disease)  Luis Alberto Carter MD as Consulting Physician (Radiation Oncology)    Chief Complaint: Postoperative medical management after left upper lobectomy of hypertension, GERD, COPD, electrolyte and glycemic management..      Subjective      Initial history (on arrival to the intensive care unit 4/7/2021):    Dillon Vo is a 70 y.o. male with PMH MARKO pulmonary nodule (followed by Dr. Payam Kenney), COPD, 1PPD Smoker, Mechanical AVR on Warfarin, Atrial Fibrillation s/p RFA/PPM, CAD prior cardiac stents, Secondary Pulm HTN, HTN, HLP, GERD and Depression who was admitted today for an elective Bronch, Thor and Lobectomy by Dr. Post.  He had a lung cancer screening CT 8/20/19 that revealed 12 mm MARKO spiculated lung lesion w/o LAD.   PET 9/25/19 revealed nonhypermetabolic activity.  He had some mild hemoptysis 12/2019 felt to be due to bronchitis and supratherapeutic INR.  It resolved with Abx/steroids.  He was then hospitalized 2/19 - 2/24/20 with H1N1 Flu/pneumonia/hemoptysis with supratherapeutic INR.  ANCA and GBM antibodies were negative.  TTE revealed EF 46%, RVSP >55, biatrial dilation, mild-mod MR and functioning mechanical AV.  He was then hospitalized again 2/26 - 3/7/20 with large rectus sheath hematoma and supratherapeutic INR.  Coumadin was reversed and he subsequently had embolic strokes.  Serial CTs in the interim have revealed stable MARKO and RUL nodules until CT 2/12/21 that revealed some slight growth in MARKO nodule.  Repeat PET 3/2/21 confirmed growth and now had hypermetabolic activity.  He was referred to CTS for resection.  His case was also presented to the tumor board who feels this is probable Stage 1A2 and he would be a candidate for cyberknife.   Patient opted for surgical resection, which was scheduled for today.     In the intensive care unit the patient is awake and alert.  Complaining of incisional pain that is helped with his PCA  Small air leak from chest tube.  No active bleeding.    Interval History:     Pain is adequately controlled.  No nausea or vomiting.  No fever or chills.  Continues to have air leak.  Ambulated in the steve.    History taken from: Patient    PMH/FH/Social History were reviewed and updated appropriately in the electronic medical record.     Review of Systems:    Review of 14 systems was completed with positives and pertinent negatives noted in the subjective section.  All other systems reviewed and are negative.         Objective     Vital Signs  Temp:  [97 °F (36.1 °C)-97.8 °F (36.6 °C)] 97 °F (36.1 °C)  Heart Rate:  [68-70] 70  Resp:  [14-20] 16  BP: (105-151)/(56-85) 112/64  04/08 0701 - 04/09 0700  In: 1031.7 [P.O.:360; I.V.:671.7]  Out: 1155 [Urine:525]  Body mass index is 19.94 kg/m².     IV drips:  HYDROmorphone HCl-NaCl  lactated ringers, Last Rate: Stopped (04/07/21 1326)  Pharmacy to dose warfarin  sodium chloride, Last Rate: 30 mL/hr (04/08/21 0216)       Physical Exam:     Constitutional:   Alert, cooperative, in no acute distress   Head:   Normocephalic, without obvious abnormality, atraumatic   Eyes:           Lids and lashes normal, conjunctivae and sclerae normal.  No icterus, no pallor, corneas clear, PER   ENMT:  Ears appear intact with no abnormalities noted     No oral lesions, no thrush, oral mucosa moist   Neck:  No adenopathy, supple, trachea midline, no thyromegaly, no JVD   Lungs/Resp:    Normal effort, symmetric chest rise, no crepitus, mild rhonchi and air leak auscultated on left with positive leak from chest tubes on left which has slowed down compared to yesterday.               Heart/CV:   Regular rhythm and normal rate, normal S1 and S2, no            murmur   Abdomen/GI:    Normal bowel sounds,  no masses, no organomegaly, soft,        nontender, nondistended   :    Deferred   Extremities/MSK:  No clubbing or cyanosis.  No edema.  Normal tone.    No deformities.    Pulses:  Pulses palpable and equal bilaterally   Skin:  No bleeding, bruising or rash   Heme/Lymph:  No cervical or supraclavicular adenopathy.   Neurologic:    Psychiatric:    Moves all extremities with no obvious focal motor deficit.  Cranial nerves 2 - 12 grossly intact  Oriented x 3, normal affect.    The above physical exam findings were reviewed and reflect exam findings as of today's exam.    Chest x-ray 4/8/2021:  Results Review:     I reviewed the patient's new clinical results.   Results from last 7 days   Lab Units 04/08/21  0318 04/06/21  0912   SODIUM mmol/L 134* 142   POTASSIUM mmol/L 3.7 4.0   CHLORIDE mmol/L 97* 100   CO2 mmol/L 30.0* 36.0*   BUN mg/dL 11 11   CREATININE mg/dL 0.88 0.90   CALCIUM mg/dL 8.6 9.1   BILIRUBIN mg/dL  --  1.6*   ALK PHOS U/L  --  87   ALT (SGPT) U/L  --  16   AST (SGOT) U/L  --  27   GLUCOSE mg/dL 125* 90     Results from last 7 days   Lab Units 04/08/21  0318 04/06/21  0912   WBC 10*3/mm3 8.27 3.61   HEMOGLOBIN g/dL 12.4* 12.8*   HEMATOCRIT % 38.1 39.0   PLATELETS 10*3/mm3 109* 103*               I reviewed the patient's new imaging including images and reports.    Chest x-ray 4/9/2021 shows 2 left-sided chest tubes in place with cardiomegaly and volume loss on the left and no definite residual pneumothorax.      Medication Review:   aspirin, 81 mg, Oral, Daily  atorvastatin, 80 mg, Oral, Nightly  buPROPion XL, 300 mg, Oral, Daily  carvedilol, 12.5 mg, Oral, BID  citalopram, 20 mg, Oral, Daily  famotidine, 20 mg, Oral, Daily  finasteride, 5 mg, Oral, Daily  folic acid, 400 mcg, Oral, Daily  ipratropium-albuterol, 3 mL, Nebulization, 4x Daily - RT  lisinopril, 10 mg, Oral, BID  vitamin B-12, 1,000 mcg, Oral, Daily  warfarin, 5 mg, Oral, Daily      HYDROmorphone HCl-NaCl,   lactated ringers, 9 mL/hr,  Last Rate: Stopped (04/07/21 1326)  Pharmacy to dose warfarin,   sodium chloride, 30 mL/hr, Last Rate: 30 mL/hr (04/08/21 0216)        Assessment/Plan         Lung nodule < 6cm on CT (S/P Left thoracotomy and MARKO Lobectomy)    AF s/p RFA and BiV PPM     Depression    Dyslipidemia    Hypertension    Tobacco use    CAD     H/O mechanical AVR     COPD, mild (CMS/HCC)    Moderate pulmonary HTN  (Est RVSP 53mmHg 2/2021)    GERD (gastroesophageal reflux disease)    70 y.o. male with left lower lobe pulmonary nodule, COPD, active smoker, history of mechanical AVR on warfarin, atrial fibrillation status post radiofrequency ablation/permanent pacemaker, coronary artery disease with prior stents, secondary pulmonary hypertension, hypertension, hyperlipidemia, GERD, depression admitted to the intensive care unit after elective left upper lobectomy by Dr. Post.    Patient has an active air leak on the left.  No fevers or chills.    Plan:  1.  Chest tube management per surgery.  2.  Scheduled bronchodilators.  3.  Smoking cessation counseling.  4.  Warfarin restarted per surgery.  5.  Monitor urine output.  In and out catheterizations as needed.  6.  We will follow while in the intensive care unit.    Dylan Gonzalez MD  04/09/21  13:23 EDT      *. Please note that portions of this note were completed with [employer] - a voice recognition program.

## 2021-04-09 NOTE — PROGRESS NOTES
Continued Stay Note  [facility]     Patient Name: Dillon Vo  MRN: [id number]  Today's Date: 4/9/2021    Admit Date: 4/7/2021    Discharge Plan     Row Name 04/09/21 1222       Plan    Plan  Home    Patient/Family in Agreement with Plan  yes    Plan Comments  Spoke with patient at bedside.  Patient still plans to go home upon discharge and denies any needs at this time.  CM will continue to follow.    Final Discharge Disposition Code  01 - home or self-care        Discharge Codes    No documentation.       Expected Discharge Date and Time     Expected Discharge Date Expected Discharge Time    Apr 12, 2021             Nancy Molina RN

## 2021-04-09 NOTE — PROGRESS NOTES
"Dillon Vo  [id number]  1950     LOS: 2 days   Patient Care Team:  Ez Cintron MD as PCP - General  Ez Tucker MD as Consulting Physician (Cardiology)  Payam Kenney MD as Referring Physician (Pulmonary Disease)  Luis Alberto Carter MD as Consulting Physician (Radiation Oncology)    Chief Complaint: Bronchogenic carcinoma      Subjective: No complaints    Objective:     Vital Sign Min/Max for last 24 hours  Temp  Min: 97.5 °F (36.4 °C)  Max: 98 °F (36.7 °C)   BP  Min: 109/59  Max: 148/86   Pulse  Min: 68  Max: 78   Resp  Min: 14  Max: 20   SpO2  Min: 90 %  Max: 98 %   No data recorded   No data recorded     Flowsheet Rows      First Filed Value   Admission Height  180.3 cm (71\") Documented at 04/07/2021 0950   Admission Weight  64.9 kg (143 lb) Documented at 04/07/2021 0950          Physical Exam:    Wound:    Pulses:     Mediastinal and Chest Tube Drainage: No air leak noted     Results Review:   Results from last 7 days   Lab Units 04/08/21  0318   WBC 10*3/mm3 8.27   HEMOGLOBIN g/dL 12.4*   HEMATOCRIT % 38.1   PLATELETS 10*3/mm3 109*     Results from last 7 days   Lab Units 04/08/21  0318   SODIUM mmol/L 134*   POTASSIUM mmol/L 3.7   CHLORIDE mmol/L 97*   CO2 mmol/L 30.0*   BUN mg/dL 11   CREATININE mg/dL 0.88   GLUCOSE mg/dL 125*   CALCIUM mg/dL 8.6             Assessment      Lung nodule < 6cm on CT (S/P Left thoracotomy and MARKO Lobectomy)    AF s/p RFA and BiV PPM     Depression    Dyslipidemia    Hypertension    Tobacco use    CAD     H/O mechanical AVR     COPD, mild (CMS/HCC)    Moderate pulmonary HTN  (Est RVSP 53mmHg 2/2021)    GERD (gastroesophageal reflux disease)      Restart Coumadin.        Chi Post MD  04/09/21  06:41 EDT      Please note that portions of this note were completed with a voice recognition program. Efforts were made to edit the dictations, but words may be mistranscribed  "

## 2021-04-10 LAB
BH BB BLOOD EXPIRATION DATE: NORMAL
BH BB BLOOD EXPIRATION DATE: NORMAL
BH BB BLOOD TYPE BARCODE: 8400
BH BB BLOOD TYPE BARCODE: 8400
BH BB DISPENSE STATUS: NORMAL
BH BB DISPENSE STATUS: NORMAL
BH BB PRODUCT CODE: NORMAL
BH BB PRODUCT CODE: NORMAL
BH BB UNIT NUMBER: NORMAL
BH BB UNIT NUMBER: NORMAL
CROSSMATCH INTERPRETATION: NORMAL
CROSSMATCH INTERPRETATION: NORMAL
INR PPP: 2.21 (ref 0.85–1.16)
PROTHROMBIN TIME: 23.6 SECONDS (ref 11.4–14.4)
UNIT  ABO: NORMAL
UNIT  ABO: NORMAL
UNIT  RH: NORMAL
UNIT  RH: NORMAL

## 2021-04-10 PROCEDURE — 94799 UNLISTED PULMONARY SVC/PX: CPT

## 2021-04-10 PROCEDURE — 99024 POSTOP FOLLOW-UP VISIT: CPT | Performed by: PHYSICIAN ASSISTANT

## 2021-04-10 PROCEDURE — 99232 SBSQ HOSP IP/OBS MODERATE 35: CPT | Performed by: INTERNAL MEDICINE

## 2021-04-10 PROCEDURE — 85610 PROTHROMBIN TIME: CPT | Performed by: PHYSICIAN ASSISTANT

## 2021-04-10 RX ORDER — WARFARIN SODIUM 3 MG/1
3 TABLET ORAL
Status: DISCONTINUED | OUTPATIENT
Start: 2021-04-10 | End: 2021-04-11

## 2021-04-10 RX ADMIN — LISINOPRIL 10 MG: 10 TABLET ORAL at 07:17

## 2021-04-10 RX ADMIN — ASPIRIN 81 MG: 81 TABLET, COATED ORAL at 07:17

## 2021-04-10 RX ADMIN — IPRATROPIUM BROMIDE AND ALBUTEROL SULFATE 3 ML: 2.5; .5 SOLUTION RESPIRATORY (INHALATION) at 19:00

## 2021-04-10 RX ADMIN — IPRATROPIUM BROMIDE AND ALBUTEROL SULFATE 3 ML: 2.5; .5 SOLUTION RESPIRATORY (INHALATION) at 12:20

## 2021-04-10 RX ADMIN — BUPROPION HYDROCHLORIDE 300 MG: 150 TABLET, FILM COATED, EXTENDED RELEASE ORAL at 07:17

## 2021-04-10 RX ADMIN — CITALOPRAM HYDROBROMIDE 20 MG: 20 TABLET ORAL at 07:18

## 2021-04-10 RX ADMIN — LISINOPRIL 10 MG: 10 TABLET ORAL at 20:07

## 2021-04-10 RX ADMIN — Medication 400 MCG: at 07:54

## 2021-04-10 RX ADMIN — FINASTERIDE 5 MG: 5 TABLET, FILM COATED ORAL at 07:18

## 2021-04-10 RX ADMIN — CARVEDILOL 12.5 MG: 12.5 TABLET, FILM COATED ORAL at 07:16

## 2021-04-10 RX ADMIN — IPRATROPIUM BROMIDE AND ALBUTEROL SULFATE 3 ML: 2.5; .5 SOLUTION RESPIRATORY (INHALATION) at 07:01

## 2021-04-10 RX ADMIN — Medication 1000 MCG: at 07:17

## 2021-04-10 RX ADMIN — ATORVASTATIN CALCIUM 80 MG: 40 TABLET, FILM COATED ORAL at 20:07

## 2021-04-10 RX ADMIN — FAMOTIDINE 20 MG: 20 TABLET, FILM COATED ORAL at 07:17

## 2021-04-10 RX ADMIN — CARVEDILOL 12.5 MG: 12.5 TABLET, FILM COATED ORAL at 20:07

## 2021-04-10 RX ADMIN — WARFARIN SODIUM 3 MG: 3 TABLET ORAL at 17:28

## 2021-04-10 NOTE — PROGRESS NOTES
Pulmonary/Critical Care Follow-up     LOS: 3 days   Patient Care Team:  Ez Cintron MD as PCP - General  Ez Tucker MD as Consulting Physician (Cardiology)  Payam Kenney MD as Referring Physician (Pulmonary Disease)  Luis Alberto Carter MD as Consulting Physician (Radiation Oncology)    Chief Complaint: Postoperative medical management after left upper lobectomy of hypertension, GERD, COPD, electrolyte and glycemic management..      Subjective      Initial history (on arrival to the intensive care unit 4/7/2021):    Dillon Vo is a 70 y.o. male with PMH MARKO pulmonary nodule (followed by Dr. Payam Kenney), COPD, 1PPD Smoker, Mechanical AVR on Warfarin, Atrial Fibrillation s/p RFA/PPM, CAD prior cardiac stents, Secondary Pulm HTN, HTN, HLP, GERD and Depression who was admitted today for an elective Bronch, Thor and Lobectomy by Dr. Post.  He had a lung cancer screening CT 8/20/19 that revealed 12 mm MARKO spiculated lung lesion w/o LAD.   PET 9/25/19 revealed nonhypermetabolic activity.  He had some mild hemoptysis 12/2019 felt to be due to bronchitis and supratherapeutic INR.  It resolved with Abx/steroids.  He was then hospitalized 2/19 - 2/24/20 with H1N1 Flu/pneumonia/hemoptysis with supratherapeutic INR.  ANCA and GBM antibodies were negative.  TTE revealed EF 46%, RVSP >55, biatrial dilation, mild-mod MR and functioning mechanical AV.  He was then hospitalized again 2/26 - 3/7/20 with large rectus sheath hematoma and supratherapeutic INR.  Coumadin was reversed and he subsequently had embolic strokes.  Serial CTs in the interim have revealed stable MARKO and RUL nodules until CT 2/12/21 that revealed some slight growth in MARKO nodule.  Repeat PET 3/2/21 confirmed growth and now had hypermetabolic activity.  He was referred to CTS for resection.  His case was also presented to the tumor board who feels this is probable Stage 1A2 and he would be a candidate for cyberknife.   Patient opted for surgical resection, which was scheduled for today.     In the intensive care unit the patient is awake and alert.  Complaining of incisional pain that is helped with his PCA  Small air leak from chest tube.  No active bleeding.    Interval History:     Patient is in good spirits.  Pain is adequately controlled.  No fever or chills.  Continues to have air leak.  No new complaints.    History taken from: Patient    PMH/FH/Social History were reviewed and updated appropriately in the electronic medical record.     Review of Systems:    Review of 14 systems was completed with positives and pertinent negatives noted in the subjective section.  All other systems reviewed and are negative.         Objective     Vital Signs  Temp:  [97.8 °F (36.6 °C)] 97.8 °F (36.6 °C)  Heart Rate:  [70-71] 70  Resp:  [14-20] 18  BP: (101-157)/(61-95) 135/74  04/09 0701 - 04/10 0700  In: 1192.3 [P.O.:980; I.V.:212.3]  Out: 1920 [Urine:1500]  Body mass index is 19.94 kg/m².     IV drips:  HYDROmorphone HCl-NaCl  lactated ringers, Last Rate: Stopped (04/07/21 1326)  Pharmacy to dose warfarin  sodium chloride, Last Rate: 30 mL/hr (04/09/21 2017)       Physical Exam:     Constitutional:   Alert, cooperative, in no acute distress   Head:   Normocephalic, without obvious abnormality, atraumatic   Eyes:           Lids and lashes normal, conjunctivae and sclerae normal.  No icterus, no pallor, corneas clear, PER   ENMT:  Ears appear intact with no abnormalities noted     No oral lesions, no thrush, oral mucosa moist   Neck:  No adenopathy, supple, trachea midline, no thyromegaly, no JVD   Lungs/Resp:    Normal effort, symmetric chest rise, no crepitus, mild rhonchi and air leak auscultated on left with positive leak from chest tubes on left which has slowed down compared to yesterday.               Heart/CV:   Regular rhythm and normal rate, normal S1 and S2, no            murmur   Abdomen/GI:    Normal bowel sounds, no masses,  no organomegaly, soft,        nontender, nondistended   :    Deferred   Extremities/MSK:  No clubbing or cyanosis.  No edema.  Normal tone.    No deformities.    Pulses:  Pulses palpable and equal bilaterally   Skin:  No bleeding, bruising or rash   Heme/Lymph:  No cervical or supraclavicular adenopathy.   Neurologic:    Psychiatric:    Moves all extremities with no obvious focal motor deficit.  Cranial nerves 2 - 12 grossly intact  Oriented x 3, normal affect.    The above physical exam findings were reviewed and reflect exam findings as of today's exam.    Chest x-ray 4/8/2021:  Results Review:     I reviewed the patient's new clinical results.   Results from last 7 days   Lab Units 04/08/21  0318 04/06/21  0912   SODIUM mmol/L 134* 142   POTASSIUM mmol/L 3.7 4.0   CHLORIDE mmol/L 97* 100   CO2 mmol/L 30.0* 36.0*   BUN mg/dL 11 11   CREATININE mg/dL 0.88 0.90   CALCIUM mg/dL 8.6 9.1   BILIRUBIN mg/dL  --  1.6*   ALK PHOS U/L  --  87   ALT (SGPT) U/L  --  16   AST (SGOT) U/L  --  27   GLUCOSE mg/dL 125* 90     Results from last 7 days   Lab Units 04/08/21  0318 04/06/21  0912   WBC 10*3/mm3 8.27 3.61   HEMOGLOBIN g/dL 12.4* 12.8*   HEMATOCRIT % 38.1 39.0   PLATELETS 10*3/mm3 109* 103*               I reviewed the patient's new imaging including images and reports.    Chest x-ray 4/9/2021 shows 2 left-sided chest tubes in place with cardiomegaly and volume loss on the left and no definite residual pneumothorax.      Medication Review:   aspirin, 81 mg, Oral, Daily  atorvastatin, 80 mg, Oral, Nightly  buPROPion XL, 300 mg, Oral, Daily  carvedilol, 12.5 mg, Oral, BID  citalopram, 20 mg, Oral, Daily  famotidine, 20 mg, Oral, Daily  finasteride, 5 mg, Oral, Daily  folic acid, 400 mcg, Oral, Daily  ipratropium-albuterol, 3 mL, Nebulization, 4x Daily - RT  lisinopril, 10 mg, Oral, BID  vitamin B-12, 1,000 mcg, Oral, Daily  warfarin, 5 mg, Oral, Daily      HYDROmorphone HCl-NaCl,   lactated ringers, 9 mL/hr, Last Rate:  Stopped (04/07/21 1326)  Pharmacy to dose warfarin,   sodium chloride, 30 mL/hr, Last Rate: 30 mL/hr (04/09/21 2017)        Assessment/Plan         Lung nodule < 6cm on CT (S/P Left thoracotomy and MARKO Lobectomy)    AF s/p RFA and BiV PPM     Depression    Dyslipidemia    Hypertension    Tobacco use    CAD     H/O mechanical AVR     COPD, mild (CMS/HCC)    Moderate pulmonary HTN  (Est RVSP 53mmHg 2/2021)    GERD (gastroesophageal reflux disease)    70 y.o. male with left lower lobe pulmonary nodule, COPD, active smoker, history of mechanical AVR on warfarin, atrial fibrillation status post radiofrequency ablation/permanent pacemaker, coronary artery disease with prior stents, secondary pulmonary hypertension, hypertension, hyperlipidemia, GERD, depression admitted to the intensive care unit after elective left upper lobectomy by Dr. Post.    Patient has an active air leak on the left.  No fevers or chills.    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  PET scan 3/2/2021 showed no areas concerning for distant metastasis.    Plan:  1.  Chest tube management per surgery.  2.  Scheduled bronchodilators.  3.  Smoking cessation counseling.  4.  Warfarin restarted per surgery.  5.  Monitor urine output.  In and out catheterizations as needed.  6.  We will follow while in the intensive care unit.    Dylan Gonzalez MD  04/10/21  11:55 EDT      *. Please note that portions of this note were completed with [employer] - a voice recognition program.

## 2021-04-10 NOTE — PLAN OF CARE
Goal Outcome Evaluation:  Plan of Care Reviewed With: patient  Progress: improving  Outcome Summary: Pt VSS. OOB with minimal assistance. Subcutaneous emphysema stable, leak and fluctuation still present. NATALY Boyd RN.

## 2021-04-10 NOTE — PROGRESS NOTES
"Pharmacy Consult  -  Warfarin    Dillon Vo is a  70 y.o. male   Height - 180.3 cm (71\")  Weight - 64.9 kg (143 lb)    Consulting Provider: - CTS  Indication: - Afib, mechanical AVR  Goal INR: - 2.5-3.5  Home Regimen:   - Warfarin 5mg daily except 2.5mg Wed    Bridge Therapy: none    Drug-Drug Interactions with current regimen:  Citalopram/ASA - may increase bleeding risk    Warfarin Dosing During Admission:    Date  4/7 4/8 4/9 4/10        INR  1.16 1.14 1.51 2.21        Dose  5mg 7.5mg 5 mg  (3mg)             Education Provided: Warfarin education provided on 4/8 verbally and in writing.  Discussed effects of warfarin, importance of checking INR, drug-drug and drug-food interactions, and signs/symptoms of bleeding and clotting.  Patient verbalized understanding through teach back.  All pertinent questions were answered.        Discharge Follow up:   Following Provider - Dr. Cintron (PCP)   Follow up time range or appointment - 2-3 days post DC      Labs:    Results from last 7 days   Lab Units 04/10/21  0350 04/09/21  0203 04/08/21  0318 04/07/21  1717 04/06/21  0912   INR  2.21* 1.51* 1.14 1.16 1.18*   HEMOGLOBIN g/dL  --   --  12.4*  --  12.8*   HEMATOCRIT %  --   --  38.1  --  39.0     Results from last 7 days   Lab Units 04/08/21  0318 04/06/21  0912   SODIUM mmol/L 134* 142   POTASSIUM mmol/L 3.7 4.0   CHLORIDE mmol/L 97* 100   CO2 mmol/L 30.0* 36.0*   BUN mg/dL 11 11   CREATININE mg/dL 0.88 0.90   CALCIUM mg/dL 8.6 9.1   BILIRUBIN mg/dL  --  1.6*   ALK PHOS U/L  --  87   ALT (SGPT) U/L  --  16   AST (SGOT) U/L  --  27   GLUCOSE mg/dL 125* 90       Current dietary intake: 25% intake last documented-per RN report \"appetite remains minimal\"  Diet Order   Procedures    Diet Regular; Cardiac       Assessment/Plan:     Pharmacy dosing Warfarin for Afib/mechanical AVR, goal INR 2.5-3.5. INR today is slightly SUBtherapeutic at 2.21 with significant increase in last 24h. Poor nutritional intake. Will " give warfarin 3mg this evening. Pharmacy will follow.     Garett IreneD, BCPS  4/10/2021  12:19 EDT

## 2021-04-10 NOTE — PROGRESS NOTES
3 Days Post-Op       LOS: 3 days   Patient Care Team:  Ez Cintron MD as PCP - General  Ez Tucker MD as Consulting Physician (Cardiology)  Payam Kenney MD as Referring Physician (Pulmonary Disease)  Luis Alberto Carter MD as Consulting Physician (Radiation Oncology)    Chief complaint:    Subjective   Denies chest pain, denies shortness of breath    Objective    Vital Signs  Temp:  [97.8 °F (36.6 °C)] 97.8 °F (36.6 °C)  Heart Rate:  [70-71] 70  Resp:  [14-20] 18  BP: (101-157)/(61-95) 157/95    Physical Exam:   General Appearance: alert, appears stated age and cooperative   Lungs: clear bilaterally   Heart: Regular rate and rhythm   Skin:  Incision c/d/i     Results   Results from last 7 days   Lab Units 04/08/21  0318   WBC 10*3/mm3 8.27   HEMOGLOBIN g/dL 12.4*   HEMATOCRIT % 38.1   PLATELETS 10*3/mm3 109*     Results from last 7 days   Lab Units 04/08/21  0318   SODIUM mmol/L 134*   POTASSIUM mmol/L 3.7   CHLORIDE mmol/L 97*   CO2 mmol/L 30.0*   BUN mg/dL 11   CREATININE mg/dL 0.88   GLUCOSE mg/dL 125*   CALCIUM mg/dL 8.6               Assessment      Lung nodule < 6cm on CT (S/P Left thoracotomy and MARKO Lobectomy)    AF s/p RFA and BiV PPM     Depression    Dyslipidemia    Hypertension    Tobacco use    CAD     H/O mechanical AVR     COPD, mild (CMS/HCC)    Moderate pulmonary HTN  (Est RVSP 53mmHg 2/2021)    GERD (gastroesophageal reflux disease)    Chest tubes 420 mL drainage in 24 hours he does have an air leak    Plan   Continue chest tubes  Transfer to telemetry    Kyaw Simental PA-C  04/10/21  10:10 EDT

## 2021-04-11 ENCOUNTER — APPOINTMENT (OUTPATIENT)
Dept: GENERAL RADIOLOGY | Facility: HOSPITAL | Age: 71
End: 2021-04-11

## 2021-04-11 LAB
INR PPP: 2.2 (ref 0.85–1.16)
PROTHROMBIN TIME: 23.6 SECONDS (ref 11.4–14.4)

## 2021-04-11 PROCEDURE — 71045 X-RAY EXAM CHEST 1 VIEW: CPT

## 2021-04-11 PROCEDURE — 94799 UNLISTED PULMONARY SVC/PX: CPT

## 2021-04-11 PROCEDURE — 85610 PROTHROMBIN TIME: CPT | Performed by: PHYSICIAN ASSISTANT

## 2021-04-11 PROCEDURE — 99232 SBSQ HOSP IP/OBS MODERATE 35: CPT | Performed by: FAMILY MEDICINE

## 2021-04-11 RX ORDER — WARFARIN SODIUM 5 MG/1
5 TABLET ORAL
Status: DISCONTINUED | OUTPATIENT
Start: 2021-04-11 | End: 2021-04-14

## 2021-04-11 RX ORDER — BISACODYL 10 MG
10 SUPPOSITORY, RECTAL RECTAL DAILY PRN
Status: DISCONTINUED | OUTPATIENT
Start: 2021-04-11 | End: 2021-04-14 | Stop reason: HOSPADM

## 2021-04-11 RX ORDER — AMOXICILLIN 250 MG
1 CAPSULE ORAL 2 TIMES DAILY PRN
Status: DISCONTINUED | OUTPATIENT
Start: 2021-04-11 | End: 2021-04-14 | Stop reason: HOSPADM

## 2021-04-11 RX ORDER — POLYETHYLENE GLYCOL 3350 17 G/17G
17 POWDER, FOR SOLUTION ORAL 2 TIMES DAILY
Status: COMPLETED | OUTPATIENT
Start: 2021-04-11 | End: 2021-04-13

## 2021-04-11 RX ADMIN — POLYETHYLENE GLYCOL 3350 17 G: 17 POWDER, FOR SOLUTION ORAL at 15:29

## 2021-04-11 RX ADMIN — IPRATROPIUM BROMIDE AND ALBUTEROL SULFATE 3 ML: 2.5; .5 SOLUTION RESPIRATORY (INHALATION) at 18:45

## 2021-04-11 RX ADMIN — FINASTERIDE 5 MG: 5 TABLET, FILM COATED ORAL at 08:33

## 2021-04-11 RX ADMIN — CITALOPRAM HYDROBROMIDE 20 MG: 20 TABLET ORAL at 08:33

## 2021-04-11 RX ADMIN — IPRATROPIUM BROMIDE AND ALBUTEROL SULFATE 3 ML: 2.5; .5 SOLUTION RESPIRATORY (INHALATION) at 08:43

## 2021-04-11 RX ADMIN — BUPROPION HYDROCHLORIDE 300 MG: 150 TABLET, FILM COATED, EXTENDED RELEASE ORAL at 08:33

## 2021-04-11 RX ADMIN — WARFARIN SODIUM 5 MG: 5 TABLET ORAL at 18:05

## 2021-04-11 RX ADMIN — ATORVASTATIN CALCIUM 80 MG: 40 TABLET, FILM COATED ORAL at 20:37

## 2021-04-11 RX ADMIN — FAMOTIDINE 20 MG: 20 TABLET, FILM COATED ORAL at 08:37

## 2021-04-11 RX ADMIN — Medication 400 MCG: at 08:33

## 2021-04-11 RX ADMIN — IPRATROPIUM BROMIDE AND ALBUTEROL SULFATE 3 ML: 2.5; .5 SOLUTION RESPIRATORY (INHALATION) at 13:08

## 2021-04-11 RX ADMIN — LISINOPRIL 10 MG: 10 TABLET ORAL at 20:37

## 2021-04-11 RX ADMIN — Medication 1000 MCG: at 08:33

## 2021-04-11 RX ADMIN — POLYETHYLENE GLYCOL 3350 17 G: 17 POWDER, FOR SOLUTION ORAL at 20:37

## 2021-04-11 RX ADMIN — ASPIRIN 81 MG: 81 TABLET, COATED ORAL at 08:33

## 2021-04-11 RX ADMIN — LISINOPRIL 10 MG: 10 TABLET ORAL at 08:33

## 2021-04-11 RX ADMIN — CARVEDILOL 12.5 MG: 12.5 TABLET, FILM COATED ORAL at 08:33

## 2021-04-11 RX ADMIN — CARVEDILOL 12.5 MG: 12.5 TABLET, FILM COATED ORAL at 20:37

## 2021-04-11 NOTE — PLAN OF CARE
Goal Outcome Evaluation:  Plan of Care Reviewed With: patient     Outcome Summary: Pt VSS no problem at this time

## 2021-04-11 NOTE — PROGRESS NOTES
"Pharmacy Consult  -  Warfarin    Dillon Vo is a  70 y.o. male   Height - 180.3 cm (71\")  Weight - 64.9 kg (143 lb)    Consulting Provider: - CTS  Indication: - Afib, mechanical AVR  Goal INR: - 2.5-3.5  Home Regimen:   - Warfarin 5mg  oral daily except 2.5mg Wednesdays    Bridge Therapy: none    Drug-Drug Interactions with current regimen:  Citalopram/ASA - may increase bleeding risk  (home med)    Warfarin Dosing During Admission:    Date  4/7 4/8 4/9 4/10 4/11       INR  1.16 1.14 1.51 2.21 2.2       Dose  5mg 7.5mg 5 mg  3 mg  5 mg         Education Provided: Warfarin education provided on 4/8 verbally and in writing.  Discussed effects of warfarin, importance of checking INR, drug-drug and drug-food interactions, and signs/symptoms of bleeding and clotting.  Patient verbalized understanding through teach back.  All pertinent questions were answered.      Discharge Follow up:   Following Provider - Dr. Cintron (PCP)   Follow up time range or appointment - 2-3 days post discharge    Labs:    Results from last 7 days   Lab Units 04/11/21  0649 04/10/21  0350 04/09/21  0203 04/08/21  0318 04/07/21  1717 04/06/21  0912   INR  2.20* 2.21* 1.51* 1.14 1.16 1.18*   HEMOGLOBIN g/dL  --   --   --  12.4*  --  12.8*   HEMATOCRIT %  --   --   --  38.1  --  39.0     Results from last 7 days   Lab Units 04/08/21  0318 04/06/21  0912   SODIUM mmol/L 134* 142   POTASSIUM mmol/L 3.7 4.0   CHLORIDE mmol/L 97* 100   CO2 mmol/L 30.0* 36.0*   BUN mg/dL 11 11   CREATININE mg/dL 0.88 0.90   CALCIUM mg/dL 8.6 9.1   BILIRUBIN mg/dL  --  1.6*   ALK PHOS U/L  --  87   ALT (SGPT) U/L  --  16   AST (SGOT) U/L  --  27   GLUCOSE mg/dL 125* 90       Current dietary intake: 50 to 75 % documented  Diet Order   Procedures    Diet Regular; Cardiac     Assessment/Plan:     Pharmacy dosing Warfarin for Afib/mechanical AVR, goal INR 2.5-3.5. Pt has hx of large rectus sheath hematoma w/ warfarin reversal and subsequent embolic stroke in " March of 2020.     1. Patient's INR is sub therapeutic today at 2.2. Goal INR 2.5 to 3.5.  2. Recommend warfarin 5 mg this evening.  3. Daily PT/INR ordered.  4. Monitor signs/symptoms of bleeding, dietary intake, and drug-drug interactions. Make dose adjustments as necessary.    Discharge recommendations: TBD    Pharmacy will continue to follow.      Antonina Miranda, PharmD  4/11/2021  13:58 EDT

## 2021-04-11 NOTE — PROGRESS NOTES
4 days Post-Op       LOS: 4 days   Patient Care Team:  Ez Cintron MD as PCP - General  Ez Tucker MD as Consulting Physician (Cardiology)  Payam Kenney MD as Referring Physician (Pulmonary Disease)  LuisA lberto Carter MD as Consulting Physician (Radiation Oncology)    Chief complaint:    Subjective   Denies chest pain, denies shortness of breath    Objective    Vital Signs  Temp:  [96.5 °F (35.8 °C)-98.5 °F (36.9 °C)] 96.7 °F (35.9 °C)  Heart Rate:  [69-88] 69  Resp:  [18] 18  BP: (135-169)/() 158/92    Physical Exam:   General Appearance: alert, appears stated age and cooperative   Lungs: clear bilaterally   Heart: Regular rate and rhythm   Skin:  Incision c/d/i     Results     Results from last 7 days   Lab Units 04/08/21  0318   WBC 10*3/mm3 8.27   HEMOGLOBIN g/dL 12.4*   HEMATOCRIT % 38.1   PLATELETS 10*3/mm3 109*     Results from last 7 days   Lab Units 04/08/21  0318   SODIUM mmol/L 134*   POTASSIUM mmol/L 3.7   CHLORIDE mmol/L 97*   CO2 mmol/L 30.0*   BUN mg/dL 11   CREATININE mg/dL 0.88   GLUCOSE mg/dL 125*   CALCIUM mg/dL 8.6               Assessment      Lung nodule < 6cm on CT (S/P Left thoracotomy and MARKO Lobectomy)    AF s/p RFA and BiV PPM     Depression    Dyslipidemia    Hypertension    Tobacco use    CAD     H/O mechanical AVR     COPD, mild (CMS/HCC)    Moderate pulmonary HTN  (Est RVSP 53mmHg 2/2021)    GERD (gastroesophageal reflux disease)  Therapeutic coagulopathy  Chest tubes to 290 mL drainage in 24 hours he does have an air leak  A.m. x-ray pending  Plan   Continue chest tubes    Kyaw Simental PA-C  04/11/21  07:32 EDT

## 2021-04-11 NOTE — PROGRESS NOTES
[facility] Medicine Services  PROGRESS NOTE    Patient Name: Dillon Vo  : 1950  MRN: [id number]    Date of Admission: 2021  Primary Care Physician: Ez Cintron MD    Subjective   Subjective     CC:  Lung surgery    HPI:  Patient is a 70-year-old with stage Ib adenocarcinoma with lipidic component who underwent elective left upper lobectomy by Dr. Post on 2021.  Patient was initially cared for in the intensive care unit and was transferred to the hospital service on 2021.     -chest tube remains.  Patient complains of constipation and would like a stool softener.  He reports his last bowel movement was Monday or Tuesday.  He has been ambulating in the steve.    ROS:  General: No fever, chills, pos fatigue  ENT: No sore throat, trouble swallowing or changes in vision  Respiratory: pos shortness of breath, cough, denies wheezing or fast breathing  Cardiovascular: No chest pain, palpitations, positive dyspnea with exertion  Gastrointestinal: No nausea vomiting abdominal pain  Musculoskeletal: No difficulty walking, no weakness  Vascular: No cyanosis or clubbing  Lymphatic: No peripheral edema, no lymphadenopathy  Neurologic: No headache, confusion, dizziness  Psychiatric: No changes in mood.  No depression or anxiety.       Objective   Objective     Vital Signs:   Temp:  [96.5 °F (35.8 °C)-98.5 °F (36.9 °C)] 97.5 °F (36.4 °C)  Heart Rate:  [68-88] 68  Resp:  [18] 18  BP: (135-185)/() 185/100        Physical Exam:  Constitutional: No acute distress, awake, alert, nontoxic, elderly body habitus  Eyes: Pupils equal, sclerae anicteric, no conjunctival injection  HENT: NCAT, mucous membranes moist  Neck: Supple, no thyromegaly, no lymphadenopathy  Respiratory: Clear to auscultation bilaterally, good effort, nonlabored respirations   Cardiovascular: RRR  Gastrointestinal: Positive bowel sounds, soft, nontender, nondistended  Musculoskeletal: No  peripheral edema, normal muscle tone for age  Psychiatric: Appropriate affect, good insight and judgement, cooperative  Neurologic: Oriented x 3, movements symmetric BUE and BLE, Cranial Nerves grossly intact, speech clear and fluent  Skin: No rashes, no jaundice, no petechiae, no mottling      Results Reviewed:  Results from last 7 days   Lab Units 04/11/21  0649 04/10/21  0350 04/09/21  0203 04/08/21  0318 04/06/21  0912   WBC 10*3/mm3  --   --   --  8.27 3.61   HEMOGLOBIN g/dL  --   --   --  12.4* 12.8*   HEMATOCRIT %  --   --   --  38.1 39.0   PLATELETS 10*3/mm3  --   --   --  109* 103*   INR  2.20* 2.21* 1.51* 1.14 1.18*     Results from last 7 days   Lab Units 04/08/21 0318 04/06/21  0912   SODIUM mmol/L 134* 142   POTASSIUM mmol/L 3.7 4.0   CHLORIDE mmol/L 97* 100   CO2 mmol/L 30.0* 36.0*   BUN mg/dL 11 11   CREATININE mg/dL 0.88 0.90   GLUCOSE mg/dL 125* 90   CALCIUM mg/dL 8.6 9.1   ALT (SGPT) U/L  --  16   AST (SGOT) U/L  --  27     Estimated Creatinine Clearance: 71.7 mL/min (by C-G formula based on SCr of 0.88 mg/dL).    Microbiology Results Abnormal     None          Imaging Results (Last 24 Hours)     ** No results found for the last 24 hours. **          Results for orders placed during the hospital encounter of 03/19/21    Adult Transthoracic Echo Complete W/ Cont if Necessary Per Protocol    Interpretation Summary  · There is biatrial enlargement. Left atrial volume is severely increased.  · The inferior vena cava is dilated and inspiratory collapse is absent.  · There is a mechanical aortic valve. I cannot exclude a small perivalvular leak (AI) with confidence.  · There is mitral annular calcification and moderate mitral regurgitation.  · There is moderate tricuspid regurgitation and moderate estimated PA hypertension (RVSP=53 mmHg).  · There is no pericardial effusion.      I have reviewed the medications:  Scheduled Meds:aspirin, 81 mg, Oral, Daily  atorvastatin, 80 mg, Oral, Nightly  buPROPion  XL, 300 mg, Oral, Daily  carvedilol, 12.5 mg, Oral, BID  citalopram, 20 mg, Oral, Daily  famotidine, 20 mg, Oral, Daily  finasteride, 5 mg, Oral, Daily  folic acid, 400 mcg, Oral, Daily  ipratropium-albuterol, 3 mL, Nebulization, 4x Daily - RT  lisinopril, 10 mg, Oral, BID  vitamin B-12, 1,000 mcg, Oral, Daily  warfarin, 3 mg, Oral, Daily      Continuous Infusions:lactated ringers, 9 mL/hr, Last Rate: Stopped (04/07/21 1326)  Pharmacy to dose warfarin,   sodium chloride, 30 mL/hr, Last Rate: 30 mL/hr (04/09/21 2017)      PRN Meds:.•  acetaminophen  •  lactated ringers  •  naloxone  •  ondansetron **OR** ondansetron  •  Pharmacy to dose warfarin    Assessment/Plan   Assessment & Plan     Active Hospital Problems    Diagnosis  POA   • **Lung nodule < 6cm on CT (S/P Left thoracotomy and MARKO Lobectomy) [R91.1]  Yes   • GERD (gastroesophageal reflux disease) [K21.9]  Yes   • Moderate pulmonary HTN  (Est RVSP 53mmHg 2/2021) [I27.20]  Yes   • Chronic anticoagulation on warfarin  [Z79.01]  Not Applicable   • COPD, mild (CMS/HCC) [J44.9]  Yes   • H/O mechanical AVR  [Z95.2]  Not Applicable   • CAD  [I25.10]  Yes   • Tobacco use [Z72.0]  Yes   • A-fib (CMS/HCC) [I48.91]  Unknown   • Depression [F32.9]  Yes   • Dyslipidemia [E78.5]  Yes   • Hypertension [I10]  Yes      Resolved Hospital Problems   No resolved problems to display.        Brief Hospital Course to date:  Dillon Vo is a 70 y.o. male with stage Ib adenocarcinoma with lipidic component who underwent elective left upper lobectomy by Dr. Post on 4/7/2021.  Patient was initially cared for in the intensive care unit and was transferred to the hospital service on 4/11/2021.    Stage Ib adenocarcinoma with lipidic component of the lung  -Status post left upper lobectomy by Dr. Post on 4/7/2021  -Postoperative chest tubes-management per surgery    Constipation  -Added MiraLAX scheduled x2-day  -Added as needed enema, suppository and stool  softeners    COPD  Tobacco abuse  Moderate pulmonary hypertension  -Encouraged tobacco cessation    History of mechanical aortic valve replacement  CAD  Atrial fibrillation  -On Coumadin  -Continue carvedilol, aspirin, lisinopril    Depression  Dyslipidemia  Hypertension  GERD      DVT Prophylaxis: Coumadin      Disposition: I expect the patient's care to extend several more days pending his postoperative recovery.    CODE STATUS:   Code Status and Medical Interventions:   Ordered at: 04/07/21 1410     Code Status:    CPR     Medical Interventions (Level of Support Prior to Arrest):    Full       Laura Lainze MD  04/11/21

## 2021-04-12 ENCOUNTER — APPOINTMENT (OUTPATIENT)
Dept: GENERAL RADIOLOGY | Facility: HOSPITAL | Age: 71
End: 2021-04-12

## 2021-04-12 LAB
CYTO UR: NORMAL
INR PPP: 2.43 (ref 0.85–1.16)
LAB AP CASE REPORT: NORMAL
LAB AP CLINICAL INFORMATION: NORMAL
LAB AP DIAGNOSIS COMMENT: NORMAL
Lab: NORMAL
PATH REPORT.ADDENDUM SPEC: NORMAL
PATH REPORT.FINAL DX SPEC: NORMAL
PATH REPORT.GROSS SPEC: NORMAL
PROTHROMBIN TIME: 25.4 SECONDS (ref 11.4–14.4)

## 2021-04-12 PROCEDURE — 99232 SBSQ HOSP IP/OBS MODERATE 35: CPT | Performed by: INTERNAL MEDICINE

## 2021-04-12 PROCEDURE — 71045 X-RAY EXAM CHEST 1 VIEW: CPT

## 2021-04-12 PROCEDURE — 94799 UNLISTED PULMONARY SVC/PX: CPT

## 2021-04-12 PROCEDURE — 85610 PROTHROMBIN TIME: CPT | Performed by: PHYSICIAN ASSISTANT

## 2021-04-12 RX ORDER — OXYCODONE HYDROCHLORIDE AND ACETAMINOPHEN 5; 325 MG/1; MG/1
1 TABLET ORAL EVERY 6 HOURS PRN
Status: DISCONTINUED | OUTPATIENT
Start: 2021-04-12 | End: 2021-04-14 | Stop reason: HOSPADM

## 2021-04-12 RX ADMIN — DOCUSATE SODIUM 50MG AND SENNOSIDES 8.6MG 1 TABLET: 8.6; 5 TABLET, FILM COATED ORAL at 21:18

## 2021-04-12 RX ADMIN — Medication 400 MCG: at 09:03

## 2021-04-12 RX ADMIN — LISINOPRIL 10 MG: 10 TABLET ORAL at 04:54

## 2021-04-12 RX ADMIN — BUPROPION HYDROCHLORIDE 300 MG: 150 TABLET, FILM COATED, EXTENDED RELEASE ORAL at 09:03

## 2021-04-12 RX ADMIN — IPRATROPIUM BROMIDE AND ALBUTEROL SULFATE 3 ML: 2.5; .5 SOLUTION RESPIRATORY (INHALATION) at 20:05

## 2021-04-12 RX ADMIN — IPRATROPIUM BROMIDE AND ALBUTEROL SULFATE 3 ML: 2.5; .5 SOLUTION RESPIRATORY (INHALATION) at 16:29

## 2021-04-12 RX ADMIN — LISINOPRIL 10 MG: 10 TABLET ORAL at 21:18

## 2021-04-12 RX ADMIN — FINASTERIDE 5 MG: 5 TABLET, FILM COATED ORAL at 09:03

## 2021-04-12 RX ADMIN — ACETAMINOPHEN 650 MG: 325 TABLET ORAL at 17:59

## 2021-04-12 RX ADMIN — CARVEDILOL 12.5 MG: 12.5 TABLET, FILM COATED ORAL at 21:18

## 2021-04-12 RX ADMIN — OXYCODONE AND ACETAMINOPHEN 1 TABLET: 5; 325 TABLET ORAL at 21:18

## 2021-04-12 RX ADMIN — POLYETHYLENE GLYCOL 3350 17 G: 17 POWDER, FOR SOLUTION ORAL at 21:18

## 2021-04-12 RX ADMIN — Medication 1000 MCG: at 09:03

## 2021-04-12 RX ADMIN — CITALOPRAM HYDROBROMIDE 20 MG: 20 TABLET ORAL at 09:03

## 2021-04-12 RX ADMIN — WARFARIN SODIUM 5 MG: 5 TABLET ORAL at 17:59

## 2021-04-12 RX ADMIN — ASPIRIN 81 MG: 81 TABLET, COATED ORAL at 09:03

## 2021-04-12 RX ADMIN — IPRATROPIUM BROMIDE AND ALBUTEROL SULFATE 3 ML: 2.5; .5 SOLUTION RESPIRATORY (INHALATION) at 12:35

## 2021-04-12 RX ADMIN — FAMOTIDINE 20 MG: 20 TABLET, FILM COATED ORAL at 09:03

## 2021-04-12 RX ADMIN — IPRATROPIUM BROMIDE AND ALBUTEROL SULFATE 3 ML: 2.5; .5 SOLUTION RESPIRATORY (INHALATION) at 08:37

## 2021-04-12 RX ADMIN — CARVEDILOL 12.5 MG: 12.5 TABLET, FILM COATED ORAL at 04:55

## 2021-04-12 RX ADMIN — ATORVASTATIN CALCIUM 80 MG: 40 TABLET, FILM COATED ORAL at 21:18

## 2021-04-12 RX ADMIN — POLYETHYLENE GLYCOL 3350 17 G: 17 POWDER, FOR SOLUTION ORAL at 09:03

## 2021-04-12 RX ADMIN — ACETAMINOPHEN 650 MG: 325 TABLET ORAL at 09:11

## 2021-04-12 NOTE — PROGRESS NOTES
Continued Stay Note  [facility]     Patient Name: Dillon Vo  MRN: [id number]  Today's Date: 4/12/2021    Admit Date: 4/7/2021    Discharge Plan     Row Name 04/12/21 1429       Plan    Plan  HOME    Patient/Family in Agreement with Plan  yes    Plan Comments  CT to water seal today.  Tolerating RA.  Per bedside discussion with pt today, plan remains to return home upon DC.  No immediate needs identified/voiced.  CM will cont to follow.    Final Discharge Disposition Code  01 - home or self-care        Discharge Codes    No documentation.       Expected Discharge Date and Time     Expected Discharge Date Expected Discharge Time    Apr 12, 2021             Lima Lee RN

## 2021-04-12 NOTE — PROGRESS NOTES
"Pharmacy Consult  -  Warfarin    Dillon Vo is a  70 y.o. male   Height - 180.3 cm (71\")  Weight - 64.9 kg (143 lb)    Consulting Provider: - CTS  Indication: - Afib, mechanical AVR  Goal INR: - 2.5-3.5  Home Regimen:   - Warfarin 5mg  oral daily except 2.5mg Wednesdays    Bridge Therapy: none    Drug-Drug Interactions with current regimen:  Citalopram/ASA - may increase bleeding risk  (home med)    Warfarin Dosing During Admission:    Date  4/7 4/8 4/9 4/10 4/11 4/12      INR  1.16 1.14 1.51 2.21 2.2 2.43      Dose  5mg 7.5mg 5 mg  3 mg 5 mg 5mg        Education Provided: Warfarin education provided on 4/8 verbally and in writing.  Discussed effects of warfarin, importance of checking INR, drug-drug and drug-food interactions, and signs/symptoms of bleeding and clotting.  Patient verbalized understanding through teach back.  All pertinent questions were answered.      Discharge Follow up:   Following Provider - Dr. Cintron (PCP)   Follow up time range or appointment - 2-3 days post discharge    Labs:    Results from last 7 days   Lab Units 04/12/21  0529 04/11/21  0649 04/10/21  0350 04/09/21  0203 04/08/21  0318 04/07/21  1717 04/06/21  0912   INR  2.43* 2.20* 2.21* 1.51* 1.14 1.16 1.18*   HEMOGLOBIN g/dL  --   --   --   --  12.4*  --  12.8*   HEMATOCRIT %  --   --   --   --  38.1  --  39.0     Results from last 7 days   Lab Units 04/08/21  0318 04/06/21  0912   SODIUM mmol/L 134* 142   POTASSIUM mmol/L 3.7 4.0   CHLORIDE mmol/L 97* 100   CO2 mmol/L 30.0* 36.0*   BUN mg/dL 11 11   CREATININE mg/dL 0.88 0.90   CALCIUM mg/dL 8.6 9.1   BILIRUBIN mg/dL  --  1.6*   ALK PHOS U/L  --  87   ALT (SGPT) U/L  --  16   AST (SGOT) U/L  --  27   GLUCOSE mg/dL 125* 90       Current dietary intake: 50 to 75 % of documented meals on 4/11.  Diet Order   Procedures    Diet Regular; Cardiac     Assessment/Plan:     Pharmacy dosing Warfarin for Afib/mechanical AVR, goal INR 2.5-3.5. Pt has hx of large rectus sheath " hematoma w/ warfarin reversal and subsequent embolic stroke in March of 2020.     1. Patient's INR remains sub therapeutic today at 2.43. Goal INR 2.5 to 3.5.  2. Recommend continuing warfarin 5 mg this evening. (patients home dose)  3. Daily PT/INR ordered.  4. Monitor signs/symptoms of bleeding, dietary intake, and drug-drug interactions. Make dose adjustments as necessary.    Discharge recommendations: TBD    Pharmacy will continue to follow.    Mitzi East, PharmD, BCPS  4/12/2021  13:41 EDT

## 2021-04-12 NOTE — PROGRESS NOTES
[facility] Medicine Services  PROGRESS NOTE    Patient Name: Dillon Vo  : 1950  MRN: [id number]    Date of Admission: 2021  Primary Care Physician: Ez Cintron MD    Subjective   Subjective     CC:  Lung surgery    HPI: Uncontrolled pain and persisting constipation.  Has been up walking.      ROS:  Gen- No fevers, chills  Resp- No cough, dyspnea  GI- No N/V/D, abd pain         Objective   Objective     Vital Signs:   Temp:  [96.4 °F (35.8 °C)-98 °F (36.7 °C)] 98 °F (36.7 °C)  Heart Rate:  [68-88] 69  Resp:  [18-20] 20  BP: (169-180)/() 177/91        Physical Exam:  Constitutional: No acute distress, asleep currently  HENT: NCAT, mucous membranes moist  Neck: Supple,   Respiratory: Clear to auscultation bilaterally, good effort, nonlabored respirations   Cardiovascular: RRR  Gastrointestinal: Positive bowel sounds, soft, nontender, nondistended  Musculoskeletal: No peripheral edema, normal muscle tone for age  Skin: No rashes, no jaundice, no petechiae, no mottling      Results Reviewed:  Results from last 7 days   Lab Units 21  0529 21  0649 04/10/21  0350 218 21  0912   WBC 10*3/mm3  --   --   --  8.27 3.61   HEMOGLOBIN g/dL  --   --   --  12.4* 12.8*   HEMATOCRIT %  --   --   --  38.1 39.0   PLATELETS 10*3/mm3  --   --   --  109* 103*   INR  2.43* 2.20* 2.21* 1.14 1.18*     Results from last 7 days   Lab Units 21  03121  0912   SODIUM mmol/L 134* 142   POTASSIUM mmol/L 3.7 4.0   CHLORIDE mmol/L 97* 100   CO2 mmol/L 30.0* 36.0*   BUN mg/dL 11 11   CREATININE mg/dL 0.88 0.90   GLUCOSE mg/dL 125* 90   CALCIUM mg/dL 8.6 9.1   ALT (SGPT) U/L  --  16   AST (SGOT) U/L  --  27     Estimated Creatinine Clearance: 71.7 mL/min (by C-G formula based on SCr of 0.88 mg/dL).    Microbiology Results Abnormal     None          Imaging Results (Last 24 Hours)     Procedure Component Value Units Date/Time    XR Chest 1 View  [[id number]] Resulted: 04/12/21 0321     Updated: 04/12/21 0720          Results for orders placed during the hospital encounter of 03/19/21    Adult Transthoracic Echo Complete W/ Cont if Necessary Per Protocol    Interpretation Summary  · There is biatrial enlargement. Left atrial volume is severely increased.  · The inferior vena cava is dilated and inspiratory collapse is absent.  · There is a mechanical aortic valve. I cannot exclude a small perivalvular leak (AI) with confidence.  · There is mitral annular calcification and moderate mitral regurgitation.  · There is moderate tricuspid regurgitation and moderate estimated PA hypertension (RVSP=53 mmHg).  · There is no pericardial effusion.      I have reviewed the medications:  Scheduled Meds:aspirin, 81 mg, Oral, Daily  atorvastatin, 80 mg, Oral, Nightly  buPROPion XL, 300 mg, Oral, Daily  carvedilol, 12.5 mg, Oral, BID  citalopram, 20 mg, Oral, Daily  famotidine, 20 mg, Oral, Daily  finasteride, 5 mg, Oral, Daily  folic acid, 400 mcg, Oral, Daily  ipratropium-albuterol, 3 mL, Nebulization, 4x Daily - RT  lisinopril, 10 mg, Oral, BID  polyethylene glycol, 17 g, Oral, BID  vitamin B-12, 1,000 mcg, Oral, Daily  warfarin, 5 mg, Oral, Daily      Continuous Infusions:lactated ringers, 9 mL/hr, Last Rate: Stopped (04/07/21 1326)  Pharmacy to dose warfarin,   sodium chloride, 30 mL/hr, Last Rate: 30 mL/hr (04/09/21 2017)      PRN Meds:.•  acetaminophen  •  bisacodyl  •  lactated ringers  •  naloxone  •  ondansetron **OR** ondansetron  •  Pharmacy to dose warfarin  •  senna-docusate sodium    Assessment/Plan   Assessment & Plan     Active Hospital Problems    Diagnosis  POA   • **Lung nodule < 6cm on CT (S/P Left thoracotomy and MARKO Lobectomy) [R91.1]  Yes   • GERD (gastroesophageal reflux disease) [K21.9]  Yes   • Moderate pulmonary HTN  (Est RVSP 53mmHg 2/2021) [I27.20]  Yes   • Chronic anticoagulation on warfarin  [Z79.01]  Not Applicable   • COPD, mild (CMS/HCC)  [J44.9]  Yes   • H/O mechanical AVR  [Z95.2]  Not Applicable   • CAD  [I25.10]  Yes   • Tobacco use [Z72.0]  Yes   • A-fib (CMS/HCC) [I48.91]  Unknown   • Depression [F32.9]  Yes   • Dyslipidemia [E78.5]  Yes   • Hypertension [I10]  Yes      Resolved Hospital Problems   No resolved problems to display.        Brief Hospital Course to date:  Dillon Vo is a 70 y.o. male with mech ARV, Afib post ablation, CAD, HTN HL.  Now stage Ib adenocarcinoma with lipidic component who underwent left upper lobectomy by Dr. Post on 4/7/2021.  Patient was initially cared for in the intensive care unit and was transferred to the hospital service on 4/11/2021.    Stage Ib adenocarcinoma with lipidic component of the lung  -Status post left upper lobectomy by Dr. Post on 4/7/2021  -Postoperative chest tubes-management per surgery  - warfarin restarted  - uncontrolled pain:  Added percocet despite constipation   - walking in halls  - CT to waterseal today     Constipation x one week  -Added MiraLAX scheduled x2-day  -Added as needed enema, suppository and stool softeners    Chronic Macrocytosis, mild TCP  - on B12 and folate but recheck levels    COPD  Tobacco abuse  Moderate pulmonary hypertension  -Encouraged tobacco cessation    History of mechanical aortic valve replacement  CAD  Atrial fibrillation  - EF 53, biatrial enlargement   -On Coumadin; INR near therapeutic  -Continue carvedilol, aspirin, lisinopril      Depression  Dyslipidemia  Hypertension  GERD      DVT Prophylaxis: Coumadin      Disposition: DC home in 2 days     CODE STATUS:   Code Status and Medical Interventions:   Ordered at: 04/07/21 1410     Code Status:    CPR     Medical Interventions (Level of Support Prior to Arrest):    Full       Leila Nicholas MD  04/12/21

## 2021-04-12 NOTE — PROGRESS NOTES
"Dillon Vo  [id number]  1950     LOS: 5 days   Patient Care Team:  Ez Cintron MD as PCP - General  Ez Tucker MD as Consulting Physician (Cardiology)  Payam Kenney MD as Referring Physician (Pulmonary Disease)  Luis Alberto Carter MD as Consulting Physician (Radiation Oncology)    Chief Complaint: Bronchogenic carcinoma      Subjective: No complaints    Objective:     Vital Sign Min/Max for last 24 hours  Temp  Min: 96.4 °F (35.8 °C)  Max: 97.9 °F (36.6 °C)   BP  Min: 169/103  Max: 185/100   Pulse  Min: 68  Max: 88   Resp  Min: 18  Max: 20   SpO2  Min: 94 %  Max: 100 %   No data recorded   No data recorded     Flowsheet Rows      First Filed Value   Admission Height  180.3 cm (71\") Documented at 04/07/2021 0950   Admission Weight  64.9 kg (143 lb) Documented at 04/07/2021 0950          Physical Exam:    Wound:    Pulses:     Mediastinal and Chest Tube Drainage: No air leak       Results Review:   Results from last 7 days   Lab Units 04/08/21  0318   WBC 10*3/mm3 8.27   HEMOGLOBIN g/dL 12.4*   HEMATOCRIT % 38.1   PLATELETS 10*3/mm3 109*     Results from last 7 days   Lab Units 04/08/21  0318   SODIUM mmol/L 134*   POTASSIUM mmol/L 3.7   CHLORIDE mmol/L 97*   CO2 mmol/L 30.0*   BUN mg/dL 11   CREATININE mg/dL 0.88   GLUCOSE mg/dL 125*   CALCIUM mg/dL 8.6             Assessment      Lung nodule < 6cm on CT (S/P Left thoracotomy and MARKO Lobectomy)    A-fib (CMS/HCC)    Depression    Dyslipidemia    Hypertension    Tobacco use    CAD     H/O mechanical AVR     COPD, mild (CMS/HCC)    Chronic anticoagulation on warfarin     Moderate pulmonary HTN  (Est RVSP 53mmHg 2/2021)    GERD (gastroesophageal reflux disease)      5 AM portable chest x-ray.  DC suction on chest tube.  Hopefully DC chest tubes tomorrow        Chi Post MD  04/12/21  06:48 EDT      Please note that portions of this note were completed with a voice recognition program. Efforts were made to edit the " dictations, but words may be mistranscribed

## 2021-04-12 NOTE — PLAN OF CARE
Goal Outcome Evaluation:  Plan of Care Reviewed With: patient     Outcome Summary: Pt resting quietly , will continue to monitor

## 2021-04-13 ENCOUNTER — APPOINTMENT (OUTPATIENT)
Dept: GENERAL RADIOLOGY | Facility: HOSPITAL | Age: 71
End: 2021-04-13

## 2021-04-13 LAB
FOLATE SERPL-MCNC: 14.1 NG/ML (ref 4.78–24.2)
INR PPP: 2.99 (ref 0.85–1.16)
PROTHROMBIN TIME: 29.8 SECONDS (ref 11.4–14.4)
VIT B12 BLD-MCNC: >2000 PG/ML (ref 211–946)

## 2021-04-13 PROCEDURE — 82746 ASSAY OF FOLIC ACID SERUM: CPT | Performed by: INTERNAL MEDICINE

## 2021-04-13 PROCEDURE — 94799 UNLISTED PULMONARY SVC/PX: CPT

## 2021-04-13 PROCEDURE — 85610 PROTHROMBIN TIME: CPT | Performed by: PHYSICIAN ASSISTANT

## 2021-04-13 PROCEDURE — 71045 X-RAY EXAM CHEST 1 VIEW: CPT

## 2021-04-13 PROCEDURE — 99232 SBSQ HOSP IP/OBS MODERATE 35: CPT | Performed by: INTERNAL MEDICINE

## 2021-04-13 PROCEDURE — 82607 VITAMIN B-12: CPT | Performed by: INTERNAL MEDICINE

## 2021-04-13 RX ORDER — OXYCODONE HYDROCHLORIDE AND ACETAMINOPHEN 5; 325 MG/1; MG/1
1 TABLET ORAL EVERY 6 HOURS PRN
Qty: 30 TABLET | Refills: 0 | Status: SHIPPED | OUTPATIENT
Start: 2021-04-13 | End: 2021-04-22

## 2021-04-13 RX ORDER — IPRATROPIUM BROMIDE AND ALBUTEROL SULFATE 2.5; .5 MG/3ML; MG/3ML
3 SOLUTION RESPIRATORY (INHALATION) EVERY 6 HOURS PRN
Status: DISCONTINUED | OUTPATIENT
Start: 2021-04-13 | End: 2021-04-14 | Stop reason: HOSPADM

## 2021-04-13 RX ADMIN — IPRATROPIUM BROMIDE AND ALBUTEROL SULFATE 3 ML: 2.5; .5 SOLUTION RESPIRATORY (INHALATION) at 12:55

## 2021-04-13 RX ADMIN — CARVEDILOL 12.5 MG: 12.5 TABLET, FILM COATED ORAL at 08:18

## 2021-04-13 RX ADMIN — ACETAMINOPHEN 650 MG: 325 TABLET ORAL at 16:13

## 2021-04-13 RX ADMIN — LISINOPRIL 10 MG: 10 TABLET ORAL at 08:17

## 2021-04-13 RX ADMIN — POLYETHYLENE GLYCOL 3350 17 G: 17 POWDER, FOR SOLUTION ORAL at 08:18

## 2021-04-13 RX ADMIN — IPRATROPIUM BROMIDE AND ALBUTEROL SULFATE 3 ML: 2.5; .5 SOLUTION RESPIRATORY (INHALATION) at 07:59

## 2021-04-13 RX ADMIN — ASPIRIN 81 MG: 81 TABLET, COATED ORAL at 08:17

## 2021-04-13 RX ADMIN — CARVEDILOL 12.5 MG: 12.5 TABLET, FILM COATED ORAL at 21:19

## 2021-04-13 RX ADMIN — ATORVASTATIN CALCIUM 80 MG: 40 TABLET, FILM COATED ORAL at 21:19

## 2021-04-13 RX ADMIN — DOCUSATE SODIUM 50MG AND SENNOSIDES 8.6MG 1 TABLET: 8.6; 5 TABLET, FILM COATED ORAL at 21:19

## 2021-04-13 RX ADMIN — FINASTERIDE 5 MG: 5 TABLET, FILM COATED ORAL at 08:18

## 2021-04-13 RX ADMIN — POLYETHYLENE GLYCOL 3350 17 G: 17 POWDER, FOR SOLUTION ORAL at 21:19

## 2021-04-13 RX ADMIN — OXYCODONE AND ACETAMINOPHEN 1 TABLET: 5; 325 TABLET ORAL at 21:53

## 2021-04-13 RX ADMIN — WARFARIN SODIUM 5 MG: 5 TABLET ORAL at 18:08

## 2021-04-13 RX ADMIN — BUPROPION HYDROCHLORIDE 300 MG: 150 TABLET, FILM COATED, EXTENDED RELEASE ORAL at 08:18

## 2021-04-13 RX ADMIN — LISINOPRIL 10 MG: 10 TABLET ORAL at 21:20

## 2021-04-13 RX ADMIN — Medication 400 MCG: at 08:18

## 2021-04-13 RX ADMIN — CITALOPRAM HYDROBROMIDE 20 MG: 20 TABLET ORAL at 08:18

## 2021-04-13 RX ADMIN — Medication 1000 MCG: at 08:17

## 2021-04-13 RX ADMIN — FAMOTIDINE 20 MG: 20 TABLET, FILM COATED ORAL at 08:18

## 2021-04-13 NOTE — PROGRESS NOTES
[facility] Medicine Services  PROGRESS NOTE    Patient Name: Dillon Vo  : 1950  MRN: [id number]    Date of Admission: 2021  Primary Care Physician: Ez Cintron MD    Subjective   Subjective     CC:  Lung surgery    HPI:  Has walked several laps in the steve on [place].      ROS:  Gen- No fevers, chills  Resp- No cough, dyspnea  GI- No N/V/D, abd pain  Had a BM a couple days ago.           Objective   Objective     Vital Signs:   Temp:  [96.8 °F (36 °C)-97 °F (36.1 °C)] 97 °F (36.1 °C)  Heart Rate:  [69-73] 69  Resp:  [16-20] 18  BP: (136-144)/(77-90) 144/79        Physical Exam:  Constitutional: No acute distress, awake in bed, conversant  HENT: NCAT, mucous membranes moist  Neck: Supple,   Respiratory: Clear to auscultation bilaterally, good effort, nonlabored respirations   Back- incision dressed L  thorax.   Cardiovascular: RRR  Gastrointestinal: Positive bowel sounds, soft, nontender, nondistended  Musculoskeletal: No peripheral edema, normal muscle tone for age  Skin: No rashes, no jaundice, no petechiae, no mottling      Results Reviewed:  Results from last 7 days   Lab Units 21  0456 21  0529 21  0649 218 21  0912   WBC 10*3/mm3  --   --   --  8.27 3.61   HEMOGLOBIN g/dL  --   --   --  12.4* 12.8*   HEMATOCRIT %  --   --   --  38.1 39.0   PLATELETS 10*3/mm3  --   --   --  109* 103*   INR  2.99* 2.43* 2.20* 1.14 1.18*     Results from last 7 days   Lab Units 21  0912   SODIUM mmol/L 134* 142   POTASSIUM mmol/L 3.7 4.0   CHLORIDE mmol/L 97* 100   CO2 mmol/L 30.0* 36.0*   BUN mg/dL 11 11   CREATININE mg/dL 0.88 0.90   GLUCOSE mg/dL 125* 90   CALCIUM mg/dL 8.6 9.1   ALT (SGPT) U/L  --  16   AST (SGOT) U/L  --  27     Estimated Creatinine Clearance: 71.7 mL/min (by C-G formula based on SCr of 0.88 mg/dL).    Microbiology Results Abnormal     None          Imaging Results (Last 24 Hours)     Procedure  Component Value Units Date/Time    XR Chest 1 View [[id number]] Resulted: 04/13/21 0403     Updated: 04/13/21 0452          Results for orders placed during the hospital encounter of 03/19/21    Adult Transthoracic Echo Complete W/ Cont if Necessary Per Protocol    Interpretation Summary  · There is biatrial enlargement. Left atrial volume is severely increased.  · The inferior vena cava is dilated and inspiratory collapse is absent.  · There is a mechanical aortic valve. I cannot exclude a small perivalvular leak (AI) with confidence.  · There is mitral annular calcification and moderate mitral regurgitation.  · There is moderate tricuspid regurgitation and moderate estimated PA hypertension (RVSP=53 mmHg).  · There is no pericardial effusion.      I have reviewed the medications:  Scheduled Meds:aspirin, 81 mg, Oral, Daily  atorvastatin, 80 mg, Oral, Nightly  buPROPion XL, 300 mg, Oral, Daily  carvedilol, 12.5 mg, Oral, BID  citalopram, 20 mg, Oral, Daily  famotidine, 20 mg, Oral, Daily  finasteride, 5 mg, Oral, Daily  folic acid, 400 mcg, Oral, Daily  ipratropium-albuterol, 3 mL, Nebulization, 4x Daily - RT  lisinopril, 10 mg, Oral, BID  polyethylene glycol, 17 g, Oral, BID  vitamin B-12, 1,000 mcg, Oral, Daily  warfarin, 5 mg, Oral, Daily      Continuous Infusions:lactated ringers, 9 mL/hr, Last Rate: Stopped (04/07/21 1326)  Pharmacy to dose warfarin,       PRN Meds:.•  acetaminophen  •  bisacodyl  •  lactated ringers  •  naloxone  •  ondansetron **OR** ondansetron  •  oxyCODONE-acetaminophen  •  Pharmacy to dose warfarin  •  senna-docusate sodium    Assessment/Plan   Assessment & Plan     Active Hospital Problems    Diagnosis  POA   • **Lung nodule < 6cm on CT (S/P Left thoracotomy and MARKO Lobectomy) [R91.1]  Yes   • GERD (gastroesophageal reflux disease) [K21.9]  Yes   • Moderate pulmonary HTN  (Est RVSP 53mmHg 2/2021) [I27.20]  Yes   • Chronic anticoagulation on warfarin  [Z79.01]  Not Applicable   • COPD,  mild (CMS/HCC) [J44.9]  Yes   • H/O mechanical AVR  [Z95.2]  Not Applicable   • CAD  [I25.10]  Yes   • Tobacco use [Z72.0]  Yes   • A-fib (CMS/HCC) [I48.91]  Unknown   • Depression [F32.9]  Yes   • Dyslipidemia [E78.5]  Yes   • Hypertension [I10]  Yes      Resolved Hospital Problems   No resolved problems to display.        Brief Hospital Course to date:  Dillon Vo is a 70 y.o. male with mech ARV, Afib post ablation, CAD, HTN HL.  Now stage Ib adenocarcinoma with lipidic component who underwent left upper lobectomy by Dr. Post on 4/7/2021.  Patient was initially cared for in the intensive care unit and was transferred to the hospital service on 4/11/2021.    Stage Ib adenocarcinoma with lipidic component of the lung  -Status post left upper lobectomy by Dr. Post on 4/7/2021  -chest tubes out  - warfarin restarted  - walking in halls    Constipation x one week  -Added MiraLAX scheduled x2-day  -Added as needed enema, suppository and stool softeners    Chronic Macrocytosis, mild TCP  - on B12 and folate but recheck levels - normal  - suspect MDS.       COPD  Tobacco abuse  Moderate pulmonary hypertension  -Encouraged tobacco cessation    History of mechanical aortic valve replacement  CAD  Atrial fibrillation  - EF 53, biatrial enlargement   -On Coumadin; INR now therapeutic  -Continue carvedilol, aspirin, lisinopril      Depression  Dyslipidemia  Hypertension  GERD      DVT Prophylaxis: Coumadin      Disposition: DC home tomorrow     CODE STATUS:   Code Status and Medical Interventions:   Ordered at: 04/07/21 1410     Code Status:    CPR     Medical Interventions (Level of Support Prior to Arrest):    Full       Leila Nicholas MD  04/13/21

## 2021-04-13 NOTE — PROGRESS NOTES
"Pharmacy Consult  -  Warfarin    Dillon Vo is a  70 y.o. male   Height - 180.3 cm (71\")  Weight - 64.9 kg (143 lb)    Consulting Provider: - CTS  Indication: - Afib, mechanical AVR  Goal INR: - 2.5-3.5  Home Regimen:   - Warfarin 5mg  oral daily except 2.5mg Wednesdays    Bridge Therapy: none    Drug-Drug Interactions with current regimen:  Citalopram/ASA - may increase bleeding risk  (home med)    Warfarin Dosing During Admission:    Date  4/7 4/8 4/9 4/10 4/11 4/12 4/13     INR  1.16 1.14 1.51 2.21 2.2 2.43 2.99     Dose  5mg 7.5mg 5 mg  3 mg 5 mg 5mg 5mg       Education Provided: Warfarin education provided on 4/8 verbally and in writing.  Discussed effects of warfarin, importance of checking INR, drug-drug and drug-food interactions, and signs/symptoms of bleeding and clotting.  Patient verbalized understanding through teach back.  All pertinent questions were answered.      Discharge Follow up:   Following Provider - Dr. Cintron (PCP)   Follow up time range or appointment - 2-3 days post discharge    Labs:    Results from last 7 days   Lab Units 04/13/21  0456 04/12/21  0529 04/11/21  0649 04/10/21  0350 04/09/21  0203 04/08/21  0318 04/07/21  1717   INR  2.99* 2.43* 2.20* 2.21* 1.51* 1.14 1.16   HEMOGLOBIN g/dL  --   --   --   --   --  12.4*  --    HEMATOCRIT %  --   --   --   --   --  38.1  --      Results from last 7 days   Lab Units 04/08/21  0318   SODIUM mmol/L 134*   POTASSIUM mmol/L 3.7   CHLORIDE mmol/L 97*   CO2 mmol/L 30.0*   BUN mg/dL 11   CREATININE mg/dL 0.88   CALCIUM mg/dL 8.6   GLUCOSE mg/dL 125*       Current dietary intake: 50% of documented meals on 4/13 and 4/11.  Diet Order   Procedures    Diet Regular; Cardiac     Assessment/Plan:     Pharmacy dosing Warfarin for Afib/mechanical AVR, goal INR 2.5-3.5. Pt has hx of large rectus sheath hematoma w/ warfarin reversal and subsequent embolic stroke in March of 2020.     1. Patient's INR is therapeutic today at 2.99. Goal INR 2.5 " to 3.5.  2. Recommend continuing warfarin 5 mg this evening. (patients home dose)  3. Daily PT/INR ordered.  4. Monitor signs/symptoms of bleeding, dietary intake, and drug-drug interactions. Make dose adjustments as necessary.    Discharge recommendations: TBD    Pharmacy will continue to follow.    Rea Tucker, PharmD  Pharmacy Resident  4/13/2021  09:13 EDT

## 2021-04-13 NOTE — PROGRESS NOTES
"Dillon Vo  [id number]  1950     LOS: 6 days   Patient Care Team:  Ez Cintron MD as PCP - General  Ez Tucker MD as Consulting Physician (Cardiology)  Payam Kenney MD as Referring Physician (Pulmonary Disease)  Luis Alberto Carter MD as Consulting Physician (Radiation Oncology)    Chief Complaint: Bronchogenic carcinoma      Subjective: No complaints    Objective:     Vital Sign Min/Max for last 24 hours  Temp  Min: 96.8 °F (36 °C)  Max: 97 °F (36.1 °C)   BP  Min: 136/90  Max: 144/79   Pulse  Min: 69  Max: 73   Resp  Min: 16  Max: 20   SpO2  Min: 95 %  Max: 99 %   No data recorded   No data recorded     Flowsheet Rows      First Filed Value   Admission Height  180.3 cm (71\") Documented at 04/07/2021 0950   Admission Weight  64.9 kg (143 lb) Documented at 04/07/2021 0950          Physical Exam:    Wound:    Pulses:     Mediastinal and Chest Tube Drainage: No air leak       Results Review:   Results from last 7 days   Lab Units 04/08/21  0318   WBC 10*3/mm3 8.27   HEMOGLOBIN g/dL 12.4*   HEMATOCRIT % 38.1   PLATELETS 10*3/mm3 109*     Results from last 7 days   Lab Units 04/08/21  0318   SODIUM mmol/L 134*   POTASSIUM mmol/L 3.7   CHLORIDE mmol/L 97*   CO2 mmol/L 30.0*   BUN mg/dL 11   CREATININE mg/dL 0.88   GLUCOSE mg/dL 125*   CALCIUM mg/dL 8.6             Assessment      Lung nodule < 6cm on CT (S/P Left thoracotomy and MARKO Lobectomy)    A-fib (CMS/HCC)    Depression    Dyslipidemia    Hypertension    Tobacco use    CAD     H/O mechanical AVR     COPD, mild (CMS/HCC)    Chronic anticoagulation on warfarin     Moderate pulmonary HTN  (Est RVSP 53mmHg 2/2021)    GERD (gastroesophageal reflux disease)      DC chest tube.  5 AM portable chest x-ray.  Hopefully discharge tomorrow        Chi Post MD  04/13/21  06:57 EDT      Please note that portions of this note were completed with a voice recognition program. Efforts were made to edit the dictations, but words " may be mistranscribed

## 2021-04-13 NOTE — PLAN OF CARE
Goal Outcome Evaluation:  Plan of Care Reviewed With: patient     Outcome Summary: VSS, room air, pain controlled with oral meds, oral stool softners continue, paced on telemetry, 50 cc output from CT overnight, dsg changed this AM, no issues

## 2021-04-14 ENCOUNTER — APPOINTMENT (OUTPATIENT)
Dept: GENERAL RADIOLOGY | Facility: HOSPITAL | Age: 71
End: 2021-04-14

## 2021-04-14 ENCOUNTER — READMISSION MANAGEMENT (OUTPATIENT)
Dept: CALL CENTER | Facility: HOSPITAL | Age: 71
End: 2021-04-14

## 2021-04-14 VITALS
HEART RATE: 69 BPM | WEIGHT: 143 LBS | HEIGHT: 71 IN | RESPIRATION RATE: 20 BRPM | DIASTOLIC BLOOD PRESSURE: 91 MMHG | BODY MASS INDEX: 20.02 KG/M2 | OXYGEN SATURATION: 98 % | TEMPERATURE: 96.8 F | SYSTOLIC BLOOD PRESSURE: 146 MMHG

## 2021-04-14 LAB
INR PPP: 2.2 (ref 0.85–1.16)
PROTHROMBIN TIME: 23.5 SECONDS (ref 11.4–14.4)

## 2021-04-14 PROCEDURE — 99232 SBSQ HOSP IP/OBS MODERATE 35: CPT | Performed by: INTERNAL MEDICINE

## 2021-04-14 PROCEDURE — 85610 PROTHROMBIN TIME: CPT | Performed by: PHYSICIAN ASSISTANT

## 2021-04-14 PROCEDURE — 71045 X-RAY EXAM CHEST 1 VIEW: CPT

## 2021-04-14 RX ORDER — WARFARIN SODIUM 7.5 MG/1
7.5 TABLET ORAL
Status: DISCONTINUED | OUTPATIENT
Start: 2021-04-14 | End: 2021-04-14 | Stop reason: HOSPADM

## 2021-04-14 RX ADMIN — CARVEDILOL 12.5 MG: 12.5 TABLET, FILM COATED ORAL at 09:58

## 2021-04-14 RX ADMIN — DOCUSATE SODIUM 50MG AND SENNOSIDES 8.6MG 1 TABLET: 8.6; 5 TABLET, FILM COATED ORAL at 09:56

## 2021-04-14 RX ADMIN — BUPROPION HYDROCHLORIDE 300 MG: 150 TABLET, FILM COATED, EXTENDED RELEASE ORAL at 09:57

## 2021-04-14 RX ADMIN — LISINOPRIL 10 MG: 10 TABLET ORAL at 09:58

## 2021-04-14 RX ADMIN — Medication 1000 MCG: at 09:57

## 2021-04-14 RX ADMIN — ASPIRIN 81 MG: 81 TABLET, COATED ORAL at 09:57

## 2021-04-14 RX ADMIN — FAMOTIDINE 20 MG: 20 TABLET, FILM COATED ORAL at 09:57

## 2021-04-14 RX ADMIN — Medication 400 MCG: at 09:57

## 2021-04-14 RX ADMIN — FINASTERIDE 5 MG: 5 TABLET, FILM COATED ORAL at 09:56

## 2021-04-14 RX ADMIN — CITALOPRAM HYDROBROMIDE 20 MG: 20 TABLET ORAL at 09:58

## 2021-04-14 NOTE — PROGRESS NOTES
Continued Stay Note  [facility]     Patient Name: Dillon Vo  MRN: [id number]  Today's Date: 4/14/2021    Admit Date: 4/7/2021    Discharge Plan     Row Name 04/14/21 1140       Plan    Plan  HOME    Patient/Family in Agreement with Plan  yes    Plan Comments  CT DCd today.  Anticipate DC this afternoon.  Per bedside f/u with pt today, plan remains to return home.  No immediate needs identified/voiced.  CM will cont to follow.    Final Discharge Disposition Code  01 - home or self-care        Discharge Codes    No documentation.       Expected Discharge Date and Time     Expected Discharge Date Expected Discharge Time    Apr 14, 2021             Lima Lee RN

## 2021-04-14 NOTE — PROGRESS NOTES
"Dillon Vo  [id number]  1950     LOS: 7 days   Patient Care Team:  Ez Cintron MD as PCP - General  Ez Tucker MD as Consulting Physician (Cardiology)  Payam Kenney MD as Referring Physician (Pulmonary Disease)  Luis Alberto Carter MD as Consulting Physician (Radiation Oncology)    Chief Complaint: Bronchogenic carcinoma      Subjective: Wants to go home    Objective:     Vital Sign Min/Max for last 24 hours  Temp  Min: 96.8 °F (36 °C)  Max: 97.9 °F (36.6 °C)   BP  Min: 122/77  Max: 154/89   Pulse  Min: 69  Max: 79   Resp  Min: 18  Max: 20   SpO2  Min: 94 %  Max: 99 %   No data recorded   No data recorded     Flowsheet Rows      First Filed Value   Admission Height  180.3 cm (71\") Documented at 04/07/2021 0950   Admission Weight  64.9 kg (143 lb) Documented at 04/07/2021 0950          Physical Exam:    Wound: Satisfactory    Pulses:     Mediastinal and Chest Tube Drainage:       Results Review:   Results from last 7 days   Lab Units 04/08/21  0318   WBC 10*3/mm3 8.27   HEMOGLOBIN g/dL 12.4*   HEMATOCRIT % 38.1   PLATELETS 10*3/mm3 109*     Results from last 7 days   Lab Units 04/08/21  0318   SODIUM mmol/L 134*   POTASSIUM mmol/L 3.7   CHLORIDE mmol/L 97*   CO2 mmol/L 30.0*   BUN mg/dL 11   CREATININE mg/dL 0.88   GLUCOSE mg/dL 125*   CALCIUM mg/dL 8.6             Assessment      Lung nodule < 6cm on CT (S/P Left thoracotomy and MARKO Lobectomy)    A-fib (CMS/HCC)    Depression    Dyslipidemia    Hypertension    Tobacco use    CAD     H/O mechanical AVR     COPD, mild (CMS/HCC)    Chronic anticoagulation on warfarin     Moderate pulmonary HTN  (Est RVSP 53mmHg 2/2021)    GERD (gastroesophageal reflux disease)      Discharge if all agree        Chi Post MD  04/14/21  06:26 EDT      Please note that portions of this note were completed with a voice recognition program. Efforts were made to edit the dictations, but words may be mistranscribed  "

## 2021-04-14 NOTE — PROGRESS NOTES
"Pharmacy Consult  -  Warfarin    Dillon Vo is a  70 y.o. male   Height - 180.3 cm (71\")  Weight - 64.9 kg (143 lb)    Consulting Provider: - CTS  Indication: - Afib, mechanical AVR  Goal INR: - 2.5-3.5  Home Regimen:   - Warfarin 5mg  oral daily except 2.5mg Wednesdays    Bridge Therapy: none    Drug-Drug Interactions with current regimen:  Citalopram/ASA - may increase bleeding risk  (home med)  APAP PRN- May increase bleeding risk  Oxycodone-APAP PRN -May increase bleeding risk    Warfarin Dosing During Admission:    Date  4/7 4/8 4/9 4/10 4/11 4/12 4/13 4/14    INR  1.16 1.14 1.51 2.21 2.2 2.43 2.99 2.2    Dose  5mg 7.5mg 5 mg  3 mg 5 mg 5mg 5mg 7.5mg      Education Provided: Warfarin education provided on 4/8 verbally and in writing.  Discussed effects of warfarin, importance of checking INR, drug-drug and drug-food interactions, and signs/symptoms of bleeding and clotting.  Patient verbalized understanding through teach back.  All pertinent questions were answered.      Discharge Follow up:   Following Provider - Dr. Cintron (PCP)   Follow up time range or appointment - 2-3 days post discharge    Labs:    Results from last 7 days   Lab Units 04/14/21  0443 04/13/21  0456 04/12/21  0529 04/11/21  0649 04/10/21  0350 04/09/21  0203 04/08/21  0318   INR  2.20* 2.99* 2.43* 2.20* 2.21* 1.51* 1.14   HEMOGLOBIN g/dL  --   --   --   --   --   --  12.4*   HEMATOCRIT %  --   --   --   --   --   --  38.1     Results from last 7 days   Lab Units 04/08/21  0318   SODIUM mmol/L 134*   POTASSIUM mmol/L 3.7   CHLORIDE mmol/L 97*   CO2 mmol/L 30.0*   BUN mg/dL 11   CREATININE mg/dL 0.88   CALCIUM mg/dL 8.6   GLUCOSE mg/dL 125*       Current dietary intake: 50%-75%  of documented meals on 4/13.  Diet Order   Procedures   • Diet Regular; Cardiac     Assessment/Plan:     Pharmacy dosing Warfarin for Afib/mechanical AVR, goal INR 2.5-3.5. Pt has hx of large rectus sheath hematoma w/ warfarin reversal and subsequent " embolic stroke in March of 2020.     1. Patient's INR dropped and is subtherapeutic today at 2.20. Goal INR 2.5 to 3.5. Patient is eating more now based on documented meals in EMR.  2. Recommend to give a boosted dose of warfarin 7.5 mg this evening given the risk of a subtherapeutic INR with a mechanical AVR.   3. Daily PT/INR ordered.  4. Monitor signs/symptoms of bleeding, dietary intake, and drug-drug interactions. Make dose adjustments as necessary.    Discharge recommendations: TBD    Pharmacy will continue to follow.    Rea Tucker, PharmD  Pharmacy Resident  4/14/2021  12:30 EDT

## 2021-04-14 NOTE — PROGRESS NOTES
[facility] Medicine Services  PROGRESS NOTE    Patient Name: Dillon Vo  : 1950  MRN: [id number]    Date of Admission: 2021  Primary Care Physician: Ez Cintron MD    Subjective   Subjective     CC:  Lung surgery    HPI:  Doing well; says he will be going home today.     ROS:  Gen- No fevers, chills  Resp- No cough, dyspnea  GI- No N/V/D, abd pain +BM    Objective   Objective     Vital Signs:   Temp:  [96.8 °F (36 °C)-97.9 °F (36.6 °C)] 96.8 °F (36 °C)  Heart Rate:  [69-79] 69  Resp:  [18] 18  BP: (122-154)/(77-89) 138/83        Physical Exam:  Constitutional: No acute distress, dozing in bed, rousable  HENT: NCAT, mucous membranes moist  Neck: Supple,   Respiratory: Clear to auscultation bilaterally, good effort, nonlabored respirations   Back not examined  Cardiovascular: RRR  Gastrointestinal: Positive bowel sounds, soft, nontender, nondistended  Musculoskeletal: No peripheral edema, normal muscle tone for age  Skin: No rashes, no jaundice, no petechiae, no mottling      Results Reviewed:  Results from last 7 days   Lab Units 21  0443 21  0456 21  0529 21  0318   WBC 10*3/mm3  --   --   --  8.27   HEMOGLOBIN g/dL  --   --   --  12.4*   HEMATOCRIT %  --   --   --  38.1   PLATELETS 10*3/mm3  --   --   --  109*   INR  2.20* 2.99* 2.43* 1.14     Results from last 7 days   Lab Units 21  0318   SODIUM mmol/L 134*   POTASSIUM mmol/L 3.7   CHLORIDE mmol/L 97*   CO2 mmol/L 30.0*   BUN mg/dL 11   CREATININE mg/dL 0.88   GLUCOSE mg/dL 125*   CALCIUM mg/dL 8.6     Estimated Creatinine Clearance: 71.7 mL/min (by C-G formula based on SCr of 0.88 mg/dL).    Microbiology Results Abnormal     None          Imaging Results (Last 24 Hours)     Procedure Component Value Units Date/Time    XR Chest 1 View [[id number]] Resulted: 218     Updated: 21          Results for orders placed during the hospital encounter of  03/19/21    Adult Transthoracic Echo Complete W/ Cont if Necessary Per Protocol    Interpretation Summary  · There is biatrial enlargement. Left atrial volume is severely increased.  · The inferior vena cava is dilated and inspiratory collapse is absent.  · There is a mechanical aortic valve. I cannot exclude a small perivalvular leak (AI) with confidence.  · There is mitral annular calcification and moderate mitral regurgitation.  · There is moderate tricuspid regurgitation and moderate estimated PA hypertension (RVSP=53 mmHg).  · There is no pericardial effusion.      I have reviewed the medications:  Scheduled Meds:aspirin, 81 mg, Oral, Daily  atorvastatin, 80 mg, Oral, Nightly  buPROPion XL, 300 mg, Oral, Daily  carvedilol, 12.5 mg, Oral, BID  citalopram, 20 mg, Oral, Daily  famotidine, 20 mg, Oral, Daily  finasteride, 5 mg, Oral, Daily  folic acid, 400 mcg, Oral, Daily  lisinopril, 10 mg, Oral, BID  vitamin B-12, 1,000 mcg, Oral, Daily  warfarin, 5 mg, Oral, Daily      Continuous Infusions:lactated ringers, 9 mL/hr, Last Rate: Stopped (04/07/21 1326)  Pharmacy to dose warfarin,       PRN Meds:.•  acetaminophen  •  bisacodyl  •  ipratropium-albuterol  •  lactated ringers  •  naloxone  •  ondansetron **OR** ondansetron  •  oxyCODONE-acetaminophen  •  Pharmacy to dose warfarin  •  senna-docusate sodium    Assessment/Plan   Assessment & Plan     Active Hospital Problems    Diagnosis  POA   • **Lung nodule < 6cm on CT (S/P Left thoracotomy and MARKO Lobectomy) [R91.1]  Yes   • GERD (gastroesophageal reflux disease) [K21.9]  Yes   • Moderate pulmonary HTN  (Est RVSP 53mmHg 2/2021) [I27.20]  Yes   • Chronic anticoagulation on warfarin  [Z79.01]  Not Applicable   • COPD, mild (CMS/HCC) [J44.9]  Yes   • H/O mechanical AVR  [Z95.2]  Not Applicable   • CAD  [I25.10]  Yes   • Tobacco use [Z72.0]  Yes   • A-fib (CMS/HCC) [I48.91]  Unknown   • Depression [F32.9]  Yes   • Dyslipidemia [E78.5]  Yes   • Hypertension [I10]  Yes       Resolved Hospital Problems   No resolved problems to display.        Brief Hospital Course to date:  Dillon Vo is a 70 y.o. male with mech ARV, Afib post ablation, CAD, HTN HL.  Now stage Ib adenocarcinoma with lipidic component who underwent left upper lobectomy by Dr. Post on 4/7/2021.  Patient was initially cared for in the intensive care unit and was transferred to the hospital service on 4/11/2021.    Stage Ib adenocarcinoma with lipidic component of the lung  -Status post left upper lobectomy by Dr. Post on 4/7/2021  -chest tubes out  - warfarin restarted  - walking in halls    Constipation x one week  -Added MiraLAX scheduled x2-day  -Added as needed enema, suppository and stool softeners    Chronic Macrocytosis, mild TCP  - on B12 and folate but recheck levels - normal  - suspect MDS.       COPD  Tobacco abuse  Moderate pulmonary hypertension  -Encouraged tobacco cessation    History of mechanical aortic valve replacement  CAD  Atrial fibrillation  - EF 53, biatrial enlargement   -On Coumadin; INR now therapeutic  -Continue carvedilol, aspirin, lisinopril      Depression  Dyslipidemia  Hypertension  GERD      DVT Prophylaxis: Coumadin      Disposition: DC home anytime    CODE STATUS:   Code Status and Medical Interventions:   Ordered at: 04/07/21 1410     Code Status:    CPR     Medical Interventions (Level of Support Prior to Arrest):    Full       Leila Nicholas MD  04/14/21

## 2021-04-14 NOTE — OUTREACH NOTE
Prep Survey      Responses   [facility] facility patient discharged from?  [facility]   Is LACE score < 7 ?  No   Emergency Room discharge w/ pulse ox?  No   Eligibility  [facility]   Date of Admission  04/07/21   Date of Discharge  04/14/21   Discharge Disposition  Home or Self Care   Discharge diagnosis  Lung nodule  BRONCHOSCOPY THORACOTOMY   Does the patient have one of the following disease processes/diagnoses(primary or secondary)?  Cardiothoracic surgery   Does the patient have Home health ordered?  No   Is there a DME ordered?  No   Prep survey completed?  Yes          Roselia Argueta RN

## 2021-04-14 NOTE — PLAN OF CARE
Goal Outcome Evaluation:        Outcome Summary: VSS on room air. Ambulated in hallway. Oral stool softeners, miralax and prune juice given. No BM over night. complained of pain once and PRN meds were given. Denies SOA. should be discharged today. No signs of acute distress.

## 2021-04-15 ENCOUNTER — TRANSITIONAL CARE MANAGEMENT TELEPHONE ENCOUNTER (OUTPATIENT)
Dept: CALL CENTER | Facility: HOSPITAL | Age: 71
End: 2021-04-15

## 2021-04-15 NOTE — OUTREACH NOTE
Call Center TCM Note      Responses   [facility] patient discharged from?  [place]   Does the patient have one of the following disease processes/diagnoses(primary or secondary)?  Cardiothoracic surgery   TCM attempt successful?  Yes   Call start time  1008   Call end time  1010   Discharge diagnosis  Lung nodule  BRONCHOSCOPY THORACOTOMY   Does the patient have all medications related to this admission filled (includes all antibiotics, pain medications, cardiac medications, etc.)  Yes   Is the patient taking all medications as directed (includes completed medication regime)?  Yes   Does the patient have a primary care provider?   Yes   Does the patient have an appointment scheduled with their C/T surgeon?  No   Comments regarding PCP  f/u with Dr. Gonzalez on 4/19 at 9 am   What is preventing the patient from scheduling follow up appointments?  Waiting on return call   Nursing Interventions  Advised patient to make appointment   Has the patient kept scheduled appointments due by today?  N/A   Psychosocial issues?  No   Did the patient receive a copy of their discharge instructions?  Yes   Nursing interventions  Reviewed instructions with patient   What is the patient's perception of their health status since discharge?  Improving   Nursing interventions  Nurse provided patient education   Is the patient /caregiver able to teach back basic post-op care?  Continue use of incentive spirometry at least 1 week post discharge, Practice 'cough and deep breath', Drive as instructed by MD in discharge instructions, Take showers only when approved by MD-sponge bathe until then, No tub bath, swimming, or hot tub until instructed by MD, Keep incision areas clean, dry and protected, Do not remove steri-strips, Lifting as instructed by MD in discharge instructions   Is the patient/caregiver able to teach back signs and symptoms of incisional infection?  Fever   Is the patient/caregiver able to teach back steps to recovery  at home?  Set small, achievable goals for return to baseline health, Rest and rebuild strength, gradually increase activity   Is the patient/caregiver able to teach back the hierarchy of who to call/visit for symptoms/problems? PCP, Specialist, Home health nurse, Urgent Care, ED, 911  Yes   TCM call completed?  Yes   Wrap up additional comments  States he is doing well and is following his discharge instructions closely, confirmed f/u with PCP for 4/19, no questions or concerns at this time.          Larisa Tucker RN    4/15/2021, 10:11 EDT

## 2021-04-18 ENCOUNTER — MDT ASSESSMENT (OUTPATIENT)
Dept: ONCOLOGY | Facility: CLINIC | Age: 71
End: 2021-04-18

## 2021-04-18 NOTE — PROGRESS NOTES
CANCER CONFERENCE AGENDA  DATE:  2021      SPECIALTY:  Thoracic   PRESENTER:    Carter Burger M.D.  SITE:  Lung        HPI:  70 YOWF who presented with progressive growth of MARKO nodule initially discovered on screening CT chest (2019)       REFERRING PROVIDER:  Payam Kenney M.D. (Pulmonary & Critical Care Medicine)       PLACE OF RESIDENCE:  [place], [place]           SOCIAL HISTORY:  Current every day smoker 1.5 PPD x57 years.  He is .       FAMILY HISTORY:  Non-contributory       PAST MEDICAL HISTORY:  Colon cancer s/p resection (), coronary artery disease w/prior stent, AFib, colitis, hypertension, bilateral pneumonia, thrombocytopenia.        PAST SURGICAL HISTORY:  Pacemaker implantation, varicose vein surgery, lithotripsy, screening colonoscopy ().   PFTs (21):  FEV1 - 77%           DLCO - Not available     NEXT GEN SEQUENCING: Not available      IMAGIN2019 ([facility]):  Low-dose CT chest - There is a non-calcified spiculated nodule identified within the left lung measuring on lung windows 9 mm.        2021 ([facility]):  CT chest - Extended lung windows reveal bullous formation right lung apex and centrilobular emphysema with a left upper lobe groundglass irregular marginated pulmonary nodule at the lateral periphery without pleural involvement,12 mm, unchanged from prior comparison, with superiorly lateral adjacent pleural scarring focus of nodularity also unchanged in overall appearance.        2021 ([facility]): PET w/CT - The area of concern nodular focus left upper lobe has enlarged from prior PET/CT as seen on recent diagnostic CT chest examination with borderline hypermetabolic activity maximum SUV 2.3 versus 0.9 on prior comparison 2019 examination.     CLINICAL STAGE:    T 1b, N 0, M 0 and stage IA2     SURGICAL PROCEDURE: 2021 ([facility]):  Left thoracotomy, MARKO lobectomy with lymph node sampling - Adenocarcinoma, invasive and lepidic pattern, moderately  differentiated.      PATHOLOGY: Tumor site:  MARKO.  Tumor size:  ~3.2 cm.  Pleural invasion: Not identified.  Bronchial/vascular margins: Negative.  Lymphovascular invasion: Not identified.   Nodes:  0/3 (0/1 Level 7, 0/1 Level 8, 0/1 hilar).  pT2aN0 Stage IB      TREATMENT PLAN DISCUSSION:      Clinical Trials: Yes     Protocol: Razor - Pathologic stage I-IIA Non-Squamous NSCLC    Treatment Recommendations/Referrals: Refer to medical oncology for potential clinical trial; active surveillance    EVIDENCE BASED NATIONAL TREATMENT GUIDELINES:  National Comprehensive Cancer Network      Please discuss clinical/working stage, TNM, Stage Group, National Treatment Guidelines, and Prognostic Indicators.      Privileged and Confidential Patient Safety Work Product:  The information contained herein has been compiled as part of the [facility] Patient Safety Evaluation System and is deemed to be a Patient Safety Work Product and is privileged and confidential.  Disclaimer:  Multidisciplinary cancer conferences provide consultative services to formulate an effective treatment plan for patients and include physician representation from medical oncology, radiation oncology, radiology, surgery, and pathology.  AJCC or other appropriate staging is discussed, along with site-specific prognostic indicators and treatment planning used by evidence-based treatment guidelines.  Treatment plans which are discussed during conference may/may not necessarily be followed by the patient's managing physician(s), as there may be other factors taken into consideration which impact the treatment plan.  The specific treatment plan is ultimately determined on an individual basis by the patient and the physician(s) involved in his/her care.

## 2021-04-19 ENCOUNTER — OFFICE VISIT (OUTPATIENT)
Dept: INTERNAL MEDICINE | Facility: CLINIC | Age: 71
End: 2021-04-19

## 2021-04-19 VITALS
SYSTOLIC BLOOD PRESSURE: 116 MMHG | BODY MASS INDEX: 19.8 KG/M2 | WEIGHT: 142 LBS | RESPIRATION RATE: 16 BRPM | DIASTOLIC BLOOD PRESSURE: 70 MMHG | HEART RATE: 66 BPM | TEMPERATURE: 97.1 F

## 2021-04-19 DIAGNOSIS — I25.10 CORONARY ARTERY DISEASE INVOLVING NATIVE CORONARY ARTERY OF NATIVE HEART WITHOUT ANGINA PECTORIS: ICD-10-CM

## 2021-04-19 DIAGNOSIS — I10 ESSENTIAL HYPERTENSION: ICD-10-CM

## 2021-04-19 DIAGNOSIS — Z95.2 MECHANICAL HEART VALVE PRESENT: ICD-10-CM

## 2021-04-19 DIAGNOSIS — J44.9 COPD, MILD (HCC): ICD-10-CM

## 2021-04-19 DIAGNOSIS — C34.12 MALIGNANT NEOPLASM OF UPPER LOBE OF LEFT LUNG (HCC): Primary | ICD-10-CM

## 2021-04-19 DIAGNOSIS — I48.21 PERMANENT ATRIAL FIBRILLATION (HCC): ICD-10-CM

## 2021-04-19 DIAGNOSIS — Z79.01 CHRONIC ANTICOAGULATION: ICD-10-CM

## 2021-04-19 LAB
ALBUMIN SERPL-MCNC: 3.8 G/DL (ref 3.5–5.2)
ALBUMIN/GLOB SERPL: 1.3 G/DL
ALP SERPL-CCNC: 96 U/L (ref 39–117)
ALT SERPL W P-5'-P-CCNC: 50 U/L (ref 1–41)
ANION GAP SERPL CALCULATED.3IONS-SCNC: 7.8 MMOL/L (ref 5–15)
AST SERPL-CCNC: 43 U/L (ref 1–40)
BASOPHILS # BLD AUTO: 0.16 10*3/MM3 (ref 0–0.2)
BASOPHILS NFR BLD AUTO: 2 % (ref 0–1.5)
BILIRUB SERPL-MCNC: 1.1 MG/DL (ref 0–1.2)
BUN SERPL-MCNC: 14 MG/DL (ref 8–23)
BUN/CREAT SERPL: 14.4 (ref 7–25)
CALCIUM SPEC-SCNC: 9.4 MG/DL (ref 8.6–10.5)
CHLORIDE SERPL-SCNC: 99 MMOL/L (ref 98–107)
CO2 SERPL-SCNC: 30.2 MMOL/L (ref 22–29)
CREAT SERPL-MCNC: 0.97 MG/DL (ref 0.76–1.27)
DEPRECATED RDW RBC AUTO: 49.5 FL (ref 37–54)
EOSINOPHIL # BLD AUTO: 0.3 10*3/MM3 (ref 0–0.4)
EOSINOPHIL NFR BLD AUTO: 3.7 % (ref 0.3–6.2)
ERYTHROCYTE [DISTWIDTH] IN BLOOD BY AUTOMATED COUNT: 13.1 % (ref 12.3–15.4)
GFR SERPL CREATININE-BSD FRML MDRD: 77 ML/MIN/1.73
GLOBULIN UR ELPH-MCNC: 3 GM/DL
GLUCOSE SERPL-MCNC: 92 MG/DL (ref 65–99)
HCT VFR BLD AUTO: 42.4 % (ref 37.5–51)
HGB BLD-MCNC: 14.4 G/DL (ref 13–17.7)
IMM GRANULOCYTES # BLD AUTO: 0.05 10*3/MM3 (ref 0–0.05)
IMM GRANULOCYTES NFR BLD AUTO: 0.6 % (ref 0–0.5)
INR PPP: 1.5 (ref 2.5–3.5)
LYMPHOCYTES # BLD AUTO: 1.7 10*3/MM3 (ref 0.7–3.1)
LYMPHOCYTES NFR BLD AUTO: 20.8 % (ref 19.6–45.3)
MCH RBC QN AUTO: 35.4 PG (ref 26.6–33)
MCHC RBC AUTO-ENTMCNC: 34 G/DL (ref 31.5–35.7)
MCV RBC AUTO: 104.2 FL (ref 79–97)
MONOCYTES # BLD AUTO: 0.71 10*3/MM3 (ref 0.1–0.9)
MONOCYTES NFR BLD AUTO: 8.7 % (ref 5–12)
NEUTROPHILS NFR BLD AUTO: 5.25 10*3/MM3 (ref 1.7–7)
NEUTROPHILS NFR BLD AUTO: 64.2 % (ref 42.7–76)
NRBC BLD AUTO-RTO: 0 /100 WBC (ref 0–0.2)
PLATELET # BLD AUTO: 232 10*3/MM3 (ref 140–450)
PMV BLD AUTO: 12.2 FL (ref 6–12)
POTASSIUM SERPL-SCNC: 4.1 MMOL/L (ref 3.5–5.2)
PROT SERPL-MCNC: 6.8 G/DL (ref 6–8.5)
RBC # BLD AUTO: 4.07 10*6/MM3 (ref 4.14–5.8)
SODIUM SERPL-SCNC: 137 MMOL/L (ref 136–145)
WBC # BLD AUTO: 8.17 10*3/MM3 (ref 3.4–10.8)

## 2021-04-19 PROCEDURE — 85610 PROTHROMBIN TIME: CPT | Performed by: INTERNAL MEDICINE

## 2021-04-19 PROCEDURE — 36416 COLLJ CAPILLARY BLOOD SPEC: CPT | Performed by: INTERNAL MEDICINE

## 2021-04-19 PROCEDURE — 85025 COMPLETE CBC W/AUTO DIFF WBC: CPT | Performed by: INTERNAL MEDICINE

## 2021-04-19 PROCEDURE — 80053 COMPREHEN METABOLIC PANEL: CPT | Performed by: INTERNAL MEDICINE

## 2021-04-19 PROCEDURE — 99495 TRANSJ CARE MGMT MOD F2F 14D: CPT | Performed by: INTERNAL MEDICINE

## 2021-04-19 PROCEDURE — 1111F DSCHRG MED/CURRENT MED MERGE: CPT | Performed by: INTERNAL MEDICINE

## 2021-04-19 NOTE — PROGRESS NOTES
Transitional Care Follow Up Visit  Subjective      Chief Complaint   Patient presents with   • Hospital Follow Up Visit     cancer          Dillon Vo is a 70 y.o. male who presents for a transitional care management visit.    Within 48 business hours after discharge our office contacted him via telephone to coordinate his care and needs.      I reviewed and discussed the details of that call along with the discharge summary, hospital problems, inpatient lab results, inpatient diagnostic studies, and consultation reports with Dillon.     Current outpatient and discharge medications have been reconciled for the patient.  Reviewed by: Mary Gonzalez MD      Date of TCM Phone Call 4/14/2021   [facility]   Date of Admission 4/7/2021   Date of Discharge 4/14/2021   Discharge Disposition Home or Self Care     Risk for Readmission (LACE) Score: 13 (4/14/2021  6:01 AM)      History of Present Illness     Course During Hospital Stay: The patient is here for a follow-up visit.  He was admitted to [facility] 4/7/2021 and discharged on 4/14/2021.  Admitting diagnosis was Lung Nodule less than 6 cm on CT.  The patient underwent a Bronchoscopy, left Thoracotomy, and left Lung Resection on 4/7/2021.  The procedure reportedly went well.  Pathology was consistent with Invasive Adenocarcinoma.    The patient has a history of Coronary Artery Disease, Atrial Fibrillation, Pacemaker, and Hypertension, and COPD.  He has a Mechanical Heart Valve.  He is on his Aspirin, Atorvastatin, Coreg, Lisinopril, and Warfarin.  He did have a Pacemaker check on 4/6/2021.  He is not on any medication or inhalers for the COPD.     Pre-admission labs on 4/6/2021 showed a normal BMP.  Hepatic function panel was normal with exception of a slightly elevated bilirubin (1.6).  CBC was significant for mild anemia (H/H 12.8/39.0).  Hemoglobin A1c was 5.4.  Covid testing was negative.  Labs on 4/8/2021 were significant for a normal BMP  with the exception of a slightly low sodium level (134).  CBC was normal with the exception of mild anemia (H/H 12.4/38.1).  On 4/13/2021,  B12 and folate were normal.    Imaging: Chest x-ray on 4/7/2021 showed post thoracotomy changes with minimal subcutaneous emphysema.  Serial chest x-rays from 4/7/2020 3 4/12/2021 showed 2 chest tubes present, decreased lung volume present, but no pneumothorax.    Since discharge, the patient states he has had a little bit of incisional chest wall pain, controlled with Percocet, as well as some fatigue.  He denies any other chest pain, dyspnea, KU, orthopnea, and other cardiac symptoms.  He denies GI symptoms with the exception of some mild constipation due to the medication.  He denies cough, hemoptysis, and wheezing.    The patient has a follow-up appoint with Dr. Kenney on 5/12/2021 and with Dr. Tucker on 8/26/2021.  He states he also has a follow-up appointment with Dr. Post next week.     The following portions of the patient's history were reviewed and updated as appropriate: allergies, current medications, past family history, past medical history, past social history, past surgical history and problem list.    Review of Systems   Constitutional: Positive for fatigue. Negative for unexpected weight change.   Eyes: Negative for visual disturbance.   Respiratory: Negative for cough, shortness of breath and wheezing.         Denies hemoptysis.   Cardiovascular: Negative for chest pain, palpitations and leg swelling.        No KU, orthopnea, or claudication.  He has some incisional chest wall pain.   Gastrointestinal: Positive for constipation (mild). Negative for abdominal pain, blood in stool, diarrhea, nausea and vomiting.        Denies melena.   Endocrine: Negative for polydipsia and polyuria.   Musculoskeletal: Negative for arthralgias and myalgias.   Neurological: Negative for dizziness, syncope, light-headedness and headaches.        No memory issues.    Psychiatric/Behavioral: Negative for decreased concentration.       Objective   Physical Exam  Vitals and nursing note reviewed.   Constitutional:       Appearance: He is well-developed and normal weight.   Neck:      Thyroid: No thyroid mass or thyromegaly.      Vascular: No carotid bruit.   Cardiovascular:      Rate and Rhythm: Normal rate and regular rhythm.      Pulses: Normal pulses.      Heart sounds: Normal heart sounds. No murmur heard.   No friction rub. No gallop.       Comments: Mechanical click present.  Pulmonary:      Effort: Pulmonary effort is normal.      Breath sounds: Normal breath sounds.   Abdominal:      General: Bowel sounds are normal. There is no distension or abdominal bruit.      Palpations: Abdomen is soft. There is no hepatomegaly, splenomegaly or mass.      Tenderness: There is no abdominal tenderness.   Musculoskeletal:      Cervical back: Normal range of motion and neck supple.      Right lower leg: No edema.      Left lower leg: No edema.   Neurological:      Mental Status: He is alert.   Psychiatric:         Mood and Affect: Mood normal.         Assessment/Plan   Diagnoses and all orders for this visit:    1. Malignant neoplasm of upper lobe of left lung (CMS/HCC) (Primary)   Follow-up with Dr. Kenney and Dr. Post as scheduled.    2. COPD, mild (CMS/HCC)   Stable, no medication.    3. CAD   -     Comprehensive Metabolic Panel  -     CBC & Differential   Continue current medication(s) as noted in the history of present illness.   Follow-up with Dr. Tucker as scheduled.    4. Essential hypertension  -     Comprehensive Metabolic Panel  -     CBC & Differential   Continue current medication(s) as noted in the history of present illness.    5. Permanent atrial fibrillation (CMS/HCC)   Continue current medication(s) as noted in the history of present illness.   Follow-up with Dr. Tucker as scheduled.    6. Mechanical heart valve present  -     POC INR   Continue current medication(s)  as noted in the history of present illness.    7. Chronic anticoagulation on warfarin   -     POC INR         Transitional Care Management Certification  I certify that the following are true:  1. Communication was made within 2 business days of discharge.  2. Complexity of Medical Decision Making is moderate.  3. Face to face visit occurred within 5 days.    *Note: 70738 is for high complexity patients with a face to face visit within 7 days of discharge.  61637 is for high complexity patients with a face to face on days 8-14 post discharge or medium complexity with face to face visit within 14 days post discharge.      Return in about 4 months (around 8/19/2021) for Recheck HTN, fasting with Nury.

## 2021-04-20 PROCEDURE — 93296 REM INTERROG EVL PM/IDS: CPT | Performed by: INTERNAL MEDICINE

## 2021-04-20 PROCEDURE — 93294 REM INTERROG EVL PM/LDLS PM: CPT | Performed by: INTERNAL MEDICINE

## 2021-04-21 ENCOUNTER — LAB (OUTPATIENT)
Dept: INTERNAL MEDICINE | Facility: CLINIC | Age: 71
End: 2021-04-21

## 2021-04-21 DIAGNOSIS — I48.21 PERMANENT ATRIAL FIBRILLATION (HCC): Primary | ICD-10-CM

## 2021-04-21 LAB — INR PPP: 1.9 (ref 2.5–3.5)

## 2021-04-21 PROCEDURE — 85610 PROTHROMBIN TIME: CPT | Performed by: INTERNAL MEDICINE

## 2021-04-21 PROCEDURE — 36416 COLLJ CAPILLARY BLOOD SPEC: CPT | Performed by: INTERNAL MEDICINE

## 2021-04-22 ENCOUNTER — READMISSION MANAGEMENT (OUTPATIENT)
Dept: CALL CENTER | Facility: HOSPITAL | Age: 71
End: 2021-04-22

## 2021-04-22 NOTE — OUTREACH NOTE
CT Surgery Week 2 Survey      Responses   [facility] patient discharged from?  [place]   Does the patient have one of the following disease processes/diagnoses(primary or secondary)?  Cardiothoracic surgery   Week 2 attempt successful?  Yes   Call start time  1218   Call end time  1223   Meds reviewed with patient/caregiver?  Yes   Is the patient having any side effects they believe may be caused by any medication additions or changes?  No   Does the patient have all medications related to this admission filled (includes all antibiotics, pain medications, cardiac medications, etc.)  Yes   Is the patient taking all medications as directed (includes completed medication regime)?  Yes   Comments regarding appointments  Pt has seen pcp.   Does the patient have a primary care provider?   Yes   Has the patient kept scheduled appointments due by today?  Yes   Has home health visited the patient within 72 hours of discharge?  N/A   Psychosocial issues?  No   What is the patient's perception of their health status since discharge?  Improving   Week 2 call completed?  Yes   Wrap up additional comments  Pt reports doing well. Pt is able to rest and does take pain medication as needed.          Rosa More RN

## 2021-04-23 ENCOUNTER — LAB (OUTPATIENT)
Dept: INTERNAL MEDICINE | Facility: CLINIC | Age: 71
End: 2021-04-23

## 2021-04-23 DIAGNOSIS — I48.91 ATRIAL FIBRILLATION, UNSPECIFIED TYPE (HCC): Primary | ICD-10-CM

## 2021-04-23 LAB — INR PPP: 2.5 (ref 2.5–3.5)

## 2021-04-23 PROCEDURE — 85610 PROTHROMBIN TIME: CPT | Performed by: INTERNAL MEDICINE

## 2021-04-23 PROCEDURE — 36416 COLLJ CAPILLARY BLOOD SPEC: CPT | Performed by: INTERNAL MEDICINE

## 2021-04-23 NOTE — DISCHARGE SUMMARY
CTS Discharge Summary    Patient Care Team:  Ez Cintron MD as PCP - General  Ez Tucker MD as Consulting Physician (Cardiology)  Payam Kenney MD as Referring Physician (Pulmonary Disease)  Luis Alberto Carter MD as Consulting Physician (Radiation Oncology)  Consults:   Consults     No orders found from 3/9/2021 to 4/8/2021.          Date of Admission: 4/7/2021  6:45 AM  Date of Discharge:  4/14/2021    Discharge Diagnosis  Past Medical History:   Diagnosis Date   • A-fib (CMS/HCC)    • Anemia    • Atrial fibrillation (CMS/HCC) 5/5/2016    Difficult to control rate. RFA of AV node with implantation of left Bi-V pacemaker, 06/18/2013. Hematoma requiring single-chamber pacemaker implanted, 07/29/2013.    • Colitis    • Colon cancer (CMS/HCC)    • Coronary artery disease    • Depression    • GERD (gastroesophageal reflux disease)    • Heart valve disease    • Hiatal hernia    • HTN (hypertension)    • Hyperbilirubinemia    • Hyperlipidemia    • Infectious viral hepatitis    • Kidney stone    • Lung cancer (CMS/HCC)    • Orthostatic hypotension    • Sick sinus syndrome (CMS/HCC)    • Skin cancer     lip      Patient Active Problem List   Diagnosis   • A-fib (CMS/HCC)   • Depression   • Hyperbilirubinemia   • Dyslipidemia   • Hypertension   • Malignant neoplasm of overlapping sites of colon (CMS/HCC)   • History of artificial heart valve   • Neurogenic bladder   • Tobacco use   • CAD    • H/O mechanical AVR    • COPD, mild (CMS/HCC)   • Bilateral pneumonia   • Thrombocytopenia (CMS/HCC)   • Hemoptysis   • Chronic anticoagulation on warfarin    • Moderate pulmonary HTN  (Est RVSP 53mmHg 2/2021)   • Macrocytosis   • Self-catheterizes urinary bladder   • Subacute, multifocal ischemic strokes due to cardioembolism   • Lung cancer (CMS/HCC)   • Lung nodule < 6cm on CT (S/P Left thoracotomy and MARKO Lobectomy)   • GERD (gastroesophageal reflux disease)       Lung nodule < 6cm on CT [R91.1]  Lung  nodule [R91.1]     Procedures Performed  Procedure(s):  BRONCHOSCOPY LEFT THORACOTOMY, LEFT LUNG RESECTION       Operative Pathology:  Final Diagnosis   1.  LUNG, LEFT UPPER LOBE, LOBECTOMY:  Adenocarcinoma, invasive and lepidic pattern, moderately differentiated, estimated total tumor size of 3.5 cm in maximum dimension (see comment).  One hilar lymph node negative for tumor (0/1).  Hyalinized granuloma with central necrosis present within the lung parenchyma; special stains for fungal organisms and acid-fast bacilli pending to be reported as an addendum.  See comment and tumor synoptic for additional details.    2.  LYMPH NODE, LEVEL 7, EXCISION:  One lymph node negative for metastatic carcinoma (0/1).     3.  LYMPH NODE, LEVEL 8, EXCISION:  One lymph node negative for metastatic carcinoma (0/1).     4.  LYMPH NODE, LEVEL 9, EXCISION:  Benign fibroadipose tissue with no lymph node identified.     LUNG CARCINOMA      SPECIMEN TYPE/PROCEDURE: Lobectomy  SPECIMEN INTEGRITY: Intact  LATERALITY (Left or Right): Left  TUMOR FOCALITY: Unifocal  TUMOR SITE: Upper lobe  TOTAL TUMOR SIZE (Invasive and lepidic components): Estimated at 3.2 cm (see comment)  INVASIVE TUMOR SIZE (Invasive adenocarcinoma with lepidic component): Estimated at 3.5 cm (see comment)  HISTOLOGIC TYPE: Adenocarcinoma, invasive and lepidic  HISTOLOGIC GRADE: G2  VISCERAL PLEURAL INVASION: Not identified  DIRECT INVASION OF ADJACENT STRUCTURES (No adjacent structures or specify): Not identified  BRONCHIAL MARGIN: Negative  VASCULAR MARGIN: Negative  LYMPHVASCULAR INVASION: Not identified  PARENCHYMAL MARGIN: N/A  CHEST WALL MARGIN: N/A  OTHER TISSUE MARGIN: N/A  CLOSEST MARGIN (Specify): Bronchial  DISTANCE OF INVASIVE CARCINOMA FROM CLOSEST MARGIN: 3.5 cm  DISTANCE OF CARCINOMA IN-SITU FOR CLOSEST MARGIN: 3.5 cm  REGIONAL LYMPH NODE STATUS: Uninvolved  NUMBER OF LYMPH NODES EXAMINED: 3  MARY JANE STATIONS EXAMINED: 7, 8, 12  NUMBER OF LYMPH NODES  INVOLVED: 0  MARY JANE STATIONS INVOLVED: None  EXTRANODAL TUMOR SPREAD: N/A  TREATMENT EFFECT: No known presurgical therapy  ADDITIONAL PATHOLOGIC FINDINGS: Hyalinized granuloma with necrosis  ANCILLARY STUDIES: None performed  BIOMARKER REPORTING SPECIMEN ADEQUACY: Adequate  AJCC PATHOLOGIC STAGE:  (COMPLETED BY PATHOLOGIST, BASED ONLY ON TISSUE FINDINGS, MORE EXTENSIVE DISEASE MAY NOT BE KNOWN TO THE PATHOLOGIST)  pT= 2a  pN= 0  AJCC PATHOLOGIC STAGE:  IB         History of Present Illness  Patient is a 70 y.o. male who is being referred for evaluation of a left upper lobe mass.  The nodule has shown an enlargement and also increased in SUV.  The patient has been seen by Dr. Payam Kenney.  He has been a smoker and continues to smoke.  He has had a heart valve replaced in the past as well.  He has had no significant weight loss or hemoptysis.      Hospital Course  Patient was taken to the operating room on 4/7/21 for bronchoscopy, left thoracotomy with left upper lobectomy.  Patient was extubated in the operating room and transported to recovery in stable condition.  POD 1:  PCA for pain.  POD 2:  Coumadin resumed  POD 3:  Small air leak in CT. Transferred to telemetry.  POD 5: Chest tubes to waterseal  POD 6:  Chest tubes removed  POD 7:  Patient met discharge criteria and was discharged to home      Discharge Medications     Discharge Medications      Changes to Medications      Instructions Start Date   finasteride 5 MG tablet  Commonly known as: PROSCAR  What changed: when to take this  Notes to patient: Last dose given today at 9am   TAKE 1 TABLET BY MOUTH ONCE DAILY AT BEDTIME      folic acid 400 MCG tablet  Commonly known as: FOLVITE  What changed:   · how much to take  · how to take this  Notes to patient: Last dose given today at 9 am.    TAKE 1 TABLET BY MOUTH ONCE DAILY      warfarin 5 MG tablet  Commonly known as: COUMADIN  What changed: additional instructions  Notes to patient: Dose not given today     Take 1 tablet by mouth once daily         Continue These Medications      Instructions Start Date   aspirin 81 MG EC tablet  Notes to patient: Dose given today at 9M   1 tablet, Oral, Daily      atorvastatin 80 MG tablet  Commonly known as: LIPITOR   80 mg, Oral, Nightly      buPROPion  MG 24 hr tablet  Commonly known as: Wellbutrin XL  Notes to patient: Dose given today at 9am.   300 mg, Oral, Daily      carvedilol 12.5 MG tablet  Commonly known as: COREG  Notes to patient: Dose given last 9 am today.   12.5 mg, Oral, 2 Times Daily      citalopram 20 MG tablet  Commonly known as: CeleXA  Notes to patient: Dose given today at 9am   Take 1 tablet by mouth once daily      famotidine 20 MG tablet  Commonly known as: PEPCID  Notes to patient: Last dose given today at 9am   20 mg, Oral, Daily      lisinopril 10 MG tablet  Commonly known as: PRINIVILZESTRIL  Notes to patient: Last dose given today at 9am.    10 mg, Oral, 2 Times Daily      tadalafil 20 MG tablet  Commonly known as: Cialis   20 mg, Oral, Daily PRN      vitamin B-12 1000 MCG tablet  Commonly known as: CYANOCOBALAMIN  Notes to patient: Last dose given at 9 am today    1,000 mcg, Oral, Daily         Stop These Medications    enoxaparin 80 MG/0.8ML solution syringe  Commonly known as: LOVENOX        ASK your doctor about these medications      Instructions Start Date   oxyCODONE-acetaminophen 5-325 MG per tablet  Commonly known as: PERCOCET  Ask about: Should I take this medication?   1 tablet, Oral, Every 6 Hours PRN             Discharge Diet:   Diet Instructions     Diet: Cardiac      Discharge Diet: Cardiac          Activity at Discharge:   Activity Instructions     Bathing Restrictions      Type of Restriction: Bathing    Bathing Restrictions: No Tub Bath    Driving Restrictions      Type of Restriction: Driving    Driving Restrictions: No Driving Until Next Appointment    Lifting Restrictions      Type of Restriction: Lifting    Lifting  Restrictions: Lifting Restriction (Indicate Limit)    Weight Limit (Pounds): 15    Length of Lifting Restriction: unti lnext appointment          Follow-up Appointments  Future Appointments   Date Time Provider Department Center   5/5/2021 10:30 AM Norma Ignacio APRN [facility] None   5/12/2021  8:00 AM Payam Kenney MD [facility]   8/26/2021 12:45 PM Ez Tucker MD [facility] None        WOUND CARE: Monitor surgical wounds daily. Keep incisons clean and dry.   Call CT Surgery office [phone]  with any questions or concerns, specifically let them know of increased redness, drainage, or opening up of incision.       Sierra Lacy PA-C  04/23/21  09:12 EDT

## 2021-04-28 ENCOUNTER — READMISSION MANAGEMENT (OUTPATIENT)
Dept: CALL CENTER | Facility: HOSPITAL | Age: 71
End: 2021-04-28

## 2021-04-28 NOTE — OUTREACH NOTE
CT Surgery Week 3 Survey      Responses   [facility] patient discharged from?  [place]   Does the patient have one of the following disease processes/diagnoses(primary or secondary)?  Cardiothoracic surgery   Week 3 attempt successful?  No   Unsuccessful attempts  Attempt 1          Ventura Mcfadden RN

## 2021-04-29 ENCOUNTER — READMISSION MANAGEMENT (OUTPATIENT)
Dept: CALL CENTER | Facility: HOSPITAL | Age: 71
End: 2021-04-29

## 2021-04-29 NOTE — OUTREACH NOTE
CT Surgery Week 3 Survey      Responses   [facility] patient discharged from?  [place]   Does the patient have one of the following disease processes/diagnoses(primary or secondary)?  Cardiothoracic surgery   Week 3 attempt successful?  Yes   Call start time  1500   Call end time  1513   Discharge diagnosis  Lung nodule  BRONCHOSCOPY THORACOTOMY   Is patient permission given to speak with other caregiver?  Yes   List who call center can speak with  Meghana Vo, spouse   Person spoke with today (if not patient) and relationship  spouse   Meds reviewed with patient/caregiver?  Yes   Is the patient having any side effects they believe may be caused by any medication additions or changes?  No   Does the patient have all medications related to this admission filled (includes all antibiotics, pain medications, cardiac medications, etc.)  Yes   Is the patient taking all medications as directed (includes completed medication regime)?  Yes   Does the patient have a primary care provider?   Yes   Does the patient have an appointment scheduled with their C/T surgeon?  Yes [5/5/21]   Comments regarding PCP  Patient has had follow up with PCP since discharge.    Has the patient kept scheduled appointments due by today?  Yes   Comments  Follow Up with Payam Kenney MD Wednesday May 12, 2021 8:00 AM   Has home health visited the patient within 72 hours of discharge?  N/A   Psychosocial issues?  No   Did the patient receive a copy of their discharge instructions?  Yes   Nursing interventions  Reviewed instructions with patient [wife]   What is the patient's perception of their health status since discharge?  Improving   Is the patient /caregiver able to teach back basic post-op care?  Continue use of incentive spirometry at least 1 week post discharge, Practice 'cough and deep breath', Drive as instructed by MD in discharge instructions, No tub bath, swimming, or hot tub until instructed by MD, Keep  incision areas clean, dry and protected, Lifting as instructed by MD in discharge instructions   Is the patient/caregiver able to teach back signs and symptoms of incisional infection?  Fever   Is the patient/caregiver able to teach back steps to recovery at home?  Set small, achievable goals for return to baseline health, Rest and rebuild strength, gradually increase activity   If the patient is a current smoker, are they able to teach back resources for cessation?  Smoking cessation medications [Current smoker. Wife states that she will ask about patches. ]   Is the patient/caregiver able to teach back the hierarchy of who to call/visit for symptoms/problems? PCP, Specialist, Home health nurse, Urgent Care, ED, 911  Yes   Week 3 call completed?  Yes          Roselia Leavitt RN

## 2021-05-05 ENCOUNTER — OFFICE VISIT (OUTPATIENT)
Dept: CARDIAC SURGERY | Facility: CLINIC | Age: 71
End: 2021-05-05

## 2021-05-05 VITALS
WEIGHT: 141.8 LBS | DIASTOLIC BLOOD PRESSURE: 84 MMHG | BODY MASS INDEX: 19.21 KG/M2 | TEMPERATURE: 97.8 F | HEART RATE: 80 BPM | OXYGEN SATURATION: 98 % | HEIGHT: 72 IN | SYSTOLIC BLOOD PRESSURE: 138 MMHG

## 2021-05-05 DIAGNOSIS — Z72.0 TOBACCO USE: ICD-10-CM

## 2021-05-05 DIAGNOSIS — C34.12 MALIGNANT NEOPLASM OF UPPER LOBE OF LEFT LUNG (HCC): ICD-10-CM

## 2021-05-05 DIAGNOSIS — F17.210 CIGARETTE NICOTINE DEPENDENCE WITHOUT COMPLICATION: ICD-10-CM

## 2021-05-05 DIAGNOSIS — Z90.2 S/P LOBECTOMY OF LUNG: Primary | ICD-10-CM

## 2021-05-05 PROBLEM — Z95.0 PRESENCE OF CARDIAC PACEMAKER: Status: ACTIVE | Noted: 2021-05-05

## 2021-05-05 PROCEDURE — 71046 X-RAY EXAM CHEST 2 VIEWS: CPT | Performed by: NURSE PRACTITIONER

## 2021-05-05 PROCEDURE — 99407 BEHAV CHNG SMOKING > 10 MIN: CPT | Performed by: NURSE PRACTITIONER

## 2021-05-05 PROCEDURE — 99024 POSTOP FOLLOW-UP VISIT: CPT | Performed by: NURSE PRACTITIONER

## 2021-05-05 RX ORDER — NICOTINE 21 MG/24HR
1 PATCH, TRANSDERMAL 24 HOURS TRANSDERMAL EVERY 24 HOURS
Qty: 90 PATCH | Refills: 0 | Status: SHIPPED | OUTPATIENT
Start: 2021-05-05

## 2021-05-05 NOTE — PROGRESS NOTES
"     [facility] Cardiothoracic Surgery Office Follow Up Note     Date of Encounter: 05/05/2021     MRN Number: [id number]  Name: Dillon Vo  Phone Number: [phone]     Referred By: No ref. provider found  PCP: Ez Cintron MD    Chief Complaint:    Chief Complaint   Patient presents with   • Lung Nodule     Hospital follow up s/p thoractomy with lobectomy on 4/7       History of Present Illness:    Dillon Vo is a 70 y.o. male current smoker with significant cardiac history (CAD, A. fib, pacemaker, mechanical heart valve on chronic anticoagulation) and history of left upper lobe lung mass with interval enlargement and elevated SUV who is s/p left thoracotomy with left upper lobectomy and lymph node sampling on 4/7/2021 with Dr. Post.  Pathology back as adenocarcinoma, invasive and lipidic pattern, moderately differentiated, with hyalinized granuloma with central necrosis present; estimated total tumor size of 3.5 cm in maximum dimension, negative margins, negative nodes with pT2 N0 MX stage IB.  He was discharged on POD#7  and has had no readmissions.  He has been doing well postoperatively however he does continue to smoke 5-10 cigarettes/day down from \"couple packs per day\".  He is experiencing fatigue that waxes and wanes but no shortness of air or cough/hemoptysis but no other constitutional symptoms.     Review of Systems:  Review of Systems   Constitutional: Positive for malaise/fatigue. Negative for chills, decreased appetite, diaphoresis, fever, night sweats, weight gain and weight loss.   HENT: Negative for hoarse voice.    Eyes: Negative for blurred vision, double vision and visual disturbance.   Cardiovascular: Negative for chest pain, claudication, dyspnea on exertion, irregular heartbeat, leg swelling, near-syncope, orthopnea, palpitations, paroxysmal nocturnal dyspnea and syncope.   Respiratory: Negative for cough, hemoptysis, shortness of breath, sputum " production and wheezing.    Hematologic/Lymphatic: Negative for adenopathy and bleeding problem. Does not bruise/bleed easily.   Skin: Negative for color change, nail changes, poor wound healing and rash.   Musculoskeletal: Negative for back pain, falls and muscle cramps.   Gastrointestinal: Negative for abdominal pain, dysphagia and heartburn.   Genitourinary: Negative for flank pain.   Neurological: Positive for weakness. Negative for brief paralysis, disturbances in coordination, dizziness, focal weakness, headaches, light-headedness, loss of balance, numbness, paresthesias, sensory change and vertigo.   Psychiatric/Behavioral: Negative for depression and suicidal ideas.   Allergic/Immunologic: Negative for persistent infections.       I have reviewed the following portions of the patient's history: allergies, current medications, past family history, past medical history, past social history, past surgical history and problem list and confirm it's accurate.    Allergies:  No Known Allergies    Medications:      Current Outpatient Medications:   •  aspirin 81 MG EC tablet, Take 1 tablet by mouth daily., Disp: , Rfl:   •  atorvastatin (LIPITOR) 80 MG tablet, Take 1 tablet by mouth Every Night., Disp: 90 tablet, Rfl: 2  •  buPROPion XL (Wellbutrin XL) 300 MG 24 hr tablet, Take 1 tablet by mouth Daily., Disp: 30 tablet, Rfl: 6  •  carvedilol (COREG) 12.5 MG tablet, Take 1 tablet by mouth 2 (Two) Times a Day., Disp: 180 tablet, Rfl: 2  •  citalopram (CeleXA) 20 MG tablet, Take 1 tablet by mouth once daily (Patient taking differently: Take 20 mg by mouth Daily.), Disp: 90 tablet, Rfl: 1  •  famotidine (PEPCID) 20 MG tablet, Take 1 tablet by mouth Daily., Disp: 30 tablet, Rfl: 1  •  finasteride (PROSCAR) 5 MG tablet, TAKE 1 TABLET BY MOUTH ONCE DAILY AT BEDTIME (Patient taking differently: Take 5 mg by mouth Daily.), Disp: 90 tablet, Rfl: 1  •  folic acid (FOLVITE) 400 MCG tablet, TAKE 1 TABLET BY MOUTH ONCE DAILY  (Patient taking differently: Daily.), Disp: 90 tablet, Rfl: 3  •  lisinopril (PRINIVIL,ZESTRIL) 10 MG tablet, Take 1 tablet by mouth 2 (Two) Times a Day., Disp: 180 tablet, Rfl: 2  •  tadalafil (Cialis) 20 MG tablet, Take 1 tablet by mouth Daily As Needed for Erectile Dysfunction., Disp: 30 tablet, Rfl: 2  •  vitamin B-12 (CYANOCOBALAMIN) 1000 MCG tablet, Take 1 tablet by mouth Daily., Disp: 90 tablet, Rfl: 3  •  warfarin (COUMADIN) 5 MG tablet, Take 1 tablet by mouth once daily (Patient taking differently: Lovenox bridge since d/c warfarin), Disp: 30 tablet, Rfl: 5  •  nicotine (NICODERM CQ) 14 MG/24HR patch, Place 1 patch on the skin as directed by provider Daily., Disp: 90 patch, Rfl: 0    History:   Past Medical History:   Diagnosis Date   • A-fib (CMS/HCC)    • Anemia    • Atrial fibrillation (CMS/HCC) 5/5/2016    Difficult to control rate. RFA of AV node with implantation of left Bi-V pacemaker, 06/18/2013. Hematoma requiring single-chamber pacemaker implanted, 07/29/2013.    • Colitis    • Colon cancer (CMS/HCC)    • Coronary artery disease    • Depression    • GERD (gastroesophageal reflux disease)    • Heart valve disease    • Hiatal hernia    • HTN (hypertension)    • Hyperbilirubinemia    • Hyperlipidemia    • Infectious viral hepatitis    • Kidney stone    • Lung cancer (CMS/HCC)    • Orthostatic hypotension    • Sick sinus syndrome (CMS/HCC)    • Skin cancer     lip        Past Surgical History:   Procedure Laterality Date   • APPENDECTOMY     • BRONCHOSCOPY THORACOTOMY Left 4/7/2021    Procedure: BRONCHOSCOPY LEFT THORACOTOMY, LEFT LUNG RESECTION;  Surgeon: Chi Post MD;  Location: [facility];  Service: Cardiothoracic;  Laterality: Left;   • CARDIAC CATHETERIZATION     • CARDIAC SURGERY  2000    valve replacement    • COLON SURGERY     • COLONOSCOPY  up to date   • CORONARY STENT PLACEMENT     • EXTRACORPOREAL SHOCKWAVE LITHOTRIPSY (ESWL), STENT INSERTION/REMOVAL     • LUNG CANCER SURGERY     •  "PACEMAKER IMPLANTATION     • VARICOSE VEIN SURGERY          Family History   Problem Relation Age of Onset   • Cancer Mother    • Hypertension Father    • Heart disease Father        Social History     Socioeconomic History   • Marital status:      Spouse name: Not on file   • Number of children: 0   • Years of education: Not on file   • Highest education level: Not on file   Tobacco Use   • Smoking status: Current Every Day Smoker     Packs/day: 1.00     Years: 57.00     Pack years: 57.00     Types: Cigarettes   • Smokeless tobacco: Never Used   Vaping Use   • Vaping Use: Never used   Substance and Sexual Activity   • Alcohol use: Yes     Alcohol/week: 2.0 standard drinks     Types: 2 Shots of liquor per week     Comment: Daily   • Drug use: No   • Sexual activity: Defer     Partners: Female     Comment:       Physical Exam:  Vitals:    05/05/21 1029   BP: 138/84   BP Location: Left arm   Patient Position: Sitting   Pulse: 80   Temp: 97.8 °F (36.6 °C)   SpO2: 98%   Weight: 64.3 kg (141 lb 12.8 oz)   Height: 182.9 cm (72\")      Body mass index is 19.23 kg/m².    Physical Exam  Vitals and nursing note reviewed.   Constitutional:       General: He is awake.      Appearance: Normal appearance.   HENT:      Head: Normocephalic and atraumatic.   Eyes:      Pupils: Pupils are equal, round, and reactive to light.   Cardiovascular:      Rate and Rhythm: Normal rate and regular rhythm.      Pulses: Normal pulses.           Radial pulses are 2+ on the right side and 2+ on the left side.      Heart sounds: Normal heart sounds, S1 normal and S2 normal.      Comments: Strong mechanical click  Pulmonary:      Effort: Pulmonary effort is normal.      Breath sounds: Normal breath sounds. No decreased air movement.   Abdominal:      Palpations: Abdomen is soft.   Musculoskeletal:         General: Normal range of motion.      Cervical back: Normal range of motion and neck supple.      Right lower leg: No edema.      " Left lower leg: No edema.   Lymphadenopathy:      Cervical: No cervical adenopathy.      Right cervical: No superficial, deep or posterior cervical adenopathy.     Left cervical: No superficial, deep or posterior cervical adenopathy.      Upper Body:      Right upper body: No supraclavicular or axillary adenopathy.      Left upper body: No supraclavicular or axillary adenopathy.   Skin:     General: Skin is warm and dry.      Capillary Refill: Capillary refill takes less than 2 seconds.      Findings: No lesion.      Nails: There is no clubbing.      Comments: Thoracotomy incision: well healing with wound edges approximated, no surrounding erythema, edema, tenderness or induration on palpation  Chest tube site: closed with no surround erythema, warmth, or tenderness    Neurological:      General: No focal deficit present.      Mental Status: He is alert and oriented to person, place, and time. Mental status is at baseline.      GCS: GCS eye subscore is 4. GCS verbal subscore is 5. GCS motor subscore is 6.      Cranial Nerves: Cranial nerves are intact.      Sensory: Sensation is intact.      Motor: Motor function is intact.      Coordination: Coordination is intact.      Gait: Gait is intact.   Psychiatric:         Mood and Affect: Mood and affect normal. Mood is not depressed.         Speech: Speech normal.         Behavior: Behavior normal. Behavior is cooperative.         Thought Content: Thought content normal.         Judgment: Judgment normal.     Labs/Imaging:  Chest x-ray in office today: Lungs expanded and well-inflated consistent with post-operative findings. No signs of pneumothorax, pleural effusion, or acute airspace consolidation noted. Sternal wires intact. Pacemaker and valve replacement noted. Personally reviewed.  Official radiology report pending    XR Chest 1 View Result Date: 4/14/2021  No significant interval change with left chest tubes in place and volume loss left hemithorax similar to  prior. No discrete pneumothorax.  D:  04/13/2021 E:  04/13/2021  This report was finalized on 4/14/2021 5:28 PM by Dr. Maxim Herrera.      Assessment / Plan:  Diagnoses and all orders for this visit:    1. S/P lobectomy of lung (Primary)    2. Malignant neoplasm of upper lobe of left lung (CMS/HCC)  -     CT Chest With & Without Contrast Diagnostic; Future    3. Tobacco use    4. Cigarette nicotine dependence without complication    Other orders  -     nicotine (NICODERM CQ) 14 MG/24HR patch; Place 1 patch on the skin as directed by provider Daily.  Dispense: 90 patch; Refill: 0    Dillon Vo is a 70 y.o. male current smoker with significant cardiac history (CAD, A. fib, pacemaker, mechanical heart valve on chronic anticoagulation) and history of left upper lobe lung mass with interval enlargement and elevated SUV who is s/p left thoracotomy with left upper lobectomy and lymph node sampling on 4/7/2021 with Dr. Post.  Pathology back as adenocarcinoma, invasive and lipidic pattern, moderately differentiated, with hyalinized granuloma with central necrosis present; estimated total tumor size of 3.5 cm in maximum dimension, negative margins, negative nodes with pT2 N0 MX stage IB.  We spent a lengthy amount of today's visit discussing his current smoking status and progression of disease without smoking sensation.  Patient has been using nicotine lozenges but is interested in pursuing the patch which we will call in for him with instructions to have PCP handle continued NRT if necessary.  We will follow patient with repeat imaging at 6 months per NCCN guidelines.  Patient has follow-up with pulmonology Dr. Kenney on 5/12 as well as cardiologist Dr. Tucker in August.    Patient Education:  Dillon Vo  reports that he has been smoking cigarettes. He has a 57.00 pack-year smoking history. He has never used smokeless tobacco.. I have educated him on the risk of diseases from using tobacco products  such as cancer, COPD and heart disease. I advised him to quit and he is willing to quit. We have discussed the following method/s for tobacco cessation:  Prescription Medicaiton.  I spent >10 minutes counseling the patient.  Tobacco Cessation:  I advised patient to quit, and offered support.    Follow Up:   Return in about 6 months (around 11/5/2021) for Imaging next visit.   Or sooner for any further concerns or worsening sign and symptoms. If unable to reach us in the office please dial 911 or go to the nearest emergency department.      Norma ARENAS  [facility] Cardiothoracic Surgery

## 2021-05-07 ENCOUNTER — LAB (OUTPATIENT)
Dept: INTERNAL MEDICINE | Facility: CLINIC | Age: 71
End: 2021-05-07

## 2021-05-07 DIAGNOSIS — I48.91 ATRIAL FIBRILLATION, UNSPECIFIED TYPE (HCC): Primary | ICD-10-CM

## 2021-05-07 LAB — INR PPP: 1.4 (ref 2.5–3.5)

## 2021-05-07 PROCEDURE — 85610 PROTHROMBIN TIME: CPT | Performed by: INTERNAL MEDICINE

## 2021-05-07 PROCEDURE — 36416 COLLJ CAPILLARY BLOOD SPEC: CPT | Performed by: INTERNAL MEDICINE

## 2021-05-10 ENCOUNTER — READMISSION MANAGEMENT (OUTPATIENT)
Dept: CALL CENTER | Facility: HOSPITAL | Age: 71
End: 2021-05-10

## 2021-05-10 ENCOUNTER — LAB (OUTPATIENT)
Dept: INTERNAL MEDICINE | Facility: CLINIC | Age: 71
End: 2021-05-10

## 2021-05-10 DIAGNOSIS — I48.91 ATRIAL FIBRILLATION, UNSPECIFIED TYPE (HCC): Primary | ICD-10-CM

## 2021-05-10 LAB — INR PPP: 1.5 (ref 2.5–3.5)

## 2021-05-10 PROCEDURE — 85610 PROTHROMBIN TIME: CPT | Performed by: INTERNAL MEDICINE

## 2021-05-10 PROCEDURE — 36416 COLLJ CAPILLARY BLOOD SPEC: CPT | Performed by: INTERNAL MEDICINE

## 2021-05-10 NOTE — OUTREACH NOTE
CT Surgery Week 4 Survey      Responses   [facility] patient discharged from?  [place]   Does the patient have one of the following disease processes/diagnoses(primary or secondary)?  Cardiothoracic surgery   Week 4 attempt successful?  No          Sandy Galvez RN

## 2021-05-12 ENCOUNTER — OFFICE VISIT (OUTPATIENT)
Dept: PULMONOLOGY | Facility: CLINIC | Age: 71
End: 2021-05-12

## 2021-05-12 ENCOUNTER — LAB (OUTPATIENT)
Dept: INTERNAL MEDICINE | Facility: CLINIC | Age: 71
End: 2021-05-12

## 2021-05-12 VITALS
DIASTOLIC BLOOD PRESSURE: 74 MMHG | SYSTOLIC BLOOD PRESSURE: 140 MMHG | OXYGEN SATURATION: 95 % | HEIGHT: 72 IN | TEMPERATURE: 96.7 F | BODY MASS INDEX: 19.1 KG/M2 | HEART RATE: 80 BPM | WEIGHT: 141 LBS

## 2021-05-12 DIAGNOSIS — I48.20 CHRONIC ATRIAL FIBRILLATION (HCC): Primary | ICD-10-CM

## 2021-05-12 DIAGNOSIS — J44.9 CHRONIC OBSTRUCTIVE PULMONARY DISEASE, UNSPECIFIED COPD TYPE (HCC): Primary | ICD-10-CM

## 2021-05-12 LAB — INR PPP: 4 (ref 2.5–3.5)

## 2021-05-12 PROCEDURE — 85610 PROTHROMBIN TIME: CPT | Performed by: INTERNAL MEDICINE

## 2021-05-12 PROCEDURE — 36416 COLLJ CAPILLARY BLOOD SPEC: CPT | Performed by: INTERNAL MEDICINE

## 2021-05-12 PROCEDURE — 99213 OFFICE O/P EST LOW 20 MIN: CPT | Performed by: INTERNAL MEDICINE

## 2021-05-13 NOTE — PROGRESS NOTES
CC:    Abnormal CT    HPI:    70 y/o WM w/ h/o tobacco abuse ~100 py plus, prior colon cancer s/p resection 2001, mechanical AVR, CAD w/ prior stent, Afib, who presents for evaluation of abnormal CT Chest. Patient had a lung cancer screening CT 8/20/19 showing a spiculated 12 mm (I disagree with radiology reading of 9 mm) spiculated MARKO lesion.  No mediastinal or hilar LAD.  No symptoms attributable to this nodule.    Last time I saw patient in 12/11/19 he had some mild hemoptysis.  This was felt to be due to bronchitis and resolved with abx / steroids.  INR was slightly supra-therapeutic.  This resolved on follow up visit 12/27/19.    Patient was admitted in 2/19-2/24/20 w/ H1N1 flu / pna / hemoptysis.  INR was 7 at the time.  CXR abnormalities cleared rapidly.  ANCA and GBM antibodies were negative.  Echo showed EF 46%, LVH, dilated LA, dilated RV, mild to mod MR, functioning mechanical AVR, RVSP > 55.  Echo was done in setting of marked abnormalities on CXR.    Had another admision 2/26-3/7/20 with supratherapeutic INR and large rectus sheath hematoma.  This was complicated by reversal of AC and subsequent embolic strokes.    He eventually had repeat PET-CT which showed higher SUV and slight growth.  He ultimately underwent MARKO Lobectomy with Dr. Post 4/7/21 that showed Stage 1B Adenocarcinoma.  He did well post op.    INTERVAL HISTORY:    Patient returns today for follow up after surgery.  Does have some KU worse than before, but all things considered doing well post op.      PMH:    Past Medical History:   Diagnosis Date   • A-fib (CMS/HCC)    • Anemia    • Atrial fibrillation (CMS/HCC) 5/5/2016    Difficult to control rate. RFA of AV node with implantation of left Bi-V pacemaker, 06/18/2013. Hematoma requiring single-chamber pacemaker implanted, 07/29/2013.    • Colitis    • Colon cancer (CMS/HCC)    • Coronary artery disease    • Depression    • GERD (gastroesophageal reflux disease)    • Heart valve  disease    • Hiatal hernia    • HTN (hypertension)    • Hyperbilirubinemia    • Hyperlipidemia    • Infectious viral hepatitis    • Kidney stone    • Lung cancer (CMS/HCC)    • Orthostatic hypotension    • Sick sinus syndrome (CMS/HCC)    • Skin cancer     lip      PSH:    Past Surgical History:   Procedure Laterality Date   • APPENDECTOMY     • BRONCHOSCOPY THORACOTOMY Left 4/7/2021    Procedure: BRONCHOSCOPY LEFT THORACOTOMY, LEFT LUNG RESECTION;  Surgeon: Chi Post MD;  Location: [facility];  Service: Cardiothoracic;  Laterality: Left;   • CARDIAC CATHETERIZATION     • CARDIAC SURGERY  2000    valve replacement    • COLON SURGERY     • COLONOSCOPY  up to date   • CORONARY STENT PLACEMENT     • EXTRACORPOREAL SHOCKWAVE LITHOTRIPSY (ESWL), STENT INSERTION/REMOVAL     • LUNG CANCER SURGERY     • PACEMAKER IMPLANTATION     • VARICOSE VEIN SURGERY       FH:    Family History   Problem Relation Age of Onset   • Cancer Mother    • Hypertension Father    • Heart disease Father      SH:    Social History     Socioeconomic History   • Marital status:      Spouse name: Not on file   • Number of children: 0   • Years of education: Not on file   • Highest education level: Not on file   Tobacco Use   • Smoking status: Current Every Day Smoker     Packs/day: 1.00     Years: 57.00     Pack years: 57.00     Types: Cigarettes   • Smokeless tobacco: Never Used   Vaping Use   • Vaping Use: Never used   Substance and Sexual Activity   • Alcohol use: Yes     Alcohol/week: 2.0 standard drinks     Types: 2 Shots of liquor per week     Comment: Daily   • Drug use: No   • Sexual activity: Defer     Partners: Female     Comment:       ALLERGIES:    No Known Allergies  MEDICATIONS:      Current Outpatient Medications:   •  aspirin 81 MG EC tablet, Take 1 tablet by mouth daily., Disp: , Rfl:   •  atorvastatin (LIPITOR) 80 MG tablet, Take 1 tablet by mouth Every Night., Disp: 90 tablet, Rfl: 2  •  buPROPion XL  (Wellbutrin XL) 300 MG 24 hr tablet, Take 1 tablet by mouth Daily., Disp: 30 tablet, Rfl: 6  •  carvedilol (COREG) 12.5 MG tablet, Take 1 tablet by mouth 2 (Two) Times a Day., Disp: 180 tablet, Rfl: 2  •  citalopram (CeleXA) 20 MG tablet, Take 1 tablet by mouth once daily (Patient taking differently: Take 20 mg by mouth Daily.), Disp: 90 tablet, Rfl: 1  •  famotidine (PEPCID) 20 MG tablet, Take 1 tablet by mouth Daily., Disp: 30 tablet, Rfl: 1  •  finasteride (PROSCAR) 5 MG tablet, TAKE 1 TABLET BY MOUTH ONCE DAILY AT BEDTIME (Patient taking differently: Take 5 mg by mouth Daily.), Disp: 90 tablet, Rfl: 1  •  folic acid (FOLVITE) 400 MCG tablet, TAKE 1 TABLET BY MOUTH ONCE DAILY (Patient taking differently: Daily.), Disp: 90 tablet, Rfl: 3  •  lisinopril (PRINIVIL,ZESTRIL) 10 MG tablet, Take 1 tablet by mouth 2 (Two) Times a Day., Disp: 180 tablet, Rfl: 2  •  nicotine (NICODERM CQ) 14 MG/24HR patch, Place 1 patch on the skin as directed by provider Daily., Disp: 90 patch, Rfl: 0  •  tadalafil (Cialis) 20 MG tablet, Take 1 tablet by mouth Daily As Needed for Erectile Dysfunction., Disp: 30 tablet, Rfl: 2  •  vitamin B-12 (CYANOCOBALAMIN) 1000 MCG tablet, Take 1 tablet by mouth Daily., Disp: 90 tablet, Rfl: 3  •  warfarin (COUMADIN) 5 MG tablet, Take 1 tablet by mouth once daily (Patient taking differently: Lovenox bridge since d/c warfarin), Disp: 30 tablet, Rfl: 5  ROS:  Per HPI, otherwise all systems reviewed and negative.    DIAGNOSTIC DATA (Reviewed and interpreted by me unless otherwise specified):    CT Chest 8/20/19 - emphysema, scarring RUL, scattered sub-centimeter nodules, 12 mm spiculated nodule in MARKO    CT PET 9/25/19 - SUV 1.0, negative    CT Chest 11/6/19 - stable MARKO nodule, also a RUL nodule that appears inflammatory that needs to be followed    CT Chest 2/6/20 - stable MARKO and RUL nodules when going back to 8/20/19    CT Chest 8/18/20 - relatively stable MARKO nodules and RUL scarring.  Prior neg  PET-CT.      CT Chest 2/12/21 - relative stable MARKO nodules, however maybe some slight growth going back to 1 year ago overall.    PET CT 3/2/21 - interval growth of primary MARKO nodule with higher SUV compared to prior    CXR 12/11/19 - RUL and RML consolidation    PFT 9/17/19 - borderline mild obstruction, +BD change, hyperinflation, +air trapping, normal DLCO    Vitals:    05/12/21 0827   BP: 140/74   Pulse: 80   Temp: 96.7 °F (35.9 °C)   SpO2: 95%       Physical Exam   Constitutional: Oriented to person, place, and time. Appears well-developed and well-nourished.   Head: Normocephalic and atraumatic.   Nose: Nose normal.   Mouth/Throat: Oropharynx is clear and moist.   Eyes: Conjunctivae are normal.  Pupils normal.  Neck: No tracheal deviation present.   Cardiovascular: Normal rate, regular rhythm, normal heart sounds with mechanical click and intact distal pulses.  Exam reveals no gallop and no friction rub.  No thrill.  No JVD.  No edema.  No murmur heard.  Pulmonary/Chest: Effort normal and breath sounds normal.  No tenderness to palpation.  No clubbing.   Abdominal: Soft. Bowel sounds are normal. No distension. No tenderness. There is no guarding.   Musculoskeletal: Normal range of motion.  No tenderness.  Lymphadenopathy:  No cervical adenopathy.   Neurological:  No new focal neurological deficits observed   Skin: Skin is warm and dry. No rash noted.   Psychiatric: Normal mood and affect.  Behavior is normal. Judgment normal.    Assessment/Plan     1)  NSCLC s/p MARKO Lobectomy - appreciate Dr. Post.  Stage IB adenocarcinoma.  Resected 4/7/21.  Needs CT Chest every 6 months for 5 years.    2)  Hemoptysis - resolved.  On coumadin.    3) Tobacco Abuse - counseled patient, recommended NRT and Wellbutrin.  He is agreeable. Will continue.      4) Dyspnea - start Stiolto.  Refer to Pulmonary Rehab.      RTC 6 months w/ Full PFT w/ BD    Payam Kenney MD  Pulmonology and Critical Care Medicine  05/13/21 13:02  EDT  Electronically Signed    C.C.:  No ref. provider found, Ez Cintron MD

## 2021-05-19 ENCOUNTER — LAB (OUTPATIENT)
Dept: INTERNAL MEDICINE | Facility: CLINIC | Age: 71
End: 2021-05-19

## 2021-05-19 DIAGNOSIS — I48.91 ATRIAL FIBRILLATION, UNSPECIFIED TYPE (HCC): Primary | ICD-10-CM

## 2021-05-19 LAB — INR PPP: 4.3 (ref 2.5–3.5)

## 2021-05-19 PROCEDURE — 36416 COLLJ CAPILLARY BLOOD SPEC: CPT | Performed by: INTERNAL MEDICINE

## 2021-05-19 PROCEDURE — 85610 PROTHROMBIN TIME: CPT | Performed by: INTERNAL MEDICINE

## 2021-05-24 ENCOUNTER — LAB (OUTPATIENT)
Dept: INTERNAL MEDICINE | Facility: CLINIC | Age: 71
End: 2021-05-24

## 2021-05-24 ENCOUNTER — APPOINTMENT (OUTPATIENT)
Dept: CT IMAGING | Facility: HOSPITAL | Age: 71
End: 2021-05-24

## 2021-05-24 ENCOUNTER — HOSPITAL ENCOUNTER (INPATIENT)
Facility: HOSPITAL | Age: 71
LOS: 4 days | Discharge: HOME | End: 2021-05-29
Attending: EMERGENCY MEDICINE | Admitting: INTERNAL MEDICINE

## 2021-05-24 DIAGNOSIS — R57.9 SHOCK (HCC): Primary | ICD-10-CM

## 2021-05-24 DIAGNOSIS — Z95.2 HISTORY OF HEART VALVE REPLACEMENT: ICD-10-CM

## 2021-05-24 DIAGNOSIS — R55 SYNCOPE, UNSPECIFIED SYNCOPE TYPE: ICD-10-CM

## 2021-05-24 DIAGNOSIS — I48.91 ATRIAL FIBRILLATION, UNSPECIFIED TYPE (HCC): Primary | ICD-10-CM

## 2021-05-24 DIAGNOSIS — Z98.890 STATUS POST LUNG SURGERY: ICD-10-CM

## 2021-05-24 DIAGNOSIS — I95.9 HYPOTENSION, UNSPECIFIED HYPOTENSION TYPE: ICD-10-CM

## 2021-05-24 LAB
ALBUMIN SERPL-MCNC: 3.7 G/DL (ref 3.5–5.2)
ALBUMIN/GLOB SERPL: 1.2 G/DL
ALP SERPL-CCNC: 100 U/L (ref 39–117)
ALT SERPL W P-5'-P-CCNC: 15 U/L (ref 1–41)
ANION GAP SERPL CALCULATED.3IONS-SCNC: 12 MMOL/L (ref 5–15)
AST SERPL-CCNC: 15 U/L (ref 1–40)
BASOPHILS # BLD AUTO: 0.05 10*3/MM3 (ref 0–0.2)
BASOPHILS NFR BLD AUTO: 0.7 % (ref 0–1.5)
BILIRUB SERPL-MCNC: 1.1 MG/DL (ref 0–1.2)
BUN SERPL-MCNC: 12 MG/DL (ref 8–23)
BUN/CREAT SERPL: 11 (ref 7–25)
CALCIUM SPEC-SCNC: 9.4 MG/DL (ref 8.6–10.5)
CHLORIDE SERPL-SCNC: 99 MMOL/L (ref 98–107)
CO2 SERPL-SCNC: 24 MMOL/L (ref 22–29)
CREAT SERPL-MCNC: 1.09 MG/DL (ref 0.76–1.27)
DEPRECATED RDW RBC AUTO: 48.5 FL (ref 37–54)
EOSINOPHIL # BLD AUTO: 0.15 10*3/MM3 (ref 0–0.4)
EOSINOPHIL NFR BLD AUTO: 2.1 % (ref 0.3–6.2)
ERYTHROCYTE [DISTWIDTH] IN BLOOD BY AUTOMATED COUNT: 12.8 % (ref 12.3–15.4)
GFR SERPL CREATININE-BSD FRML MDRD: 67 ML/MIN/1.73
GLOBULIN UR ELPH-MCNC: 3 GM/DL
GLUCOSE SERPL-MCNC: 92 MG/DL (ref 65–99)
HCT VFR BLD AUTO: 40.2 % (ref 37.5–51)
HGB BLD-MCNC: 13.4 G/DL (ref 13–17.7)
IMM GRANULOCYTES # BLD AUTO: 0.03 10*3/MM3 (ref 0–0.05)
IMM GRANULOCYTES NFR BLD AUTO: 0.4 % (ref 0–0.5)
INR PPP: 1.66 (ref 0.85–1.16)
INR PPP: 1.8 (ref 2.5–3.5)
LYMPHOCYTES # BLD AUTO: 2.22 10*3/MM3 (ref 0.7–3.1)
LYMPHOCYTES NFR BLD AUTO: 30.7 % (ref 19.6–45.3)
MAGNESIUM SERPL-MCNC: 1.8 MG/DL (ref 1.6–2.4)
MCH RBC QN AUTO: 34 PG (ref 26.6–33)
MCHC RBC AUTO-ENTMCNC: 33.3 G/DL (ref 31.5–35.7)
MCV RBC AUTO: 102 FL (ref 79–97)
MONOCYTES # BLD AUTO: 0.56 10*3/MM3 (ref 0.1–0.9)
MONOCYTES NFR BLD AUTO: 7.8 % (ref 5–12)
NEUTROPHILS NFR BLD AUTO: 4.21 10*3/MM3 (ref 1.7–7)
NEUTROPHILS NFR BLD AUTO: 58.3 % (ref 42.7–76)
NRBC BLD AUTO-RTO: 0 /100 WBC (ref 0–0.2)
PLATELET # BLD AUTO: 153 10*3/MM3 (ref 140–450)
PMV BLD AUTO: 10.9 FL (ref 6–12)
POTASSIUM SERPL-SCNC: 3.5 MMOL/L (ref 3.5–5.2)
PROT SERPL-MCNC: 6.7 G/DL (ref 6–8.5)
PROTHROMBIN TIME: 19 SECONDS (ref 11.4–14.4)
RBC # BLD AUTO: 3.94 10*6/MM3 (ref 4.14–5.8)
SODIUM SERPL-SCNC: 135 MMOL/L (ref 136–145)
TROPONIN T SERPL-MCNC: <0.01 NG/ML (ref 0–0.03)
WBC # BLD AUTO: 7.22 10*3/MM3 (ref 3.4–10.8)

## 2021-05-24 PROCEDURE — 93005 ELECTROCARDIOGRAM TRACING: CPT

## 2021-05-24 PROCEDURE — 85610 PROTHROMBIN TIME: CPT | Performed by: EMERGENCY MEDICINE

## 2021-05-24 PROCEDURE — 83735 ASSAY OF MAGNESIUM: CPT | Performed by: EMERGENCY MEDICINE

## 2021-05-24 PROCEDURE — 82533 TOTAL CORTISOL: CPT | Performed by: INTERNAL MEDICINE

## 2021-05-24 PROCEDURE — 99285 EMERGENCY DEPT VISIT HI MDM: CPT

## 2021-05-24 PROCEDURE — 70450 CT HEAD/BRAIN W/O DYE: CPT

## 2021-05-24 PROCEDURE — 85025 COMPLETE CBC W/AUTO DIFF WBC: CPT | Performed by: EMERGENCY MEDICINE

## 2021-05-24 PROCEDURE — 71275 CT ANGIOGRAPHY CHEST: CPT

## 2021-05-24 PROCEDURE — 85610 PROTHROMBIN TIME: CPT | Performed by: INTERNAL MEDICINE

## 2021-05-24 PROCEDURE — 80053 COMPREHEN METABOLIC PANEL: CPT | Performed by: EMERGENCY MEDICINE

## 2021-05-24 PROCEDURE — 84484 ASSAY OF TROPONIN QUANT: CPT | Performed by: EMERGENCY MEDICINE

## 2021-05-24 PROCEDURE — 74177 CT ABD & PELVIS W/CONTRAST: CPT

## 2021-05-24 PROCEDURE — 36416 COLLJ CAPILLARY BLOOD SPEC: CPT | Performed by: INTERNAL MEDICINE

## 2021-05-24 PROCEDURE — 93005 ELECTROCARDIOGRAM TRACING: CPT | Performed by: EMERGENCY MEDICINE

## 2021-05-24 PROCEDURE — 0 IOPAMIDOL PER 1 ML: Performed by: EMERGENCY MEDICINE

## 2021-05-24 PROCEDURE — 82077 ASSAY SPEC XCP UR&BREATH IA: CPT | Performed by: INTERNAL MEDICINE

## 2021-05-24 RX ORDER — SODIUM CHLORIDE 0.9 % (FLUSH) 0.9 %
10 SYRINGE (ML) INJECTION AS NEEDED
Status: DISCONTINUED | OUTPATIENT
Start: 2021-05-24 | End: 2021-05-29 | Stop reason: HOSPADM

## 2021-05-24 RX ADMIN — IOPAMIDOL 85 ML: 755 INJECTION, SOLUTION INTRAVENOUS at 23:44

## 2021-05-24 RX ADMIN — SODIUM CHLORIDE 1000 ML: 9 INJECTION, SOLUTION INTRAVENOUS at 23:03

## 2021-05-25 PROBLEM — R55 SYNCOPE: Status: ACTIVE | Noted: 2021-05-25

## 2021-05-25 PROBLEM — I95.9 HYPOTENSION: Status: ACTIVE | Noted: 2021-05-25

## 2021-05-25 PROBLEM — S81.812A LACERATION OF LEFT LOWER EXTREMITY: Status: ACTIVE | Noted: 2021-05-25

## 2021-05-25 LAB
ALBUMIN SERPL-MCNC: 3.2 G/DL (ref 3.5–5.2)
ALBUMIN/GLOB SERPL: 1.4 G/DL
ALP SERPL-CCNC: 83 U/L (ref 39–117)
ALT SERPL W P-5'-P-CCNC: 12 U/L (ref 1–41)
AMPHET+METHAMPHET UR QL: NEGATIVE
AMPHETAMINES UR QL: NEGATIVE
ANION GAP SERPL CALCULATED.3IONS-SCNC: 8 MMOL/L (ref 5–15)
APTT PPP: 34.9 SECONDS (ref 50–95)
AST SERPL-CCNC: 12 U/L (ref 1–40)
ATMOSPHERIC PRESS: ABNORMAL MM[HG]
BACTERIA UR QL AUTO: ABNORMAL /HPF
BARBITURATES UR QL SCN: NEGATIVE
BASE EXCESS BLDV CALC-SCNC: 1.3 MMOL/L (ref -2–2)
BDY SITE: ABNORMAL
BENZODIAZ UR QL SCN: NEGATIVE
BILIRUB SERPL-MCNC: 1 MG/DL (ref 0–1.2)
BILIRUB UR QL STRIP: NEGATIVE
BODY TEMPERATURE: 37 C
BUN SERPL-MCNC: 13 MG/DL (ref 8–23)
BUN/CREAT SERPL: 15.5 (ref 7–25)
BUPRENORPHINE SERPL-MCNC: NEGATIVE NG/ML
CALCIUM SPEC-SCNC: 8.2 MG/DL (ref 8.6–10.5)
CANNABINOIDS SERPL QL: NEGATIVE
CHLORIDE SERPL-SCNC: 104 MMOL/L (ref 98–107)
CLARITY UR: CLEAR
CO2 BLDA-SCNC: 29.2 MMOL/L (ref 22–33)
CO2 SERPL-SCNC: 24 MMOL/L (ref 22–29)
COCAINE UR QL: NEGATIVE
COHGB MFR BLD: 2.2 %
COLOR UR: YELLOW
CORTIS SERPL-MCNC: 17.89 MCG/DL
CREAT SERPL-MCNC: 0.84 MG/DL (ref 0.76–1.27)
D-LACTATE SERPL-SCNC: 0.8 MMOL/L (ref 0.5–2)
EPAP: 0
ETHANOL BLD-MCNC: 32 MG/DL (ref 0–10)
GFR SERPL CREATININE-BSD FRML MDRD: 90 ML/MIN/1.73
GLOBULIN UR ELPH-MCNC: 2.3 GM/DL
GLUCOSE SERPL-MCNC: 112 MG/DL (ref 65–99)
GLUCOSE UR STRIP-MCNC: NEGATIVE MG/DL
HCO3 BLDV-SCNC: 27.7 MMOL/L (ref 22–28)
HGB BLDA-MCNC: 12.3 G/DL (ref 13.5–17.5)
HGB UR QL STRIP.AUTO: ABNORMAL
HOLD SPECIMEN: NORMAL
HYALINE CASTS UR QL AUTO: ABNORMAL /LPF
INHALED O2 CONCENTRATION: 21 %
INR PPP: 1.91 (ref 0.85–1.16)
INR PPP: 2.26 (ref 0.85–1.16)
IPAP: 0
KETONES UR QL STRIP: NEGATIVE
LEUKOCYTE ESTERASE UR QL STRIP.AUTO: ABNORMAL
METHADONE UR QL SCN: NEGATIVE
METHGB BLD QL: 0.3 %
MODALITY: ABNORMAL
NITRITE UR QL STRIP: POSITIVE
NOTE: ABNORMAL
OPIATES UR QL: NEGATIVE
OXYCODONE UR QL SCN: POSITIVE
OXYHGB MFR BLDV: 46.3 %
PAW @ PEAK INSP FLOW SETTING VENT: 0 CMH2O
PCO2 BLDV: 50.5 MM HG (ref 41–51)
PCP UR QL SCN: NEGATIVE
PH BLDV: 7.35 PH UNITS (ref 7.31–7.41)
PH UR STRIP.AUTO: 5.5 [PH] (ref 5–8)
PO2 BLDV: 29.6 MM HG (ref 27–53)
POTASSIUM SERPL-SCNC: 4 MMOL/L (ref 3.5–5.2)
PROPOXYPH UR QL: NEGATIVE
PROT SERPL-MCNC: 5.5 G/DL (ref 6–8.5)
PROT UR QL STRIP: NEGATIVE
PROTHROMBIN TIME: 21.1 SECONDS (ref 11.4–14.4)
PROTHROMBIN TIME: 24.1 SECONDS (ref 11.4–14.4)
QT INTERVAL: 508 MS
QTC INTERVAL: 548 MS
RBC # UR: ABNORMAL /HPF
REF LAB TEST METHOD: ABNORMAL
SODIUM SERPL-SCNC: 136 MMOL/L (ref 136–145)
SP GR UR STRIP: 1.02 (ref 1–1.03)
SQUAMOUS #/AREA URNS HPF: ABNORMAL /HPF
TOTAL RATE: 0 BREATHS/MINUTE
TRICYCLICS UR QL SCN: NEGATIVE
TSH SERPL DL<=0.05 MIU/L-ACNC: 1.77 UIU/ML (ref 0.27–4.2)
UFH PPP CHRO-ACNC: 0.1 IU/ML (ref 0.3–0.7)
UFH PPP CHRO-ACNC: 0.23 IU/ML (ref 0.3–0.7)
UFH PPP CHRO-ACNC: 0.29 IU/ML (ref 0.3–0.7)
UFH PPP CHRO-ACNC: 0.3 IU/ML (ref 0.3–0.7)
UROBILINOGEN UR QL STRIP: ABNORMAL
WBC UR QL AUTO: ABNORMAL /HPF
WHOLE BLOOD HOLD SPECIMEN: NORMAL
WHOLE BLOOD HOLD SPECIMEN: NORMAL

## 2021-05-25 PROCEDURE — 84443 ASSAY THYROID STIM HORMONE: CPT | Performed by: INTERNAL MEDICINE

## 2021-05-25 PROCEDURE — 87088 URINE BACTERIA CULTURE: CPT | Performed by: EMERGENCY MEDICINE

## 2021-05-25 PROCEDURE — 25010000002 HEPARIN (PORCINE) 25000-0.45 UT/250ML-% SOLUTION: Performed by: INTERNAL MEDICINE

## 2021-05-25 PROCEDURE — 25010000002 HEPARIN (PORCINE) PER 1000 UNITS

## 2021-05-25 PROCEDURE — 85610 PROTHROMBIN TIME: CPT | Performed by: INTERNAL MEDICINE

## 2021-05-25 PROCEDURE — 85730 THROMBOPLASTIN TIME PARTIAL: CPT | Performed by: INTERNAL MEDICINE

## 2021-05-25 PROCEDURE — 25010000002 HEPARIN (PORCINE) PER 1000 UNITS: Performed by: INTERNAL MEDICINE

## 2021-05-25 PROCEDURE — 82820 HEMOGLOBIN-OXYGEN AFFINITY: CPT

## 2021-05-25 PROCEDURE — 99291 CRITICAL CARE FIRST HOUR: CPT | Performed by: INTERNAL MEDICINE

## 2021-05-25 PROCEDURE — 80306 DRUG TEST PRSMV INSTRMNT: CPT | Performed by: INTERNAL MEDICINE

## 2021-05-25 PROCEDURE — 85520 HEPARIN ASSAY: CPT

## 2021-05-25 PROCEDURE — 80053 COMPREHEN METABOLIC PANEL: CPT | Performed by: INTERNAL MEDICINE

## 2021-05-25 PROCEDURE — 85520 HEPARIN ASSAY: CPT | Performed by: INTERNAL MEDICINE

## 2021-05-25 PROCEDURE — 87086 URINE CULTURE/COLONY COUNT: CPT | Performed by: EMERGENCY MEDICINE

## 2021-05-25 PROCEDURE — 83605 ASSAY OF LACTIC ACID: CPT | Performed by: EMERGENCY MEDICINE

## 2021-05-25 PROCEDURE — 87186 SC STD MICRODIL/AGAR DIL: CPT | Performed by: EMERGENCY MEDICINE

## 2021-05-25 PROCEDURE — 82805 BLOOD GASES W/O2 SATURATION: CPT

## 2021-05-25 PROCEDURE — 87040 BLOOD CULTURE FOR BACTERIA: CPT | Performed by: EMERGENCY MEDICINE

## 2021-05-25 PROCEDURE — 81001 URINALYSIS AUTO W/SCOPE: CPT | Performed by: EMERGENCY MEDICINE

## 2021-05-25 RX ORDER — ASPIRIN 81 MG/1
81 TABLET ORAL DAILY
Status: DISCONTINUED | OUTPATIENT
Start: 2021-05-25 | End: 2021-05-29 | Stop reason: HOSPADM

## 2021-05-25 RX ORDER — WARFARIN SODIUM 5 MG/1
5 TABLET ORAL
Status: DISCONTINUED | OUTPATIENT
Start: 2021-05-25 | End: 2021-05-27

## 2021-05-25 RX ORDER — BUPROPION HYDROCHLORIDE 150 MG/1
300 TABLET ORAL DAILY
Status: DISCONTINUED | OUTPATIENT
Start: 2021-05-25 | End: 2021-05-29 | Stop reason: HOSPADM

## 2021-05-25 RX ORDER — HEPARIN SODIUM 1000 [USP'U]/ML
60 INJECTION, SOLUTION INTRAVENOUS; SUBCUTANEOUS AS NEEDED
Status: DISCONTINUED | OUTPATIENT
Start: 2021-05-25 | End: 2021-05-25

## 2021-05-25 RX ORDER — HEPARIN SODIUM 1000 [USP'U]/ML
30 INJECTION, SOLUTION INTRAVENOUS; SUBCUTANEOUS AS NEEDED
Status: DISCONTINUED | OUTPATIENT
Start: 2021-05-25 | End: 2021-05-25

## 2021-05-25 RX ORDER — HEPARIN SODIUM 1000 [USP'U]/ML
60 INJECTION, SOLUTION INTRAVENOUS; SUBCUTANEOUS ONCE
Status: COMPLETED | OUTPATIENT
Start: 2021-05-25 | End: 2021-05-25

## 2021-05-25 RX ORDER — NOREPINEPHRINE BIT/0.9 % NACL 8 MG/250ML
.02-.3 INFUSION BOTTLE (ML) INTRAVENOUS
Status: DISCONTINUED | OUTPATIENT
Start: 2021-05-25 | End: 2021-05-25

## 2021-05-25 RX ORDER — HEPARIN SODIUM 1000 [USP'U]/ML
2000 INJECTION, SOLUTION INTRAVENOUS; SUBCUTANEOUS ONCE
Status: COMPLETED | OUTPATIENT
Start: 2021-05-25 | End: 2021-05-25

## 2021-05-25 RX ORDER — HEPARIN SODIUM 10000 [USP'U]/100ML
16 INJECTION, SOLUTION INTRAVENOUS
Status: DISCONTINUED | OUTPATIENT
Start: 2021-05-25 | End: 2021-05-29 | Stop reason: HOSPADM

## 2021-05-25 RX ORDER — ATORVASTATIN CALCIUM 40 MG/1
80 TABLET, FILM COATED ORAL NIGHTLY
Status: DISCONTINUED | OUTPATIENT
Start: 2021-05-25 | End: 2021-05-29 | Stop reason: HOSPADM

## 2021-05-25 RX ORDER — NICOTINE 21 MG/24HR
1 PATCH, TRANSDERMAL 24 HOURS TRANSDERMAL
Status: DISCONTINUED | OUTPATIENT
Start: 2021-05-25 | End: 2021-05-29 | Stop reason: HOSPADM

## 2021-05-25 RX ORDER — CITALOPRAM 20 MG/1
20 TABLET ORAL DAILY
Status: DISCONTINUED | OUTPATIENT
Start: 2021-05-25 | End: 2021-05-29 | Stop reason: HOSPADM

## 2021-05-25 RX ADMIN — HEPARIN SODIUM 12 UNITS/KG/HR: 10000 INJECTION, SOLUTION INTRAVENOUS at 03:55

## 2021-05-25 RX ADMIN — ASPIRIN 81 MG: 81 TABLET, COATED ORAL at 10:35

## 2021-05-25 RX ADMIN — WARFARIN SODIUM 5 MG: 5 TABLET ORAL at 18:17

## 2021-05-25 RX ADMIN — ATORVASTATIN CALCIUM 80 MG: 40 TABLET, FILM COATED ORAL at 03:40

## 2021-05-25 RX ADMIN — HEPARIN SODIUM 3810 UNITS: 1000 INJECTION INTRAVENOUS; SUBCUTANEOUS at 03:53

## 2021-05-25 RX ADMIN — HEPARIN SODIUM 2000 UNITS: 1000 INJECTION, SOLUTION INTRAVENOUS; SUBCUTANEOUS at 20:12

## 2021-05-25 RX ADMIN — ATORVASTATIN CALCIUM 80 MG: 40 TABLET, FILM COATED ORAL at 20:12

## 2021-05-25 RX ADMIN — SODIUM CHLORIDE 1000 ML: 9 INJECTION, SOLUTION INTRAVENOUS at 00:00

## 2021-05-25 RX ADMIN — SODIUM CHLORIDE 500 ML: 9 INJECTION, SOLUTION INTRAVENOUS at 01:37

## 2021-05-25 RX ADMIN — NICOTINE 1 PATCH: 14 PATCH, EXTENDED RELEASE TRANSDERMAL at 11:15

## 2021-05-25 RX ADMIN — CITALOPRAM HYDROBROMIDE 20 MG: 20 TABLET ORAL at 10:35

## 2021-05-25 RX ADMIN — BUPROPION HYDROCHLORIDE 300 MG: 150 TABLET, FILM COATED, EXTENDED RELEASE ORAL at 10:35

## 2021-05-26 LAB
BASOPHILS # BLD AUTO: 0.05 10*3/MM3 (ref 0–0.2)
BASOPHILS NFR BLD AUTO: 0.9 % (ref 0–1.5)
D-LACTATE SERPL-SCNC: 1.7 MMOL/L (ref 0.5–2)
DEPRECATED RDW RBC AUTO: 50.8 FL (ref 37–54)
EOSINOPHIL # BLD AUTO: 0.13 10*3/MM3 (ref 0–0.4)
EOSINOPHIL NFR BLD AUTO: 2.3 % (ref 0.3–6.2)
ERYTHROCYTE [DISTWIDTH] IN BLOOD BY AUTOMATED COUNT: 12.9 % (ref 12.3–15.4)
HCT VFR BLD AUTO: 37.1 % (ref 37.5–51)
HGB BLD-MCNC: 11.9 G/DL (ref 13–17.7)
IMM GRANULOCYTES # BLD AUTO: 0.01 10*3/MM3 (ref 0–0.05)
IMM GRANULOCYTES NFR BLD AUTO: 0.2 % (ref 0–0.5)
INR PPP: 2.17 (ref 0.85–1.16)
LYMPHOCYTES # BLD AUTO: 1.5 10*3/MM3 (ref 0.7–3.1)
LYMPHOCYTES NFR BLD AUTO: 27.1 % (ref 19.6–45.3)
MAGNESIUM SERPL-MCNC: 1.7 MG/DL (ref 1.6–2.4)
MCH RBC QN AUTO: 34.3 PG (ref 26.6–33)
MCHC RBC AUTO-ENTMCNC: 32.1 G/DL (ref 31.5–35.7)
MCV RBC AUTO: 106.9 FL (ref 79–97)
MONOCYTES # BLD AUTO: 0.46 10*3/MM3 (ref 0.1–0.9)
MONOCYTES NFR BLD AUTO: 8.3 % (ref 5–12)
NEUTROPHILS NFR BLD AUTO: 3.39 10*3/MM3 (ref 1.7–7)
NEUTROPHILS NFR BLD AUTO: 61.2 % (ref 42.7–76)
NRBC BLD AUTO-RTO: 0 /100 WBC (ref 0–0.2)
PHOSPHATE SERPL-MCNC: 3.2 MG/DL (ref 2.5–4.5)
PLATELET # BLD AUTO: 100 10*3/MM3 (ref 140–450)
PMV BLD AUTO: 11.4 FL (ref 6–12)
PROTHROMBIN TIME: 23.3 SECONDS (ref 11.4–14.4)
RBC # BLD AUTO: 3.47 10*6/MM3 (ref 4.14–5.8)
UFH PPP CHRO-ACNC: 0.24 IU/ML (ref 0.3–0.7)
UFH PPP CHRO-ACNC: 0.5 IU/ML (ref 0.3–0.7)
UFH PPP CHRO-ACNC: 0.6 IU/ML (ref 0.3–0.7)
WBC # BLD AUTO: 5.54 10*3/MM3 (ref 3.4–10.8)

## 2021-05-26 PROCEDURE — 85025 COMPLETE CBC W/AUTO DIFF WBC: CPT | Performed by: INTERNAL MEDICINE

## 2021-05-26 PROCEDURE — 99232 SBSQ HOSP IP/OBS MODERATE 35: CPT | Performed by: INTERNAL MEDICINE

## 2021-05-26 PROCEDURE — 85520 HEPARIN ASSAY: CPT

## 2021-05-26 PROCEDURE — 25010000002 HEPARIN (PORCINE) PER 1000 UNITS

## 2021-05-26 PROCEDURE — 25010000002 HEPARIN (PORCINE) 25000-0.45 UT/250ML-% SOLUTION

## 2021-05-26 PROCEDURE — 85610 PROTHROMBIN TIME: CPT | Performed by: INTERNAL MEDICINE

## 2021-05-26 PROCEDURE — 83735 ASSAY OF MAGNESIUM: CPT | Performed by: INTERNAL MEDICINE

## 2021-05-26 PROCEDURE — 84100 ASSAY OF PHOSPHORUS: CPT | Performed by: INTERNAL MEDICINE

## 2021-05-26 PROCEDURE — 83605 ASSAY OF LACTIC ACID: CPT | Performed by: INTERNAL MEDICINE

## 2021-05-26 RX ORDER — HEPARIN SODIUM 1000 [USP'U]/ML
2000 INJECTION, SOLUTION INTRAVENOUS; SUBCUTANEOUS ONCE
Status: COMPLETED | OUTPATIENT
Start: 2021-05-26 | End: 2021-05-26

## 2021-05-26 RX ADMIN — BUPROPION HYDROCHLORIDE 300 MG: 150 TABLET, FILM COATED, EXTENDED RELEASE ORAL at 08:20

## 2021-05-26 RX ADMIN — ASPIRIN 81 MG: 81 TABLET, COATED ORAL at 08:20

## 2021-05-26 RX ADMIN — HEPARIN SODIUM 2000 UNITS: 1000 INJECTION, SOLUTION INTRAVENOUS; SUBCUTANEOUS at 03:29

## 2021-05-26 RX ADMIN — CITALOPRAM HYDROBROMIDE 20 MG: 20 TABLET ORAL at 08:20

## 2021-05-26 RX ADMIN — HEPARIN SODIUM 16 UNITS/KG/HR: 10000 INJECTION, SOLUTION INTRAVENOUS at 07:04

## 2021-05-26 RX ADMIN — NICOTINE 1 PATCH: 14 PATCH, EXTENDED RELEASE TRANSDERMAL at 08:22

## 2021-05-26 RX ADMIN — ATORVASTATIN CALCIUM 80 MG: 40 TABLET, FILM COATED ORAL at 20:44

## 2021-05-26 RX ADMIN — WARFARIN SODIUM 5 MG: 5 TABLET ORAL at 17:31

## 2021-05-27 LAB
ANION GAP SERPL CALCULATED.3IONS-SCNC: 9 MMOL/L (ref 5–15)
BACTERIA SPEC AEROBE CULT: ABNORMAL
BASOPHILS # BLD AUTO: 0.04 10*3/MM3 (ref 0–0.2)
BASOPHILS NFR BLD AUTO: 0.8 % (ref 0–1.5)
BUN SERPL-MCNC: 11 MG/DL (ref 8–23)
BUN/CREAT SERPL: 13.8 (ref 7–25)
CALCIUM SPEC-SCNC: 9.2 MG/DL (ref 8.6–10.5)
CHLORIDE SERPL-SCNC: 105 MMOL/L (ref 98–107)
CO2 SERPL-SCNC: 27 MMOL/L (ref 22–29)
CREAT SERPL-MCNC: 0.8 MG/DL (ref 0.76–1.27)
DEPRECATED RDW RBC AUTO: 47.9 FL (ref 37–54)
EOSINOPHIL # BLD AUTO: 0.08 10*3/MM3 (ref 0–0.4)
EOSINOPHIL NFR BLD AUTO: 1.7 % (ref 0.3–6.2)
ERYTHROCYTE [DISTWIDTH] IN BLOOD BY AUTOMATED COUNT: 12.8 % (ref 12.3–15.4)
GFR SERPL CREATININE-BSD FRML MDRD: 96 ML/MIN/1.73
GLUCOSE SERPL-MCNC: 96 MG/DL (ref 65–99)
HCT VFR BLD AUTO: 36.4 % (ref 37.5–51)
HGB BLD-MCNC: 12.1 G/DL (ref 13–17.7)
IMM GRANULOCYTES # BLD AUTO: 0.01 10*3/MM3 (ref 0–0.05)
IMM GRANULOCYTES NFR BLD AUTO: 0.2 % (ref 0–0.5)
INR PPP: 1.83 (ref 0.85–1.16)
LYMPHOCYTES # BLD AUTO: 1.23 10*3/MM3 (ref 0.7–3.1)
LYMPHOCYTES NFR BLD AUTO: 25.6 % (ref 19.6–45.3)
MCH RBC QN AUTO: 33.8 PG (ref 26.6–33)
MCHC RBC AUTO-ENTMCNC: 33.2 G/DL (ref 31.5–35.7)
MCV RBC AUTO: 101.7 FL (ref 79–97)
MONOCYTES # BLD AUTO: 0.39 10*3/MM3 (ref 0.1–0.9)
MONOCYTES NFR BLD AUTO: 8.1 % (ref 5–12)
NEUTROPHILS NFR BLD AUTO: 3.06 10*3/MM3 (ref 1.7–7)
NEUTROPHILS NFR BLD AUTO: 63.6 % (ref 42.7–76)
NRBC BLD AUTO-RTO: 0 /100 WBC (ref 0–0.2)
PLATELET # BLD AUTO: 102 10*3/MM3 (ref 140–450)
PMV BLD AUTO: 11.4 FL (ref 6–12)
POTASSIUM SERPL-SCNC: 4.3 MMOL/L (ref 3.5–5.2)
PROTHROMBIN TIME: 20.5 SECONDS (ref 11.4–14.4)
RBC # BLD AUTO: 3.58 10*6/MM3 (ref 4.14–5.8)
SODIUM SERPL-SCNC: 141 MMOL/L (ref 136–145)
UFH PPP CHRO-ACNC: 0.41 IU/ML (ref 0.3–0.7)
UFH PPP CHRO-ACNC: 0.45 IU/ML (ref 0.3–0.7)
WBC # BLD AUTO: 4.81 10*3/MM3 (ref 3.4–10.8)

## 2021-05-27 PROCEDURE — 25010000002 CEFTRIAXONE PER 250 MG: Performed by: INTERNAL MEDICINE

## 2021-05-27 PROCEDURE — 85610 PROTHROMBIN TIME: CPT | Performed by: INTERNAL MEDICINE

## 2021-05-27 PROCEDURE — 85520 HEPARIN ASSAY: CPT | Performed by: INTERNAL MEDICINE

## 2021-05-27 PROCEDURE — 85520 HEPARIN ASSAY: CPT

## 2021-05-27 PROCEDURE — 99233 SBSQ HOSP IP/OBS HIGH 50: CPT | Performed by: INTERNAL MEDICINE

## 2021-05-27 PROCEDURE — 80048 BASIC METABOLIC PNL TOTAL CA: CPT | Performed by: INTERNAL MEDICINE

## 2021-05-27 PROCEDURE — 25010000003 MAGNESIUM SULFATE 4 GM/100ML SOLUTION: Performed by: INTERNAL MEDICINE

## 2021-05-27 PROCEDURE — 25010000002 HEPARIN (PORCINE) 25000-0.45 UT/250ML-% SOLUTION: Performed by: INTERNAL MEDICINE

## 2021-05-27 PROCEDURE — 85025 COMPLETE CBC W/AUTO DIFF WBC: CPT | Performed by: INTERNAL MEDICINE

## 2021-05-27 RX ORDER — LISINOPRIL 10 MG/1
10 TABLET ORAL
Status: DISCONTINUED | OUTPATIENT
Start: 2021-05-27 | End: 2021-05-29 | Stop reason: HOSPADM

## 2021-05-27 RX ORDER — MAGNESIUM SULFATE HEPTAHYDRATE 40 MG/ML
2 INJECTION, SOLUTION INTRAVENOUS AS NEEDED
Status: DISCONTINUED | OUTPATIENT
Start: 2021-05-27 | End: 2021-05-29 | Stop reason: HOSPADM

## 2021-05-27 RX ORDER — WARFARIN SODIUM 7.5 MG/1
7.5 TABLET ORAL
Status: DISCONTINUED | OUTPATIENT
Start: 2021-05-27 | End: 2021-05-28 | Stop reason: ALTCHOICE

## 2021-05-27 RX ORDER — MAGNESIUM SULFATE HEPTAHYDRATE 40 MG/ML
4 INJECTION, SOLUTION INTRAVENOUS AS NEEDED
Status: DISCONTINUED | OUTPATIENT
Start: 2021-05-27 | End: 2021-05-29 | Stop reason: HOSPADM

## 2021-05-27 RX ADMIN — SODIUM CHLORIDE 1 G: 900 INJECTION INTRAVENOUS at 09:24

## 2021-05-27 RX ADMIN — ASPIRIN 81 MG: 81 TABLET, COATED ORAL at 08:08

## 2021-05-27 RX ADMIN — CITALOPRAM HYDROBROMIDE 20 MG: 20 TABLET ORAL at 08:08

## 2021-05-27 RX ADMIN — WARFARIN SODIUM 7.5 MG: 7.5 TABLET ORAL at 17:43

## 2021-05-27 RX ADMIN — HEPARIN SODIUM 16 UNITS/KG/HR: 10000 INJECTION, SOLUTION INTRAVENOUS at 09:23

## 2021-05-27 RX ADMIN — MAGNESIUM SULFATE IN WATER 4 G: 40 INJECTION, SOLUTION INTRAVENOUS at 16:34

## 2021-05-27 RX ADMIN — NICOTINE 1 PATCH: 14 PATCH, EXTENDED RELEASE TRANSDERMAL at 08:09

## 2021-05-27 RX ADMIN — LISINOPRIL 10 MG: 10 TABLET ORAL at 09:24

## 2021-05-27 RX ADMIN — BUPROPION HYDROCHLORIDE 300 MG: 150 TABLET, FILM COATED, EXTENDED RELEASE ORAL at 08:08

## 2021-05-27 RX ADMIN — ATORVASTATIN CALCIUM 80 MG: 40 TABLET, FILM COATED ORAL at 20:41

## 2021-05-27 RX ADMIN — SODIUM CHLORIDE, PRESERVATIVE FREE 10 ML: 5 INJECTION INTRAVENOUS at 08:08

## 2021-05-28 LAB
ANION GAP SERPL CALCULATED.3IONS-SCNC: 10 MMOL/L (ref 5–15)
BASOPHILS # BLD AUTO: 0.04 10*3/MM3 (ref 0–0.2)
BASOPHILS NFR BLD AUTO: 0.9 % (ref 0–1.5)
BUN SERPL-MCNC: 10 MG/DL (ref 8–23)
BUN/CREAT SERPL: 11.9 (ref 7–25)
CALCIUM SPEC-SCNC: 8.9 MG/DL (ref 8.6–10.5)
CHLORIDE SERPL-SCNC: 100 MMOL/L (ref 98–107)
CO2 SERPL-SCNC: 29 MMOL/L (ref 22–29)
CREAT SERPL-MCNC: 0.84 MG/DL (ref 0.76–1.27)
DEPRECATED RDW RBC AUTO: 51.1 FL (ref 37–54)
EOSINOPHIL # BLD AUTO: 0.1 10*3/MM3 (ref 0–0.4)
EOSINOPHIL NFR BLD AUTO: 2.2 % (ref 0.3–6.2)
ERYTHROCYTE [DISTWIDTH] IN BLOOD BY AUTOMATED COUNT: 12.9 % (ref 12.3–15.4)
GFR SERPL CREATININE-BSD FRML MDRD: 90 ML/MIN/1.73
GLUCOSE SERPL-MCNC: 89 MG/DL (ref 65–99)
HCT VFR BLD AUTO: 38.8 % (ref 37.5–51)
HGB BLD-MCNC: 12.3 G/DL (ref 13–17.7)
IMM GRANULOCYTES # BLD AUTO: 0.01 10*3/MM3 (ref 0–0.05)
IMM GRANULOCYTES NFR BLD AUTO: 0.2 % (ref 0–0.5)
INR PPP: 1.78 (ref 0.85–1.16)
LYMPHOCYTES # BLD AUTO: 1.26 10*3/MM3 (ref 0.7–3.1)
LYMPHOCYTES NFR BLD AUTO: 27.2 % (ref 19.6–45.3)
MAGNESIUM SERPL-MCNC: 2.3 MG/DL (ref 1.6–2.4)
MCH RBC QN AUTO: 33.9 PG (ref 26.6–33)
MCHC RBC AUTO-ENTMCNC: 31.7 G/DL (ref 31.5–35.7)
MCV RBC AUTO: 106.9 FL (ref 79–97)
MONOCYTES # BLD AUTO: 0.41 10*3/MM3 (ref 0.1–0.9)
MONOCYTES NFR BLD AUTO: 8.9 % (ref 5–12)
NEUTROPHILS NFR BLD AUTO: 2.81 10*3/MM3 (ref 1.7–7)
NEUTROPHILS NFR BLD AUTO: 60.6 % (ref 42.7–76)
NRBC BLD AUTO-RTO: 0 /100 WBC (ref 0–0.2)
PLATELET # BLD AUTO: 101 10*3/MM3 (ref 140–450)
PMV BLD AUTO: 11.7 FL (ref 6–12)
POTASSIUM SERPL-SCNC: 4.1 MMOL/L (ref 3.5–5.2)
PROTHROMBIN TIME: 20 SECONDS (ref 11.4–14.4)
RBC # BLD AUTO: 3.63 10*6/MM3 (ref 4.14–5.8)
SODIUM SERPL-SCNC: 139 MMOL/L (ref 136–145)
UFH PPP CHRO-ACNC: 0.41 IU/ML (ref 0.3–0.7)
WBC # BLD AUTO: 4.63 10*3/MM3 (ref 3.4–10.8)

## 2021-05-28 PROCEDURE — 25010000002 CEFTRIAXONE PER 250 MG: Performed by: INTERNAL MEDICINE

## 2021-05-28 PROCEDURE — 25010000002 HEPARIN (PORCINE) 25000-0.45 UT/250ML-% SOLUTION: Performed by: INTERNAL MEDICINE

## 2021-05-28 PROCEDURE — 99233 SBSQ HOSP IP/OBS HIGH 50: CPT | Performed by: INTERNAL MEDICINE

## 2021-05-28 PROCEDURE — 85025 COMPLETE CBC W/AUTO DIFF WBC: CPT | Performed by: INTERNAL MEDICINE

## 2021-05-28 PROCEDURE — 80048 BASIC METABOLIC PNL TOTAL CA: CPT | Performed by: INTERNAL MEDICINE

## 2021-05-28 PROCEDURE — 85520 HEPARIN ASSAY: CPT

## 2021-05-28 PROCEDURE — 83735 ASSAY OF MAGNESIUM: CPT | Performed by: INTERNAL MEDICINE

## 2021-05-28 PROCEDURE — 85610 PROTHROMBIN TIME: CPT | Performed by: INTERNAL MEDICINE

## 2021-05-28 RX ORDER — WARFARIN SODIUM 5 MG/1
10 TABLET ORAL
Status: COMPLETED | OUTPATIENT
Start: 2021-05-28 | End: 2021-05-28

## 2021-05-28 RX ADMIN — LISINOPRIL 10 MG: 10 TABLET ORAL at 08:36

## 2021-05-28 RX ADMIN — SODIUM CHLORIDE, PRESERVATIVE FREE 10 ML: 5 INJECTION INTRAVENOUS at 08:36

## 2021-05-28 RX ADMIN — HEPARIN SODIUM 16 UNITS/KG/HR: 10000 INJECTION, SOLUTION INTRAVENOUS at 08:33

## 2021-05-28 RX ADMIN — ATORVASTATIN CALCIUM 80 MG: 40 TABLET, FILM COATED ORAL at 20:05

## 2021-05-28 RX ADMIN — CITALOPRAM HYDROBROMIDE 20 MG: 20 TABLET ORAL at 08:36

## 2021-05-28 RX ADMIN — SODIUM CHLORIDE 1 G: 900 INJECTION INTRAVENOUS at 08:35

## 2021-05-28 RX ADMIN — WARFARIN SODIUM 10 MG: 5 TABLET ORAL at 16:36

## 2021-05-28 RX ADMIN — NICOTINE 1 PATCH: 14 PATCH, EXTENDED RELEASE TRANSDERMAL at 08:37

## 2021-05-28 RX ADMIN — ASPIRIN 81 MG: 81 TABLET, COATED ORAL at 08:36

## 2021-05-28 RX ADMIN — BUPROPION HYDROCHLORIDE 300 MG: 150 TABLET, FILM COATED, EXTENDED RELEASE ORAL at 08:36

## 2021-05-29 ENCOUNTER — READMISSION MANAGEMENT (OUTPATIENT)
Dept: CALL CENTER | Facility: HOSPITAL | Age: 71
End: 2021-05-29

## 2021-05-29 VITALS
RESPIRATION RATE: 18 BRPM | HEART RATE: 69 BPM | OXYGEN SATURATION: 96 % | WEIGHT: 145.5 LBS | BODY MASS INDEX: 19.71 KG/M2 | TEMPERATURE: 98.5 F | DIASTOLIC BLOOD PRESSURE: 67 MMHG | HEIGHT: 72 IN | SYSTOLIC BLOOD PRESSURE: 119 MMHG

## 2021-05-29 LAB
ALBUMIN SERPL-MCNC: 3.4 G/DL (ref 3.5–5.2)
ANION GAP SERPL CALCULATED.3IONS-SCNC: 9 MMOL/L (ref 5–15)
BUN SERPL-MCNC: 10 MG/DL (ref 8–23)
BUN/CREAT SERPL: 10.3 (ref 7–25)
CALCIUM SPEC-SCNC: 9.4 MG/DL (ref 8.6–10.5)
CHLORIDE SERPL-SCNC: 102 MMOL/L (ref 98–107)
CO2 SERPL-SCNC: 29 MMOL/L (ref 22–29)
CREAT SERPL-MCNC: 0.97 MG/DL (ref 0.76–1.27)
DEPRECATED RDW RBC AUTO: 50.8 FL (ref 37–54)
ERYTHROCYTE [DISTWIDTH] IN BLOOD BY AUTOMATED COUNT: 13.1 % (ref 12.3–15.4)
GFR SERPL CREATININE-BSD FRML MDRD: 77 ML/MIN/1.73
GLUCOSE SERPL-MCNC: 103 MG/DL (ref 65–99)
HCT VFR BLD AUTO: 38.8 % (ref 37.5–51)
HGB BLD-MCNC: 12.5 G/DL (ref 13–17.7)
INR PPP: 2.44 (ref 0.85–1.16)
MCH RBC QN AUTO: 33.9 PG (ref 26.6–33)
MCHC RBC AUTO-ENTMCNC: 32.2 G/DL (ref 31.5–35.7)
MCV RBC AUTO: 105.1 FL (ref 79–97)
PHOSPHATE SERPL-MCNC: 3.8 MG/DL (ref 2.5–4.5)
PLATELET # BLD AUTO: 112 10*3/MM3 (ref 140–450)
PMV BLD AUTO: 11.6 FL (ref 6–12)
POTASSIUM SERPL-SCNC: 4.9 MMOL/L (ref 3.5–5.2)
PROTHROMBIN TIME: 25.5 SECONDS (ref 11.4–14.4)
RBC # BLD AUTO: 3.69 10*6/MM3 (ref 4.14–5.8)
SODIUM SERPL-SCNC: 140 MMOL/L (ref 136–145)
UFH PPP CHRO-ACNC: 0.32 IU/ML (ref 0.3–0.7)
WBC # BLD AUTO: 4.88 10*3/MM3 (ref 3.4–10.8)

## 2021-05-29 PROCEDURE — 85027 COMPLETE CBC AUTOMATED: CPT | Performed by: INTERNAL MEDICINE

## 2021-05-29 PROCEDURE — 80069 RENAL FUNCTION PANEL: CPT | Performed by: INTERNAL MEDICINE

## 2021-05-29 PROCEDURE — 25010000002 CEFTRIAXONE PER 250 MG: Performed by: INTERNAL MEDICINE

## 2021-05-29 PROCEDURE — 99239 HOSP IP/OBS DSCHRG MGMT >30: CPT | Performed by: INTERNAL MEDICINE

## 2021-05-29 PROCEDURE — 85520 HEPARIN ASSAY: CPT

## 2021-05-29 PROCEDURE — 25010000002 HEPARIN (PORCINE) 25000-0.45 UT/250ML-% SOLUTION: Performed by: INTERNAL MEDICINE

## 2021-05-29 PROCEDURE — 85610 PROTHROMBIN TIME: CPT | Performed by: INTERNAL MEDICINE

## 2021-05-29 RX ORDER — LISINOPRIL 10 MG/1
10 TABLET ORAL
Start: 2021-05-30 | End: 2021-12-29

## 2021-05-29 RX ORDER — WARFARIN SODIUM 1 MG/1
TABLET ORAL
Qty: 10 TABLET | Refills: 0 | Status: SHIPPED | OUTPATIENT
Start: 2021-05-29 | End: 2021-06-08 | Stop reason: DRUGHIGH

## 2021-05-29 RX ORDER — CEPHALEXIN 500 MG/1
500 CAPSULE ORAL 2 TIMES DAILY
Qty: 8 CAPSULE | Refills: 0 | Status: SHIPPED | OUTPATIENT
Start: 2021-05-29 | End: 2021-06-02

## 2021-05-29 RX ORDER — WARFARIN SODIUM 5 MG/1
5 TABLET ORAL
Status: DISCONTINUED | OUTPATIENT
Start: 2021-05-29 | End: 2021-05-29

## 2021-05-29 RX ADMIN — LISINOPRIL 10 MG: 10 TABLET ORAL at 09:08

## 2021-05-29 RX ADMIN — SODIUM CHLORIDE, PRESERVATIVE FREE 10 ML: 5 INJECTION INTRAVENOUS at 09:08

## 2021-05-29 RX ADMIN — BUPROPION HYDROCHLORIDE 300 MG: 150 TABLET, FILM COATED, EXTENDED RELEASE ORAL at 09:07

## 2021-05-29 RX ADMIN — NICOTINE 1 PATCH: 14 PATCH, EXTENDED RELEASE TRANSDERMAL at 09:10

## 2021-05-29 RX ADMIN — HEPARIN SODIUM 16 UNITS/KG/HR: 10000 INJECTION, SOLUTION INTRAVENOUS at 06:59

## 2021-05-29 RX ADMIN — SODIUM CHLORIDE 1 G: 900 INJECTION INTRAVENOUS at 09:08

## 2021-05-29 RX ADMIN — ASPIRIN 81 MG: 81 TABLET, COATED ORAL at 09:06

## 2021-05-29 RX ADMIN — CITALOPRAM HYDROBROMIDE 20 MG: 20 TABLET ORAL at 09:08

## 2021-05-29 NOTE — OUTREACH NOTE
Prep Survey      Responses   [facility] facility patient discharged from?  [place]   Is LACE score < 7 ?  No   Emergency Room discharge w/ pulse ox?  No   Eligibility  [facility]    Date of Admission  05/24/21   Date of Discharge  05/29/21   Discharge Disposition  Home or Self Care   Discharge diagnosis  Hypotension,  syncope,     Does the patient have one of the following disease processes/diagnoses(primary or secondary)?  Other   Does the patient have Home health ordered?  No   Is there a DME ordered?  No   Prep survey completed?  Yes          Barbara Grewal RN

## 2021-05-30 LAB
BACTERIA SPEC AEROBE CULT: NORMAL
BACTERIA SPEC AEROBE CULT: NORMAL

## 2021-05-31 ENCOUNTER — TRANSITIONAL CARE MANAGEMENT TELEPHONE ENCOUNTER (OUTPATIENT)
Dept: CALL CENTER | Facility: HOSPITAL | Age: 71
End: 2021-05-31

## 2021-05-31 NOTE — OUTREACH NOTE
Call Center TCM Note      Responses   [facility] patient discharged from?  [place]   Does the patient have one of the following disease processes/diagnoses(primary or secondary)?  Other   TCM attempt successful?  Yes   Call start time  1150   Call end time  1158   Discharge diagnosis  Hypotension,  syncope,  laceration of LLE   Is patient permission given to speak with other caregiver?  Yes   List who call center can speak with  Meghana Vo, spouse   Person spoke with today (if not patient) and relationship  spouse   Medication alerts for this patient  Home coumadin. Patient to have INR recheck in PCP office tomorrow 6/1/21. Wife will call in the am.    Meds reviewed with patient/caregiver?  Yes   Is the patient having any side effects they believe may be caused by any medication additions or changes?  No   Does the patient have all medications ordered at discharge?  Yes   Is the patient taking all medications as directed (includes completed medication regime)?  Yes   Does the patient have a primary care provider?   Yes   Does the patient have an appointment with their PCP within 7 days of discharge?  No   Comments regarding PCP  PCP Dr Cintron. Patient needs appt for Tues 6/1/21 per discharge instructions.    What is preventing the patient from scheduling follow up appointments within 7 days of discharge?  -- [today is holiday, unable to contact office to obtain appt. No hospital follow up appt seen available in PCP schedule for appt tomorrow. ]   Nursing Interventions  Educated patient on importance of making appointment, Advised patient to make appointment [Wife to contact PCP office in the am. Message routed to office with appt need. ]   Has the patient kept scheduled appointments due by today?  N/A   Has home health visited the patient within 72 hours of discharge?  N/A   Psychosocial issues?  No   Did the patient receive a copy of their discharge instructions?  Yes   Nursing interventions   Reviewed instructions with patient   What is the patient's perception of their health status since discharge?  Improving [Wife reports that patient did have some bleeding after discharge from laceration on leg. She states she redressed and no further bleeding. ]   Is the patient/caregiver able to teach back signs and symptoms related to disease process for when to call PCP?  Yes   Is the patient/caregiver able to teach back signs and symptoms related to disease process for when to call 911?  Yes   Is the patient/caregiver able to teach back the hierarchy of who to call/visit for symptoms/problems? PCP, Specialist, Home health nurse, Urgent Care, ED, 911  Yes   TCM call completed?  Yes   Wrap up additional comments  Wife denies further questions today.           Roselia Leavitt RN    5/31/2021, 11:58 EDT

## 2021-06-01 ENCOUNTER — LAB (OUTPATIENT)
Dept: INTERNAL MEDICINE | Facility: CLINIC | Age: 71
End: 2021-06-01

## 2021-06-01 DIAGNOSIS — I48.91 ATRIAL FIBRILLATION, UNSPECIFIED TYPE (HCC): Primary | ICD-10-CM

## 2021-06-01 LAB — INR PPP: 6 (ref 2.5–3.5)

## 2021-06-01 PROCEDURE — 36416 COLLJ CAPILLARY BLOOD SPEC: CPT | Performed by: INTERNAL MEDICINE

## 2021-06-01 PROCEDURE — 85610 PROTHROMBIN TIME: CPT | Performed by: INTERNAL MEDICINE

## 2021-06-04 ENCOUNTER — LAB (OUTPATIENT)
Dept: INTERNAL MEDICINE | Facility: CLINIC | Age: 71
End: 2021-06-04

## 2021-06-04 DIAGNOSIS — I48.91 ATRIAL FIBRILLATION, UNSPECIFIED TYPE (HCC): Primary | ICD-10-CM

## 2021-06-04 LAB — INR PPP: 3.8 (ref 2.5–3.5)

## 2021-06-04 PROCEDURE — 85610 PROTHROMBIN TIME: CPT | Performed by: INTERNAL MEDICINE

## 2021-06-04 PROCEDURE — 36416 COLLJ CAPILLARY BLOOD SPEC: CPT | Performed by: INTERNAL MEDICINE

## 2021-06-08 ENCOUNTER — OFFICE VISIT (OUTPATIENT)
Dept: INTERNAL MEDICINE | Facility: CLINIC | Age: 71
End: 2021-06-08

## 2021-06-08 ENCOUNTER — TELEPHONE (OUTPATIENT)
Dept: INTERNAL MEDICINE | Facility: CLINIC | Age: 71
End: 2021-06-08

## 2021-06-08 ENCOUNTER — HOSPITAL ENCOUNTER (OUTPATIENT)
Dept: GENERAL RADIOLOGY | Facility: HOSPITAL | Age: 71
Discharge: HOME | End: 2021-06-08
Admitting: INTERNAL MEDICINE

## 2021-06-08 VITALS
RESPIRATION RATE: 18 BRPM | SYSTOLIC BLOOD PRESSURE: 156 MMHG | DIASTOLIC BLOOD PRESSURE: 68 MMHG | TEMPERATURE: 98 F | WEIGHT: 140 LBS | BODY MASS INDEX: 18.99 KG/M2 | HEART RATE: 80 BPM

## 2021-06-08 DIAGNOSIS — M54.50 ACUTE MIDLINE LOW BACK PAIN WITHOUT SCIATICA: ICD-10-CM

## 2021-06-08 DIAGNOSIS — N39.0 URINARY TRACT INFECTION WITHOUT HEMATURIA, SITE UNSPECIFIED: ICD-10-CM

## 2021-06-08 DIAGNOSIS — I48.91 ATRIAL FIBRILLATION, UNSPECIFIED TYPE (HCC): ICD-10-CM

## 2021-06-08 DIAGNOSIS — I95.9 HYPOTENSION, UNSPECIFIED HYPOTENSION TYPE: Primary | ICD-10-CM

## 2021-06-08 LAB
ALBUMIN SERPL-MCNC: 4.2 G/DL (ref 3.5–5.2)
ALBUMIN/GLOB SERPL: 1.4 G/DL
ALP SERPL-CCNC: 95 U/L (ref 39–117)
ALT SERPL W P-5'-P-CCNC: 21 U/L (ref 1–41)
ANION GAP SERPL CALCULATED.3IONS-SCNC: 10.3 MMOL/L (ref 5–15)
AST SERPL-CCNC: 18 U/L (ref 1–40)
BASOPHILS # BLD AUTO: 0.05 10*3/MM3 (ref 0–0.2)
BASOPHILS NFR BLD AUTO: 0.8 % (ref 0–1.5)
BILIRUB SERPL-MCNC: 1.4 MG/DL (ref 0–1.2)
BUN SERPL-MCNC: 10 MG/DL (ref 8–23)
BUN/CREAT SERPL: 11.4 (ref 7–25)
CALCIUM SPEC-SCNC: 9.4 MG/DL (ref 8.6–10.5)
CHLORIDE SERPL-SCNC: 99 MMOL/L (ref 98–107)
CO2 SERPL-SCNC: 26.7 MMOL/L (ref 22–29)
CREAT SERPL-MCNC: 0.88 MG/DL (ref 0.76–1.27)
DEPRECATED RDW RBC AUTO: 48.2 FL (ref 37–54)
EOSINOPHIL # BLD AUTO: 0.11 10*3/MM3 (ref 0–0.4)
EOSINOPHIL NFR BLD AUTO: 1.7 % (ref 0.3–6.2)
ERYTHROCYTE [DISTWIDTH] IN BLOOD BY AUTOMATED COUNT: 13.1 % (ref 12.3–15.4)
GFR SERPL CREATININE-BSD FRML MDRD: 86 ML/MIN/1.73
GLOBULIN UR ELPH-MCNC: 3 GM/DL
GLUCOSE SERPL-MCNC: 82 MG/DL (ref 65–99)
HCT VFR BLD AUTO: 39 % (ref 37.5–51)
HGB BLD-MCNC: 13.5 G/DL (ref 13–17.7)
IMM GRANULOCYTES # BLD AUTO: 0.02 10*3/MM3 (ref 0–0.05)
IMM GRANULOCYTES NFR BLD AUTO: 0.3 % (ref 0–0.5)
INR PPP: 3 (ref 2.5–3.5)
LYMPHOCYTES # BLD AUTO: 1.11 10*3/MM3 (ref 0.7–3.1)
LYMPHOCYTES NFR BLD AUTO: 16.8 % (ref 19.6–45.3)
MCH RBC QN AUTO: 35.1 PG (ref 26.6–33)
MCHC RBC AUTO-ENTMCNC: 34.6 G/DL (ref 31.5–35.7)
MCV RBC AUTO: 101.3 FL (ref 79–97)
MONOCYTES # BLD AUTO: 0.69 10*3/MM3 (ref 0.1–0.9)
MONOCYTES NFR BLD AUTO: 10.4 % (ref 5–12)
NEUTROPHILS NFR BLD AUTO: 4.64 10*3/MM3 (ref 1.7–7)
NEUTROPHILS NFR BLD AUTO: 70 % (ref 42.7–76)
NRBC BLD AUTO-RTO: 0 /100 WBC (ref 0–0.2)
PLATELET # BLD AUTO: 151 10*3/MM3 (ref 140–450)
PMV BLD AUTO: 12.3 FL (ref 6–12)
POTASSIUM SERPL-SCNC: 4.1 MMOL/L (ref 3.5–5.2)
PROT SERPL-MCNC: 7.2 G/DL (ref 6–8.5)
RBC # BLD AUTO: 3.85 10*6/MM3 (ref 4.14–5.8)
SODIUM SERPL-SCNC: 136 MMOL/L (ref 136–145)
WBC # BLD AUTO: 6.62 10*3/MM3 (ref 3.4–10.8)

## 2021-06-08 PROCEDURE — 85610 PROTHROMBIN TIME: CPT | Performed by: INTERNAL MEDICINE

## 2021-06-08 PROCEDURE — 85025 COMPLETE CBC W/AUTO DIFF WBC: CPT | Performed by: INTERNAL MEDICINE

## 2021-06-08 PROCEDURE — 36416 COLLJ CAPILLARY BLOOD SPEC: CPT | Performed by: INTERNAL MEDICINE

## 2021-06-08 PROCEDURE — 99495 TRANSJ CARE MGMT MOD F2F 14D: CPT | Performed by: INTERNAL MEDICINE

## 2021-06-08 PROCEDURE — 72100 X-RAY EXAM L-S SPINE 2/3 VWS: CPT

## 2021-06-08 PROCEDURE — 1111F DSCHRG MED/CURRENT MED MERGE: CPT | Performed by: INTERNAL MEDICINE

## 2021-06-08 PROCEDURE — 80053 COMPREHEN METABOLIC PANEL: CPT | Performed by: INTERNAL MEDICINE

## 2021-06-08 PROCEDURE — 36415 COLL VENOUS BLD VENIPUNCTURE: CPT | Performed by: INTERNAL MEDICINE

## 2021-06-08 RX ORDER — OXYCODONE AND ACETAMINOPHEN 7.5; 325 MG/1; MG/1
1 TABLET ORAL EVERY 6 HOURS PRN
Qty: 10 TABLET | Refills: 0 | Status: SHIPPED | OUTPATIENT
Start: 2021-06-08 | End: 2021-08-26

## 2021-06-08 NOTE — TELEPHONE ENCOUNTER
PATIENT STATES THAT DOCTOR MEME TOLD HIM TO CALL IN AND GIVE HIM A PRESCRIPTION NUMBER  PERCEPTION NUMBER NUMBER-[id number]. THE PATIENT STATES THAT THE MEDICATION IS OXYCODONE AND THAT IT WAS ORIGINALLY FILLED AT [facility] THE DOCTOR THAT WROTE THE PRESCRIPTION WAS NORMA ZHU     PATIENTS CALL BACK NUMBER  [phone]

## 2021-06-16 ENCOUNTER — OFFICE VISIT (OUTPATIENT)
Dept: INTERNAL MEDICINE | Facility: CLINIC | Age: 71
End: 2021-06-16

## 2021-06-16 VITALS
DIASTOLIC BLOOD PRESSURE: 60 MMHG | TEMPERATURE: 97.7 F | SYSTOLIC BLOOD PRESSURE: 112 MMHG | BODY MASS INDEX: 19.53 KG/M2 | HEART RATE: 56 BPM | WEIGHT: 144 LBS | RESPIRATION RATE: 18 BRPM

## 2021-06-16 DIAGNOSIS — Z48.02 VISIT FOR SUTURE REMOVAL: ICD-10-CM

## 2021-06-16 DIAGNOSIS — N39.0 URINARY TRACT INFECTION WITHOUT HEMATURIA, SITE UNSPECIFIED: Primary | ICD-10-CM

## 2021-06-16 DIAGNOSIS — I10 ESSENTIAL HYPERTENSION: ICD-10-CM

## 2021-06-16 DIAGNOSIS — I48.91 ATRIAL FIBRILLATION, UNSPECIFIED TYPE (HCC): ICD-10-CM

## 2021-06-16 LAB
BILIRUB BLD-MCNC: NEGATIVE MG/DL
CLARITY, POC: ABNORMAL
COLOR UR: YELLOW
EXPIRATION DATE: ABNORMAL
GLUCOSE UR STRIP-MCNC: NEGATIVE MG/DL
INR PPP: 2.8 (ref 2.5–3.5)
KETONES UR QL: NEGATIVE
LEUKOCYTE EST, POC: NEGATIVE
Lab: ABNORMAL
NITRITE UR-MCNC: NEGATIVE MG/ML
PH UR: 6 [PH] (ref 5–8)
PROT UR STRIP-MCNC: NEGATIVE MG/DL
RBC # UR STRIP: ABNORMAL /UL
SP GR UR: 1.01 (ref 1–1.03)
UROBILINOGEN UR QL: ABNORMAL

## 2021-06-16 PROCEDURE — 81003 URINALYSIS AUTO W/O SCOPE: CPT | Performed by: INTERNAL MEDICINE

## 2021-06-16 PROCEDURE — 85610 PROTHROMBIN TIME: CPT | Performed by: INTERNAL MEDICINE

## 2021-06-16 PROCEDURE — 99213 OFFICE O/P EST LOW 20 MIN: CPT | Performed by: INTERNAL MEDICINE

## 2021-06-16 PROCEDURE — 36416 COLLJ CAPILLARY BLOOD SPEC: CPT | Performed by: INTERNAL MEDICINE

## 2021-06-16 PROCEDURE — 87086 URINE CULTURE/COLONY COUNT: CPT | Performed by: INTERNAL MEDICINE

## 2021-06-16 PROCEDURE — 87147 CULTURE TYPE IMMUNOLOGIC: CPT | Performed by: INTERNAL MEDICINE

## 2021-06-16 NOTE — PROGRESS NOTES
Chief Complaint   Patient presents with   • Follow-up     1 WEEK FOLLOW UP        History of Present Illness    The patient presents for follow-up of a urinary tract infection. The patient denies dysuria, gross hematuria, frequency, urgency, hesitancy, fever, vaginal discharge, pelvic pain or flank pain. He completed his medication.    The patient presents for a follow-up related to hypertension. The patient reports that he has had no headaches, chest pain, dyspnea, edema, syncope, blurred vision or palpitations. He states that he is taking his medication as prescribed. He is not having medication side effects.    Review of Systems    CONSTITUTIONAL- Denies Unexplained Weight Loss, Chills, Sweats, Fatigue, Weakness or Malaise.    PULMONARY- Denies Wheezing, Sputum Production, Cough, Hemoptysis or Pleuritic Chest Pain.    CARDIOVASCULAR- Denies Claudication or Irregular Heart Beat.    Medications      Current Outpatient Medications:   •  aspirin 81 MG EC tablet, Take 1 tablet by mouth daily., Disp: , Rfl:   •  atorvastatin (LIPITOR) 80 MG tablet, Take 1 tablet by mouth Every Night., Disp: 90 tablet, Rfl: 2  •  buPROPion XL (Wellbutrin XL) 300 MG 24 hr tablet, Take 1 tablet by mouth Daily., Disp: 30 tablet, Rfl: 6  •  citalopram (CeleXA) 20 MG tablet, Take 1 tablet by mouth once daily (Patient taking differently: Take 20 mg by mouth Daily.), Disp: 90 tablet, Rfl: 1  •  famotidine (PEPCID) 20 MG tablet, Take 1 tablet by mouth Daily., Disp: 30 tablet, Rfl: 1  •  finasteride (PROSCAR) 5 MG tablet, TAKE 1 TABLET BY MOUTH ONCE DAILY AT BEDTIME (Patient taking differently: Take 5 mg by mouth Daily.), Disp: 90 tablet, Rfl: 1  •  folic acid (FOLVITE) 400 MCG tablet, TAKE 1 TABLET BY MOUTH ONCE DAILY (Patient taking differently: Daily.), Disp: 90 tablet, Rfl: 3  •  lisinopril (PRINIVIL,ZESTRIL) 10 MG tablet, Take 1 tablet by mouth Daily., Disp: , Rfl:   •  nicotine (NICODERM CQ) 14 MG/24HR patch, Place 1 patch on the skin as  directed by provider Daily., Disp: 90 patch, Rfl: 0  •  tadalafil (Cialis) 20 MG tablet, Take 1 tablet by mouth Daily As Needed for Erectile Dysfunction., Disp: 30 tablet, Rfl: 2  •  vitamin B-12 (CYANOCOBALAMIN) 1000 MCG tablet, Take 1 tablet by mouth Daily., Disp: 90 tablet, Rfl: 3  •  warfarin (COUMADIN) 5 MG tablet, Take 1 tablet by mouth once daily (Patient taking differently: No sig reported), Disp: 30 tablet, Rfl: 5  •  oxyCODONE-acetaminophen (Percocet) 7.5-325 MG per tablet, Take 1 tablet by mouth Every 6 (Six) Hours As Needed for Moderate Pain  or Severe Pain ., Disp: 10 tablet, Rfl: 0  •  tiotropium bromide-olodaterol (STIOLTO RESPIMAT) 2.5-2.5 MCG/ACT aerosol solution inhaler, Inhale 2 puffs Daily., Disp: 1 each, Rfl: 11     Allergies    No Known Allergies    Problem List    Patient Active Problem List   Diagnosis   • A-fib (CMS/HCC)   • Depression   • Hyperbilirubinemia   • Dyslipidemia   • Hypertension   • Malignant neoplasm of overlapping sites of colon (CMS/HCC)   • Neurogenic bladder   • Tobacco use   • CAD    • H/O mechanical AVR    • COPD, mild (CMS/HCC)   • Bilateral pneumonia   • Thrombocytopenia (CMS/HCC)   • Hemoptysis   • Chronic anticoagulation on warfarin    • Moderate pulmonary HTN  (Est RVSP 53mmHg 2/2021)   • Macrocytosis   • Self-catheterizes urinary bladder   • Subacute, multifocal ischemic strokes due to cardioembolism   • Lung cancer (S/P Left thoracotomy and lobectomy 4/2021)   • Lung nodule < 6cm on CT (S/P Left thoracotomy and MARKO Lobectomy)   • GERD (gastroesophageal reflux disease)   • Presence of cardiac pacemaker   • S/P lobectomy of MARKO lung 4/7/21   • Hypotension (R/O hypovolemia)   • Syncope (R/O due to orthostatic hypotension)   • Laceration of left lower extremity       Medications, Allergies, Problems List and Past History were reviewed and updated.    Physical Examination    /60 (BP Location: Right arm, Patient Position: Sitting, Cuff Size: Adult)   Pulse 56    Temp 97.7 °F (36.5 °C) (Infrared)   Resp 18   Wt 65.3 kg (144 lb)   BMI 19.53 kg/m²     Neck: Thyroid- non enlarged, symmetric and has no nodules. No bruits are detected. ROM- Normal Range of Motion with no rigidity.    Lungs: Auscultation- Clear to auscultation bilaterally. There are no retractions, clubbing or cyanosis. The Expiratory to Inspiratory ratio is equal.    Cardiovascular: There are no carotid bruits. Heart- Normal Rate with Regular rhythm and a 3/6 holosystolic murmur heard loudest over the entire precordium with no radiation. There are no gallops. There are no rubs. In the lower extremities there is no edema. The upper extremities do not have edema.    Abdomen: Soft, benign, non-tender with no masses, hernias, organomegaly or scars.    Impression and Assessment    Urinary Tract Infection.    Essential Hypertension.    Plan    Essential Hypertension Plan: The patient was instructed to continue the current medications.    Urinary Tract Infection Plan: Further plans will be made after test results are available.    Procedure Notes    The patient presents for suture removal. The wound is well healed. One long running sutures were removed. The wound did not have dehiscense.    Diagnoses and all orders for this visit:    1. Urinary tract infection without hematuria, site unspecified (Primary)  -     POC Urinalysis Dipstick, Automated    2. Essential hypertension    3. Visit for suture removal          Return to Office    The patient was instructed to return for follow-up in 3 months. The patient was instructed to return sooner if the condition changes, worsens, or does not resolve.

## 2021-06-17 LAB — BACTERIA SPEC AEROBE CULT: ABNORMAL

## 2021-06-28 NOTE — PROGRESS NOTES
Chief Complaint   Patient presents with   • Hospital Follow Up Visit     [facility] 5/24-5/29, B/P DROPPED AND FELL       History of Present Illness    The patient presents to the office for a follow-up visit from a recent hospitalization. The patient was hospitalized from 24-May-2021 through 29-May-2021 with atrial fibrillation, a urinary tract infection and hypotension. The records have been received and reviewed. The medication list has been reconciled. The hospital stay was uncomplicated. His in hospital treatment consisted of intravenous antibiotics, oral antibiotics, fluid replacement and monitoring.        Since leaving the hospital he reports that he is back to normal. He denies abdominal pain, itchy watery eyes, bone pain, chills, congestion, a dry cough, a wet cough, decreased appetite, diarrhea, dysuria, ear drainage, ear pain, eye drainage, facial pain, fever, a headache, joint pain, myalgias, nasal discharge, nausea, neck stiffness, night sweats, a rash, sneezing, a sore throat, vomiting or wheezing. He also reports that he does not have chest pain, dyspnea, edema, syncope, blurred vision or palpitations.    The patient presents for a follow-up related to chronic atrial fibrillation. He denies palpitations. The patient denies fatigue, dizziness or exercise intolerance. The patient denies using diet pills, decongestants, alcohol, caffeine, cocaine or stimulant medications.    The patient presents for follow-up of a urinary tract infection. The patient denies gross hematuria, frequency, urgency, hesitancy, fever, vaginal discharge, pelvic pain or flank pain. He completed his medication. He has not had a prior history of frequent, recurrent urinary tract infections.    Review of Systems    CONSTITUTIONAL- Denies Unexplained Weight Loss, Sweats, Weakness or Malaise.    HENT- Denies Sinus Pain, Decreased Hearing or Tinnitus.    GASTROINTESTINAL- Denies Blood per Rectum, Constipation or Heartburn.    PULMONARY-  Denies Sputum Production, Cough, Hemoptysis or Pleuritic Chest Pain.    CARDIOVASCULAR- Denies Claudication or Irregular Heart Beat.    Medications      Current Outpatient Medications:   •  aspirin 81 MG EC tablet, Take 1 tablet by mouth daily., Disp: , Rfl:   •  atorvastatin (LIPITOR) 80 MG tablet, Take 1 tablet by mouth Every Night., Disp: 90 tablet, Rfl: 2  •  buPROPion XL (Wellbutrin XL) 300 MG 24 hr tablet, Take 1 tablet by mouth Daily., Disp: 30 tablet, Rfl: 6  •  citalopram (CeleXA) 20 MG tablet, Take 1 tablet by mouth once daily (Patient taking differently: Take 20 mg by mouth Daily.), Disp: 90 tablet, Rfl: 1  •  famotidine (PEPCID) 20 MG tablet, Take 1 tablet by mouth Daily., Disp: 30 tablet, Rfl: 1  •  finasteride (PROSCAR) 5 MG tablet, TAKE 1 TABLET BY MOUTH ONCE DAILY AT BEDTIME (Patient taking differently: Take 5 mg by mouth Daily.), Disp: 90 tablet, Rfl: 1  •  folic acid (FOLVITE) 400 MCG tablet, TAKE 1 TABLET BY MOUTH ONCE DAILY (Patient taking differently: Daily.), Disp: 90 tablet, Rfl: 3  •  lisinopril (PRINIVIL,ZESTRIL) 10 MG tablet, Take 1 tablet by mouth Daily., Disp: , Rfl:   •  nicotine (NICODERM CQ) 14 MG/24HR patch, Place 1 patch on the skin as directed by provider Daily., Disp: 90 patch, Rfl: 0  •  tadalafil (Cialis) 20 MG tablet, Take 1 tablet by mouth Daily As Needed for Erectile Dysfunction., Disp: 30 tablet, Rfl: 2  •  tiotropium bromide-olodaterol (STIOLTO RESPIMAT) 2.5-2.5 MCG/ACT aerosol solution inhaler, Inhale 2 puffs Daily., Disp: 1 each, Rfl: 11  •  vitamin B-12 (CYANOCOBALAMIN) 1000 MCG tablet, Take 1 tablet by mouth Daily., Disp: 90 tablet, Rfl: 3  •  warfarin (COUMADIN) 5 MG tablet, Take 1 tablet by mouth once daily (Patient taking differently: No sig reported), Disp: 30 tablet, Rfl: 5  •  oxyCODONE-acetaminophen (Percocet) 7.5-325 MG per tablet, Take 1 tablet by mouth Every 6 (Six) Hours As Needed for Moderate Pain  or Severe Pain ., Disp: 10 tablet, Rfl: 0   Current  outpatient and discharge medications have been reconciled for the patient.  Reviewed by: Ez Cintron MD    Allergies    No Known Allergies    Problem List    Patient Active Problem List   Diagnosis   • A-fib (CMS/HCC)   • Depression   • Hyperbilirubinemia   • Dyslipidemia   • Hypertension   • Malignant neoplasm of overlapping sites of colon (CMS/HCC)   • Neurogenic bladder   • Tobacco use   • CAD    • H/O mechanical AVR    • COPD, mild (CMS/HCC)   • Bilateral pneumonia   • Thrombocytopenia (CMS/HCC)   • Hemoptysis   • Chronic anticoagulation on warfarin    • Moderate pulmonary HTN  (Est RVSP 53mmHg 2/2021)   • Macrocytosis   • Self-catheterizes urinary bladder   • Subacute, multifocal ischemic strokes due to cardioembolism   • Lung cancer (S/P Left thoracotomy and lobectomy 4/2021)   • Lung nodule < 6cm on CT (S/P Left thoracotomy and MARKO Lobectomy)   • GERD (gastroesophageal reflux disease)   • Presence of cardiac pacemaker   • S/P lobectomy of MARKO lung 4/7/21   • Hypotension (R/O hypovolemia)   • Syncope (R/O due to orthostatic hypotension)   • Laceration of left lower extremity       Medications, Allergies, Problems List and Past History were reviewed and updated.    Physical Examination    /68 (BP Location: Right arm, Patient Position: Sitting, Cuff Size: Adult)   Pulse 80   Temp 98 °F (36.7 °C) (Infrared)   Resp 18   Wt 63.5 kg (140 lb)   BMI 18.99 kg/m²     HEENT: Head- Normocephalic Atraumatic. Facies- Within normal limits. Pinnas- Normal texture and shape bilaterally. Canals- Normal bilaterally. TMs- Normal bilaterally. Nares- Patent bilaterally. Nasal Septum- is normal. There is no tenderness to palpation over the frontal or maxillary sinuses. Lids- Normal bilaterally. Conjunctiva- Clear bilaterally. Sclera- Anicteric bilaterally. Oropharynx- Moist with no lesions. Tonsils- No enlargement, erythema or exudate.    Neck: Thyroid- non enlarged, symmetric and has no nodules. No bruits are  detected. ROM- Normal Range of Motion with no rigidity.    Lungs: Auscultation- Clear to auscultation bilaterally. There are no retractions, clubbing or cyanosis. The Expiratory to Inspiratory ratio is equal.    Lymph Nodes: Cervical- no enlarged lymph nodes noted.    Cardiovascular: There are no carotid bruits. Heart- Normal Rate with Irregularly Irregular rhythm and no murmurs. There are no gallops. There are no rubs. In the lower extremities there is no edema. The upper extremities do not have edema.    Abdomen: Soft, benign, non-tender with no masses, hernias, organomegaly or scars.    Impression and Assessment    Atrial Fibrillation.    Urinary Tract Infection.    Plan    Atrial Fibrillation Plan: The patient was instructed to continue the current medications.    Urinary Tract Infection Plan: Resolved.    Diagnoses and all orders for this visit:    1. Hypotension, unspecified hypotension type (Primary)  -     CBC & Differential  -     Comprehensive Metabolic Panel  -     POC INR    2. Urinary tract infection without hematuria, site unspecified  -     Urine Culture - Urine, Urine, Clean Catch  -     POC INR    3. Acute midline low back pain without sciatica  -     XR Spine Lumbar 2 or 3 View; Future  -     POC INR    4. Atrial fibrillation, unspecified type (CMS/[facility])  -     POC INR    Transitional Care Management Certification  I certify that the following are true:  1. Communication was made within 2 business days of discharge.  2. Complexity of Medical Decision Making is moderate.  3. Face to face visit occurred within 10 days.    *Note: 78332 is for high complexity patients with a face to face visit within 7 days of discharge.  [id number] is for high complexity patients with a face to face on days 8-14 post discharge or medium complexity with face to face visit within 14 days post discharge.        Return to Office    The patient was instructed to return for follow-up in 3 months. The patient was instructed to  return sooner if the condition changes, worsens, or does not resolve.

## 2021-07-07 ENCOUNTER — TELEPHONE (OUTPATIENT)
Dept: CARDIOLOGY | Facility: CLINIC | Age: 71
End: 2021-07-07

## 2021-07-16 ENCOUNTER — LAB (OUTPATIENT)
Dept: INTERNAL MEDICINE | Facility: CLINIC | Age: 71
End: 2021-07-16

## 2021-07-16 DIAGNOSIS — I48.11 LONGSTANDING PERSISTENT ATRIAL FIBRILLATION (HCC): Primary | ICD-10-CM

## 2021-07-16 LAB — INR PPP: 1.2 (ref 2.5–3.5)

## 2021-07-16 PROCEDURE — 85610 PROTHROMBIN TIME: CPT | Performed by: INTERNAL MEDICINE

## 2021-07-16 PROCEDURE — 36416 COLLJ CAPILLARY BLOOD SPEC: CPT | Performed by: INTERNAL MEDICINE

## 2021-07-19 ENCOUNTER — LAB (OUTPATIENT)
Dept: INTERNAL MEDICINE | Facility: CLINIC | Age: 71
End: 2021-07-19

## 2021-07-19 DIAGNOSIS — I48.11 LONGSTANDING PERSISTENT ATRIAL FIBRILLATION (HCC): Primary | ICD-10-CM

## 2021-07-19 LAB — INR PPP: 3.6 (ref 2.5–3.5)

## 2021-07-19 PROCEDURE — 36416 COLLJ CAPILLARY BLOOD SPEC: CPT | Performed by: INTERNAL MEDICINE

## 2021-07-19 PROCEDURE — 85610 PROTHROMBIN TIME: CPT | Performed by: INTERNAL MEDICINE

## 2021-07-26 ENCOUNTER — LAB (OUTPATIENT)
Dept: INTERNAL MEDICINE | Facility: CLINIC | Age: 71
End: 2021-07-26

## 2021-07-26 DIAGNOSIS — I48.20 CHRONIC ATRIAL FIBRILLATION (HCC): Primary | ICD-10-CM

## 2021-07-26 LAB — INR PPP: 3.1 (ref 2.5–3.5)

## 2021-07-26 PROCEDURE — 85610 PROTHROMBIN TIME: CPT | Performed by: INTERNAL MEDICINE

## 2021-07-26 PROCEDURE — 36416 COLLJ CAPILLARY BLOOD SPEC: CPT | Performed by: INTERNAL MEDICINE

## 2021-08-02 RX ORDER — CITALOPRAM 20 MG/1
TABLET ORAL
Qty: 90 TABLET | Refills: 1 | Status: SHIPPED | OUTPATIENT
Start: 2021-08-02 | End: 2022-07-08

## 2021-08-09 ENCOUNTER — LAB (OUTPATIENT)
Dept: INTERNAL MEDICINE | Facility: CLINIC | Age: 71
End: 2021-08-09

## 2021-08-09 DIAGNOSIS — I48.91 ATRIAL FIBRILLATION, UNSPECIFIED TYPE (HCC): Primary | ICD-10-CM

## 2021-08-09 LAB — INR PPP: 1.2 (ref 2.5–3.5)

## 2021-08-09 PROCEDURE — 36416 COLLJ CAPILLARY BLOOD SPEC: CPT | Performed by: INTERNAL MEDICINE

## 2021-08-09 PROCEDURE — 85610 PROTHROMBIN TIME: CPT | Performed by: INTERNAL MEDICINE

## 2021-08-11 ENCOUNTER — LAB (OUTPATIENT)
Dept: INTERNAL MEDICINE | Facility: CLINIC | Age: 71
End: 2021-08-11

## 2021-08-11 DIAGNOSIS — I48.91 ATRIAL FIBRILLATION, UNSPECIFIED TYPE (HCC): Primary | ICD-10-CM

## 2021-08-11 LAB — INR PPP: 1.8 (ref 2.5–3.5)

## 2021-08-11 PROCEDURE — 85610 PROTHROMBIN TIME: CPT | Performed by: INTERNAL MEDICINE

## 2021-08-11 PROCEDURE — 36416 COLLJ CAPILLARY BLOOD SPEC: CPT | Performed by: INTERNAL MEDICINE

## 2021-08-12 RX ORDER — WARFARIN SODIUM 5 MG/1
TABLET ORAL
Qty: 30 TABLET | Refills: 0 | Status: SHIPPED | OUTPATIENT
Start: 2021-08-12 | End: 2021-09-23

## 2021-08-13 ENCOUNTER — LAB (OUTPATIENT)
Dept: INTERNAL MEDICINE | Facility: CLINIC | Age: 71
End: 2021-08-13

## 2021-08-13 DIAGNOSIS — I48.11 LONGSTANDING PERSISTENT ATRIAL FIBRILLATION (HCC): Primary | ICD-10-CM

## 2021-08-13 LAB — INR PPP: 2.7 (ref 2.5–3.5)

## 2021-08-13 PROCEDURE — 85610 PROTHROMBIN TIME: CPT | Performed by: INTERNAL MEDICINE

## 2021-08-13 PROCEDURE — 36416 COLLJ CAPILLARY BLOOD SPEC: CPT | Performed by: INTERNAL MEDICINE

## 2021-08-20 ENCOUNTER — LAB (OUTPATIENT)
Dept: INTERNAL MEDICINE | Facility: CLINIC | Age: 71
End: 2021-08-20

## 2021-08-20 DIAGNOSIS — I48.91 ATRIAL FIBRILLATION, UNSPECIFIED TYPE (HCC): Primary | ICD-10-CM

## 2021-08-20 LAB — INR PPP: 4.8 (ref 2.5–3.5)

## 2021-08-20 PROCEDURE — 85610 PROTHROMBIN TIME: CPT | Performed by: INTERNAL MEDICINE

## 2021-08-20 PROCEDURE — 36416 COLLJ CAPILLARY BLOOD SPEC: CPT | Performed by: INTERNAL MEDICINE

## 2021-08-23 ENCOUNTER — LAB (OUTPATIENT)
Dept: INTERNAL MEDICINE | Facility: CLINIC | Age: 71
End: 2021-08-23

## 2021-08-23 DIAGNOSIS — I48.91 ATRIAL FIBRILLATION, UNSPECIFIED TYPE (HCC): Primary | ICD-10-CM

## 2021-08-23 LAB — INR PPP: 2.6 (ref 2.5–3.5)

## 2021-08-23 PROCEDURE — 85610 PROTHROMBIN TIME: CPT | Performed by: INTERNAL MEDICINE

## 2021-08-23 PROCEDURE — 36416 COLLJ CAPILLARY BLOOD SPEC: CPT | Performed by: INTERNAL MEDICINE

## 2021-08-26 ENCOUNTER — TELEPHONE (OUTPATIENT)
Dept: CARDIOLOGY | Facility: CLINIC | Age: 71
End: 2021-08-26

## 2021-08-26 ENCOUNTER — OFFICE VISIT (OUTPATIENT)
Dept: CARDIOLOGY | Facility: CLINIC | Age: 71
End: 2021-08-26

## 2021-08-26 VITALS
BODY MASS INDEX: 20.61 KG/M2 | OXYGEN SATURATION: 98 % | DIASTOLIC BLOOD PRESSURE: 80 MMHG | SYSTOLIC BLOOD PRESSURE: 137 MMHG | HEART RATE: 81 BPM | HEIGHT: 71 IN | WEIGHT: 147.2 LBS

## 2021-08-26 DIAGNOSIS — Z95.0 PRESENCE OF CARDIAC PACEMAKER: ICD-10-CM

## 2021-08-26 PROCEDURE — 99214 OFFICE O/P EST MOD 30 MIN: CPT | Performed by: INTERNAL MEDICINE

## 2021-08-26 PROCEDURE — 93279 PRGRMG DEV EVAL PM/LDLS PM: CPT | Performed by: INTERNAL MEDICINE

## 2021-08-26 RX ORDER — CARVEDILOL 25 MG/1
12.5 TABLET ORAL 2 TIMES DAILY WITH MEALS
COMMUNITY
Start: 2021-08-14 | End: 2022-07-08 | Stop reason: DRUGHIGH

## 2021-08-26 NOTE — PROGRESS NOTES
OFFICE FOLLOW UP     Date of Encounter:2021     Name: Dillon Vo  : 1950  Address: [place] [facility] [address]    PCP: Ez Cintron MD  [address] [place] [id number]    Dillon Vo is a 71 y.o. male.      Chief Complaint: Follow up of VHD, CAD, Afib, HLD    Problem List:   1. “Mixed” heart disease:  a. Aortic valve disease with mechanical prosthesis, IDB.  b. Everolimus-eluting stent placed mid-LAD for single-vessel CAD, 2013.    c. Ejection fraction less than 20% with global hypokinesia, 2013:  Only modestly reduced EF by echocardiogram, 2013.    d. “Absent” clopidogrel therapy following stent placement, “corrected,” 08/15/2013.  e. Echo, 2018: mild LVH, normal global and segmental LV function, normal functioning bileaflet aortic mechanical valve   f. Echo 2020: mild global hypokinesia of the LV, normally functioning mechanical aortic valve, RVSP 59mmHg   g. Echo, 3/19/21: left atrial volume is severely increased, EF 53%, mechanical AV (cannot exclude small perivalvular leak), MAC and moderate MR, RVSP 53 mmHg  2. Atrial fibrillation, chronic:  a. Difficult to control rate.  b. Chads-vasc= 3, chronic warfarin  c. RFA of AV node with implantation of left Bi-V pacemaker, 2013.  d. Hematoma requiring single-chamber pacemaker implanted, 2013.   3. Tobacco use, chronic.  4. Dyslipidemia  5. Lung Cancer, adenocarcinoma  1. Left upper lobe lobectomy ([facility]), 2021, one hilar lymph node negative for tumor  2. No adjunctive therapy needed  6. [facility] admission for supratherapeutic INR 2020 with large rectus sheath hematoma  a. S/p reversal  b. Multiple subacute/chronic small embolic cerebral infarcts by CT head during same admission     Allergies:  No Known Allergies    Current Medications:  Current Outpatient Medications   Medication Instructions   • aspirin 81 MG EC tablet 1 tablet, Oral, Daily   •  "atorvastatin (LIPITOR) 80 mg, Oral, Nightly   • buPROPion XL (WELLBUTRIN XL) 300 mg, Oral, Daily   • carvedilol (COREG) 12.5 MG tablet Daily   • citalopram (CeleXA) 20 MG tablet Take 1 tablet by mouth once daily   • famotidine (PEPCID) 20 mg, Oral, Daily   • finasteride (PROSCAR) 5 MG tablet TAKE 1 TABLET BY MOUTH ONCE DAILY AT BEDTIME   • folic acid (FOLVITE) 400 MCG tablet TAKE 1 TABLET BY MOUTH ONCE DAILY   • lisinopril (PRINIVIL,ZESTRIL) 10 mg, Oral, Every 24 Hours Scheduled   • mupirocin (Bactroban) 2 % ointment Topical, 3 Times Daily   • nicotine (NICODERM CQ) 14 MG/24HR patch 1 patch, Transdermal, Every 24 Hours   • tadalafil (CIALIS) 20 mg, Oral, Daily PRN   • vitamin B-12 (CYANOCOBALAMIN) 1,000 mcg, Oral, Daily   • warfarin (COUMADIN) 5 MG tablet Take 1 tablet by mouth once daily        History of Present Illness:  Dillon Vo returns for annual follow up today.  He underwent a left upper lobectomy on 4/7/2021 to remove the spiculated lung nodule that was \"cancerous\" (IDB).  He did not receive adjuvant treatment of any kind.  He has not had any symptoms of congestive heart failure.  He has not had angina.  He has had no \"warfarin issues\".             The following portions of the patient's history were reviewed and updated as appropriate: allergies, current medications and problem list.    ROS: Pertinent positives as listed in the HPI.  All other systems reviewed and negative.    Objective:    Vitals:    08/26/21 1251 08/26/21 1253   BP: 141/79 137/80   BP Location: Left arm Left arm   Patient Position: Sitting Standing   Pulse: 70 81   SpO2: 98%    Weight: 66.8 kg (147 lb 3.2 oz)    Height: 180.3 cm (71\")      Body mass index is 20.53 kg/m².    Physical Exam:  GENERAL: Alert, cooperative, in no acute distress.   HEENT: Normocephalic, no adenopathy, no jugular venous distention  HEART: No discrete PMI is noted. Good prosthetic \"clicks\".  LUNGS: Clear to auscultation bilaterally. No wheezing, " "rales or ronchi.  ABDOMEN: Soft, bowel sounds present, non-tender   NEUROLOGIC: No focal abnormalities involving strength or sensation are noted.   EXTREMITIES: No clubbing, cyanosis, or edema noted.      Diagnostic Data:    Lab Results   Component Value Date    INR 2.60 (A) 08/23/2021    INR 4.80 (A) 08/20/2021    INR 2.70 (A) 08/13/2021    PROTIME 25.5 (H) 05/29/2021    PROTIME 20.0 (H) 05/28/2021    PROTIME 20.5 (H) 05/27/2021      Lab Results   Component Value Date    GLUCOSE 82 06/08/2021    CALCIUM 9.4 06/08/2021     06/08/2021    K 4.1 06/08/2021    CO2 26.7 06/08/2021    CL 99 06/08/2021    BUN 10 06/08/2021    CREATININE 0.88 06/08/2021    EGFRIFNONA 86 06/08/2021    BCR 11.4 06/08/2021    ANIONGAP 10.3 06/08/2021      Lab Results   Component Value Date    WBC 6.62 06/08/2021    HGB 13.5 06/08/2021    HCT 39.0 06/08/2021    .3 (H) 06/08/2021     06/08/2021      Lab Results   Component Value Date    HGBA1C 5.40 04/06/2021      Lab Results   Component Value Date    CHOL 101 02/24/2021    CHLPL 189 01/21/2016    TRIG 36 02/24/2021    HDL 75 (H) 02/24/2021    LDL 15 02/24/2021      Lab Results   Component Value Date    TSH 1.770 05/25/2021      Procedures  [facility] PPM interrogation: Battery life 7.5-8 years, no significant events.  V paced >99%.   Assessment and Plan:   1.  CAD: NYHA class I on medical management following revascularization.  2.  HTN: Mildly elevated today.  Discussed conservative management measures without change in medications.  3.  HLD: Continue treatment \"as is\" with an LDL cholesterol of 15 on 2/24/2021.  4.  VHD: Asymptomatic.   5.  Chronic afib: We will have new pacemaker remote transmission monitor mailed to him and have asked him to call when he receives to ensure connection. Anticoagulated on warfarin.      I will see Dillon Vo back in one year or sooner on an as needed basis.            EMR Dragon/Transcription Disclaimer:  Much of this encounter note is " an electronic transcription/translation of spoken language to printed text.  The electronic translation of spoken language may permit erroneous, or at times, nonsensical words or phrases to be inadvertently transcribed.  Although I have reviewed the note for such errors, some may still exist.

## 2021-09-07 ENCOUNTER — LAB (OUTPATIENT)
Dept: INTERNAL MEDICINE | Facility: CLINIC | Age: 71
End: 2021-09-07

## 2021-09-07 DIAGNOSIS — I48.91 ATRIAL FIBRILLATION, UNSPECIFIED TYPE (HCC): Primary | ICD-10-CM

## 2021-09-07 LAB — INR PPP: 3.3 (ref 2.5–3.5)

## 2021-09-07 PROCEDURE — 85610 PROTHROMBIN TIME: CPT | Performed by: INTERNAL MEDICINE

## 2021-09-07 PROCEDURE — 36416 COLLJ CAPILLARY BLOOD SPEC: CPT | Performed by: INTERNAL MEDICINE

## 2021-09-16 ENCOUNTER — OFFICE VISIT (OUTPATIENT)
Dept: INTERNAL MEDICINE | Facility: CLINIC | Age: 71
End: 2021-09-16

## 2021-09-16 VITALS
DIASTOLIC BLOOD PRESSURE: 62 MMHG | WEIGHT: 146 LBS | BODY MASS INDEX: 20.36 KG/M2 | RESPIRATION RATE: 18 BRPM | TEMPERATURE: 97.8 F | HEART RATE: 72 BPM | SYSTOLIC BLOOD PRESSURE: 128 MMHG

## 2021-09-16 DIAGNOSIS — Z00.00 MEDICARE ANNUAL WELLNESS VISIT, SUBSEQUENT: Primary | ICD-10-CM

## 2021-09-16 DIAGNOSIS — Z23 IMMUNIZATION DUE: ICD-10-CM

## 2021-09-16 PROCEDURE — G0009 ADMIN PNEUMOCOCCAL VACCINE: HCPCS | Performed by: INTERNAL MEDICINE

## 2021-09-16 PROCEDURE — 90732 PPSV23 VACC 2 YRS+ SUBQ/IM: CPT | Performed by: INTERNAL MEDICINE

## 2021-09-16 PROCEDURE — G0439 PPPS, SUBSEQ VISIT: HCPCS | Performed by: INTERNAL MEDICINE

## 2021-09-16 RX ORDER — FINASTERIDE 5 MG/1
TABLET, FILM COATED ORAL
Qty: 90 TABLET | Refills: 1 | Status: SHIPPED | OUTPATIENT
Start: 2021-09-16 | End: 2023-03-22 | Stop reason: SDUPTHER

## 2021-09-16 NOTE — PROGRESS NOTES
The ABCs of the Annual Wellness Visit  Subsequent Medicare Wellness Visit    Chief Complaint   Patient presents with   • Medicare Wellness-subsequent   • Follow-up     FOLLOW UP CHRONIC MEDICAL PROBLEMS      Subjective    History of Present Illness:  Dillon Vo is a 71 y.o. male who presents for a Subsequent Medicare Wellness Visit.    The following portions of the patient's history were reviewed and   updated as appropriate: allergies, current medications, past family history, past medical history, past social history, past surgical history and problem list.    Compared to one year ago, the patient feels his physical   health is the same.    Compared to one year ago, the patient feels his mental   health is the same.    Recent Hospitalizations:  This patient has had a [facility] admission record on file within the last 365 days.    Current Medical Providers:  Patient Care Team:  Ez Cintron MD as PCP - General  Ez Tucker MD as Consulting Physician (Cardiology)  Payam Kenney MD as Referring Physician (Pulmonary Disease)  Luis Alberto Carter MD as Consulting Physician (Radiation Oncology)    Outpatient Medications Prior to Visit   Medication Sig Dispense Refill   • aspirin 81 MG EC tablet Take 1 tablet by mouth daily.     • atorvastatin (LIPITOR) 80 MG tablet Take 1 tablet by mouth Every Night. 90 tablet 2   • buPROPion XL (Wellbutrin XL) 300 MG 24 hr tablet Take 1 tablet by mouth Daily. 30 tablet 6   • carvedilol (COREG) 12.5 MG tablet Take 12.5 mg by mouth Daily.     • citalopram (CeleXA) 20 MG tablet Take 1 tablet by mouth once daily 90 tablet 1   • famotidine (PEPCID) 20 MG tablet Take 1 tablet by mouth Daily. 30 tablet 1   • finasteride (PROSCAR) 5 MG tablet TAKE 1 TABLET BY MOUTH ONCE DAILY AT BEDTIME 90 tablet 1   • folic acid (FOLVITE) 400 MCG tablet TAKE 1 TABLET BY MOUTH ONCE DAILY (Patient taking differently: Daily.) 90 tablet 3   • lisinopril  (PRINIVIL,ZESTRIL) 10 MG tablet Take 1 tablet by mouth Daily.     • mupirocin (Bactroban) 2 % ointment Apply  topically to the appropriate area as directed 3 (Three) Times a Day. 30 g 1   • nicotine (NICODERM CQ) 14 MG/24HR patch Place 1 patch on the skin as directed by provider Daily. 90 patch 0   • vitamin B-12 (CYANOCOBALAMIN) 1000 MCG tablet Take 1 tablet by mouth Daily. 90 tablet 3   • warfarin (COUMADIN) 5 MG tablet Take 1 tablet by mouth once daily 30 tablet 0   • tadalafil (Cialis) 20 MG tablet Take 1 tablet by mouth Daily As Needed for Erectile Dysfunction. 30 tablet 2     No facility-administered medications prior to visit.       No opioid medication identified on active medication list. I have reviewed chart for other potential  high risk medication/s and harmful drug interactions in the elderly.          Aspirin is on active medication list. Aspirin use is indicated based on review of current medical condition/s. Pros and cons of this therapy have been discussed today. Benefits of this medication outweigh potential harm.  Patient has been encouraged to continue taking this medication.  .      Patient Active Problem List   Diagnosis   • A-fib (CMS/HCC)   • Depression   • Hyperbilirubinemia   • Dyslipidemia   • Hypertension   • Malignant neoplasm of overlapping sites of colon (CMS/HCC)   • Neurogenic bladder   • Tobacco use   • CAD    • H/O mechanical AVR    • COPD, mild (CMS/HCC)   • Bilateral pneumonia   • Thrombocytopenia (CMS/HCC)   • Hemoptysis   • Chronic anticoagulation on warfarin    • Moderate pulmonary HTN  (Est RVSP 53mmHg 2/2021)   • Macrocytosis   • Self-catheterizes urinary bladder   • Subacute, multifocal ischemic strokes due to cardioembolism   • Lung cancer (S/P Left thoracotomy and lobectomy 4/2021)   • Lung nodule < 6cm on CT (S/P Left thoracotomy and MARKO Lobectomy)   • GERD (gastroesophageal reflux disease)   • Presence of cardiac pacemaker   • S/P lobectomy of MARKO lung 4/7/21   •  Hypotension (R/O hypovolemia)   • Syncope (R/O due to orthostatic hypotension)   • Laceration of left lower extremity     Advance Care Planning  Advance Directive is on file.  ACP discussion was held with the patient during this visit. Patient has an advance directive in EMR which is still valid.           Objective    Vitals:    09/16/21 1219   BP: 150/78   BP Location: Left arm   Patient Position: Sitting   Cuff Size: Adult   Pulse: 72   Resp: 18   Temp: 97.8 °F (36.6 °C)   TempSrc: Infrared   Weight: 66.2 kg (146 lb)   PainSc:   2   PainLoc: Leg     BMI Readings from Last 1 Encounters:   09/16/21 20.36 kg/m²   BMI is within normal parameters. No follow-up required.    Does the patient have evidence of cognitive impairment? No    Physical Exam  Constitutional:       Appearance: Normal appearance.   HENT:      Head: Normocephalic and atraumatic.      Right Ear: Tympanic membrane normal.      Left Ear: Tympanic membrane normal.      Nose: Nose normal.      Mouth/Throat:      Mouth: Mucous membranes are moist.      Pharynx: Oropharynx is clear.   Eyes:      Extraocular Movements: Extraocular movements intact.      Conjunctiva/sclera: Conjunctivae normal.      Pupils: Pupils are equal, round, and reactive to light.   Cardiovascular:      Rate and Rhythm: Normal rate and regular rhythm.      Pulses: Normal pulses.      Heart sounds: Normal heart sounds.   Abdominal:      General: Abdomen is flat. Bowel sounds are normal.   Musculoskeletal:      Cervical back: Normal range of motion and neck supple.   Skin:     General: Skin is warm and dry.   Neurological:      General: No focal deficit present.      Mental Status: He is alert.   Psychiatric:         Mood and Affect: Mood normal.         Behavior: Behavior normal.         Finger Rub Hearing{Test (right ear):passed  Finger Rub Hearing{Test (left ear):passed           HEALTH RISK ASSESSMENT    Smoking Status:  Social History     Tobacco Use   Smoking Status Current Every  Day Smoker   • Packs/day: 0.50   • Years: 57.00   • Pack years: 28.50   • Types: Cigarettes   Smokeless Tobacco Never Used     Alcohol Consumption:  Social History     Substance and Sexual Activity   Alcohol Use Yes   • Alcohol/week: 2.0 standard drinks   • Types: 2 Shots of liquor per week    Comment: Daily     Fall Risk Screen:    PATRICIA Fall Risk Assessment was completed, and patient is at HIGH risk for falls. Assessment completed on:9/16/2021    Depression Screening:  PHQ-2/PHQ-9 Depression Screening 9/16/2021   Little interest or pleasure in doing things 0   Feeling down, depressed, or hopeless 0   Total Score 0       Health Habits and Functional and Cognitive Screening:  Functional & Cognitive Status 9/16/2021   Do you have difficulty preparing food and eating? No   Do you have difficulty bathing yourself, getting dressed or grooming yourself? No   Do you have difficulty using the toilet? No   Do you have difficulty moving around from place to place? No   Do you have trouble with steps or getting out of a bed or a chair? Yes   Current Diet Well Balanced Diet   Dental Exam Up to date   Eye Exam Up to date   Exercise (times per week) 7 times per week   Current Exercises Include Walking   Do you need help using the phone?  No   Are you deaf or do you have serious difficulty hearing?  Yes   Do you need help with transportation? No   Do you need help shopping? No   Do you need help preparing meals?  No   Do you need help with housework?  No   Do you need help with laundry? No   Do you need help taking your medications? No   Do you need help managing money? No   Do you ever drive or ride in a car without wearing a seat belt? Yes   Have you felt unusual stress, anger or loneliness in the last month? No   Who do you live with? Spouse   If you need help, do you have trouble finding someone available to you? No   Have you been bothered in the last four weeks by sexual problems? Yes   Do you have difficulty  concentrating, remembering or making decisions? Yes       Age-appropriate Screening Schedule:  Refer to the list below for future screening recommendations based on patient's age, sex and/or medical conditions. Orders for these recommended tests are listed in the plan section. The patient has been provided with a written plan.    Health Maintenance   Topic Date Due   • TDAP/TD VACCINES (1 - Tdap) Never done   • ZOSTER VACCINE (2 of 3) 02/24/2016   • INFLUENZA VACCINE  10/01/2021   • LIPID PANEL  02/24/2022   • COLONOSCOPY  08/07/2028              Assessment/Plan   CMS Preventative Services Quick Reference  Risk Factors Identified During Encounter  Fall Risk-High or Moderate  Immunizations Discussed/Encouraged (specific Immunizations; Tdap, Pneumococcal 23 and Shingrix  The above risks/problems have been discussed with the patient.  Follow up actions/plans if indicated are seen below in the Assessment/Plan Section.  Pertinent information has been shared with the patient in the After Visit Summary.    Diagnoses and all orders for this visit:    1. Medicare annual wellness visit, subsequent (Primary)    2. Immunization due  -     Pneumococcal Polysaccharide Vaccine 23-Valent Greater Than or Equal To 1yo Subcutaneous / IM

## 2021-09-16 NOTE — PATIENT INSTRUCTIONS
Medicare Wellness  Personal Prevention Plan of Service     Date of Office Visit:  2021  Encounter Provider:  Ez Cintron MD  Place of Service:  [facility]  Patient Name: Dillon Vo  :  1950    As part of the Medicare Wellness portion of your visit today, we are providing you with this personalized preventive plan of services (PPPS). This plan is based upon recommendations of the [place] Preventive Services Task Force (USPSTF) and the Advisory Committee on Immunization Practices (ACIP).    This lists the preventive care services that should be considered, and provides dates of when you are due. Items listed as completed are up-to-date and do not require any further intervention.    Health Maintenance   Topic Date Due   • TDAP/TD VACCINES (1 - Tdap) Never done   • ZOSTER VACCINE (2 of 3) 2016   • Pneumococcal Vaccine 65+ (1 of 2 - PPSV23) 04/10/2019   • ANNUAL WELLNESS VISIT  2020   • INFLUENZA VACCINE  10/01/2021   • LIPID PANEL  2022   • COLONOSCOPY  2028   • HEPATITIS C SCREENING  Completed   • COVID-19 Vaccine  Completed   • AAA SCREEN (ONE-TIME)  Completed       Orders Placed This Encounter   Procedures   • Pneumococcal Polysaccharide Vaccine 23-Valent Greater Than or Equal To 3yo Subcutaneous / IM       Return in about 4 months (around 2022) for Follow-up.          Fall Prevention in the Home, Adult  Falls can cause injuries. They can happen to people of all ages. There are many things you can do to make your home safe and to help prevent falls. Ask for help when making these changes, if needed.  What actions can I take to prevent falls?  General Instructions  · Use good lighting in all rooms. Replace any light bulbs that burn out.  · Turn on the lights when you go into a dark area. Use night-lights.  · Keep items that you use often in easy-to-reach places. Lower the shelves around your home  if necessary.  · Set up your furniture so you have a clear path. Avoid moving your furniture around.  · Do not have throw rugs and other things on the floor that can make you trip.  · Avoid walking on wet floors.  · If any of your floors are uneven, fix them.  · Add color or contrast paint or tape to clearly giuliano and help you see:  ? Any grab bars or handrails.  ? First and last steps of stairways.  ? Where the edge of each step is.  · If you use a stepladder:  ? Make sure that it is fully opened. Do not climb a closed stepladder.  ? Make sure that both sides of the stepladder are locked into place.  ? Ask someone to hold the stepladder for you while you use it.  · If there are any pets around you, be aware of where they are.  What can I do in the bathroom?         · Keep the floor dry. Clean up any water that spills onto the floor as soon as it happens.  · Remove soap buildup in the tub or shower regularly.  · Use non-skid mats or decals on the floor of the tub or shower.  · Attach bath mats securely with double-sided, non-slip rug tape.  · If you need to sit down in the shower, use a plastic, non-slip stool.  · Install grab bars by the toilet and in the tub and shower. Do not use towel bars as grab bars.  What can I do in the bedroom?  · Make sure that you have a light by your bed that is easy to reach.  · Do not use any sheets or blankets that are too big for your bed. They should not hang down onto the floor.  · Have a firm chair that has side arms. You can use this for support while you get dressed.  What can I do in the kitchen?  · Clean up any spills right away.  · If you need to reach something above you, use a strong step stool that has a grab bar.  · Keep electrical cords out of the way.  · Do not use floor polish or wax that makes floors slippery. If you must use wax, use non-skid floor wax.  What can I do with my stairs?  · Do not leave any items on the stairs.  · Make sure that you have a light switch  at the top of the stairs and the bottom of the stairs. If you do not have them, ask someone to add them for you.  · Make sure that there are handrails on both sides of the stairs, and use them. Fix handrails that are broken or loose. Make sure that handrails are as long as the stairways.  · Install non-slip stair treads on all stairs in your home.  · Avoid having throw rugs at the top or bottom of the stairs. If you do have throw rugs, attach them to the floor with carpet tape.  · Choose a carpet that does not hide the edge of the steps on the stairway.  · Check any carpeting to make sure that it is firmly attached to the stairs. Fix any carpet that is loose or worn.  What can I do on the outside of my home?  · Use bright outdoor lighting.  · Regularly fix the edges of walkways and driveways and fix any cracks.  · Remove anything that might make you trip as you walk through a door, such as a raised step or threshold.  · Trim any bushes or trees on the path to your home.  · Regularly check to see if handrails are loose or broken. Make sure that both sides of any steps have handrails.  · Install guardrails along the edges of any raised decks and porches.  · Clear walking paths of anything that might make someone trip, such as tools or rocks.  · Have any leaves, snow, or ice cleared regularly.  · Use sand or salt on walking paths during winter.  · Clean up any spills in your garage right away. This includes grease or oil spills.  What other actions can I take?  · Wear shoes that:  ? Have a low heel. Do not wear high heels.  ? Have rubber bottoms.  ? Are comfortable and fit you well.  ? Are closed at the toe. Do not wear open-toe sandals.  · Use tools that help you move around (mobility aids) if they are needed. These include:  ? Canes.  ? Walkers.  ? Scooters.  ? Crutches.  · Review your medicines with your doctor. Some medicines can make you feel dizzy. This can increase your chance of falling.  Ask your doctor what  other things you can do to help prevent falls.  Where to find more information  · Centers for Disease Control and Prevention, STEAD[place]: https://cdc.gov  · National [place] on Aging: https://ba6wvqh.[place]ham.nih.gov  Contact a doctor if:  · You are afraid of falling at home.  · You feel weak, drowsy, or dizzy at home.  · You fall at home.  Summary  · There are many simple things that you can do to make your home safe and to help prevent falls.  · Ways to make your home safe include removing tripping hazards and installing grab bars in the bathroom.  · Ask for help when making these changes in your home.  This information is not intended to replace advice given to you by your health care provider. Make sure you discuss any questions you have with your health care provider.  Document Revised: 04/09/2020 Document Reviewed: 08/02/2018  Elsevier Patient Education © 2021 [employer]

## 2021-09-23 RX ORDER — WARFARIN SODIUM 5 MG/1
TABLET ORAL
Qty: 30 TABLET | Refills: 0 | Status: SHIPPED | OUTPATIENT
Start: 2021-09-23 | End: 2021-10-22 | Stop reason: SDUPTHER

## 2021-10-01 ENCOUNTER — LAB (OUTPATIENT)
Dept: INTERNAL MEDICINE | Facility: CLINIC | Age: 71
End: 2021-10-01

## 2021-10-01 DIAGNOSIS — I48.91 ATRIAL FIBRILLATION, UNSPECIFIED TYPE (HCC): Primary | ICD-10-CM

## 2021-10-01 LAB — INR PPP: 2 (ref 2.5–3.5)

## 2021-10-01 PROCEDURE — 85610 PROTHROMBIN TIME: CPT | Performed by: INTERNAL MEDICINE

## 2021-10-01 PROCEDURE — 36416 COLLJ CAPILLARY BLOOD SPEC: CPT | Performed by: INTERNAL MEDICINE

## 2021-10-06 ENCOUNTER — TELEPHONE (OUTPATIENT)
Dept: CARDIOLOGY | Facility: CLINIC | Age: 71
End: 2021-10-06

## 2021-10-06 NOTE — TELEPHONE ENCOUNTER
Called Mr Vo to see if he received the Merlin home monitor that was ordered for him.  Left message for a return call.

## 2021-10-08 ENCOUNTER — LAB (OUTPATIENT)
Dept: INTERNAL MEDICINE | Facility: CLINIC | Age: 71
End: 2021-10-08

## 2021-10-08 DIAGNOSIS — I48.91 ATRIAL FIBRILLATION, UNSPECIFIED TYPE (HCC): Primary | ICD-10-CM

## 2021-10-08 LAB — INR PPP: 2.8 (ref 2.5–3.5)

## 2021-10-08 PROCEDURE — 85610 PROTHROMBIN TIME: CPT | Performed by: INTERNAL MEDICINE

## 2021-10-08 PROCEDURE — 36416 COLLJ CAPILLARY BLOOD SPEC: CPT | Performed by: INTERNAL MEDICINE

## 2021-10-15 ENCOUNTER — LAB (OUTPATIENT)
Dept: INTERNAL MEDICINE | Facility: CLINIC | Age: 71
End: 2021-10-15

## 2021-10-15 DIAGNOSIS — Z23 NEED FOR INFLUENZA VACCINATION: ICD-10-CM

## 2021-10-15 DIAGNOSIS — I48.91 ATRIAL FIBRILLATION, UNSPECIFIED TYPE (HCC): Primary | ICD-10-CM

## 2021-10-15 LAB — INR PPP: 4.7 (ref 2.5–3.5)

## 2021-10-15 PROCEDURE — G0008 ADMIN INFLUENZA VIRUS VAC: HCPCS | Performed by: INTERNAL MEDICINE

## 2021-10-15 PROCEDURE — 85610 PROTHROMBIN TIME: CPT | Performed by: INTERNAL MEDICINE

## 2021-10-15 PROCEDURE — 90662 IIV NO PRSV INCREASED AG IM: CPT | Performed by: INTERNAL MEDICINE

## 2021-10-15 PROCEDURE — 36416 COLLJ CAPILLARY BLOOD SPEC: CPT | Performed by: INTERNAL MEDICINE

## 2021-10-22 ENCOUNTER — LAB (OUTPATIENT)
Dept: INTERNAL MEDICINE | Facility: CLINIC | Age: 71
End: 2021-10-22

## 2021-10-22 DIAGNOSIS — I48.91 ATRIAL FIBRILLATION, UNSPECIFIED TYPE (HCC): Primary | ICD-10-CM

## 2021-10-22 LAB — INR PPP: 2.9 (ref 2.5–3.5)

## 2021-10-22 PROCEDURE — 36416 COLLJ CAPILLARY BLOOD SPEC: CPT | Performed by: INTERNAL MEDICINE

## 2021-10-22 PROCEDURE — 85610 PROTHROMBIN TIME: CPT | Performed by: INTERNAL MEDICINE

## 2021-10-22 RX ORDER — WARFARIN SODIUM 5 MG/1
5 TABLET ORAL DAILY
Qty: 30 TABLET | Refills: 5 | Status: SHIPPED | OUTPATIENT
Start: 2021-10-22 | End: 2022-05-11 | Stop reason: SDUPTHER

## 2021-11-02 ENCOUNTER — LAB (OUTPATIENT)
Dept: INTERNAL MEDICINE | Facility: CLINIC | Age: 71
End: 2021-11-02

## 2021-11-02 DIAGNOSIS — I48.91 ATRIAL FIBRILLATION, UNSPECIFIED TYPE (HCC): Primary | ICD-10-CM

## 2021-11-02 LAB — INR PPP: 5.2 (ref 2.5–3.5)

## 2021-11-02 PROCEDURE — 85610 PROTHROMBIN TIME: CPT | Performed by: INTERNAL MEDICINE

## 2021-11-02 PROCEDURE — 36416 COLLJ CAPILLARY BLOOD SPEC: CPT | Performed by: INTERNAL MEDICINE

## 2021-11-09 ENCOUNTER — LAB (OUTPATIENT)
Dept: INTERNAL MEDICINE | Facility: CLINIC | Age: 71
End: 2021-11-09

## 2021-11-09 DIAGNOSIS — I48.91 ATRIAL FIBRILLATION, UNSPECIFIED TYPE (HCC): Primary | ICD-10-CM

## 2021-11-09 LAB — INR PPP: 3 (ref 2.5–3.5)

## 2021-11-09 PROCEDURE — 36416 COLLJ CAPILLARY BLOOD SPEC: CPT | Performed by: INTERNAL MEDICINE

## 2021-11-09 PROCEDURE — 85610 PROTHROMBIN TIME: CPT | Performed by: INTERNAL MEDICINE

## 2021-11-11 DIAGNOSIS — Z01.812 BLOOD TESTS PRIOR TO TREATMENT OR PROCEDURE: Primary | ICD-10-CM

## 2021-11-15 ENCOUNTER — TELEPHONE (OUTPATIENT)
Dept: INTERNAL MEDICINE | Facility: CLINIC | Age: 71
End: 2021-11-15

## 2021-11-16 LAB — INR PPP: 1.5 (ref 2.5–3.5)

## 2021-11-17 ENCOUNTER — ANTICOAGULATION VISIT (OUTPATIENT)
Dept: INTERNAL MEDICINE | Facility: CLINIC | Age: 71
End: 2021-11-17

## 2021-11-17 DIAGNOSIS — I48.91 ATRIAL FIBRILLATION, UNSPECIFIED TYPE (HCC): Primary | ICD-10-CM

## 2021-11-23 ENCOUNTER — LAB (OUTPATIENT)
Dept: INTERNAL MEDICINE | Facility: CLINIC | Age: 71
End: 2021-11-23

## 2021-11-23 ENCOUNTER — TELEPHONE (OUTPATIENT)
Dept: INTERNAL MEDICINE | Facility: CLINIC | Age: 71
End: 2021-11-23

## 2021-11-23 DIAGNOSIS — I48.11 LONGSTANDING PERSISTENT ATRIAL FIBRILLATION (HCC): Primary | ICD-10-CM

## 2021-11-23 LAB — INR PPP: 7.4 (ref 2.5–3.5)

## 2021-11-23 PROCEDURE — 36416 COLLJ CAPILLARY BLOOD SPEC: CPT | Performed by: INTERNAL MEDICINE

## 2021-11-23 PROCEDURE — 85610 PROTHROMBIN TIME: CPT | Performed by: INTERNAL MEDICINE

## 2021-11-23 NOTE — TELEPHONE ENCOUNTER
Nuha with [facility] called with a critical lab for patient. His INR today was 7.4. I asked provider for recommendations. Provider advised to have patient hold medicine for 3 days starting today and then take 1/2 his normal dose on Friday, Saturday, and Sunday. Patient to have INR rechecked on Monday. Patient has been advised of providers recommendations and verb good understanding. I have updated patient anticoagulation chart.

## 2021-12-03 ENCOUNTER — HOSPITAL ENCOUNTER (OUTPATIENT)
Dept: CT IMAGING | Facility: HOSPITAL | Age: 71
Discharge: HOME | End: 2021-12-03
Admitting: NURSE PRACTITIONER

## 2021-12-03 DIAGNOSIS — C34.12 MALIGNANT NEOPLASM OF UPPER LOBE OF LEFT LUNG (HCC): ICD-10-CM

## 2021-12-03 LAB — CREAT BLDA-MCNC: 0.9 MG/DL (ref 0.6–1.3)

## 2021-12-03 PROCEDURE — 71270 CT THORAX DX C-/C+: CPT

## 2021-12-03 PROCEDURE — 82565 ASSAY OF CREATININE: CPT

## 2021-12-03 PROCEDURE — 25010000002 IOPAMIDOL 61 % SOLUTION: Performed by: NURSE PRACTITIONER

## 2021-12-03 RX ADMIN — IOPAMIDOL 75 ML: 612 INJECTION, SOLUTION INTRAVENOUS at 13:42

## 2021-12-06 LAB — INR PPP: 1.3 (ref 2.5–3.5)

## 2021-12-07 ENCOUNTER — TELEPHONE (OUTPATIENT)
Dept: INTERNAL MEDICINE | Facility: CLINIC | Age: 71
End: 2021-12-07

## 2021-12-07 ENCOUNTER — ANTICOAGULATION VISIT (OUTPATIENT)
Dept: INTERNAL MEDICINE | Facility: CLINIC | Age: 71
End: 2021-12-07

## 2021-12-07 DIAGNOSIS — I48.91 ATRIAL FIBRILLATION, UNSPECIFIED TYPE (HCC): Primary | ICD-10-CM

## 2021-12-09 ENCOUNTER — OFFICE VISIT (OUTPATIENT)
Dept: CARDIAC SURGERY | Facility: CLINIC | Age: 71
End: 2021-12-09

## 2021-12-09 VITALS
DIASTOLIC BLOOD PRESSURE: 93 MMHG | OXYGEN SATURATION: 99 % | TEMPERATURE: 98.2 F | SYSTOLIC BLOOD PRESSURE: 193 MMHG | HEIGHT: 71 IN | HEART RATE: 72 BPM | BODY MASS INDEX: 20.3 KG/M2 | WEIGHT: 145 LBS

## 2021-12-09 DIAGNOSIS — C34.92 ADENOCARCINOMA OF LUNG, STAGE 1, LEFT (HCC): ICD-10-CM

## 2021-12-09 DIAGNOSIS — I71.40 AAA (ABDOMINAL AORTIC ANEURYSM) WITHOUT RUPTURE (HCC): ICD-10-CM

## 2021-12-09 DIAGNOSIS — Z90.2 S/P LOBECTOMY OF LUNG: Primary | ICD-10-CM

## 2021-12-09 PROCEDURE — 99214 OFFICE O/P EST MOD 30 MIN: CPT | Performed by: NURSE PRACTITIONER

## 2021-12-09 NOTE — PROGRESS NOTES
[facility] Cardiothoracic Surgery Office Follow Up Note     Date of Encounter: 2021     Name: Dillon Vo  : 1950     Referred By: No ref. provider found  PCP: Ez Cintron MD    Chief Complaint:    Chief Complaint   Patient presents with   • Follow-up     6 month follow up for lung cancer.   • Lung Cancer       Subjective      History of Present Illness:    Dillon Vo is a 71 y.o. male current smoker, with significant cardiac history (CAD, A. fib, pacemaker, mechanical heart valve on chronic anticoagulation) and history of left upper lobe lung mass with interval enlargement and elevated SUV who is s/p left thoracotomy with left upper lobectomy and lymph node sampling on 2021 with Dr. Psot.  Pathology revealed adenocarcinoma, invasive and lipidic pattern, moderately differentiated, with hyalinized granuloma with central necrosis present; estimated total tumor size of 3.5 cm in maximum dimension, negative margins, negative nodes; staging consistent with pT2 N0 MX stage IB adenocarcinoma. Patient presents today for 6 months follow up with repeat CT scan. Patient has been doing well, denies any interval worsening SOA, hemoptysis, unintentional weight loss, or lymphadenopathy. He does unfortunately continue to smoke. Patient continues to follow with pulmonology Dr. Kenney's office last seen 2021, with recommendations for CT chest every 6 months for 5 years.  Patient follows closely with his PCP Dr. Cintron with next follow-up scheduled in 2022. He also follows with cardiology Dr. Tucker office.     Review of Systems:  Review of Systems   Constitutional: Negative. Negative for chills, decreased appetite, diaphoresis, fever, malaise/fatigue, night sweats and weight loss.   HENT: Positive for sore throat. Negative for congestion, hoarse voice and stridor.    Cardiovascular: Positive for dyspnea on exertion. Negative for chest pain, claudication,  irregular heartbeat, leg swelling, near-syncope, orthopnea, palpitations, paroxysmal nocturnal dyspnea and syncope.   Respiratory: Positive for cough. Negative for hemoptysis, shortness of breath, sleep disturbances due to breathing, snoring, sputum production and wheezing.    Hematologic/Lymphatic: Negative for adenopathy and bleeding problem. Bruises/bleeds easily.   Skin: Negative.  Negative for color change, dry skin, itching, poor wound healing and rash.   Musculoskeletal: Negative.  Negative for arthritis, back pain, falls and muscle weakness.   Gastrointestinal: Negative.  Negative for abdominal pain, anorexia, constipation, diarrhea, hematochezia, melena, nausea and vomiting.   Neurological: Negative.  Negative for difficulty with concentration, disturbances in coordination, dizziness, loss of balance, numbness, seizures, vertigo and weakness.   Psychiatric/Behavioral: Negative for altered mental status, depression and substance abuse. The patient does not have insomnia and is not nervous/anxious.    Allergic/Immunologic: Negative for persistent infections.       I have reviewed the following portions of the patient's history: allergies, current medications, past family history, past medical history, past social history, past surgical history, problem list and ROS and confirm it's accurate.    Allergies:  No Known Allergies    Medications:      Current Outpatient Medications:   •  aspirin 81 MG EC tablet, Take 1 tablet by mouth daily., Disp: , Rfl:   •  atorvastatin (LIPITOR) 80 MG tablet, Take 1 tablet by mouth Every Night., Disp: 90 tablet, Rfl: 2  •  buPROPion XL (Wellbutrin XL) 300 MG 24 hr tablet, Take 1 tablet by mouth Daily., Disp: 30 tablet, Rfl: 6  •  carvedilol (COREG) 12.5 MG tablet, Take 12.5 mg by mouth Daily., Disp: , Rfl:   •  citalopram (CeleXA) 20 MG tablet, Take 1 tablet by mouth once daily, Disp: 90 tablet, Rfl: 1  •  famotidine (PEPCID) 20 MG tablet, Take 1 tablet by mouth Daily., Disp:  30 tablet, Rfl: 1  •  finasteride (PROSCAR) 5 MG tablet, TAKE 1 TABLET BY MOUTH ONCE DAILY AT BEDTIME, Disp: 90 tablet, Rfl: 1  •  folic acid (FOLVITE) 400 MCG tablet, TAKE 1 TABLET BY MOUTH ONCE DAILY (Patient taking differently: Daily.), Disp: 90 tablet, Rfl: 3  •  lisinopril (PRINIVIL,ZESTRIL) 10 MG tablet, Take 1 tablet by mouth Daily., Disp: , Rfl:   •  mupirocin (BACTROBAN) 2 % ointment, APPLY TOPICALLY TO THE APPROPRIATE AREA AS DIRECTED 3 TIMES A DAY, Disp: 30 g, Rfl: 0  •  nicotine (NICODERM CQ) 14 MG/24HR patch, Place 1 patch on the skin as directed by provider Daily., Disp: 90 patch, Rfl: 0  •  tadalafil (Cialis) 20 MG tablet, Take 1 tablet by mouth Daily As Needed for Erectile Dysfunction., Disp: 30 tablet, Rfl: 2  •  vitamin B-12 (CYANOCOBALAMIN) 1000 MCG tablet, Take 1 tablet by mouth Daily., Disp: 90 tablet, Rfl: 3  •  warfarin (COUMADIN) 5 MG tablet, Take 1 tablet by mouth Daily., Disp: 30 tablet, Rfl: 5    History:   Past Medical History:   Diagnosis Date   • A-fib (HCC)    • Anemia    • Atrial fibrillation (HCC) 5/5/2016    Difficult to control rate. RFA of AV node with implantation of left Bi-V pacemaker, 06/18/2013. Hematoma requiring single-chamber pacemaker implanted, 07/29/2013.    • Colitis    • Colon cancer (HCC)    • Coronary artery disease    • Depression    • GERD (gastroesophageal reflux disease)    • Heart valve disease    • Hiatal hernia    • HTN (hypertension)    • Hyperbilirubinemia    • Hyperlipidemia    • Infectious viral hepatitis    • Kidney stone    • Lung cancer (HCC)    • Orthostatic hypotension    • Sick sinus syndrome (HCC)    • Skin cancer     lip        Past Surgical History:   Procedure Laterality Date   • APPENDECTOMY     • BRONCHOSCOPY THORACOTOMY Left 4/7/2021    Procedure: BRONCHOSCOPY LEFT THORACOTOMY, LEFT LUNG RESECTION;  Surgeon: Chi Post MD;  Location: [facility];  Service: Cardiothoracic;  Laterality: Left;   • CARDIAC CATHETERIZATION     • CARDIAC  "SURGERY  2000    valve replacement    • COLON SURGERY     • COLONOSCOPY  up to date   • CORONARY STENT PLACEMENT     • EXTRACORPOREAL SHOCKWAVE LITHOTRIPSY (ESWL), STENT INSERTION/REMOVAL     • LUNG CANCER SURGERY     • PACEMAKER IMPLANTATION     • VARICOSE VEIN SURGERY         Social History     Socioeconomic History   • Marital status:    • Number of children: 0   Tobacco Use   • Smoking status: Current Every Day Smoker     Packs/day: 0.25     Years: 57.00     Pack years: 14.25     Types: Cigarettes   • Smokeless tobacco: Never Used   Vaping Use   • Vaping Use: Never used   Substance and Sexual Activity   • Alcohol use: Yes     Alcohol/week: 2.0 standard drinks     Types: 2 Shots of liquor per week     Comment: Daily   • Drug use: No   • Sexual activity: Defer     Partners: Female     Comment:          Family History   Problem Relation Age of Onset   • Cancer Mother    • Hypertension Father    • Heart disease Father        Objective     Physical Exam:  Vitals:    12/09/21 1300   BP: (!) 193/93   BP Location: Right arm   Patient Position: Sitting   Pulse: 72   Temp: 98.2 °F (36.8 °C)   SpO2: 99%   Weight: 65.8 kg (145 lb)   Height: 180.3 cm (71\")      Body mass index is 20.22 kg/m².    Physical Exam  Vitals and nursing note reviewed.   Constitutional:       General: He is awake.      Appearance: Normal appearance.   HENT:      Head: Normocephalic and atraumatic.   Eyes:      Pupils: Pupils are equal, round, and reactive to light.   Cardiovascular:      Rate and Rhythm: Normal rate and regular rhythm.      Pulses: Normal pulses.      Heart sounds: Normal heart sounds, S1 normal and S2 normal.   Pulmonary:      Effort: Pulmonary effort is normal.      Breath sounds: Normal breath sounds. No decreased air movement.   Chest:   Breasts:      Right: No axillary adenopathy or supraclavicular adenopathy.      Left: No axillary adenopathy or supraclavicular adenopathy.       Abdominal:      Palpations: Abdomen " is soft.   Musculoskeletal:         General: Normal range of motion.      Cervical back: Normal range of motion and neck supple.      Right lower leg: No edema.      Left lower leg: No edema.   Lymphadenopathy:      Cervical: No cervical adenopathy.      Right cervical: No superficial, deep or posterior cervical adenopathy.     Left cervical: No superficial, deep or posterior cervical adenopathy.      Upper Body:      Right upper body: No supraclavicular or axillary adenopathy.      Left upper body: No supraclavicular or axillary adenopathy.   Skin:     General: Skin is warm and dry.      Capillary Refill: Capillary refill takes less than 2 seconds.      Findings: No lesion.      Nails: There is no clubbing.      Comments: Thoracotomy incision: completely healed, no new areas of induration    Neurological:      General: No focal deficit present.      Mental Status: He is alert and oriented to person, place, and time. Mental status is at baseline.      GCS: GCS eye subscore is 4. GCS verbal subscore is 5. GCS motor subscore is 6.      Cranial Nerves: Cranial nerves are intact.      Sensory: Sensation is intact.      Motor: Motor function is intact.      Coordination: Coordination is intact.      Gait: Gait is intact.   Psychiatric:         Mood and Affect: Mood and affect normal. Mood is not depressed.         Speech: Speech normal.         Behavior: Behavior normal. Behavior is cooperative.         Thought Content: Thought content normal.         Judgment: Judgment normal.         Imaging/Labs:    CT Chest With & Without Contrast Diagnostic    Result Date: 12/4/2021  Persistent left pleural effusion, similar in extent to the 05/24/2021 exam. No new or progressive chest disease is seen. No evidence of recurrent malignancy.  D:  12/03/2021 E:  12/03/2021  This report was finalized on 12/4/2021 3:37 PM by Dr. Rufino Disla MD.       CTA Chest Result date 5/24/21:  IMPRESSION:  Status post left upper lobectomy. There is a  moderate subpulmonic left pleural effusion new since March. No evidence of pulmonary embolism.    NM PET Result date 3/2/21:   IMPRESSION:  Compared to PET/CT 09/25/2019 and as seen on recent  diagnostic CT chest, there has been slow but interval progressive growth  of a left upper lobe pulmonary nodule now measuring approximately 13 mm  with interval increase in activity borderline hypermetabolic or  equivocal for malignancy maximum SUV 2.3 versus 0.9 on 2019 comparison.  No separate sites from this in the chest or elsewhere to suggest distant  metastasis or abnormal hypermetabolism otherwise.    Assessment / Plan      Assessment / Plan:  Diagnoses and all orders for this visit:    1. S/P lobectomy of MARKO lung 4/7/21 (Primary)    2. Adenocarcinoma of lung, stage 1, left (HCC)       1. Left upper lobe lung mass with interval enlargement and elevated SUV who is s/p left thoracotomy with left upper lobectomy and lymph node sampling on 4/7/2021 with Dr. Post: Patient doing well, denies any acute complaints today or constitutional symptoms. Reviewed CT scan results with patient, revealing stable persistent left pleural effusion and central changes to the upper to mid left lung that are most likely postoperative changes. Patient does currently have a mild respiratory illness with some nasal congestion on exam but denies any fever, chills or body aches. In reviewing patient's imaging, there was a CT abdomen performed in May 2021 that showed small increase in abdominal aortic aneurysm now measuring 3.8 x 3.9 cm. Patient is unsure if anyone else is following him for this issue. Will plan to see the patient back in 6 months with repeat CT chest for surveillance post-lung surgery and with AAA US.     Follow Up:   Return in about 6 months (around 6/9/2022).   Or sooner for any further concerns or worsening sign and symptoms. If unable to reach us in the office please dial 911 or go to the nearest emergency  department.      Ángela ARENAS  [facility]    Time Spent: I spent 35 minutes caring for Dillon on this date of service. This time includes time spent by me in the following activities: preparing for the visit, reviewing tests, obtaining and/or reviewing a separately obtained history, performing a medically appropriate examination and/or evaluation, counseling and educating the patient/family/caregiver, ordering medications, tests, or procedures, documenting information in the medical record and independently interpreting results and communicating that information with the patient/family/caregiver.

## 2021-12-15 NOTE — TELEPHONE ENCOUNTER
See anticoag flowsheet.  Patient was directed to retest on 12-9-21.  LMOM for patient to call.  Patient needs to check his INR level

## 2021-12-29 RX ORDER — LISINOPRIL 10 MG/1
TABLET ORAL
Qty: 180 TABLET | Refills: 0 | Status: SHIPPED | OUTPATIENT
Start: 2021-12-29 | End: 2022-09-06 | Stop reason: SDUPTHER

## 2021-12-29 RX ORDER — ATORVASTATIN CALCIUM 80 MG/1
TABLET, FILM COATED ORAL
Qty: 90 TABLET | Refills: 0 | Status: SHIPPED | OUTPATIENT
Start: 2021-12-29 | End: 2022-04-22 | Stop reason: SDUPTHER

## 2022-01-03 ENCOUNTER — TELEPHONE (OUTPATIENT)
Dept: INTERNAL MEDICINE | Facility: CLINIC | Age: 72
End: 2022-01-03

## 2022-01-03 NOTE — TELEPHONE ENCOUNTER
S/W patient's wife today.  She is going to have the patient come into the office to have his INR level checked.

## 2022-01-04 ENCOUNTER — LAB (OUTPATIENT)
Dept: INTERNAL MEDICINE | Facility: CLINIC | Age: 72
End: 2022-01-04

## 2022-01-04 DIAGNOSIS — I48.91 ATRIAL FIBRILLATION, UNSPECIFIED TYPE: Primary | ICD-10-CM

## 2022-01-04 LAB — INR PPP: 2.7 (ref 2.5–3.5)

## 2022-01-04 PROCEDURE — 85610 PROTHROMBIN TIME: CPT | Performed by: INTERNAL MEDICINE

## 2022-01-04 PROCEDURE — 36416 COLLJ CAPILLARY BLOOD SPEC: CPT | Performed by: INTERNAL MEDICINE

## 2022-01-05 ENCOUNTER — TELEPHONE (OUTPATIENT)
Dept: CARDIOLOGY | Facility: CLINIC | Age: 72
End: 2022-01-05

## 2022-01-17 RX ORDER — BUPROPION HYDROCHLORIDE 300 MG/1
TABLET ORAL
Qty: 30 TABLET | Refills: 5 | Status: SHIPPED | OUTPATIENT
Start: 2022-01-17

## 2022-01-20 ENCOUNTER — LAB (OUTPATIENT)
Dept: INTERNAL MEDICINE | Facility: CLINIC | Age: 72
End: 2022-01-20

## 2022-01-20 DIAGNOSIS — I48.11 LONGSTANDING PERSISTENT ATRIAL FIBRILLATION: Primary | ICD-10-CM

## 2022-01-20 LAB — INR PPP: 1.8 (ref 2.5–3.5)

## 2022-01-20 PROCEDURE — 85610 PROTHROMBIN TIME: CPT | Performed by: INTERNAL MEDICINE

## 2022-01-20 PROCEDURE — 36416 COLLJ CAPILLARY BLOOD SPEC: CPT | Performed by: INTERNAL MEDICINE

## 2022-01-26 ENCOUNTER — APPOINTMENT (OUTPATIENT)
Dept: CT IMAGING | Facility: HOSPITAL | Age: 72
End: 2022-01-26

## 2022-01-26 ENCOUNTER — HOSPITAL ENCOUNTER (EMERGENCY)
Facility: HOSPITAL | Age: 72
Discharge: HOME | End: 2022-01-26
Attending: EMERGENCY MEDICINE | Admitting: EMERGENCY MEDICINE

## 2022-01-26 ENCOUNTER — APPOINTMENT (OUTPATIENT)
Dept: GENERAL RADIOLOGY | Facility: HOSPITAL | Age: 72
End: 2022-01-26

## 2022-01-26 VITALS
SYSTOLIC BLOOD PRESSURE: 159 MMHG | BODY MASS INDEX: 20.3 KG/M2 | RESPIRATION RATE: 18 BRPM | DIASTOLIC BLOOD PRESSURE: 95 MMHG | WEIGHT: 145 LBS | HEIGHT: 71 IN | OXYGEN SATURATION: 98 % | HEART RATE: 70 BPM | TEMPERATURE: 98.5 F

## 2022-01-26 DIAGNOSIS — Z79.01 CHRONIC ANTICOAGULATION: ICD-10-CM

## 2022-01-26 DIAGNOSIS — R06.02 SHORTNESS OF BREATH: Primary | ICD-10-CM

## 2022-01-26 DIAGNOSIS — I50.9 CONGESTIVE HEART FAILURE, UNSPECIFIED HF CHRONICITY, UNSPECIFIED HEART FAILURE TYPE: ICD-10-CM

## 2022-01-26 LAB
ALBUMIN SERPL-MCNC: 3.8 G/DL (ref 3.5–5.2)
ALBUMIN/GLOB SERPL: 1.3 G/DL
ALP SERPL-CCNC: 69 U/L (ref 39–117)
ALT SERPL W P-5'-P-CCNC: 10 U/L (ref 1–41)
ANION GAP SERPL CALCULATED.3IONS-SCNC: 8 MMOL/L (ref 5–15)
AST SERPL-CCNC: 21 U/L (ref 1–40)
BASOPHILS # BLD AUTO: 0.06 10*3/MM3 (ref 0–0.2)
BASOPHILS NFR BLD AUTO: 1.5 % (ref 0–1.5)
BILIRUB SERPL-MCNC: 1.3 MG/DL (ref 0–1.2)
BUN SERPL-MCNC: 11 MG/DL (ref 8–23)
BUN/CREAT SERPL: 11.3 (ref 7–25)
CALCIUM SPEC-SCNC: 9 MG/DL (ref 8.6–10.5)
CHLORIDE SERPL-SCNC: 103 MMOL/L (ref 98–107)
CO2 SERPL-SCNC: 29 MMOL/L (ref 22–29)
CREAT SERPL-MCNC: 0.97 MG/DL (ref 0.76–1.27)
DEPRECATED RDW RBC AUTO: 54.1 FL (ref 37–54)
ELLIPTOCYTES BLD QL SMEAR: NORMAL
EOSINOPHIL # BLD AUTO: 0.03 10*3/MM3 (ref 0–0.4)
EOSINOPHIL NFR BLD AUTO: 0.7 % (ref 0.3–6.2)
ERYTHROCYTE [DISTWIDTH] IN BLOOD BY AUTOMATED COUNT: 13 % (ref 12.3–15.4)
FLUAV RNA RESP QL NAA+PROBE: NOT DETECTED
FLUBV RNA RESP QL NAA+PROBE: NOT DETECTED
GFR SERPL CREATININE-BSD FRML MDRD: 76 ML/MIN/1.73
GLOBULIN UR ELPH-MCNC: 2.9 GM/DL
GLUCOSE SERPL-MCNC: 122 MG/DL (ref 65–99)
HCT VFR BLD AUTO: 38.6 % (ref 37.5–51)
HGB BLD-MCNC: 12.9 G/DL (ref 13–17.7)
HOLD SPECIMEN: NORMAL
IMM GRANULOCYTES # BLD AUTO: 0.02 10*3/MM3 (ref 0–0.05)
IMM GRANULOCYTES NFR BLD AUTO: 0.5 % (ref 0–0.5)
INR PPP: 4.98 (ref 0.85–1.16)
LYMPHOCYTES # BLD AUTO: 0.68 10*3/MM3 (ref 0.7–3.1)
LYMPHOCYTES NFR BLD AUTO: 16.5 % (ref 19.6–45.3)
MACROCYTES BLD QL SMEAR: NORMAL
MCH RBC QN AUTO: 37.5 PG (ref 26.6–33)
MCHC RBC AUTO-ENTMCNC: 33.4 G/DL (ref 31.5–35.7)
MCV RBC AUTO: 112.2 FL (ref 79–97)
MONOCYTES # BLD AUTO: 0.45 10*3/MM3 (ref 0.1–0.9)
MONOCYTES NFR BLD AUTO: 10.9 % (ref 5–12)
NEUTROPHILS NFR BLD AUTO: 2.88 10*3/MM3 (ref 1.7–7)
NEUTROPHILS NFR BLD AUTO: 69.9 % (ref 42.7–76)
NRBC BLD AUTO-RTO: 0 /100 WBC (ref 0–0.2)
NT-PROBNP SERPL-MCNC: 2076 PG/ML (ref 0–900)
PLAT MORPH BLD: NORMAL
PLATELET # BLD AUTO: 128 10*3/MM3 (ref 140–450)
PMV BLD AUTO: 11.9 FL (ref 6–12)
POTASSIUM SERPL-SCNC: 3.9 MMOL/L (ref 3.5–5.2)
PROT SERPL-MCNC: 6.7 G/DL (ref 6–8.5)
PROTHROMBIN TIME: 43.9 SECONDS (ref 11.4–14.4)
QT INTERVAL: 480 MS
QTC INTERVAL: 518 MS
RBC # BLD AUTO: 3.44 10*6/MM3 (ref 4.14–5.8)
RSV RNA NPH QL NAA+NON-PROBE: NOT DETECTED
SARS-COV-2 RNA RESP QL NAA+PROBE: NOT DETECTED
SODIUM SERPL-SCNC: 140 MMOL/L (ref 136–145)
TROPONIN T SERPL-MCNC: <0.01 NG/ML (ref 0–0.03)
WBC MORPH BLD: NORMAL
WBC NRBC COR # BLD: 4.12 10*3/MM3 (ref 3.4–10.8)
WHOLE BLOOD HOLD SPECIMEN: NORMAL
WHOLE BLOOD HOLD SPECIMEN: NORMAL

## 2022-01-26 PROCEDURE — 85007 BL SMEAR W/DIFF WBC COUNT: CPT | Performed by: EMERGENCY MEDICINE

## 2022-01-26 PROCEDURE — 85610 PROTHROMBIN TIME: CPT | Performed by: EMERGENCY MEDICINE

## 2022-01-26 PROCEDURE — 96374 THER/PROPH/DIAG INJ IV PUSH: CPT

## 2022-01-26 PROCEDURE — 36415 COLL VENOUS BLD VENIPUNCTURE: CPT

## 2022-01-26 PROCEDURE — 93005 ELECTROCARDIOGRAM TRACING: CPT | Performed by: EMERGENCY MEDICINE

## 2022-01-26 PROCEDURE — 99284 EMERGENCY DEPT VISIT MOD MDM: CPT

## 2022-01-26 PROCEDURE — 71045 X-RAY EXAM CHEST 1 VIEW: CPT

## 2022-01-26 PROCEDURE — 83880 ASSAY OF NATRIURETIC PEPTIDE: CPT | Performed by: EMERGENCY MEDICINE

## 2022-01-26 PROCEDURE — 25010000002 FUROSEMIDE PER 20 MG: Performed by: EMERGENCY MEDICINE

## 2022-01-26 PROCEDURE — 80053 COMPREHEN METABOLIC PANEL: CPT | Performed by: EMERGENCY MEDICINE

## 2022-01-26 PROCEDURE — 71250 CT THORAX DX C-: CPT

## 2022-01-26 PROCEDURE — 84484 ASSAY OF TROPONIN QUANT: CPT | Performed by: EMERGENCY MEDICINE

## 2022-01-26 PROCEDURE — 87637 SARSCOV2&INF A&B&RSV AMP PRB: CPT | Performed by: EMERGENCY MEDICINE

## 2022-01-26 PROCEDURE — 85025 COMPLETE CBC W/AUTO DIFF WBC: CPT | Performed by: EMERGENCY MEDICINE

## 2022-01-26 RX ORDER — FUROSEMIDE 20 MG/1
TABLET ORAL
Qty: 5 TABLET | Refills: 0 | Status: SHIPPED | OUTPATIENT
Start: 2022-01-26 | End: 2022-04-22

## 2022-01-26 RX ORDER — SODIUM CHLORIDE 0.9 % (FLUSH) 0.9 %
10 SYRINGE (ML) INJECTION AS NEEDED
Status: DISCONTINUED | OUTPATIENT
Start: 2022-01-26 | End: 2022-01-27 | Stop reason: HOSPADM

## 2022-01-26 RX ORDER — FUROSEMIDE 40 MG/1
40 TABLET ORAL ONCE
Status: COMPLETED | OUTPATIENT
Start: 2022-01-26 | End: 2022-01-26

## 2022-01-26 RX ORDER — FUROSEMIDE 10 MG/ML
20 INJECTION INTRAMUSCULAR; INTRAVENOUS ONCE
Status: COMPLETED | OUTPATIENT
Start: 2022-01-26 | End: 2022-01-26

## 2022-01-26 RX ORDER — POTASSIUM CHLORIDE 750 MG/1
10 TABLET, FILM COATED, EXTENDED RELEASE ORAL DAILY
Qty: 5 TABLET | Refills: 0 | Status: SHIPPED | OUTPATIENT
Start: 2022-01-26 | End: 2022-04-22

## 2022-01-26 RX ADMIN — FUROSEMIDE 20 MG: 10 INJECTION INTRAMUSCULAR; INTRAVENOUS at 20:02

## 2022-01-26 RX ADMIN — FUROSEMIDE 40 MG: 40 TABLET ORAL at 22:16

## 2022-01-27 ENCOUNTER — ANTICOAGULATION VISIT (OUTPATIENT)
Dept: INTERNAL MEDICINE | Facility: CLINIC | Age: 72
End: 2022-01-27

## 2022-01-27 DIAGNOSIS — I48.91 ATRIAL FIBRILLATION, UNSPECIFIED TYPE: Primary | ICD-10-CM

## 2022-01-27 LAB — INR PPP: 4.9 (ref 2.5–3.5)

## 2022-01-27 PROCEDURE — 85610 PROTHROMBIN TIME: CPT | Performed by: INTERNAL MEDICINE

## 2022-01-27 PROCEDURE — 36416 COLLJ CAPILLARY BLOOD SPEC: CPT | Performed by: INTERNAL MEDICINE

## 2022-02-01 ENCOUNTER — OFFICE VISIT (OUTPATIENT)
Dept: INTERNAL MEDICINE | Facility: CLINIC | Age: 72
End: 2022-02-01

## 2022-02-01 ENCOUNTER — LAB (OUTPATIENT)
Dept: LAB | Facility: HOSPITAL | Age: 72
End: 2022-02-01

## 2022-02-01 ENCOUNTER — HOSPITAL ENCOUNTER (OUTPATIENT)
Dept: CT IMAGING | Facility: HOSPITAL | Age: 72
Discharge: HOME | End: 2022-02-01

## 2022-02-01 VITALS
BODY MASS INDEX: 20.36 KG/M2 | SYSTOLIC BLOOD PRESSURE: 160 MMHG | DIASTOLIC BLOOD PRESSURE: 84 MMHG | TEMPERATURE: 97.8 F | WEIGHT: 146 LBS | HEART RATE: 68 BPM | RESPIRATION RATE: 18 BRPM

## 2022-02-01 DIAGNOSIS — E78.5 HYPERLIPIDEMIA, UNSPECIFIED HYPERLIPIDEMIA TYPE: ICD-10-CM

## 2022-02-01 DIAGNOSIS — D64.9 ANEMIA, UNSPECIFIED TYPE: ICD-10-CM

## 2022-02-01 DIAGNOSIS — S09.90XA CLOSED HEAD INJURY, INITIAL ENCOUNTER: ICD-10-CM

## 2022-02-01 DIAGNOSIS — I48.91 ATRIAL FIBRILLATION, UNSPECIFIED TYPE: ICD-10-CM

## 2022-02-01 DIAGNOSIS — I10 ESSENTIAL HYPERTENSION: Primary | ICD-10-CM

## 2022-02-01 LAB — INR PPP: 2.5 (ref 2.5–3.5)

## 2022-02-01 PROCEDURE — 36416 COLLJ CAPILLARY BLOOD SPEC: CPT | Performed by: INTERNAL MEDICINE

## 2022-02-01 PROCEDURE — 80053 COMPREHEN METABOLIC PANEL: CPT | Performed by: INTERNAL MEDICINE

## 2022-02-01 PROCEDURE — 99214 OFFICE O/P EST MOD 30 MIN: CPT | Performed by: INTERNAL MEDICINE

## 2022-02-01 PROCEDURE — 85025 COMPLETE CBC W/AUTO DIFF WBC: CPT | Performed by: INTERNAL MEDICINE

## 2022-02-01 PROCEDURE — 85610 PROTHROMBIN TIME: CPT | Performed by: INTERNAL MEDICINE

## 2022-02-01 PROCEDURE — 80061 LIPID PANEL: CPT | Performed by: INTERNAL MEDICINE

## 2022-02-01 PROCEDURE — 70450 CT HEAD/BRAIN W/O DYE: CPT

## 2022-02-01 NOTE — PROGRESS NOTES
Chief Complaint   Patient presents with   • Follow-up     Follow up chronic medical problems       History of Present Illness      The patient presents for a follow-up related to hypertension. The patient reports that he has had no headaches, chest pain, dyspnea, edema, syncope, blurred vision or palpitations. He states that he is taking his medication as prescribed. He is not having medication side effects.    The patient presents for evaluation of trauma to the head and face. The trauma consisted of a fall onto the face. The trauma occurred one day ago. He did lose consciousness. He has bruises and cuts and scratches. Prior treatment was not received.    The patient presents for a follow-up related to chronic atrial fibrillation. He denies palpitations. The patient denies fatigue, dizziness, nausea or exercise intolerance. The patient denies using diet pills, decongestants, alcohol, caffeine, cocaine or stimulant medications.    The patient presents for a follow-up related to hyperlipidemia. He is following a low fat diet. He reports that he is exercising. He is taking atorvastatin. The patient is taking his medication as prescribed. He reports no medication side effects, including muscle cramps, abdominal pain, headaches or weakness. He denies orthopnea, paroxysmal nocturnal dyspnea or dyspnea on exertion.    The patient presents for a follow-up related to anemia. There are no reports of blood loss. The patient has no symptoms of a dry cough, a wet cough, wheezing, fever, vomiting, diarrhea, myalgias, abdominal pain, sweats, weight loss, decreased appetite, chills or paresthesias. The patient's energy level is normal.    Medications      Current Outpatient Medications:   •  aspirin 81 MG EC tablet, Take 1 tablet by mouth daily., Disp: , Rfl:   •  atorvastatin (LIPITOR) 80 MG tablet, TAKE 1 TABLET BY MOUTH ONCE DAILY AT NIGHT, Disp: 90 tablet, Rfl: 0  •  buPROPion XL (WELLBUTRIN XL) 300 MG 24 hr tablet, Take 1  tablet by mouth once daily, Disp: 30 tablet, Rfl: 5  •  carvedilol (COREG) 12.5 MG tablet, Take 12.5 mg by mouth Daily., Disp: , Rfl:   •  citalopram (CeleXA) 20 MG tablet, Take 1 tablet by mouth once daily, Disp: 90 tablet, Rfl: 1  •  famotidine (PEPCID) 20 MG tablet, Take 1 tablet by mouth Daily., Disp: 30 tablet, Rfl: 1  •  finasteride (PROSCAR) 5 MG tablet, TAKE 1 TABLET BY MOUTH ONCE DAILY AT BEDTIME, Disp: 90 tablet, Rfl: 1  •  folic acid (FOLVITE) 400 MCG tablet, TAKE 1 TABLET BY MOUTH ONCE DAILY (Patient taking differently: Daily.), Disp: 90 tablet, Rfl: 3  •  furosemide (LASIX) 20 MG tablet, Take 2 tablets tomorrow morning, then 1 tablet daily.  Take with potassium tablet, Disp: 5 tablet, Rfl: 0  •  lisinopril (PRINIVIL,ZESTRIL) 10 MG tablet, Take 1 tablet by mouth twice daily, Disp: 180 tablet, Rfl: 0  •  mupirocin (BACTROBAN) 2 % ointment, APPLY TOPICALLY TO THE APPROPRIATE AREA AS DIRECTED 3 TIMES A DAY, Disp: 30 g, Rfl: 0  •  nicotine (NICODERM CQ) 14 MG/24HR patch, Place 1 patch on the skin as directed by provider Daily., Disp: 90 patch, Rfl: 0  •  potassium chloride 10 MEQ CR tablet, Take 1 tablet by mouth Daily., Disp: 5 tablet, Rfl: 0  •  tadalafil (Cialis) 20 MG tablet, Take 1 tablet by mouth Daily As Needed for Erectile Dysfunction., Disp: 30 tablet, Rfl: 2  •  vitamin B-12 (CYANOCOBALAMIN) 1000 MCG tablet, Take 1 tablet by mouth Daily., Disp: 90 tablet, Rfl: 3  •  warfarin (COUMADIN) 5 MG tablet, Take 1 tablet by mouth Daily., Disp: 30 tablet, Rfl: 5     Allergies    No Known Allergies    Problem List    Patient Active Problem List   Diagnosis   • A-fib (HCC)   • Depression   • Hyperbilirubinemia   • Dyslipidemia   • Hypertension   • Malignant neoplasm of overlapping sites of colon (HCC)   • Neurogenic bladder   • Tobacco use   • CAD    • H/O mechanical AVR    • COPD, mild (HCC)   • Bilateral pneumonia   • Thrombocytopenia (HCC)   • Hemoptysis   • Chronic anticoagulation on warfarin    • Moderate  pulmonary HTN  (Est RVSP 53mmHg 2/2021)   • Macrocytosis   • Self-catheterizes urinary bladder   • Subacute, multifocal ischemic strokes due to cardioembolism   • Lung cancer (S/P Left thoracotomy and lobectomy 4/2021)   • Lung nodule < 6cm on CT (S/P Left thoracotomy and MARKO Lobectomy)   • GERD (gastroesophageal reflux disease)   • Presence of cardiac pacemaker   • S/P lobectomy of MARKO lung 4/7/21   • Hypotension (R/O hypovolemia)   • Syncope (R/O due to orthostatic hypotension)   • Laceration of left lower extremity       Medications, Allergies, Problems List and Past History were reviewed and updated.    Physical Examination    /84 (BP Location: Left arm, Patient Position: Sitting, Cuff Size: Adult)   Pulse 68   Temp 97.8 °F (36.6 °C) (Infrared)   Resp 18   Wt 66.2 kg (146 lb)   BMI 20.36 kg/m²     HEENT: Head- Normocephalic Atraumatic. Facies- Within normal limits. Pinnas- Normal texture and shape bilaterally. Canals- Normal bilaterally. TMs- Normal bilaterally. Nares- Patent bilaterally. Nasal Septum- is normal. There is no tenderness to palpation over the frontal or maxillary sinuses. Lids- Normal bilaterally. Conjunctiva- Clear bilaterally. Sclera- Anicteric bilaterally. Oropharynx- Moist with no lesions. Tonsils- No enlargement, erythema or exudate.    Neck: Thyroid- non enlarged, symmetric and has no nodules. No bruits are detected. ROM- Normal Range of Motion with no rigidity.    Lungs: Auscultation- Clear to auscultation bilaterally. There are no retractions, clubbing or cyanosis. The Expiratory to Inspiratory ratio is equal.    Lymph Nodes: Cervical- no enlarged lymph nodes noted. Clavicular- Deferred. Axillary- Deferred. Inguinal- Deferred.    Cardiovascular: There are no carotid bruits. Heart- Normal Rate with Irregularly Irregular rhythm and no murmurs. There are no gallops. There are no rubs. In the lower extremities there is no edema. The upper extremities do not have edema.    Abdomen:  Soft, benign, non-tender with no masses, hernias, organomegaly or scars.    Dermatologic: The patient has bruising on the right buccal mucosa, his chin, his face, his forehead, his lower lip and his nose. The bruising is deep red and purple. The affected skin is macerated and the condition is noted to be diffusely discolored.    Impression and Assessment    Closed Head Injury.    Essential Hypertension.    Atrial Fibrillation.    Hyperlipidemia.    Anemia.    Plan    Essential Hypertension Plan: The patient was instructed to continue the current medications.    Atrial Fibrillation Plan: The patient will follow-up with his cardiologist. The patient was instructed to continue the current medications.    Hyperlipidemia Plan: The patient was instructed to exercise daily, eat a low fat diet and continue his medications.    Closed Head Injury Plan: Further plans will be made after the test results are reviewed.    Anemia Plan: The patient was instructed to continue the current medications.    Diagnoses and all orders for this visit:    1. Essential hypertension (Primary)  -     Comprehensive Metabolic Panel; Future  -     Comprehensive Metabolic Panel    2. Atrial fibrillation, unspecified type (HCC)  -     Comprehensive Metabolic Panel; Future  -     Comprehensive Metabolic Panel    3. Closed head injury, initial encounter  -     CT Head Without Contrast; Future    4. Hyperlipidemia, unspecified hyperlipidemia type  -     Comprehensive Metabolic Panel; Future  -     Lipid Panel; Future  -     Comprehensive Metabolic Panel  -     Lipid Panel    5. Anemia, unspecified type  -     CBC & Differential; Future  -     CBC & Differential        Return to Office    The patient was instructed to return for follow-up in 4 months. The patient was instructed to return sooner if the condition changes, worsens, or does not resolve.

## 2022-02-02 ENCOUNTER — TELEPHONE (OUTPATIENT)
Dept: INTERNAL MEDICINE | Facility: CLINIC | Age: 72
End: 2022-02-02

## 2022-02-02 ENCOUNTER — PATIENT OUTREACH (OUTPATIENT)
Dept: CASE MANAGEMENT | Facility: OTHER | Age: 72
End: 2022-02-02

## 2022-02-02 ENCOUNTER — OFFICE VISIT (OUTPATIENT)
Dept: CARDIOLOGY | Facility: HOSPITAL | Age: 72
End: 2022-02-02

## 2022-02-02 VITALS
OXYGEN SATURATION: 100 % | HEART RATE: 80 BPM | HEIGHT: 71 IN | SYSTOLIC BLOOD PRESSURE: 138 MMHG | TEMPERATURE: 97.9 F | BODY MASS INDEX: 20.65 KG/M2 | RESPIRATION RATE: 20 BRPM | WEIGHT: 147.5 LBS | DIASTOLIC BLOOD PRESSURE: 78 MMHG

## 2022-02-02 DIAGNOSIS — I10 PRIMARY HYPERTENSION: ICD-10-CM

## 2022-02-02 DIAGNOSIS — I27.20 PULMONARY HTN: ICD-10-CM

## 2022-02-02 DIAGNOSIS — E87.79 OTHER HYPERVOLEMIA: Primary | ICD-10-CM

## 2022-02-02 LAB
ALBUMIN SERPL-MCNC: 3.8 G/DL (ref 3.5–5.2)
ALBUMIN/GLOB SERPL: 1.3 G/DL
ALP SERPL-CCNC: 70 U/L (ref 39–117)
ALT SERPL W P-5'-P-CCNC: 11 U/L (ref 1–41)
ANION GAP SERPL CALCULATED.3IONS-SCNC: 10.3 MMOL/L (ref 5–15)
AST SERPL-CCNC: 18 U/L (ref 1–40)
BASOPHILS # BLD AUTO: 0.07 10*3/MM3 (ref 0–0.2)
BASOPHILS NFR BLD AUTO: 1.3 % (ref 0–1.5)
BILIRUB SERPL-MCNC: 1.7 MG/DL (ref 0–1.2)
BUN SERPL-MCNC: 13 MG/DL (ref 8–23)
BUN/CREAT SERPL: 13.4 (ref 7–25)
CALCIUM SPEC-SCNC: 9.3 MG/DL (ref 8.6–10.5)
CHLORIDE SERPL-SCNC: 100 MMOL/L (ref 98–107)
CHOLEST SERPL-MCNC: 137 MG/DL (ref 0–200)
CO2 SERPL-SCNC: 27.7 MMOL/L (ref 22–29)
CREAT SERPL-MCNC: 0.97 MG/DL (ref 0.76–1.27)
DEPRECATED RDW RBC AUTO: 46.8 FL (ref 37–54)
EOSINOPHIL # BLD AUTO: 0.1 10*3/MM3 (ref 0–0.4)
EOSINOPHIL NFR BLD AUTO: 1.9 % (ref 0.3–6.2)
ERYTHROCYTE [DISTWIDTH] IN BLOOD BY AUTOMATED COUNT: 11.8 % (ref 12.3–15.4)
GFR SERPL CREATININE-BSD FRML MDRD: 76 ML/MIN/1.73
GLOBULIN UR ELPH-MCNC: 2.9 GM/DL
GLUCOSE SERPL-MCNC: 147 MG/DL (ref 65–99)
HCT VFR BLD AUTO: 40.7 % (ref 37.5–51)
HDLC SERPL-MCNC: 74 MG/DL (ref 40–60)
HGB BLD-MCNC: 14 G/DL (ref 13–17.7)
LDLC SERPL CALC-MCNC: 51 MG/DL (ref 0–100)
LDLC/HDLC SERPL: 0.71 {RATIO}
LYMPHOCYTES # BLD AUTO: 0.67 10*3/MM3 (ref 0.7–3.1)
LYMPHOCYTES NFR BLD AUTO: 12.8 % (ref 19.6–45.3)
MCH RBC QN AUTO: 37.4 PG (ref 26.6–33)
MCHC RBC AUTO-ENTMCNC: 34.4 G/DL (ref 31.5–35.7)
MCV RBC AUTO: 108.8 FL (ref 79–97)
MONOCYTES # BLD AUTO: 0.46 10*3/MM3 (ref 0.1–0.9)
MONOCYTES NFR BLD AUTO: 8.8 % (ref 5–12)
NEUTROPHILS NFR BLD AUTO: 3.93 10*3/MM3 (ref 1.7–7)
NEUTROPHILS NFR BLD AUTO: 74.8 % (ref 42.7–76)
PLATELET # BLD AUTO: 155 10*3/MM3 (ref 140–450)
PMV BLD AUTO: 12.5 FL (ref 6–12)
POTASSIUM SERPL-SCNC: 4 MMOL/L (ref 3.5–5.2)
PROT SERPL-MCNC: 6.7 G/DL (ref 6–8.5)
RBC # BLD AUTO: 3.74 10*6/MM3 (ref 4.14–5.8)
SODIUM SERPL-SCNC: 138 MMOL/L (ref 136–145)
TRIGL SERPL-MCNC: 54 MG/DL (ref 0–150)
VLDLC SERPL-MCNC: 12 MG/DL (ref 5–40)
WBC NRBC COR # BLD: 5.25 10*3/MM3 (ref 3.4–10.8)

## 2022-02-02 PROCEDURE — 99214 OFFICE O/P EST MOD 30 MIN: CPT | Performed by: NURSE PRACTITIONER

## 2022-02-02 NOTE — OUTREACH NOTE
"Ambulatory Case Management Note    Patient Outreach    Received a call back from patient, after he missed a previously made call today.  Explained role of Ambulatory .  Patient said he is \"doing better\". Said he is breathing good; without difficulty.  Reported he [facility]t [facility] with [facility], at 3:15pm; plans to drive himself.  He said \"I'm in good shape\".  Patient denied needs or concerns for RN-ACM to address.  Conversation was brief, per patient.        Diann Fields RN  Ambulatory Case Management    2/2/2022, 16:07 EST    "

## 2022-02-02 NOTE — TELEPHONE ENCOUNTER
Kiara Flores RN   2/2/2022  6:34 PM EST Back to Top        LMVM for pt to please return call.  Ofc. # given.     Ez Cintron MD   2/2/2022  7:52 AM EST         Notify patient that his head CT did not reveal acute injury.

## 2022-02-03 NOTE — TELEPHONE ENCOUNTER
Tried calling Dillon  - unable to reach him on cell #  vm was full  Tried calling Meghana -wife STEPHANIE for her to return call      HUBB  Please notify Notify patient that his head CT did not reveal acute injury.

## 2022-02-05 NOTE — PROGRESS NOTES
"Chief Complaint  Shortness of Breath and Establish Care    Subjective    History of Present Illness {CC  Problem List  Visit  Diagnosis   Encounters  Notes  Medications  Labs  Result Review Imaging  Media :23}       History of Present Illness   71 year old male presents to the office today for ongoing evaluation of his fluid overload. He presented to [facility] on 1/26/22 with complaints of dypsnea. Patient was initiated on lasix and potassium for a few days and notes resolution of dyspnea. He notes he is feeling great. Currently denies chest pain, dypsnea, dizziness, presyncope, syncope, orthopnea, pnd or pedal edema. Notes bps are well controlled at home.   Objective     Vital Signs:   Vitals:    02/02/22 1517 02/02/22 1519 02/02/22 1520 02/02/22 1550   BP: 173/83 162/77 174/89 138/78   BP Location: Right arm Left arm Left arm Left arm   Patient Position: Sitting Standing Sitting Sitting   Cuff Size: Adult Adult Adult    Pulse: 84 79 80    Resp:   20    Temp:   97.9 °F (36.6 °C)    TempSrc:   Temporal    SpO2: 99% 99% 100%    Weight:   66.9 kg (147 lb 8 oz)    Height:   180.3 cm (71\")      Body mass index is 20.57 kg/m².  Physical Exam  Vitals and nursing note reviewed.   Constitutional:       Appearance: Normal appearance.   HENT:      Head: Normocephalic.   Eyes:      Pupils: Pupils are equal, round, and reactive to light.   Cardiovascular:      Rate and Rhythm: Normal rate and regular rhythm.      Pulses: Normal pulses.      Heart sounds: Normal heart sounds. No murmur heard.      Pulmonary:      Effort: Pulmonary effort is normal.      Breath sounds: Normal breath sounds.   Abdominal:      General: Bowel sounds are normal.      Palpations: Abdomen is soft.   Musculoskeletal:         General: Normal range of motion.      Cervical back: Normal range of motion.      Right lower leg: No edema.      Left lower leg: No edema.   Skin:     General: Skin is warm and dry.      Capillary Refill: Capillary refill " takes less than 2 seconds.   Neurological:      Mental Status: He is alert and oriented to person, place, and time.   Psychiatric:         Mood and Affect: Mood normal.         Thought Content: Thought content normal.              Result Review  Data Reviewed:{ Labs  Result Review  Imaging  Med Tab  Media :23}     Echo March 2021:  · There is biatrial enlargement. Left atrial volume is severely increased.  · The inferior vena cava is dilated and inspiratory collapse is absent.  · There is a mechanical aortic valve. I cannot exclude a small perivalvular leak (AI) with confidence.  · There is mitral annular calcification and moderate mitral regurgitation.  · There is moderate tricuspid regurgitation and moderate estimated PA hypertension (RVSP=53 mmHg).  · There is no pericardial effusion.     Lab Results   Component Value Date    GLUCOSE 147 (H) 02/01/2022    CALCIUM 9.3 02/01/2022     02/01/2022    K 4.0 02/01/2022    CO2 27.7 02/01/2022     02/01/2022    BUN 13 02/01/2022    CREATININE 0.97 02/01/2022    EGFRIFNONA 76 02/01/2022    BCR 13.4 02/01/2022    ANIONGAP 10.3 02/01/2022     Lab Results   Component Value Date    WBC 5.25 02/01/2022    HGB 14.0 02/01/2022    HCT 40.7 02/01/2022    .8 (H) 02/01/2022     02/01/2022                Assessment and Plan {CC Problem List  Visit Diagnosis  ROS  Review (Popup)  Health Maintenance  Quality  BestPractice  Medications  SmartSets  SnapShot Encounters  Media :23}   1. Other hypervolemia  Mild  Resolved     2. Primary hypertension  Well controlled on coreg, lisinopril   Continue to monitor closely at home     3. Pulmonary HTN (HCC)  Stable           Follow Up {Instructions Charge Capture  Follow-up Communications :23}   Return if symptoms worsen or fail to improve.    Patient was given instructions and counseling regarding his condition or for health maintenance advice. Please see specific information pulled into the AVS if  appropriate.  Patient was instructed to call the [facility] with any questions, concerns, or worsening symptoms.

## 2022-02-16 ENCOUNTER — ANTICOAGULATION VISIT (OUTPATIENT)
Dept: INTERNAL MEDICINE | Facility: CLINIC | Age: 72
End: 2022-02-16

## 2022-02-16 DIAGNOSIS — I48.91 ATRIAL FIBRILLATION, UNSPECIFIED TYPE: Primary | ICD-10-CM

## 2022-02-16 LAB — INR PPP: 5.2 (ref 2.5–3.5)

## 2022-02-16 PROCEDURE — 85610 PROTHROMBIN TIME: CPT | Performed by: INTERNAL MEDICINE

## 2022-02-16 PROCEDURE — 36416 COLLJ CAPILLARY BLOOD SPEC: CPT | Performed by: INTERNAL MEDICINE

## 2022-02-21 ENCOUNTER — ANTICOAGULATION VISIT (OUTPATIENT)
Dept: INTERNAL MEDICINE | Facility: CLINIC | Age: 72
End: 2022-02-21

## 2022-02-21 DIAGNOSIS — I48.91 ATRIAL FIBRILLATION, UNSPECIFIED TYPE: Primary | ICD-10-CM

## 2022-02-21 LAB — INR PPP: 2.6 (ref 2.5–3.5)

## 2022-02-21 PROCEDURE — 85610 PROTHROMBIN TIME: CPT | Performed by: INTERNAL MEDICINE

## 2022-02-21 PROCEDURE — 36416 COLLJ CAPILLARY BLOOD SPEC: CPT | Performed by: INTERNAL MEDICINE

## 2022-02-28 ENCOUNTER — ANTICOAGULATION VISIT (OUTPATIENT)
Dept: INTERNAL MEDICINE | Facility: CLINIC | Age: 72
End: 2022-02-28

## 2022-02-28 DIAGNOSIS — I48.91 ATRIAL FIBRILLATION, UNSPECIFIED TYPE: Primary | ICD-10-CM

## 2022-02-28 LAB — INR PPP: 2 (ref 2.5–3.5)

## 2022-02-28 PROCEDURE — 36416 COLLJ CAPILLARY BLOOD SPEC: CPT | Performed by: INTERNAL MEDICINE

## 2022-02-28 PROCEDURE — 85610 PROTHROMBIN TIME: CPT | Performed by: INTERNAL MEDICINE

## 2022-03-07 ENCOUNTER — ANTICOAGULATION VISIT (OUTPATIENT)
Dept: INTERNAL MEDICINE | Facility: CLINIC | Age: 72
End: 2022-03-07

## 2022-03-07 DIAGNOSIS — I48.91 ATRIAL FIBRILLATION, UNSPECIFIED TYPE: Primary | ICD-10-CM

## 2022-03-07 LAB — INR PPP: 2.7 (ref 2.5–3.5)

## 2022-03-07 PROCEDURE — 36416 COLLJ CAPILLARY BLOOD SPEC: CPT | Performed by: INTERNAL MEDICINE

## 2022-03-07 PROCEDURE — 85610 PROTHROMBIN TIME: CPT | Performed by: INTERNAL MEDICINE

## 2022-03-14 ENCOUNTER — ANTICOAGULATION VISIT (OUTPATIENT)
Dept: INTERNAL MEDICINE | Facility: CLINIC | Age: 72
End: 2022-03-14

## 2022-03-14 DIAGNOSIS — I48.91 ATRIAL FIBRILLATION, UNSPECIFIED TYPE: Primary | ICD-10-CM

## 2022-03-14 LAB — INR PPP: 1.9 (ref 2.5–3.5)

## 2022-03-14 PROCEDURE — 36416 COLLJ CAPILLARY BLOOD SPEC: CPT | Performed by: INTERNAL MEDICINE

## 2022-03-14 PROCEDURE — 85610 PROTHROMBIN TIME: CPT | Performed by: INTERNAL MEDICINE

## 2022-03-21 ENCOUNTER — ANTICOAGULATION VISIT (OUTPATIENT)
Dept: INTERNAL MEDICINE | Facility: CLINIC | Age: 72
End: 2022-03-21

## 2022-03-21 DIAGNOSIS — I48.91 ATRIAL FIBRILLATION, UNSPECIFIED TYPE: Primary | ICD-10-CM

## 2022-03-21 LAB — INR PPP: 2.3 (ref 2.5–3.5)

## 2022-03-21 PROCEDURE — 85610 PROTHROMBIN TIME: CPT | Performed by: INTERNAL MEDICINE

## 2022-03-21 PROCEDURE — 36416 COLLJ CAPILLARY BLOOD SPEC: CPT | Performed by: INTERNAL MEDICINE

## 2022-03-23 ENCOUNTER — HOSPITAL ENCOUNTER (EMERGENCY)
Facility: HOSPITAL | Age: 72
Discharge: HOME | End: 2022-03-23
Attending: STUDENT IN AN ORGANIZED HEALTH CARE EDUCATION/TRAINING PROGRAM | Admitting: STUDENT IN AN ORGANIZED HEALTH CARE EDUCATION/TRAINING PROGRAM

## 2022-03-23 ENCOUNTER — APPOINTMENT (OUTPATIENT)
Dept: GENERAL RADIOLOGY | Facility: HOSPITAL | Age: 72
End: 2022-03-23

## 2022-03-23 VITALS
SYSTOLIC BLOOD PRESSURE: 164 MMHG | HEART RATE: 71 BPM | TEMPERATURE: 97.9 F | HEIGHT: 71 IN | WEIGHT: 150 LBS | OXYGEN SATURATION: 93 % | DIASTOLIC BLOOD PRESSURE: 80 MMHG | BODY MASS INDEX: 21 KG/M2 | RESPIRATION RATE: 21 BRPM

## 2022-03-23 DIAGNOSIS — I27.20 PULMONARY HYPERTENSION: ICD-10-CM

## 2022-03-23 DIAGNOSIS — I50.9 ACUTE ON CHRONIC CONGESTIVE HEART FAILURE, UNSPECIFIED HEART FAILURE TYPE: Primary | ICD-10-CM

## 2022-03-23 DIAGNOSIS — R06.00 DYSPNEA, UNSPECIFIED TYPE: ICD-10-CM

## 2022-03-23 DIAGNOSIS — G47.00 INSOMNIA, UNSPECIFIED TYPE: ICD-10-CM

## 2022-03-23 DIAGNOSIS — Z95.0 PRESENCE OF CARDIAC PACEMAKER: ICD-10-CM

## 2022-03-23 DIAGNOSIS — D53.9 ANEMIA, MACROCYTIC: ICD-10-CM

## 2022-03-23 LAB
ALBUMIN SERPL-MCNC: 3.8 G/DL (ref 3.5–5.2)
ALBUMIN/GLOB SERPL: 1.2 G/DL
ALP SERPL-CCNC: 65 U/L (ref 39–117)
ALT SERPL W P-5'-P-CCNC: 9 U/L (ref 1–41)
ANION GAP SERPL CALCULATED.3IONS-SCNC: 12 MMOL/L (ref 5–15)
AST SERPL-CCNC: 19 U/L (ref 1–40)
BASOPHILS # BLD AUTO: 0.02 10*3/MM3 (ref 0–0.2)
BASOPHILS NFR BLD AUTO: 0.6 % (ref 0–1.5)
BILIRUB SERPL-MCNC: 1.7 MG/DL (ref 0–1.2)
BUN SERPL-MCNC: 10 MG/DL (ref 8–23)
BUN/CREAT SERPL: 10.1 (ref 7–25)
CALCIUM SPEC-SCNC: 9 MG/DL (ref 8.6–10.5)
CHLORIDE SERPL-SCNC: 101 MMOL/L (ref 98–107)
CO2 SERPL-SCNC: 23 MMOL/L (ref 22–29)
CREAT SERPL-MCNC: 0.99 MG/DL (ref 0.76–1.27)
DEPRECATED RDW RBC AUTO: 48.9 FL (ref 37–54)
EGFRCR SERPLBLD CKD-EPI 2021: 81.4 ML/MIN/1.73
EOSINOPHIL # BLD AUTO: 0.01 10*3/MM3 (ref 0–0.4)
EOSINOPHIL NFR BLD AUTO: 0.3 % (ref 0.3–6.2)
ERYTHROCYTE [DISTWIDTH] IN BLOOD BY AUTOMATED COUNT: 12.9 % (ref 12.3–15.4)
GLOBULIN UR ELPH-MCNC: 3.2 GM/DL
GLUCOSE SERPL-MCNC: 119 MG/DL (ref 65–99)
HCT VFR BLD AUTO: 37.7 % (ref 37.5–51)
HGB BLD-MCNC: 12.7 G/DL (ref 13–17.7)
HOLD SPECIMEN: NORMAL
IMM GRANULOCYTES # BLD AUTO: 0.01 10*3/MM3 (ref 0–0.05)
IMM GRANULOCYTES NFR BLD AUTO: 0.3 % (ref 0–0.5)
INR PPP: 2.06 (ref 0.85–1.16)
LYMPHOCYTES # BLD AUTO: 0.27 10*3/MM3 (ref 0.7–3.1)
LYMPHOCYTES NFR BLD AUTO: 8.5 % (ref 19.6–45.3)
MCH RBC QN AUTO: 34.5 PG (ref 26.6–33)
MCHC RBC AUTO-ENTMCNC: 33.7 G/DL (ref 31.5–35.7)
MCV RBC AUTO: 102.4 FL (ref 79–97)
MONOCYTES # BLD AUTO: 0.4 10*3/MM3 (ref 0.1–0.9)
MONOCYTES NFR BLD AUTO: 12.7 % (ref 5–12)
NEUTROPHILS NFR BLD AUTO: 2.45 10*3/MM3 (ref 1.7–7)
NEUTROPHILS NFR BLD AUTO: 77.6 % (ref 42.7–76)
NRBC BLD AUTO-RTO: 0 /100 WBC (ref 0–0.2)
NT-PROBNP SERPL-MCNC: 5142 PG/ML (ref 0–900)
PLATELET # BLD AUTO: 86 10*3/MM3 (ref 140–450)
PMV BLD AUTO: 11.5 FL (ref 6–12)
POTASSIUM SERPL-SCNC: 4 MMOL/L (ref 3.5–5.2)
PROT SERPL-MCNC: 7 G/DL (ref 6–8.5)
PROTHROMBIN TIME: 22.4 SECONDS (ref 11.4–14.4)
RBC # BLD AUTO: 3.68 10*6/MM3 (ref 4.14–5.8)
SODIUM SERPL-SCNC: 136 MMOL/L (ref 136–145)
TROPONIN T SERPL-MCNC: <0.01 NG/ML (ref 0–0.03)
WBC NRBC COR # BLD: 3.16 10*3/MM3 (ref 3.4–10.8)
WHOLE BLOOD HOLD SPECIMEN: NORMAL
WHOLE BLOOD HOLD SPECIMEN: NORMAL

## 2022-03-23 PROCEDURE — 96374 THER/PROPH/DIAG INJ IV PUSH: CPT

## 2022-03-23 PROCEDURE — 80053 COMPREHEN METABOLIC PANEL: CPT | Performed by: STUDENT IN AN ORGANIZED HEALTH CARE EDUCATION/TRAINING PROGRAM

## 2022-03-23 PROCEDURE — 99283 EMERGENCY DEPT VISIT LOW MDM: CPT

## 2022-03-23 PROCEDURE — 93005 ELECTROCARDIOGRAM TRACING: CPT | Performed by: STUDENT IN AN ORGANIZED HEALTH CARE EDUCATION/TRAINING PROGRAM

## 2022-03-23 PROCEDURE — 25010000002 FUROSEMIDE PER 20 MG

## 2022-03-23 PROCEDURE — 71045 X-RAY EXAM CHEST 1 VIEW: CPT

## 2022-03-23 PROCEDURE — 85610 PROTHROMBIN TIME: CPT | Performed by: STUDENT IN AN ORGANIZED HEALTH CARE EDUCATION/TRAINING PROGRAM

## 2022-03-23 PROCEDURE — 83880 ASSAY OF NATRIURETIC PEPTIDE: CPT | Performed by: STUDENT IN AN ORGANIZED HEALTH CARE EDUCATION/TRAINING PROGRAM

## 2022-03-23 PROCEDURE — 84484 ASSAY OF TROPONIN QUANT: CPT | Performed by: STUDENT IN AN ORGANIZED HEALTH CARE EDUCATION/TRAINING PROGRAM

## 2022-03-23 PROCEDURE — 85025 COMPLETE CBC W/AUTO DIFF WBC: CPT | Performed by: STUDENT IN AN ORGANIZED HEALTH CARE EDUCATION/TRAINING PROGRAM

## 2022-03-23 RX ORDER — SODIUM CHLORIDE 0.9 % (FLUSH) 0.9 %
10 SYRINGE (ML) INJECTION AS NEEDED
Status: DISCONTINUED | OUTPATIENT
Start: 2022-03-23 | End: 2022-03-23 | Stop reason: HOSPADM

## 2022-03-23 RX ORDER — FUROSEMIDE 10 MG/ML
80 INJECTION INTRAMUSCULAR; INTRAVENOUS ONCE
Status: COMPLETED | OUTPATIENT
Start: 2022-03-23 | End: 2022-03-23

## 2022-03-23 RX ORDER — FUROSEMIDE 10 MG/ML
INJECTION INTRAMUSCULAR; INTRAVENOUS
Status: COMPLETED
Start: 2022-03-23 | End: 2022-03-23

## 2022-03-23 RX ADMIN — FUROSEMIDE 80 MG: 10 INJECTION INTRAMUSCULAR; INTRAVENOUS at 06:05

## 2022-03-23 RX ADMIN — FUROSEMIDE 80 MG: 10 INJECTION, SOLUTION INTRAMUSCULAR; INTRAVENOUS at 06:05

## 2022-03-23 NOTE — ED PROVIDER NOTES
EMERGENCY DEPARTMENT ENCOUNTER    Pt Name: Dillon Vo  MRN: [id number]  Pt :   1950  Room Number:  15/15  Date of encounter:  3/23/2022  PCP: Ez Cintron MD  ED Provider: Juan West MD    Historian: Patient      HPI:  Chief Complaint: Dyspnea, insomnia        Context: Dillon Vo is a 71-year-old man who presents the emergency department for evaluation of insomnia and shortness of breath.  He has history of mechanical heart valve and pacemaker as well as CHF.  He says for the last couple of days he has just not felt right.  He initially complained of sensation of palpitations but now says that that is baseline for him and he can always hear feel his heartbeat because of the mechanical valve.  He takes Lasix but is not sure that he has gotten all of his medications.  He says he has been having insomnia and was unable to take a nap at 4 PM yesterday and then was having difficulty sleeping tonight as well so presents here for evaluation of insomnia.  He also complains of the sensation of shortness of breath without actual chest pain.  Denies any recent fevers or systemic symptoms.  Has no other complaints at this time and says he is now starting to feel better and feels like he could sleep here.      PAST MEDICAL HISTORY  Past Medical History:   Diagnosis Date   • A-fib (HCC)    • Anemia    • Atrial fibrillation (HCC) 2016    Difficult to control rate. RFA of AV node with implantation of left Bi-V pacemaker, 2013. Hematoma requiring single-chamber pacemaker implanted, 2013.    • Colitis    • Colon cancer (HCC)    • Coronary artery disease    • Depression    • GERD (gastroesophageal reflux disease)    • Heart valve disease    • Hiatal hernia    • HTN (hypertension)    • Hyperbilirubinemia    • Hyperlipidemia    • Infectious viral hepatitis    • Kidney stone    • Lung cancer (HCC)    • Orthostatic hypotension    • Sick sinus syndrome (HCC)    • Skin  cancer     lip          PAST SURGICAL HISTORY  Past Surgical History:   Procedure Laterality Date   • APPENDECTOMY     • BRONCHOSCOPY THORACOTOMY Left 4/7/2021    Procedure: BRONCHOSCOPY LEFT THORACOTOMY, LEFT LUNG RESECTION;  Surgeon: Chi Post MD;  Location: [facility];  Service: Cardiothoracic;  Laterality: Left;   • CARDIAC CATHETERIZATION     • CARDIAC SURGERY  2000    valve replacement    • COLON SURGERY     • COLONOSCOPY  up to date   • CORONARY STENT PLACEMENT     • EXTRACORPOREAL SHOCKWAVE LITHOTRIPSY (ESWL), STENT INSERTION/REMOVAL     • LUNG CANCER SURGERY     • PACEMAKER IMPLANTATION     • VARICOSE VEIN SURGERY           FAMILY HISTORY  Family History   Problem Relation Age of Onset   • Cancer Mother    • Hypertension Father    • Heart disease Father          SOCIAL HISTORY  Social History     Socioeconomic History   • Marital status:    • Number of children: 0   Tobacco Use   • Smoking status: Current Every Day Smoker     Packs/day: 0.25     Years: 57.00     Pack years: 14.25     Types: Cigarettes     Start date: 1963   • Smokeless tobacco: Never Used   Vaping Use   • Vaping Use: Never used   Substance and Sexual Activity   • Alcohol use: Yes     Alcohol/week: 2.0 standard drinks     Types: 2 Shots of liquor per week     Comment: Daily   • Drug use: No   • Sexual activity: Defer     Partners: Female     Comment:           ALLERGIES  Patient has no known allergies.        REVIEW OF SYSTEMS  Review of Systems       All systems reviewed and negative except for those discussed in HPI.       PHYSICAL EXAM    I have reviewed the triage vital signs and nursing notes.    ED Triage Vitals [03/23/22 0414]   Temp Heart Rate Resp BP SpO2   97.9 °F (36.6 °C) 71 21 (!) 184/91 95 %      Temp src Heart Rate Source Patient Position BP Location FiO2 (%)   Oral Monitor Sitting Left arm --       Physical Exam  GENERAL:   Appears awake and alert in no acute distress  HENT: Nares patent.  EYES: No  scleral icterus.  CV: Regular rhythm, regular rate.  Loud click from mechanical heart valve  RESPIRATORY: Normal effort.  No audible wheezes, rales or rhonchi.  ABDOMEN: Soft, nontender  MUSCULOSKELETAL: No deformities.  Mild pitting lower extremity edema symmetric bilaterally with negative Homans' sign bilaterally  NEURO: Alert, moves all extremities, follows commands.  SKIN: Warm, dry, no rash visualized.        LAB RESULTS  Recent Results (from the past 24 hour(s))   Comprehensive Metabolic Panel    Collection Time: 03/23/22  4:30 AM    Specimen: Blood   Result Value Ref Range    Glucose 119 (H) 65 - 99 mg/dL    BUN 10 8 - 23 mg/dL    Creatinine 0.99 0.76 - 1.27 mg/dL    Sodium 136 136 - 145 mmol/L    Potassium 4.0 3.5 - 5.2 mmol/L    Chloride 101 98 - 107 mmol/L    CO2 23.0 22.0 - 29.0 mmol/L    Calcium 9.0 8.6 - 10.5 mg/dL    Total Protein 7.0 6.0 - 8.5 g/dL    Albumin 3.80 3.50 - 5.20 g/dL    ALT (SGPT) 9 1 - 41 U/L    AST (SGOT) 19 1 - 40 U/L    Alkaline Phosphatase 65 39 - 117 U/L    Total Bilirubin 1.7 (H) 0.0 - 1.2 mg/dL    Globulin 3.2 gm/dL    A/G Ratio 1.2 g/dL    BUN/Creatinine Ratio 10.1 7.0 - 25.0    Anion Gap 12.0 5.0 - 15.0 mmol/L    eGFR 81.4 >60.0 mL/min/1.73   BNP    Collection Time: 03/23/22  4:30 AM    Specimen: Blood   Result Value Ref Range    proBNP 5,142.0 (H) 0.0 - 900.0 pg/mL   Troponin    Collection Time: 03/23/22  4:30 AM    Specimen: Blood   Result Value Ref Range    Troponin T <0.010 0.000 - 0.030 ng/mL   Green Top (Gel)    Collection Time: 03/23/22  4:30 AM   Result Value Ref Range    Extra Tube Hold for add-ons.    Gold Top - SST    Collection Time: 03/23/22  4:30 AM   Result Value Ref Range    Extra Tube Hold for add-ons.    Protime-INR    Collection Time: 03/23/22  4:51 AM    Specimen: Blood   Result Value Ref Range    Protime 22.4 (H) 11.4 - 14.4 Seconds    INR 2.06 (H) 0.85 - 1.16   Lavender Top    Collection Time: 03/23/22  4:51 AM   Result Value Ref Range    Extra Tube hold  for add-on    Light Blue Top    Collection Time: 03/23/22  4:51 AM   Result Value Ref Range    Extra Tube hold for add-on    CBC Auto Differential    Collection Time: 03/23/22  4:51 AM    Specimen: Blood   Result Value Ref Range    WBC 3.16 (L) 3.40 - 10.80 10*3/mm3    RBC 3.68 (L) 4.14 - 5.80 10*6/mm3    Hemoglobin 12.7 (L) 13.0 - 17.7 g/dL    Hematocrit 37.7 37.5 - 51.0 %    .4 (H) 79.0 - 97.0 fL    MCH 34.5 (H) 26.6 - 33.0 pg    MCHC 33.7 31.5 - 35.7 g/dL    RDW 12.9 12.3 - 15.4 %    RDW-SD 48.9 37.0 - 54.0 fl    MPV 11.5 6.0 - 12.0 fL    Platelets 86 (L) 140 - 450 10*3/mm3    Neutrophil % 77.6 (H) 42.7 - 76.0 %    Lymphocyte % 8.5 (L) 19.6 - 45.3 %    Monocyte % 12.7 (H) 5.0 - 12.0 %    Eosinophil % 0.3 0.3 - 6.2 %    Basophil % 0.6 0.0 - 1.5 %    Immature Grans % 0.3 0.0 - 0.5 %    Neutrophils, Absolute 2.45 1.70 - 7.00 10*3/mm3    Lymphocytes, Absolute 0.27 (L) 0.70 - 3.10 10*3/mm3    Monocytes, Absolute 0.40 0.10 - 0.90 10*3/mm3    Eosinophils, Absolute 0.01 0.00 - 0.40 10*3/mm3    Basophils, Absolute 0.02 0.00 - 0.20 10*3/mm3    Immature Grans, Absolute 0.01 0.00 - 0.05 10*3/mm3    nRBC 0.0 0.0 - 0.2 /100 WBC       If labs were ordered, I independently reviewed the results.        RADIOLOGY  XR Chest 1 View    Result Date: 3/23/2022  EXAMINATION: XR CHEST 1 VW  DATE OF EXAM: 3/23/2022 5:14 AM HISTORY: Shortness of breath COMPARISON: None. FINDINGS: There is some elevation of the left hemidiaphragm that appears chronic and unchanged from prior imaging. Lungs are clear. Cardiomegaly. Median sternotomy. Valve replacement. Cardiac pacer.     1.  No acute cardiopulmonary abnormality. Findings are similar to prior. Electronically signed by:  Juan Vee M.D.  3/23/2022 4:16 AM Mountain Time      I ordered and reviewed the above noted radiographic studies.      I viewed images of chest x-ray which is clear, with stable cardiomegaly per my evaluation.    See radiologist's dictation for official  interpretation.        PROCEDURES    Procedures    ECG 12 Lead   Preliminary Result             MEDICATIONS GIVEN IN ER    Medications   sodium chloride 0.9 % flush 10 mL (has no administration in time range)   furosemide (LASIX) injection 80 mg (80 mg Intravenous Given 3/23/22 0605)         PROGRESS, DATA ANALYSIS, CONSULTS, AND MEDICAL DECISION MAKING    All labs have been independently reviewed by me.  All radiology studies have been reviewed by me and the radiologist dictating the report.   EKG's have been independently viewed and interpreted by me.      Differential diagnosis, MI, CHF, anemia, electrolyte abnormality, arrhythmia, pneumonia, pneumothorax    ED Course as of 03/23/22 0650   Wed Mar 23, 2022   0431 In summary is a very nice 71-year-old man who presents the emergency department for evaluation of insomnia and shortness of breath.  He has history of mechanical heart valve and pacemaker as well as CHF.  He says for the last couple of days he has just not felt right.  He initially complained of sensation of palpitations but now says that that is baseline for him and he can always hear feel his heartbeat because of the mechanical valve.  He takes Lasix but is not sure that he has gotten all of his medications.  He says he has been having insomnia and was unable to take a nap at 4 PM yesterday and then was having difficulty sleeping tonight as well so presents here for evaluation of insomnia.  He also complains of the sensation of shortness of breath without actual chest pain.  Denies any recent fevers or systemic symptoms.  Has no other complaints at this time and says he is now starting to feel better and feels like he could sleep here. [CC]      ED Course User Index  [CC] Juan West MD       Patient arrived awake and alert in no acute distress.  Mildly hypertensive otherwise vitals within normal limits.  He is satting in the mid to high 90s on room air without tachypnea.  Lungs clear to  auscultation.  Denies chest pain.  Chest x-ray is clear.  Troponin is not elevated.  Multiple abnormalities on CBC including macrocytic anemia and leukopenia which is actually consistent with his prior values.  No actionable items on CMP.  INR is therapeutic at 2.06.  BNP is elevated at 5000 which shows a significant increase from prior.  He says he may have missed several doses of his Lasix and was given 80 mg of IV Lasix here.  He continues to look well and sat well on room air and I think he can safely discharged with cardiology follow-up.  His Lasix as prescribed by his PCP and he will touch base with them today.  Was given order for evaluation in our heart failure clinic as well.  He was counseled on strict return precautions verbally expressed understanding of these.  Will contact his PCP later today.      AS OF 06:50 EDT VITALS:    BP - 164/80  HR - 71  TEMP - 97.9 °F (36.6 °C) (Oral)  O2 SATS - 93%                  DIAGNOSIS  Final diagnoses:   Acute on chronic congestive heart failure, unspecified heart failure type (HCC)   Anemia, macrocytic   Dyspnea, unspecified type   Insomnia, unspecified type   Moderate pulmonary HTN  (Est RVSP 53mmHg 2/2021)   Presence of cardiac pacemaker         DISPOSITION  DISCHARGE    Patient discharged in stable condition.    Reviewed implications of results, diagnosis, meds, responsibility to follow up, warning signs and symptoms of possible worsening, potential complications and reasons to return to ER.    Patient/Family voiced understanding of above instructions.    Discussed plan for discharge, as there is no emergent indication for admission.  Pt/family is agreeable and understands need for follow up and possible repeat testing.  Pt/family is aware that discharge does not mean that nothing is wrong but that it indicates no emergency is currently present that requires admission and they must continue care with follow-up as given below or with a physician of their choice.      FOLLOW-UP  Ez Cintron MD  [address]  [place] [id number]  [phone]    Call   To discuss whether your Lasix dose should be increased    [facility]  [address]  [facility] [address] [phone]             Medication List      Changed    folic acid 400 MCG tablet  Commonly known as: FOLVITE  TAKE 1 TABLET BY MOUTH ONCE DAILY  What changed:   · how much to take  · how to take this     furosemide 20 MG tablet  Commonly known as: LASIX  Take 2 tablets tomorrow morning, then 1 tablet daily.  Take with potassium tablet  What changed:   · how to take this  · when to take this  · additional instructions                       Juan West MD  03/23/22 6053

## 2022-03-23 NOTE — DISCHARGE INSTRUCTIONS
Overall today's work-up was reassuring but it does look like your CHF is exacerbated some.  As we discussed touch base with your PCP to see if the Lasix dose should be increased and you will be contacted with cardiology follow-up.  If your symptoms change or worsen in any way please return to the ED or seek other medical care.

## 2022-03-24 LAB
QT INTERVAL: 470 MS
QTC INTERVAL: 507 MS

## 2022-03-28 ENCOUNTER — ANTICOAGULATION VISIT (OUTPATIENT)
Dept: INTERNAL MEDICINE | Facility: CLINIC | Age: 72
End: 2022-03-28

## 2022-03-28 DIAGNOSIS — I48.91 ATRIAL FIBRILLATION, UNSPECIFIED TYPE: Primary | ICD-10-CM

## 2022-03-28 LAB — INR PPP: 2 (ref 2.5–3.5)

## 2022-03-28 PROCEDURE — 36416 COLLJ CAPILLARY BLOOD SPEC: CPT | Performed by: INTERNAL MEDICINE

## 2022-03-28 PROCEDURE — 85610 PROTHROMBIN TIME: CPT | Performed by: INTERNAL MEDICINE

## 2022-04-01 ENCOUNTER — TELEPHONE (OUTPATIENT)
Dept: CARDIOLOGY | Facility: HOSPITAL | Age: 72
End: 2022-04-01

## 2022-04-01 NOTE — TELEPHONE ENCOUNTER
Patient was referred to [facility] by the [facility]. Several attempt shave been made to contact the patient with no success. Letter was mailed to patient to contact the office to schedule.

## 2022-04-04 ENCOUNTER — ANTICOAGULATION VISIT (OUTPATIENT)
Dept: INTERNAL MEDICINE | Facility: CLINIC | Age: 72
End: 2022-04-04

## 2022-04-04 DIAGNOSIS — I48.91 ATRIAL FIBRILLATION, UNSPECIFIED TYPE: Primary | ICD-10-CM

## 2022-04-04 LAB — INR PPP: 3 (ref 2.5–3.5)

## 2022-04-04 PROCEDURE — 85610 PROTHROMBIN TIME: CPT | Performed by: INTERNAL MEDICINE

## 2022-04-04 PROCEDURE — 36416 COLLJ CAPILLARY BLOOD SPEC: CPT | Performed by: INTERNAL MEDICINE

## 2022-04-06 ENCOUNTER — TELEPHONE (OUTPATIENT)
Dept: CARDIOLOGY | Facility: CLINIC | Age: 72
End: 2022-04-06

## 2022-04-06 ENCOUNTER — PATIENT OUTREACH (OUTPATIENT)
Dept: CASE MANAGEMENT | Facility: OTHER | Age: 72
End: 2022-04-06

## 2022-04-06 NOTE — OUTREACH NOTE
AMBULATORY CASE MANAGEMENT NOTE    Name and Relationship of Patient/Support Person: Dillon Vo - Self    Patient Outreach    Contacted patient regarding ED visit 3/23/22 with A/C CHF.  Reminded patient of role of RN-ACM.  Reviewed ED discharge recommendations.  Patient said he received papers in the mail regarding following up with his doctor.  Encouraged patient to call his PCP for f/u appt; to call Cardio office for f/u with [facility] Clinic as well as the Cardiologist, as ED recommended.  Patient said he would call; declined RN-ACM assistance with appointments.  He denied any shortness of breath currently; said it is occasional; said his breathing has improved.  Said he is doing okay now.  Voiced independence with ADL's, Mobility, Driving and Medications; compliance with meds each day. Patient denied needs or concerns for RN-ACM to address.       Adult Patient Profile  Questions/Answers    Flowsheet Row Most Recent Value   Symptoms/Conditions Managed at Home cardiovascular   Barriers to Managing Health none   Cardiovascular Symptoms/Conditions heart failure   Cardiovascular Management Strategies medication therapy   Barriers to Taking Medication as Prescribed none   Hearing Difficulty or Deaf no   Difficulty Communicating no   Difficulty Eating/Swallowing no   Dressing/Bathing Difficulty no   Doing Errands Independently Difficulty (such as shopping) no   Equipment Currently Used at Home none   Usual Activity Tolerance good   Current Activity Tolerance good   Primary Source of Support/Comfort spouse   People in Home spouse        Social Work Assessment  Questions/Answers    Flowsheet Row Most Recent Value   People in Home spouse   Usual Activity Tolerance good   Current Activity Tolerance good   Equipment Currently Used at Home none        Send Education  Questions/Answers    Flowsheet Row Most Recent Value   Annual Wellness Visit:  Patient Has Completed  [AWV completed 9-16-21.]   Advanced Directives:  Patient Has        [facility] updated and reviewed with the patient during this program:  Transportation Needs: No Transportation Needs   • Lack of Transportation (Medical): No   • Lack of Transportation (Non-Medical): No       Education Documentation  Weight Monitoring, taught by David Fields, RN at 4/6/2022 10:51 AM.  Learner: Patient  Readiness: Acceptance  Method: Explanation  Response: Verbalizes Understanding    Provider Follow-Up, taught by David Fields, RN at 4/6/2022 10:51 AM.  Learner: Patient  Readiness: Acceptance  Method: Explanation  Response: Verbalizes Understanding    Medication Management, taught by David Fields, RN at 4/6/2022 10:51 AM.  Learner: Patient  Readiness: Acceptance  Method: Explanation  Response: Verbalizes Understanding    Diet Modification, taught by David Fields, RN at 4/6/2022 10:51 AM.  Learner: Patient  Readiness: Acceptance  Method: Explanation  Response: Verbalizes Understanding          DAVID MARINA  Ambulatory Case Management    4/6/2022, 10:51 EDT

## 2022-04-06 NOTE — TELEPHONE ENCOUNTER
Called Mr Vo to speak with him regarding his merlin monitor not being set up. His voicemail has not been set up on his phone and was unable to leave a message. I called his wife and she stated she would have him call me.

## 2022-04-12 ENCOUNTER — ANTICOAGULATION VISIT (OUTPATIENT)
Dept: INTERNAL MEDICINE | Facility: CLINIC | Age: 72
End: 2022-04-12

## 2022-04-12 DIAGNOSIS — I48.91 ATRIAL FIBRILLATION, UNSPECIFIED TYPE: Primary | ICD-10-CM

## 2022-04-12 LAB
INR PPP: 3 (ref 2.5–3.5)
INR PPP: 8 (ref 2.5–3.5)

## 2022-04-12 PROCEDURE — 85610 PROTHROMBIN TIME: CPT | Performed by: INTERNAL MEDICINE

## 2022-04-12 PROCEDURE — 36416 COLLJ CAPILLARY BLOOD SPEC: CPT | Performed by: INTERNAL MEDICINE

## 2022-04-16 PROCEDURE — 93294 REM INTERROG EVL PM/LDLS PM: CPT | Performed by: INTERNAL MEDICINE

## 2022-04-16 PROCEDURE — 93296 REM INTERROG EVL PM/IDS: CPT | Performed by: INTERNAL MEDICINE

## 2022-04-19 ENCOUNTER — OFFICE VISIT (OUTPATIENT)
Dept: INTERNAL MEDICINE | Facility: CLINIC | Age: 72
End: 2022-04-19

## 2022-04-19 ENCOUNTER — LAB (OUTPATIENT)
Dept: LAB | Facility: HOSPITAL | Age: 72
End: 2022-04-19

## 2022-04-19 VITALS
DIASTOLIC BLOOD PRESSURE: 94 MMHG | SYSTOLIC BLOOD PRESSURE: 184 MMHG | BODY MASS INDEX: 21.36 KG/M2 | HEIGHT: 71 IN | WEIGHT: 152.6 LBS | HEART RATE: 70 BPM | TEMPERATURE: 97.5 F | RESPIRATION RATE: 18 BRPM | OXYGEN SATURATION: 99 %

## 2022-04-19 DIAGNOSIS — I50.9 CHRONIC HEART FAILURE, UNSPECIFIED HEART FAILURE TYPE: ICD-10-CM

## 2022-04-19 DIAGNOSIS — R73.9 HYPERGLYCEMIA: ICD-10-CM

## 2022-04-19 DIAGNOSIS — I10 ESSENTIAL HYPERTENSION: Primary | ICD-10-CM

## 2022-04-19 DIAGNOSIS — I48.91 ATRIAL FIBRILLATION, UNSPECIFIED TYPE: ICD-10-CM

## 2022-04-19 LAB
ALBUMIN SERPL-MCNC: 4.1 G/DL (ref 3.5–5.2)
ALBUMIN/GLOB SERPL: 1.4 G/DL
ALP SERPL-CCNC: 69 U/L (ref 39–117)
ALT SERPL W P-5'-P-CCNC: 20 U/L (ref 1–41)
ANION GAP SERPL CALCULATED.3IONS-SCNC: 10.2 MMOL/L (ref 5–15)
AST SERPL-CCNC: 29 U/L (ref 1–40)
BILIRUB SERPL-MCNC: 1.2 MG/DL (ref 0–1.2)
BUN SERPL-MCNC: 7 MG/DL (ref 8–23)
BUN/CREAT SERPL: 7.2 (ref 7–25)
CALCIUM SPEC-SCNC: 9.3 MG/DL (ref 8.6–10.5)
CHLORIDE SERPL-SCNC: 102 MMOL/L (ref 98–107)
CO2 SERPL-SCNC: 29.8 MMOL/L (ref 22–29)
CREAT SERPL-MCNC: 0.97 MG/DL (ref 0.76–1.27)
EGFRCR SERPLBLD CKD-EPI 2021: 83.5 ML/MIN/1.73
GLOBULIN UR ELPH-MCNC: 3 GM/DL
GLUCOSE SERPL-MCNC: 110 MG/DL (ref 65–99)
HBA1C MFR BLD: 5.7 % (ref 4.8–5.6)
INR PPP: 6.6 (ref 2.5–3.5)
NT-PROBNP SERPL-MCNC: 1984 PG/ML (ref 0–900)
POTASSIUM SERPL-SCNC: 3.8 MMOL/L (ref 3.5–5.2)
PROT SERPL-MCNC: 7.1 G/DL (ref 6–8.5)
SODIUM SERPL-SCNC: 142 MMOL/L (ref 136–145)

## 2022-04-19 PROCEDURE — 80053 COMPREHEN METABOLIC PANEL: CPT | Performed by: NURSE PRACTITIONER

## 2022-04-19 PROCEDURE — 36416 COLLJ CAPILLARY BLOOD SPEC: CPT | Performed by: NURSE PRACTITIONER

## 2022-04-19 PROCEDURE — 36415 COLL VENOUS BLD VENIPUNCTURE: CPT | Performed by: NURSE PRACTITIONER

## 2022-04-19 PROCEDURE — 99214 OFFICE O/P EST MOD 30 MIN: CPT | Performed by: NURSE PRACTITIONER

## 2022-04-19 PROCEDURE — 85025 COMPLETE CBC W/AUTO DIFF WBC: CPT | Performed by: NURSE PRACTITIONER

## 2022-04-19 PROCEDURE — 85610 PROTHROMBIN TIME: CPT | Performed by: NURSE PRACTITIONER

## 2022-04-19 PROCEDURE — 83880 ASSAY OF NATRIURETIC PEPTIDE: CPT | Performed by: NURSE PRACTITIONER

## 2022-04-19 PROCEDURE — 83036 HEMOGLOBIN GLYCOSYLATED A1C: CPT | Performed by: NURSE PRACTITIONER

## 2022-04-19 RX ORDER — TIMOLOL MALEATE 5 MG/ML
1 SOLUTION/ DROPS OPHTHALMIC DAILY
Status: ON HOLD | COMMUNITY
End: 2022-12-05

## 2022-04-19 NOTE — PROGRESS NOTES
Patient Name: Dillon Vo  : 1950   MRN: [id number]     Chief Complaint:    Chief Complaint   Patient presents with   • Congestive Heart Failure     ER follow up. ER doctor wants Lasix dosage evaluated       History of Present Illness: Dillon Vo is a 71 y.o. male presents to clinic for follow-up after ER visit 3/23/22. He was seen for insomnia, palpitations and SOB. His BNP was elevated and was given Lasix 80 mg IV. Labs showed macrocytic anemia, leukocytosis and elevated glucose. His platelets were low. His symptoms have improved. He missed an appnt with [facility] on 22.     CHF   H/o mechanical AVR. LV EF 53%. He is unsure if he is taking furosemide any longer; it was not on his medication list. Last Rx on record was a five day supply. Denies dyspnea.   Results for orders placed during the hospital encounter of 21    Adult Transthoracic Echo Complete W/ Cont if Necessary Per Protocol    Interpretation Summary  · There is biatrial enlargement. Left atrial volume is severely increased.  · The inferior vena cava is dilated and inspiratory collapse is absent.  · There is a mechanical aortic valve. I cannot exclude a small perivalvular leak (AI) with confidence.  · There is mitral annular calcification and moderate mitral regurgitation.  · There is moderate tricuspid regurgitation and moderate estimated PA hypertension (RVSP=53 mmHg).  · There is no pericardial effusion.              Hypertension  The patient denies headaches, chest pain, dyspnea, edema, syncope, blurred vision or palpitations. They state that they are taking their medication as prescribed. They are not having medication side effects. He has not taken his medication today.         Past Medical History:   Diagnosis Date   • A-fib (HCC)    • Anemia    • Atrial fibrillation (HCC) 2016    Difficult to control rate. RFA of AV node with implantation of left Bi-V pacemaker, 2013. Hematoma requiring  single-chamber pacemaker implanted, 07/29/2013.    • Colitis    • Colon cancer (HCC)    • Coronary artery disease    • Depression    • GERD (gastroesophageal reflux disease)    • Heart valve disease    • Hiatal hernia    • HTN (hypertension)    • Hyperbilirubinemia    • Hyperlipidemia    • Infectious viral hepatitis    • Kidney stone    • Lung cancer (HCC)    • Orthostatic hypotension    • Sick sinus syndrome (HCC)    • Skin cancer     lip          Subjective     Review of System: Review of Systems   Constitutional: Negative for fatigue and fever.   HENT: Negative for congestion and rhinorrhea.    Respiratory: Negative for cough, shortness of breath and wheezing.    Cardiovascular: Negative for chest pain and palpitations.   Gastrointestinal: Negative for abdominal pain, diarrhea, nausea and vomiting.   Genitourinary: Negative for dysuria.   Musculoskeletal: Negative for myalgias.   Skin: Negative for rash.   Neurological: Negative for dizziness and headaches.   Hematological: Negative for adenopathy.   Psychiatric/Behavioral: Negative for dysphoric mood.        Medications:     Current Outpatient Medications:   •  aspirin 81 MG EC tablet, Take 1 tablet by mouth daily., Disp: , Rfl:   •  atorvastatin (LIPITOR) 80 MG tablet, TAKE 1 TABLET BY MOUTH ONCE DAILY AT NIGHT, Disp: 90 tablet, Rfl: 0  •  buPROPion XL (WELLBUTRIN XL) 300 MG 24 hr tablet, Take 1 tablet by mouth once daily, Disp: 30 tablet, Rfl: 5  •  carvedilol (COREG) 12.5 MG tablet, Take 25 mg by mouth 2 (Two) Times a Day With Meals., Disp: , Rfl:   •  citalopram (CeleXA) 20 MG tablet, Take 1 tablet by mouth once daily, Disp: 90 tablet, Rfl: 1  •  famotidine (PEPCID) 20 MG tablet, Take 1 tablet by mouth Daily., Disp: 30 tablet, Rfl: 1  •  finasteride (PROSCAR) 5 MG tablet, TAKE 1 TABLET BY MOUTH ONCE DAILY AT BEDTIME, Disp: 90 tablet, Rfl: 1  •  folic acid (FOLVITE) 400 MCG tablet, TAKE 1 TABLET BY MOUTH ONCE DAILY (Patient taking differently: Daily.), Disp:  "90 tablet, Rfl: 3  •  furosemide (LASIX) 20 MG tablet, Take 2 tablets tomorrow morning, then 1 tablet daily.  Take with potassium tablet (Patient taking differently: Take  by mouth Daily. Take 2 tablets tomorrow morning, then 1 tablet daily.  Take with potassium tablet. Last dose on Saturday or Sunday.), Disp: 5 tablet, Rfl: 0  •  lisinopril (PRINIVIL,ZESTRIL) 10 MG tablet, Take 1 tablet by mouth twice daily, Disp: 180 tablet, Rfl: 0  •  nicotine (NICODERM CQ) 14 MG/24HR patch, Place 1 patch on the skin as directed by provider Daily., Disp: 90 patch, Rfl: 0  •  potassium chloride 10 MEQ CR tablet, Take 1 tablet by mouth Daily., Disp: 5 tablet, Rfl: 0  •  tadalafil (Cialis) 20 MG tablet, Take 1 tablet by mouth Daily As Needed for Erectile Dysfunction., Disp: 30 tablet, Rfl: 2  •  timolol (TIMOPTIC) 0.5 % ophthalmic solution, timolol maleate 0.5 % eye drops, Disp: , Rfl:   •  vitamin B-12 (CYANOCOBALAMIN) 1000 MCG tablet, Take 1 tablet by mouth Daily., Disp: 90 tablet, Rfl: 3  •  warfarin (COUMADIN) 5 MG tablet, Take 1 tablet by mouth Daily., Disp: 30 tablet, Rfl: 5    Allergies:   No Known Allergies    Objective     Physical Exam:   Vital Signs:   Vitals:    04/19/22 1135 04/19/22 1153   BP: (!) 186/102 (!) 184/94   BP Location: Right arm    Patient Position: Sitting    Cuff Size: Adult    Pulse: 70    Resp: 18    Temp: 97.5 °F (36.4 °C)    TempSrc: Infrared    SpO2: 99%    Weight: 69.2 kg (152 lb 9.6 oz)    Height: 180.3 cm (71\")    PainSc: 0-No pain      Body mass index is 21.28 kg/m². Patient's Body mass index is 21.28 kg/m². indicating that he is within normal range (BMI 18.5-24.9). No BMI management plan needed..      Physical Exam  Constitutional:       General: He is not in acute distress.     Appearance: He is not ill-appearing.   HENT:      Head: Normocephalic.   Cardiovascular:      Rate and Rhythm: Normal rate and regular rhythm.   Pulmonary:      Breath sounds: Normal breath sounds.   Abdominal:      " General: Bowel sounds are normal.      Tenderness: There is no abdominal tenderness.   Musculoskeletal:         General: No swelling or tenderness. Normal range of motion.   Lymphadenopathy:      Cervical: No cervical adenopathy.   Skin:     General: Skin is warm and dry.   Neurological:      General: No focal deficit present.      Mental Status: He is oriented to person, place, and time.   Psychiatric:         Mood and Affect: Mood normal.         Assessment / Plan      Assessment/Plan:   Diagnoses and all orders for this visit:    1. Essential hypertension (Primary)  -     Comprehensive metabolic panel; Future  -     CBC w AUTO Differential; Future  -     Comprehensive metabolic panel  -     CBC w AUTO Differential  -     POC INR  Take bp medication as prescribed. Your blood pressure was elevated today. You have not taken your bp medication today. Please call and confirm bp medications.     2. Chronic heart failure, unspecified heart failure type (HCC)  -     BNP; Future  -     BNP  -     POC INR    I will send a referral to the heart failure clinic since he missed an appnt.    3. Hyperglycemia  -     Hemoglobin A1c; Future  -     Hemoglobin A1c  -     POC INR    4. Atrial fibrillation, unspecified type (HCC)  -     POC INR           Follow Up:   Return in about 1 week (around 4/26/2022).    DAGOBERTO Cerda  [facility]

## 2022-04-20 LAB
BASOPHILS # BLD AUTO: 0.06 10*3/MM3 (ref 0–0.2)
BASOPHILS NFR BLD AUTO: 1.4 % (ref 0–1.5)
DEPRECATED RDW RBC AUTO: 46.8 FL (ref 37–54)
EOSINOPHIL # BLD AUTO: 0.07 10*3/MM3 (ref 0–0.4)
EOSINOPHIL NFR BLD AUTO: 1.6 % (ref 0.3–6.2)
ERYTHROCYTE [DISTWIDTH] IN BLOOD BY AUTOMATED COUNT: 12.8 % (ref 12.3–15.4)
HCT VFR BLD AUTO: 38.6 % (ref 37.5–51)
HGB BLD-MCNC: 13.1 G/DL (ref 13–17.7)
IMM GRANULOCYTES # BLD AUTO: 0.01 10*3/MM3 (ref 0–0.05)
IMM GRANULOCYTES NFR BLD AUTO: 0.2 % (ref 0–0.5)
LYMPHOCYTES # BLD AUTO: 0.96 10*3/MM3 (ref 0.7–3.1)
LYMPHOCYTES NFR BLD AUTO: 21.6 % (ref 19.6–45.3)
MCH RBC QN AUTO: 34.1 PG (ref 26.6–33)
MCHC RBC AUTO-ENTMCNC: 33.9 G/DL (ref 31.5–35.7)
MCV RBC AUTO: 100.5 FL (ref 79–97)
MONOCYTES # BLD AUTO: 0.46 10*3/MM3 (ref 0.1–0.9)
MONOCYTES NFR BLD AUTO: 10.4 % (ref 5–12)
NEUTROPHILS NFR BLD AUTO: 2.88 10*3/MM3 (ref 1.7–7)
NEUTROPHILS NFR BLD AUTO: 64.8 % (ref 42.7–76)
NRBC BLD AUTO-RTO: 0.2 /100 WBC (ref 0–0.2)
PLATELET # BLD AUTO: 112 10*3/MM3 (ref 140–450)
PMV BLD AUTO: 13 FL (ref 6–12)
RBC # BLD AUTO: 3.84 10*6/MM3 (ref 4.14–5.8)
WBC NRBC COR # BLD: 4.44 10*3/MM3 (ref 3.4–10.8)

## 2022-04-21 ENCOUNTER — ANTICOAGULATION VISIT (OUTPATIENT)
Dept: INTERNAL MEDICINE | Facility: CLINIC | Age: 72
End: 2022-04-21

## 2022-04-21 DIAGNOSIS — I48.91 ATRIAL FIBRILLATION, UNSPECIFIED TYPE: Primary | ICD-10-CM

## 2022-04-21 PROCEDURE — 85610 PROTHROMBIN TIME: CPT | Performed by: INTERNAL MEDICINE

## 2022-04-21 PROCEDURE — 36416 COLLJ CAPILLARY BLOOD SPEC: CPT | Performed by: INTERNAL MEDICINE

## 2022-04-22 ENCOUNTER — OFFICE VISIT (OUTPATIENT)
Dept: CARDIOLOGY | Facility: HOSPITAL | Age: 72
End: 2022-04-22

## 2022-04-22 VITALS
HEIGHT: 71 IN | TEMPERATURE: 96.8 F | BODY MASS INDEX: 20.86 KG/M2 | HEART RATE: 70 BPM | SYSTOLIC BLOOD PRESSURE: 168 MMHG | RESPIRATION RATE: 18 BRPM | OXYGEN SATURATION: 96 % | WEIGHT: 149 LBS | DIASTOLIC BLOOD PRESSURE: 76 MMHG

## 2022-04-22 DIAGNOSIS — I38 VALVULAR HEART DISEASE: ICD-10-CM

## 2022-04-22 DIAGNOSIS — Z91.89 COMPLIANCE WITH MEDICATION REGIMEN: ICD-10-CM

## 2022-04-22 DIAGNOSIS — I48.20 CHRONIC ATRIAL FIBRILLATION: ICD-10-CM

## 2022-04-22 DIAGNOSIS — I50.32 CHRONIC HEART FAILURE WITH PRESERVED EJECTION FRACTION: Primary | ICD-10-CM

## 2022-04-22 DIAGNOSIS — I10 PRIMARY HYPERTENSION: ICD-10-CM

## 2022-04-22 LAB — INR PPP: 3 (ref 2–3)

## 2022-04-22 PROCEDURE — 99214 OFFICE O/P EST MOD 30 MIN: CPT | Performed by: NURSE PRACTITIONER

## 2022-04-22 RX ORDER — POTASSIUM CHLORIDE 750 MG/1
10 TABLET, FILM COATED, EXTENDED RELEASE ORAL DAILY
Qty: 30 TABLET | Refills: 3 | Status: SHIPPED | OUTPATIENT
Start: 2022-04-22 | End: 2022-12-08 | Stop reason: HOSPADM

## 2022-04-22 RX ORDER — ATORVASTATIN CALCIUM 80 MG/1
80 TABLET, FILM COATED ORAL NIGHTLY
Qty: 30 TABLET | Refills: 3 | Status: SHIPPED | OUTPATIENT
Start: 2022-04-22 | End: 2022-08-02

## 2022-04-22 RX ORDER — FUROSEMIDE 20 MG/1
20 TABLET ORAL DAILY
Qty: 30 TABLET | Refills: 1 | Status: SHIPPED | OUTPATIENT
Start: 2022-04-22 | End: 2022-12-08 | Stop reason: HOSPADM

## 2022-04-22 NOTE — PROGRESS NOTES
"[facility], [facility]    Chief Complaint  Atrial Fibrillation and Congestive Heart Failure    Subjective    History of Present Illness {CC  Problem List  Visit  Diagnosis   Encounters  Notes  Medications  Labs  Result Review Imaging  Media :23}     Dillon Vo presents to [facility] for   History of Present Illness     71-year-old male with history of chronic atrial fibrillation status post AV node ablation with single-chamber pacemaker (2013) pacemaker.  Valvular heart disease status post mechanical aortic valve, hypertension heart failure with preserved EF.      Patient presented to [facility] ED on 3/23/2021 with complaints of shortness of breath and insomnia.  Intermittent palpitations.  Patient had been prescribed Lasix but unsure if he was taking the medication.   given IV Lasix in ED..  His dose was increased for 1 day then resume normal dosing.    Pt presents today stating he does not think he is taking his water pill, potassium or atorvastatin.  He does not have is meds or list present today.  Pt states he has not taken his morning meds today.  \"Wasn't sure if I needed to before the visit\".      Patient denies chest pain, pressure, dizziness, near syncope, syncope.  Dyspnea has resolved.  No edema, PND, orthopnea.    Objective     Vital Signs:   Vitals:    04/22/22 1334 04/22/22 1335 04/22/22 1336   BP: 172/91 162/80 168/76   BP Location: Right arm Left arm Left arm   Patient Position: Sitting Standing Sitting   Cuff Size: Adult Adult Adult   Pulse: 81 72 70   Resp:   18   Temp: 96.8 °F (36 °C) 96.8 °F (36 °C) 96.8 °F (36 °C)   TempSrc: Temporal Temporal Temporal   SpO2: 97% 97% 96%   Weight: 67.6 kg (149 lb)  67.6 kg (149 lb)   Height:   180.3 cm (71\")     Body mass index is 20.78 kg/m².  Physical Exam  Constitutional:       General: He is not in acute distress.     Appearance: Normal appearance. "   Cardiovascular:      Rate and Rhythm: Normal rate and regular rhythm.      Pulses:           Radial pulses are 2+ on the right side.        Dorsalis pedis pulses are 2+ on the right side.        Posterior tibial pulses are 2+ on the right side.      Heart sounds: Normal heart sounds.   Pulmonary:      Effort: Pulmonary effort is normal.      Breath sounds: Normal breath sounds.   Abdominal:      Palpations: Abdomen is soft.      Tenderness: There is no abdominal tenderness.   Musculoskeletal:      Right lower leg: No edema.      Left lower leg: No edema.   Skin:     General: Skin is warm and dry.   Neurological:      Mental Status: He is alert.   Psychiatric:         Mood and Affect: Mood normal.         Behavior: Behavior is cooperative.              Result Review  Data Reviewed:{ Labs  Result Review  Imaging  Med Tab  Media :23}   Echo 03/21/21:  bi atrial enlargement, moderate MR, EF 53%, moderate TR with RVSP 53 mmHg    [facility] remote monitoring 04/12/22: RV pacing 99%    Anticoagulation Visit on 04/21/2022   Component Date Value Ref Range Status   • INR 04/21/2022 3.00 (A) 2 - 3 Final                   Assessment and Plan {CC Problem List  Visit Diagnosis  ROS  Review (Popup)  Health Maintenance  Quality  BestPractice  Medications  SmartSets  SnapShot Encounters  Media :23}   1. Chronic heart failure with preserved ejection fraction (HCC)  EF 3/21/2021 with EF 53%.    Appears euvolemic today    Reviewed signs and symptoms heart failure worsening, role of the heart valve center and when to call.    2. Valvular heart disease  Chronic Coumadin followed by primary care provider    3. Chronic atrial fibrillation (HCC)  Patient with atrial fibrillation, history of AV node ablation.  Single-lead pacemaker with RV pacing 99%.  Unable to track atrial heart rates.        4. Primary hypertension  Blood pressure elevated today has not taken medications.    5.  Medication  compliance  Discussed the importance of taking medications as scheduled.  Patient should take morning medications prior to every follow-up appointment as scheduled unless directed otherwise.  We do not hold medications for lab work.    Patient should be encouraged to bring in medication bottles with each visit for review.                Follow Up {Instructions Charge Capture  Follow-up Communications :23}   Return in about 4 weeks (around 5/20/2022) for Video visit, Telephone visit.    Patient was given instructions and counseling regarding his condition or for health maintenance advice. Please see specific information pulled into the AVS if appropriate.  Patient was instructed to call the [facility] with any questions, concerns, or worsening symptoms.

## 2022-04-25 ENCOUNTER — ANTICOAGULATION VISIT (OUTPATIENT)
Dept: INTERNAL MEDICINE | Facility: CLINIC | Age: 72
End: 2022-04-25

## 2022-04-25 DIAGNOSIS — I48.91 ATRIAL FIBRILLATION, UNSPECIFIED TYPE: Primary | ICD-10-CM

## 2022-04-25 LAB — INR PPP: 1.5 (ref 2.5–3.5)

## 2022-04-25 PROCEDURE — 85610 PROTHROMBIN TIME: CPT | Performed by: INTERNAL MEDICINE

## 2022-04-25 PROCEDURE — 36416 COLLJ CAPILLARY BLOOD SPEC: CPT | Performed by: INTERNAL MEDICINE

## 2022-04-26 ENCOUNTER — TELEPHONE (OUTPATIENT)
Dept: INTERNAL MEDICINE | Facility: CLINIC | Age: 72
End: 2022-04-26

## 2022-04-29 ENCOUNTER — ANTICOAGULATION VISIT (OUTPATIENT)
Dept: INTERNAL MEDICINE | Facility: CLINIC | Age: 72
End: 2022-04-29

## 2022-04-29 DIAGNOSIS — I48.91 ATRIAL FIBRILLATION, UNSPECIFIED TYPE: Primary | ICD-10-CM

## 2022-04-29 LAB — INR PPP: 1.6 (ref 2.5–3.5)

## 2022-04-29 PROCEDURE — 36416 COLLJ CAPILLARY BLOOD SPEC: CPT | Performed by: INTERNAL MEDICINE

## 2022-04-29 PROCEDURE — 85610 PROTHROMBIN TIME: CPT | Performed by: INTERNAL MEDICINE

## 2022-05-02 DIAGNOSIS — C34.12 MALIGNANT NEOPLASM OF UPPER LOBE OF LEFT LUNG: Primary | ICD-10-CM

## 2022-05-03 ENCOUNTER — ANTICOAGULATION VISIT (OUTPATIENT)
Dept: INTERNAL MEDICINE | Facility: CLINIC | Age: 72
End: 2022-05-03

## 2022-05-03 DIAGNOSIS — I48.11 LONGSTANDING PERSISTENT ATRIAL FIBRILLATION: Primary | ICD-10-CM

## 2022-05-03 LAB — INR PPP: 3.1 (ref 2.5–3.5)

## 2022-05-03 PROCEDURE — 36416 COLLJ CAPILLARY BLOOD SPEC: CPT | Performed by: INTERNAL MEDICINE

## 2022-05-03 PROCEDURE — 85610 PROTHROMBIN TIME: CPT | Performed by: INTERNAL MEDICINE

## 2022-05-04 ENCOUNTER — TELEPHONE (OUTPATIENT)
Dept: INTERNAL MEDICINE | Facility: CLINIC | Age: 72
End: 2022-05-04

## 2022-05-04 DIAGNOSIS — I48.91 ATRIAL FIBRILLATION, UNSPECIFIED TYPE: Primary | ICD-10-CM

## 2022-05-05 ENCOUNTER — TELEPHONE (OUTPATIENT)
Dept: PHARMACY | Facility: HOSPITAL | Age: 72
End: 2022-05-05

## 2022-05-05 NOTE — TELEPHONE ENCOUNTER
"Mr. Vo is a new referral to clinic from Dr. Ez Cintron at [facility].    His goal INR is 2.5-3.5 for chronic atrial fibrillation    He reports his dose varies but for \"a long time\" his dosing was warfarin 5 mg daily except 2.5 mg on Wednesdays but has recently switched to warfarin 5 mg daily. His INR was low last week and he took two boosted doses of 10 mg.    Going forward he prefers to check in the clinic. Have made tentative appointment for Wednesday, 5/11, at 1400 for recheck. However, patient was driving and unable to write down information and requests a follow up call tomorrow with appointment information.  "

## 2022-05-11 ENCOUNTER — ANTICOAGULATION VISIT (OUTPATIENT)
Dept: PHARMACY | Facility: HOSPITAL | Age: 72
End: 2022-05-11

## 2022-05-11 DIAGNOSIS — I48.91 ATRIAL FIBRILLATION, UNSPECIFIED TYPE: Primary | ICD-10-CM

## 2022-05-11 DIAGNOSIS — Z95.2 H/O AORTIC VALVE REPLACEMENT: ICD-10-CM

## 2022-05-11 LAB
INR PPP: 1.5 (ref 0.91–1.09)
PROTHROMBIN TIME: 17.8 SECONDS (ref 10–13.8)

## 2022-05-11 PROCEDURE — G0463 HOSPITAL OUTPT CLINIC VISIT: HCPCS | Performed by: PHARMACIST

## 2022-05-11 PROCEDURE — 36416 COLLJ CAPILLARY BLOOD SPEC: CPT

## 2022-05-11 PROCEDURE — 85610 PROTHROMBIN TIME: CPT

## 2022-05-11 RX ORDER — ENOXAPARIN SODIUM 100 MG/ML
1 INJECTION SUBCUTANEOUS EVERY 12 HOURS SCHEDULED
Qty: 8 ML | Refills: 1 | Status: SHIPPED | OUTPATIENT
Start: 2022-05-11 | End: 2022-08-18 | Stop reason: SDUPTHER

## 2022-05-11 RX ORDER — WARFARIN SODIUM 5 MG/1
5 TABLET ORAL DAILY
Qty: 30 TABLET | Refills: 5 | Status: SHIPPED | OUTPATIENT
Start: 2022-05-11 | End: 2022-09-06 | Stop reason: SDUPTHER

## 2022-05-11 NOTE — PROGRESS NOTES
Anticoagulation Clinic Progress Note  Indication:  Mechanical heart valve, persistent Afib  Referring Provider: Ez Cintron  Initial Warfarin Start Date: > 20 years  Planned Duration of Therapy: indefinite  Goal INR: 2.5-3.5  Current Drug Interactions: asa  Other: [PREVIOUS ANTICOAGULATION, ETC]  [id number]:  Bleed Risk: [HASBLED, if appropriate; HIGH/MODERATE/LOW + REASON, H/O BLEED]      Diet: occasional broccoli, spinach salad rarely  Alcohol: 2-3xnightly  Tobacco: < 1/2ppd  OTC Pain Medication: none    Anticoagulation Clinic INR History:  Date 5/11          Total Weekly Dose 35 mg          INR 1.5          Notes             Clinic Interview:  Tablet Strength: 5 mg  Estimated OOP cost: [please send to registration if not already done]  Verbal Release: Verbal Release Authorization signed on 5/11/2022 -- may speak with Meghana (spouse)      Patient Findings:    Dillon Vo is a new start to warfarin therapy. He was by himself for counseling. Discussed warfarin's indication, mechanism, and dosing. Also enforced the importance of taking warfarin as instructed and at the same time every day, preferably in the evening so that we can make dose adjustments more easily following subsequent clinic visits. He expressed understanding of this fact. We also discussed what he should do about a missed dose; pts can take missed doses within about 12 hours of their usual scheduled dose, but he was instructed on the importance of not doubling up on doses unless told to do so by the warfarin clinic. Explained possible side effects of warfarin therapy, including increased risk of bleeding, s/sx of bleeding and s/sx of any additional clots/PE/CVA. Also discussed monitoring of warfarin, the INR, goal INR range 2.5 - 3.5, and the frequency of monitoring. Explained that he would be coming into the clinic more frequently in these first few weeks of therapy as we try to adjust his dose and achieve a therapeutic INR x 2  "consecutive readings. Once that is achieved, pt will follow up in clinic every 4 weeks, on average. He stated no problems with transportation or scheduling clinic appts in this manner. Reviewed drug/food/tobacco/EtOH interactions and provided written information covering these topics in more detail, explaining that green, leafy vegetables interact most heavily with warfarin. Instructed the pt not to take or discontinue any medications without informing his physician/pharmacist and reminded him to inform us of any dietary changes, as well. He expressed understanding of the information provided and has no additional questions at this time.        Wt ~ 150lbs  Plan:  1. INR is subtherapeutic at 1.5 today. Instructed pt to take warfarin 7.5 mg today and then resume warfarin 5 mg po daily. Recommend lovenox bridge while INR is subtherapeutic. PA sent through covermymeds.com \"Key [id number]\"  2. RTC next week on Monday. 5/16  3. Medications need reviewing  4. Pt requests warfarin refills.  5. Verbal and written information provided. Dillon Vo expresses understanding by teach back and has no further questions at this time.    Ez Doan, PharmD  5/11/2022  14:23 EDT    "

## 2022-05-16 ENCOUNTER — HOSPITAL ENCOUNTER (EMERGENCY)
Facility: HOSPITAL | Age: 72
Discharge: HOME | End: 2022-05-16
Attending: EMERGENCY MEDICINE | Admitting: EMERGENCY MEDICINE

## 2022-05-16 ENCOUNTER — ANTICOAGULATION VISIT (OUTPATIENT)
Dept: PHARMACY | Facility: HOSPITAL | Age: 72
End: 2022-05-16

## 2022-05-16 ENCOUNTER — APPOINTMENT (OUTPATIENT)
Dept: CARDIOLOGY | Facility: HOSPITAL | Age: 72
End: 2022-05-16

## 2022-05-16 VITALS
DIASTOLIC BLOOD PRESSURE: 91 MMHG | BODY MASS INDEX: 21 KG/M2 | OXYGEN SATURATION: 97 % | SYSTOLIC BLOOD PRESSURE: 158 MMHG | WEIGHT: 150 LBS | HEIGHT: 71 IN | TEMPERATURE: 98.2 F | HEART RATE: 70 BPM | RESPIRATION RATE: 18 BRPM

## 2022-05-16 DIAGNOSIS — I48.91 ATRIAL FIBRILLATION, UNSPECIFIED TYPE: Primary | ICD-10-CM

## 2022-05-16 DIAGNOSIS — R60.9 PERIPHERAL EDEMA: Primary | ICD-10-CM

## 2022-05-16 LAB
ALBUMIN SERPL-MCNC: 4 G/DL (ref 3.5–5.2)
ALBUMIN/GLOB SERPL: 1.2 G/DL
ALP SERPL-CCNC: 77 U/L (ref 39–117)
ALT SERPL W P-5'-P-CCNC: 9 U/L (ref 1–41)
ANION GAP SERPL CALCULATED.3IONS-SCNC: 13 MMOL/L (ref 5–15)
AST SERPL-CCNC: 25 U/L (ref 1–40)
BASOPHILS # BLD AUTO: 0.05 10*3/MM3 (ref 0–0.2)
BASOPHILS NFR BLD AUTO: 1.2 % (ref 0–1.5)
BH CV LOWER VASCULAR LEFT COMMON FEMORAL AUGMENT: NORMAL
BH CV LOWER VASCULAR LEFT COMMON FEMORAL COMPETENT: NORMAL
BH CV LOWER VASCULAR LEFT COMMON FEMORAL COMPRESS: NORMAL
BH CV LOWER VASCULAR LEFT COMMON FEMORAL PHASIC: NORMAL
BH CV LOWER VASCULAR LEFT COMMON FEMORAL SPONT: NORMAL
BH CV LOWER VASCULAR RIGHT COMMON FEMORAL AUGMENT: NORMAL
BH CV LOWER VASCULAR RIGHT COMMON FEMORAL COMPETENT: NORMAL
BH CV LOWER VASCULAR RIGHT COMMON FEMORAL COMPRESS: NORMAL
BH CV LOWER VASCULAR RIGHT COMMON FEMORAL PHASIC: NORMAL
BH CV LOWER VASCULAR RIGHT COMMON FEMORAL SPONT: NORMAL
BH CV LOWER VASCULAR RIGHT DISTAL FEMORAL AUGMENT: NORMAL
BH CV LOWER VASCULAR RIGHT DISTAL FEMORAL COMPETENT: NORMAL
BH CV LOWER VASCULAR RIGHT DISTAL FEMORAL COMPRESS: NORMAL
BH CV LOWER VASCULAR RIGHT DISTAL FEMORAL PHASIC: NORMAL
BH CV LOWER VASCULAR RIGHT DISTAL FEMORAL SPONT: NORMAL
BH CV LOWER VASCULAR RIGHT GASTRONEMIUS COMPRESS: NORMAL
BH CV LOWER VASCULAR RIGHT GREATER SAPH AK COMPRESS: NORMAL
BH CV LOWER VASCULAR RIGHT GREATER SAPH BK COMPRESS: NORMAL
BH CV LOWER VASCULAR RIGHT LESSER SAPH COMPRESS: NORMAL
BH CV LOWER VASCULAR RIGHT MID FEMORAL AUGMENT: NORMAL
BH CV LOWER VASCULAR RIGHT MID FEMORAL COMPETENT: NORMAL
BH CV LOWER VASCULAR RIGHT MID FEMORAL COMPRESS: NORMAL
BH CV LOWER VASCULAR RIGHT MID FEMORAL PHASIC: NORMAL
BH CV LOWER VASCULAR RIGHT MID FEMORAL SPONT: NORMAL
BH CV LOWER VASCULAR RIGHT PERONEAL AUGMENT: NORMAL
BH CV LOWER VASCULAR RIGHT PERONEAL COMPRESS: NORMAL
BH CV LOWER VASCULAR RIGHT POPLITEAL AUGMENT: NORMAL
BH CV LOWER VASCULAR RIGHT POPLITEAL COMPETENT: NORMAL
BH CV LOWER VASCULAR RIGHT POPLITEAL COMPRESS: NORMAL
BH CV LOWER VASCULAR RIGHT POPLITEAL PHASIC: NORMAL
BH CV LOWER VASCULAR RIGHT POPLITEAL SPONT: NORMAL
BH CV LOWER VASCULAR RIGHT POSTERIOR TIBIAL AUGMENT: NORMAL
BH CV LOWER VASCULAR RIGHT POSTERIOR TIBIAL COMPRESS: NORMAL
BH CV LOWER VASCULAR RIGHT PROFUNDA FEMORAL AUGMENT: NORMAL
BH CV LOWER VASCULAR RIGHT PROFUNDA FEMORAL COMPETENT: NORMAL
BH CV LOWER VASCULAR RIGHT PROFUNDA FEMORAL COMPRESS: NORMAL
BH CV LOWER VASCULAR RIGHT PROFUNDA FEMORAL PHASIC: NORMAL
BH CV LOWER VASCULAR RIGHT PROFUNDA FEMORAL SPONT: NORMAL
BH CV LOWER VASCULAR RIGHT PROXIMAL FEMORAL AUGMENT: NORMAL
BH CV LOWER VASCULAR RIGHT PROXIMAL FEMORAL COMPETENT: NORMAL
BH CV LOWER VASCULAR RIGHT PROXIMAL FEMORAL COMPRESS: NORMAL
BH CV LOWER VASCULAR RIGHT PROXIMAL FEMORAL PHASIC: NORMAL
BH CV LOWER VASCULAR RIGHT PROXIMAL FEMORAL SPONT: NORMAL
BH CV LOWER VASCULAR RIGHT SAPHENOFEMORAL JUNCTION AUGMENT: NORMAL
BH CV LOWER VASCULAR RIGHT SAPHENOFEMORAL JUNCTION COMPETENT: NORMAL
BH CV LOWER VASCULAR RIGHT SAPHENOFEMORAL JUNCTION COMPRESS: NORMAL
BH CV LOWER VASCULAR RIGHT SAPHENOFEMORAL JUNCTION PHASIC: NORMAL
BH CV LOWER VASCULAR RIGHT SAPHENOFEMORAL JUNCTION SPONT: NORMAL
BH CV LOWER VASCULAR RIGHT VARICOSITY AK COMPRESS: NORMAL
BH CV LOWER VASCULAR RIGHT VARICOSITY BK COMPRESS: NORMAL
BILIRUB SERPL-MCNC: 1.3 MG/DL (ref 0–1.2)
BUN SERPL-MCNC: 10 MG/DL (ref 8–23)
BUN/CREAT SERPL: 10.5 (ref 7–25)
CALCIUM SPEC-SCNC: 9.1 MG/DL (ref 8.6–10.5)
CHLORIDE SERPL-SCNC: 101 MMOL/L (ref 98–107)
CO2 SERPL-SCNC: 24 MMOL/L (ref 22–29)
CREAT SERPL-MCNC: 0.95 MG/DL (ref 0.76–1.27)
DEPRECATED RDW RBC AUTO: 54.9 FL (ref 37–54)
EGFRCR SERPLBLD CKD-EPI 2021: 85.6 ML/MIN/1.73
EOSINOPHIL # BLD AUTO: 0.03 10*3/MM3 (ref 0–0.4)
EOSINOPHIL NFR BLD AUTO: 0.7 % (ref 0.3–6.2)
ERYTHROCYTE [DISTWIDTH] IN BLOOD BY AUTOMATED COUNT: 14.6 % (ref 12.3–15.4)
GLOBULIN UR ELPH-MCNC: 3.3 GM/DL
GLUCOSE SERPL-MCNC: 122 MG/DL (ref 65–99)
HCT VFR BLD AUTO: 38.7 % (ref 37.5–51)
HGB BLD-MCNC: 12.9 G/DL (ref 13–17.7)
HOLD SPECIMEN: NORMAL
IMM GRANULOCYTES # BLD AUTO: 0.01 10*3/MM3 (ref 0–0.05)
IMM GRANULOCYTES NFR BLD AUTO: 0.2 % (ref 0–0.5)
INR PPP: 2.38 (ref 0.84–1.13)
INR PPP: 3.1 (ref 0.91–1.09)
LYMPHOCYTES # BLD AUTO: 0.66 10*3/MM3 (ref 0.7–3.1)
LYMPHOCYTES NFR BLD AUTO: 15.9 % (ref 19.6–45.3)
MAXIMAL PREDICTED HEART RATE: 149 BPM
MCH RBC QN AUTO: 34.1 PG (ref 26.6–33)
MCHC RBC AUTO-ENTMCNC: 33.3 G/DL (ref 31.5–35.7)
MCV RBC AUTO: 102.4 FL (ref 79–97)
MONOCYTES # BLD AUTO: 0.36 10*3/MM3 (ref 0.1–0.9)
MONOCYTES NFR BLD AUTO: 8.7 % (ref 5–12)
NEUTROPHILS NFR BLD AUTO: 3.03 10*3/MM3 (ref 1.7–7)
NEUTROPHILS NFR BLD AUTO: 73.3 % (ref 42.7–76)
NRBC BLD AUTO-RTO: 0 /100 WBC (ref 0–0.2)
NT-PROBNP SERPL-MCNC: 1800 PG/ML (ref 0–900)
PLATELET # BLD AUTO: 123 10*3/MM3 (ref 140–450)
PMV BLD AUTO: 12.3 FL (ref 6–12)
POTASSIUM SERPL-SCNC: 4.1 MMOL/L (ref 3.5–5.2)
PROT SERPL-MCNC: 7.3 G/DL (ref 6–8.5)
PROTHROMBIN TIME: 26.1 SECONDS (ref 11.4–14.4)
PROTHROMBIN TIME: 36.7 SECONDS (ref 10–13.8)
RBC # BLD AUTO: 3.78 10*6/MM3 (ref 4.14–5.8)
SODIUM SERPL-SCNC: 138 MMOL/L (ref 136–145)
STRESS TARGET HR: 127 BPM
WBC NRBC COR # BLD: 4.14 10*3/MM3 (ref 3.4–10.8)
WHOLE BLOOD HOLD COAG: NORMAL
WHOLE BLOOD HOLD SPECIMEN: NORMAL

## 2022-05-16 PROCEDURE — 25010000002 FUROSEMIDE PER 20 MG: Performed by: EMERGENCY MEDICINE

## 2022-05-16 PROCEDURE — 85025 COMPLETE CBC W/AUTO DIFF WBC: CPT | Performed by: EMERGENCY MEDICINE

## 2022-05-16 PROCEDURE — 99283 EMERGENCY DEPT VISIT LOW MDM: CPT

## 2022-05-16 PROCEDURE — 83880 ASSAY OF NATRIURETIC PEPTIDE: CPT | Performed by: EMERGENCY MEDICINE

## 2022-05-16 PROCEDURE — 36415 COLL VENOUS BLD VENIPUNCTURE: CPT

## 2022-05-16 PROCEDURE — 93971 EXTREMITY STUDY: CPT | Performed by: INTERNAL MEDICINE

## 2022-05-16 PROCEDURE — 36416 COLLJ CAPILLARY BLOOD SPEC: CPT

## 2022-05-16 PROCEDURE — 93971 EXTREMITY STUDY: CPT

## 2022-05-16 PROCEDURE — G0463 HOSPITAL OUTPT CLINIC VISIT: HCPCS

## 2022-05-16 PROCEDURE — 85610 PROTHROMBIN TIME: CPT

## 2022-05-16 PROCEDURE — 80053 COMPREHEN METABOLIC PANEL: CPT | Performed by: EMERGENCY MEDICINE

## 2022-05-16 PROCEDURE — 85610 PROTHROMBIN TIME: CPT | Performed by: EMERGENCY MEDICINE

## 2022-05-16 PROCEDURE — 96374 THER/PROPH/DIAG INJ IV PUSH: CPT

## 2022-05-16 RX ORDER — SODIUM CHLORIDE 0.9 % (FLUSH) 0.9 %
10 SYRINGE (ML) INJECTION AS NEEDED
Status: DISCONTINUED | OUTPATIENT
Start: 2022-05-16 | End: 2022-05-16 | Stop reason: HOSPADM

## 2022-05-16 RX ORDER — LIDOCAINE HYDROCHLORIDE 20 MG/ML
JELLY TOPICAL ONCE
Status: COMPLETED | OUTPATIENT
Start: 2022-05-16 | End: 2022-05-16

## 2022-05-16 RX ORDER — FUROSEMIDE 10 MG/ML
40 INJECTION INTRAMUSCULAR; INTRAVENOUS ONCE
Status: COMPLETED | OUTPATIENT
Start: 2022-05-16 | End: 2022-05-16

## 2022-05-16 RX ADMIN — LIDOCAINE HYDROCHLORIDE: 20 JELLY TOPICAL at 18:59

## 2022-05-16 RX ADMIN — FUROSEMIDE 40 MG: 10 INJECTION, SOLUTION INTRAMUSCULAR; INTRAVENOUS at 18:35

## 2022-05-16 NOTE — DISCHARGE INSTRUCTIONS
Please take your medication as previously prescribed, take your Lasix double dose for the next 3 days, elevate your legs when possible to allow continued drainage.  Follow-up with your PCP in a few days for reevaluation.

## 2022-05-16 NOTE — ED PROVIDER NOTES
[place]    EMERGENCY DEPARTMENT ENCOUNTER      Pt Name: Dillon Vo  MRN: [id number]  YOB: 1950  Date of evaluation: 5/16/2022  Provider: Ravi Wells DO    CHIEF COMPLAINT       Chief Complaint   Patient presents with   • Leg Swelling         HISTORY OF PRESENT ILLNESS  (Location/Symptom, Timing/Onset, Context/Setting, Quality, Duration, Modifying Factors, Severity.)   Dillon Vo is a 71 y.o. male who presents to the emergency department for evaluation of swelling his bilateral lower extremities, right greater than left.  He notes a history of a valve replacement and is on Coumadin, states he had a check last week which was slightly low and they were trying to increase his Coumadin dosage, there was a remote discussion about possibly the patient on Lovenox for some reason until he gets his Coumadin up to therapeutic level but patient was unable to afford the Lovenox thus did not take it for the last few days.  He has been compliant with his Coumadin dosage.  He notes there was some swelling in the right lower extremity when compared to the left, there was a concern for possible DVT which why he was sent to the hospital for evaluation.  He does have generalized discomfort likely from the swelling in his both of his feet.  Denies any chest pain cough congestion fever chills or recent illness.  Denies any other acute systemic complaints at this time, no fall, injury or trauma.      Nursing notes were reviewed.    REVIEW OF SYSTEMS    (2-9 systems for level 4, 10 or more for level 5)   ROS:  General:  No fevers, no chills, no weakness  Cardiovascular:  No chest pain, no palpitations  Respiratory:  No shortness of breath, no cough, no wheezing  Gastrointestinal:  No pain, no nausea, no vomiting, no diarrhea  Musculoskeletal: Positive bilateral lower extremity edema, bilateral foot pain  Skin:  No rash  Neurologic:  No headache  Psychiatric:  No anxiety  Genitourinary:  No  dysuria, no hematuria    Except as noted above the remainder of the review of systems was reviewed and negative.       PAST MEDICAL HISTORY     Past Medical History:   Diagnosis Date   • A-fib (HCC)    • Anemia    • Atrial fibrillation (HCC) 5/5/2016    Difficult to control rate. RFA of AV node with implantation of left Bi-V pacemaker, 06/18/2013. Hematoma requiring single-chamber pacemaker implanted, 07/29/2013.    • Colitis    • Colon cancer (HCC)    • Coronary artery disease    • Depression    • GERD (gastroesophageal reflux disease)    • Heart valve disease    • Hiatal hernia    • HTN (hypertension)    • Hyperbilirubinemia    • Hyperlipidemia    • Infectious viral hepatitis    • Kidney stone    • Lung cancer (HCC)    • Orthostatic hypotension    • Sick sinus syndrome (HCC)    • Skin cancer     lip          SURGICAL HISTORY       Past Surgical History:   Procedure Laterality Date   • APPENDECTOMY     • BRONCHOSCOPY THORACOTOMY Left 4/7/2021    Procedure: BRONCHOSCOPY LEFT THORACOTOMY, LEFT LUNG RESECTION;  Surgeon: Chi Post MD;  Location: [facility];  Service: Cardiothoracic;  Laterality: Left;   • CARDIAC CATHETERIZATION     • CARDIAC SURGERY  2000    valve replacement    • COLON SURGERY     • COLONOSCOPY  up to date   • CORONARY STENT PLACEMENT     • EXTRACORPOREAL SHOCKWAVE LITHOTRIPSY (ESWL), STENT INSERTION/REMOVAL     • LUNG CANCER SURGERY     • PACEMAKER IMPLANTATION     • VARICOSE VEIN SURGERY           CURRENT MEDICATIONS       Current Facility-Administered Medications:   •  [COMPLETED] Insert peripheral IV, , , Once **AND** sodium chloride 0.9 % flush 10 mL, 10 mL, Intravenous, PRN, Ravi Wells, DO    Current Outpatient Medications:   •  aspirin 81 MG EC tablet, Take 1 tablet by mouth daily., Disp: , Rfl:   •  atorvastatin (LIPITOR) 80 MG tablet, Take 1 tablet by mouth Every Night., Disp: 30 tablet, Rfl: 3  •  buPROPion XL (WELLBUTRIN XL) 300 MG 24 hr tablet, Take 1 tablet by mouth  once daily, Disp: 30 tablet, Rfl: 5  •  carvedilol (COREG) 25 MG tablet, Take 12.5 mg by mouth 2 (Two) Times a Day With Meals., Disp: , Rfl:   •  citalopram (CeleXA) 20 MG tablet, Take 1 tablet by mouth once daily, Disp: 90 tablet, Rfl: 1  •  Enoxaparin Sodium (LOVENOX) 80 MG/0.8ML solution prefilled syringe syringe, Inject 0.7 mL under the skin into the appropriate area as directed Every 12 (Twelve) Hours., Disp: 8 mL, Rfl: 1  •  famotidine (PEPCID) 20 MG tablet, Take 1 tablet by mouth Daily., Disp: 30 tablet, Rfl: 1  •  finasteride (PROSCAR) 5 MG tablet, TAKE 1 TABLET BY MOUTH ONCE DAILY AT BEDTIME, Disp: 90 tablet, Rfl: 1  •  folic acid (FOLVITE) 400 MCG tablet, TAKE 1 TABLET BY MOUTH ONCE DAILY, Disp: 90 tablet, Rfl: 3  •  furosemide (LASIX) 20 MG tablet, Take 1 tablet by mouth Daily. Take 2 tablets tomorrow morning, then 1 tablet daily.  Take with potassium tablet (Patient taking differently: Take 20 mg by mouth Daily. Take1 tablet daily.  Take with potassium tablet), Disp: 30 tablet, Rfl: 1  •  lisinopril (PRINIVIL,ZESTRIL) 10 MG tablet, Take 1 tablet by mouth twice daily, Disp: 180 tablet, Rfl: 0  •  nicotine (NICODERM CQ) 14 MG/24HR patch, Place 1 patch on the skin as directed by provider Daily., Disp: 90 patch, Rfl: 0  •  potassium chloride 10 MEQ CR tablet, Take 1 tablet by mouth Daily., Disp: 30 tablet, Rfl: 3  •  tadalafil (Cialis) 20 MG tablet, Take 1 tablet by mouth Daily As Needed for Erectile Dysfunction., Disp: 30 tablet, Rfl: 2  •  timolol (TIMOPTIC) 0.5 % ophthalmic solution, Administer 1 drop to both eyes Daily., Disp: , Rfl:   •  vitamin B-12 (CYANOCOBALAMIN) 1000 MCG tablet, Take 1 tablet by mouth Daily., Disp: 90 tablet, Rfl: 3  •  warfarin (COUMADIN) 5 MG tablet, Take 1 tablet by mouth Daily., Disp: 30 tablet, Rfl: 5    ALLERGIES     Patient has no known allergies.    FAMILY HISTORY       Family History   Problem Relation Age of Onset   • Cancer Mother    • Hypertension Father    • Heart  "disease Father    • No Known Problems Sister    • No Known Problems Brother           SOCIAL HISTORY       Social History     Socioeconomic History   • Marital status:    • Number of children: 0   Tobacco Use   • Smoking status: Current Every Day Smoker     Packs/day: 0.25     Years: 57.00     Pack years: 14.25     Types: Cigarettes     Start date: 1963   • Smokeless tobacco: Never Used   • Tobacco comment: cut back on cigs   Vaping Use   • Vaping Use: Never used   Substance and Sexual Activity   • Alcohol use: Yes     Alcohol/week: 2.0 standard drinks     Types: 2 Shots of liquor per week     Comment: Daily   • Drug use: No   • Sexual activity: Defer     Partners: Female     Comment:           PHYSICAL EXAM    (up to 7 for level 4, 8 or more for level 5)     Vitals:    05/16/22 1500 05/16/22 1838 05/16/22 1934   BP: 176/97 (!) 187/106    BP Location: Left arm     Patient Position: Sitting     Pulse: 70 70 70   Resp: 18     Temp: 98.2 °F (36.8 °C)     TempSrc: Oral     SpO2: 97% (!) 88% 97%   Weight: 68 kg (150 lb)     Height: 180.3 cm (71\")         Physical Exam  General : Patient is awake, alert, oriented, in no acute distress, nontoxic appearing  HEENT: Pupils are equally round and reactive to light, EOMI, conjunctivae clear, sclerae white, there is no injection no icterus.  Oral mucosa is moist, no exudate. Uvula is midline  Neck: Neck is supple, full range of motion, trachea midline  Cardiac: Heart regular rate, rhythm, systolic click noted  Lungs: Lungs are clear to auscultation, there is no wheezing, rhonchi, or rales. There is no use of accessory muscles  Chest wall: There is no tenderness to palpation over the chest wall or over ribs  Abdomen: Abdomen is soft, nontender, nondistended. There are no firm or pulsatile masses, no rebound rigidity or guarding.   Musculoskeletal: 2-3+ edema to bilateral lower extremities, right lower extremity is slightly more swollen than the left.  Distal pulses " are intact, distal sensation is normal.  5 out of 5 strength in all 4 extremities.  No focal muscle deficits are appreciated  Neuro: Motor intact, sensory intact, level of consciousness is normal  Dermatology: Skin is warm and dry  Psych: Mentation is grossly normal, cognition is grossly normal. Affect is appropriate.      DIAGNOSTIC RESULTS     EKG: All EKGs are interpreted by the Emergency Department Physician who either signs or Co-signs this chart in the absence of a cardiologist.    No orders to display       RADIOLOGY:   Non-plain film images such as CT, Ultrasound and MRI are read by the radiologist. Plain radiographic images are visualized and preliminarily interpreted by the emergency physician with the below findings:      [] Radiologist's Report Reviewed:  No orders to display         ED BEDSIDE ULTRASOUND:   Performed by ED Physician - none    LABS:    I have reviewed and interpreted all of the currently available lab results from this visit (if applicable):  Results for orders placed or performed during the hospital encounter of 05/16/22   Protime-INR    Specimen: Blood   Result Value Ref Range    Protime 26.1 (H) 11.4 - 14.4 Seconds    INR 2.38 (H) 0.84 - 1.13   Comprehensive Metabolic Panel    Specimen: Blood   Result Value Ref Range    Glucose 122 (H) 65 - 99 mg/dL    BUN 10 8 - 23 mg/dL    Creatinine 0.95 0.76 - 1.27 mg/dL    Sodium 138 136 - 145 mmol/L    Potassium 4.1 3.5 - 5.2 mmol/L    Chloride 101 98 - 107 mmol/L    CO2 24.0 22.0 - 29.0 mmol/L    Calcium 9.1 8.6 - 10.5 mg/dL    Total Protein 7.3 6.0 - 8.5 g/dL    Albumin 4.00 3.50 - 5.20 g/dL    ALT (SGPT) 9 1 - 41 U/L    AST (SGOT) 25 1 - 40 U/L    Alkaline Phosphatase 77 39 - 117 U/L    Total Bilirubin 1.3 (H) 0.0 - 1.2 mg/dL    Globulin 3.3 gm/dL    A/G Ratio 1.2 g/dL    BUN/Creatinine Ratio 10.5 7.0 - 25.0    Anion Gap 13.0 5.0 - 15.0 mmol/L    eGFR 85.6 >60.0 mL/min/1.73   BNP    Specimen: Blood   Result Value Ref Range    proBNP 1,800.0  (H) 0.0 - 900.0 pg/mL   CBC Auto Differential    Specimen: Blood   Result Value Ref Range    WBC 4.14 3.40 - 10.80 10*3/mm3    RBC 3.78 (L) 4.14 - 5.80 10*6/mm3    Hemoglobin 12.9 (L) 13.0 - 17.7 g/dL    Hematocrit 38.7 37.5 - 51.0 %    .4 (H) 79.0 - 97.0 fL    MCH 34.1 (H) 26.6 - 33.0 pg    MCHC 33.3 31.5 - 35.7 g/dL    RDW 14.6 12.3 - 15.4 %    RDW-SD 54.9 (H) 37.0 - 54.0 fl    MPV 12.3 (H) 6.0 - 12.0 fL    Platelets 123 (L) 140 - 450 10*3/mm3    Neutrophil % 73.3 42.7 - 76.0 %    Lymphocyte % 15.9 (L) 19.6 - 45.3 %    Monocyte % 8.7 5.0 - 12.0 %    Eosinophil % 0.7 0.3 - 6.2 %    Basophil % 1.2 0.0 - 1.5 %    Immature Grans % 0.2 0.0 - 0.5 %    Neutrophils, Absolute 3.03 1.70 - 7.00 10*3/mm3    Lymphocytes, Absolute 0.66 (L) 0.70 - 3.10 10*3/mm3    Monocytes, Absolute 0.36 0.10 - 0.90 10*3/mm3    Eosinophils, Absolute 0.03 0.00 - 0.40 10*3/mm3    Basophils, Absolute 0.05 0.00 - 0.20 10*3/mm3    Immature Grans, Absolute 0.01 0.00 - 0.05 10*3/mm3    nRBC 0.0 0.0 - 0.2 /100 WBC   Green Top (Gel)   Result Value Ref Range    Extra Tube Hold for add-ons.    Lavender Top   Result Value Ref Range    Extra Tube hold for add-on    Gold Top - SST   Result Value Ref Range    Extra Tube Hold for add-ons.    Gray Top   Result Value Ref Range    Extra Tube Hold for add-ons.    Light Blue Top   Result Value Ref Range    Extra Tube Hold for add-ons.    Duplex Venous Lower Extremity - Right   Result Value Ref Range    Target HR (85%) 127 bpm    Max. Pred. HR (100%) 149 bpm    Right Common Femoral Spont Y     Right Common Femoral Phasic Y     Right Common Femoral Augment Y     Right Common Femoral Competent Y     Right Common Femoral Compress C     Right Saphenofemoral Junction Spont Y     Right Saphenofemoral Junction Phasic Y     Right Saphenofemoral Junction Augment Y     Right Saphenofemoral Junction Competent Y     Right Saphenofemoral Junction Compress C     Right Profunda Femoral Spont Y     Right Profunda Femoral  "Phasic Y     Right Profunda Femoral Augment Y     Right Profunda Femoral Competent Y     Right Profunda Femoral Compress C     Right Proximal Femoral Spont Y     Right Proximal Femoral Phasic Y     Right Proximal Femoral Augment Y     Right Proximal Femoral Competent Y     Right Proximal Femoral Compress C     Right Mid Femoral Spont Y     Right Mid Femoral Phasic Y     Right Mid Femoral Augment Y     Right Mid Femoral Competent Y     Right Mid Femoral Compress C     Right Distal Femoral Spont Y     Right Distal Femoral Phasic Y     Right Distal Femoral Augment Y     Right Distal Femoral Competent Y     Right Distal Femoral Compress C     Right Popliteal Spont Y     Right Popliteal Phasic Y     Right Popliteal Augment Y     Right Popliteal Competent Y     Right Popliteal Compress C     Right Posterior Tibial Augment Y     Right Posterior Tibial Compress C     Right Peroneal Augment Y     Right Peroneal Compress C     Right Gastronemius Compress C     Right Greater Saph AK Compress C     Right Greater Saph BK Compress C     Right Lesser Saph Compress C     Right Varicosity AK Compress C     Right Varicosity BK Compress C     Left Common Femoral Spont Y     Left Common Femoral Phasic Y     Left Common Femoral Augment Y     Left Common Femoral Competent Y     Left Common Femoral Compress C         All other labs were within normal range or not returned as of this dictation.      EMERGENCY DEPARTMENT COURSE and DIFFERENTIAL DIAGNOSIS/MDM:   Vitals:    Vitals:    05/16/22 1500 05/16/22 1838 05/16/22 1934   BP: 176/97 (!) 187/106    BP Location: Left arm     Patient Position: Sitting     Pulse: 70 70 70   Resp: 18     Temp: 98.2 °F (36.8 °C)     TempSrc: Oral     SpO2: 97% (!) 88% 97%   Weight: 68 kg (150 lb)     Height: 180.3 cm (71\")              Patient with swelling to right lower extremity when compared to the left with bilateral peripheral edema.  The patient is supposed to be on a therapeutic Coumadin, was bridging " to Lovenox but cannot afford his medication.  Here he is nontoxic-appearing, his vital signs are stable, no respiratory distress.  Ultrasound of the right lower extremity does not reveal any acute DVT or abnormality.  Patient's INR is 2.4, we did give him dose of Lasix secondary to the peripheral edema, discussed leg elevation.  DVT study with no acute abnormalities.  Patient was diuresing well in the ED, we discussed increasing his Lasix to a double dose.  To follow-up with his PCP in a couple days for reevaluation, return precautions discussed. I had a discussion with the patient/family regarding diagnosis, diagnostic results, treatment plan, and medications.  The patient/family indicated understanding of these instructions.  I spent adequate time at the bedside preceding discharge necessary to personally discuss the aftercare instructions, giving patient education, providing explanations of the results of our evaluations/findings, and my decision making to assure that the patient/family understand the plan of care.  Time was allotted to answer questions at that time and throughout the ED course.  Emphasis was placed on timely follow-up after discharge.  I also discussed the potential for the development of an acute emergent condition requiring further evaluation, admission, or even surgical intervention. I discussed that we found nothing during the visit today indicating the need for further workup, admission, or the presence of an unstable medical condition.  I encouraged the patient to return to the emergency department immediately for ANY concerns, worsening, new complaints, or if symptoms persist and unable to seek follow-up in a timely fashion.  The patient/family expressed understanding and agreement with this plan.  The patient will follow-up with their PCP in 1-2 days for reevaluation.       MEDICATIONS ADMINISTERED IN ED:  Medications   sodium chloride 0.9 % flush 10 mL (has no administration in time  range)   furosemide (LASIX) injection 40 mg (40 mg Intravenous Given 5/16/22 9822)   Lidocaine HCl gel (XYLOCAINE) urethral/mucosal/topical ( Topical Given 5/16/22 7274)       PROCEDURES:  Procedures    CRITICAL CARE TIME    Total Critical Care time was 0 minutes, excluding separately reportable procedures.   There was a high probability of clinically significant/life threatening deterioration in the patient's condition which required my urgent intervention.      FINAL IMPRESSION      1. Peripheral edema          DISPOSITION/PLAN     ED Disposition     ED Disposition   Discharge    Condition   Stable    Comment   --             PATIENT REFERRED TO:  Ez Cintron MD  [address] [place] [address] [address]  [place] [id number]  [phone]    In 2 days      [facility] Emergency Department  [address] [facility] [address] [phone]    If symptoms worsen      DISCHARGE MEDICATIONS:     Medication List      CHANGE how you take these medications    furosemide 20 MG tablet  Commonly known as: LASIX  Take 1 tablet by mouth Daily. Take 2 tablets tomorrow morning, then 1 tablet daily.  Take with potassium tablet  What changed: additional instructions        CONTINUE taking these medications    aspirin 81 MG EC tablet     atorvastatin 80 MG tablet  Commonly known as: LIPITOR  Take 1 tablet by mouth Every Night.     buPROPion  MG 24 hr tablet  Commonly known as: WELLBUTRIN XL  Take 1 tablet by mouth once daily     carvedilol 25 MG tablet  Commonly known as: COREG     citalopram 20 MG tablet  Commonly known as: CeleXA  Take 1 tablet by mouth once daily     Enoxaparin Sodium 80 MG/0.8ML solution prefilled syringe syringe  Commonly known as: LOVENOX  Inject 0.7 mL under the skin into the appropriate area as directed Every 12 (Twelve) Hours.     famotidine 20 MG tablet  Commonly known as: PEPCID  Take 1 tablet by mouth Daily.     finasteride 5 MG tablet  Commonly known as:  PROSCAR  TAKE 1 TABLET BY MOUTH ONCE DAILY AT BEDTIME     folic acid 400 MCG tablet  Commonly known as: FOLVITE  TAKE 1 TABLET BY MOUTH ONCE DAILY     lisinopril 10 MG tablet  Commonly known as: PRINIVIL,ZESTRIL  Take 1 tablet by mouth twice daily     nicotine 14 MG/24HR patch  Commonly known as: NICODERM CQ  Place 1 patch on the skin as directed by provider Daily.     potassium chloride 10 MEQ CR tablet  Take 1 tablet by mouth Daily.     tadalafil 20 MG tablet  Commonly known as: Cialis  Take 1 tablet by mouth Daily As Needed for Erectile Dysfunction.     timolol 0.5 % ophthalmic solution  Commonly known as: TIMOPTIC     vitamin B-12 1000 MCG tablet  Commonly known as: CYANOCOBALAMIN  Take 1 tablet by mouth Daily.     warfarin 5 MG tablet  Commonly known as: COUMADIN  Take 1 tablet by mouth Daily.                Comment: Please note this report has been produced using speech recognition software.      Ravi Wells DO  Attending Emergency Physician               Ravi Wells DO  05/16/22 1938

## 2022-05-16 NOTE — PROGRESS NOTES
Anticoagulation Clinic Progress Note  Indication:  Mechanical heart valve, persistent Afib  Referring Provider: Ez Cintron  Initial Warfarin Start Date: > 20 years  Planned Duration of Therapy: indefinite  Goal INR: 2.5-3.5  Current Drug Interactions: asa    [id number]:    Diet: occasional broccoli, spinach salad rarely  Alcohol: 2-3xnightly  Tobacco: < 1/2ppd  OTC Pain Medication: none    Anticoagulation Clinic INR History:  Date 5/11 5/16         Total Weekly Dose 35 mg 37.5mg         INR 1.5 3.1         Notes  1x boost, no enox           Clinic Interview:  Tablet Strength: 5 mg  Estimated OOP cost: [please send to registration if not already done]  Verbal Release: Verbal Release Authorization signed on 5/11/2022 -- may speak with Meghana (spouse)      Patient Findings:    Positives:  Signs/symptoms of thrombosis   Negatives:  Signs/symptoms of bleeding, Laboratory test error suspected, Change in health, Change in alcohol use, Change in activity, Upcoming invasive procedure, Emergency department visit, Upcoming dental procedure, Missed doses, Extra doses, Change in medications, Change in diet/appetite, Hospital admission, Bruising, Other complaints   Comments:  Patient was unable to afford lovenox and did not  medication.   Has c/o swelling in RLE, denies redness, warmth, pain. Will have patient go to ED to r/o possible DVT   Brought in medications-- had 2 bottles of atorvastatin 80mg, possibly was double dosing on statin.           Plan:  1. INR is therapeutic at 3.1 today. Unable to determine cause of labile INR.  Instructed pt to continue warfarin 5 mg po daily. Would consider new rx for 1mg tablets to start 5.5mg daily at next encounter if again returns SUBTherapeutic  2. RTC next week 5/19, will go to ED today to r/o DVT  3.Pt requests warfarin refills.  4. Verbal and written information provided. Dillon Vo expresses understanding by teach back and has no further questions at this  time.    Diana Solano, GarettD.  05/16/22   14:59 EDT

## 2022-05-19 ENCOUNTER — ANTICOAGULATION VISIT (OUTPATIENT)
Dept: PHARMACY | Facility: HOSPITAL | Age: 72
End: 2022-05-19

## 2022-05-19 DIAGNOSIS — I48.91 ATRIAL FIBRILLATION, UNSPECIFIED TYPE: Primary | ICD-10-CM

## 2022-05-19 LAB
INR PPP: 2.9 (ref 0.91–1.09)
PROTHROMBIN TIME: 35.2 SECONDS (ref 10–13.8)

## 2022-05-19 PROCEDURE — 85610 PROTHROMBIN TIME: CPT

## 2022-05-19 PROCEDURE — 36416 COLLJ CAPILLARY BLOOD SPEC: CPT

## 2022-05-19 PROCEDURE — G0463 HOSPITAL OUTPT CLINIC VISIT: HCPCS

## 2022-05-19 NOTE — PROGRESS NOTES
Anticoagulation Clinic Progress Note  Indication:  Mechanical heart valve, persistent Afib  Referring Provider: Ez Cintron  Initial Warfarin Start Date: > 20 years  Planned Duration of Therapy: indefinite  Goal INR: 2.5-3.5  Current Drug Interactions: asa    [id number]:    Diet: occasional broccoli, spinach salad rarely  Alcohol: 2-3xnightly  Tobacco: < 1/2ppd  OTC Pain Medication: none    Anticoagulation Clinic INR History:  Date 5/11 5/16 5/19        Total Weekly Dose 35 mg 37.5mg 35mg        INR 1.5 2.68 ED  3.1 POCT 2.9        Notes  1x boost, no enox           Clinic Interview:  Tablet Strength: 5 mg  Estimated OOP cost: [please send to registration if not already done]  Verbal Release: Verbal Release Authorization signed on 5/11/2022 -- may speak with Meghana (spouse)      Patient Findings:      Negatives:  Signs/symptoms of thrombosis, Signs/symptoms of bleeding, Laboratory test error suspected, Change in health, Change in alcohol use, Change in activity, Upcoming invasive procedure, Upcoming dental procedure, Missed doses, Extra doses, Change in medications, Change in diet/appetite, Hospital admission, Bruising, Other complaints   Comments:  No DVT findings from ED visit   Discussed consistency with tobacco use           Plan:  1. INR is therapeutic at 2.9  today.Instructed pt to continue warfarin 5 mg po daily. Could consider new rx for 1mg tablets to start 5.5mg daily at next encounter if again returns SUBTherapeutic  2. RTC next week 5/24  3.Pt requests warfarin refills.  4. Verbal and written information provided. Dillon Vo expresses understanding by teach back and has no further questions at this time.    Diana Solano, PharmD.  05/19/22   14:32 EDT

## 2022-05-23 ENCOUNTER — HOSPITAL ENCOUNTER (OUTPATIENT)
Dept: CT IMAGING | Facility: HOSPITAL | Age: 72
Discharge: HOME | End: 2022-05-23
Admitting: NURSE PRACTITIONER

## 2022-05-23 DIAGNOSIS — C34.12 MALIGNANT NEOPLASM OF UPPER LOBE OF LEFT LUNG: ICD-10-CM

## 2022-05-23 PROCEDURE — 71270 CT THORAX DX C-/C+: CPT

## 2022-05-23 PROCEDURE — 25010000002 IOPAMIDOL 61 % SOLUTION: Performed by: NURSE PRACTITIONER

## 2022-05-23 RX ADMIN — IOPAMIDOL 60 ML: 612 INJECTION, SOLUTION INTRAVENOUS at 15:40

## 2022-05-25 ENCOUNTER — TELEPHONE (OUTPATIENT)
Dept: PHARMACY | Facility: HOSPITAL | Age: 72
End: 2022-05-25

## 2022-05-27 ENCOUNTER — TELEPHONE (OUTPATIENT)
Dept: PHARMACY | Facility: HOSPITAL | Age: 72
End: 2022-05-27

## 2022-06-01 ENCOUNTER — OFFICE VISIT (OUTPATIENT)
Dept: CARDIOLOGY | Facility: HOSPITAL | Age: 72
End: 2022-06-01

## 2022-06-01 VITALS
WEIGHT: 150 LBS | HEIGHT: 71 IN | SYSTOLIC BLOOD PRESSURE: 140 MMHG | BODY MASS INDEX: 21 KG/M2 | HEART RATE: 72 BPM | DIASTOLIC BLOOD PRESSURE: 76 MMHG

## 2022-06-01 DIAGNOSIS — R60.0 BILATERAL LEG EDEMA: ICD-10-CM

## 2022-06-01 DIAGNOSIS — I50.32 CHRONIC HEART FAILURE WITH PRESERVED EJECTION FRACTION: Primary | ICD-10-CM

## 2022-06-01 DIAGNOSIS — I10 PRIMARY HYPERTENSION: ICD-10-CM

## 2022-06-01 DIAGNOSIS — I38 VALVULAR HEART DISEASE: ICD-10-CM

## 2022-06-01 DIAGNOSIS — I48.20 CHRONIC ATRIAL FIBRILLATION: ICD-10-CM

## 2022-06-01 PROCEDURE — 99442 PR PHYS/QHP TELEPHONE EVALUATION 11-20 MIN: CPT | Performed by: NURSE PRACTITIONER

## 2022-06-01 NOTE — PROGRESS NOTES
[facility]  This was an audio enabled telemedicine encounter.    You have chosen to receive care through the use of telemedicine. Telemedicine enables health care providers at different locations to provide safe, effective, and convenient care through the use of technology. As with any health care service, there are risks associated with the use of telemedicine, including equipment failure, poor connections, and  issues.    • Do you understand the risks and benefits of telemedicine as I have explained them to you? Yes  • Have your questions regarding telemedicine been answered? Yes  • Do you consent to the use of telemedicine in your medical care today? Yes    Chief Complaint  Follow-up (Dyspnea and edema)    Subjective    History of Present Illness {CC  Problem List  Visit  Diagnosis   Encounters  Notes  Medications  Labs  Result Review Imaging  Media :23}     Dillon Vo presents to [facility] CARDIOLOGY for   History of Present Illness     71-year-old male with chronic atrial fibrillation status post AV node ablation with single chamber pacemaker (2013).  Valvular heart disease status post mechanical aortic valve, hypertension, heart failure with preserved EF.    History of medication compliance issues.    Echocardiogram 3/21/2021 with EF 53%.  Chronic Coumadin therapy followed by primary care provider.    Last office visit patient was seen after an ED visit with shortness of breath.  Patient had not been taking his Lasix.  At that time he was encouraged to restart his Lasix and potassium as prescribed.  Patient also not taking his atorvastatin.  Patient's blood pressures been elevated on last office visit but he had not taken his morning blood pressure medications.    Patient presented to [facility] ED on 5/16/2022 with complaints of lower extremity edema.  Venous duplex negative for DVT.  He  "was given Lasix in the ED.  Home dose increased for 2 days.    Pt reports edema has resolved.  He denies CP, pressure, dizziness, syncope.  Dyspnea noted in AM but improves through out the day.        Objective     Vital Signs:   Vitals:    06/01/22 1207   BP: 140/76   Pulse: 72   Weight: 68 kg (150 lb)   Height: 180.3 cm (71\")     Body mass index is 20.92 kg/m².  Physical Exam     A/O, fair historian, engaged, non-labored interview      Result Review  Data Reviewed:{ Labs  Result Review  Imaging  Med Tab  Media :23}       Lab Results   Component Value Date    WBC 4.14 05/16/2022    HGB 12.9 (L) 05/16/2022    HCT 38.7 05/16/2022    .4 (H) 05/16/2022     (L) 05/16/2022     Lab Results   Component Value Date    GLUCOSE 122 (H) 05/16/2022    CALCIUM 9.1 05/16/2022     05/16/2022    K 4.1 05/16/2022    CO2 24.0 05/16/2022     05/16/2022    BUN 10 05/16/2022    CREATININE 0.95 05/16/2022    EGFRIFAFRI >60 08/20/2021    EGFRIFNONA 76 02/01/2022    BCR 10.5 05/16/2022    ANIONGAP 13.0 05/16/2022     Lab Results   Component Value Date    TSH 1.770 05/25/2021     Lab Results   Component Value Date    CHOL 137 02/01/2022    CHOL 101 02/24/2021    CHOL 121 11/25/2020    CHLPL 189 01/21/2016     Lab Results   Component Value Date    TRIG 54 02/01/2022    TRIG 36 02/24/2021    TRIG 40 11/25/2020     Lab Results   Component Value Date    HDL 74 (H) 02/01/2022    HDL 75 (H) 02/24/2021    HDL 61 (H) 11/25/2020     Lab Results   Component Value Date    LDL 51 02/01/2022    LDL 15 02/24/2021    LDL 50 11/25/2020                   Assessment and Plan {CC Problem List  Visit Diagnosis  ROS  Review (Popup)  Health Maintenance  Quality  BestPractice  Medications  SmartSets  SnapShot Encounters  Media :23}   1. Chronic heart failure with preserved ejection fraction (HCC)  Patient currently on Lasix 20 mg daily.  If he has worsening dyspnea, edema he should increase Lasix to 40 mg daily for 3 days " then resume previous dosing.  If symptoms do not improve after 3 days he should call the heart valve center for evaluation.    2. Valvular heart disease  Mechanical valve.    3. Chronic atrial fibrillation (HCC)  Coumadin followed by [facility] coaoscar lobo    4. Primary hypertension  Continue lisinopril  5. Bilateral leg edema  Reported resolved.      I spent 15 minutes caring for Dillon on this date of service. This time includes time spent by me in the following activities:preparing for the visit, reviewing tests, performing a medically appropriate examination and/or evaluation , counseling and educating the patient/family/caregiver and documenting information in the medical record    Follow Up {Instructions Charge Capture  Follow-up Communications :23}   Return in about 4 weeks (around 6/29/2022) for Office visit, valvular heart disease, heart failure with preserved EF.    Patient was given instructions and counseling regarding his condition or for health maintenance advice. Please see specific information pulled into the AVS if appropriate.  Patient was instructed to call the Heart [facility] Val[facility] Center with any questions, concerns, or worsening symptoms.

## 2022-06-09 ENCOUNTER — OFFICE VISIT (OUTPATIENT)
Dept: CARDIAC SURGERY | Facility: CLINIC | Age: 72
End: 2022-06-09

## 2022-06-09 VITALS
HEART RATE: 74 BPM | HEIGHT: 71 IN | OXYGEN SATURATION: 99 % | BODY MASS INDEX: 19.88 KG/M2 | TEMPERATURE: 97.3 F | DIASTOLIC BLOOD PRESSURE: 73 MMHG | SYSTOLIC BLOOD PRESSURE: 123 MMHG | WEIGHT: 142 LBS

## 2022-06-09 DIAGNOSIS — C34.12 MALIGNANT NEOPLASM OF UPPER LOBE OF LEFT LUNG: Primary | ICD-10-CM

## 2022-06-09 PROCEDURE — 99213 OFFICE O/P EST LOW 20 MIN: CPT | Performed by: NURSE PRACTITIONER

## 2022-06-09 RX ORDER — DORZOLAMIDE HYDROCHLORIDE AND TIMOLOL MALEATE 20; 5 MG/ML; MG/ML
SOLUTION/ DROPS OPHTHALMIC
COMMUNITY

## 2022-06-09 NOTE — PROGRESS NOTES
[facility] Cardiothoracic Surgery Office Follow Up Note     Date of Encounter: 2022     Name: Dillon Vo  : 1950     Referred By: No ref. provider found  PCP: Ez Cintron MD    Chief Complaint:    Chief Complaint   Patient presents with   • Lung Cancer     6 month follow-up with results from CT chest        Subjective      History of Present Illness:    Dillon Vo is a 71 y.o. male current smoker, with history of CAD, A. fib, pacemaker, mechanical heart valve on chronic anticoagulation, colon cancer,  and hypermetabolic left upper lobe lung mass s/p left thoracotomy with left upper lobectomy and lymph node sampling on 2021 with Dr. Post.  Pathology revealed adenocarcinoma, invasive and lipidic pattern, moderately differentiated, with hyalinized granuloma with central necrosis present; estimated total tumor size of 3.5 cm in maximum dimension, negative margins, negative nodes; staging consistent with pT2 N0 MX stage IB adenocarcinoma. Patient was previously established with pulmonology Dr. Kenney with recommendations to continue with surveillance CT Chest imaging every 6 months. Patient missed his last appointment with pulmonology in November and does not appear to have seen them since. Patient presents today for 6 month follow up surveillance of his lung cancer.  Patient has been doing well, denies any interval worsening SOA, hemoptysis, or lymphadenopathy. He has had some weight loss but minimal over the last 6 months. He does unfortunately continue to smoke.  He is continue to follow closely with his PCP Dr. Cintron and recently established care with the [facility] for management of his HF PEF, A. fib, HTN, and BLE edema.    Review of Systems:  Review of Systems   Constitutional: Positive for weight loss (5-6 lbs within 6 months). Negative for chills, decreased appetite, diaphoresis, fever, malaise/fatigue, night sweats and weight gain.   HENT: Negative for  hoarse voice.    Eyes: Negative for blurred vision, double vision and visual disturbance.   Cardiovascular: Negative for chest pain, claudication, dyspnea on exertion, irregular heartbeat, leg swelling, near-syncope, orthopnea, palpitations, paroxysmal nocturnal dyspnea and syncope.   Respiratory: Negative for cough, hemoptysis, shortness of breath, sputum production and wheezing.    Hematologic/Lymphatic: Negative for adenopathy and bleeding problem. Bruises/bleeds easily.   Skin: Negative for color change, nail changes, poor wound healing and rash.   Musculoskeletal: Negative for back pain, falls and muscle cramps.   Gastrointestinal: Negative for abdominal pain, dysphagia and heartburn.   Genitourinary: Negative for flank pain.   Neurological: Negative for brief paralysis, disturbances in coordination, dizziness, focal weakness, headaches, light-headedness, loss of balance, numbness, paresthesias, sensory change, vertigo and weakness.   Psychiatric/Behavioral: Negative for depression and suicidal ideas.   Allergic/Immunologic: Negative for persistent infections.       I have reviewed the following portions of the patient's history: allergies, current medications, past family history, past medical history, past social history, past surgical history, problem list and ROS and confirm it's accurate.    Allergies:  No Known Allergies    Medications:      Current Outpatient Medications:   •  aspirin 81 MG EC tablet, Take 1 tablet by mouth daily., Disp: , Rfl:   •  atorvastatin (LIPITOR) 80 MG tablet, Take 1 tablet by mouth Every Night., Disp: 30 tablet, Rfl: 3  •  buPROPion XL (WELLBUTRIN XL) 300 MG 24 hr tablet, Take 1 tablet by mouth once daily, Disp: 30 tablet, Rfl: 5  •  carvedilol (COREG) 25 MG tablet, Take 12.5 mg by mouth 2 (Two) Times a Day With Meals., Disp: , Rfl:   •  citalopram (CeleXA) 20 MG tablet, Take 1 tablet by mouth once daily, Disp: 90 tablet, Rfl: 1  •  dorzolamide-timolol (COSOPT) 22.3-6.8 MG/ML  ophthalmic solution, dorzolamide 22.3 mg-timolol 6.8 mg/mL eye drops  1 drop both eyes 9 am and 9 pm, Disp: , Rfl:   •  famotidine (PEPCID) 20 MG tablet, Take 1 tablet by mouth Daily., Disp: 30 tablet, Rfl: 1  •  finasteride (PROSCAR) 5 MG tablet, TAKE 1 TABLET BY MOUTH ONCE DAILY AT BEDTIME, Disp: 90 tablet, Rfl: 1  •  folic acid (FOLVITE) 400 MCG tablet, TAKE 1 TABLET BY MOUTH ONCE DAILY, Disp: 90 tablet, Rfl: 3  •  furosemide (LASIX) 20 MG tablet, Take 1 tablet by mouth Daily. Take 2 tablets tomorrow morning, then 1 tablet daily.  Take with potassium tablet (Patient taking differently: Take 20 mg by mouth Daily. Take1 tablet daily.  Take with potassium tablet), Disp: 30 tablet, Rfl: 1  •  lisinopril (PRINIVIL,ZESTRIL) 10 MG tablet, Take 1 tablet by mouth twice daily, Disp: 180 tablet, Rfl: 0  •  nicotine (NICODERM CQ) 14 MG/24HR patch, Place 1 patch on the skin as directed by provider Daily., Disp: 90 patch, Rfl: 0  •  potassium chloride 10 MEQ CR tablet, Take 1 tablet by mouth Daily., Disp: 30 tablet, Rfl: 3  •  tadalafil (Cialis) 20 MG tablet, Take 1 tablet by mouth Daily As Needed for Erectile Dysfunction., Disp: 30 tablet, Rfl: 2  •  vitamin B-12 (CYANOCOBALAMIN) 1000 MCG tablet, Take 1 tablet by mouth Daily., Disp: 90 tablet, Rfl: 3  •  warfarin (COUMADIN) 5 MG tablet, Take 1 tablet by mouth Daily., Disp: 30 tablet, Rfl: 5  •  Enoxaparin Sodium (LOVENOX) 80 MG/0.8ML solution prefilled syringe syringe, Inject 0.7 mL under the skin into the appropriate area as directed Every 12 (Twelve) Hours. (Patient not taking: Reported on 6/9/2022), Disp: 8 mL, Rfl: 1  •  timolol (TIMOPTIC) 0.5 % ophthalmic solution, Administer 1 drop to both eyes Daily. (Patient not taking: Reported on 6/9/2022), Disp: , Rfl:     History:   Past Medical History:   Diagnosis Date   • A-fib (HCC)    • Anemia    • Atrial fibrillation (HCC) 5/5/2016    Difficult to control rate. RFA of AV node with implantation of left Bi-V pacemaker,  06/18/2013. Hematoma requiring single-chamber pacemaker implanted, 07/29/2013.    • Colitis    • Colon cancer (HCC)    • Coronary artery disease    • Depression    • GERD (gastroesophageal reflux disease)    • Heart valve disease    • Hiatal hernia    • HTN (hypertension)    • Hyperbilirubinemia    • Hyperlipidemia    • Infectious viral hepatitis    • Kidney stone    • Lung cancer (HCC)    • Orthostatic hypotension    • Sick sinus syndrome (HCC)    • Skin cancer     lip        Past Surgical History:   Procedure Laterality Date   • APPENDECTOMY     • BRONCHOSCOPY THORACOTOMY Left 4/7/2021    Procedure: BRONCHOSCOPY LEFT THORACOTOMY, LEFT LUNG RESECTION;  Surgeon: Chi Post MD;  [facility]: [facility];  Service: Cardiothoracic;  Laterality: Left;   • CARDIAC CATHETERIZATION     • CARDIAC SURGERY  2000    valve replacement    • COLON SURGERY     • COLONOSCOPY  up to date   • CORONARY STENT PLACEMENT     • EXTRACORPOREAL SHOCKWAVE LITHOTRIPSY (ESWL), STENT INSERTION/REMOVAL     • LUNG CANCER SURGERY     • PACEMAKER IMPLANTATION     • VARICOSE VEIN SURGERY         Social History     Socioeconomic History   • Marital status:    • Number of children: 0   Tobacco Use   • Smoking status: Current Every Day Smoker     Packs/day: 0.25     Years: 57.00     Pack years: 14.25     Types: Cigarettes     Start date: 1963   • Smokeless tobacco: Never Used   • Tobacco comment: cut back on cigs   Vaping Use   • Vaping Use: Never used   Substance and Sexual Activity   • Alcohol use: Yes     Alcohol/week: 2.0 standard drinks     Types: 2 Shots of liquor per week     Comment: Daily   • Drug use: No   • Sexual activity: Defer     Partners: Female     Comment:          Family History   Problem Relation Age of Onset   • Cancer Mother    • Hypertension Father    • Heart disease Father    • No Known Problems Sister    • No Known Problems Brother        Objective     Physical Exam:  Vitals:    06/09/22 1134   BP: 123/73   BP  "Location: Right arm   Patient Position: Sitting   Pulse: 74   Temp: 97.3 °F (36.3 °C)   SpO2: 99%   Weight: 64.4 kg (142 lb)   Height: 180.3 cm (71\")      Body mass index is 19.8 kg/m².    Physical Exam  Vitals and nursing note reviewed.   Constitutional:       General: He is awake.      Appearance: Normal appearance.   HENT:      Head: Normocephalic and atraumatic.   Eyes:      Pupils: Pupils are equal, round, and reactive to light.   Cardiovascular:      Rate and Rhythm: Normal rate and regular rhythm.      Pulses: Normal pulses.      Heart sounds: Normal heart sounds, S1 normal and S2 normal.   Pulmonary:      Effort: Pulmonary effort is normal.      Breath sounds: Normal breath sounds. No decreased air movement.   Chest:   Breasts:      Right: No axillary adenopathy or supraclavicular adenopathy.      Left: No axillary adenopathy or supraclavicular adenopathy.       Abdominal:      Palpations: Abdomen is soft.   Musculoskeletal:         General: Normal range of motion.      Cervical back: Normal range of motion and neck supple.      Right lower leg: No edema.      Left lower leg: No edema.   Lymphadenopathy:      Cervical: No cervical adenopathy.      Right cervical: No superficial, deep or posterior cervical adenopathy.     Left cervical: No superficial, deep or posterior cervical adenopathy.      Upper Body:      Right upper body: No supraclavicular or axillary adenopathy.      Left upper body: No supraclavicular or axillary adenopathy.   Skin:     General: Skin is warm and dry.      Capillary Refill: Capillary refill takes less than 2 seconds.      Findings: No lesion.      Nails: There is no clubbing.      Comments: Thoracotomy scar well appearing   Neurological:      General: No focal deficit present.      Mental Status: He is alert and oriented to person, place, and time. Mental status is at baseline.      GCS: GCS eye subscore is 4. GCS verbal subscore is 5. GCS motor subscore is 6.      Cranial Nerves: " Cranial nerves are intact.      Sensory: Sensation is intact.      Motor: Motor function is intact.      Coordination: Coordination is intact.      Gait: Gait is intact.   Psychiatric:         Mood and Affect: Mood and affect normal. Mood is not depressed.         Speech: Speech normal.         Behavior: Behavior normal. Behavior is cooperative.         Thought Content: Thought content normal.         Judgment: Judgment normal.         Imaging/Labs:    CT Chest With & Without Contrast Diagnostic Result Date: 5/23/2022  Persistent moderate left pleural effusion with improved, now trace right pleural effusion. Groundglass changes at the lung bases are essentially resolved, with no current evidence of acute infectious or inflammatory process. Otherwise stable appearance status post left upper lobectomy with no evidence of recurrent mass or new suspicious nodularity to suggest recurrence.  This report was finalized on 5/23/2022 6:41 PM by Brennen Rubio.       CT Chest With & Without Contrast Diagnostic Result Date: 12/4/2021  Persistent left pleural effusion, similar in extent to the 05/24/2021 exam. No new or progressive chest disease is seen. No evidence of recurrent malignancy.  D:  12/03/2021 E:  12/03/2021  This report was finalized on 12/4/2021 3:37 PM by Dr. Rufino Disla MD.       CTA Chest Result date 5/24/21:  IMPRESSION: Status post left upper lobectomy. There is a moderate subpulmonic left pleural effusion new since March. No evidence of pulmonary embolism.     NM PET Result date 3/2/21:   IMPRESSION: Compared to PET/CT 09/25/2019 and as seen on recent  diagnostic CT chest, there has been slow but interval progressive growth  of a left upper lobe pulmonary nodule now measuring approximately 13 mm  with interval increase in activity borderline hypermetabolic or  equivocal for malignancy maximum SUV 2.3 versus 0.9 on 2019 comparison.  No separate sites from this in the chest or elsewhere to suggest  distant  metastasis or abnormal hypermetabolism otherwise.      Assessment / Plan      Assessment / Plan:  Diagnoses and all orders for this visit:    1. Malignant neoplasm of upper lobe of left lung (HCC) (Primary)       1. Left upper lobe lung mass with interval enlargement and elevated SUV who is s/p left thoracotomy with left upper lobectomy and lymph node sampling on 4/7/2021 with Dr. Post: Patient doing well, denies any acute complaints today or constitutional symptoms. Reviewed most recent CT Chest with interval improvement in left pleural effusion and improvement in previously noted groundglass opacities and no new acute concerns for malignancy. Patient was supposed to have AAA US performed, we will r/s today. Patient otherwise doing well and will plan to see the patient back in 6 months with repeat CT Chest for surveillance of his lung cancer.     Patient Education:  Dillon Vo  reports that he has been smoking cigarettes. He started smoking about 59 years ago. He has a 14.25 pack-year smoking history. He has never used smokeless tobacco.. I have educated him on the risk of diseases from using tobacco products such as cancer, COPD and heart disease.     I advised him to quit and he is not willing to quit.    I spent 5 minutes counseling the patient.    Follow Up:   Return in about 6 months (around 12/9/2022) for F/u with imaging.   Or sooner for any further concerns or worsening sign and symptoms. If unable to reach us in the office please dial 911 or go to the nearest emergency department.      Ángela ARENAS  [facility] Cardiothoracic Surgery    Time Spent: I spent 27 minutes caring for Dillon on this date of service. This time includes time spent by me in the following activities: preparing for the visit, reviewing tests, obtaining and/or reviewing a separately obtained history, performing a medically appropriate examination and/or evaluation, counseling and educating the  patient/family/caregiver, ordering medications, tests, or procedures, documenting information in the medical record, independently interpreting results and communicating that information with the patient/family/caregiver and care coordination.

## 2022-06-10 ENCOUNTER — ANTICOAGULATION VISIT (OUTPATIENT)
Dept: PHARMACY | Facility: HOSPITAL | Age: 72
End: 2022-06-10

## 2022-06-10 DIAGNOSIS — I48.91 ATRIAL FIBRILLATION, UNSPECIFIED TYPE: Primary | ICD-10-CM

## 2022-06-10 LAB
INR PPP: 2.2 (ref 0.91–1.09)
PROTHROMBIN TIME: 26.2 SECONDS (ref 10–13.8)

## 2022-06-10 PROCEDURE — 36416 COLLJ CAPILLARY BLOOD SPEC: CPT

## 2022-06-10 PROCEDURE — G0463 HOSPITAL OUTPT CLINIC VISIT: HCPCS

## 2022-06-10 PROCEDURE — 85610 PROTHROMBIN TIME: CPT

## 2022-06-10 NOTE — PROGRESS NOTES
Anticoagulation Clinic Progress Note  Indication:  Mechanical heart valve, persistent Afib  Referring Provider: Ez Cinrton  Initial Warfarin Start Date: > 20 years  Planned Duration of Therapy: indefinite  Goal INR: 2.5-3.5  Current Drug Interactions: asa    [id number]:    Diet: occasional broccoli, spinach salad rarely  Alcohol: 2-3xnightly  Tobacco: < 1/2ppd  OTC Pain Medication: none    Anticoagulation Clinic INR History:  Date 5/11 5/16 5/19 6/10       Total Weekly Dose 35 mg 37.5mg 35mg 35mg       INR 1.5 2.68 ED  3.1 POCT 2.9 2.2       Notes  1x boost, no enox           Clinic Interview:  Tablet Strength: 5 mg  Estimated OOP cost: [please send to registration if not already done]  Verbal Release: Verbal Release Authorization signed on 5/11/2022 -- may speak with Meghana (spouse)    Patient Findings    Negatives:  Signs/symptoms of thrombosis, Signs/symptoms of bleeding, Laboratory test error suspected, Change in health, Change in alcohol use, Change in activity, Upcoming invasive procedure, Emergency department visit, Upcoming dental procedure, Missed doses, Extra doses, Change in medications, Change in diet/appetite, Hospital admission, Bruising, Other complaints       Plan:  1. INR is subtherapeutic at 2.2  today.Instructed pt to boost dose to 10mg tongith and increase dose to warfarin 5 mg po daily except 7.5mg on Friday.   2. RTC next week 6/24  3. Verbal and written information provided. Dillon Vo expresses understanding by teach back and has no further questions at this time.    Thank you,    Yossi BajwaD, IMELDA, BCPS  6/10/2022  11:28 EDT

## 2022-06-24 ENCOUNTER — ANTICOAGULATION VISIT (OUTPATIENT)
Dept: PHARMACY | Facility: HOSPITAL | Age: 72
End: 2022-06-24

## 2022-06-24 DIAGNOSIS — I48.91 ATRIAL FIBRILLATION, UNSPECIFIED TYPE: Primary | ICD-10-CM

## 2022-06-24 LAB
INR PPP: 2.2 (ref 0.91–1.09)
PROTHROMBIN TIME: 26.1 SECONDS (ref 10–13.8)

## 2022-06-24 PROCEDURE — 85610 PROTHROMBIN TIME: CPT

## 2022-06-24 PROCEDURE — 36416 COLLJ CAPILLARY BLOOD SPEC: CPT

## 2022-06-24 NOTE — PROGRESS NOTES
Anticoagulation Clinic Progress Note  Indication:  Mechanical heart valve, persistent Afib  Referring Provider: Ez Cintron  Initial Warfarin Start Date: > 20 years  Planned Duration of Therapy: indefinite  Goal INR: 2.5-3.5  Current Drug Interactions: asa    [id number]:    Diet: occasional broccoli, spinach salad rarely  Alcohol: 2-3xnightly  Tobacco: < 1/2ppd  OTC Pain Medication: none    Anticoagulation Clinic INR History:  Date 5/11 5/16 5/19 6/10 6/24      Total Weekly Dose 35 mg 37.5mg 35mg 35mg 35 mg      INR 1.5 2.68 ED  3.1 POCT 2.9 2.2 2.2      Notes  1x boost, no enox           Clinic Interview:  Tablet Strength: 5 mg  Estimated OOP cost: [please send to registration if not already done]  Verbal Release: Verbal Release Authorization signed on 5/11/2022 -- may speak with Meghana (spouse)    Patient Findings    Negatives:  Signs/symptoms of thrombosis, Signs/symptoms of bleeding, Laboratory test error suspected, Change in health, Change in alcohol use, Change in activity, Upcoming invasive procedure, Emergency department visit, Upcoming dental procedure, Missed doses, Extra doses, Change in medications, Change in diet/appetite, Hospital admission, Bruising, Other complaints   Comments:  Mr Vo thinks he may be eating more vegetables than normal due to it being summer and his garden is growing lettuce and tomatoes.     Of note he took 5mg dialy last week instead of 5mg daily except 7.5mg on Friday like planned.       Plan:  1. INR is sub-therapeutic at 2.2 today.  Instructed pt to boost dose to 10mg tonight and increase dose to warfarin 5 mg po daily except 7.5mg on Friday.   2. RTC next week 7/8  3. Verbal and written information provided. Dillon Vo expresses understanding by teach back and has no further questions at this time.    Thank you,  Karen Jimenez, PharmD, BCPS   6/24/2022  11:27 EDT

## 2022-06-30 ENCOUNTER — OFFICE VISIT (OUTPATIENT)
Dept: CARDIOLOGY | Facility: HOSPITAL | Age: 72
End: 2022-06-30

## 2022-06-30 VITALS
TEMPERATURE: 96.3 F | DIASTOLIC BLOOD PRESSURE: 64 MMHG | WEIGHT: 142.4 LBS | BODY MASS INDEX: 19.94 KG/M2 | HEART RATE: 70 BPM | HEIGHT: 71 IN | OXYGEN SATURATION: 98 % | RESPIRATION RATE: 20 BRPM | SYSTOLIC BLOOD PRESSURE: 132 MMHG

## 2022-06-30 DIAGNOSIS — I10 PRIMARY HYPERTENSION: ICD-10-CM

## 2022-06-30 DIAGNOSIS — I50.32 CHRONIC HEART FAILURE WITH PRESERVED EJECTION FRACTION: Primary | ICD-10-CM

## 2022-06-30 DIAGNOSIS — I48.20 CHRONIC ATRIAL FIBRILLATION: ICD-10-CM

## 2022-06-30 DIAGNOSIS — I38 VALVULAR HEART DISEASE: ICD-10-CM

## 2022-06-30 DIAGNOSIS — R60.0 BILATERAL LEG EDEMA: ICD-10-CM

## 2022-06-30 LAB
ANION GAP SERPL CALCULATED.3IONS-SCNC: 8.1 MMOL/L (ref 5–15)
BUN SERPL-MCNC: 7 MG/DL (ref 8–23)
BUN/CREAT SERPL: 7.6 (ref 7–25)
CALCIUM SPEC-SCNC: 9.4 MG/DL (ref 8.6–10.5)
CHLORIDE SERPL-SCNC: 99 MMOL/L (ref 98–107)
CO2 SERPL-SCNC: 31.9 MMOL/L (ref 22–29)
CREAT SERPL-MCNC: 0.92 MG/DL (ref 0.76–1.27)
EGFRCR SERPLBLD CKD-EPI 2021: 88.9 ML/MIN/1.73
GLUCOSE SERPL-MCNC: 120 MG/DL (ref 65–99)
POTASSIUM SERPL-SCNC: 3.5 MMOL/L (ref 3.5–5.2)
SODIUM SERPL-SCNC: 139 MMOL/L (ref 136–145)

## 2022-06-30 PROCEDURE — 99214 OFFICE O/P EST MOD 30 MIN: CPT | Performed by: NURSE PRACTITIONER

## 2022-06-30 PROCEDURE — 80048 BASIC METABOLIC PNL TOTAL CA: CPT | Performed by: NURSE PRACTITIONER

## 2022-06-30 NOTE — PROGRESS NOTES
"[facility], [facility]    Chief Complaint  Congestive Heart Failure and Follow-up    Subjective    History of Present Illness {CC  Problem List  Visit  Diagnosis   Encounters  Notes  Medications  Labs  Result Review Imaging  Media :23}     Dillon Vo presents to [facility] CARDIOLOGY for   History of Present Illness     71-year-old male with chronic atrial fibrillation status post AV node ablation, single-chamber pacemaker (2013), valvular heart disease status post mechanical aortic valve, hypertension, heart failure with preserved EF.  History of medication compliance issues.    Echocardiogram 3/21/2021 with EF 53%.    Chronic Coumadin therapy [facility] anticoagulation department    Last visit patient been complaining of shortness of breath.  He had not been taking his Lasix.  Patient was restarted on Lasix 20 mg daily.    Edema and dyspnea has resolved.  No CP or pressure, occasional.  mild dizziness, no syncope.  Objective     Vital Signs:   Vitals:    06/30/22 1242   BP: 132/64   BP Location: Left arm   Patient Position: Sitting   Cuff Size: Adult   Pulse: 70   Resp: 20   Temp: 96.3 °F (35.7 °C)   TempSrc: Temporal   SpO2: 98%   Weight: 64.6 kg (142 lb 6.4 oz)   Height: 180.3 cm (71\")     Body mass index is 19.86 kg/m².  Physical Exam  Vitals reviewed.   Constitutional:       General: He is not in acute distress.     Appearance: Normal appearance.   Cardiovascular:      Rate and Rhythm: Normal rate and regular rhythm.      Pulses:           Radial pulses are 2+ on the right side.        Dorsalis pedis pulses are 2+ on the right side.        Posterior tibial pulses are 2+ on the right side.      Heart sounds: Normal heart sounds.   Pulmonary:      Effort: Pulmonary effort is normal.      Breath sounds: Normal breath sounds.   Musculoskeletal:      Right lower leg: No edema.      Left lower leg: No edema.   Skin:     " General: Skin is warm and dry.   Neurological:      Mental Status: He is alert.   Psychiatric:         Mood and Affect: Mood normal.         Behavior: Behavior is cooperative.              Result Review  Data Reviewed:{ Labs  Result Review  Imaging  Med Tab  Media :23}                 Assessment and Plan {CC Problem List  Visit Diagnosis  ROS  Review (Popup)  Health Maintenance  Quality  BestPractice  Medications  SmartSets  SnapShot Encounters  Media :23}   1. Chronic heart failure with preserved ejection fraction (HCC)  Euvolemic   Lasix 20 mg with KCL  - Basic Metabolic Panel    2. Valvular heart disease    - Basic Metabolic Panel    Reviewed signs and symptoms of heart failure worsening, role the heart failure center and when to call  3. Chronic atrial fibrillation (HCC)  Heart rate controlled, beta-blocker    Coumadin followed by anticoagulation clinic.        4. Primary hypertension  Blood pressure well controlled today    5. Bilateral leg edema  Resolved,          Follow Up {Instructions Charge Capture  Follow-up Communications :23}   Return in about 6 months (around 12/30/2022) for Office visit, HF.    Patient was given instructions and counseling regarding his condition or for health maintenance advice. Please see specific information pulled into the AVS if appropriate.  Patient was instructed to call the [facility] with any questions, concerns, or worsening symptoms.

## 2022-07-06 ENCOUNTER — HOSPITAL ENCOUNTER (OUTPATIENT)
Dept: ULTRASOUND IMAGING | Facility: HOSPITAL | Age: 72
Discharge: HOME | End: 2022-07-06
Admitting: NURSE PRACTITIONER

## 2022-07-06 DIAGNOSIS — I71.40 AAA (ABDOMINAL AORTIC ANEURYSM) WITHOUT RUPTURE: ICD-10-CM

## 2022-07-06 PROCEDURE — 76706 US ABDL AORTA SCREEN AAA: CPT

## 2022-07-07 ENCOUNTER — TELEPHONE (OUTPATIENT)
Dept: CARDIAC SURGERY | Facility: CLINIC | Age: 72
End: 2022-07-07

## 2022-07-07 NOTE — TELEPHONE ENCOUNTER
Called patient to let him know the results of his AAA US, AAA measuring 4.6 cm on US (4.5 cm on personal review of May 2021 CT), no significant interval growth. Will continue to monitor with plans for repeat AAA US in 1 year. Patient v/u and was thankful. Will plan to order US at visit December 2022.

## 2022-07-08 ENCOUNTER — ANTICOAGULATION VISIT (OUTPATIENT)
Dept: PHARMACY | Facility: HOSPITAL | Age: 72
End: 2022-07-08

## 2022-07-08 ENCOUNTER — LAB (OUTPATIENT)
Dept: LAB | Facility: HOSPITAL | Age: 72
End: 2022-07-08

## 2022-07-08 DIAGNOSIS — I48.91 ATRIAL FIBRILLATION, UNSPECIFIED TYPE: ICD-10-CM

## 2022-07-08 DIAGNOSIS — I48.91 ATRIAL FIBRILLATION, UNSPECIFIED TYPE: Primary | ICD-10-CM

## 2022-07-08 LAB
INR PPP: 3.66 (ref 0.84–1.13)
INR PPP: 4.9 (ref 0.91–1.09)
PROTHROMBIN TIME: 36.7 SECONDS (ref 11.4–14.4)
PROTHROMBIN TIME: 59 SECONDS (ref 10–13.8)

## 2022-07-08 PROCEDURE — G0463 HOSPITAL OUTPT CLINIC VISIT: HCPCS

## 2022-07-08 PROCEDURE — 85610 PROTHROMBIN TIME: CPT

## 2022-07-08 PROCEDURE — 36416 COLLJ CAPILLARY BLOOD SPEC: CPT

## 2022-07-08 PROCEDURE — 36415 COLL VENOUS BLD VENIPUNCTURE: CPT

## 2022-07-08 RX ORDER — CITALOPRAM 20 MG/1
TABLET ORAL
Qty: 90 TABLET | Refills: 0 | Status: SHIPPED | OUTPATIENT
Start: 2022-07-08

## 2022-07-08 RX ORDER — CARVEDILOL 12.5 MG/1
TABLET ORAL
Qty: 180 TABLET | Refills: 0 | Status: SHIPPED | OUTPATIENT
Start: 2022-07-08 | End: 2022-09-06 | Stop reason: SDUPTHER

## 2022-07-08 NOTE — PROGRESS NOTES
Anticoagulation Clinic Progress Note  Indication:  Mechanical aortic heart valve, persistent Afib  Referring Provider: Ez Cintron  Initial Warfarin Start Date: > 20 years  Planned Duration of Therapy: indefinite  Goal INR: 2.5-3.5  Current Drug Interactions: asa  [id number]: 2 (HTN, Age > 65)    Diet: more Green beans throughout summer 7/8/2022; occasional broccoli, spinach salad rarely  Alcohol: 2-3xnightly  Tobacco: < 1/2 ppd   OTC Pain Medication: none    Anticoagulation Clinic INR History:  Date 5/11 5/16 5/19 6/10 6/24 7/8     Total Weekly Dose 35 mg 37.5mg 35mg 35mg 35 mg 37.5mg? 40mg?     INR 1.5 2.68 ED  3.1 POCT 2.9 2.2 2.2 4.9 POCT  3.66 meenakshi     Notes  1x boost, no enox    Possible extra dose       Clinic Interview:  Tablet Strength: 5 mg  Estimated OOP cost: [please send to registration if not already done]  Verbal Release: Verbal Release Authorization signed on 5/11/2022 -- may speak with Meghana (spouse)  Patient Contact: [phone] (Mobile)    Patient Findings  Positives:  Change in diet/appetite   Negatives:  Signs/symptoms of thrombosis, Signs/symptoms of bleeding, Laboratory test error suspected, Change in health, Change in alcohol use, Change in activity, Upcoming invasive procedure, Emergency department visit, Upcoming dental procedure, Missed doses, Extra doses, Change in medications, Hospital admission, Bruising, Other complaints   Comments:  He reports that he believes that he may have doubled a dose last week when filling up his pillbox. He is not certain of this. He ate some green beans over the 4th of July weekend and likely had them about 3 times this week.     Plan:  1. INR is supra-therapeutic at 4.9 today using POCT device. Patient went to the lab for venipuncture and result was 3.66. Instructed pt to r resume warfarin 5 mg po daily except 7.5mg on Friday.   2. Repeat INR 7/22  3. Verbal and written information provided. Dillon Vo expresses understanding by teach  back and has no further questions at this time.    Veronica Diego, PharmD  7/8/2022  11:18 EDT     Unable to reach patient regarding venipuncture results until 7/11. Patient held dose 7/8. Will BOOST tonight's dose to 7.5mg then resume 5mg qd except 7.5mg Friday    Diana Solano, GarettD.  07/11/22   14:19 EDT

## 2022-07-22 ENCOUNTER — ANTICOAGULATION VISIT (OUTPATIENT)
Dept: PHARMACY | Facility: HOSPITAL | Age: 72
End: 2022-07-22

## 2022-07-22 DIAGNOSIS — I48.91 ATRIAL FIBRILLATION, UNSPECIFIED TYPE: Primary | ICD-10-CM

## 2022-07-22 LAB
INR PPP: 1.5 (ref 0.91–1.09)
PROTHROMBIN TIME: 17.7 SECONDS (ref 10–13.8)

## 2022-07-22 PROCEDURE — 36416 COLLJ CAPILLARY BLOOD SPEC: CPT

## 2022-07-22 PROCEDURE — 85610 PROTHROMBIN TIME: CPT

## 2022-07-22 PROCEDURE — G0463 HOSPITAL OUTPT CLINIC VISIT: HCPCS

## 2022-07-22 NOTE — PROGRESS NOTES
Anticoagulation Clinic Progress Note  Indication:  Mechanical aortic heart valve, persistent Afib  Referring Provider: Ez Cintron  Initial Warfarin Start Date: > 20 years  Planned Duration of Therapy: indefinite  Goal INR: 2.5-3.5  Current Drug Interactions: asa  [id number]: 2 (HTN, Age > 65)    Diet: more Green beans throughout summer 7/8/2022; occasional broccoli, spinach salad rarely  Alcohol: 2-3xnightly  Tobacco: < 1/2 ppd   OTC Pain Medication: none    Anticoagulation Clinic INR History:  Date 5/11 5/16 5/19 6/10 6/24 7/8 7/22    Total Weekly Dose 35 mg 37.5mg 35mg 35mg 35 mg 37.5mg? 40mg? 40mg    INR 1.5 2.68 ED  3.1 POCT 2.9 2.2 2.2 4.9 POCT  3.66 meenakshi 1.5    Notes  1x boost, no enox    Possible extra dose       Clinic Interview:  Tablet Strength: 5 mg  Estimated OOP cost: [please send to registration if not already done]  Verbal Release: Verbal Release Authorization signed on 5/11/2022 -- may speak with Meghana (spouse)  Patient Contact: [phone] (Mobile)    Patient Findings  Positives:  Change in diet/appetite   Negatives:  Signs/symptoms of thrombosis, Signs/symptoms of bleeding, Laboratory test error suspected, Change in health, Change in alcohol use, Change in activity, Upcoming invasive procedure, Emergency department visit, Upcoming dental procedure, Missed doses, Extra doses, Change in medications, Hospital admission, Bruising, Other complaints   Comments:  Patient reports that his wife had made a green bean casserole that he has eaten quite a bit this week. Plans to resume normal diet. Denies missed doses, s/sx of thrombosis.     Plan:  1. INR is sub-therapeutic today at 1.5. Instructed patient to take warfarin 7.5mg today (7/22) and tomorrow (7/23), then resume 5mg daily until recheck on Tuesday. Patient will begin lovenox 70mg (~1mg/kg) SQ Q12H starting today until he RTC. He reports that he has lovenox injections at home. Discussed appropriate injection technique, as well as wasting  0.1mL to ensure receives only 70mg dose using 80mg syringes.  2. Repeat INR 7/26  3. Verbal and written information provided. Dillon Vo expresses understanding by teach back and has no further questions at this time.    Agapito Gallego RPH  7/22/2022  13:21 EDT

## 2022-07-26 ENCOUNTER — APPOINTMENT (OUTPATIENT)
Dept: PHARMACY | Facility: HOSPITAL | Age: 72
End: 2022-07-26

## 2022-07-27 ENCOUNTER — ANTICOAGULATION VISIT (OUTPATIENT)
Dept: PHARMACY | Facility: HOSPITAL | Age: 72
End: 2022-07-27

## 2022-07-27 DIAGNOSIS — I48.91 ATRIAL FIBRILLATION, UNSPECIFIED TYPE: Primary | ICD-10-CM

## 2022-07-27 LAB
INR PPP: 2.7 (ref 0.91–1.09)
PROTHROMBIN TIME: 32.7 SECONDS (ref 10–13.8)

## 2022-07-27 PROCEDURE — 85610 PROTHROMBIN TIME: CPT

## 2022-07-27 PROCEDURE — 36416 COLLJ CAPILLARY BLOOD SPEC: CPT

## 2022-07-27 NOTE — PROGRESS NOTES
Anticoagulation Clinic Progress Note  Indication:  Mechanical aortic heart valve, persistent Afib  Referring Provider: Ez Cintron  Initial Warfarin Start Date: > 20 years  Planned Duration of Therapy: indefinite  Goal INR: 2.5-3.5  Current Drug Interactions: asa  [id number]: 2 (HTN, Age > 65)    Diet: more Green beans throughout summer 7/8/2022; occasional broccoli, spinach salad rarely  Alcohol: 2-3xnightly  Tobacco: < 1/2 ppd   OTC Pain Medication: none    Anticoagulation Clinic INR History:  Date 5/11 5/16 5/19 6/10 6/24 7/8 7/22 7/27   Total Weekly Dose 35 mg 37.5mg 35mg 35mg 35 mg 37.5mg? 40mg? 40mg 40 mg   INR 1.5 2.68 ED  3.1 POCT 2.9 2.2 2.2 4.9 POCT  3.66 meenakshi 1.5 2.7   Notes  1x boost, no enox    Possible extra dose enox      Clinic Interview:  Tablet Strength: 5 mg  Estimated OOP cost: [please send to registration if not already done]  Verbal Release: Verbal Release Authorization signed on 5/11/2022 -- may speak with Meghana (spouse)  Patient Contact: [phone] (Mobile)    Patient Findings  Negatives:  Signs/symptoms of thrombosis, Signs/symptoms of bleeding, Laboratory test error suspected, Change in health, Change in alcohol use, Change in activity, Upcoming invasive procedure, Emergency department visit, Upcoming dental procedure, Missed doses, Extra doses, Change in medications, Change in diet/appetite, Hospital admission, Bruising, Other complaints       Plan:  1. INR is therapeutic today at 2.7 . Instructed patient to continue 5mg daily except 7.5mg on FriSat and stop Lovenox today   2. Repeat INR 8/3  3. Verbal and written information provided. Dillon Vo expresses understanding by teach back and has no further questions at this time.      Karen Jimenez, PharmD, BCPS   7/27/2022  15:33 EDT

## 2022-08-02 RX ORDER — ATORVASTATIN CALCIUM 80 MG/1
TABLET, FILM COATED ORAL
Qty: 90 TABLET | Refills: 3 | Status: SHIPPED | OUTPATIENT
Start: 2022-08-02 | End: 2022-09-06 | Stop reason: SDUPTHER

## 2022-08-03 ENCOUNTER — ANTICOAGULATION VISIT (OUTPATIENT)
Dept: PHARMACY | Facility: HOSPITAL | Age: 72
End: 2022-08-03

## 2022-08-03 DIAGNOSIS — I48.91 ATRIAL FIBRILLATION, UNSPECIFIED TYPE: Primary | ICD-10-CM

## 2022-08-03 LAB
INR PPP: 4.3 (ref 0.91–1.09)
PROTHROMBIN TIME: 52.1 SECONDS (ref 10–13.8)

## 2022-08-03 PROCEDURE — 85610 PROTHROMBIN TIME: CPT

## 2022-08-03 PROCEDURE — 36416 COLLJ CAPILLARY BLOOD SPEC: CPT

## 2022-08-03 PROCEDURE — G0463 HOSPITAL OUTPT CLINIC VISIT: HCPCS

## 2022-08-03 NOTE — PROGRESS NOTES
Anticoagulation Clinic Progress Note  Indication:  Mechanical aortic heart valve, persistent Afib  Referring Provider: Ez Cintron  Initial Warfarin Start Date: > 20 years  Planned Duration of Therapy: indefinite  Goal INR: 2.5-3.5  Current Drug Interactions: asa  [id number]: 2 (HTN, Age > 65)    Diet: more Green beans throughout summer 7/8/2022; occasional broccoli, spinach salad rarely  Alcohol: 2-3xnightly  Tobacco: < 1/2 ppd   OTC Pain Medication: none    Anticoagulation Clinic INR History:  Date 5/11 5/16 5/19 6/10 6/24 7/8 7/22 7/27 8/3     Total Weekly Dose 35 mg 37.5mg 35mg 35mg 35 mg 37.5mg? 40mg? 40mg 40 mg 40mg     INR 1.5 2.68 ED  3.1 POCT 2.9 2.2 2.2 4.9 POCT  3.66 meenakshi 1.5 2.7 4.3     Notes  1x boost, no enox    Possible extra dose enox         Clinic Interview:  Tablet Strength: 5 mg  Estimated OOP cost: [please send to registration if not already done]  Verbal Release: Verbal Release Authorization signed on 5/11/2022 -- may speak with Meghana (spouse)  Patient Contact: [phone] (Mobile)    Patient Findings    Negatives:  Signs/symptoms of thrombosis, Signs/symptoms of bleeding, Laboratory test error suspected, Change in health, Change in alcohol use, Change in activity, Upcoming invasive procedure, Emergency department visit, Upcoming dental procedure, Missed doses, Extra doses, Change in medications, Change in diet/appetite, Hospital admission, Bruising, Other complaints   Comments:  Took 7.5mg Wedthurs vs FriSat as directed         Plan:  1. INR is SUPRAtherapeutic today at 4.3 . Instructed patient to reduce tonight's dose to 2.5mg then take warfarin 5mg daily except 7.5mg on Sat   2. Repeat INR 8/10  3. Verbal and written information provided. Dillon Vo expresses understanding by teach back and has no further questions at this time.    Diana Solano, PharmD.  08/03/22   14:12 EDT

## 2022-08-10 ENCOUNTER — ANTICOAGULATION VISIT (OUTPATIENT)
Dept: PHARMACY | Facility: HOSPITAL | Age: 72
End: 2022-08-10

## 2022-08-10 DIAGNOSIS — I48.91 ATRIAL FIBRILLATION, UNSPECIFIED TYPE: Primary | ICD-10-CM

## 2022-08-10 LAB
INR PPP: 5.2 (ref 0.91–1.09)
PROTHROMBIN TIME: 62.1 SECONDS (ref 10–13.8)

## 2022-08-10 PROCEDURE — 85610 PROTHROMBIN TIME: CPT

## 2022-08-10 PROCEDURE — 36416 COLLJ CAPILLARY BLOOD SPEC: CPT

## 2022-08-10 PROCEDURE — G0463 HOSPITAL OUTPT CLINIC VISIT: HCPCS

## 2022-08-10 NOTE — PROGRESS NOTES
Anticoagulation Clinic Progress Note  Indication:  Mechanical aortic heart valve, persistent Afib  Referring Provider: Ez Cintron  Initial Warfarin Start Date: > 20 years  Planned Duration of Therapy: indefinite  Goal INR: 2.5-3.5  Current Drug Interactions: asa  [id number]: 2 (HTN, Age > 65)    Diet: more Green beans throughout summer 7/8/2022; occasional broccoli, spinach salad rarely  Alcohol: 2-3xnightly  Tobacco: < 1/2 ppd   OTC Pain Medication: none    Anticoagulation Clinic INR History:  Date 5/11 5/16 5/19 6/10 6/24 7/8 7/22 7/27 8/3 8/10    Total Weekly Dose 35 mg 37.5mg 35mg 35mg 35 mg 37.5mg? 40mg? 40mg 40 mg 40mg 35mg    INR 1.5 2.68 ED  3.1 POCT 2.9 2.2 2.2 4.9 POCT  3.66 meenakshi 1.5 2.7 4.3 5.2    Notes  1x boost, no enox    Possible extra dose enox         Clinic Interview:  Tablet Strength: 5 mg  Estimated OOP cost: [please send to registration if not already done]  Verbal Release: Verbal Release Authorization signed on 5/11/2022 -- may speak with Meghana (spouse)  Patient Contact: [phone] (Mobile)    Patient Findings  Negatives:  Signs/symptoms of thrombosis, Signs/symptoms of bleeding, Laboratory test error suspected, Change in health, Change in alcohol use, Change in activity, Upcoming invasive procedure, Emergency department visit, Upcoming dental procedure, Missed doses, Extra doses, Change in medications, Change in diet/appetite, Hospital admission, Bruising, Other complaints   Comments:  Took 7.5mg Wedthurs vs FriSat as directed     Plan:  1. INR is SUPRAtherapeutic today at 5.2. Instructed patient to hold dose tonight and then decrease dose to 5mg daily except 2.5mg Mondays.   2. Repeat INR 8/17. Of note day after his birthday.  3. Verbal and written information provided. Dillon Vo expresses understanding by teach back and has no further questions at this time.    Veronica Diego, PharmD  08/10/22   14:15 EDT

## 2022-08-17 ENCOUNTER — APPOINTMENT (OUTPATIENT)
Dept: PHARMACY | Facility: HOSPITAL | Age: 72
End: 2022-08-17

## 2022-08-18 ENCOUNTER — ANTICOAGULATION VISIT (OUTPATIENT)
Dept: PHARMACY | Facility: HOSPITAL | Age: 72
End: 2022-08-18

## 2022-08-18 DIAGNOSIS — Z95.2 H/O AORTIC VALVE REPLACEMENT: ICD-10-CM

## 2022-08-18 DIAGNOSIS — I48.91 ATRIAL FIBRILLATION, UNSPECIFIED TYPE: Primary | ICD-10-CM

## 2022-08-18 LAB
INR PPP: 1.8 (ref 0.91–1.09)
PROTHROMBIN TIME: 21.1 SECONDS (ref 10–13.8)

## 2022-08-18 PROCEDURE — 36416 COLLJ CAPILLARY BLOOD SPEC: CPT

## 2022-08-18 PROCEDURE — 85610 PROTHROMBIN TIME: CPT

## 2022-08-18 PROCEDURE — G0463 HOSPITAL OUTPT CLINIC VISIT: HCPCS | Performed by: PHARMACIST

## 2022-08-18 RX ORDER — ENOXAPARIN SODIUM 100 MG/ML
1 INJECTION SUBCUTANEOUS EVERY 12 HOURS SCHEDULED
Qty: 8 ML | Refills: 0 | Status: SHIPPED | OUTPATIENT
Start: 2022-08-18 | End: 2022-08-23

## 2022-08-18 NOTE — PROGRESS NOTES
Anticoagulation Clinic Progress Note  Indication:  Mechanical aortic heart valve, persistent Afib  Referring Provider: Ez Cintron  Initial Warfarin Start Date: > 20 years  Planned Duration of Therapy: indefinite  Goal INR: 2.5-3.5  Current Drug Interactions: asa  [id number]: 2 (HTN, Age > 65)    Diet: more Green beans throughout summer 7/8/2022; occasional broccoli, spinach salad rarely  Alcohol: 2-3xnightly  Tobacco: < 1/2 ppd   OTC Pain Medication: none    Anticoagulation Clinic INR History:  Date 5/11 5/16 5/19 6/10 6/24 7/8 7/22 7/27 8/3 8/10 8/18   Total Weekly Dose 35 mg 37.5mg 35mg 35mg 35 mg 37.5mg? 40mg? 40mg 40 mg 40mg 35mg    INR 1.5 2.68 ED  3.1 POCT 2.9 2.2 2.2 4.9 POCT  3.66 meenakshi 1.5 2.7 4.3 5.2 1.8   Notes  1x boost, no enox    Possible extra dose enox         Clinic Interview:  Tablet Strength: 5 mg  Estimated OOP cost: [please send to registration if not already done]  Verbal Release: Verbal Release Authorization signed on 5/11/2022 -- may speak with Meghana (spouse)  Patient Contact: [phone] (Mobile)    Patient Findings    Negatives:  Signs/symptoms of thrombosis, Signs/symptoms of bleeding, Laboratory test error suspected, Change in health, Change in alcohol use, Change in activity, Upcoming invasive procedure, Emergency department visit, Upcoming dental procedure, Missed doses, Extra doses, Change in medications, Change in diet/appetite, Hospital admission, Bruising, Other complaints   Comments:  He held the dose as instructed, then he believes he took 1/2 tablet last Thursday rather than Monday.       Plan:  1. INR is SUBtherapeutic today at 1.8. Instructed patient to boost tonight's warfarin to 7.5 mg and begin enoxaparin 80 mg SQ q12h x 3 doses, and continue warfarin 5 mg daily on Friday.    2. Repeat INR 8/23 .  3. Verbal and written information provided. Dillon Vo expresses understanding by teach back and has no further questions at this time.    Ez Doan,  Fela, PharmD  8/18/2022  14:09 EDT

## 2022-08-23 ENCOUNTER — ANTICOAGULATION VISIT (OUTPATIENT)
Dept: PHARMACY | Facility: HOSPITAL | Age: 72
End: 2022-08-23

## 2022-08-23 DIAGNOSIS — I48.91 ATRIAL FIBRILLATION, UNSPECIFIED TYPE: Primary | ICD-10-CM

## 2022-08-23 LAB
INR PPP: 2.3 (ref 0.91–1.09)
PROTHROMBIN TIME: 27.9 SECONDS (ref 10–13.8)

## 2022-08-23 PROCEDURE — G0463 HOSPITAL OUTPT CLINIC VISIT: HCPCS

## 2022-08-23 PROCEDURE — 85610 PROTHROMBIN TIME: CPT

## 2022-08-23 PROCEDURE — 36416 COLLJ CAPILLARY BLOOD SPEC: CPT

## 2022-08-23 NOTE — PROGRESS NOTES
Anticoagulation Clinic Progress Note  Indication:  Mechanical aortic heart valve, persistent Afib  Referring Provider: Ez Cintron  Initial Warfarin Start Date: > 20 years  Planned Duration of Therapy: indefinite  Goal INR: 2.5-3.5  Current Drug Interactions: asa  [id number]: 2 (HTN, Age > 65)    Diet: more Green beans throughout summer 7/8/2022; occasional broccoli, spinach salad rarely  Alcohol: 2-3xnightly  Tobacco: < 1/2 ppd   OTC Pain Medication: none    Anticoagulation Clinic INR History:  Date 5/11 5/16 5/19 6/10 6/24 7/8 7/22 7/27 8/3 8/10 8/18   Total Weekly Dose 35 mg 37.5mg 35mg 35mg 35 mg 37.5mg? 40mg? 40mg 40 mg 40mg 35mg 32.5 mg   INR 1.5 2.68 ED  3.1 POCT 2.9 2.2 2.2 4.9 POCT  3.66 meenakshi 1.5 2.7 4.3 5.2 1.8   Notes  1x boost, no enox    Possible extra dose enox         Date 8/23             Total Weekly Dose 37.5 mg             INR 2.3             Notes                Clinic Interview:  Tablet Strength: 5 mg  Estimated OOP cost: [please send to registration if not already done]  Verbal Release: Verbal Release Authorization signed on 5/11/2022 -- may speak with Meghana (spouse)  Patient Contact: [phone] (Mobile)    Patient Findings  Negatives:  Signs/symptoms of thrombosis, Signs/symptoms of bleeding, Laboratory test error suspected, Change in health, Change in alcohol use, Change in activity, Upcoming invasive procedure, Emergency department visit, Upcoming dental procedure, Missed doses, Extra doses, Change in medications, Change in diet/appetite, Hospital admission, Bruising, Other complaints   Comments:  No issues with Lovenox shots. No other changes. Dosing confirmed with patient.       Plan:  1. INR is SUBtherapeutic today at 2.3, however improved from 1.8 last week. INR has fluctuated recently without a clear cause. Instructed patient to boost tonight's dose of warfarin to 7.5 mg and then continue warfarin 5 mg daily until recheck. Patient to STOP Lovenox injections.  2. Repeat INR  8/30.  3. Verbal and written information provided. Dillon Vo expresses understanding by teach back and has no further questions at this time.    Faisal Franklin, PharmD, BCPS  8/23/2022  14:01 EDT

## 2022-08-30 ENCOUNTER — ANTICOAGULATION VISIT (OUTPATIENT)
Dept: PHARMACY | Facility: HOSPITAL | Age: 72
End: 2022-08-30

## 2022-08-30 ENCOUNTER — LAB (OUTPATIENT)
Dept: LAB | Facility: HOSPITAL | Age: 72
End: 2022-08-30

## 2022-08-30 DIAGNOSIS — Z95.2 H/O AORTIC VALVE REPLACEMENT: ICD-10-CM

## 2022-08-30 DIAGNOSIS — I48.91 ATRIAL FIBRILLATION, UNSPECIFIED TYPE: ICD-10-CM

## 2022-08-30 DIAGNOSIS — I48.91 ATRIAL FIBRILLATION, UNSPECIFIED TYPE: Primary | ICD-10-CM

## 2022-08-30 LAB
INR PPP: 4.72 (ref 0.84–1.13)
INR PPP: 6.2 (ref 0.91–1.09)
PROTHROMBIN TIME: 44.9 SECONDS (ref 11.4–14.4)
PROTHROMBIN TIME: 75 SECONDS (ref 10–13.8)

## 2022-08-30 PROCEDURE — G0463 HOSPITAL OUTPT CLINIC VISIT: HCPCS

## 2022-08-30 PROCEDURE — 36416 COLLJ CAPILLARY BLOOD SPEC: CPT

## 2022-08-30 PROCEDURE — 85610 PROTHROMBIN TIME: CPT

## 2022-08-30 PROCEDURE — 36415 COLL VENOUS BLD VENIPUNCTURE: CPT

## 2022-08-30 NOTE — PROGRESS NOTES
Anticoagulation Clinic Progress Note  Indication:  Mechanical aortic heart valve, persistent Afib  Referring Provider: Ez Cintron  Initial Warfarin Start Date: > 20 years  Planned Duration of Therapy: indefinite  Goal INR: 2.5-3.5  Current Drug Interactions: asa  [id number]: 2 (HTN, Age > 65)    Diet: more Green beans throughout summer 7/8/2022; occasional broccoli, spinach salad rarely  Alcohol: 2-3xnightly  Tobacco: < 1/2 ppd   OTC Pain Medication: none    Anticoagulation Clinic INR History:  Date 5/11 5/16 5/19 6/10 6/24 7/8 7/22 7/27 8/3 8/10 8/18   Total Weekly Dose 35 mg 37.5mg 35mg 35mg 35 mg 37.5mg? 40mg? 40mg 40 mg 40mg 35mg 32.5 mg   INR 1.5 2.68 ED  3.1 POCT 2.9 2.2 2.2 4.9 POCT  3.66 meenakshi 1.5 2.7 4.3 5.2 1.8   Notes  1x boost, no enox    Possible extra dose enox         Date 8/23 8/30            Total Weekly Dose 37.5 mg             INR 2.3 4.72  6.2 POCT            Notes                Clinic Interview:  Tablet Strength: 5 mg  Estimated OOP cost: [please send to registration if not already done]  Verbal Release: Verbal Release Authorization signed on 5/11/2022 -- may speak with Meghana (spouse)  Patient Contact:[phone] (Mobile)      Patient Findings      Negatives:  Signs/symptoms of thrombosis, Signs/symptoms of bleeding, Laboratory test error suspected, Change in health, Change in alcohol use, Change in activity, Upcoming invasive procedure, Emergency department visit, Upcoming dental procedure, Missed doses, Extra doses, Change in medications, Change in diet/appetite, Hospital admission, Bruising, Other complaints         Plan:  1. INR is SUPRAtherapeutic today at 6.2 via POCT, 4.71 via venipuncture. Instructed patient to HOLD tonight's dose of warfarin then take warfarin 5 mg daily until recheck.  2. Repeat INR 9/6  3. Verbal and written information provided. Dillon Vo expresses understanding by teach back and has no further questions at this time.  4. Discussed to monitor  for s/sx of bleeding including epistaxis, hematuria, unusual bruising, hemoptysis, hematochezia as well as s/sx of stroke including impaired speech, unilateral paralysis, blurry vision and when to seek medical attention    Diana Solano, GarettD.  08/30/22   16:02 EDT

## 2022-09-06 ENCOUNTER — OFFICE VISIT (OUTPATIENT)
Dept: CARDIOLOGY | Facility: CLINIC | Age: 72
End: 2022-09-06

## 2022-09-06 ENCOUNTER — ANTICOAGULATION VISIT (OUTPATIENT)
Dept: PHARMACY | Facility: HOSPITAL | Age: 72
End: 2022-09-06

## 2022-09-06 VITALS
WEIGHT: 148 LBS | DIASTOLIC BLOOD PRESSURE: 102 MMHG | HEART RATE: 70 BPM | OXYGEN SATURATION: 99 % | HEIGHT: 71 IN | SYSTOLIC BLOOD PRESSURE: 190 MMHG | BODY MASS INDEX: 20.72 KG/M2

## 2022-09-06 DIAGNOSIS — I48.21 PERMANENT ATRIAL FIBRILLATION: ICD-10-CM

## 2022-09-06 DIAGNOSIS — I25.119 CORONARY ARTERY DISEASE INVOLVING NATIVE CORONARY ARTERY OF NATIVE HEART WITH ANGINA PECTORIS: Primary | ICD-10-CM

## 2022-09-06 DIAGNOSIS — I48.0 PAROXYSMAL ATRIAL FIBRILLATION: ICD-10-CM

## 2022-09-06 DIAGNOSIS — I10 PRIMARY HYPERTENSION: ICD-10-CM

## 2022-09-06 DIAGNOSIS — Z95.2 H/O AORTIC VALVE REPLACEMENT: ICD-10-CM

## 2022-09-06 DIAGNOSIS — Z95.0 CARDIAC PACEMAKER IN SITU: ICD-10-CM

## 2022-09-06 DIAGNOSIS — I48.91 ATRIAL FIBRILLATION, UNSPECIFIED TYPE: Primary | ICD-10-CM

## 2022-09-06 DIAGNOSIS — Z95.2 HISTORY OF MECHANICAL AORTIC VALVE REPLACEMENT: ICD-10-CM

## 2022-09-06 DIAGNOSIS — E78.5 HYPERLIPIDEMIA LDL GOAL <70: ICD-10-CM

## 2022-09-06 PROBLEM — R91.1 LUNG NODULE: Status: RESOLVED | Noted: 2021-04-07 | Resolved: 2022-09-06

## 2022-09-06 PROBLEM — I63.9 ISCHEMIC STROKE: Status: RESOLVED | Noted: 2020-03-05 | Resolved: 2022-09-06

## 2022-09-06 PROBLEM — D75.89 MACROCYTOSIS: Status: RESOLVED | Noted: 2020-02-26 | Resolved: 2022-09-06

## 2022-09-06 PROBLEM — R04.2 HEMOPTYSIS: Status: RESOLVED | Noted: 2020-02-21 | Resolved: 2022-09-06

## 2022-09-06 PROBLEM — S81.812A LACERATION OF LEFT LOWER EXTREMITY: Status: RESOLVED | Noted: 2021-05-25 | Resolved: 2022-09-06

## 2022-09-06 PROBLEM — I27.20 PULMONARY HYPERTENSION: Status: RESOLVED | Noted: 2020-02-26 | Resolved: 2022-09-06

## 2022-09-06 PROBLEM — R91.1 LUNG NODULE: Status: RESOLVED | Noted: 2020-02-21 | Resolved: 2022-09-06

## 2022-09-06 PROBLEM — I95.9 HYPOTENSION: Status: RESOLVED | Noted: 2021-05-25 | Resolved: 2022-09-06

## 2022-09-06 PROBLEM — J18.9 BILATERAL PNEUMONIA: Status: RESOLVED | Noted: 2020-02-20 | Resolved: 2022-09-06

## 2022-09-06 LAB
INR PPP: 5.1 (ref 0.91–1.09)
PROTHROMBIN TIME: 61.4 SECONDS (ref 10–13.8)

## 2022-09-06 PROCEDURE — 99213 OFFICE O/P EST LOW 20 MIN: CPT | Performed by: INTERNAL MEDICINE

## 2022-09-06 PROCEDURE — G0463 HOSPITAL OUTPT CLINIC VISIT: HCPCS

## 2022-09-06 PROCEDURE — 93279 PRGRMG DEV EVAL PM/LDLS PM: CPT | Performed by: INTERNAL MEDICINE

## 2022-09-06 PROCEDURE — 36416 COLLJ CAPILLARY BLOOD SPEC: CPT

## 2022-09-06 PROCEDURE — 85610 PROTHROMBIN TIME: CPT

## 2022-09-06 RX ORDER — ASPIRIN 81 MG/1
81 TABLET ORAL DAILY
Start: 2022-09-06

## 2022-09-06 RX ORDER — LISINOPRIL 10 MG/1
10 TABLET ORAL 2 TIMES DAILY
Qty: 180 TABLET | Refills: 3 | Status: ON HOLD | OUTPATIENT
Start: 2022-09-06 | End: 2022-12-08 | Stop reason: SDUPTHER

## 2022-09-06 RX ORDER — CARVEDILOL 12.5 MG/1
12.5 TABLET ORAL 2 TIMES DAILY WITH MEALS
Qty: 180 TABLET | Refills: 3 | Status: SHIPPED | OUTPATIENT
Start: 2022-09-06

## 2022-09-06 RX ORDER — WARFARIN SODIUM 5 MG/1
5 TABLET ORAL DAILY
Qty: 30 TABLET | Refills: 5 | Status: SHIPPED | OUTPATIENT
Start: 2022-09-06

## 2022-09-06 RX ORDER — ATORVASTATIN CALCIUM 80 MG/1
80 TABLET, FILM COATED ORAL NIGHTLY
Qty: 90 TABLET | Refills: 3 | Status: SHIPPED | OUTPATIENT
Start: 2022-09-06

## 2022-09-06 NOTE — PROGRESS NOTES
[facility] Cardiology      Identification: Dillon Vo is a 72 y.o. male who resides in [place], [place].    Reason for visit:  · CAD  · Mechanical aortic valve  · Pacemaker check  · CV risk factors    Assessment     Problem List Items Addressed This Visit        Cardiology Problems    Cardiac pacemaker in situ    Overview     · SJM dual-chamber pacemaker          Current Assessment & Plan     · Normal functioning [employer] pacemaker interrogated today with 1.6 years of battery life remaining         Coronary artery disease involving native coronary artery of native heart with angina pectoris (HCC) - Primary    Overview     · Cardiac catheterization (6/2013): Mid LAD stenosis status post RK.         Current Assessment & Plan     · No anginal  · Continue aspirin, beta-blocker, statin         Relevant Medications    aspirin 81 MG EC tablet    carvedilol (COREG) 12.5 MG tablet    Hyperlipidemia LDL goal <70    Overview     • High intensity statin therapy indicated given the presence of CAD         Current Assessment & Plan     · Controlled  · Continue atorvastatin         Relevant Medications    atorvastatin (LIPITOR) 80 MG tablet    Hypertension    Overview     • Target blood pressure <130/80 mmHg         Relevant Medications    lisinopril (PRINIVIL,ZESTRIL) 10 MG tablet    carvedilol (COREG) 12.5 MG tablet    Permanent atrial fibrillation (HCC)    Overview     · [id number] 5 (age, CAD, HTN, CVA)   · Status post AV node ablation pacemaker implantation, 6/18/2013         Current Assessment & Plan     · Asymptomatic  · Continue warfarin  · Continue carvedilol         Relevant Medications    warfarin (COUMADIN) 5 MG tablet    carvedilol (COREG) 12.5 MG tablet       Other    History of mechanical aortic valve replacement    Overview     · Remote mechanical aortic valve replacement  · Echo (3/19/2021): LVEF 53%. Mechanical aortic valve with small perivalvular leak.  MAC with moderate MR.  RVSP 53 mmHg.       "   Relevant Medications    warfarin (COUMADIN) 5 MG tablet          Plan   • Continue present medical therapy      Follow-up   Return in about 1 year (around 9/6/2023).        Subjective      Mr. Vo returns the office today.  He is doing well from a cardiovascular standpoint.  He denies any symptoms of angina, heart failure, TIA, or stroke.  He been having issues controlling his INR and just got back from the Coumadin clinic.    Review of Systems   Constitutional: Positive for decreased appetite.   Cardiovascular: Negative.    Respiratory: Negative.        Objective     BP (!) 190/102 (BP Location: Left arm, Patient Position: Sitting, Cuff Size: Adult)   Pulse 70   Ht 180.3 cm (71\")   Wt 67.1 kg (148 lb)   SpO2 99%   BMI 20.64 kg/m²       Constitutional:       Appearance: Healthy appearance. Well-developed.   Eyes:      General: Lids are normal. No scleral icterus.     Conjunctiva/sclera: Conjunctivae normal.   HENT:      Head: Normocephalic and atraumatic.   Neck:      Thyroid: No thyromegaly.      Vascular: No carotid bruit or JVD.   Pulmonary:      Effort: Pulmonary effort is normal.      Breath sounds: Normal breath sounds. No wheezing. No rhonchi. No rales.   Cardiovascular:      Normal rate. Regular rhythm.      Murmurs: There is no murmur.      No gallop. No rub.   Pulses:     Intact distal pulses.   Edema:     Peripheral edema absent.   Abdominal:      General: There is no distension.      Palpations: Abdomen is soft. There is no abdominal mass.   Musculoskeletal:      Cervical back: Normal range of motion. Skin:     General: Skin is warm and dry.      Findings: No rash.   Neurological:      General: No focal deficit present.      Mental Status: Alert and oriented to person, place, and time.      Gait: Gait is intact.   Psychiatric:         Attention and Perception: Attention normal.         Mood and Affect: Mood normal.         Behavior: Behavior normal.         Result Review  (reviewed with " patient):    CG dual-chamber pacemaker interrogated today.  Mode set at VVIR 70.  1.6 years battery life remaining.  Nonsustained VT x15 beats.  Please see device interrogation sheet for details.            Lab Results   Component Value Date    GLUCOSE 120 (H) 06/30/2022    BUN 7 (L) 06/30/2022    CREATININE 0.92 06/30/2022    EGFRIFNONA 76 02/01/2022    EGFRIFAFRI >60 08/20/2021    BCR 7.6 06/30/2022    K 3.5 06/30/2022    CO2 31.9 (H) 06/30/2022    CALCIUM 9.4 06/30/2022    ALBUMIN 4.00 05/16/2022    AST 25 05/16/2022    ALT 9 05/16/2022     Lab Results   Component Value Date    WBC 4.14 05/16/2022    HGB 12.9 (L) 05/16/2022    HCT 38.7 05/16/2022    .4 (H) 05/16/2022     (L) 05/16/2022     Lab Results   Component Value Date    CHOL 137 02/01/2022    TRIG 54 02/01/2022    HDL 74 (H) 02/01/2022    LDL 51 02/01/2022     Lab Results   Component Value Date    HGBA1C 5.70 (H) 04/19/2022         Cr Ronquillo MD, FACC, FSCAI  9/6/2022

## 2022-09-06 NOTE — ASSESSMENT & PLAN NOTE
· Normal functioning [employer]vio pacemaker interrogated today with 1.6 years of battery life remaining

## 2022-09-06 NOTE — PROGRESS NOTES
Anticoagulation Clinic Progress Note  Indication:  Mechanical aortic heart valve, persistent Afib  Referring Provider: Ez Cintron  Initial Warfarin Start Date: > 20 years  Planned Duration of Therapy: indefinite  Goal INR: 2.5-3.5  Current Drug Interactions: asa  [id number]: 2 (HTN, Age > 65)    Diet: no GLV 9/6/20222  Alcohol: 2-3xnightly  Tobacco: < 1/2 ppd   OTC Pain Medication: none    Anticoagulation Clinic INR History:  Date 5/11 5/16 5/19 6/10 6/24 7/8 7/22 7/27 8/3 8/10 8/18   Total Weekly Dose 35 mg 37.5mg 35mg 35mg 35 mg 37.5mg? 40mg? 40mg 40 mg 40mg 35mg 32.5 mg   INR 1.5 2.68 ED  3.1 POCT 2.9 2.2 2.2 4.9 POCT  3.66 meenakshi 1.5 2.7 4.3 5.2 1.8   Notes  1x boost, no enox    Possible extra dose enox         Date 8/23 8/30 9/6           Total Weekly Dose 37.5 mg 37.5mg 30mg           INR 2.3 4.72  6.2 POCT 5.1           Notes                Clinic Interview:  Tablet Strength: 5 mg  Estimated OOP cost: [please send to registration if not already done]  Verbal Release: Verbal Release Authorization signed on 5/11/2022 -- may speak with Meghana (spouse)  Patient Contact:[phone] (Mobile)      Patient Findings  Negatives:  Signs/symptoms of thrombosis, Signs/symptoms of bleeding, Laboratory test error suspected, Change in health, Change in alcohol use, Change in activity, Upcoming invasive procedure, Emergency department visit, Upcoming dental procedure, Missed doses, Extra doses, Change in medications, Change in diet/appetite, Hospital admission, Bruising, Other complaints   Comments:  Pt is smoking less than a pack a day. Discussed that INR fluctuates. He has not taken a MVI. May want to consider picking up a MVI and keep consistent with consistent Vit K.     Plan:  1. INR is SUPRAtherapeutic today at 5.1 via POCT. Instructed patient to HOLD tonight's dose and then decrease weekly dose to 5mg daily except 2.5mg ThurSun for this week.  2. Repeat INR 9/9 to ensure INR has come down.  3. Verbal and  written information provided. Dillon Vo expresses understanding by teach back and has no further questions at this time.  4. Discussed to monitor for s/sx of bleeding including epistaxis, hematuria, unusual bruising, hemoptysis, hematochezia as well as s/sx of stroke including impaired speech, unilateral paralysis, blurry vision and when to seek medical attention    Veronica Diego, PharmD  09/06/22   15:42 EDT

## 2022-09-09 ENCOUNTER — ANTICOAGULATION VISIT (OUTPATIENT)
Dept: PHARMACY | Facility: HOSPITAL | Age: 72
End: 2022-09-09

## 2022-09-09 DIAGNOSIS — I48.21 PERMANENT ATRIAL FIBRILLATION: Primary | ICD-10-CM

## 2022-09-09 LAB
INR PPP: 3.4 (ref 0.91–1.09)
PROTHROMBIN TIME: 41.3 SECONDS (ref 10–13.8)

## 2022-09-09 PROCEDURE — 36416 COLLJ CAPILLARY BLOOD SPEC: CPT

## 2022-09-09 PROCEDURE — G0463 HOSPITAL OUTPT CLINIC VISIT: HCPCS

## 2022-09-09 PROCEDURE — 85610 PROTHROMBIN TIME: CPT

## 2022-09-09 NOTE — PROGRESS NOTES
Anticoagulation Clinic Progress Note  Indication:  Mechanical aortic heart valve, persistent Afib  Referring Provider: Ez Cintron  Initial Warfarin Start Date: > 20 years  Planned Duration of Therapy: indefinite  Goal INR: 2.5-3.5  Current Drug Interactions: asa  [id number]: 2 (HTN, Age > 65)    Diet: no GLV 9/6/2022; occasional asparagus or green beans (1-2x/month) 9/9/2022  Alcohol: 2-3xnightly  Tobacco: <1/2 ppd   OTC Pain Medication: none    Anticoagulation Clinic INR History:  Date 5/11 5/16 5/19 6/10 6/24 7/8 7/22 7/27 8/3 8/10 8/18   Total Weekly Dose 35 mg 37.5mg 35mg 35mg 35 mg 37.5mg? 40mg? 40mg 40 mg 40mg 35mg 32.5 mg   INR 1.5 2.68 ED  3.1 POCT 2.9 2.2 2.2 4.9 POCT  3.66 meenakshi 1.5 2.7 4.3 5.2 1.8   Notes  1x boost, no enox    Possible extra dose enox         Date 8/23 8/30 9/6 9/9          Total Weekly Dose 37.5 mg 37.5mg 30mg 27.5 mg          INR 2.3 4.72  6.2 POCT 5.1 3.4          Notes    Hold x1; red. x1            Clinic Interview:  Tablet Strength: 5 mg  Verbal Release: Verbal Release Authorization signed on 5/11/2022 -- may speak with Meghana (spouse)  Patient Contact:[phone] (Mobile)      Patient Findings  Positives:  Other complaints   Negatives:  Signs/symptoms of thrombosis, Signs/symptoms of bleeding, Laboratory test error suspected, Change in health, Change in alcohol use, Change in activity, Upcoming invasive procedure, Emergency department visit, Upcoming dental procedure, Missed doses, Extra doses, Change in medications, Change in diet/appetite, Hospital admission, Bruising   Comments:  Mr. Vo reports that he used to take 4 mg daily several years ago before he started the 5 mg daily regimen.   Still considering MVI, has not picked anything up yet. Patient is aware to contact us when he does.     All other findings negative per patient.     Plan:  1. INR is therapeutic today at 3.4 via POCT (goal 2.5-3.5). Instructed patient to continue recently decreased weekly dose of  5mg daily except 2.5mg ThurSun for this week.  2. Repeat INR 9/16.  3. Verbal and written information provided. Dillon Vo expresses understanding by teach back and has no further questions at this time.  4. Discussed to monitor for s/sx of bleeding including epistaxis, hematuria, unusual bruising, hemoptysis, hematochezia as well as s/sx of stroke including impaired speech, unilateral paralysis, blurry vision and when to seek medical attention    Clover Montelongo, Pharmacy Intern  09/09/22   14:07 EDT     May consider third 2.5mg dose 9/16  Patient has medicare + medicaid-- unsure of [facility] coverage but could try to set patient up with [employer]? See if he is interested at next encounter  I, Diana Solano, PharmD, have reviewed the note in full and agree with the assessment and plan.  09/09/22  14:50 EDT

## 2022-09-15 ENCOUNTER — APPOINTMENT (OUTPATIENT)
Dept: GENERAL RADIOLOGY | Facility: HOSPITAL | Age: 72
End: 2022-09-15

## 2022-09-15 ENCOUNTER — HOSPITAL ENCOUNTER (EMERGENCY)
Facility: HOSPITAL | Age: 72
Discharge: HOME | End: 2022-09-15
Attending: EMERGENCY MEDICINE | Admitting: EMERGENCY MEDICINE

## 2022-09-15 VITALS
TEMPERATURE: 98.2 F | DIASTOLIC BLOOD PRESSURE: 92 MMHG | HEART RATE: 65 BPM | HEIGHT: 71 IN | BODY MASS INDEX: 20.3 KG/M2 | RESPIRATION RATE: 16 BRPM | WEIGHT: 145 LBS | OXYGEN SATURATION: 98 % | SYSTOLIC BLOOD PRESSURE: 165 MMHG

## 2022-09-15 DIAGNOSIS — S20.211A CONTUSION OF RIB ON RIGHT SIDE, INITIAL ENCOUNTER: Primary | ICD-10-CM

## 2022-09-15 PROCEDURE — 99282 EMERGENCY DEPT VISIT SF MDM: CPT

## 2022-09-15 PROCEDURE — 71101 X-RAY EXAM UNILAT RIBS/CHEST: CPT

## 2022-09-15 RX ORDER — METHOCARBAMOL 750 MG/1
750 TABLET, FILM COATED ORAL 3 TIMES DAILY
Qty: 15 TABLET | Refills: 0 | Status: ON HOLD | OUTPATIENT
Start: 2022-09-15 | End: 2022-12-05

## 2022-09-16 ENCOUNTER — ANTICOAGULATION VISIT (OUTPATIENT)
Dept: PHARMACY | Facility: HOSPITAL | Age: 72
End: 2022-09-16

## 2022-09-16 DIAGNOSIS — I48.21 PERMANENT ATRIAL FIBRILLATION: Primary | ICD-10-CM

## 2022-09-16 LAB
INR PPP: 2.6 (ref 0.91–1.09)
INR PPP: 2.6 (ref 0.9–1.1)
PROTHROMBIN TIME: 31.6 SECONDS (ref 10–13.8)

## 2022-09-16 PROCEDURE — 85610 PROTHROMBIN TIME: CPT

## 2022-09-16 PROCEDURE — G0463 HOSPITAL OUTPT CLINIC VISIT: HCPCS | Performed by: PHARMACIST

## 2022-09-16 PROCEDURE — 36416 COLLJ CAPILLARY BLOOD SPEC: CPT

## 2022-09-16 NOTE — ED PROVIDER NOTES
Subjective   History of Present Illness  Dillon Vo is a 72 yr old male presents to the ER with c/o Rt rib pain.  Pt explains that he fell in the shower last night.  Patient explains that last night his pain was not that bad patient went to sleep woke up this morning and noticed discomfort to his right ribs.  930 this morning patient took a pain medication the pain decreased greatly.  Around 1:00 the pain returned and he felt at this time that he should be seen in the ER.  Patient denies any other injuries.  He denies chest pain or shortness of breath.  Patient reports that the pain increases with movement and palpation.    History provided by:  Patient   used: No    Trauma  Mechanism of injury: fall  Injury location: Rt rib pain.  Incident location: bathroom  Time since incident: 1 day     Fall:       Fall occurred: in shower.    EMS/PTA data:       Loss of consciousness: no    Current symptoms:       Pain quality: aching       Associated symptoms:             Denies back pain, chest pain, headache, loss of consciousness, nausea, neck pain and vomiting.       Review of Systems   Cardiovascular: Negative for chest pain.   Gastrointestinal: Negative for nausea and vomiting.   Musculoskeletal: Negative for back pain and neck pain.        Rt rib pain   Skin: Negative for wound.   Neurological: Negative for loss of consciousness and headaches.       Past Medical History:   Diagnosis Date   • A-fib (HCC)    • Anemia    • Atrial fibrillation (HCC) 5/5/2016    Difficult to control rate. RFA of AV node with implantation of left Bi-V pacemaker, 06/18/2013. Hematoma requiring single-chamber pacemaker implanted, 07/29/2013.    • Colitis    • Colon cancer (HCC)    • Coronary artery disease    • Depression    • GERD (gastroesophageal reflux disease)    • Heart valve disease    • Hiatal hernia    • HTN (hypertension)    • Hyperbilirubinemia    • Hyperlipidemia    • Infectious viral hepatitis    •  Kidney stone    • Lung cancer (HCC)    • Orthostatic hypotension    • Sick sinus syndrome (HCC)    • Skin cancer     lip        No Known Allergies    Past Surgical History:   Procedure Laterality Date   • APPENDECTOMY     • BRONCHOSCOPY THORACOTOMY Left 4/7/2021    Procedure: BRONCHOSCOPY LEFT THORACOTOMY, LEFT LUNG RESECTION;  Surgeon: Chi Post MD;  Location: [facility];  Service: Cardiothoracic;  Laterality: Left;   • CARDIAC CATHETERIZATION     • CARDIAC SURGERY  2000    valve replacement    • COLON SURGERY     • COLONOSCOPY  up to date   • CORONARY STENT PLACEMENT     • EXTRACORPOREAL SHOCKWAVE LITHOTRIPSY (ESWL), STENT INSERTION/REMOVAL     • LUNG CANCER SURGERY     • PACEMAKER IMPLANTATION     • VARICOSE VEIN SURGERY         Family History   Problem Relation Age of Onset   • Cancer Mother    • Hypertension Father    • Heart disease Father    • No Known Problems Sister    • No Known Problems Brother        Social History     Socioeconomic History   • Marital status:    • Number of children: 0   Tobacco Use   • Smoking status: Current Every Day Smoker     Packs/day: 0.25     Years: 57.00     Pack years: 14.25     Types: Cigarettes     Start date: 1963   • Smokeless tobacco: Never Used   • Tobacco comment: cut back on cigs   Vaping Use   • Vaping Use: Never used   Substance and Sexual Activity   • Alcohol use: Yes     Alcohol/week: 2.0 standard drinks     Types: 2 Shots of liquor per week     Comment: Daily   • Drug use: No   • Sexual activity: Defer     Partners: Female     Comment:             Objective   Physical Exam  Vitals and nursing note reviewed.   Constitutional:       General: He is not in acute distress.     Appearance: Normal appearance. He is not ill-appearing.   HENT:      Head: Normocephalic and atraumatic.      Nose: Nose normal.      Mouth/Throat:      Mouth: Mucous membranes are moist.   Eyes:      Extraocular Movements: Extraocular movements intact.      Pupils: Pupils  "are equal, round, and reactive to light.   Cardiovascular:      Rate and Rhythm: Normal rate and regular rhythm.      Heart sounds: Normal heart sounds.   Pulmonary:      Effort: No respiratory distress.      Breath sounds: Normal breath sounds.   Chest:      Chest wall: Tenderness (rt lateral rib pain) present.   Musculoskeletal:         General: No swelling or tenderness. Normal range of motion.      Cervical back: Normal range of motion and neck supple. No tenderness.   Skin:     General: Skin is warm and dry.   Neurological:      Mental Status: He is alert and oriented to person, place, and time.         Procedures           ED Course  ED Course as of 09/15/22 2116   Thu Sep 15, 2022   1603 Patient will be discharged home.  Patient to take meds as ordered.  Patient to return to the ER as needed.  Patient agrees and verbalized understanding. [KG]      ED Course User Index  [KG] Chelo Miranda, DAGOBERTO                No results found for this or any previous visit (from the past 24 hour(s)).  Note: In addition to lab results from this visit, the labs listed above may include labs taken at another facility or during a different encounter within the last 24 hours. Please correlate lab times with ED admission and discharge times for further clarification of the services performed during this visit.    XR Ribs Right With PA Chest   Final Result       1. No acute displaced right rib fracture is identified.   2. Small left basilar pleural fluid with adjacent scarring.       This report was finalized on 9/15/2022 3:17 PM by Sierra Middleton MD.            Vitals:    09/15/22 1426 09/15/22 1613   BP: (!) 201/107 165/92   BP Location: Left arm    Patient Position: Sitting    Pulse: 68 65   Resp: 18 16   Temp: 98.2 °F (36.8 °C)    SpO2: 97% 98%   Weight: 65.8 kg (145 lb)    Height: 180.3 cm (71\")      Medications - No data to display  ECG/EMG Results (last 24 hours)     ** No results found for the last 24 hours. **        No " orders to display                                  MDM    Final diagnoses:   Contusion of rib on right side, initial encounter       ED Disposition  ED Disposition     ED Disposition   Discharge    Condition   Stable    Comment   --             Ez Cintron MD  [address] [id number] [phone]               Medication List      New Prescriptions    methocarbamol 750 MG tablet  Commonly known as: ROBAXIN  Take 1 tablet by mouth 3 (Three) Times a Day.        Changed    furosemide 20 MG tablet  Commonly known as: LASIX  Take 1 tablet by mouth Daily. Take 2 tablets tomorrow morning, then 1 tablet daily.  Take with potassium tablet  What changed: additional instructions           Where to Get Your Medications      These medications were sent to [facility] [address] [place] [address] - [address] - [phone]  - [phone]   [address] [facility] [address]    Phone: [phone]   · methocarbamol 750 MG tablet          Chelo Miranda, APRN  09/15/22 5053

## 2022-09-16 NOTE — PROGRESS NOTES
Anticoagulation Clinic Progress Note  Indication:  Mechanical aortic heart valve, persistent Afib  Referring Provider: Ez Cintron  Initial Warfarin Start Date: > 20 years  Planned Duration of Therapy: indefinite  Goal INR: 2.5-3.5  Current Drug Interactions: asa  [id number]: 2 (HTN, Age > 65)    Diet: no GLV 9/6/2022; occasional asparagus or green beans (1-2x/month) 9/9/2022  Alcohol: 2-3xnightly  Tobacco: <1/2 ppd   OTC Pain Medication: none    Anticoagulation Clinic INR History:  Date 5/11 5/16 5/19 6/10 6/24 7/8 7/22 7/27 8/3 8/10 8/18   Total Weekly Dose 35 mg 37.5mg 35mg 35mg 35 mg 37.5mg? 40mg? 40mg 40 mg 40mg 35mg 32.5 mg   INR 1.5 2.68 ED  3.1 POCT 2.9 2.2 2.2 4.9 POCT  3.66 meenakshi 1.5 2.7 4.3 5.2 1.8   Notes  1x boost, no enox    Possible extra dose enox         Date 8/23 8/30 9/6 9/9          Total Weekly Dose 37.5 mg 37.5mg 30mg 27.5 mg          INR 2.3 4.72  6.2 POCT 5.1 3.4 2.6         Notes    Hold x1; red. x1            Clinic Interview:  Tablet Strength: 5 mg  Verbal Release: Verbal Release Authorization signed on 5/11/2022 -- may speak with Meghana (spouse)  Patient Contact:[phone] (Mobile)      Patient Findings    Plan:  1. INR is therapeutic today at 2.6 via POCT (goal 2.5-3.5). Instructed patient to continue recently decreased weekly dose of 5mg daily except 2.5mg ThurSun for this week.  2. Repeat INR 9/30 to see if this is the best reginen for him.  3. Verbal and written information provided in clnic. Dillon Vo expresses understanding by teach back and has no further questions at this time.      Ez Doan, PharmD  09/16/22   12:12 EDT       Patient has medicare + medicaid-- unsure of  coverage but could try to set patient up with [employer]? See if he is interested at next encounter  He has tried a home monitor previously, but we will leave this note and he will think about trying again.

## 2022-09-21 ENCOUNTER — PATIENT OUTREACH (OUTPATIENT)
Dept: CASE MANAGEMENT | Facility: OTHER | Age: 72
End: 2022-09-21

## 2022-09-21 NOTE — OUTREACH NOTE
AMBULATORY CASE MANAGEMENT NOTE    Name and Relationship of Patient/Support Person: Meghana Vo - Emergency Contact    Patient Outreach    Three unable to reach attempts to patient's phone, and no voicemail set up, since ED visit 9/15/22.  Called wife to make sure of correct phone number for patient.  She said it is correct phone number.  Wife reported patient is doing okay; is still a little sore in rib area.  Reported he is breathing easily, is not short of breath, has no wheezing, has no cough.  Stated patient is independent with all Mobility and ADL's.  Advised on importance of close PCP follow up as ED recommended; need to call for a f/u appt.  Wife voiced understanding and agreement.  No questions or concerns voiced at this time.      DAVID MARINA  Ambulatory Case Management    9/21/2022, 11:14 EDT

## 2022-10-03 ENCOUNTER — ANTICOAGULATION VISIT (OUTPATIENT)
Dept: PHARMACY | Facility: HOSPITAL | Age: 72
End: 2022-10-03

## 2022-10-03 DIAGNOSIS — I48.21 PERMANENT ATRIAL FIBRILLATION: Primary | ICD-10-CM

## 2022-10-03 LAB
INR PPP: 2.3 (ref 0.91–1.09)
PROTHROMBIN TIME: 27.6 SECONDS (ref 10–13.8)

## 2022-10-03 PROCEDURE — 85610 PROTHROMBIN TIME: CPT

## 2022-10-03 PROCEDURE — G0463 HOSPITAL OUTPT CLINIC VISIT: HCPCS

## 2022-10-03 PROCEDURE — 36416 COLLJ CAPILLARY BLOOD SPEC: CPT

## 2022-10-03 NOTE — PROGRESS NOTES
Anticoagulation Clinic Progress Note  Indication:  Mechanical aortic heart valve, persistent Afib  Referring Provider: Ez Cintron  Initial Warfarin Start Date: > 20 years  Planned Duration of Therapy: indefinite  Goal INR: 2.5-3.5  Current Drug Interactions: asa  [id number]: 2 (HTN, Age > 65)    Diet: no GLV 9/6/2022; occasional asparagus or green beans (1-2x/month) 9/9/2022  Alcohol: 2-3xnightly  Tobacco: <1/2 ppd   OTC Pain Medication: none    Anticoagulation Clinic INR History:  Date 5/11 5/16 5/19 6/10 6/24 7/8 7/22 7/27 8/3 8/10 8/18   Total Weekly Dose 35 mg 37.5mg 35mg 35mg 35 mg 37.5mg? 40mg? 40mg 40 mg 40mg 35mg 32.5 mg   INR 1.5 2.68 ED  3.1 POCT 2.9 2.2 2.2 4.9 POCT  3.66 meenakshi 1.5 2.7 4.3 5.2 1.8   Notes  1x boost, no enox    Possible extra dose enox         Date 8/23 8/30 9/6 9/9 9/16 10/3        Total Weekly Dose 37.5 mg 37.5mg 30mg 27.5 mg 30 mg 30 mg        INR 2.3 4.72  6.2 POCT 5.1 3.4 2.6 2.3        Notes    Hold x1; red. x1            Clinic Interview:  Tablet Strength: 5 mg  Verbal Release: Verbal Release Authorization signed on 5/11/2022 -- may speak with Meghana (spouse)  Patient Contact:[phone] (Mobile)      Patient Findings  Negatives:  Signs/symptoms of thrombosis, Signs/symptoms of bleeding, Laboratory test error suspected, Change in health, Change in alcohol use, Change in activity, Upcoming invasive procedure, Emergency department visit, Upcoming dental procedure, Missed doses, Extra doses, Change in medications, Change in diet/appetite, Hospital admission, Bruising, Other complaints   Comments:  Discussed home monitor interest with patient - he will hold off for now and see how INR goes over next few weeks. Had issues in past with a home monitor which makes him slightly hesitant.       Plan:  1. INR is slightly subtherapeutic today at 2.3 via POCT (goal 2.5-3.5). Instructed patient to take a slightly increased dose of warfarin 5mg daily except 2.5mg Sun only until recheck  given previous dose requirements and mechanical heart valve + a fib.  2. Repeat INR in two weeks, 10/17.  3. Verbal and written information provided in clnic. Dillon Vo expresses understanding by teach back and has no further questions at this time.      Faisal Franklin, PharmD  10/03/22   14:24 EDT

## 2022-10-10 ENCOUNTER — TELEPHONE (OUTPATIENT)
Dept: CARDIAC SURGERY | Facility: CLINIC | Age: 72
End: 2022-10-10

## 2022-11-17 DIAGNOSIS — C34.12 MALIGNANT NEOPLASM OF UPPER LOBE OF LEFT LUNG: Primary | ICD-10-CM

## 2022-12-02 ENCOUNTER — ANTICOAGULATION VISIT (OUTPATIENT)
Dept: PHARMACY | Facility: HOSPITAL | Age: 72
End: 2022-12-02

## 2022-12-02 DIAGNOSIS — I48.21 PERMANENT ATRIAL FIBRILLATION: Primary | ICD-10-CM

## 2022-12-02 LAB
INR PPP: 3.9 (ref 0.91–1.09)
PROTHROMBIN TIME: 47.2 SECONDS (ref 10–13.8)

## 2022-12-02 PROCEDURE — 36416 COLLJ CAPILLARY BLOOD SPEC: CPT

## 2022-12-02 PROCEDURE — 85610 PROTHROMBIN TIME: CPT

## 2022-12-02 PROCEDURE — G0463 HOSPITAL OUTPT CLINIC VISIT: HCPCS

## 2022-12-02 NOTE — PROGRESS NOTES
Anticoagulation Clinic Progress Note  Indication:  Mechanical aortic heart valve, persistent Afib  Referring Provider: Ez Cintron  Initial Warfarin Start Date: > 20 years  Planned Duration of Therapy: indefinite  Goal INR: 2.5-3.5  Current Drug Interactions: asa  [id number]: 2 (HTN, Age > 65)    Diet: no GLV 9/6/2022; occasional asparagus or green beans (1-2x/month) 9/9/2022  Alcohol: 2-3xnightly  Tobacco: <1/2 ppd   OTC Pain Medication: none    Anticoagulation Clinic INR History:  Date 5/11 5/16 5/19 6/10 6/24 7/8 7/22 7/27 8/3 8/10 8/18   Total Weekly Dose 35 mg 37.5mg 35mg 35mg 35 mg 37.5mg? 40mg? 40mg 40 mg 40mg 35mg 32.5 mg   INR 1.5 2.68 ED  3.1 POCT 2.9 2.2 2.2 4.9 POCT  3.66 meenakshi 1.5 2.7 4.3 5.2 1.8   Notes  1x boost, no enox    Possible extra dose enox         Date 8/23 8/30 9/6 9/9 9/16 10/3 12/2       Total Weekly Dose 37.5 mg 37.5mg 30mg 27.5 mg 30 mg 30 mg 30mg       INR 2.3 4.72  6.2 POCT 5.1 3.4 2.6 2.3 3.9       Notes    Hold x1; red. x1            Clinic Interview:  Tablet Strength: 5 mg  Verbal Release: Verbal Release Authorization signed on 5/11/2022 -- may speak with Meghana (spouse)  Patient Contact:[phone] (Mobile)      Patient Findings      Negatives:  Signs/symptoms of thrombosis, Signs/symptoms of bleeding, Laboratory test error suspected, Change in health, Change in alcohol use, Change in activity, Upcoming invasive procedure, Emergency department visit, Upcoming dental procedure, Missed doses, Extra doses, Change in medications, Change in diet/appetite, Hospital admission, Bruising, Other complaints         Plan:  1. INR is SUPRAtherapeutic today at 3.9 via POCT (goal 2.5-3.5). Instructed patient to reduce tonight;s dose to 2.5mg then continue warfarin 5mg daily except 2.5mg SunWed until recheck   2. Repeat INR in 2 weeks  3. Verbal and written information provided in clnic. Dillon Vo expresses understanding by teach back and has no further questions at this  time.    Diana Solano, GarettD.  12/02/22   14:11 EST

## 2022-12-04 ENCOUNTER — HOSPITAL ENCOUNTER (INPATIENT)
Facility: HOSPITAL | Age: 72
LOS: 4 days | Discharge: HOME HEALTH | End: 2022-12-08
Attending: EMERGENCY MEDICINE | Admitting: INTERNAL MEDICINE

## 2022-12-04 ENCOUNTER — APPOINTMENT (OUTPATIENT)
Dept: CT IMAGING | Facility: HOSPITAL | Age: 72
End: 2022-12-04

## 2022-12-04 DIAGNOSIS — I16.1 HYPERTENSIVE EMERGENCY: ICD-10-CM

## 2022-12-04 DIAGNOSIS — J90 BILATERAL PLEURAL EFFUSION: ICD-10-CM

## 2022-12-04 DIAGNOSIS — I10 PRIMARY HYPERTENSION: ICD-10-CM

## 2022-12-04 DIAGNOSIS — R07.9 ACUTE CHEST PAIN: ICD-10-CM

## 2022-12-04 DIAGNOSIS — R09.02 HYPOXIA: ICD-10-CM

## 2022-12-04 DIAGNOSIS — I50.33 ACUTE ON CHRONIC DIASTOLIC CONGESTIVE HEART FAILURE: ICD-10-CM

## 2022-12-04 DIAGNOSIS — R26.89 BALANCE PROBLEM: ICD-10-CM

## 2022-12-04 DIAGNOSIS — I50.9 ACUTE CONGESTIVE HEART FAILURE, UNSPECIFIED HEART FAILURE TYPE: Primary | ICD-10-CM

## 2022-12-04 DIAGNOSIS — Z86.79 HISTORY OF CORONARY ARTERY DISEASE: ICD-10-CM

## 2022-12-04 DIAGNOSIS — R55 SYNCOPE, UNSPECIFIED SYNCOPE TYPE: ICD-10-CM

## 2022-12-04 DIAGNOSIS — J44.9 CHRONIC OBSTRUCTIVE PULMONARY DISEASE, UNSPECIFIED COPD TYPE: ICD-10-CM

## 2022-12-04 PROBLEM — IMO0001 SMOKING: Status: ACTIVE | Noted: 2022-12-04

## 2022-12-04 PROBLEM — I48.11 LONGSTANDING PERSISTENT ATRIAL FIBRILLATION: Status: ACTIVE | Noted: 2022-12-04

## 2022-12-04 PROBLEM — I71.40 ABDOMINAL AORTIC ANEURYSM (AAA) WITHOUT RUPTURE: Status: ACTIVE | Noted: 2022-12-04

## 2022-12-04 PROBLEM — F17.200 SMOKING: Status: ACTIVE | Noted: 2022-12-04

## 2022-12-04 LAB
ALBUMIN SERPL-MCNC: 3.8 G/DL (ref 3.5–5.2)
ALBUMIN/GLOB SERPL: 1.3 G/DL
ALP SERPL-CCNC: 97 U/L (ref 39–117)
ALT SERPL W P-5'-P-CCNC: 28 U/L (ref 1–41)
ANION GAP SERPL CALCULATED.3IONS-SCNC: 12 MMOL/L (ref 5–15)
AST SERPL-CCNC: 56 U/L (ref 1–40)
BASOPHILS # BLD AUTO: 0.04 10*3/MM3 (ref 0–0.2)
BASOPHILS NFR BLD AUTO: 0.6 % (ref 0–1.5)
BILIRUB SERPL-MCNC: 1.4 MG/DL (ref 0–1.2)
BUN SERPL-MCNC: 12 MG/DL (ref 8–23)
BUN/CREAT SERPL: 14.1 (ref 7–25)
CALCIUM SPEC-SCNC: 8.8 MG/DL (ref 8.6–10.5)
CHLORIDE SERPL-SCNC: 102 MMOL/L (ref 98–107)
CO2 SERPL-SCNC: 25 MMOL/L (ref 22–29)
CREAT SERPL-MCNC: 0.85 MG/DL (ref 0.76–1.27)
DEPRECATED RDW RBC AUTO: 55.8 FL (ref 37–54)
EGFRCR SERPLBLD CKD-EPI 2021: 92.3 ML/MIN/1.73
EOSINOPHIL # BLD AUTO: 0.03 10*3/MM3 (ref 0–0.4)
EOSINOPHIL NFR BLD AUTO: 0.4 % (ref 0.3–6.2)
ERYTHROCYTE [DISTWIDTH] IN BLOOD BY AUTOMATED COUNT: 13.8 % (ref 12.3–15.4)
FLUAV SUBTYP SPEC NAA+PROBE: NOT DETECTED
FLUBV RNA ISLT QL NAA+PROBE: NOT DETECTED
GLOBULIN UR ELPH-MCNC: 3 GM/DL
GLUCOSE SERPL-MCNC: 121 MG/DL (ref 65–99)
HBA1C MFR BLD: 5.2 % (ref 4.8–5.6)
HCT VFR BLD AUTO: 41.4 % (ref 37.5–51)
HGB BLD-MCNC: 13.7 G/DL (ref 13–17.7)
IMM GRANULOCYTES # BLD AUTO: 0.03 10*3/MM3 (ref 0–0.05)
IMM GRANULOCYTES NFR BLD AUTO: 0.4 % (ref 0–0.5)
INR PPP: 3.61 (ref 0.84–1.13)
INR PPP: 4.71 (ref 0.84–1.13)
LYMPHOCYTES # BLD AUTO: 0.65 10*3/MM3 (ref 0.7–3.1)
LYMPHOCYTES NFR BLD AUTO: 9.6 % (ref 19.6–45.3)
MCH RBC QN AUTO: 35.9 PG (ref 26.6–33)
MCHC RBC AUTO-ENTMCNC: 33.1 G/DL (ref 31.5–35.7)
MCV RBC AUTO: 108.4 FL (ref 79–97)
MONOCYTES # BLD AUTO: 0.6 10*3/MM3 (ref 0.1–0.9)
MONOCYTES NFR BLD AUTO: 8.9 % (ref 5–12)
NEUTROPHILS NFR BLD AUTO: 5.4 10*3/MM3 (ref 1.7–7)
NEUTROPHILS NFR BLD AUTO: 80.1 % (ref 42.7–76)
NRBC BLD AUTO-RTO: 0 /100 WBC (ref 0–0.2)
NT-PROBNP SERPL-MCNC: 3991 PG/ML (ref 0–900)
PLATELET # BLD AUTO: 166 10*3/MM3 (ref 140–450)
PMV BLD AUTO: 11.7 FL (ref 6–12)
POTASSIUM SERPL-SCNC: 4.1 MMOL/L (ref 3.5–5.2)
PROT SERPL-MCNC: 6.8 G/DL (ref 6–8.5)
PROTHROMBIN TIME: 36.3 SECONDS (ref 11.4–14.4)
PROTHROMBIN TIME: 44.8 SECONDS (ref 11.4–14.4)
QT INTERVAL: 512 MS
QTC INTERVAL: 552 MS
RBC # BLD AUTO: 3.82 10*6/MM3 (ref 4.14–5.8)
SARS-COV-2 RNA PNL SPEC NAA+PROBE: NOT DETECTED
SODIUM SERPL-SCNC: 139 MMOL/L (ref 136–145)
TROPONIN T SERPL-MCNC: <0.01 NG/ML (ref 0–0.03)
WBC NRBC COR # BLD: 6.75 10*3/MM3 (ref 3.4–10.8)

## 2022-12-04 PROCEDURE — 83880 ASSAY OF NATRIURETIC PEPTIDE: CPT | Performed by: EMERGENCY MEDICINE

## 2022-12-04 PROCEDURE — 87636 SARSCOV2 & INF A&B AMP PRB: CPT | Performed by: EMERGENCY MEDICINE

## 2022-12-04 PROCEDURE — 85610 PROTHROMBIN TIME: CPT | Performed by: EMERGENCY MEDICINE

## 2022-12-04 PROCEDURE — 93005 ELECTROCARDIOGRAM TRACING: CPT | Performed by: EMERGENCY MEDICINE

## 2022-12-04 PROCEDURE — 83036 HEMOGLOBIN GLYCOSYLATED A1C: CPT | Performed by: NURSE PRACTITIONER

## 2022-12-04 PROCEDURE — 85610 PROTHROMBIN TIME: CPT | Performed by: NURSE PRACTITIONER

## 2022-12-04 PROCEDURE — 84484 ASSAY OF TROPONIN QUANT: CPT | Performed by: EMERGENCY MEDICINE

## 2022-12-04 PROCEDURE — 71275 CT ANGIOGRAPHY CHEST: CPT

## 2022-12-04 PROCEDURE — 85025 COMPLETE CBC W/AUTO DIFF WBC: CPT | Performed by: EMERGENCY MEDICINE

## 2022-12-04 PROCEDURE — 0 IOPAMIDOL PER 1 ML: Performed by: EMERGENCY MEDICINE

## 2022-12-04 PROCEDURE — 99223 1ST HOSP IP/OBS HIGH 75: CPT | Performed by: INTERNAL MEDICINE

## 2022-12-04 PROCEDURE — 25010000002 FUROSEMIDE PER 20 MG: Performed by: INTERNAL MEDICINE

## 2022-12-04 PROCEDURE — 99222 1ST HOSP IP/OBS MODERATE 55: CPT | Performed by: STUDENT IN AN ORGANIZED HEALTH CARE EDUCATION/TRAINING PROGRAM

## 2022-12-04 PROCEDURE — 51703 INSERT BLADDER CATH COMPLEX: CPT | Performed by: STUDENT IN AN ORGANIZED HEALTH CARE EDUCATION/TRAINING PROGRAM

## 2022-12-04 PROCEDURE — 80053 COMPREHEN METABOLIC PANEL: CPT | Performed by: EMERGENCY MEDICINE

## 2022-12-04 PROCEDURE — 99285 EMERGENCY DEPT VISIT HI MDM: CPT

## 2022-12-04 RX ORDER — MAGNESIUM SULFATE HEPTAHYDRATE 40 MG/ML
4 INJECTION, SOLUTION INTRAVENOUS AS NEEDED
Status: DISCONTINUED | OUTPATIENT
Start: 2022-12-04 | End: 2022-12-08 | Stop reason: HOSPADM

## 2022-12-04 RX ORDER — FINASTERIDE 5 MG/1
5 TABLET, FILM COATED ORAL NIGHTLY
Status: DISCONTINUED | OUTPATIENT
Start: 2022-12-04 | End: 2022-12-08 | Stop reason: HOSPADM

## 2022-12-04 RX ORDER — ATORVASTATIN CALCIUM 40 MG/1
80 TABLET, FILM COATED ORAL NIGHTLY
Status: DISCONTINUED | OUTPATIENT
Start: 2022-12-04 | End: 2022-12-08 | Stop reason: HOSPADM

## 2022-12-04 RX ORDER — FUROSEMIDE 10 MG/ML
40 INJECTION INTRAMUSCULAR; INTRAVENOUS DAILY
Status: DISCONTINUED | OUTPATIENT
Start: 2022-12-05 | End: 2022-12-04

## 2022-12-04 RX ORDER — ACETAMINOPHEN 325 MG/1
650 TABLET ORAL EVERY 4 HOURS PRN
Status: DISCONTINUED | OUTPATIENT
Start: 2022-12-04 | End: 2022-12-08 | Stop reason: HOSPADM

## 2022-12-04 RX ORDER — CITALOPRAM 20 MG/1
20 TABLET ORAL DAILY
Status: DISCONTINUED | OUTPATIENT
Start: 2022-12-05 | End: 2022-12-08 | Stop reason: HOSPADM

## 2022-12-04 RX ORDER — SODIUM CHLORIDE 0.9 % (FLUSH) 0.9 %
10 SYRINGE (ML) INJECTION EVERY 12 HOURS SCHEDULED
Status: DISCONTINUED | OUTPATIENT
Start: 2022-12-04 | End: 2022-12-08 | Stop reason: HOSPADM

## 2022-12-04 RX ORDER — CARVEDILOL 12.5 MG/1
25 TABLET ORAL 2 TIMES DAILY WITH MEALS
Status: DISCONTINUED | OUTPATIENT
Start: 2022-12-05 | End: 2022-12-08 | Stop reason: HOSPADM

## 2022-12-04 RX ORDER — POTASSIUM CHLORIDE 1.5 G/1.77G
40 POWDER, FOR SOLUTION ORAL AS NEEDED
Status: DISCONTINUED | OUTPATIENT
Start: 2022-12-04 | End: 2022-12-04 | Stop reason: ALTCHOICE

## 2022-12-04 RX ORDER — FAMOTIDINE 20 MG/1
20 TABLET, FILM COATED ORAL DAILY
Status: DISCONTINUED | OUTPATIENT
Start: 2022-12-05 | End: 2022-12-08 | Stop reason: HOSPADM

## 2022-12-04 RX ORDER — LORAZEPAM 2 MG/ML
0.5 INJECTION INTRAMUSCULAR
Status: DISCONTINUED | OUTPATIENT
Start: 2022-12-04 | End: 2022-12-08

## 2022-12-04 RX ORDER — SODIUM CHLORIDE 9 MG/ML
40 INJECTION, SOLUTION INTRAVENOUS AS NEEDED
Status: DISCONTINUED | OUTPATIENT
Start: 2022-12-04 | End: 2022-12-08 | Stop reason: HOSPADM

## 2022-12-04 RX ORDER — WARFARIN SODIUM 5 MG/1
5 TABLET ORAL
Status: DISCONTINUED | OUTPATIENT
Start: 2022-12-04 | End: 2022-12-04

## 2022-12-04 RX ORDER — LANOLIN ALCOHOL/MO/W.PET/CERES
400 CREAM (GRAM) TOPICAL DAILY
Status: DISCONTINUED | OUTPATIENT
Start: 2022-12-04 | End: 2022-12-04 | Stop reason: SDUPTHER

## 2022-12-04 RX ORDER — POTASSIUM CHLORIDE 750 MG/1
40 CAPSULE, EXTENDED RELEASE ORAL AS NEEDED
Status: DISCONTINUED | OUTPATIENT
Start: 2022-12-04 | End: 2022-12-04 | Stop reason: ALTCHOICE

## 2022-12-04 RX ORDER — MAGNESIUM SULFATE HEPTAHYDRATE 40 MG/ML
2 INJECTION, SOLUTION INTRAVENOUS AS NEEDED
Status: DISCONTINUED | OUTPATIENT
Start: 2022-12-04 | End: 2022-12-08 | Stop reason: HOSPADM

## 2022-12-04 RX ORDER — LORAZEPAM 0.5 MG/1
0.5 TABLET ORAL
Status: DISCONTINUED | OUTPATIENT
Start: 2022-12-04 | End: 2022-12-08

## 2022-12-04 RX ORDER — CARVEDILOL 12.5 MG/1
12.5 TABLET ORAL 2 TIMES DAILY WITH MEALS
Status: DISCONTINUED | OUTPATIENT
Start: 2022-12-04 | End: 2022-12-04

## 2022-12-04 RX ORDER — LORAZEPAM 2 MG/ML
1 INJECTION INTRAMUSCULAR
Status: DISCONTINUED | OUTPATIENT
Start: 2022-12-04 | End: 2022-12-08

## 2022-12-04 RX ORDER — ACETAMINOPHEN 160 MG/5ML
650 SOLUTION ORAL EVERY 4 HOURS PRN
Status: DISCONTINUED | OUTPATIENT
Start: 2022-12-04 | End: 2022-12-08 | Stop reason: HOSPADM

## 2022-12-04 RX ORDER — THIAMINE HYDROCHLORIDE 100 MG/ML
100 INJECTION, SOLUTION INTRAMUSCULAR; INTRAVENOUS ONCE
Status: COMPLETED | OUTPATIENT
Start: 2022-12-04 | End: 2022-12-04

## 2022-12-04 RX ORDER — LISINOPRIL 20 MG/1
20 TABLET ORAL 2 TIMES DAILY
Status: DISCONTINUED | OUTPATIENT
Start: 2022-12-04 | End: 2022-12-08 | Stop reason: HOSPADM

## 2022-12-04 RX ORDER — NITROGLYCERIN 20 MG/100ML
5-200 INJECTION INTRAVENOUS
Status: DISCONTINUED | OUTPATIENT
Start: 2022-12-04 | End: 2022-12-05

## 2022-12-04 RX ORDER — BUPROPION HYDROCHLORIDE 150 MG/1
300 TABLET ORAL DAILY
Status: DISCONTINUED | OUTPATIENT
Start: 2022-12-05 | End: 2022-12-08 | Stop reason: HOSPADM

## 2022-12-04 RX ORDER — LISINOPRIL 10 MG/1
10 TABLET ORAL 2 TIMES DAILY
Status: DISCONTINUED | OUTPATIENT
Start: 2022-12-04 | End: 2022-12-04

## 2022-12-04 RX ORDER — DORZOLAMIDE HYDROCHLORIDE AND TIMOLOL MALEATE 20; 5 MG/ML; MG/ML
SOLUTION/ DROPS OPHTHALMIC 2 TIMES DAILY
Status: DISCONTINUED | OUTPATIENT
Start: 2022-12-04 | End: 2022-12-08 | Stop reason: HOSPADM

## 2022-12-04 RX ORDER — BISACODYL 5 MG/1
5 TABLET, DELAYED RELEASE ORAL DAILY PRN
Status: DISCONTINUED | OUTPATIENT
Start: 2022-12-04 | End: 2022-12-08 | Stop reason: HOSPADM

## 2022-12-04 RX ORDER — POLYETHYLENE GLYCOL 3350 17 G/17G
17 POWDER, FOR SOLUTION ORAL DAILY PRN
Status: DISCONTINUED | OUTPATIENT
Start: 2022-12-04 | End: 2022-12-08 | Stop reason: HOSPADM

## 2022-12-04 RX ORDER — LORAZEPAM 1 MG/1
1 TABLET ORAL
Status: DISCONTINUED | OUTPATIENT
Start: 2022-12-04 | End: 2022-12-08

## 2022-12-04 RX ORDER — AMOXICILLIN 250 MG
2 CAPSULE ORAL 2 TIMES DAILY
Status: DISCONTINUED | OUTPATIENT
Start: 2022-12-04 | End: 2022-12-08 | Stop reason: HOSPADM

## 2022-12-04 RX ORDER — ASPIRIN 81 MG/1
81 TABLET ORAL DAILY
Status: DISCONTINUED | OUTPATIENT
Start: 2022-12-05 | End: 2022-12-08 | Stop reason: HOSPADM

## 2022-12-04 RX ORDER — ASPIRIN 81 MG/1
324 TABLET, CHEWABLE ORAL ONCE
Status: COMPLETED | OUTPATIENT
Start: 2022-12-04 | End: 2022-12-04

## 2022-12-04 RX ORDER — BISACODYL 10 MG
10 SUPPOSITORY, RECTAL RECTAL DAILY PRN
Status: DISCONTINUED | OUTPATIENT
Start: 2022-12-04 | End: 2022-12-08 | Stop reason: HOSPADM

## 2022-12-04 RX ORDER — FUROSEMIDE 10 MG/ML
40 INJECTION INTRAMUSCULAR; INTRAVENOUS EVERY 12 HOURS
Status: DISCONTINUED | OUTPATIENT
Start: 2022-12-04 | End: 2022-12-05

## 2022-12-04 RX ORDER — POTASSIUM CHLORIDE 7.45 MG/ML
10 INJECTION INTRAVENOUS
Status: DISCONTINUED | OUTPATIENT
Start: 2022-12-04 | End: 2022-12-07

## 2022-12-04 RX ORDER — SODIUM CHLORIDE 0.9 % (FLUSH) 0.9 %
10 SYRINGE (ML) INJECTION AS NEEDED
Status: DISCONTINUED | OUTPATIENT
Start: 2022-12-04 | End: 2022-12-08 | Stop reason: HOSPADM

## 2022-12-04 RX ORDER — POTASSIUM CHLORIDE 29.8 MG/ML
20 INJECTION INTRAVENOUS
Status: DISCONTINUED | OUTPATIENT
Start: 2022-12-04 | End: 2022-12-04 | Stop reason: ALTCHOICE

## 2022-12-04 RX ORDER — ACETAMINOPHEN 650 MG/1
650 SUPPOSITORY RECTAL EVERY 4 HOURS PRN
Status: DISCONTINUED | OUTPATIENT
Start: 2022-12-04 | End: 2022-12-08 | Stop reason: HOSPADM

## 2022-12-04 RX ORDER — NICOTINE 21 MG/24HR
1 PATCH, TRANSDERMAL 24 HOURS TRANSDERMAL EVERY 24 HOURS
Status: DISCONTINUED | OUTPATIENT
Start: 2022-12-04 | End: 2022-12-08 | Stop reason: HOSPADM

## 2022-12-04 RX ORDER — DIPHENOXYLATE HYDROCHLORIDE AND ATROPINE SULFATE 2.5; .025 MG/1; MG/1
1 TABLET ORAL DAILY
Status: DISPENSED | OUTPATIENT
Start: 2022-12-05 | End: 2022-12-08

## 2022-12-04 RX ORDER — FOLIC ACID 1 MG/1
1 TABLET ORAL DAILY
Status: DISPENSED | OUTPATIENT
Start: 2022-12-05 | End: 2022-12-08

## 2022-12-04 RX ADMIN — DORZOLAMIDE HYDROCHLORIDE: 20 SOLUTION/ DROPS OPHTHALMIC at 20:54

## 2022-12-04 RX ADMIN — IOPAMIDOL 100 ML: 755 INJECTION, SOLUTION INTRAVENOUS at 16:19

## 2022-12-04 RX ADMIN — LISINOPRIL 20 MG: 20 TABLET ORAL at 21:02

## 2022-12-04 RX ADMIN — SENNOSIDES AND DOCUSATE SODIUM 2 TABLET: 50; 8.6 TABLET ORAL at 20:55

## 2022-12-04 RX ADMIN — ASPIRIN 324 MG: 81 TABLET, CHEWABLE ORAL at 16:29

## 2022-12-04 RX ADMIN — ATORVASTATIN CALCIUM 80 MG: 40 TABLET, FILM COATED ORAL at 20:55

## 2022-12-04 RX ADMIN — FINASTERIDE 5 MG: 5 TABLET, FILM COATED ORAL at 20:55

## 2022-12-04 RX ADMIN — Medication 1 PATCH: at 20:54

## 2022-12-04 RX ADMIN — FUROSEMIDE 40 MG: 10 INJECTION, SOLUTION INTRAMUSCULAR; INTRAVENOUS at 20:54

## 2022-12-04 RX ADMIN — THIAMINE HCL TAB 100 MG 100 MG: 100 TAB at 20:55

## 2022-12-04 RX ADMIN — NITROGLYCERIN 10 MCG/MIN: 20 INJECTION INTRAVENOUS at 14:59

## 2022-12-04 NOTE — ED PROVIDER NOTES
[facility]    EMERGENCY DEPARTMENT ENCOUNTER      Pt Name: Dillon Vo  MRN: [id number]  YOB: 1950  Date of evaluation: 12/4/2022  Provider: Jett Dean MD    CHIEF COMPLAINT       Chief Complaint   Patient presents with   • Shortness of Breath         HISTORY OF PRESENT ILLNESS  (Location/Symptom, Timing/Onset, Context/Setting, Quality, Duration, Modifying Factors, Severity.)   Dillon Vo is a 72 y.o. male who presents to the emergency department with 2 days of intermittent episodes of nonradiating substernal chest tightness along with dyspnea.  Patient states that he was going to come to the emergency department yesterday due to these episodes but states that his symptoms resolved by the time he arrived and so he went back home.  He states that today's episode started earlier and has persisted.  He continues to complain of tightness across his chest as well as difficulty breathing.  He denies any associated diaphoresis or vomiting.  He has history significant for artificial aortic valve replacement, atrial fibrillation, and coronary artery disease and is currently on Coumadin.  No prior history of VTE.  He notes no obvious inciting factors including exertion.      Nursing notes were reviewed.    REVIEW OF SYSTEMS    (2-9 systems for level 4, 10 or more for level 5)   ROS:  General:  No fevers, no chills, no weakness  Cardiovascular: Chest pain  Respiratory: Shortness of breath  Gastrointestinal:  No pain, no nausea, no vomiting, no diarrhea  Musculoskeletal:  No muscle pain, no joint pain  Skin:  No rash  Neurologic:  No speech problems, no headache, no extremity numbness, no extremity tingling, no extremity weakness  Psychiatric:  No anxiety  Genitourinary:  No dysuria, no hematuria    Except as noted above the remainder of the review of systems was reviewed and negative.       PAST MEDICAL HISTORY     Past Medical History:   Diagnosis Date   • A-fib (HCC)    • Anemia    •  Atrial fibrillation (HCC) 5/5/2016    Difficult to control rate. RFA of AV node with implantation of left Bi-V pacemaker, 06/18/2013. Hematoma requiring single-chamber pacemaker implanted, 07/29/2013.    • Colitis    • Colon cancer (HCC)    • Coronary artery disease    • Depression    • GERD (gastroesophageal reflux disease)    • Heart valve disease    • Hiatal hernia    • HTN (hypertension)    • Hyperbilirubinemia    • Hyperlipidemia    • Infectious viral hepatitis    • Kidney stone    • Lung cancer (HCC)    • Orthostatic hypotension    • Sick sinus syndrome (HCC)    • Skin cancer     lip          SURGICAL HISTORY       Past Surgical History:   Procedure Laterality Date   • APPENDECTOMY     • BRONCHOSCOPY THORACOTOMY Left 4/7/2021    Procedure: BRONCHOSCOPY LEFT THORACOTOMY, LEFT LUNG RESECTION;  Surgeon: Chi Post MD;  Location: [facility];  Service: Cardiothoracic;  Laterality: Left;   • CARDIAC CATHETERIZATION     • CARDIAC SURGERY  2000    valve replacement    • COLON SURGERY     • COLONOSCOPY  up to date   • CORONARY STENT PLACEMENT     • EXTRACORPOREAL SHOCKWAVE LITHOTRIPSY (ESWL), STENT INSERTION/REMOVAL     • LUNG CANCER SURGERY     • PACEMAKER IMPLANTATION     • VARICOSE VEIN SURGERY           CURRENT MEDICATIONS       Current Facility-Administered Medications:   •  nitroglycerin (TRIDIL) 200 mcg/ml infusion, 5-200 mcg/min, Intravenous, Titrated, Jett Dean MD, Last Rate: 6 mL/hr at 12/04/22 1628, 20 mcg/min at 12/04/22 1628    Current Outpatient Medications:   •  aspirin 81 MG EC tablet, Take 1 tablet by mouth Daily., Disp: , Rfl:   •  atorvastatin (LIPITOR) 80 MG tablet, Take 1 tablet by mouth Every Night., Disp: 90 tablet, Rfl: 3  •  buPROPion XL (WELLBUTRIN XL) 300 MG 24 hr tablet, Take 1 tablet by mouth once daily, Disp: 30 tablet, Rfl: 5  •  carvedilol (COREG) 12.5 MG tablet, Take 1 tablet by mouth 2 (Two) Times a Day With Meals., Disp: 180 tablet, Rfl: 3  •  citalopram (CeleXA) 20 MG  tablet, Take 1 tablet by mouth once daily, Disp: 90 tablet, Rfl: 0  •  dorzolamide-timolol (COSOPT) 22.3-6.8 MG/ML ophthalmic solution, dorzolamide 22.3 mg-timolol 6.8 mg/mL eye drops  1 drop both eyes 9 am and 9 pm, Disp: , Rfl:   •  famotidine (PEPCID) 20 MG tablet, Take 1 tablet by mouth Daily., Disp: 30 tablet, Rfl: 1  •  finasteride (PROSCAR) 5 MG tablet, TAKE 1 TABLET BY MOUTH ONCE DAILY AT BEDTIME, Disp: 90 tablet, Rfl: 1  •  folic acid (FOLVITE) 400 MCG tablet, TAKE 1 TABLET BY MOUTH ONCE DAILY, Disp: 90 tablet, Rfl: 3  •  furosemide (LASIX) 20 MG tablet, Take 1 tablet by mouth Daily. Take 2 tablets tomorrow morning, then 1 tablet daily.  Take with potassium tablet (Patient taking differently: Take 20 mg by mouth Daily. Take1 tablet daily.  Take with potassium tablet), Disp: 30 tablet, Rfl: 1  •  lisinopril (PRINIVIL,ZESTRIL) 10 MG tablet, Take 1 tablet by mouth 2 (Two) Times a Day., Disp: 180 tablet, Rfl: 3  •  methocarbamol (ROBAXIN) 750 MG tablet, Take 1 tablet by mouth 3 (Three) Times a Day., Disp: 15 tablet, Rfl: 0  •  nicotine (NICODERM CQ) 14 MG/24HR patch, Place 1 patch on the skin as directed by provider Daily., Disp: 90 patch, Rfl: 0  •  potassium chloride 10 MEQ CR tablet, Take 1 tablet by mouth Daily., Disp: 30 tablet, Rfl: 3  •  tadalafil (Cialis) 20 MG tablet, Take 1 tablet by mouth Daily As Needed for Erectile Dysfunction., Disp: 30 tablet, Rfl: 2  •  timolol (TIMOPTIC) 0.5 % ophthalmic solution, Administer 1 drop to both eyes Daily., Disp: , Rfl:   •  vitamin B-12 (CYANOCOBALAMIN) 1000 MCG tablet, Take 1 tablet by mouth Daily., Disp: 90 tablet, Rfl: 3  •  warfarin (COUMADIN) 5 MG tablet, Take 1 tablet by mouth Daily., Disp: 30 tablet, Rfl: 5    ALLERGIES     Patient has no known allergies.    FAMILY HISTORY       Family History   Problem Relation Age of Onset   • Cancer Mother    • Hypertension Father    • Heart disease Father    • No Known Problems Sister    • No Known Problems Brother            SOCIAL HISTORY       Social History     Socioeconomic History   • Marital status:    • Number of children: 0   Tobacco Use   • Smoking status: Every Day     Packs/day: 0.25     Years: 57.00     Pack years: 14.25     Types: Cigarettes     Start date: 1963   • Smokeless tobacco: Never   • Tobacco comments:     cut back on cigs   Vaping Use   • Vaping Use: Never used   Substance and Sexual Activity   • Alcohol use: Yes     Alcohol/week: 2.0 standard drinks     Types: 2 Shots of liquor per week     Comment: Daily   • Drug use: No   • Sexual activity: Defer     Partners: Female     Comment:           PHYSICAL EXAM    (up to 7 for level 4, 8 or more for level 5)     Vitals:    12/04/22 1530 12/04/22 1531 12/04/22 1630 12/04/22 1645   BP:   (!) 190/113 (!) 179/107   BP Location:       Patient Position:       Pulse: 70 70 70 70   Resp:       Temp:       TempSrc:       SpO2: 91% (!) 89% 96% 96%   Weight:       Height:           Physical Exam  General: Awake, alert, no acute distress.  HEENT: Conjunctivae normal.  Neck: Trachea midline.  Cardiac: Heart regular rate, rhythm, no murmurs, rubs, or gallops  Lungs: Lungs are clear to auscultation, there is no wheezing, rhonchi, or rales. There is no use of accessory muscles.  Chest wall: There is no tenderness to palpation over the chest wall or over ribs  Abdomen: Abdomen is soft, nontender, nondistended. There are no firm or pulsatile masses, no rebound rigidity or guarding.   Musculoskeletal: No deformity.  Neuro: Alert and oriented x 4.  Dermatology: Skin is warm and dry  Psych: Mentation is grossly normal, cognition is grossly normal. Affect is appropriate.        DIAGNOSTIC RESULTS     EKG: All EKGs are interpreted by the Emergency Department Physician who either signs or Co-signs this chart in the absence of a cardiologist.    ECG 12 Lead Dyspnea   Final Result   Test Reason : Dyspnea   Blood Pressure :   */*   mmHG   Vent. Rate :  70 BPM     Atrial Rate :  326 BPM      P-R Int :   * ms          QRS Dur : 182 ms       QT Int : 512 ms       P-R-T Axes :   * 221 105 degrees      QTc Int : 552 ms      ** Suspect arm lead reversal, interpretation assumes no reversal   Wide QRS rhythm   Nonspecific intraventricular block   Possible Lateral infarct , age undetermined   Possible Inferior infarct , age undetermined   Abnormal ECG   When compared with ECG of 23-MAR-2022 04:23,   No significant change was found   Confirmed by MAGALIE SAMPSON ([id number]) on 12/4/2022 3:27:54 PM      Referred By: EDMD           Confirmed By: MAGALIE SAMPSON      ECG 12 Lead Dyspnea    (Results Pending)       RADIOLOGY:   Non-plain film images such as CT, Ultrasound and MRI are read by the radiologist. Plain radiographic images are visualized and preliminarily interpreted by the emergency physician with the below findings:      [x] Radiologist's Report Reviewed:  CT Angiogram Chest   Final Result   1.  Abdominal aortic aneurysm with estimated maximal diameter of 5 cm.   Surgical consultation is recommended for consideration of repair.   2.  Ascending thoracic aorta appears to measure up to 4.4 cm. No   definite acute aortic abnormality is seen on this exam.   3.  Cardiomegaly with reflux of contrast into the IVC and hepatic veins.   There is also a small amount of ascites and increased small to moderate   left and small right pleural effusions. These findings may indicate   heart failure.   4.  Mild groundglass opacities, central bronchial wall thickening, and   basilar septal thickening which could be seen with edema or pneumonia.   5.  Emphysema. Status post left upper lobectomy.   6.  Cholelithiasis.       This report was finalized on 12/4/2022 4:57 PM by Oniel Callaway MD.                ED BEDSIDE ULTRASOUND:   Performed by ED Physician - none    LABS:    I have reviewed and interpreted all of the currently available lab results from this visit (if applicable):  Results for orders placed or performed  during the hospital encounter of 12/04/22   COVID-19 and FLU A/B PCR - Swab, Nasopharynx    Specimen: Nasopharynx; Swab   Result Value Ref Range    COVID19 Not Detected Not Detected - Ref. Range    Influenza A PCR Not Detected Not Detected    Influenza B PCR Not Detected Not Detected   Protime-INR    Specimen: Blood   Result Value Ref Range    Protime 44.8 (H) 11.4 - 14.4 Seconds    INR 4.71 (H) 0.84 - 1.13   Comprehensive Metabolic Panel    Specimen: Blood   Result Value Ref Range    Glucose 121 (H) 65 - 99 mg/dL    BUN 12 8 - 23 mg/dL    Creatinine 0.85 0.76 - 1.27 mg/dL    Sodium 139 136 - 145 mmol/L    Potassium 4.1 3.5 - 5.2 mmol/L    Chloride 102 98 - 107 mmol/L    CO2 25.0 22.0 - 29.0 mmol/L    Calcium 8.8 8.6 - 10.5 mg/dL    Total Protein 6.8 6.0 - 8.5 g/dL    Albumin 3.80 3.50 - 5.20 g/dL    ALT (SGPT) 28 1 - 41 U/L    AST (SGOT) 56 (H) 1 - 40 U/L    Alkaline Phosphatase 97 39 - 117 U/L    Total Bilirubin 1.4 (H) 0.0 - 1.2 mg/dL    Globulin 3.0 gm/dL    A/G Ratio 1.3 g/dL    BUN/Creatinine Ratio 14.1 7.0 - 25.0    Anion Gap 12.0 5.0 - 15.0 mmol/L    eGFR 92.3 >60.0 mL/min/1.73   Troponin    Specimen: Blood   Result Value Ref Range    Troponin T <0.010 0.000 - 0.030 ng/mL   BNP    Specimen: Blood   Result Value Ref Range    proBNP 3,991.0 (H) 0.0 - 900.0 pg/mL   CBC Auto Differential    Specimen: Blood   Result Value Ref Range    WBC 6.75 3.40 - 10.80 10*3/mm3    RBC 3.82 (L) 4.14 - 5.80 10*6/mm3    Hemoglobin 13.7 13.0 - 17.7 g/dL    Hematocrit 41.4 37.5 - 51.0 %    .4 (H) 79.0 - 97.0 fL    MCH 35.9 (H) 26.6 - 33.0 pg    MCHC 33.1 31.5 - 35.7 g/dL    RDW 13.8 12.3 - 15.4 %    RDW-SD 55.8 (H) 37.0 - 54.0 fl    MPV 11.7 6.0 - 12.0 fL    Platelets 166 140 - 450 10*3/mm3    Neutrophil % 80.1 (H) 42.7 - 76.0 %    Lymphocyte % 9.6 (L) 19.6 - 45.3 %    Monocyte % 8.9 5.0 - 12.0 %    Eosinophil % 0.4 0.3 - 6.2 %    Basophil % 0.6 0.0 - 1.5 %    Immature Grans % 0.4 0.0 - 0.5 %    Neutrophils, Absolute 5.40  1.70 - 7.00 10*3/mm3    Lymphocytes, Absolute 0.65 (L) 0.70 - 3.10 10*3/mm3    Monocytes, Absolute 0.60 0.10 - 0.90 10*3/mm3    Eosinophils, Absolute 0.03 0.00 - 0.40 10*3/mm3    Basophils, Absolute 0.04 0.00 - 0.20 10*3/mm3    Immature Grans, Absolute 0.03 0.00 - 0.05 10*3/mm3    nRBC 0.0 0.0 - 0.2 /100 WBC   ECG 12 Lead Dyspnea   Result Value Ref Range    QT Interval 512 ms    QTC Interval 552 ms        All other labs were within normal range or not returned as of this dictation.      EMERGENCY DEPARTMENT COURSE and DIFFERENTIAL DIAGNOSIS/MDM:   Vitals:    Vitals:    12/04/22 1530 12/04/22 1531 12/04/22 1630 12/04/22 1645   BP:   (!) 190/113 (!) 179/107   BP Location:       Patient Position:       Pulse: 70 70 70 70   Resp:       Temp:       TempSrc:       SpO2: 91% (!) 89% 96% 96%   Weight:       Height:           ED Course as of 12/04/22 1727   Sun Dec 04, 2022   1702 Patient now requiring 2 L nasal cannula.  Chest pain not improved with nitroglycerin infusion.  Blood pressure was slight improvement.  Presentation consistent with bilateral pleural effusions with ongoing chest pain and significant hypertension.  Patient will be admitted to the hospitalist service for further management. [NS]      ED Course User Index  [NS] Jett Dean MD       This patient presents with intermittent chest tightness and shortness of breath in the setting of significant history of coronary artery disease raising concern for ACS/unstable angina.  Patient also has history of thoracic aortic aneurysm.  Feel that PE is less likely given that patient is anticoagulated, however given aneurysm history we will go ahead and obtain CTA chest to rule out any other acute intrathoracic pathology.  Will reassess once we obtain diagnostic results.      MEDICATIONS ADMINISTERED IN ED:  Medications   nitroglycerin (TRIDIL) 200 mcg/ml infusion (20 mcg/min Intravenous Rate/Dose Change 12/4/22 1492)   aspirin chewable tablet 324 mg (324 mg Oral  Given 12/4/22 1629)   iopamidol (ISOVUE-370) 76 % injection 100 mL (100 mL Intravenous Given 12/4/22 1619)       CRITICAL CARE TIME    Approximately 35 minutes of discontinuous critical care time was provided to this patient by myself absent of any time spent performing procedures.  Patient presents critically ill with acute hypoxia secondary to acute exacerbation of CHF/bilateral pleural effusions/hypertensive emergency with associated chest pain placing the cardiovascular, respiratory, neurologic, renal systems at risk requiring the following interventions: Administration of supplemental oxygen, nitroglycerin infusion for blood pressure and chest pain control, interpretation of lab/ECG/imaging, frequent reassessment, coordination of admission with the following response: Improvement in chest pain and blood pressure.  Patient at high risk of deterioration and possibly death without these interventions.        FINAL IMPRESSION      1. Acute congestive heart failure, unspecified heart failure type (HCC)    2. Bilateral pleural effusion    3. Hypoxia    4. Acute chest pain    5. Hypertensive emergency    6. History of coronary artery disease          DISPOSITION/PLAN     ED Disposition     ED Disposition   Decision to Admit    Condition   --    Comment   Level of Care: Telemetry [5]   Diagnosis: CHF (congestive heart failure) (HCC) [[id number]]   Certification: I Certify That Inpatient Hospital Services Are Medically Necessary For Greater Than 2 Midnights                   Jett Dean MD  Attending Emergency Physician               Jett Dean MD  12/04/22 4721

## 2022-12-04 NOTE — H&P
[facility] Medicine Services  HISTORY AND PHYSICAL    Patient Name: Dillon Vo  : 1950  MRN: [id number]  Primary Care Physician: Ez Cintron MD  Date of admission: 2022    Subjective   Subjective     Chief Complaint:  Right sided chest pain and shortness of air    HPI:  Dillon Vo is a 72 y.o. male PMH Afib and mechanical aortic valve (coumadin/INR 2.5-3.5), AAA (5cm), Pacemaker Left chest wall that was removed an relocated to the right chest wall, HTN/HLD, smoking (60+ yrs), CAD s/p CABG, hx of lung cancer and colon cancer presenting with intermittent right sided chest pain, shortness of breath and lower extremity edema for 3 days. Pt found with CHF exacerbation. He states he did not know he had a AAA and if he did, he forgot it. Pt follows with Dr Ronquillo cardiology.     Review of Systems   Constitutional: Positive for activity change, appetite change and fatigue.   HENT: Positive for sinus pressure.    Eyes: Negative.    Respiratory: Positive for cough and shortness of breath.    Cardiovascular: Positive for chest pain and leg swelling. Negative for palpitations.   Gastrointestinal: Negative.  Negative for abdominal pain.   Endocrine: Negative.    Genitourinary: Negative.    Musculoskeletal: Negative.    Skin: Negative.    Allergic/Immunologic: Negative.    Neurological: Negative.    Hematological: Negative.    Psychiatric/Behavioral: Negative.       All other systems reviewed and are negative.     Personal History     Past Medical History:   Diagnosis Date   • A-fib (HCC)    • Anemia    • Atrial fibrillation (HCC) 2016    Difficult to control rate. RFA of AV node with implantation of left Bi-V pacemaker, 2013. Hematoma requiring single-chamber pacemaker implanted, 2013.    • Colitis    • Colon cancer (HCC)    • Coronary artery disease    • Depression    • GERD (gastroesophageal reflux disease)    • Heart valve disease    •  Hiatal hernia    • HTN (hypertension)    • Hyperbilirubinemia    • Hyperlipidemia    • Infectious viral hepatitis    • Kidney stone    • Lung cancer (HCC)    • Orthostatic hypotension    • Sick sinus syndrome (HCC)    • Skin cancer     lip          Oncology Problem List:  Lung cancer (S/P Left thoracotomy and lobectomy 4/2021) (03/15/2021;   Status: Active)  Malignant neoplasm of overlapping sites of colon (HCC) (06/16/2016;   Status: Active)       Past Surgical History:   Procedure Laterality Date   • APPENDECTOMY     • BRONCHOSCOPY THORACOTOMY Left 4/7/2021    Procedure: BRONCHOSCOPY LEFT THORACOTOMY, LEFT LUNG RESECTION;  Surgeon: Chi Post MD;  Location: [facility];  Service: Cardiothoracic;  Laterality: Left;   • CARDIAC CATHETERIZATION     • CARDIAC SURGERY  2000    valve replacement    • COLON SURGERY     • COLONOSCOPY  up to date   • CORONARY STENT PLACEMENT     • EXTRACORPOREAL SHOCKWAVE LITHOTRIPSY (ESWL), STENT INSERTION/REMOVAL     • LUNG CANCER SURGERY     • PACEMAKER IMPLANTATION     • VARICOSE VEIN SURGERY         Family History: family history includes Cancer in his mother; Heart disease in his father; Hypertension in his father; No Known Problems in his brother and sister. Otherwise pertinent FHx was reviewed and unremarkable.     Social History:  reports that he has been smoking cigarettes. He started smoking about 59 years ago. He has a 14.25 pack-year smoking history. He has never used smokeless tobacco. He reports current alcohol use of about 2.0 standard drinks per week. He reports current drug use. Drug: Marijuana.  Social History     Social History Narrative    Lives in [facility]    Caffeine 0       Medications:  aspirin, atorvastatin, buPROPion XL, carvedilol, citalopram, dorzolamide-timolol, famotidine, finasteride, folic acid, furosemide, lisinopril, methocarbamol, nicotine, potassium chloride, tadalafil, timolol, vitamin B-12, and warfarin    No Known Allergies    Objective    Objective     Vital Signs:   Temp:  [98.2 °F (36.8 °C)] 98.2 °F (36.8 °C)  Heart Rate:  [70-71] 70  Resp:  [19] 19  BP: (179-190)/(104-113) 179/107    Physical Exam  Vitals reviewed.   Constitutional:       General: He is not in acute distress.  HENT:      Head: Normocephalic and atraumatic.      Mouth/Throat:      Mouth: Mucous membranes are moist.   Eyes:      General: No scleral icterus.     Conjunctiva/sclera: Conjunctivae normal.   Cardiovascular:      Rate and Rhythm: Normal rate and regular rhythm.      Heart sounds: Normal heart sounds. No murmur heard.    No friction rub. No gallop.   Pulmonary:      Effort: Pulmonary effort is normal.      Breath sounds: Normal breath sounds.   Abdominal:      General: Bowel sounds are normal. There is no distension.      Palpations: Abdomen is soft.      Tenderness: There is no abdominal tenderness.   Musculoskeletal:         General: Swelling present.      Cervical back: Neck supple.      Comments: +2 lower extremity edema   Skin:     General: Skin is warm and dry.   Neurological:      General: No focal deficit present.      Mental Status: He is alert and oriented to person, place, and time.   Psychiatric:         Mood and Affect: Mood normal.         Behavior: Behavior normal.        Result Review:  I have personally reviewed the results from the time of this admission to 12/4/2022 18:03 EST and agree with these findings:  [x]  Laboratory list / accordion  []  Microbiology  [x]  Radiology  [x]  EKG/Telemetry   [x]  Cardiology/Vascular   []  Pathology  [x]  Old records  []  Other:  Most notable findings include:   INR 4.71, BNP 3991    LAB RESULTS:      Lab 12/04/22  1458 12/02/22  1400   WBC 6.75  --    HEMOGLOBIN 13.7  --    HEMATOCRIT 41.4  --    PLATELETS 166  --    NEUTROS ABS 5.40  --    IMMATURE GRANS (ABS) 0.03  --    LYMPHS ABS 0.65*  --    MONOS ABS 0.60  --    EOS ABS 0.03  --    .4*  --    PROTIME 44.8* 47.2*         Lab 12/04/22  1458   SODIUM 139    POTASSIUM 4.1   CHLORIDE 102   CO2 25.0   ANION GAP 12.0   BUN 12   CREATININE 0.85   EGFR 92.3   GLUCOSE 121*   CALCIUM 8.8         Lab 12/04/22  1458   TOTAL PROTEIN 6.8   ALBUMIN 3.80   GLOBULIN 3.0   ALT (SGPT) 28   AST (SGOT) 56*   BILIRUBIN 1.4*   ALK PHOS 97         Lab 12/04/22  1458 12/02/22  1400   PROBNP 3,991.0*  --    TROPONIN T <0.010  --    PROTIME 44.8* 47.2*   INR 4.71* 3.9*                 Brief Urine Lab Results     None        Microbiology Results (last 10 days)     Procedure Component Value - Date/Time    COVID PRE-OP / PRE-PROCEDURE SCREENING ORDER (NO ISOLATION) - Swab, Nasopharynx [[id number]]  (Normal) Collected: 12/04/22 1458    Lab Status: Final result Specimen: Swab from Nasopharynx Updated: 12/04/22 1611    Narrative:      The following orders were created for panel order COVID PRE-OP / PRE-PROCEDURE SCREENING ORDER (NO ISOLATION) - Swab, Nasopharynx.  Procedure                               Abnormality         Status                     ---------                               -----------         ------                     COVID-19 and FLU A/B PCR...[[id number]]  Normal              Final result                 Please view results for these tests on the individual orders.    COVID-19 and FLU A/B PCR - Swab, Nasopharynx [[id number]]  (Normal) Collected: 12/04/22 1458    Lab Status: Final result Specimen: Swab from Nasopharynx Updated: 12/04/22 1611     COVID19 Not Detected     Influenza A PCR Not Detected     Influenza B PCR Not Detected    Narrative:      Fact sheet for providers: https://www.fda.gov/media/095135/download    Fact sheet for patients: https://www.fda.gov/media/258646/download    Test performed by PCR.          CT Angiogram Chest    Result Date: 12/4/2022  DATE OF EXAM: 12/4/2022 4:13 PM  PROCEDURE: CT ANGIOGRAM CHEST-  INDICATIONS: cp, hx taa. History of lung cancer and left upper lobectomy.  COMPARISON: 05/23/2022. CT abdomen pelvis 05/24/2021.  TECHNIQUE: Contiguous axial  imaging was obtained from the thoracic inlet through the upper abdomen following the intravenous administration of 100 mL of Isovue 370. Reconstructed coronal and sagittal images were also obtained. Automated exposure control and iterative reconstruction methods were used.  The radiation dose reduction device was turned on for each scan per the ALARA (As Low as Reasonably Achievable) protocol.  FINDINGS: VASCULAR FINDINGS: Status post median sternotomy. There appears to have been aortic valve replacement. There is also peripheral hyperdense attenuation in the ascending aorta which may indicate prior aneurysm repair. The proximal ascending aorta appears to measure up to approximately 3.8 x 4 cm. The more distal ascending aorta appears to measure up to 4.4 x 4.2 cm. The aortic arch measures up to approximately 3.6 cm. The proximal descending aorta measures up to approximately 3.6 cm. The mid descending aorta measures up to 2.9 cm. The distal thoracic aorta measures up to approximately 2.9 cm. There is aneurysm of the abdominal aorta and inferior to the renal arteries. Appears to have a maximal diameter of approximately 5 cm on series 900 image 37. There is atherosclerosis of the mesenteric arteries with suspected mild luminal narrowing of the proximal superior mesenteric artery. There is also atherosclerosis and suspected mild to moderate luminal narrowing of the proximal renal arteries.  No definite findings of acute aortic abnormality at this time.  The central pulmonary arteries appear to enhance as expected  NONVASCULAR FINDINGS: The heart appears enlarged. Pacemaker is present. No pericardial effusion. There is reflux of contrast into the IVC and hepatic veins which may be seen with right heart failure. No definite axillary, mediastinal, or hilar adenopathy is seen.  Status post left upper lobectomy. There is advanced emphysema. There has been interval increase in left pleural effusion, now moderate in size.  There is also increased small right pleural effusion. No definite pneumothorax.  There is some new mild consolidation adjacent to the effusions. There also appears to be some new central bronchial wall thickening, groundglass opacities, and basilar septal thickening. There is suspected chronic scarring at the right apex. There are mild right apical bulla.  There are calcified gallstones. There is a small amount of ascites. Cyst is seen in the anterior left renal midpole.  No definite aggressive osseous lesion is identified.    Impression: 1.  Abdominal aortic aneurysm with estimated maximal diameter of 5 cm. Surgical consultation is recommended for consideration of repair. 2.  Ascending thoracic aorta appears to measure up to 4.4 cm. No definite acute aortic abnormality is seen on this exam. 3.  Cardiomegaly with reflux of contrast into the IVC and hepatic veins. There is also a small amount of ascites and increased small to moderate left and small right pleural effusions. These findings may indicate heart failure. 4.  Mild groundglass opacities, central bronchial wall thickening, and basilar septal thickening which could be seen with edema or pneumonia. 5.  Emphysema. Status post left upper lobectomy. 6.  Cholelithiasis.  This report was finalized on 12/4/2022 4:57 PM by Oniel Callaway MD.        Results for orders placed during the hospital encounter of 03/19/21    Adult Transthoracic Echo Complete W/ Cont if Necessary Per Protocol    Interpretation Summary  · There is biatrial enlargement. Left atrial volume is severely increased.  · The inferior vena cava is dilated and inspiratory collapse is absent.  · There is a mechanical aortic valve. I cannot exclude a small perivalvular leak (AI) with confidence.  · There is mitral annular calcification and moderate mitral regurgitation.  · There is moderate tricuspid regurgitation and moderate estimated PA hypertension (RVSP=53 mmHg).  · There is no pericardial  effusion.      Assessment & Plan   Assessment & Plan       CHF (congestive heart failure) (HCC)    Abdominal aortic aneurysm (AAA) without rupture    HLD (hyperlipidemia)    HTN (hypertension)    COPD (chronic obstructive pulmonary disease) (HCC)    Gastroesophageal reflux disease without esophagitis    Smoking    Hospital Course to date:  Dillon Vo is a 72 y.o. male PMH Afib and mechanical aortic valve (coumadin/INR 2.5-3.5), AAA (5cm), Pacemaker Left chest wall that was removed an relocated to the right chest wall, HTN/HLD, smoking (60+ yrs), CAD s/p CABG, hx of lung cancer and colon cancer presenting with intermittent right sided chest pain, shortness of breath and lower extremity edema for 3 days. Pt found with CHF exacerbation.      CHF exacerbation  Right Sided chest pain  Hx CAD s/p CABG  Hx of pacemaker with revision  - Consult cardiology, follows with Dr Ronquillo  - BNP 3991  - lasix 40 IV daily  - electrolyte replacement  - Strict I&O daily weight  - Nitro drip, troponin negative, EKG no significant change from EKG 03/2022 QT/QTc 512/552  - continue to trend troponin with EKG correlation  - received ASA 324mg in the ED, continue with ASA 81 mg daily tomorrow  - 3/2021 ECHO LVEF 53%. Repeat ECHO     AAA  - CTA chest (+) 5 cm. This has increased from [place] AAA screen 07/06/2022 where the AAA was 4.6 cm infrarenal AAA.     HTN/HLD  - Pt on a nitro drip  - Coreg, Lisinopril, atorvastatin     AFIB/ Mechanical aortic valve  - INR 4.71   - goal 2.5-3.5  - pharmacy to dose    Smoking 60+ years  - smoking cessation education  - Nicotine patch    Depression  - Wellbutrin, celexa    BPH  - finasteride    Alcohol use  - [facility] protocol  - vitamins  - denies hx of alcohol withdrawal seizure    GERD  - pepcid    DVT prophylaxis:  Coumadin    CODE STATUS:    Medical Intervention Limits: NO intubation (DNI)  Code Status (Patient has no pulse and is not breathing): No CPR (Do Not Attempt to Resuscitate)  Medical  Interventions (Patient has pulse or is breathing): Limited Support    This note has been completed as part of a split-shared workflow.     Electronically signed by DAGOBERTO David, 12/04/22, 6:00 PM EST.      Attending   Admission Attestation       I have performed an independent face-to-face diagnostic evaluation including performing an independent physical examination as documented here.  The documented plan of care above was reviewed and developed with the advanced practice clinician (APC).      Brief Summary Statement:   Dillon Vo is a 72 y.o. male presenting to ED with SOA x 3 days. Says this is worse with exertion. Also complaining of some associated chest tightness. Has been taking all of his medication as directed but notes his BP has been very uncontrolled at home. In ED he is found to have CHF exacerbation. He will be treated with IV NGT infusion, IV BID lasix. Will check echocardiogram in am. As he self caths at home will place chen for 24-48 hours while we are actively diuresing him. Will also ask cardiology to see given his associated CP.    Remainder of detailed HPI is as noted by APC and has been reviewed and/or edited by me for completeness.    Attending Physical Exam:  Temp:  [98.2 °F (36.8 °C)] 98.2 °F (36.8 °C)  Heart Rate:  [70-71] 70  Resp:  [19] 19  BP: (179-190)/(104-113) 184/105    Constitutional: Awake, alert  Eyes: PERRLA, sclerae anicteric, no conjunctival injection  HENT: NCAT, mucous membranes moist, O2 via NC  Neck: Supple, no thyromegaly, no lymphadenopathy, trachea midline  Respiratory: Normal WOB, crackles  Cardiovascular: RRR, mechanical click  Gastrointestinal: Positive bowel sounds, soft, nontender, nondistended  Musculoskeletal: 1+ LE edema bilaterally  Psychiatric: Appropriate affect, cooperative  Neurologic: Oriented x 3, strength symmetric in all extremities, Cranial Nerves grossly intact to confrontation, speech clear  Skin: No rashes     Brief  Assessment/Plan :  See detailed assessment and plan developed with APC which I have reviewed and/or edited for completeness.            Criss Enciso II, DO  12/04/22

## 2022-12-05 ENCOUNTER — APPOINTMENT (OUTPATIENT)
Dept: CARDIOLOGY | Facility: HOSPITAL | Age: 72
End: 2022-12-05

## 2022-12-05 ENCOUNTER — APPOINTMENT (OUTPATIENT)
Dept: CT IMAGING | Facility: HOSPITAL | Age: 72
End: 2022-12-05

## 2022-12-05 PROBLEM — I48.11 LONGSTANDING PERSISTENT ATRIAL FIBRILLATION: Status: RESOLVED | Noted: 2022-12-04 | Resolved: 2022-12-05

## 2022-12-05 PROBLEM — I44.2 COMPLETE HEART BLOCK (HCC): Status: ACTIVE | Noted: 2022-12-05

## 2022-12-05 PROBLEM — R33.9 URINARY RETENTION: Status: ACTIVE | Noted: 2022-12-05

## 2022-12-05 LAB
ANION GAP SERPL CALCULATED.3IONS-SCNC: 14 MMOL/L (ref 5–15)
BASOPHILS # BLD AUTO: 0.04 10*3/MM3 (ref 0–0.2)
BASOPHILS NFR BLD AUTO: 0.5 % (ref 0–1.5)
BH CV ECHO MEAS - AO MAX PG: 14.5 MMHG
BH CV ECHO MEAS - AO MEAN PG: 6.5 MMHG
BH CV ECHO MEAS - AO ROOT DIAM: 3.1 CM
BH CV ECHO MEAS - AO V2 MAX: 190.5 CM/SEC
BH CV ECHO MEAS - AO V2 VTI: 30.7 CM
BH CV ECHO MEAS - AVA(I,D): 2.26 CM2
BH CV ECHO MEAS - EDV(CUBED): 110.6 ML
BH CV ECHO MEAS - EDV(MOD-SP2): 136 ML
BH CV ECHO MEAS - EDV(MOD-SP4): 150 ML
BH CV ECHO MEAS - EF(MOD-BP): 45 %
BH CV ECHO MEAS - EF(MOD-SP2): 42.4 %
BH CV ECHO MEAS - EF(MOD-SP4): 46.9 %
BH CV ECHO MEAS - ESV(CUBED): 42.9 ML
BH CV ECHO MEAS - ESV(MOD-SP2): 78.3 ML
BH CV ECHO MEAS - ESV(MOD-SP4): 79.7 ML
BH CV ECHO MEAS - FS: 27.1 %
BH CV ECHO MEAS - IVS/LVPW: 1 CM
BH CV ECHO MEAS - IVSD: 1.2 CM
BH CV ECHO MEAS - LA DIMENSION: 5.2 CM
BH CV ECHO MEAS - LAT PEAK E' VEL: 8.3 CM/SEC
BH CV ECHO MEAS - LV DIASTOLIC VOL/BSA (35-75): 83.5 CM2
BH CV ECHO MEAS - LV MASS(C)D: 219.1 GRAMS
BH CV ECHO MEAS - LV MAX PG: 7.1 MMHG
BH CV ECHO MEAS - LV MEAN PG: 3 MMHG
BH CV ECHO MEAS - LV SYSTOLIC VOL/BSA (12-30): 44.4 CM2
BH CV ECHO MEAS - LV V1 MAX: 133 CM/SEC
BH CV ECHO MEAS - LV V1 VTI: 22.1 CM
BH CV ECHO MEAS - LVIDD: 4.8 CM
BH CV ECHO MEAS - LVIDS: 3.5 CM
BH CV ECHO MEAS - LVOT AREA: 3.1 CM2
BH CV ECHO MEAS - LVOT DIAM: 2 CM
BH CV ECHO MEAS - LVPWD: 1.2 CM
BH CV ECHO MEAS - MED PEAK E' VEL: 6.3 CM/SEC
BH CV ECHO MEAS - MV DEC TIME: 0.2 MSEC
BH CV ECHO MEAS - MV E MAX VEL: 88.8 CM/SEC
BH CV ECHO MEAS - PA ACC TIME: 0.11 SEC
BH CV ECHO MEAS - PA PR(ACCEL): 31.3 MMHG
BH CV ECHO MEAS - RAP SYSTOLE: 3 MMHG
BH CV ECHO MEAS - RVSP: 35 MMHG
BH CV ECHO MEAS - SI(MOD-SP2): 32.1 ML/M2
BH CV ECHO MEAS - SI(MOD-SP4): 39.1 ML/M2
BH CV ECHO MEAS - SV(LVOT): 69.4 ML
BH CV ECHO MEAS - SV(MOD-SP2): 57.7 ML
BH CV ECHO MEAS - SV(MOD-SP4): 70.3 ML
BH CV ECHO MEAS - TAPSE (>1.6): 1.64 CM
BH CV ECHO MEAS - TR MAX PG: 32.5 MMHG
BH CV ECHO MEAS - TR MAX VEL: 263.5 CM/SEC
BH CV ECHO MEASUREMENTS AVERAGE E/E' RATIO: 12.16
BH CV VAS BP LEFT ARM: NORMAL MMHG
BH CV XLRA - RV BASE: 5.5 CM
BH CV XLRA - RV LENGTH: 5.6 CM
BH CV XLRA - RV MID: 4.3 CM
BH CV XLRA - TDI S': 11.2 CM/SEC
BUN SERPL-MCNC: 11 MG/DL (ref 8–23)
BUN/CREAT SERPL: 13.9 (ref 7–25)
CALCIUM SPEC-SCNC: 8.7 MG/DL (ref 8.6–10.5)
CHLORIDE SERPL-SCNC: 98 MMOL/L (ref 98–107)
CHOLEST SERPL-MCNC: 104 MG/DL (ref 0–200)
CO2 SERPL-SCNC: 25 MMOL/L (ref 22–29)
CREAT SERPL-MCNC: 0.79 MG/DL (ref 0.76–1.27)
DEPRECATED RDW RBC AUTO: 55 FL (ref 37–54)
EGFRCR SERPLBLD CKD-EPI 2021: 94.4 ML/MIN/1.73
EOSINOPHIL # BLD AUTO: 0.02 10*3/MM3 (ref 0–0.4)
EOSINOPHIL NFR BLD AUTO: 0.2 % (ref 0.3–6.2)
ERYTHROCYTE [DISTWIDTH] IN BLOOD BY AUTOMATED COUNT: 13.9 % (ref 12.3–15.4)
GLUCOSE SERPL-MCNC: 91 MG/DL (ref 65–99)
HCT VFR BLD AUTO: 39.9 % (ref 37.5–51)
HDLC SERPL-MCNC: 62 MG/DL (ref 40–60)
HGB BLD-MCNC: 13.4 G/DL (ref 13–17.7)
IMM GRANULOCYTES # BLD AUTO: 0.05 10*3/MM3 (ref 0–0.05)
IMM GRANULOCYTES NFR BLD AUTO: 0.6 % (ref 0–0.5)
INR PPP: 3.25 (ref 0.84–1.13)
LDLC SERPL CALC-MCNC: 28 MG/DL (ref 0–100)
LDLC/HDLC SERPL: 0.48 {RATIO}
LEFT ATRIUM VOLUME INDEX: 69.4 ML/M2
LV EF 2D ECHO EST: 50 %
LYMPHOCYTES # BLD AUTO: 0.67 10*3/MM3 (ref 0.7–3.1)
LYMPHOCYTES NFR BLD AUTO: 8.3 % (ref 19.6–45.3)
MAXIMAL PREDICTED HEART RATE: 148 BPM
MCH RBC QN AUTO: 36.3 PG (ref 26.6–33)
MCHC RBC AUTO-ENTMCNC: 33.6 G/DL (ref 31.5–35.7)
MCV RBC AUTO: 108.1 FL (ref 79–97)
MONOCYTES # BLD AUTO: 0.75 10*3/MM3 (ref 0.1–0.9)
MONOCYTES NFR BLD AUTO: 9.2 % (ref 5–12)
NEUTROPHILS NFR BLD AUTO: 6.59 10*3/MM3 (ref 1.7–7)
NEUTROPHILS NFR BLD AUTO: 81.2 % (ref 42.7–76)
NRBC BLD AUTO-RTO: 0 /100 WBC (ref 0–0.2)
PLATELET # BLD AUTO: 143 10*3/MM3 (ref 140–450)
PMV BLD AUTO: 12.4 FL (ref 6–12)
POTASSIUM SERPL-SCNC: 4.1 MMOL/L (ref 3.5–5.2)
PROTHROMBIN TIME: 33.4 SECONDS (ref 11.4–14.4)
QT INTERVAL: 564 MS
QTC INTERVAL: 609 MS
RBC # BLD AUTO: 3.69 10*6/MM3 (ref 4.14–5.8)
SODIUM SERPL-SCNC: 137 MMOL/L (ref 136–145)
STRESS TARGET HR: 126 BPM
TRIGL SERPL-MCNC: 61 MG/DL (ref 0–150)
TROPONIN T SERPL-MCNC: <0.01 NG/ML (ref 0–0.03)
VLDLC SERPL-MCNC: 14 MG/DL (ref 5–40)
WBC NRBC COR # BLD: 8.12 10*3/MM3 (ref 3.4–10.8)

## 2022-12-05 PROCEDURE — 97165 OT EVAL LOW COMPLEX 30 MIN: CPT

## 2022-12-05 PROCEDURE — 74174 CTA ABD&PLVS W/CONTRAST: CPT

## 2022-12-05 PROCEDURE — 93306 TTE W/DOPPLER COMPLETE: CPT

## 2022-12-05 PROCEDURE — 80048 BASIC METABOLIC PNL TOTAL CA: CPT | Performed by: NURSE PRACTITIONER

## 2022-12-05 PROCEDURE — 93306 TTE W/DOPPLER COMPLETE: CPT | Performed by: INTERNAL MEDICINE

## 2022-12-05 PROCEDURE — 85025 COMPLETE CBC W/AUTO DIFF WBC: CPT | Performed by: NURSE PRACTITIONER

## 2022-12-05 PROCEDURE — 97161 PT EVAL LOW COMPLEX 20 MIN: CPT

## 2022-12-05 PROCEDURE — 25010000002 HYDRALAZINE PER 20 MG: Performed by: PHYSICIAN ASSISTANT

## 2022-12-05 PROCEDURE — 99222 1ST HOSP IP/OBS MODERATE 55: CPT

## 2022-12-05 PROCEDURE — 99222 1ST HOSP IP/OBS MODERATE 55: CPT | Performed by: INTERNAL MEDICINE

## 2022-12-05 PROCEDURE — 25010000002 FUROSEMIDE PER 20 MG: Performed by: INTERNAL MEDICINE

## 2022-12-05 PROCEDURE — 99233 SBSQ HOSP IP/OBS HIGH 50: CPT | Performed by: INTERNAL MEDICINE

## 2022-12-05 PROCEDURE — 85610 PROTHROMBIN TIME: CPT | Performed by: NURSE PRACTITIONER

## 2022-12-05 PROCEDURE — 0 IOPAMIDOL PER 1 ML: Performed by: INTERNAL MEDICINE

## 2022-12-05 PROCEDURE — 80061 LIPID PANEL: CPT | Performed by: NURSE PRACTITIONER

## 2022-12-05 PROCEDURE — 84484 ASSAY OF TROPONIN QUANT: CPT | Performed by: NURSE PRACTITIONER

## 2022-12-05 PROCEDURE — 97530 THERAPEUTIC ACTIVITIES: CPT

## 2022-12-05 PROCEDURE — 97535 SELF CARE MNGMENT TRAINING: CPT

## 2022-12-05 RX ORDER — HYDRALAZINE HYDROCHLORIDE 20 MG/ML
10 INJECTION INTRAMUSCULAR; INTRAVENOUS ONCE
Status: COMPLETED | OUTPATIENT
Start: 2022-12-05 | End: 2022-12-05

## 2022-12-05 RX ORDER — WARFARIN SODIUM 5 MG/1
5 TABLET ORAL
Status: DISCONTINUED | OUTPATIENT
Start: 2022-12-05 | End: 2022-12-08 | Stop reason: HOSPADM

## 2022-12-05 RX ADMIN — ATORVASTATIN CALCIUM 80 MG: 40 TABLET, FILM COATED ORAL at 20:12

## 2022-12-05 RX ADMIN — MULTIVITAMIN TABLET 1 TABLET: TABLET at 08:54

## 2022-12-05 RX ADMIN — HYDRALAZINE HYDROCHLORIDE 10 MG: 20 INJECTION INTRAMUSCULAR; INTRAVENOUS at 00:40

## 2022-12-05 RX ADMIN — LISINOPRIL 20 MG: 20 TABLET ORAL at 08:54

## 2022-12-05 RX ADMIN — IOPAMIDOL 80 ML: 755 INJECTION, SOLUTION INTRAVENOUS at 17:49

## 2022-12-05 RX ADMIN — WARFARIN SODIUM 5 MG: 5 TABLET ORAL at 17:14

## 2022-12-05 RX ADMIN — CITALOPRAM HYDROBROMIDE 20 MG: 20 TABLET ORAL at 08:54

## 2022-12-05 RX ADMIN — CARVEDILOL 25 MG: 12.5 TABLET, FILM COATED ORAL at 08:54

## 2022-12-05 RX ADMIN — THIAMINE HCL TAB 100 MG 100 MG: 100 TAB at 08:54

## 2022-12-05 RX ADMIN — SENNOSIDES AND DOCUSATE SODIUM 2 TABLET: 50; 8.6 TABLET ORAL at 08:54

## 2022-12-05 RX ADMIN — DORZOLAMIDE HYDROCHLORIDE: 20 SOLUTION/ DROPS OPHTHALMIC at 08:56

## 2022-12-05 RX ADMIN — Medication 1 PATCH: at 20:13

## 2022-12-05 RX ADMIN — CARVEDILOL 25 MG: 12.5 TABLET, FILM COATED ORAL at 17:13

## 2022-12-05 RX ADMIN — ASPIRIN 81 MG: 81 TABLET, COATED ORAL at 08:54

## 2022-12-05 RX ADMIN — FAMOTIDINE 20 MG: 20 TABLET ORAL at 08:54

## 2022-12-05 RX ADMIN — FOLIC ACID 1 MG: 1 TABLET ORAL at 08:54

## 2022-12-05 RX ADMIN — FUROSEMIDE 40 MG: 10 INJECTION, SOLUTION INTRAMUSCULAR; INTRAVENOUS at 08:54

## 2022-12-05 RX ADMIN — BUPROPION HYDROCHLORIDE 300 MG: 150 TABLET, FILM COATED, EXTENDED RELEASE ORAL at 08:54

## 2022-12-05 RX ADMIN — LISINOPRIL 20 MG: 20 TABLET ORAL at 20:13

## 2022-12-05 RX ADMIN — FINASTERIDE 5 MG: 5 TABLET, FILM COATED ORAL at 20:12

## 2022-12-05 RX ADMIN — DORZOLAMIDE HYDROCHLORIDE: 20 SOLUTION/ DROPS OPHTHALMIC at 20:13

## 2022-12-05 RX ADMIN — Medication 10 ML: at 09:02

## 2022-12-05 RX ADMIN — SENNOSIDES AND DOCUSATE SODIUM 2 TABLET: 50; 8.6 TABLET ORAL at 20:12

## 2022-12-05 NOTE — CONSULTS
[facility] Cardiothoracic Surgery In-Patient Consult    Name:  Dillon Vo  MRN Number:  [id number]  Date of Admission:  12/4/2022  Date of Consultation: 12/5/2022    Consulting Provider:    PCP: Ez Cintron MD  IP Care Team:  Patient Care Team:  Ez Cintron MD as PCP - Payam Tatum MD as Referring Physician (Pulmonary Disease)  Luis Alberto Carter MD as Consulting Physician (Radiation Oncology)  Moiz Ronquillo IV, MD as Consulting Physician (Interventional Cardiology)  Mallory Loza APRN as Nurse Practitioner (Interventional Cardiology)    Reason for Consultation: AAA    History of Present Illness:    Dillon Vo is a 72 y.o. male with a history of current smoker, EtOH use (2 glasses of scotch a day), CAD s/p stents, A. Fib s/p ablation, pacemaker s/p revision to right side, mechanical aortic heart valve (on coumadin), colon cancer, neurogenic bladder managed with intermittent catheterizations (followed by  urology), AAA, and hypermetabolic left upper lobe lung mass s/p left thoracotomy with left upper lobectomy and lymph node sampling on 4/7/2021 with Dr. Post. Staging was consistent with pT2 N0 MX stage IB adenocarcinoma.   He presented to [facility] on 12/4 for dyspnea, BLE edema, and right sided chest pain x3 days. He is currently on a nitroglycerin drip and was given 40 mg of IV Lasix. Urology is following and placed chen catheter on 12/4. CTA revealed AAA with maximal diameter 5 cm, which is increased from ultrasound done on 7/6/2022 which showed 4.6 cm AAA. He denies any chest, abdominal, or back pain.     Review of Systems:  Review of Systems   Constitutional: Negative.    HENT: Negative.    Eyes: Negative.    Respiratory: Positive for shortness of breath.    Cardiovascular: Positive for leg swelling.   Gastrointestinal: Negative.    Endocrine: Negative.    Genitourinary: Negative.    Musculoskeletal: Negative.    Skin:  Negative.    Allergic/Immunologic: Negative.    Neurological: Negative.    Hematological: Bruises/bleeds easily.   Psychiatric/Behavioral: Negative.        Past Medical History:    Past Medical History:   Diagnosis Date   • A-fib (HCC)    • Anemia    • Atrial fibrillation (HCC) 5/5/2016    Difficult to control rate. RFA of AV node with implantation of left Bi-V pacemaker, 06/18/2013. Hematoma requiring single-chamber pacemaker implanted, 07/29/2013.    • Colitis    • Colon cancer (HCC)    • Coronary artery disease    • Depression    • GERD (gastroesophageal reflux disease)    • Heart valve disease    • Hiatal hernia    • HTN (hypertension)    • Hyperbilirubinemia    • Hyperlipidemia    • Infectious viral hepatitis    • Kidney stone    • Lung cancer (HCC)    • Orthostatic hypotension    • Sick sinus syndrome (HCC)    • Skin cancer     lip        Past Surgical History:    Past Surgical History:   Procedure Laterality Date   • APPENDECTOMY     • BRONCHOSCOPY THORACOTOMY Left 4/7/2021    Procedure: BRONCHOSCOPY LEFT THORACOTOMY, LEFT LUNG RESECTION;  Surgeon: Chi Post MD;  Location: [facility];  Service: Cardiothoracic;  Laterality: Left;   • CARDIAC CATHETERIZATION     • CARDIAC SURGERY  2000    valve replacement    • COLON SURGERY     • COLONOSCOPY  up to date   • CORONARY STENT PLACEMENT     • EXTRACORPOREAL SHOCKWAVE LITHOTRIPSY (ESWL), STENT INSERTION/REMOVAL     • LUNG CANCER SURGERY     • PACEMAKER IMPLANTATION     • VARICOSE VEIN SURGERY         Family History:    Family History   Problem Relation Age of Onset   • Cancer Mother    • Hypertension Father    • Heart disease Father    • No Known Problems Sister    • No Known Problems Brother        Social History:    Social History     Socioeconomic History   • Marital status:    • Number of children: 0   Tobacco Use   • Smoking status: Every Day     Packs/day: 0.25     Years: 57.00     Pack years: 14.25     Types: Cigarettes     Start date: 1963   •  Smokeless tobacco: Never   • Tobacco comments:     cut back on cigs   Vaping Use   • Vaping Use: Never used   Substance and Sexual Activity   • Alcohol use: Yes     Alcohol/week: 2.0 standard drinks     Types: 2 Shots of liquor per week     Comment: Daily   • Drug use: Yes     Types: Marijuana     Comment: occasionally   • Sexual activity: Defer     Partners: Female     Comment:         Allergies:  No Known Allergies      Physical Exam:  Vital Signs:    Temp:  [96.9 °F (36.1 °C)-98.2 °F (36.8 °C)] 97.8 °F (36.6 °C)  Heart Rate:  [62-71] 70  Resp:  [16-20] 16  BP: (146-196)/() 171/87  Body mass index is 19.21 kg/m².     Physical Exam  Vitals and nursing note reviewed.   Constitutional:       Appearance: Normal appearance.   HENT:      Head: Normocephalic.      Mouth/Throat:      Pharynx: Oropharynx is clear.   Eyes:      Pupils: Pupils are equal, round, and reactive to light.   Cardiovascular:      Rate and Rhythm: Normal rate.      Comments: Non-pitting edema present bilaterally  Valve click present  Pulmonary:      Effort: Pulmonary effort is normal.   Abdominal:      General: Bowel sounds are normal.   Musculoskeletal:      Cervical back: Normal range of motion.      Right lower leg: Edema present.      Left lower leg: Edema present.   Skin:     General: Skin is warm.   Neurological:      General: No focal deficit present.      Mental Status: He is alert and oriented to person, place, and time.   Psychiatric:         Mood and Affect: Mood normal.         Behavior: Behavior normal.         Labs/Imaging/Procedures:   Results from last 7 days   Lab Units 12/05/22  0416 12/04/22  1458   SODIUM mmol/L 137 139   POTASSIUM mmol/L 4.1 4.1   CHLORIDE mmol/L 98 102   CO2 mmol/L 25.0 25.0   BUN mg/dL 11 12   CREATININE mg/dL 0.79 0.85   CALCIUM mg/dL 8.7 8.8   BILIRUBIN mg/dL  --  1.4*   ALK PHOS U/L  --  97   ALT (SGPT) U/L  --  28   AST (SGOT) U/L  --  56*   GLUCOSE mg/dL 91 121*     Results from last 7 days    Lab Units 12/04/22  1458   TROPONIN T ng/mL <0.010     Results from last 7 days   Lab Units 12/05/22  0416 12/04/22  1458   WBC 10*3/mm3 8.12 6.75   HEMOGLOBIN g/dL 13.4 13.7   HEMATOCRIT % 39.9 41.4   PLATELETS 10*3/mm3 143 166     Results from last 7 days   Lab Units 12/05/22  0416 12/04/22 2029 12/04/22  1458   INR  3.25* 3.61* 4.71*         Results from last 7 days   Lab Units 12/05/22  0416   CHOLESTEROL mg/dL 104   TRIGLYCERIDES mg/dL 61   HDL CHOL mg/dL 62*   LDL CHOL mg/dL 28     CT Angiogram Chest    Result Date: 12/4/2022  1.  Abdominal aortic aneurysm with estimated maximal diameter of 5 cm. Surgical consultation is recommended for consideration of repair. 2.  Ascending thoracic aorta appears to measure up to 4.4 cm. No definite acute aortic abnormality is seen on this exam. 3.  Cardiomegaly with reflux of contrast into the IVC and hepatic veins. There is also a small amount of ascites and increased small to moderate left and small right pleural effusions. These findings may indicate heart failure. 4.  Mild groundglass opacities, central bronchial wall thickening, and basilar septal thickening which could be seen with edema or pneumonia. 5.  Emphysema. Status post left upper lobectomy. 6.  Cholelithiasis.  This report was finalized on 12/4/2022 4:57 PM by Oniel Callaway MD.         Echo: Results for orders placed during the hospital encounter of 03/19/21    Adult Transthoracic Echo Complete W/ Cont if Necessary Per Protocol    Interpretation Summary  · There is biatrial enlargement. Left atrial volume is severely increased.  · The inferior vena cava is dilated and inspiratory collapse is absent.  · There is a mechanical aortic valve. I cannot exclude a small perivalvular leak (AI) with confidence.  · There is mitral annular calcification and moderate mitral regurgitation.  · There is moderate tricuspid regurgitation and moderate estimated PA hypertension (RVSP=53 mmHg).  · There is no pericardial  effusion.     Carotid Duplex: Results for orders placed during the hospital encounter of 05/16/22    Duplex Venous Lower Extremity - Right    Interpretation Summary  · The right lower extremity venous duplex scan is negative for evidence of a DVT and SVT.      Assessment:    CHF (congestive heart failure) ([facility])    Hyperlipidemia LDL goal <70    HTN (hypertension)    Coronary artery disease involving native coronary artery of native heart with angina pectoris ([facility])    H/O mechanical aortic valve replacement    COPD (chronic obstructive pulmonary disease) ([facility])    Gastroesophageal reflux disease without esophagitis    Pacemaker    Permanent atrial fibrillation ([facility])    Abdominal aortic aneurysm (AAA) without rupture    Smoking  5 cm aortic abdominal aneurysm.  Patient appears to be asymptomatic.  We will see back in office and schedule electively at that time.  Discussed with patient today.  Like thank you for this consultation.    Plan:  -Continue with medical optimization at this time  -Cardiology consult  -Will discuss with Dr. Post regarding increased size in AAA from ultrasound in July        DAGOBERTO Urbina  09:27 EST  12/05/22     Thank you for allowing us to participate in the care of your patient. Please do not hesitate to contact us with additional questions or concerns.

## 2022-12-05 NOTE — THERAPY EVALUATION
Patient Name: Dillon Vo  : 1950    MRN: [id number]                              Today's Date: 2022       Admit Date: 2022    Visit Dx:     ICD-10-CM ICD-9-CM   1. Acute congestive heart failure, unspecified heart failure type (HCC)  I50.9 428.0   2. Bilateral pleural effusion  J90 511.9   3. Hypoxia  R09.02 799.02   4. Acute chest pain  R07.9 786.50   5. Hypertensive emergency  I16.1 401.9   6. History of coronary artery disease  Z86.79 V12.59     Patient Active Problem List   Diagnosis   • Depression   • Hyperbilirubinemia   • Hyperlipidemia LDL goal <70   • HTN (hypertension)   • Malignant neoplasm of overlapping sites of colon (HCC)   • Neurogenic bladder   • Tobacco use   • Coronary artery disease involving native coronary artery of native heart with angina pectoris (HCC)   • H/O mechanical aortic valve replacement   • COPD (chronic obstructive pulmonary disease) (HCC)   • Thrombocytopenia (HCC)   • Chronic anticoagulation on warfarin    • Self-catheterizes urinary bladder   • Lung cancer (S/P Left thoracotomy and lobectomy 2021)   • Lung nodule < 6cm on CT (S/P Left thoracotomy and MARKO Lobectomy)   • Gastroesophageal reflux disease without esophagitis   • Pacemaker   • S/P lobectomy of MARKO lung 21   • Syncope (R/O due to orthostatic hypotension)   • Permanent atrial fibrillation (HCC)   • CHF (congestive heart failure) (HCC)   • Abdominal aortic aneurysm (AAA) without rupture   • Smoking     Past Medical History:   Diagnosis Date   • A-fib (HCC)    • Anemia    • Atrial fibrillation (HCC) 2016    Difficult to control rate. RFA of AV node with implantation of left Bi-V pacemaker, 2013. Hematoma requiring single-chamber pacemaker implanted, 2013.    • Colitis    • Colon cancer (HCC)    • Coronary artery disease    • Depression    • GERD (gastroesophageal reflux disease)    • Heart valve disease    • Hiatal hernia    • HTN (hypertension)    • Hyperbilirubinemia     • Hyperlipidemia    • Infectious viral hepatitis    • Kidney stone    • Lung cancer (HCC)    • Orthostatic hypotension    • Sick sinus syndrome (HCC)    • Skin cancer     lip      Past Surgical History:   Procedure Laterality Date   • APPENDECTOMY     • BRONCHOSCOPY THORACOTOMY Left 4/7/2021    Procedure: BRONCHOSCOPY LEFT THORACOTOMY, LEFT LUNG RESECTION;  Surgeon: Chi Post MD;  [facility]  Service: Cardiothoracic;  Laterality: Left;   • CARDIAC CATHETERIZATION     • CARDIAC SURGERY  2000    valve replacement    • COLON SURGERY     • COLONOSCOPY  up to date   • CORONARY STENT PLACEMENT     • EXTRACORPOREAL SHOCKWAVE LITHOTRIPSY (ESWL), STENT INSERTION/REMOVAL     • LUNG CANCER SURGERY     • PACEMAKER IMPLANTATION     • VARICOSE VEIN SURGERY        General Information     Row Name 12/05/22 0834          OT Time and Intention    Document Type evaluation  -AN     Mode of Treatment occupational therapy  -AN     Row Name 12/05/22 0834          General Information    Patient Profile Reviewed yes  -AN     Prior Level of Function independent:;all household mobility;community mobility;gait;ADL's  owns RW, however has not been using  -AN     Existing Precautions/Restrictions fall;oxygen therapy device and L/min  -AN     Barriers to Rehab medically complex  -AN     Row Name 12/05/22 0834          Living Environment    People in Home spouse  -AN     Row Name 12/05/22 0834          Home Main Entrance    Number of Stairs, Main Entrance four  -AN     Stair Railings, Main Entrance railings safe and in good condition  -AN     Row Name 12/05/22 0834          Stairs Within Home, Primary    Stairs, Within Home, Primary 1 level with a basement  -AN     Stairs Comment, Within Home, Primary tub shower  -AN     Row Name 12/05/22 0834          Cognition    Orientation Status (Cognition) oriented x 4  -AN     Row Name 12/05/22 0834          Safety Issues, Functional Mobility    Safety Issues Affecting Function  (Mobility) safety precaution awareness;positioning of assistive device;insight into deficits/self-awareness;safety precautions follow-through/compliance;sequencing abilities  -AN     Impairments Affecting Function (Mobility) balance;endurance/activity tolerance;strength;shortness of breath  -AN           User Key  (r) = Recorded By, (t) = Taken By, (c) = Cosigned By    Initials Name Provider Type    AN Teodora Serrato OT Occupational Therapist                 Mobility/ADL's     Row Name 12/05/22 0835          Bed Mobility    Bed Mobility supine-sit  -AN     Supine-Sit [place] (Bed Mobility) supervision  -AN     Bed Mobility, Safety Issues decreased use of arms for pushing/pulling;decreased use of legs for bridging/pushing;impaired trunk control for bed mobility  -AN     Assistive Device (Bed Mobility) head of bed elevated  -AN     Row Name 12/05/22 0835          Transfers    Transfers sit-stand transfer;stand-sit transfer;toilet transfer  -AN     Comment, (Transfers) cues for hand placement and transfer technique using RW  -AN     Row Name 12/05/22 0835          Sit-Stand Transfer    Sit-Stand [place] (Transfers) standby assist  -AN     Assistive Device (Sit-Stand Transfers) walker, front-wheeled  -AN     Row Name 12/05/22 0835          Stand-Sit Transfer    Stand-Sit [place] (Transfers) standby assist  -AN     Assistive Device (Stand-Sit Transfers) walker, front-wheeled  -AN     Row Name 12/05/22 0835          Toilet Transfer    Type (Toilet Transfer) sit-stand;stand-sit  -AN     [place] Level (Toilet Transfer) standby assist  -AN     Assistive Device (Toilet Transfer) commode  -AN     Row Name 12/05/22 08          Functional Mobility    Functional Mobility- Ind. Level standby assist  -AN     Functional Mobility- Device walker, front-wheeled  -AN     Functional Mobility-Distance (Feet) --  household distance  -AN     Functional Mobility- Safety Issues balance decreased during  turns;sequencing ability decreased;step length decreased  -AN     Functional Mobility- Comment pt ambulated with Min HHA reaching out for UE support, progressing to stand by assist with use of RW  -AN     Row Name 12/05/22 0835          Activities of Daily Living    BADL Assessment/Intervention lower body dressing;grooming;toileting  -AN     Row Name 12/05/22 0835          Lower Body Dressing Assessment/Training    [place] Level (Lower Body Dressing) don;socks;supervision  -AN     Position (Lower Body Dressing) edge of bed sitting  -AN     Row Name 12/05/22 0835          Grooming Assessment/Training    [place] Level (Grooming) wash face, hands;standby assist  -AN     Position (Grooming) sink side  -AN     Comment, (Grooming) cues for RW positioning at the sink vs off to the side  -AN     Row Name 12/05/22 0835          Toileting Assessment/Training    [place] Level (Toileting) adjust/manage clothing;perform perineal hygiene;verbal cues  -AN     Assistive Devices (Toileting) commode  -AN     Position (Toileting) unsupported sitting;supported standing  -AN     Comment, (Toileting) cues for transfer technique using RW  -AN           User Key  (r) = Recorded By, (t) = Taken By, (c) = Cosigned By    Initials Name Provider Type    AN Teodora Serrato OT Occupational Therapist               Obj/Interventions     Row Name 12/05/22 0930          Sensory Assessment (Somatosensory)    Sensory Assessment (Somatosensory) sensation intact  -AN     Row Name 12/05/22 0930          Vision Assessment/Intervention    Visual Impairment/Limitations WFL  -AN     Row Name 12/05/22 0930          Range of Motion Comprehensive    General Range of Motion bilateral upper extremity ROM WFL;bilateral lower extremity ROM WFL  -AN     Row Name 12/05/22 0930          Strength Comprehensive (MMT)    General Manual Muscle Testing (MMT) Assessment upper extremity strength deficits identified;lower extremity strength deficits  identified  -AN     Comment, General Manual Muscle Testing (MMT) Assessment BUE grossly 4/5, BLE weakness observed with mobility  -AN     Row Name 12/05/22 0930          Motor Skills    Motor Skills functional endurance  -AN     Functional Endurance SpO2 dropped to 88% on RA with ambulation  -AN     Row Name 12/05/22 0930          Balance    Balance Assessment sitting static balance;sit to stand dynamic balance;sitting dynamic balance;standing static balance;standing dynamic balance  -AN     Static Sitting Balance supervision  -AN     Dynamic Sitting Balance supervision  -AN     Position, Sitting Balance sitting edge of bed  -AN     Sit to Stand Dynamic Balance verbal cues;standby assist  -AN     Static Standing Balance standby assist  -AN     Dynamic Standing Balance standby assist  -AN     Position/Device Used, Standing Balance walker, rolling  -AN     Balance Interventions standing;sit to stand;supported;static;dynamic;minimal challenge;occupation based/functional task  -AN           User Key  (r) = Recorded By, (t) = Taken By, (c) = Cosigned By    Initials Name Provider Type    AN Teodora Serrato OT Occupational Therapist               Goals/Plan     Row Name 12/05/22 0936          Transfer Goal 1 (OT)    Activity/Assistive Device (Transfer Goal 1, OT) sit-to-stand/stand-to-sit;commode;walker, rolling  -AN     [place] Level/Cues Needed (Transfer Goal 1, OT) modified independence  -AN     Time Frame (Transfer Goal 1, OT) long term goal (LTG);10 days  -AN     Row Name 12/05/22 0936          Toileting Goal 1 (OT)    Activity/Device (Toileting Goal 1, OT) adjust/manage clothing;commode  -AN     [place] Level/Cues Needed (Toileting Goal 1, OT) modified independence  -AN     Time Frame (Toileting Goal 1, OT) long term goal (LTG);10 days  -AN     Row Name 12/05/22 0936          Grooming Goal 1 (OT)    Activity/Device (Grooming Goal 1, OT) wash face, hands;oral care  -AN     [place] (Grooming Goal 1,  OT) modified independence  -AN     Time Frame (Grooming Goal 1, OT) long term goal (LTG);10 days  -AN     Row Name 12/05/22 0936          Therapy Assessment/Plan (OT)    Planned Therapy Interventions (OT) activity tolerance training;adaptive equipment training;BADL retraining;functional balance retraining;edema control/reduction;occupation/activity based interventions;patient/caregiver education/training;transfer/mobility retraining;strengthening exercise  -AN           User Key  (r) = Recorded By, (t) = Taken By, (c) = Cosigned By    Initials Name Provider Type    AN Teodora Serrato OT Occupational Therapist               Clinical Impression     Row Name 12/05/22 0931          Pain Assessment    Pretreatment Pain Rating 0/10 - no pain  -AN     Posttreatment Pain Rating 0/10 - no pain  -AN     Pre/Posttreatment Pain Comment asymptomatic  -AN     Pain Intervention(s) Repositioned;Ambulation/increased activity  -AN     Row Name 12/05/22 0931          Plan of Care Review    Plan of Care Reviewed With patient  -AN     Progress no change  -AN     Outcome Evaluation OT evaluation completed. Pt presents with generalized weakness, impaired balance, and dyspnea with activity limiting independence with ADLs and functional mobility. Pt performed functional transfers with CGA progressing to stand by with use of RW. Stand by for sink side ADLs. Rec IP OT and home with assist and home health OT.  -AN     Row Name 12/05/22 0931          Therapy Assessment/Plan (OT)    Patient/Family Therapy Goal Statement (OT) Return to PLOF  -AN     Rehab Potential (OT) good, to achieve stated therapy goals  -AN     Criteria for Skilled Therapeutic Interventions Met (OT) yes;skilled treatment is necessary  -AN     Therapy Frequency (OT) daily  -AN     Row Name 12/05/22 0931          Therapy Plan Review/Discharge Plan (OT)    Anticipated Discharge Disposition (OT) home with assist;home with home health  -AN     Row Name 12/05/22 0931           Vital Signs    Pre Systolic BP Rehab --  VSS  -AN     O2 Delivery Pre Treatment nasal cannula  -AN     O2 Delivery Intra Treatment room air  -AN     O2 Delivery Post Treatment nasal cannula  -AN     Pre Patient Position Supine  -AN     Intra Patient Position Standing  -AN     Post Patient Position Sitting  -AN     Row Name 12/05/22 0931          Positioning and Restraints    Pre-Treatment Position in bed  -AN     Post Treatment Position chair  -AN     In Chair notified nsg;reclined;call light within reach;encouraged to call for assist;exit alarm on;waffle cushion;legs elevated  -AN           User Key  (r) = Recorded By, (t) = Taken By, (c) = Cosigned By    Initials Name Provider Type    Teodora Turner OT Occupational Therapist               Outcome Measures     Row Name 12/05/22 0936          How much help from another is currently needed...    Putting on and taking off regular lower body clothing? 3  -AN     Bathing (including washing, rinsing, and drying) 3  -AN     Toileting (which includes using toilet bed pan or urinal) 3  -AN     Putting on and taking off regular upper body clothing 3  -AN     Taking care of personal grooming (such as brushing teeth) 3  -AN     Eating meals 4  -AN     AM-PAC 6 Clicks Score (OT) 19  -AN     Row Name 12/05/22 0936          Functional Assessment    Outcome Measure Options AM-PAC 6 Clicks Daily Activity (OT)  -AN           User Key  (r) = Recorded By, (t) = Taken By, (c) = Cosigned By    Initials Name Provider Type    Teodora Turner OT Occupational Therapist                Occupational Therapy Education     Title: PT OT SLP Therapies (In Progress)     Topic: Occupational Therapy (In Progress)     Point: ADL training (Done)     Description:   Instruct learner(s) on proper safety adaptation and remediation techniques during self care or transfers.   Instruct in proper use of assistive devices.              Learning Progress Summary           Patient Acceptance, ECRISTELA  by AN at 12/5/2022 0937                   Point: Home exercise program (Not Started)     Description:   Instruct learner(s) on appropriate technique for monitoring, assisting and/or progressing therapeutic exercises/activities.              Learner Progress:  Not documented in this visit.          Point: Precautions (Done)     Description:   Instruct learner(s) on prescribed precautions during self-care and functional transfers.              Learning Progress Summary           Patient Acceptance, E, VU by AN at 12/5/2022 0937                   Point: Body mechanics (Done)     Description:   Instruct learner(s) on proper positioning and spine alignment during self-care, functional mobility activities and/or exercises.              Learning Progress Summary           Patient Acceptance, E, VU by AN at 12/5/2022 0937                               User Key     Initials Effective Dates Name Provider Type Discipline    AN 09/21/21 -  Teodora Serrato OT Occupational Therapist OT              OT Recommendation and Plan  Planned Therapy Interventions (OT): activity tolerance training, adaptive equipment training, BADL retraining, functional balance retraining, edema control/reduction, occupation/activity based interventions, patient/caregiver education/training, transfer/mobility retraining, strengthening exercise  Therapy Frequency (OT): daily  Plan of Care Review  Plan of Care Reviewed With: patient  Progress: no change  Outcome Evaluation: OT evaluation completed. Pt presents with generalized weakness, impaired balance, and dyspnea with activity limiting independence with ADLs and functional mobility. Pt performed functional transfers with CGA progressing to stand by with use of RW. Stand by for sink side ADLs. Rec IP OT and home with assist and home health OT.     Time Calculation:    Time Calculation- OT     Row Name 12/05/22 0940             Time Calculation- OT    OT Start Time 0750  -AN      OT Received On  12/05/22  -AN      OT Goal Re-Cert Due Date 12/15/22  -AN         Timed Charges    82498 - OT Self Care/Mgmt Minutes 8  -AN         Untimed Charges    OT Eval/Re-eval Minutes 32  -AN         Total Minutes    Timed Charges Total Minutes 8  -AN      Untimed Charges Total Minutes 32  -AN       Total Minutes 40  -AN            User Key  (r) = Recorded By, (t) = Taken By, (c) = Cosigned By    Initials Name Provider Type    AN Teodora Serrato OT Occupational Therapist              Therapy Charges for Today     Code Description Service Date Service Provider Modifiers Qty    27149787322 HC OT SELF CARE/MGMT/TRAIN EA 15 MIN 12/5/2022 Teodora Serrato OT GO 1    [id number] HC OT EVAL LOW COMPLEXITY 3 12/5/2022 Teodora Serrato OT GO 1    [id number]  OT THER SUPP EA 15 MIN 12/5/2022 Teodora Serrato OT GO 2               Teodora Serrato OT  12/5/2022

## 2022-12-05 NOTE — PROGRESS NOTES
"    [facility]  PROGRESS NOTE    Patient Name: Dillon Vo  : 1950  MRN: [id number]    Date of Admission: 2022  Primary Care Physician: Ez Cintron MD    Subjective   Subjective     CC:  CP and SOA.     HPI:  \"so so.\"  Feeling a little better.  States that he usually I/O cath's at home.  Was difficult to cath overnight so urology was called and chen was placed that is to remain in until f/u with .  States that he's not on home O2    ROS:  Gen- No fevers, chills  CV- +chest pain when ambulated with PT, palpitations  Resp- No cough, +dyspnea  GI- No N/V/D, abd pain        Objective   Objective     Vital Signs:   Temp:  [96.9 °F (36.1 °C)-98.2 °F (36.8 °C)] 97.8 °F (36.6 °C)  Heart Rate:  [62-71] 68  Resp:  [16-20] 16  BP: (146-196)/() 171/95  Flow (L/min):  [2] 2     Physical Exam:  Constitutional: No acute distress, awake, alert, sitting up in chair  HENT: NCAT, mucous membranes moist  Respiratory: Clear to auscultation bilaterally, respiratory effort normal on 3LNC  Cardiovascular: RRR, no murmurs, rubs, or gallops  Gastrointestinal: Positive bowel sounds, soft, nontender, nondistended, +chen  Musculoskeletal: No bilateral ankle edema  Psychiatric: Appropriate affect, cooperative  Neurologic: Oriented x 3, strength symmetric in all extremities, Cranial Nerves grossly intact to confrontation, speech clear  Skin: No rashes      Results Reviewed:  LAB RESULTS:      Lab 22  0416 22  1458 22  1400   WBC 8.12  --  6.75  --    HEMOGLOBIN 13.4  --  13.7  --    HEMATOCRIT 39.9  --  41.4  --    PLATELETS 143  --  166  --    NEUTROS ABS 6.59  --  5.40  --    IMMATURE GRANS (ABS) 0.05  --  0.03  --    LYMPHS ABS 0.67*  --  0.65*  --    MONOS ABS 0.75  --  0.60  --    EOS ABS 0.02  --  0.03  --    .1*  --  108.4*  --    PROTIME 33.4* 36.3* 44.8* 47.2*         Lab 22  0416 22  1458   SODIUM 137 " 139   POTASSIUM 4.1 4.1   CHLORIDE 98 102   CO2 25.0 25.0   ANION GAP 14.0 12.0   BUN 11 12   CREATININE 0.79 0.85   EGFR 94.4 92.3   GLUCOSE 91 121*   CALCIUM 8.7 8.8   HEMOGLOBIN A1C  --  5.20         Lab 12/04/22 1458   TOTAL PROTEIN 6.8   ALBUMIN 3.80   GLOBULIN 3.0   ALT (SGPT) 28   AST (SGOT) 56*   BILIRUBIN 1.4*   ALK PHOS 97         Lab 12/05/22  0416 12/04/22 2029 12/04/22 1458 12/02/22  1400   PROBNP  --   --  3,991.0*  --    TROPONIN T  --   --  <0.010  --    PROTIME 33.4* 36.3* 44.8* 47.2*   INR 3.25* 3.61* 4.71* 3.9*         Lab 12/05/22 0416   CHOLESTEROL 104   LDL CHOL 28   HDL CHOL 62*   TRIGLYCERIDES 61             Brief Urine Lab Results     None          Microbiology Results Abnormal     Procedure Component Value - Date/Time    COVID PRE-OP / PRE-PROCEDURE SCREENING ORDER (NO ISOLATION) - Swab, Nasopharynx [[id number]]  (Normal) Collected: 12/04/22 1458    Lab Status: Final result Specimen: Swab from Nasopharynx Updated: 12/04/22 1611    Narrative:      The following orders were created for panel order COVID PRE-OP / PRE-PROCEDURE SCREENING ORDER (NO ISOLATION) - Swab, Nasopharynx.  Procedure                               Abnormality         Status                     ---------                               -----------         ------                     COVID-19 and FLU A/B PCR...[[id number]]  Normal              Final result                 Please view results for these tests on the individual orders.    COVID-19 and FLU A/B PCR - Swab, Nasopharynx [[id number]]  (Normal) Collected: 12/04/22 1458    Lab Status: Final result Specimen: Swab from Nasopharynx Updated: 12/04/22 1611     COVID19 Not Detected     Influenza A PCR Not Detected     Influenza B PCR Not Detected    Narrative:      Fact sheet for providers: https://www.fda.gov/media/974858/download    Fact sheet for patients: https://www.fda.gov/media/009047/download    Test performed by PCR.          CT Angiogram Chest    Result Date:  12/4/2022  DATE OF EXAM: 12/4/2022 4:13 PM  PROCEDURE: CT ANGIOGRAM CHEST-  INDICATIONS: cp, hx taa. History of lung cancer and left upper lobectomy.  COMPARISON: 05/23/2022. CT abdomen pelvis 05/24/2021.  TECHNIQUE: Contiguous axial imaging was obtained from the thoracic inlet through the upper abdomen following the intravenous administration of 100 mL of Isovue 370. Reconstructed coronal and sagittal images were also obtained. Automated exposure control and iterative reconstruction methods were used.  The radiation dose reduction device was turned on for each scan per the ALARA (As Low as Reasonably Achievable) protocol.  FINDINGS: VASCULAR FINDINGS: Status post median sternotomy. There appears to have been aortic valve replacement. There is also peripheral hyperdense attenuation in the ascending aorta which may indicate prior aneurysm repair. The proximal ascending aorta appears to measure up to approximately 3.8 x 4 cm. The more distal ascending aorta appears to measure up to 4.4 x 4.2 cm. The aortic arch measures up to approximately 3.6 cm. The proximal descending aorta measures up to approximately 3.6 cm. The mid descending aorta measures up to 2.9 cm. The distal thoracic aorta measures up to approximately 2.9 cm. There is aneurysm of the abdominal aorta and inferior to the renal arteries. Appears to have a maximal diameter of approximately 5 cm on series 900 image 37. There is atherosclerosis of the mesenteric arteries with suspected mild luminal narrowing of the proximal superior mesenteric artery. There is also atherosclerosis and suspected mild to moderate luminal narrowing of the proximal renal arteries.  No definite findings of acute aortic abnormality at this time.  The central pulmonary arteries appear to enhance as expected  NONVASCULAR FINDINGS: The heart appears enlarged. Pacemaker is present. No pericardial effusion. There is reflux of contrast into the IVC and hepatic veins which may be seen with  right heart failure. No definite axillary, mediastinal, or hilar adenopathy is seen.  Status post left upper lobectomy. There is advanced emphysema. There has been interval increase in left pleural effusion, now moderate in size. There is also increased small right pleural effusion. No definite pneumothorax.  There is some new mild consolidation adjacent to the effusions. There also appears to be some new central bronchial wall thickening, groundglass opacities, and basilar septal thickening. There is suspected chronic scarring at the right apex. There are mild right apical bulla.  There are calcified gallstones. There is a small amount of ascites. Cyst is seen in the anterior left renal midpole.  No definite aggressive osseous lesion is identified.    Impression: 1.  Abdominal aortic aneurysm with estimated maximal diameter of 5 cm. Surgical consultation is recommended for consideration of repair. 2.  Ascending thoracic aorta appears to measure up to 4.4 cm. No definite acute aortic abnormality is seen on this exam. 3.  Cardiomegaly with reflux of contrast into the IVC and hepatic veins. There is also a small amount of ascites and increased small to moderate left and small right pleural effusions. These findings may indicate heart failure. 4.  Mild groundglass opacities, central bronchial wall thickening, and basilar septal thickening which could be seen with edema or pneumonia. 5.  Emphysema. Status post left upper lobectomy. 6.  Cholelithiasis.  This report was finalized on 12/4/2022 4:57 PM by Oniel Callaway MD.        Results for orders placed during the hospital encounter of 03/19/21    Adult Transthoracic Echo Complete W/ Cont if Necessary Per Protocol    Interpretation Summary  · There is biatrial enlargement. Left atrial volume is severely increased.  · The inferior vena cava is dilated and inspiratory collapse is absent.  · There is a mechanical aortic valve. I cannot exclude a small perivalvular leak (AI)  with confidence.  · There is mitral annular calcification and moderate mitral regurgitation.  · There is moderate tricuspid regurgitation and moderate estimated PA hypertension (RVSP=53 mmHg).  · There is no pericardial effusion.      I have reviewed the medications:  Scheduled Meds:aspirin, 81 mg, Oral, Daily  atorvastatin, 80 mg, Oral, Nightly  buPROPion XL, 300 mg, Oral, Daily  carvedilol, 25 mg, Oral, BID With Meals  citalopram, 20 mg, Oral, Daily  dorzolamide-timolol, , Both Eyes, BID  famotidine, 20 mg, Oral, Daily  finasteride, 5 mg, Oral, Nightly  thiamine, 100 mg, Oral, Daily   And  multivitamin, 1 tablet, Oral, Daily   And  folic acid, 1 mg, Oral, Daily  furosemide, 40 mg, Intravenous, Q12H  lisinopril, 20 mg, Oral, BID  nicotine, 1 patch, Transdermal, Q24H  senna-docusate sodium, 2 tablet, Oral, BID  sodium chloride, 10 mL, Intravenous, Q12H      Continuous Infusions:nitroglycerin, 5-200 mcg/min, Last Rate: 20 mcg/min (12/04/22 1628)  Pharmacy to dose warfarin,       PRN Meds:.•  acetaminophen **OR** acetaminophen **OR** acetaminophen  •  senna-docusate sodium **AND** polyethylene glycol **AND** bisacodyl **AND** bisacodyl  •  LORazepam **OR** LORazepam **OR** LORazepam **OR** LORazepam **OR** LORazepam **OR** LORazepam  •  magnesium sulfate **OR** magnesium sulfate **OR** magnesium sulfate  •  Pharmacy to dose warfarin  •  potassium chloride  •  sodium chloride  •  sodium chloride    Assessment & Plan   Assessment & Plan     Active Hospital Problems    Diagnosis  POA   • **CHF (congestive heart failure) ([facility]) [I50.9]  Yes   • Abdominal aortic aneurysm (AAA) without rupture [I71.40]  Unknown   • Smoking [F17.200]  Unknown   • Longstanding persistent atrial fibrillation ([facility]) [I48.11]  Unknown   • Pacemaker [Z95.0]  Unknown   • Gastroesophageal reflux disease without esophagitis [K21.9]  Unknown   • COPD (chronic obstructive pulmonary disease) ([facility]) [J44.9]  Unknown   • H/O mechanical aortic valve  replacement [Z95.2]  Not Applicable   • HTN (hypertension) [I10]  Unknown   • HLD (hyperlipidemia) [E78.5]  Unknown      Resolved Hospital Problems   No resolved problems to display.        Brief Hospital Course to date:  Dillon Vo is a 72 y.o. male PMH Afib and mechanical aortic valve (coumadin/INR 2.5-3.5), AAA (5cm), Pacemaker Left chest wall that was removed an relocated to the right chest wall, HTN/HLD, smoking (60+ yrs), CAD s/p CABG, hx of lung cancer and colon cancer presenting with intermittent right sided chest pain, shortness of breath and lower extremity edema for 3 days. Pt found with CHF exacerbation.        CHF exacerbation  Right Sided chest pain  Hx CAD s/p CABG  Hx of pacemaker with revision  - cardiology following.  (Follows with Dr Ronquillo outpatient).  Recs IV lasix 40mg IV BID.  Stopped NTG gtt.  Recs to consider chemical stress testing  - Strict I&O daily weight  - troponin negative x2.  EKG no significant change from EKG 03/2022 QT/QTc 512/552  - continueASA 81 mg daily   - 3/2021 ECHO LVEF 53%. ECHO pending  - note I/o cath's at home and apparently was only doing once per day     AAA  - CTA chest (+) 5 cm. This has increased from [place] AAA screen 07/06/2022 where the AAA was 4.6 cm infrarenal AAA.   - [facility] following.       HTN/HLD  - cards stopped ntg gtt  - Coreg, Lisinopril, atorvastatin      AFIB/ Mechanical aortic valve  Supratherapeutic  - INR 4.71   - goal 2.5-3.5  - pharmacy to dose     Smoking 60+ years  - smoking cessation education  - Nicotine patch     Depression  - Wellbutrin, celexa     BPH  Urinary Retention  - finasteride  - chronic I/O cath at home.   placed chen on 12/4 d/t difficult cath.  Recs to keep chen until f/u outpatient .  With h/o urethral stricture of unclear etiology plans flexible cystoscopy in the future at some point.  Res to avoid anticholinergics for bladder spasms unless severe     Alcohol use  - [facility] protocol  - vitamins  - denies hx of  alcohol withdrawal seizure     GERD  - pepcid         Expected Discharge Location and Transportation: Recs home with assist with home health  Expected Discharge Date: 12/8/22    DVT prophylaxis:  Medical and mechanical DVT prophylaxis orders are present.          CODE STATUS:   Code Status and Medical Interventions:   Ordered at: 12/04/22 8462     Medical Intervention Limits:    NO intubation (DNI)     Code Status (Patient has no pulse and is not breathing):    No CPR (Do Not Attempt to Resuscitate)     Medical Interventions (Patient has pulse or is breathing):    Limited Support       Alexander Lim MD  12/05/22

## 2022-12-05 NOTE — PROGRESS NOTES
"Pharmacy Consult  -  Warfarin    Dillon Vo is a  72 y.o. male   Height - 180.3 cm (71\")  Weight - 62.5 kg (137 lb 11.2 oz)    Consulting Provider: - Hospitalist  Indication: - Atrial Fibrillation and Mechanical valve  Goal INR: - 2.5 - 3.5  Home Regimen:   - Warfarin 5 mg Mo, Tu, Th, Fr, Sa   - Warfarin 2.5 mg We, Villatoro    Bridge Therapy: No       Drug-Drug Interactions with current regimen:  Aspirin - may increase bleeding risk  Citalopram - may increase INR    Warfarin Dosing During Admission:    Date  12/4 12/5          INR  4.71 3.25          Dose  HOLD 5mg               Education Provided: Patient follows with [facility], will address education concerns as necessary.    Discharge Follow up:   Following Provider - [facility]   Follow up time range or appointment - 2-3 days post discharge      Labs:    Results from last 7 days   Lab Units 12/05/22  0416 12/04/22  2029 12/04/22  1458 12/02/22  1400   INR  3.25* 3.61* 4.71* 3.9*   HEMOGLOBIN g/dL 13.4  --  13.7  --    HEMATOCRIT % 39.9  --  41.4  --      Results from last 7 days   Lab Units 12/05/22  0416 12/04/22  1458   SODIUM mmol/L 137 139   POTASSIUM mmol/L 4.1 4.1   CHLORIDE mmol/L 98 102   CO2 mmol/L 25.0 25.0   BUN mg/dL 11 12   CREATININE mg/dL 0.79 0.85   CALCIUM mg/dL 8.7 8.8   BILIRUBIN mg/dL  --  1.4*   ALK PHOS U/L  --  97   ALT (SGPT) U/L  --  28   AST (SGOT) U/L  --  56*   GLUCOSE mg/dL 91 121*       Current dietary intake: 50% meals 12/5  Diet Order   Procedures    Diet: Cardiac Diets; Healthy Heart (2-3 Na+); Texture: Regular Texture (IDDSI 7); Fluid Consistency: Thin (IDDSI 0)       Assessment/Plan:     Pharmacy dosing Warfarin for AVR. Goal INR 2.5-3.5.  INR today is 3.25 , decreased from 4.71 yesterday. Warfarin held 12/4.  Give Warfarin 5mg tonight.  Daily PT/INR ordered.  Monitor signs/symptoms of bleeding, dietary intake, and drug-drug interactions. Make dose adjustments as necessary.  Pharmacy will continue to follow.     Chon STEPHENS" Josué PharmASHLEIGH  12/5/2022  13:45 EST

## 2022-12-05 NOTE — PLAN OF CARE
Goal Outcome Evaluation:                 Problem: Adult Inpatient Plan of Care  Goal: Absence of Hospital-Acquired Illness or Injury  Intervention: Identify and Manage Fall Risk  Recent Flowsheet Documentation  Taken 12/5/2022 0200 by Mumtaz Haddad RN  Safety Promotion/Fall Prevention:   activity supervised   safety round/check completed   toileting scheduled  Taken 12/5/2022 0000 by Mumtaz Haddad RN  Safety Promotion/Fall Prevention:   activity supervised   safety round/check completed   toileting scheduled  Taken 12/4/2022 2200 by Mumtaz Haddad RN  Safety Promotion/Fall Prevention:   activity supervised   safety round/check completed   toileting scheduled  Taken 12/4/2022 1956 by Mumtaz Haddad RN  Safety Promotion/Fall Prevention:   activity supervised   safety round/check completed   toileting scheduled  Intervention: Prevent Skin Injury  Recent Flowsheet Documentation  Taken 12/5/2022 0200 by Mumtaz Haddad RN  Body Position: supine  Taken 12/5/2022 0000 by Mumtaz Haddad RN  Body Position: supine  Taken 12/4/2022 2200 by Mumtaz Haddad RN  Body Position: supine  Taken 12/4/2022 1956 by Mumtaz Haddad RN  Body Position: supine  Intervention: Prevent and Manage VTE (Venous Thromboembolism) Risk  Recent Flowsheet Documentation  Taken 12/5/2022 0200 by Mumtaz Haddad RN  VTE Prevention/Management:   bilateral   sequential compression devices on  Taken 12/5/2022 0000 by Mumtaz Haddad RN  VTE Prevention/Management:   bilateral   sequential compression devices on  Taken 12/4/2022 2200 by Mumtaz Haddad RN  VTE Prevention/Management:   bilateral   sequential compression devices on  Taken 12/4/2022 1956 by Mumtaz Haddad RN  VTE Prevention/Management:   bilateral   sequential compression devices on  Intervention: Prevent Infection  Recent Flowsheet Documentation  Taken 12/5/2022 0200 by Mumtaz Haddad RN  Infection Prevention: environmental surveillance performed  Taken 12/5/2022 0000 by  Mumtaz Haddad RN  Infection Prevention: environmental surveillance performed  Taken 12/4/2022 2200 by Mumtaz Haddad RN  Infection Prevention: environmental surveillance performed  Goal: Optimal Comfort and Wellbeing  Intervention: Monitor Pain and Promote Comfort  Recent Flowsheet Documentation  Taken 12/5/2022 0200 by Mumtaz Haddad RN  Pain Management Interventions: quiet environment facilitated  Taken 12/5/2022 0000 by Mumtaz Haddad RN  Pain Management Interventions: quiet environment facilitated  Taken 12/4/2022 2200 by Mumtaz Haddad RN  Pain Management Interventions: quiet environment facilitated  Taken 12/4/2022 1956 by Mumtaz Haddad RN  Pain Management Interventions: quiet environment facilitated  Intervention: Provide Person-Centered Care  Recent Flowsheet Documentation  Taken 12/5/2022 0200 by Mumtaz Haddad RN  Trust Relationship/Rapport: care explained  Taken 12/5/2022 0000 by Mumtaz Haddad RN  Trust Relationship/Rapport: care explained  Taken 12/4/2022 2200 by Mumtaz Haddad RN  Trust Relationship/Rapport: care explained  Taken 12/4/2022 1956 by Mumtaz Haddad RN  Trust Relationship/Rapport: care explained  Goal: Readiness for Transition of Care  Intervention: Mutually Develop Transition Plan  Recent Flowsheet Documentation  Taken 12/4/2022 1954 by Mumtaz Haddad RN  Equipment Currently Used at Home: none  Transportation Anticipated: family or friend will provide  Patient/Family Anticipated Services at Transition: none  Patient/Family Anticipates Transition to: home with family  Goal: Absence of Hospital-Acquired Illness or Injury  Intervention: Identify and Manage Fall Risk  Recent Flowsheet Documentation  Taken 12/5/2022 0200 by Mumtaz Haddad RN  Safety Promotion/Fall Prevention:   activity supervised   safety round/check completed   toileting scheduled  Taken 12/5/2022 0000 by Mumtaz Haddad RN  Safety Promotion/Fall Prevention:   activity supervised   safety  round/check completed   toileting scheduled  Taken 12/4/2022 2200 by Mumtaz Haddad RN  Safety Promotion/Fall Prevention:   activity supervised   safety round/check completed   toileting scheduled  Taken 12/4/2022 1956 by Mumtaz Haddad RN  Safety Promotion/Fall Prevention:   activity supervised   safety round/check completed   toileting scheduled  Intervention: Prevent Skin Injury  Recent Flowsheet Documentation  Taken 12/5/2022 0200 by Mumtaz Haddad RN  Body Position: supine  Taken 12/5/2022 0000 by Mumtaz Haddad RN  Body Position: supine  Taken 12/4/2022 2200 by Mumtaz Haddad RN  Body Position: supine  Taken 12/4/2022 1956 by Mumtaz Haddad RN  Body Position: supine  Intervention: Prevent and Manage VTE (Venous Thromboembolism) Risk  Recent Flowsheet Documentation  Taken 12/5/2022 0200 by Mumtaz Haddad RN  VTE Prevention/Management:   bilateral   sequential compression devices on  Taken 12/5/2022 0000 by Mumtaz Haddad RN  VTE Prevention/Management:   bilateral   sequential compression devices on  Taken 12/4/2022 2200 by Mumtaz Haddad RN  VTE Prevention/Management:   bilateral   sequential compression devices on  Taken 12/4/2022 1956 by Mumtaz Haddad RN  VTE Prevention/Management:   bilateral   sequential compression devices on  Intervention: Prevent Infection  Recent Flowsheet Documentation  Taken 12/5/2022 0200 by Mumtaz Haddad RN  Infection Prevention: environmental surveillance performed  Taken 12/5/2022 0000 by Mumtaz Haddad RN  Infection Prevention: environmental surveillance performed  Taken 12/4/2022 2200 by Mumtaz Haddad RN  Infection Prevention: environmental surveillance performed  Goal: Optimal Comfort and Wellbeing  Intervention: Monitor Pain and Promote Comfort  Recent Flowsheet Documentation  Taken 12/5/2022 0200 by Mumtaz Haddad RN  Pain Management Interventions: quiet environment facilitated  Taken 12/5/2022 0000 by Mumtza Haddad RN  Pain Management  Interventions: quiet environment facilitated  Taken 12/4/2022 2200 by Mumtaz Haddad, RN  Pain Management Interventions: quiet environment facilitated  Taken 12/4/2022 1956 by Mumtaz Haddad RN  Pain Management Interventions: quiet environment facilitated  Intervention: Provide Person-Centered Care  Recent Flowsheet Documentation  Taken 12/5/2022 0200 by Mumtaz Haddad, RN  Trust Relationship/Rapport: care explained  Taken 12/5/2022 0000 by Mumtaz Haddad, RN  Trust Relationship/Rapport: care explained  Taken 12/4/2022 2200 by Mumtaz Haddad, RN  Trust Relationship/Rapport: care explained  Taken 12/4/2022 1956 by Mumtaz Haddad, RN  Trust Relationship/Rapport: care explained  Goal: Readiness for Transition of Care  Intervention: Mutually Develop Transition Plan  Recent Flowsheet Documentation  Taken 12/4/2022 1954 by Mumtaz Haddad, RN  Equipment Currently Used at Home: none  Transportation Anticipated: family or friend will provide  Patient/Family Anticipated Services at Transition: none  Patient/Family Anticipates Transition to: home with family     VSS, voids well, rested throughout the night, chen in place, will continue to monitor for changes.

## 2022-12-05 NOTE — NURSING NOTE
Contacted the nightshift NP regarding the inability to cath the PT, PT hasn't had output in over 24 hours. Pt states he self caths at home but uses a much smaller coude catheter. Bladder scan showed 740ML in bladder. ER tried twice and we tried once. Consulted urology, urology came to the bedside and cath'd the PT and ankered a chen. Urology said they had to use a guide wire and would continue to follow up with the PT and plans to see the patient after discharge.

## 2022-12-05 NOTE — PLAN OF CARE
Goal Outcome Evaluation:  Plan of Care Reviewed With: patient        Progress: no change  Outcome Evaluation: OT evaluation completed. Pt presents with generalized weakness, impaired balance, and dyspnea with activity limiting independence with ADLs and functional mobility. Pt performed functional transfers with CGA progressing to stand by with use of RW. Stand by for sink side ADLs. Rec IP OT and home with assist and home health OT.

## 2022-12-05 NOTE — CASE MANAGEMENT/SOCIAL WORK
Discharge Planning Assessment  [facility]     Patient Name: Dillon Vo  MRN: [id number]  Today's Date: 12/5/2022    Admit Date: 12/4/2022    Plan: Initial Discharge Plan Assessment   Discharge Needs Assessment     Row Name 12/05/22 1525       Living Environment    People in Home spouse    Current Living Arrangements home    Primary Care Provided by self    Provides Primary Care For no one    Family Caregiver if Needed spouse    Quality of Family Relationships unable to assess    Able to Return to Prior Arrangements yes    Living Arrangement Comments 2-4 steps into home. Steps to basement.       Transition Planning    Patient/Family Anticipates Transition to home    Transportation Anticipated car, drives self       Discharge Needs Assessment    Equipment Currently Used at Home none               Discharge Plan     Row Name 12/05/22 1526       Plan    Plan Initial Discharge Plan Assessment    Patient/Family in Agreement with Plan yes    Plan Comments I spoke with Mr. Vo at the bedside. Pt lives with wife in [facility]. Independent with ADL's. Drove self to hospital. No DME/HH/OPPT. PT/OT worked with pt and recommmend home with Home Health. Pt is agreeable. Pt agreeable to [facility], [facility], and [facility] for referrals. I spoke with [facility] she will let  know if they can accept for PT/OT/SN. HH orders in [facility]. Pt states that the doctor told him he would discharge home tomorrow. PCP- Dr. Ez Cintron. Confirmed Medicare, [place] Medicaid and prescriptions filled at [facility]. Pt states he will drive himself home.  will continue to follow.    Final Discharge Disposition Code 06 - home with home health care              Continued Care and Services - Admitted Since 12/4/2022     Home Medical Care     Service Provider Request Status Selected Services Address Phone Fax Patient Preferred    [facility] Pending - No Request Sent N/A [address]  [place], [facility] [address] [phone] --    [facility] No [facility] Sent N/A [address] [facility] [address] [phone] --    [facility]  [place] Pending - No Request Sent N/A [address], [address], [facility] [address] [phone] -- --              Expected Discharge Date and Time     Expected Discharge Date Expected Discharge Time    Dec 9, 2022          Demographic Summary     Row Name 12/05/22 1522       General Information    Reason for Consult discharge planning       Contact Information    Permission Granted to Share Info With                Functional Status     Row Name 12/05/22 1525       Functional Status    Usual Activity Tolerance good       Functional Status, IADL    Medications independent    Meal Preparation independent    Housekeeping independent    Laundry independent    Shopping independent               Psychosocial    No documentation.                Abuse/Neglect    No documentation.                Legal    No documentation.                Substance Abuse    No documentation.                Patient Forms    No documentation.                   Dinorah Watson RN

## 2022-12-05 NOTE — PLAN OF CARE
Goal Outcome Evaluation:  Plan of Care Reviewed With: patient           Outcome Evaluation: Pt presents with balance deficits, generalized weakness, and decreased activity tolerance/dyspnea upon exertion limiting his functional mobility. Pt performed bed mobility supervision, STS SBA, navigated 10 steps CGA w/  use of one hand rail, and ambulated 140' x2 SBA w/ and w/o use of AD demonstrating improved posture, gait mechanics, and speed w/ use of a RW. Pt w/ c/o SOB during gait, SpO2 86% RA  returning >89% after ~45 seconds of supplemental O2 and verbal cues for PLB. IPPT warranted. Recommend home with assist and HH therapy services.

## 2022-12-05 NOTE — CONSULTS
Inpatient Cardiology Consult  Consult performed by: Moiz Ronquillo IV, MD  Consult ordered by: Orin Kenney APRN  Reason for consult: CHF            Cardiology Consult         IDENTIFICATION: 72-year-old gentleman who resides in [place], [place]      Active Hospital Problems    Diagnosis  POA   • **Acute congestive heart failure (HCC) [I50.9]  Yes     Priority: High     · Echo (3/21/2021): LVEF 53%.  Biatrial enlargement.  Mechanical aortic valve with possible small perivalvular leak.  Moderate TR.  RVSP 53 mmHg     • Urinary retention [R33.9]  Yes     Priority: High   • H/O mechanical aortic valve replacement [Z95.2]  Not Applicable     Priority: Medium     · Remote mechanical aortic valve replacement and thoracic aneurysm repair approximately 20 to 21 years ago at [facility]  · Echo (3/19/2021): LVEF 53%. Mechanical aortic valve with small perivalvular leak.  MAC with moderate MR.  RVSP 53 mmHg.     • Permanent atrial fibrillation (HCC) [I48.21]  Yes     Priority: Low     · [id number] 5 (age, CAD, HTN, CVA)   · Status post AV node ablation pacemaker implantation, 6/18/2013     • Coronary artery disease involving native coronary artery of native heart with angina pectoris (HCC) [I25.119]  Yes     Priority: Low     · Cardiac catheterization (6/2013): Mid LAD stenosis status post RK.     • Complete heart block (HCC) [I44.2]  Yes     Status post AV node ablation     • Abdominal aortic aneurysm (AAA) without rupture [I71.40]  Yes     · Abdominal ultrasound (7/2022):4.6 cm infrarenal abdominal aortic aneurysm, possibly very minimallyincreased in size from CT comparison.  · CT angiogram of the chest (12/4/2022): Abdominal aortic aneurysm 5 cm and enlarged from ultrasound 7/2022     • Smoking [F17.200]  Yes   • Pacemaker [Z95.0]  Yes     · SJM dual-chamber pacemaker on the right chest wall     • Gastroesophageal reflux disease without esophagitis [K21.9]  Yes   • COPD (chronic obstructive  pulmonary disease) (HCC) [J44.9]  Yes   • HTN (hypertension) [I10]  Yes     • Target blood pressure <130/80 mmHg     • Hyperlipidemia LDL goal <70 [E78.5]  Yes     • High intensity statin therapy indicated given the presence of CAD                 72-year-old gentleman with mechanical aortic valve, CAD, permanent atrial fibrillation status post AV node ablation and permanent pacemaker placement who has a neurogenic bladder requiring in and out catheterization.  The patient presented to [facility] on 12/4/2022 with shortness of breath associated with lower extremity edema, elevated proBNP, and CT finding of bilateral pleural effusions.  The patient was noted to have distended bladder full of urine on CT imaging and catheterization was performed with improvement in symptoms.  The patient reports he may have catheterize himself once in 48 hours prior to admission.   CT imaging also revealed a 5 cm abdominal aortic aneurysm which was nominally increased from 4.6 cm in July of this year on ultrasound testing.    The patient presently feels much better.  He is no longer short of breath.  Patient denies experiencing chest pain with regular activities.        No Known Allergies    Prior to Admission medications    Medication Sig Start Date End Date Taking? Authorizing Provider   aspirin 81 MG EC tablet Take 1 tablet by mouth Daily. 9/6/22   Moiz Ronquillo IV, MD   atorvastatin (LIPITOR) 80 MG tablet Take 1 tablet by mouth Every Night. 9/6/22   Moiz Ronquillo IV, MD   buPROPion XL (WELLBUTRIN XL) 300 MG 24 hr tablet Take 1 tablet by mouth once daily 1/17/22   Ez Cintron MD   carvedilol (COREG) 12.5 MG tablet Take 1 tablet by mouth 2 (Two) Times a Day With Meals. 9/6/22   Moiz Ronquillo IV, MD   citalopram (CeleXA) 20 MG tablet Take 1 tablet by mouth once daily 7/8/22   Ez Cintron MD   dorzolamide-timolol (COSOPT) 22.3-6.8 MG/ML ophthalmic solution dorzolamide 22.3  mg-timolol 6.8 mg/mL eye drops   1 drop both eyes 9 am and 9 pm    Provider, MD Manuela   famotidine (PEPCID) 20 MG tablet Take 1 tablet by mouth Daily. 2/9/18   Ez Tucker MD   finasteride (PROSCAR) 5 MG tablet TAKE 1 TABLET BY MOUTH ONCE DAILY AT BEDTIME 9/16/21   Ez Cintron MD   folic acid (FOLVITE) 400 MCG tablet TAKE 1 TABLET BY MOUTH ONCE DAILY 3/29/19   Ez Cintron MD   furosemide (LASIX) 20 MG tablet Take 1 tablet by mouth Daily. Take 2 tablets tomorrow morning, then 1 tablet daily.  Take with potassium tablet  Patient taking differently: Take 20 mg by mouth Daily. Take1 tablet daily.  Take with potassium tablet 4/22/22   Margarita Weaver APRN   lisinopril (PRINIVIL,ZESTRIL) 10 MG tablet Take 1 tablet by mouth 2 (Two) Times a Day. 9/6/22   Moiz Ronquillo IV, MD   methocarbamol (ROBAXIN) 750 MG tablet Take 1 tablet by mouth 3 (Three) Times a Day. 9/15/22   Chelo Miranda APRN   nicotine (NICODERM CQ) 14 MG/24HR patch Place 1 patch on the skin as directed by provider Daily. 5/5/21   Norma Ignacio APRN   potassium chloride 10 MEQ CR tablet Take 1 tablet by mouth Daily. 4/22/22   Margarita Weaver APRN   tadalafil (Cialis) 20 MG tablet Take 1 tablet by mouth Daily As Needed for Erectile Dysfunction. 2/24/21   Ez Cintron MD   timolol (TIMOPTIC) 0.5 % ophthalmic solution Administer 1 drop to both eyes Daily.    Provider, MD Manuela   vitamin B-12 (CYANOCOBALAMIN) 1000 MCG tablet Take 1 tablet by mouth Daily. 12/12/16   Yue Meek PA-C   warfarin (COUMADIN) 5 MG tablet Take 1 tablet by mouth Daily. 9/6/22   Moiz Ronquillo IV, MD       Past Medical History:   Diagnosis Date   • Anemia    • Atrial fibrillation (HCC) 05/05/2016    AV node ablation and CRT pacemaker   • Colitis    • Colon cancer (HCC)    • Complete heart block (HCC)     Status post AV node ablation   • Coronary artery disease    • Depression    • GERD  (gastroesophageal reflux disease)    • Hiatal hernia    • HTN (hypertension)    • Hyperbilirubinemia    • Hyperlipidemia    • Infectious viral hepatitis    • Kidney stone    • Lung cancer (HCC)    • Skin cancer     lip        Past Surgical History:   Procedure Laterality Date   • APPENDECTOMY     • BRONCHOSCOPY THORACOTOMY Left 4/7/2021    Procedure: BRONCHOSCOPY LEFT THORACOTOMY, LEFT LUNG RESECTION;  Surgeon: Chi Post MD;  Location: [facility];  Service: Cardiothoracic;  Laterality: Left;   • CARDIAC CATHETERIZATION     • CARDIAC SURGERY  2000    valve replacement    • COLON SURGERY     • COLONOSCOPY  up to date   • CORONARY STENT PLACEMENT     • EXTRACORPOREAL SHOCKWAVE LITHOTRIPSY (ESWL), STENT INSERTION/REMOVAL     • LUNG CANCER SURGERY     • PACEMAKER IMPLANTATION     • VARICOSE VEIN SURGERY         Family History   Problem Relation Age of Onset   • Cancer Mother    • Hypertension Father    • Heart disease Father    • No Known Problems Sister    • No Known Problems Brother        Social History     Tobacco Use   Smoking Status Every Day   • Packs/day: 0.25   • Years: 57.00   • Pack years: 14.25   • Types: Cigarettes   • Start date: 1963   Smokeless Tobacco Never   Tobacco Comments    cut back on cigs       Social History     Substance and Sexual Activity   Alcohol Use Yes   • Alcohol/week: 2.0 standard drinks   • Types: 2 Shots of liquor per week    Comment: Daily         Review of Systems:   Review of Systems   Constitutional: Negative for malaise/fatigue.   Eyes: Negative for vision loss in left eye and vision loss in right eye.   Cardiovascular: Positive for chest pain, dyspnea on exertion and leg swelling. Negative for near-syncope, orthopnea, palpitations, paroxysmal nocturnal dyspnea and syncope.   Respiratory: Positive for shortness of breath.    Musculoskeletal: Negative for myalgias.   Neurological: Negative for brief paralysis, excessive daytime sleepiness, focal weakness, numbness,  paresthesias and weakness.   All other systems reviewed and are negative.           Vital Sign Min/Max for last 24 hours  Temp  Min: 96.9 °F (36.1 °C)  Max: 98 °F (36.7 °C)   BP  Min: 121/73  Max: 196/104   Pulse  Min: 62  Max: 71   Resp  Min: 16  Max: 20   SpO2  Min: 91 %  Max: 96 %   Flow (L/min)  Min: 2  Max: 2      Intake/Output Summary (Last 24 hours) at 12/5/2022 1656  Last data filed at 12/5/2022 1300  Gross per 24 hour   Intake 960 ml   Output 2650 ml   Net -1690 ml           Tele: Paced    Constitutional:       Appearance: Healthy appearance. Well-developed.   Eyes:      General: Lids are normal. No scleral icterus.     Conjunctiva/sclera: Conjunctivae normal.   HENT:      Head: Normocephalic and atraumatic.   Neck:      Thyroid: No thyromegaly.      Vascular: No carotid bruit or JVD.   Pulmonary:      Effort: Pulmonary effort is normal.      Breath sounds: Normal breath sounds. No wheezing. No rhonchi. No rales.   Cardiovascular:      Normal rate. Regular rhythm.      Murmurs: There is no murmur.      No gallop. No rub.      Comments: Crisp S1/S2  Pulses:     Intact distal pulses.   Edema:     Ankle: bilateral 1+ edema of the ankle.     Feet: bilateral 1+ edema of the feet.  Abdominal:      General: There is no distension.      Palpations: Abdomen is soft. There is no abdominal mass.   Musculoskeletal:      Cervical back: Normal range of motion. Skin:     General: Skin is warm and dry.      Findings: No rash.   Neurological:      General: No focal deficit present.      Mental Status: Alert and oriented to person, place, and time.      Gait: Gait is intact.   Psychiatric:         Attention and Perception: Attention normal.         Mood and Affect: Mood normal.         Behavior: Behavior normal.              DATA REVIEW:    EKG (12/5/2022): Ventricular paced.  No ischemic changes.    Echo (12/5/2022): LVEF 50%.  No regional wall motion abnormality.  Normal function mechanical aortic valve.  Mild mitral  regurgitation.      Results from last 7 days   Lab Units 12/05/22  0416 12/04/22  1458   SODIUM mmol/L 137 139   POTASSIUM mmol/L 4.1 4.1   CHLORIDE mmol/L 98 102   BUN mg/dL 11 12   CREATININE mg/dL 0.79 0.85     Results from last 7 days   Lab Units 12/05/22  1322 12/04/22  1458   TROPONIN T ng/mL <0.010 <0.010     Results from last 7 days   Lab Units 12/05/22  0416 12/04/22  1458   WBC 10*3/mm3 8.12 6.75   HEMOGLOBIN g/dL 13.4 13.7   HEMATOCRIT % 39.9 41.4   PLATELETS 10*3/mm3 143 166     Lab Results   Component Value Date    HGBA1C 5.20 12/04/2022     Lab Results   Component Value Date    CHOL 104 12/05/2022    CHLPL 189 01/21/2016    TRIG 61 12/05/2022    HDL 62 (H) 12/05/2022    LDL 28 12/05/2022              Acute diastolic heart failure  · Heart failure due to neurogenic bladder/inadequate and out catheterization    Coronary artery disease  · Previous LAD PCI  · Biomarker negative chest pain on admission suspected due to CHF  · Will read stratify with pharmacologic nuclear stress tomorrow    Mechanical aortic valve  · Normal functioning device on echo this admission  · Continue anticoagulation with goal INR 2-3  · Continue low-dose aspirin    Permanent atrial fibrillation  · Rate controlled with AV node ablation  · Continue warfarin for stroke prophylaxis    Pacemaker  · Normal functioning device    Abdominal aortic aneurysm  · Per CT surgery    Hyperlipidemia with goal LDL <50  · Well-controlled  · Continue atorvastatin    Tobacco abuse  · Nicotine patch    History of lung cancer  · Status post left upper lobectomy    History of neurogenic bladder  · Managed with intermittent catheterization and previously followed [facility]  · Urology consulted and Causey catheter in place             · Discontinue IV Lasix as patient euvolemic after adequate drainage of bladder  · Cardiac PET in a.m.  · N.p.o. after midnight      Electronically signed by Moiz Ronquillo IV, MD, 12/05/22, 4:55 PM EST.

## 2022-12-05 NOTE — PLAN OF CARE
Goal Outcome Evaluation:  Plan of Care Reviewed With: patient        Progress: no change  Outcome Evaluation: Remains paced on the monitor.  Causey to bedside drainage bag. Chest pain has subsided and Nitro drip stopped. Echo completed today. Waiting CT Angiogram to be done. Up to recliner and tolerates well.  Daily weight and strict I and O ordered today. See flowsheet. Waiting discharge plan when tests are complete and pt is ready.   Progress Notes by Luis Enrique Lindsey DO at 7/17/2019 11:29 AM     Author: Luis Enrique Lindsey DO Service: -- Author Type: Physician    Filed: 7/18/2019 10:26 AM Encounter Date: 7/17/2019 Status: Signed    : Luis Enrique Lindsey DO (Physician)       [facility]    Facility:   [facility] [[id number]]   Code Status: FULL CODE    Chief concern: Patient is seen by me today for admission to the [facility].      Reason for admission: Patient was recently discharged from [facility], where she was treated for group B streptococcal bacteremia, and sepsis related to a cellulitis infection of her right lower extremity.  After initial treatment using broad-spectrum antibiotics, her treatment was narrowed down to using ceftriaxone, which she will continue to get via a PICC line in her right upper extremity until 07/25/2019.  Since time of readmission to the [facility], patient relates she continues to do better from when she was initially hospitalized.  She reports still having a low-grade fever, with review of long-term care facility documentation showing a temperature as high as 100.3  F on 07/16/2019.  No worsening of erythema or edema of her right lower extremity reported by staff or patient since readmission to facility.  She denies having any new problems with breathing, eating or drinking, or urination.  Her stools have been looser than normal since starting antibiotic treatment in the hospital, but they have not been liquid stools.  She has some skin irritation in her bilateral proximal inner thighs, and underneath her breast, which she told me has been getting treated using an antifungal powder underneath her breasts, and over her inner thighs.  I do not see that in the orders using the facility electronic medical record.  I was speak to nursing staff about this discrepancy.  She has multiple labs scheduled for day of exam, and I added an INR study to  "this.  Her INR has been within the ideal therapeutic range recently per review of hospital labs.  Her initial elevated total white blood cell count went back down to the normal range with treatment given in hospital.  Her renal function is good.  Besides her Rocephin antibiotic treatment started in hospital, other significant medication changes includes the discontinuation for now of her methotrexate and Humira treatment for rheumatoid arthritis due to the immunosuppressive effects of these medications.  At time of my exam, her pain was well controlled on her prescribed medications.    Review of systems: See history of present illness, all others negative.     The medication list from the nursing home orders in the [facility] EMR, and the medications listed in the Epic EMR were reconciled, and any new medications to include in Epic are listed as a \"historical medication\".    Current Outpatient Medications   Medication Sig Dispense Refill   ? acetaminophen (TYLENOL) 325 MG tablet Take 650 mg by mouth every 6 (six) hours as needed for pain.            ? ascorbic acid, vitamin C, (ASCORBIC ACID WITH KAEL HIPS) 500 MG tablet Take 500 mg by mouth 2 (two) times a day.     ? cefTRIAXone 2 gram SolR Infuse 2,000 mg into a venous catheter daily.     ? cholecalciferol, vitamin D3, (VITAMIN D3) 2,000 unit Tab Take 6,000 Units by mouth daily.     ? DULoxetine (CYMBALTA) 20 MG capsule Take 1 capsule (20 mg total) by mouth daily. 30 capsule 2   ? folic acid (FOLVITE) 1 MG tablet Take 1 tablet (1 mg total) by mouth daily.  0   ? hydroCHLOROthiazide (HYDRODIURIL) 25 MG tablet Take 25 mg by mouth daily.     ? hydroxychloroquine (PLAQUENIL) 200 mg tablet Take 200 mg by mouth 2 (two) times a day.     ? ibuprofen (ADVIL,MOTRIN) 200 MG tablet Take 400 mg by mouth every 8 (eight) hours as needed for pain.            ? methotrexate 2.5 MG tablet Take 4 tablets (10 mg total) by mouth once a week. 16 tablet 0   ? multivitamin therapeutic " (THERAGRAN) tablet Take 1 tablet by mouth daily.     ? oxyCODONE (OXY-IR) 5 mg capsule Take 1-2 tablets every 4 hours orally as needed for pain relief.  Use 1 tablet for 1-5/10 pain, take 2 tablets for 6-10/10 pain.  0   ? SODIUM CHLORIDE 0.9 %, FLUSH, INJ Infuse 10 mL into a venous catheter. Before and after medications or lab drawn     ? WARFARIN SODIUM (WARFARIN ORAL) Take by mouth daily. 6/28/19 INR 2.78  Cont 9mg M-W-F, 8mg AOD Next INR 7/25 5/30/19  INR 2.20 Cont 9mg M-W-F, 8mg AOD. Next INR 6/27.  5/3/19 INR 1.88  Take 9mg M-W-F and 8mg AOD.  Next INR 5/30/19.    4/4/19 INR 2.28  Cont 9mg on Wed and Sun and 8mg AOD.  Next INR 5/3/19.  3/7/19 INR 2.22  Cont 9mg on Wed and Sun and 8mg AOD.  Next INR 4/4/19.             No current facility-administered medications for this visit.      Past Medical History:   Diagnosis Date   ? Aseptic necrosis of femoral head (H)     LEFT   ? Bacteremia due to group B Streptococcus    ? Cellulitis of right lower extremity    ? DJD (degenerative joint disease)    ? DVT, bilateral lower limbs (H) 10/2014   ? Edema - swelling of the legs after blood clots 5/22/2015   ? Essential hypertension with goal blood pressure less than 140/90    ? Failure to thrive    ? History of blood clots    ? Hypokalemia 10/15/2014   ? Lung mass 10/18/2014   ? Morbid obesity (H) 4/10/2015    Had formal nutrition consult at [facility].  RD reports that she is not willing to commit to the program outlined.  See scanned document.  12/15/2015 - Marcio Quinoenz MD      ? Obesity - although she was not weighed today, she is clearly obese on inspection. 4/10/2015   ? LOPEZ (obstructive sleep apnea) - abnormal overnight pulse oximetry 5/22/2015   ? Osteoarthritis    ? Peripheral vascular disease (H)    ? Pleural effusion    ? Pressure ulcer of foot    ? Rheumatoid arthritis (H) 12/30/2014    seronegative   ? Sepsis (H)    ? Seropositive rheumatoid arthritis (H) 1/19/2016   ? Vitamin D deficiency 8/26/2015       Past Surgical History:   Procedure Laterality Date   ? JOINT REPLACEMENT      knee   ? PERIPHERALLY INSERTED CENTRAL CATHETER INSERTION Right 07/14/2019    4fr/44cm/Rt Cephalic.lowSVC.MGlav   ? TOTAL HIP ARTHROPLASTY Left 10/18/2016    Procedure: LEFT HIP TOTAL ARTHROPLASTY;  Surgeon: London Goss MD;  Location: [facility] Main OR;  Service:    ? TOTAL KNEE ARTHROPLASTY Left 2006      Social History     Tobacco Use   ? Smoking status: Former Smoker     Packs/day: 1.00     Years: 30.00     Pack years: 30.00     Types: Cigarettes   ? Smokeless tobacco: Never Used   Substance Use Topics   ? Alcohol use: Yes     Comment: 3-4 times a year she will have 1 or 2.   ? Drug use: No        Family History   Problem Relation Age of Onset   ? Multiple myeloma Father    ? Deep vein thrombosis Father    ? Cataracts Father    ? Snoring Father    ? Other Brother         Blood withdrawn from time to time.  Sounds like hemochromatosis.   ? Sleep apnea Brother    ? Cancer Mother         Uterine CA   ? Arthritis Mother    ? Cataracts Mother    ? Breast cancer Mother 54   ? No Medical Problems Sister    ? Rheum arthritis Maternal Grandmother    ? Breast cancer Maternal Grandmother 50   ? Heart disease Maternal Grandmother    ? Rheum arthritis Paternal Aunt    ? Breast cancer Maternal Aunt 54   ? Breast cancer Cousin    ? Clotting disorder Neg Hx        Vitals:    07/15/19 0203 07/16/19 2325   BP:  133/81   Pulse:  70   Resp:  18   Temp:  98.5  F (36.9  C)   SpO2:  94%   Weight: (!) 341 lb 1.6 oz (154.7 kg)        EXAM:   General: Vital signs reviewed.  Patient is in no acute appearing distress.  Breathing appears nonlabored.  Patient is alert and oriented ×3.  HEENT: No scleral discoloration.  No abnormal nasal or ear drainage.  No intraoral plaques suggestive of oral Candida, and no abnormal intraoral lesions such as vesicles or ulcers.  No pharyngeal injection.  Neck: Supple with no JVD.  Heart: Heart rate is regular without  murmur.  Lungs: Lungs are clear to auscultation with good airflow bilaterally.  Abdomen:  Abdomen is soft, nontender.  No palpable abnormal masses or organomegaly.  Skin/extremities: Overall warm and dry, except for increased skin temperature noted in right lower extremity going proximally to the mid knee level.  She had appropriate compression garments on her bilateral lower extremities which I did not remove.  There is slight erythema extending proximally to about the mid knee region, with no open skin areas noted in the visible skin areas.  See picture included documentation for further reference.  I looked at her PIC site in her right upper extremity, and it looks good, with no abnormal drainage, or discoloration.  Neuro: No acute focal problems.  Psych: Patient has a very pleasant affect, smiling often and making good eye contact exam.  Speech is clear and fluent.  Stays on conversation topics well.        Recent studies: None since the time of readmission.    Approximately 45 minutes was spent on patient management, with greater than 50% of this time being spent on discussion of concerns and management with resident, and care staff.  The remainder of the time was spent on medication and vital sign data reconciliation between electronic medical records, and review of past medical history and current medical management.  Assessment/Plan   1. Essential hypertension with goal blood pressure less than 140/90     2. Seropositive rheumatoid arthritis of multiple sites (H)     3. Morbid obesity (H)     4. Warfarin anticoagulation - long-term because of unresolved DVT that first appeared in October, 2014     5. Vitamin D deficiency     6. History of total left hip replacement     7. History of total left knee replacement (TKR)     8. Primary osteoarthritis involving multiple joints     9. High risk medication use         Patient Instructions   Add INR to lab draw for tomorrow.  I left the Humira and methotrexate on her  Epic electronic medical record medication list, due to this possibly being restarted after treatment of her acute illness.  If her stool should become more liquid, we will need to assess her for possible C. difficile infection.  I will speak to nursing staff about her antifungal treatment under her breasts, and between her legs, and doing appropriate in order for this treatment.  Otherwise, no change in her current management.     Luis Enrique Lindsey, DO

## 2022-12-05 NOTE — CONSULTS
[facility]   HISTORY AND PHYSICAL    Patient Name: Dillon Vo  : 1950  MRN: [id number]  Primary Care Physician:  Ez Cintron MD  Date of admission: 2022    Subjective   Subjective     Chief Complaint: Difficult chen, urinary retention, CHF    HPI:    Dillon Vo is a 72 y.o. male with an extensive past medical history including atrial fibrillation, aortic valve on warfarin, tobacco abuse, status post pacemaker, history of colon cancer status post remote colectomy, coronary artery disease, AAA (5 cm) GERD, hiatal hernia, hypertension, hyperlipidemia, reported lung cancer per records, sick sinus syndrome, neurogenic bladder managing with intermittent catheterization who was admitted to [facility] today with congestive heart failure exacerbation with bilateral pleural effusions on CT chest and hypertensive emergency.    Patient reports subjective shortness of breath and lower extremity edema for the last few days.  BNP is elevated 3991.  IV Diuretics have been ordered.     The patient has previously followed with [facility] urology department for inability to urinate, he has been diagnosed with neurogenic bladder.  The patient has a reported history of membranous versus bulbar urethral stricture and has undergone previous cystoscopy and CT urogram for history of gross hematuria likely related to catheterizations.    The patient is a poor historian, he states he has not seen a urologist within the last year.  He manages his bladder with once daily catheterization particularly at night.  He states he gets an extreme amount of urine out but he is only emptying once a day.  He states he has not catheterized over the last 24 hours.  Patient describes sensation of a full bladder.    Creatinine 0.85.  The patient does not have any abdominal imaging since .  CT below has been reviewed.  The patient had a massively distended bladder at that time as  well.        For accurate I's and O's nursing has been attempting intermittent catheterization but has been unable to advance the catheter.  Charge nurse was unable to place a catheter.  Urology was consulted for difficult Causey.    Catheter placement  The patient's penis was swabbed with iodine.  A 16 Chadian silicone catheter was attempted to be advanced but resistance was met at the presumed prostate versus proximal bulbar urethra.  The catheter was removed.  A prelubricated Glidewire was advanced down the penis and this was able to be advanced into the bladder.  Over the wire, a 16 Chadian [place] tip catheter was advanced through the area of resistance with difficulty but eventually the catheter was able to be advanced into the bladder.  10 cc of sterile water used inflate balloon.  Roughly 800+ milliliters of dark sohan urine was returned.  Catheter was secured to the patient's right thigh and hooked up to a drainage bag.    Review of Systems   All systems were reviewed and negative except for: Shortness of breath, chest pain, suprapubic discomfort    Personal History     Past Medical History:   Diagnosis Date   • A-fib (HCC)    • Anemia    • Atrial fibrillation (HCC) 5/5/2016    Difficult to control rate. RFA of AV node with implantation of left Bi-V pacemaker, 06/18/2013. Hematoma requiring single-chamber pacemaker implanted, 07/29/2013.    • Colitis    • Colon cancer (HCC)    • Coronary artery disease    • Depression    • GERD (gastroesophageal reflux disease)    • Heart valve disease    • Hiatal hernia    • HTN (hypertension)    • Hyperbilirubinemia    • Hyperlipidemia    • Infectious viral hepatitis    • Kidney stone    • Lung cancer (HCC)    • Orthostatic hypotension    • Sick sinus syndrome (HCC)    • Skin cancer     lip        Past Surgical History:   Procedure Laterality Date   • APPENDECTOMY     • BRONCHOSCOPY THORACOTOMY Left 4/7/2021    Procedure: BRONCHOSCOPY LEFT THORACOTOMY, LEFT LUNG RESECTION;   Surgeon: Chi Post MD;  Location: [facility];  Service: Cardiothoracic;  Laterality: Left;   • CARDIAC CATHETERIZATION     • CARDIAC SURGERY  2000    valve replacement    • COLON SURGERY     • COLONOSCOPY  up to date   • CORONARY STENT PLACEMENT     • EXTRACORPOREAL SHOCKWAVE LITHOTRIPSY (ESWL), STENT INSERTION/REMOVAL     • LUNG CANCER SURGERY     • PACEMAKER IMPLANTATION     • VARICOSE VEIN SURGERY         Family History: family history includes Cancer in his mother; Heart disease in his father; Hypertension in his father; No Known Problems in his brother and sister. Otherwise pertinent FHx was reviewed and not pertinent to current issue.    Social History:  reports that he has been smoking cigarettes. He started smoking about 59 years ago. He has a 14.25 pack-year smoking history. He has never used smokeless tobacco. He reports current alcohol use of about 2.0 standard drinks per week. He reports current drug use. Drug: Marijuana.    Home Medications:  aspirin, atorvastatin, buPROPion XL, carvedilol, citalopram, dorzolamide-timolol, famotidine, finasteride, folic acid, furosemide, lisinopril, methocarbamol, nicotine, potassium chloride, tadalafil, timolol, vitamin B-12, and warfarin      Allergies:  No Known Allergies    Objective   Objective     Vitals:   Temp:  [97.5 °F (36.4 °C)-98.2 °F (36.8 °C)] 97.5 °F (36.4 °C)  Heart Rate:  [70-71] 71  Resp:  [16-20] 16  BP: (179-190)/(104-113) 185/104  Flow (L/min):  [2] 2  Physical Exam    Constitutional: Awake in bed, alert    Eyes: PERRLA, sclerae anicteric, no conjunctival injection   HENT: Normocephalic, atraumatic, mucous membranes moist   Neck: Supple, trachea midline   Respiratory:Equal chest rise, non-labored respirations, 2 L oxygen nasal cannula   Cardiovascular: RRR, palpable radial pulses bilaterally   Gastrointestinal: Soft, nontender, non-distended, no flank pain to palpation    Musculoskeletal: Normal lower extremity pitting edema   Genitourinary:  Circumcised phallus, orthotopic meatus, bilaterally descended testicles without masses or lesions   Psychiatric: Appropriate affect, cooperative   Neurologic: Oriented x 3, Cranial Nerves grossly intact, speech clear   Skin: No rashes     Result Review    Result Review:  I have personally reviewed the results from the time of this admission to 12/4/2022 23:20 EST and agree with these findings:  [x]  Laboratory  []  Microbiology  [x]  Radiology  [x]  EKG/Telemetry   []  Cardiology/Vascular   []  Pathology  [x]  Old records  []  Other:    Most notable findings include: CHF exacerbation with proBNP 3991, troponins negative, creatinine 0.85, no leukocytosis, CT angiogram demonstrates emphysema, 5 cm aortic aneurysm    Assessment & Plan   Assessment / Plan     Brief Patient Summary:  Dillon Vo is a 72 y.o. male who has an extensive past medical history including atrial fibrillation, mechanical aortic valve on warfarin, CAD status post pacemaker, CHF, tobacco abuse history, lung cancer and colon cancer history, neurogenic bladder managing with intermittent catheterization.      He has previously followed with [facility] urology.  He has a known bulbar versus membranous urethral stricture of unclear etiology.  He has been catheterizing for an unclear number of years.  Unfortunately, the patient has only been catheterizing once daily.  Throughout the day he does not naturally void.  I discussed with the patient that his inability to empty his bladder on a consistent basis is likely contributed to his cardiomegaly/congestive heart failure and fluid overload.    The best option would be to leave the patient's Causey catheter in indefinitely for now.  He has not followed with urology for a year.  He will establish care with me in the [facility] urology department.      We will leave his catheter in until he sees me as an outpatient at which point we will discuss intermittent catheterization resumption.  He  has a massively distended bladder as of 2020, he is unlikely to regain natural voiding and he will likely need to continue to rely on intermittent catheterization.  We may perform flexible cystoscopy to assess his stricture in the future.    Active Hospital Problems:  Active Hospital Problems    Diagnosis    • **CHF (congestive heart failure) ([facility])    • Abdominal aortic aneurysm (AAA) without rupture    • Smoking    • Longstanding persistent atrial fibrillation ([facility])    • Pacemaker    • Gastroesophageal reflux disease without esophagitis    • COPD (chronic obstructive pulmonary disease) ([facility])    • H/O mechanical aortic valve replacement    • HTN (hypertension)    • HLD (hyperlipidemia)      Plan:   -Presumed history of neurogenic bladder managed with intermittent catheterization, previously following with [facility] urology  -Patient has only been catheterizing once daily, this has likely contributed to fluid overload and CHF exacerbation  -Patient should maintain his Causey catheter indefinitely for now, we will establish care and he will see me back in urology office for discussion of catheter removal and resuming intermittent catheterization  -Given his history of urethral stricture of unclear etiology (possible urethral trauma related to catheterization?),  We will plan for flexible cystoscopy at some point in the future  -I would avoid anticholinergics for bladder spasms unless severe given the patient's age and multiple medical comorbidities    DVT prophylaxis:  Medical and mechanical DVT prophylaxis orders are present.    CODE STATUS:    Medical Intervention Limits: NO intubation (DNI)  Code Status (Patient has no pulse and is not breathing): No CPR (Do Not Attempt to Resuscitate)  Medical Interventions (Patient has pulse or is breathing): Limited Support    Admission Status:  I believe this patient meets inpatient status.    Electronically signed by Floyd Shen MD, 12/04/22, 11:20 PM  (2) cough or sneeze EST.

## 2022-12-05 NOTE — THERAPY EVALUATION
Patient Name: Dillon Vo  : 1950    MRN: [id number]                              Today's Date: 2022       Admit Date: 2022    Visit Dx:     ICD-10-CM ICD-9-CM   1. Acute congestive heart failure, unspecified heart failure type (HCC)  I50.9 428.0   2. Bilateral pleural effusion  J90 511.9   3. Hypoxia  R09.02 799.02   4. Acute chest pain  R07.9 786.50   5. Hypertensive emergency  I16.1 401.9   6. History of coronary artery disease  Z86.79 V12.59     Patient Active Problem List   Diagnosis   • Depression   • Hyperbilirubinemia   • Hyperlipidemia LDL goal <70   • HTN (hypertension)   • Malignant neoplasm of overlapping sites of colon (HCC)   • Neurogenic bladder   • Tobacco use   • Coronary artery disease involving native coronary artery of native heart with angina pectoris (HCC)   • H/O mechanical aortic valve replacement   • COPD (chronic obstructive pulmonary disease) (HCC)   • Thrombocytopenia (HCC)   • Chronic anticoagulation on warfarin    • Self-catheterizes urinary bladder   • Lung cancer (S/P Left thoracotomy and lobectomy 2021)   • Lung nodule < 6cm on CT (S/P Left thoracotomy and MARKO Lobectomy)   • Gastroesophageal reflux disease without esophagitis   • Pacemaker   • S/P lobectomy of MARKO lung 21   • Syncope (R/O due to orthostatic hypotension)   • Permanent atrial fibrillation (HCC)   • CHF (congestive heart failure) (HCC)   • Abdominal aortic aneurysm (AAA) without rupture   • Smoking   • Urinary retention     Past Medical History:   Diagnosis Date   • A-fib (HCC)    • Anemia    • Atrial fibrillation (HCC) 2016    Difficult to control rate. RFA of AV node with implantation of left Bi-V pacemaker, 2013. Hematoma requiring single-chamber pacemaker implanted, 2013.    • Colitis    • Colon cancer (HCC)    • Coronary artery disease    • Depression    • GERD (gastroesophageal reflux disease)    • Heart valve disease    • Hiatal hernia    • HTN (hypertension)     • Hyperbilirubinemia    • Hyperlipidemia    • Infectious viral hepatitis    • Kidney stone    • Lung cancer (HCC)    • Orthostatic hypotension    • Sick sinus syndrome (HCC)    • Skin cancer     lip      Past Surgical History:   Procedure Laterality Date   • APPENDECTOMY     • BRONCHOSCOPY THORACOTOMY Left 4/7/2021    Procedure: BRONCHOSCOPY LEFT THORACOTOMY, LEFT LUNG RESECTION;  Surgeon: Chi Post MD;  Location: [facility];  Service: Cardiothoracic;  Laterality: Left;   • CARDIAC CATHETERIZATION     • CARDIAC SURGERY  2000    valve replacement    • COLON SURGERY     • COLONOSCOPY  up to date   • CORONARY STENT PLACEMENT     • EXTRACORPOREAL SHOCKWAVE LITHOTRIPSY (ESWL), STENT INSERTION/REMOVAL     • LUNG CANCER SURGERY     • PACEMAKER IMPLANTATION     • VARICOSE VEIN SURGERY        General Information     Row Name 12/05/22 1443          Physical Therapy Time and Intention    Document Type evaluation  -FW     Mode of Treatment physical therapy  -FW     Row Name 12/05/22 1443          General Information    Patient Profile Reviewed yes  -FW     Prior Level of Function independent:;all household mobility;gait;community mobility;transfer;ADL's;dressing;driving  -FW     Existing Precautions/Restrictions fall  -FW     Barriers to Rehab previous functional deficit;medically complex  -FW     Row Name 12/05/22 1443          Living Environment    People in Home spouse  -FW     Row Name 12/05/22 1443          Home Main Entrance    Number of Stairs, Main Entrance four  -FW     Stair Railings, Main Entrance railing on left side (ascending);other (see comments)  wall on R that can be used for support  -FW     Row Name 12/05/22 1443          Cognition    Orientation Status (Cognition) oriented x 4  -FW     Row Name 12/05/22 1443          Safety Issues, Functional Mobility    Safety Issues Affecting Function (Mobility) safety precaution awareness;safety precautions follow-through/compliance;sequencing abilities  -FW      Impairments Affecting Function (Mobility) balance;endurance/activity tolerance;strength;shortness of breath  -FW           User Key  (r) = Recorded By, (t) = Taken By, (c) = Cosigned By    Initials Name Provider Type    FW Tai Hill PT Physical Therapist               Mobility     Row Name 12/05/22 1443          Bed Mobility    Bed Mobility supine-sit  -FW     Supine-Sit [place] (Bed Mobility) supervision  -FW     Assistive Device (Bed Mobility) head of bed elevated  -FW     Row Name 12/05/22 1443          Sit-Stand Transfer    Sit-Stand [place] (Transfers) standby assist;verbal cues  -FW     Assistive Device (Sit-Stand Transfers) walker, front-wheeled  -FW     Row Name 12/05/22 1443          Gait/Stairs (Locomotion)    [place] Level (Gait) standby assist  -FW     Assistive Device (Gait) walker, front-wheeled;other (see comments)  140' w/o AD, 140' RW  -FW     Distance in Feet (Gait) 140+140  -FW     Deviations/Abnormal Patterns (Gait) bilateral deviations  -FW     Bilateral Gait Deviations forward flexed posture  -FW     Left Sided Gait Deviations decreased knee extension  -FW     Right Sided Gait Deviations decreased knee extension  -FW     [place] Level (Stairs) contact guard  -FW     Handrail Location (Stairs) left side (ascending);left side (descending)  -FW     Number of Steps (Stairs) 10  -FW     Ascending Technique (Stairs) step-over-step;step-to-step  -FW     Descending Technique (Stairs) step-over-step;step-to-step  -FW     Stairs, Safety Issues other (see comments)  decreased functional strength  -FW     Comment, (Gait/Stairs) 140' w/ and w/o AD demonstrating improved gait mechanics, posturen, and gait speed w/ use of RW; verbal cues for stair sequencing; gait limited by c/o SOB  -FW           User Key  (r) = Recorded By, (t) = Taken By, (c) = Cosigned By    Initials Name Provider Type    FW Tai Hill PT Physical Therapist               Obj/Interventions     Row  Name 12/05/22 1447          Range of Motion Comprehensive    General Range of Motion bilateral lower extremity ROM WFL  -FW     Row Name 12/05/22 1447          Strength Comprehensive (MMT)    General Manual Muscle Testing (MMT) Assessment lower extremity strength deficits identified  -FW     Comment, General Manual Muscle Testing (MMT) Assessment BLE 4/5 grossly  -FW     Row Name 12/05/22 1447          Balance    Balance Assessment sitting static balance;sitting dynamic balance;standing static balance;standing dynamic balance  -FW     Static Sitting Balance independent  -FW     Dynamic Sitting Balance supervision  -FW     Position, Sitting Balance sitting in chair;sitting edge of bed;unsupported  -FW     Static Standing Balance standby assist  -FW     Dynamic Standing Balance standby assist;contact guard  -FW     Position/Device Used, Standing Balance supported;walker, front-wheeled  -FW     Row Name 12/05/22 1447          Sensory Assessment (Somatosensory)    Sensory Assessment (Somatosensory) LE sensation intact  -FW           User Key  (r) = Recorded By, (t) = Taken By, (c) = Cosigned By    Initials Name Provider Type    FW Tai Hill, PT Physical Therapist               Goals/Plan     Row Name 12/05/22 1453          Bed Mobility Goal 1 (PT)    Activity/Assistive Device (Bed Mobility Goal 1, PT) sit to supine/supine to sit;sidelying to sit/sit to sidelying  -FW     [place] Level/Cues Needed (Bed Mobility Goal 1, PT) independent  -FW     Time Frame (Bed Mobility Goal 1, PT) long term goal (LTG);10 days  -FW     Row Name 12/05/22 1453          Transfer Goal 1 (PT)    Activity/Assistive Device (Transfer Goal 1, PT) sit-to-stand/stand-to-sit;bed-to-chair/chair-to-bed  -FW     [place] Level/Cues Needed (Transfer Goal 1, PT) modified independence  -FW     Time Frame (Transfer Goal 1, PT) long term goal (LTG);10 days  -FW     Row Name 12/05/22 1453          Gait Training Goal 1 (PT)     Activity/Assistive Device (Gait Training Goal 1, PT) gait (walking locomotion);assistive device use;forward stepping;improve balance and speed  -FW     [place] Level (Gait Training Goal 1, PT) modified independence  -FW     Distance (Gait Training Goal 1, PT) 500  -FW     Time Frame (Gait Training Goal 1, PT) long term goal (LTG);10 days  -FW     Row Name 12/05/22 1453          Stairs Goal 1 (PT)    Activity/Assistive Device (Stairs Goal 1, PT) stairs, all skills;ascending stairs;decrease fall risk;improve balance and speed  -FW     [place] Level/Cues Needed (Stairs Goal 1, PT) standby assist  -FW     Number of Stairs (Stairs Goal 1, PT) 4  -FW     Time Frame (Stairs Goal 1, PT) long term goal (LTG);10 days  -FW     Row Name 12/05/22 1458          Therapy Assessment/Plan (PT)    Planned Therapy Interventions (PT) balance training;bed mobility training;gait training;home exercise program;patient/family education;ROM (range of motion);strengthening;stretching;transfer training  -FW           User Key  (r) = Recorded By, (t) = Taken By, (c) = Cosigned By    Initials Name Provider Type    FW Tai Hill, AVTAR Physical Therapist               Clinical Impression     Row Name 12/05/22 1448          Pain    Pretreatment Pain Rating 0/10 - no pain  -FW     Posttreatment Pain Rating 0/10 - no pain  -FW     Row Name 12/05/22 1448          Plan of Care Review    Plan of Care Reviewed With patient  -FW     Outcome Evaluation Pt presents with balance deficits, generalized weakness, and decreased activity tolerance/dyspnea upon exertion limiting his functional mobility. Pt performed bed mobility supervision, STS SBA, navigated 10 steps CGA w/  use of one hand rail, and ambulated 140' x2 SBA w/ and w/o use of AD demonstrating improved posture, gait mechanics, and speed w/ use of a RW. Pt w/ c/o SOB during gait, SpO2 86% RA  returning >89% after ~45 seconds of supplemental O2 and verbal cues for PLB. IPPTwarranted.  Recommend home with assist and HH therapy services.  -     Row Name 12/05/22 1448          Therapy Assessment/Plan (PT)    Rehab Potential (PT) good, to achieve stated therapy goals  -     Criteria for Skilled Interventions Met (PT) yes;meets criteria;skilled treatment is necessary  -     Therapy Frequency (PT) daily  -     Row Name 12/05/22 1448          Vital Signs    Pre SpO2 (%) 93  -FW     O2 Delivery Pre Treatment room air  -FW     Intra SpO2 (%) 86  -FW     O2 Delivery Intra Treatment room air  -FW     Post SpO2 (%) 94  -FW     O2 Delivery Post Treatment supplemental O2  -FW     Pre Patient Position Supine  -FW     Intra Patient Position Standing  -FW     Post Patient Position Supine  -FW     Row Name 12/05/22 1448          Positioning and Restraints    Pre-Treatment Position in bed  -FW     Post Treatment Position bed  -FW     In Bed notified nsg;supine;call light within reach;encouraged to call for assist;exit alarm on  -FW           User Key  (r) = Recorded By, (t) = Taken By, (c) = Cosigned By    Initials Name Provider Type    FW Tai Hill, PT Physical Therapist               Outcome Measures     Row Name 12/05/22 1455 12/05/22 0810       How much help from another person do you currently need...    Turning from your back to your side while in flat bed without using bedrails? 4  -FW 4  -MK    Moving from lying on back to sitting on the side of a flat bed without bedrails? 4  -FW 4  -MK    Moving to and from a bed to a chair (including a wheelchair)? 3  -FW 3  -MK    Standing up from a chair using your arms (e.g., wheelchair, bedside chair)? 4  -FW 4  -MK    Climbing 3-5 steps with a railing? 3  -FW 3  -MK    To walk in hospital room? 3  -FW 3  -MK    AM-PAC 6 Clicks Score (PT) 21  - 21  -MK    Highest level of mobility 6 --> Walked 10 steps or more  - 6 --> Walked 10 steps or more  -MK    Row Name 12/05/22 1455 12/05/22 0936       Functional Assessment    Outcome Measure Options  AM-PAC 6 Clicks Basic Mobility (PT)  - AM-PAC 6 Clicks Daily Activity (OT)  -AN          User Key  (r) = Recorded By, (t) = Taken By, (c) = Cosigned By    Initials Name Provider Type    Iman Banuelos, RN Registered Nurse    Tai Driscoll, AVTAR Physical Therapist    Teodora Turner OT Occupational Therapist                             Physical Therapy Education     Title: PT OT SLP Therapies (In Progress)     Topic: Physical Therapy (In Progress)     Point: Mobility training (Done)     Learning Progress Summary           Patient Acceptance, E, VU by  at 12/5/2022 1455                   Point: Home exercise program (Not Started)     Learner Progress:  Not documented in this visit.          Point: Body mechanics (Done)     Learning Progress Summary           Patient Acceptance, E, VU by  at 12/5/2022 1455                   Point: Precautions (Done)     Learning Progress Summary           Patient Acceptance, E, VU by  at 12/5/2022 1455                               User Key     Initials Effective Dates Name Provider Type Discipline     05/05/22 -  Tai Hill, PT Physical Therapist PT              PT Recommendation and Plan  Planned Therapy Interventions (PT): balance training, bed mobility training, gait training, home exercise program, patient/family education, ROM (range of motion), strengthening, stretching, transfer training  Plan of Care Reviewed With: patient  Outcome Evaluation: Pt presents with balance deficits, generalized weakness, and decreased activity tolerance/dyspnea upon exertion limiting his functional mobility. Pt performed bed mobility supervision, STS SBA, navigated 10 steps CGA w/  use of one hand rail, and ambulated 140' x2 SBA w/ and w/o use of AD demonstrating improved posture, gait mechanics, and speed w/ use of a RW. Pt w/ c/o SOB during gait, SpO2 86% RA  returning >89% after ~45 seconds of supplemental O2 and verbal cues for PLB. IPPTwarranted. Recommend  home with assist and HH therapy services.     Time Calculation:    PT Charges     Row Name 12/05/22 1458             Time Calculation    Start Time 1420  -FW      PT Received On 12/05/22  -FW      PT Goal Re-Cert Due Date 12/15/22  -FW         Timed Charges    36703 - PT Therapeutic Activity Minutes 8  -FW         Untimed Charges    PT Eval/Re-eval Minutes 32  -FW         Total Minutes    Timed Charges Total Minutes 8  -FW      Untimed Charges Total Minutes 32  -FW       Total Minutes 40  -FW            User Key  (r) = Recorded By, (t) = Taken By, (c) = Cosigned By    Initials Name Provider Type    FW Tai Hill, PT Physical Therapist              Therapy Charges for Today     Code Description Service Date Service Provider Modifiers Qty    34657247548 HC PT THERAPEUTIC ACT EA 15 MIN 12/5/2022 Tai Hill, PT GP 1    81486550030 HC PT EVAL LOW COMPLEXITY 3 12/5/2022 Tai Hill, PT GP 1          PT G-Codes  Outcome Measure Options: AM-PAC 6 Clicks Basic Mobility (PT)  AM-PAC 6 Clicks Score (PT): 21  AM-PAC 6 Clicks Score (OT): 19  PT Discharge Summary  Anticipated Discharge Disposition (PT): home with assist, home with home health    Tai Hill PT  12/5/2022

## 2022-12-06 ENCOUNTER — APPOINTMENT (OUTPATIENT)
Dept: CARDIOLOGY | Facility: HOSPITAL | Age: 72
End: 2022-12-06

## 2022-12-06 PROBLEM — I50.31 ACUTE DIASTOLIC CONGESTIVE HEART FAILURE (HCC): Status: ACTIVE | Noted: 2022-12-04

## 2022-12-06 LAB
ANION GAP SERPL CALCULATED.3IONS-SCNC: 13 MMOL/L (ref 5–15)
BH CV REST NUCLEAR ISOTOPE DOSE: 30 MCI
BH CV STRESS BP STAGE 2: NORMAL
BH CV STRESS BP STAGE 4: NORMAL
BH CV STRESS COMMENTS STAGE 1: NORMAL
BH CV STRESS DOSE REGADENOSON STAGE 1: 0.4
BH CV STRESS DURATION MIN STAGE 1: 1
BH CV STRESS DURATION MIN STAGE 2: 1
BH CV STRESS DURATION MIN STAGE 3: 1
BH CV STRESS DURATION MIN STAGE 4: 1
BH CV STRESS DURATION SEC STAGE 1: 0
BH CV STRESS DURATION SEC STAGE 2: 0
BH CV STRESS DURATION SEC STAGE 3: 0
BH CV STRESS DURATION SEC STAGE 4: 0
BH CV STRESS HR STAGE 1: 70
BH CV STRESS HR STAGE 2: 70
BH CV STRESS HR STAGE 3: 70
BH CV STRESS HR STAGE 4: 70
BH CV STRESS NUCLEAR ISOTOPE DOSE: 29.8 MCI
BH CV STRESS O2 STAGE 1: 95
BH CV STRESS O2 STAGE 2: 98
BH CV STRESS O2 STAGE 3: 98
BH CV STRESS O2 STAGE 4: 97
BH CV STRESS PROTOCOL 1: NORMAL
BH CV STRESS RECOVERY BP: NORMAL MMHG
BH CV STRESS RECOVERY HR: 70 BPM
BH CV STRESS RECOVERY O2: 95 %
BH CV STRESS STAGE 1: 1
BH CV STRESS STAGE 2: 2
BH CV STRESS STAGE 3: 3
BH CV STRESS STAGE 4: 4
BUN SERPL-MCNC: 10 MG/DL (ref 8–23)
BUN/CREAT SERPL: 12 (ref 7–25)
CALCIUM SPEC-SCNC: 8.9 MG/DL (ref 8.6–10.5)
CHLORIDE SERPL-SCNC: 99 MMOL/L (ref 98–107)
CO2 SERPL-SCNC: 26 MMOL/L (ref 22–29)
CREAT SERPL-MCNC: 0.83 MG/DL (ref 0.76–1.27)
EGFRCR SERPLBLD CKD-EPI 2021: 93 ML/MIN/1.73
GLUCOSE SERPL-MCNC: 97 MG/DL (ref 65–99)
INR PPP: 2.83 (ref 0.84–1.13)
LV EF NUC BP: 47 %
MAGNESIUM SERPL-MCNC: 1.9 MG/DL (ref 1.6–2.4)
MAXIMAL PREDICTED HEART RATE: 148 BPM
PERCENT MAX PREDICTED HR: 49.32 %
POTASSIUM SERPL-SCNC: 3.5 MMOL/L (ref 3.5–5.2)
PROTHROMBIN TIME: 29.9 SECONDS (ref 11.4–14.4)
SODIUM SERPL-SCNC: 138 MMOL/L (ref 136–145)
STRESS BASELINE BP: NORMAL MMHG
STRESS BASELINE HR: 70 BPM
STRESS O2 SAT REST: 96 %
STRESS PERCENT HR: 58 %
STRESS POST ESTIMATED WORKLOAD: 1 METS
STRESS POST EXERCISE DUR MIN: 4 MIN
STRESS POST EXERCISE DUR SEC: 0 SEC
STRESS POST O2 SAT PEAK: 98 %
STRESS POST PEAK BP: NORMAL MMHG
STRESS POST PEAK HR: 73 BPM
STRESS TARGET HR: 126 BPM

## 2022-12-06 PROCEDURE — 97110 THERAPEUTIC EXERCISES: CPT

## 2022-12-06 PROCEDURE — 78431 MYOCRD IMG PET RST&STRS CT: CPT | Performed by: INTERNAL MEDICINE

## 2022-12-06 PROCEDURE — 83735 ASSAY OF MAGNESIUM: CPT | Performed by: INTERNAL MEDICINE

## 2022-12-06 PROCEDURE — 0 RUBIDIUM CHLORIDE: Performed by: INTERNAL MEDICINE

## 2022-12-06 PROCEDURE — 97116 GAIT TRAINING THERAPY: CPT

## 2022-12-06 PROCEDURE — A9555 RB82 RUBIDIUM: HCPCS | Performed by: INTERNAL MEDICINE

## 2022-12-06 PROCEDURE — 93018 CV STRESS TEST I&R ONLY: CPT | Performed by: INTERNAL MEDICINE

## 2022-12-06 PROCEDURE — 93017 CV STRESS TEST TRACING ONLY: CPT

## 2022-12-06 PROCEDURE — 85610 PROTHROMBIN TIME: CPT | Performed by: NURSE PRACTITIONER

## 2022-12-06 PROCEDURE — 25010000002 REGADENOSON 0.4 MG/5ML SOLUTION: Performed by: INTERNAL MEDICINE

## 2022-12-06 PROCEDURE — 99232 SBSQ HOSP IP/OBS MODERATE 35: CPT | Performed by: NURSE PRACTITIONER

## 2022-12-06 PROCEDURE — 80048 BASIC METABOLIC PNL TOTAL CA: CPT | Performed by: INTERNAL MEDICINE

## 2022-12-06 PROCEDURE — 78431 MYOCRD IMG PET RST&STRS CT: CPT

## 2022-12-06 PROCEDURE — 99232 SBSQ HOSP IP/OBS MODERATE 35: CPT | Performed by: HOSPITALIST

## 2022-12-06 RX ADMIN — BUPROPION HYDROCHLORIDE 300 MG: 150 TABLET, FILM COATED, EXTENDED RELEASE ORAL at 08:27

## 2022-12-06 RX ADMIN — MULTIVITAMIN TABLET 1 TABLET: TABLET at 08:26

## 2022-12-06 RX ADMIN — LISINOPRIL 20 MG: 20 TABLET ORAL at 08:27

## 2022-12-06 RX ADMIN — DORZOLAMIDE HYDROCHLORIDE: 20 SOLUTION/ DROPS OPHTHALMIC at 20:24

## 2022-12-06 RX ADMIN — Medication 10 ML: at 08:33

## 2022-12-06 RX ADMIN — CARVEDILOL 25 MG: 12.5 TABLET, FILM COATED ORAL at 17:27

## 2022-12-06 RX ADMIN — FINASTERIDE 5 MG: 5 TABLET, FILM COATED ORAL at 20:24

## 2022-12-06 RX ADMIN — ASPIRIN 81 MG: 81 TABLET, COATED ORAL at 08:27

## 2022-12-06 RX ADMIN — REGADENOSON 0.4 MG: 0.08 INJECTION, SOLUTION INTRAVENOUS at 10:15

## 2022-12-06 RX ADMIN — ATORVASTATIN CALCIUM 80 MG: 40 TABLET, FILM COATED ORAL at 20:24

## 2022-12-06 RX ADMIN — RUBIDIUM CHLORIDE RB-82 1 DOSE: 150 INJECTION, SOLUTION INTRAVENOUS at 10:17

## 2022-12-06 RX ADMIN — FAMOTIDINE 20 MG: 20 TABLET ORAL at 08:28

## 2022-12-06 RX ADMIN — CARVEDILOL 25 MG: 12.5 TABLET, FILM COATED ORAL at 08:26

## 2022-12-06 RX ADMIN — DORZOLAMIDE HYDROCHLORIDE: 20 SOLUTION/ DROPS OPHTHALMIC at 08:31

## 2022-12-06 RX ADMIN — FOLIC ACID 1 MG: 1 TABLET ORAL at 08:27

## 2022-12-06 RX ADMIN — Medication 1 PATCH: at 20:23

## 2022-12-06 RX ADMIN — SENNOSIDES AND DOCUSATE SODIUM 2 TABLET: 50; 8.6 TABLET ORAL at 08:27

## 2022-12-06 RX ADMIN — WARFARIN SODIUM 5 MG: 5 TABLET ORAL at 17:27

## 2022-12-06 RX ADMIN — SENNOSIDES AND DOCUSATE SODIUM 2 TABLET: 50; 8.6 TABLET ORAL at 20:24

## 2022-12-06 RX ADMIN — CITALOPRAM HYDROBROMIDE 20 MG: 20 TABLET ORAL at 08:27

## 2022-12-06 RX ADMIN — THIAMINE HCL TAB 100 MG 100 MG: 100 TAB at 08:26

## 2022-12-06 RX ADMIN — RUBIDIUM CHLORIDE RB-82 1 DOSE: 150 INJECTION, SOLUTION INTRAVENOUS at 10:06

## 2022-12-06 RX ADMIN — LISINOPRIL 20 MG: 20 TABLET ORAL at 20:24

## 2022-12-06 NOTE — PROGRESS NOTES
[facility] Cardiothoracic Surgery In-Patient Progress Note    CT abdomen pelvis reviewed from yesterday, AAA measuring 4.9 cm. We will see patient in office in 2-4 weeks for follow-up. He remains asymptomatic.  We will sign off and be available as needed.      DAGOBERTO Urbina  12/06/22  07:20 EST

## 2022-12-06 NOTE — PLAN OF CARE
Goal Outcome Evaluation:  Plan of Care Reviewed With: patient        Progress: improving  Outcome Evaluation: Patient able to participate in ambulation and exercise with stable vitals. He is more stable with a walker

## 2022-12-06 NOTE — PLAN OF CARE
Goal Outcome Evaluation:                 Problem: Adult Inpatient Plan of Care  Goal: Absence of Hospital-Acquired Illness or Injury  Intervention: Identify and Manage Fall Risk  Recent Flowsheet Documentation  Taken 12/6/2022 0200 by Mumtaz Haddad RN  Safety Promotion/Fall Prevention:   activity supervised   safety round/check completed   toileting scheduled  Taken 12/6/2022 0000 by Mumtaz Haddad RN  Safety Promotion/Fall Prevention:   activity supervised   safety round/check completed   toileting scheduled  Taken 12/5/2022 2200 by Mumtaz Haddad RN  Safety Promotion/Fall Prevention:   activity supervised   safety round/check completed   toileting scheduled  Taken 12/5/2022 2000 by Mumtaz Haddad RN  Safety Promotion/Fall Prevention:   activity supervised   safety round/check completed   toileting scheduled  Intervention: Prevent Skin Injury  Recent Flowsheet Documentation  Taken 12/6/2022 0200 by Mumtaz Haddad RN  Body Position: supine  Taken 12/6/2022 0000 by Mumtaz Haddad RN  Body Position: supine  Taken 12/5/2022 2200 by Mumtaz Haddad RN  Body Position: supine  Taken 12/5/2022 2000 by Mumtaz Haddad RN  Body Position: supine  Intervention: Prevent and Manage VTE (Venous Thromboembolism) Risk  Recent Flowsheet Documentation  Taken 12/6/2022 0200 by Mumtaz Haddad RN  VTE Prevention/Management:   bilateral   sequential compression devices on  Taken 12/5/2022 2200 by Mumtaz Haddad RN  VTE Prevention/Management:   bilateral   sequential compression devices on  Taken 12/5/2022 2000 by Mumtaz aHddad RN  VTE Prevention/Management:   bilateral   sequential compression devices on  Intervention: Prevent Infection  Recent Flowsheet Documentation  Taken 12/6/2022 0200 by Mumtaz Haddad RN  Infection Prevention: environmental surveillance performed  Taken 12/6/2022 0000 by Mumtaz Haddad RN  Infection Prevention: environmental surveillance performed  Taken 12/5/2022 2200 by Mumtaz Haddad  RN  Infection Prevention: environmental surveillance performed  Taken 12/5/2022 2000 by Mumtaz Haddad RN  Infection Prevention: environmental surveillance performed  Goal: Optimal Comfort and Wellbeing  Intervention: Monitor Pain and Promote Comfort  Recent Flowsheet Documentation  Taken 12/6/2022 0200 by Mumtaz Haddad RN  Pain Management Interventions: quiet environment facilitated  Taken 12/6/2022 0000 by Mumtaz Haddad RN  Pain Management Interventions: quiet environment facilitated  Taken 12/5/2022 2200 by Mumtaz Haddad RN  Pain Management Interventions: quiet environment facilitated  Taken 12/5/2022 2000 by Mumtaz Haddad RN  Pain Management Interventions: quiet environment facilitated  Intervention: Provide Person-Centered Care  Recent Flowsheet Documentation  Taken 12/6/2022 0200 by Mumtaz Haddad RN  Trust Relationship/Rapport: care explained  Taken 12/6/2022 0000 by Mumtaz Haddad RN  Trust Relationship/Rapport: care explained  Taken 12/5/2022 2200 by Mumtaz Haddad RN  Trust Relationship/Rapport: care explained  Taken 12/5/2022 2000 by Mumtaz Haddad RN  Trust Relationship/Rapport: care explained  Goal: Absence of Hospital-Acquired Illness or Injury  Intervention: Identify and Manage Fall Risk  Recent Flowsheet Documentation  Taken 12/6/2022 0200 by Mumtaz Haddad RN  Safety Promotion/Fall Prevention:   activity supervised   safety round/check completed   toileting scheduled  Taken 12/6/2022 0000 by Mumtaz Haddad RN  Safety Promotion/Fall Prevention:   activity supervised   safety round/check completed   toileting scheduled  Taken 12/5/2022 2200 by Mumtaz Haddad RN  Safety Promotion/Fall Prevention:   activity supervised   safety round/check completed   toileting scheduled  Taken 12/5/2022 2000 by Mumtaz Haddad RN  Safety Promotion/Fall Prevention:   activity supervised   safety round/check completed   toileting scheduled  Intervention: Prevent Skin Injury  Recent Flowsheet  Documentation  Taken 12/6/2022 0200 by Mumtaz Haddad RN  Body Position: supine  Taken 12/6/2022 0000 by Mumtaz Haddad RN  Body Position: supine  Taken 12/5/2022 2200 by Mumtaz Haddad RN  Body Position: supine  Taken 12/5/2022 2000 by Mumtaz Haddad RN  Body Position: supine  Intervention: Prevent and Manage VTE (Venous Thromboembolism) Risk  Recent Flowsheet Documentation  Taken 12/6/2022 0200 by Mumtaz Haddad RN  VTE Prevention/Management:   bilateral   sequential compression devices on  Taken 12/5/2022 2200 by Mumtaz Haddad RN  VTE Prevention/Management:   bilateral   sequential compression devices on  Taken 12/5/2022 2000 by Mumtaz Haddad RN  VTE Prevention/Management:   bilateral   sequential compression devices on  Intervention: Prevent Infection  Recent Flowsheet Documentation  Taken 12/6/2022 0200 by Mumtaz Haddad RN  Infection Prevention: environmental surveillance performed  Taken 12/6/2022 0000 by Mumtaz Haddad RN  Infection Prevention: environmental surveillance performed  Taken 12/5/2022 2200 by Mumtaz Haddad RN  Infection Prevention: environmental surveillance performed  Taken 12/5/2022 2000 by Mumtaz Haddad RN  Infection Prevention: environmental surveillance performed  Goal: Optimal Comfort and Wellbeing  Intervention: Monitor Pain and Promote Comfort  Recent Flowsheet Documentation  Taken 12/6/2022 0200 by Mumtaz Haddad RN  Pain Management Interventions: quiet environment facilitated  Taken 12/6/2022 0000 by Mumtaz Haddad RN  Pain Management Interventions: quiet environment facilitated  Taken 12/5/2022 2200 by Mumtaz Haddad RN  Pain Management Interventions: quiet environment facilitated  Taken 12/5/2022 2000 by Mumtaz Haddad RN  Pain Management Interventions: quiet environment facilitated  Intervention: Provide Person-Centered Care  Recent Flowsheet Documentation  Taken 12/6/2022 0200 by Mumtaz Haddad RN  Trust Relationship/Rapport: care  explained  Taken 12/6/2022 0000 by Mumtaz Haddad, RN  Trust Relationship/Rapport: care explained  Taken 12/5/2022 2200 by Mumtaz Haddad, RN  Trust Relationship/Rapport: care explained  Taken 12/5/2022 2000 by Mumtaz Haddad, RN  Trust Relationship/Rapport: care explained     VSS, voids well, rested throughout the night, pain managed with PRN medications, will continue to monitor for changes.

## 2022-12-06 NOTE — PLAN OF CARE
Goal Outcome Evaluation:  Plan of Care Reviewed With: patient        Progress: no change  Outcome Evaluation: Stress test completed today. Remains paced on the monitor. [place] to bedside drainage bag. Waiting a discharge plan when cardiac workup is complete.  Diet reordered for this evening. Denies chest pain today.

## 2022-12-06 NOTE — THERAPY TREATMENT NOTE
Patient Name: Dillon Vo  : 1950    MRN: [id number]                              Today's Date: 2022       Admit Date: 2022    Visit Dx:     ICD-10-CM ICD-9-CM   1. Acute congestive heart failure, unspecified heart failure type (HCC)  I50.9 428.0   2. Bilateral pleural effusion  J90 511.9   3. Hypoxia  R09.02 799.02   4. Acute chest pain  R07.9 786.50   5. Hypertensive emergency  I16.1 401.9   6. History of coronary artery disease  Z86.79 V12.59   7. Acute on chronic diastolic congestive heart failure (HCC)  I50.33 428.33     428.0   8. Chronic obstructive pulmonary disease, unspecified COPD type (HCC)  J44.9 496   9. Syncope, unspecified syncope type  R55 780.2   10. Balance problem  R26.89 781.99     Patient Active Problem List   Diagnosis   • Depression   • Hyperbilirubinemia   • Hyperlipidemia LDL goal <70   • Essential hypertension   • Malignant neoplasm of overlapping sites of colon (HCC)   • Neurogenic bladder   • Tobacco use   • Coronary artery disease involving native coronary artery of native heart with angina pectoris (HCC)   • H/O mechanical aortic valve replacement   • COPD (chronic obstructive pulmonary disease) (HCC)   • Thrombocytopenia (HCC)   • Chronic anticoagulation on warfarin    • Self-catheterizes urinary bladder   • Lung cancer (S/P Left thoracotomy and lobectomy 2021)   • Lung nodule < 6cm on CT (S/P Left thoracotomy and MARKO Lobectomy)   • Gastroesophageal reflux disease without esophagitis   • Pacemaker   • S/P lobectomy of MARKO lung 21   • Syncope (R/O due to orthostatic hypotension)   • Permanent atrial fibrillation (HCC)   • Acute diastolic congestive heart failure (HCC)   • Abdominal aortic aneurysm (AAA) without rupture   • Smoking   • Urinary retention   • Complete heart block (HCC)     Past Medical History:   Diagnosis Date   • Anemia    • Atrial fibrillation (HCC) 2016    Status post AV node ablation and pacemaker placed   • Colitis    • Colon  cancer (HCC)    • Complete heart block (HCC)     Status post AV node ablation   • Coronary artery disease    • Depression    • GERD (gastroesophageal reflux disease)    • Hiatal hernia    • HTN (hypertension)    • Hyperbilirubinemia    • Hyperlipidemia    • Infectious viral hepatitis    • Kidney stone    • Lung cancer (HCC)    • Skin cancer     lip      Past Surgical History:   Procedure Laterality Date   • APPENDECTOMY     • BRONCHOSCOPY THORACOTOMY Left 4/7/2021    Procedure: BRONCHOSCOPY LEFT THORACOTOMY, LEFT LUNG RESECTION;  Surgeon: Chi Post MD;  [facility];  Service: Cardiothoracic;  Laterality: Left;   • CARDIAC CATHETERIZATION     • CARDIAC SURGERY  2000    valve replacement    • COLON SURGERY     • COLONOSCOPY  up to date   • CORONARY STENT PLACEMENT     • EXTRACORPOREAL SHOCKWAVE LITHOTRIPSY (ESWL), STENT INSERTION/REMOVAL     • LUNG CANCER SURGERY     • PACEMAKER IMPLANTATION     • VARICOSE VEIN SURGERY        General Information     Row Name 12/06/22 1426          Physical Therapy Time and Intention    Document Type therapy note (daily note)  -SC     Mode of Treatment physical therapy  -SC     Row Name 12/06/22 1426          General Information    Patient Profile Reviewed yes  -SC     Existing Precautions/Restrictions fall  -SC     Row Name 12/06/22 1426          Cognition    Orientation Status (Cognition) oriented x 4  -SC     Row Name 12/06/22 1426          Safety Issues, Functional Mobility    Impairments Affecting Function (Mobility) balance;endurance/activity tolerance;strength;shortness of breath  -SC     Comment, Safety Issues/Impairments (Mobility) alert, following commands  -SC           User Key  (r) = Recorded By, (t) = Taken By, (c) = Cosigned By    Initials Name Provider Type    SC Talat Santoyo PT Physical Therapist               Mobility     Row Name 12/06/22 1427          Bed Mobility    Bed Mobility sit-supine;scooting/bridging  -SC     Scooting/Bridging  [facility] (Bed Mobility) independent  -SC     Sit-Supine [facility] (Bed Mobility) independent  -SC     Comment, (Bed Mobility) able to get back to bed after exercise  -[facility] Name 12/06/22 1427          Sit-Stand Transfer    Sit-Stand [facility] (Transfers) standby assist  -SC     Assistive Device (Sit-Stand Transfers) walker, front-wheeled  -[facility] Name 12/06/22 1427          Gait/Stairs (Locomotion)    [facility] Level (Gait) standby assist  -SC     Assistive Device (Gait) walker, front-wheeled;other (see comments)  175+175.. WITH AND W/OUT WALKER  -SC     Deviations/Abnormal Patterns (Gait) base of support, narrow  -SC     Bilateral Gait Deviations forward flexed posture  -SC     Right Sided Gait Deviations decreased knee extension  -SC     Comment, (Gait/Stairs) Gt training focused on  controling walker in hallway. Demonstrated good technique some cues to stay closer to walker. Progressed off walker with just contact assistance. Cues for upright posture. Demonstrated occational sway and unsteadiness,.  -SC           User Key  (r) = Recorded By, (t) = Taken By, (c) = Cosigned By    Initials Name Provider Type    SC Talat Santoyo, PT Physical Therapist               Obj/Interventions     [facility] Name 12/06/22 1431          Motor Skills    Therapeutic Exercise hip;ankle;knee  -[facility] Name 12/06/22 1431          Hip (Therapeutic Exercise)    Hip (Therapeutic Exercise) AROM (active range of motion)  -SC     Hip AROM (Therapeutic Exercise) supine;aBduction;aDduction;extension;flexion;10 repetitions  alternating heel slides, bridging, bend knee drop offs, standing supported MIP  -[facility] Name 12/06/22 1431          Ankle (Therapeutic Exercise)    Ankle (Therapeutic Exercise) --  standing heel raises  -[facility] Name 12/06/22 1431          Balance    Balance Assessment standing dynamic balance  -SC     Dynamic Standing Balance standby assist  -SC     Position/Device Used, Standing Balance  supported;walker, rolling  -SC           User Key  (r) = Recorded By, (t) = Taken By, (c) = Cosigned By    Initials Name Provider Type    SC Talat Santoyo, PT Physical Therapist               Goals/Plan    No documentation.                Clinical Impression     Row Name 12/06/22 1433          Pain    Pretreatment Pain Rating 0/10 - no pain  -SC     Posttreatment Pain Rating 0/10 - no pain  -SC     Row Name 12/06/22 1433          Plan of Care Review    Plan of Care Reviewed With patient  -SC     Progress improving  -SC     Outcome Evaluation Patient able to participate in ambulation and exercise with stable vitals. He is more stable with a walker  -SC     Row Name 12/06/22 1433          Therapy Assessment/Plan (PT)    Rehab Potential (PT) good, to achieve stated therapy goals  -SC     Criteria for Skilled Interventions Met (PT) yes;meets criteria;skilled treatment is necessary  -SC     Therapy Frequency (PT) daily  -SC     Row Name 12/06/22 1433          Positioning and Restraints    Pre-Treatment Position sitting in chair/recliner  -SC     Post Treatment Position bed  -SC     In Bed notified nsg;supine;encouraged to call for assist;exit alarm on  -SC           User Key  (r) = Recorded By, (t) = Taken By, (c) = Cosigned By    Initials Name Provider Type    SC Talat Santoyo, PT Physical Therapist               Outcome Measures     Row Name 12/06/22 1436 12/06/22 0820       How much help from another person do you currently need...    Turning from your back to your side while in flat bed without using bedrails? 4  -SC 4  -MK    Moving from lying on back to sitting on the side of a flat bed without bedrails? 4  -SC 4  -MK    Moving to and from a bed to a chair (including a wheelchair)? 3  -SC 3  -MK    Standing up from a chair using your arms (e.g., wheelchair, bedside chair)? 4  -SC 4  -MK    Climbing 3-5 steps with a railing? 3  -SC 3  -MK    To walk in hospital room? 1  -SC 3  -MK    AM-PAC 6 Clicks Score (PT)  19  -SC 21  -MK    Highest level of mobility 6 --> Walked 10 steps or more  -SC 6 --> Walked 10 steps or more  -    Row Name 12/06/22 1436          Functional Assessment    Outcome Measure Options AM-PAC 6 Clicks Basic Mobility (PT)  -SC           User Key  (r) = Recorded By, (t) = Taken By, (c) = Cosigned By    Initials Name Provider Type    SC Talat Santoyo, PT Physical Therapist    Iman Banuelos, RN Registered Nurse                             Physical Therapy Education     Title: PT OT SLP Therapies (In Progress)     Topic: Physical Therapy (Done)     Point: Mobility training (Done)     Learning Progress Summary           Patient Eager, E, VU,NR by SC at 12/6/2022 1437    Comment: reviewed HEP    Acceptance, E, VU by  at 12/5/2022 1455                   Point: Home exercise program (Done)     Learning Progress Summary           Patient Eager, E, VU,NR by SC at 12/6/2022 1437    Comment: reviewed HEP                   Point: Body mechanics (Done)     Learning Progress Summary           Patient Eager, E, VU,NR by SC at 12/6/2022 1437    Comment: reviewed HEP    Acceptance, E, VU by  at 12/5/2022 1455                   Point: Precautions (Done)     Learning Progress Summary           Patient Eager, E, VU,NR by SC at 12/6/2022 1437    Comment: reviewed HEP    Acceptance, E, VU by  at 12/5/2022 1455                               User Key     Initials Effective Dates Name Provider Type Discipline    SC 06/16/21 -  Talat Santoyo, PT Physical Therapist PT     05/05/22 -  Tai Hill PT Physical Therapist PT              PT Recommendation and Plan     Plan of Care Reviewed With: patient  Progress: improving  Outcome Evaluation: Patient able to participate in ambulation and exercise with stable vitals. He is more stable with a walker     Time Calculation:    PT Charges     Row Name 12/06/22 1351             Time Calculation    Start Time 1351  -SC      PT Received On 12/06/22  -SC      PT  Goal Re-Cert Due Date 12/15/22  -SC         Time Calculation- PT    Total Timed Code Minutes- PT 24 minute(s)  -SC         Timed Charges    76881 - PT Therapeutic Exercise Minutes 9  -SC      78649 - Gait Training Minutes  15  -SC         Total Minutes    Timed Charges Total Minutes 24  -SC       Total Minutes 24  -SC            User Key  (r) = Recorded By, (t) = Taken By, (c) = Cosigned By    Initials Name Provider Type    SC Talat Santoyo PT Physical Therapist              Therapy Charges for Today     Code Description Service Date Service Provider Modifiers Qty    35058266942  PT THER PROC EA 15 MIN 12/6/2022 Talat Santoyo, PT GP 1    [id number] HC GAIT TRAINING EA 15 MIN 12/6/2022 Talat Santoyo PT GP 1          PT G-Codes  Outcome Measure Options: AM-PAC 6 Clicks Basic Mobility (PT)  AM-PAC 6 Clicks Score (PT): 19  AM-PAC 6 Clicks Score (OT): 19  PT Discharge Summary  Anticipated Discharge Disposition (PT): home with assist, home with home health    Talat Santoyo PT  12/6/2022

## 2022-12-06 NOTE — PROGRESS NOTES
"Pharmacy Consult  -  Warfarin    Dillon Vo is a  72 y.o. male   Height - 180.3 cm (70.98\")  Weight - 63.7 kg (140 lb 6.9 oz)    Consulting Provider: - Hospitalist  Indication: - Atrial Fibrillation and Mechanical valve  Goal INR: - 2.5 - 3.5  Home Regimen:   - Warfarin 5 mg Mo, Tu, Th, Fr, Sa   - Warfarin 2.5 mg We, Villatoro    Bridge Therapy: No       Drug-Drug Interactions with current regimen:  Aspirin - may increase bleeding risk  Citalopram - may increase INR    Warfarin Dosing During Admission:    Date  12/4 12/5 12/6         INR  4.71 3.25 2.83         Dose  HOLD 5mg 5mg              Education Provided: Patient follows with [facility], will address education concerns as necessary.    Discharge Follow up:   Following Provider - [facility]   Follow up time range or appointment - 2-3 days post discharge      Labs:    Results from last 7 days   Lab Units 12/06/22  0430 12/05/22  0416 12/04/22  2029 12/04/22  1458 12/02/22  1400   INR  2.83* 3.25* 3.61* 4.71* 3.9*   HEMOGLOBIN g/dL  --  13.4  --  13.7  --    HEMATOCRIT %  --  39.9  --  41.4  --      Results from last 7 days   Lab Units 12/06/22  0430 12/05/22  0416 12/04/22  1458   SODIUM mmol/L 138 137 139   POTASSIUM mmol/L 3.5 4.1 4.1   CHLORIDE mmol/L 99 98 102   CO2 mmol/L 26.0 25.0 25.0   BUN mg/dL 10 11 12   CREATININE mg/dL 0.83 0.79 0.85   CALCIUM mg/dL 8.9 8.7 8.8   BILIRUBIN mg/dL  --   --  1.4*   ALK PHOS U/L  --   --  97   ALT (SGPT) U/L  --   --  28   AST (SGOT) U/L  --   --  56*   GLUCOSE mg/dL 97 91 121*       Current dietary intake:   Diet Order   Procedures    NPO Diet NPO Type: Strict NPO       Assessment/Plan:     Pharmacy dosing Warfarin for AVR. Goal INR 2.5-3.5.  INR today is 2.83, decreased from 3.25 yesterday. Warfarin held 12/4.  Give Warfarin 5mg tonight.  Daily PT/INR ordered.  Monitor signs/symptoms of bleeding, dietary intake, and drug-drug interactions. Make dose adjustments as necessary.  Pharmacy will continue to follow. "     Chon Moses, PharmD  12/6/2022  15:27 EST

## 2022-12-06 NOTE — PROGRESS NOTES
Cardiology Progress Note      Reason for visit:    · Acute diastolic heart failure    IDENTIFICATION: 72-year-old gentleman who resides in [place], [place]    Active Hospital Problems    Diagnosis  POA   • **Acute diastolic congestive heart failure (HCC) [I50.31]  Yes     Priority: High     · Echo (3/21/2021): LVEF 53%.  Biatrial enlargement.  Mechanical aortic valve with possible small perivalvular leak.  Moderate TR.  RVSP 53 mmHg  · Echo (12/5/2022):LVEF = 50%.  Mechanical aortic valve well-seated and functions normally.  RVSP 35 mmHg.  Bilateral pleural effusions.  LV diastolic function indeterminate     • Complete heart block (HCC) [I44.2]  Yes     Priority: Medium     Status post AV node ablation     • Abdominal aortic aneurysm (AAA) without rupture [I71.40]  Yes     Priority: Medium     · Abdominal ultrasound (7/2022):4.6 cm infrarenal abdominal aortic aneurysm, possibly very minimallyincreased in size from CT comparison.  · CT angiogram of the chest (12/4/2022): Abdominal aortic aneurysm 5 cm and enlarged from ultrasound 7/2022     • Pacemaker [Z95.0]  Yes     Priority: Medium     · SJM dual-chamber pacemaker on the right chest wall     • H/O mechanical aortic valve replacement [Z95.2]  Not Applicable     Priority: Medium     · Remote mechanical aortic valve replacement and thoracic aneurysm repair approximately 20 to 21 years ago at [facility]  · Echo (3/19/2021): LVEF 53%. Mechanical aortic valve with small perivalvular leak.  MAC with moderate MR.  RVSP 53 mmHg.     • Coronary artery disease involving native coronary artery of native heart with angina pectoris (HCC) [I25.119]  Yes     Priority: Medium     · Cardiac catheterization (6/2013): Mid LAD stenosis status post RK.     • Essential hypertension [I10]  Yes     Priority: Medium     • Target blood pressure <130/80 mmHg     • Gastroesophageal reflux disease without esophagitis [K21.9]  Yes     Priority: Low   • Hyperlipidemia LDL goal <70  [E78.5]  Yes     Priority: Low     • High intensity statin therapy indicated given the presence of CAD     • Urinary retention [R33.9]  Yes   • Smoking [F17.200]  Yes   • Permanent atrial fibrillation (HCC) [I48.21]  Yes     · [id number] 5 (age, CAD, HTN, CVA)   · Status post AV node ablation pacemaker implantation, 6/18/2013     • COPD (chronic obstructive pulmonary disease) (HCC) [J44.9]  Yes          Patient lying flat in bed breathing easy on room air.  O2 saturations 96%.  He denies any chest pain or shortness of breath.  His Lasix was discontinued yesterday.           Vital Sign Min/Max for last 24 hours  Temp  Min: 97.6 °F (36.4 °C)  Max: 98.1 °F (36.7 °C)   BP  Min: 116/62  Max: 171/87   Pulse  Min: 68  Max: 72   Resp  Min: 16  Max: 16   SpO2  Min: 91 %  Max: 96 %   Flow (L/min)  Min: 2  Max: 2      Intake/Output Summary (Last 24 hours) at 12/6/2022 0828  Last data filed at 12/6/2022 0721  Gross per 24 hour   Intake 40 ml   Output 2800 ml   Net -2760 ml           Physical Exam  Constitutional:       General: He is sleeping.   Cardiovascular:      Rate and Rhythm: Normal rate. Rhythm irregularly irregular.      Heart sounds: Murmur heard.      Comments: Ventricular paced  Ector S1/S2 click  Pulmonary:      Effort: Pulmonary effort is normal.      Breath sounds: Decreased breath sounds present.   Abdominal:      General: Bowel sounds are normal.      Palpations: Abdomen is soft.      Tenderness: There is no abdominal tenderness.   Skin:     General: Skin is warm and dry.   Neurological:      Mental Status: He is oriented to person, place, and time and easily aroused.   Psychiatric:         Behavior: Behavior is cooperative.         Tele: Paced    Results Review (reviewed the patient's recent labs in the electronic medical record):     EKG (12/5/2022): Ventricular paced    CT angiogram of the chest (12/4/2022): Abdominal aortic aneurysm at 5 cm increase from previous ultrasound of 4.6 cm.  Small amount of  ascites and increased small to moderate left and right pleural effusions.  Emphysema status post left upper lobectomy    ECHO (12/5/2022): LVEF 50%.  Mechanical aortic valve well-seated and functions normally.  RVSP 35 mmHg.  Bilateral pleural effusions.  LV diastolic dysfunction    Results from last 7 days   Lab Units 12/06/22  0430 12/05/22  0416 12/04/22  1458   SODIUM mmol/L 138 137 139   POTASSIUM mmol/L 3.5 4.1 4.1   CHLORIDE mmol/L 99 98 102   BUN mg/dL 10 11 12   CREATININE mg/dL 0.83 0.79 0.85   MAGNESIUM mg/dL 1.9  --   --      Results from last 7 days   Lab Units 12/05/22  1322 12/04/22  1458   TROPONIN T ng/mL <0.010 <0.010     Results from last 7 days   Lab Units 12/05/22  0416 12/04/22  1458   WBC 10*3/mm3 8.12 6.75   HEMOGLOBIN g/dL 13.4 13.7   HEMATOCRIT % 39.9 41.4   PLATELETS 10*3/mm3 143 166       Lab Results   Component Value Date    HGBA1C 5.20 12/04/2022       Lab Results   Component Value Date    CHOL 104 12/05/2022    CHLPL 189 01/21/2016    TRIG 61 12/05/2022    HDL 62 (H) 12/05/2022    LDL 28 12/05/2022                 Acute diastolic heart failure  • Heart failure due to neurogenic bladder/inadequate in/out urinary catheterization  • Echo 12/5/2022 LVEF 50%.  LV diastolic function indeterminate     Coronary artery disease  • Previous LAD PCI  • Biomarker negative chest pain on admission suspected due to CHF  • Denies any chest pain     Mechanical aortic valve  • Normal functioning device on echo this admission  • Continue Coumadin with goal INR 2-3  • Continue low-dose aspirin  • Current INR 2.83     Permanent atrial fibrillation  • Rate controlled with AV node ablation  • Continue Coumadin for stroke prophylaxis     Pacemaker  • Normal functioning device     Abdominal aortic aneurysm  • Per CT surgery    Hypertension  · Carvedilol 25 mg twice daily  · Lisinopril 20 mg twice daily     Hyperlipidemia with goal LDL <50  • Well-controlled  • Continue atorvastatin     Tobacco abuse  • Nicotine  patch     History of lung cancer  • Status post left upper lobectomy     History of neurogenic bladder  • Managed with intermittent catheterization and previously followed [facility]  • Urology consulted and Causey catheter in place                · N.p.o. for stress test today  · Further recommendations to follow    Electronically signed by DAGOBERTO Kenyon, 12/06/22, 8:26 AM EST.

## 2022-12-06 NOTE — PROGRESS NOTES
[facility] Medicine Services  PROGRESS NOTE    Patient Name: Dillon Vo  : 1950  MRN: [id number]    Date of Admission: 2022  Primary Care Physician: Ez Cintron MD    Subjective   Subjective     CC:  F/U SOA    HPI:  Seen this morning, feels much better overall.     ROS:  Gen-no fevers, no chills  CV-no chest pain, no palpitations  Resp-no cough, no dyspnea  GI-no N/V/D, no abd pain    All other systems reviewed and negative except any additional pertinent positives and negatives as discussed in HPI.       Objective   Objective     Vital Signs:   Temp:  [97.6 °F (36.4 °C)-98.1 °F (36.7 °C)] 98.1 °F (36.7 °C)  Heart Rate:  [69-72] 69  Resp:  [16] 16  BP: (116-165)/(62-97) 147/78  Flow (L/min):  [2] 2     Physical Exam:  Gen-no acute distress  HENT-NCAT, mucous membranes moist  CV-RRR, S1 S2 normal, no m/r/g  Resp-CTAB, no wheezes or rales  Abd-soft, NT, ND, +BS  Ext-no edema  Neuro-A&Ox3, no focal deficits  Skin-no rashes  Psych-appropriate mood    Results Reviewed:  LAB RESULTS:      Lab 22  04322  0416 22  1458 22  1400   WBC  --  8.12  --  6.75  --    HEMOGLOBIN  --  13.4  --  13.7  --    HEMATOCRIT  --  39.9  --  41.4  --    PLATELETS  --  143  --  166  --    NEUTROS ABS  --  6.59  --  5.40  --    IMMATURE GRANS (ABS)  --  0.05  --  0.03  --    LYMPHS ABS  --  0.67*  --  0.65*  --    MONOS ABS  --  0.75  --  0.60  --    EOS ABS  --  0.02  --  0.03  --    MCV  --  108.1*  --  108.4*  --    PROTIME 29.9* 33.4* 36.3* 44.8* 47.2*         Lab 22  0430 22  0416 22  1458   SODIUM 138 137 139   POTASSIUM 3.5 4.1 4.1   CHLORIDE 99 98 102   CO2 26.0 25.0 25.0   ANION GAP 13.0 14.0 12.0   BUN 10 11 12   CREATININE 0.83 0.79 0.85   EGFR 93.0 94.4 92.3   GLUCOSE 97 91 121*   CALCIUM 8.9 8.7 8.8   MAGNESIUM 1.9  --   --    HEMOGLOBIN A1C  --   --  5.20         Lab 22  1458   TOTAL PROTEIN 6.8   ALBUMIN  3.80   GLOBULIN 3.0   ALT (SGPT) 28   AST (SGOT) 56*   BILIRUBIN 1.4*   ALK PHOS 97         Lab 12/06/22  0430 12/05/22  1322 12/05/22  0416 12/04/22 2029 12/04/22  1458 12/02/22  1400   PROBNP  --   --   --   --  3,991.0*  --    TROPONIN T  --  <0.010  --   --  <0.010  --    PROTIME 29.9*  --  33.4* 36.3* 44.8* 47.2*   INR 2.83*  --  3.25* 3.61* 4.71* 3.9*         Lab 12/05/22  0416   CHOLESTEROL 104   LDL CHOL 28   HDL CHOL 62*   TRIGLYCERIDES 61             Brief Urine Lab Results     None          Microbiology Results Abnormal     Procedure Component Value - Date/Time    COVID PRE-OP / PRE-PROCEDURE SCREENING ORDER (NO ISOLATION) - Swab, Nasopharynx [[id number]]  (Normal) Collected: 12/04/22 1458    Lab Status: Final result Specimen: Swab from Nasopharynx Updated: 12/04/22 1611    Narrative:      The following orders were created for panel order COVID PRE-OP / PRE-PROCEDURE SCREENING ORDER (NO ISOLATION) - Swab, Nasopharynx.  Procedure                               Abnormality         Status                     ---------                               -----------         ------                     COVID-19 and FLU A/B PCR...[[id number]]  Normal              Final result                 Please view results for these tests on the individual orders.    COVID-19 and FLU A/B PCR - Swab, Nasopharynx [[id number]]  (Normal) Collected: 12/04/22 1458    Lab Status: Final result Specimen: Swab from Nasopharynx Updated: 12/04/22 1611     COVID19 Not Detected     Influenza A PCR Not Detected     Influenza B PCR Not Detected    Narrative:      Fact sheet for providers: https://www.fda.gov/media/159438/download    Fact sheet for patients: https://www.fda.gov/media/407897/download    Test performed by PCR.          Adult Transthoracic Echo Complete w/ Color, Spectral and Contrast if necessary per protocol    Result Date: 12/5/2022  •  Left ventricular systolic function is normal. Estimated left ventricular EF = 50% •  Left  ventricular diastolic function was indeterminate. •  A mechanical aortic valve is well-seated and functions normally •  Estimated right ventricular systolic pressure from tricuspid regurgitation is mildly elevated (35 mmHg). •  Bilateral pleural effusions are present     CT Angiogram Abdomen Pelvis    Result Date: 12/5/2022  DATE OF EXAM:12/5/2022 5:42 PM  PROCEDURE: CT ANGIOGRAM ABDOMEN PELVIS-  INDICATIONS: AAA; I50.9-Heart failure, unspecified; F16-Mbmdqmt effusion, not elsewhere classified; R09.02-Hypoxemia; R07.9-Chest pain, unspecified; I16.1-Hypertensive emergency; Z86.79-Personal history of other diseases of the circulatory system; I50.33-Acute on chronic diastolic (congestive) heart failure; J44.9-Chronic obstructive pulmonary disease, unspecified; R55-Syncope and collapse; R26.89-Other abnor  COMPARISON: CT chest 12/04/2022, ultrasound abdominal aorta screening 07/06/2022, CT abdomen and pelvis contrast 05/24/2021.  TECHNIQUE: Routine transaxial slices were obtained through the abdomen without the administration of intravenous contrast. Additional scanning of the abdomen and pelvis followed by the intravenous administration of 80 mL of Isovue 370. Reconstructed coronal and sagittal images were also obtained. In addition, a 3-D volume rendered image was obtained after postprocessing. Automated exposure control and iterative reconstruction methods were used.  The radiation dose reduction device was turned on for each scan per the ALARA (As Low as Reasonably Achievable) protocol.  FINDINGS: Infrarenal abdominal aortic aneurysm measures up to 4.9 cm on coronal image 45, previously 4.2 cm measured similarly on 05/24/2021 CT. Craniocaudal extent is 4.9 cm on coronal image 45. Plaque at the origins of the celiac artery and SMA do not cause significant stenosis. There is plaque at the origin of each renal artery, causing bilateral stenosis, right more severe than left. Aneurysmal dilatation of the right internal  iliac artery measures 1.4 cm in diameter on coronal image 38. There is marked narrowing of the left internal iliac artery.  Left greater than right pleural effusions appear stable compared to yesterday's CT. There is associated partial bilateral lower lobe compressive atelectasis. Liver, gallbladder, pancreas, spleen, adrenal glands, and right kidney appear unremarkable for the phase of contrast-enhancement. Left renal cysts measure up to 2.7 cm. No abnormal bowel distention is seen. There is apparent wall thickening of the ascending colon. Urinary bladder is decompressed with a Causey catheter in place. Bilateral hydroceles are partially included. There is a small amount of free pelvic fluid.  T12 superior endplate defect with mild associated height loss is stable compared to 12/04/2022 CT, but new compared to 05/24/2021 CT, likely due to age-indeterminate fracture.      Impression: 1.  4.9 cm infrarenal abdominal aortic aneurysm, previously 4.2 cm when measured similarly on 05/24/2021. 2.  Other atherosclerotic vascular disease, as described above. 3.  Apparent wall thickening of the ascending colon, which may be due to incomplete distention or nonspecific colitis. 4.  Small amount of free pelvic fluid, nonspecific. 5.  Stable bilateral pleural effusions compared to recent CT chest. 6.  Please see above for additional incidental findings.   This report was finalized on 12/5/2022 7:18 PM by Mayte Ybarra MD.        Results for orders placed during the hospital encounter of 12/04/22    Adult Transthoracic Echo Complete w/ Color, Spectral and Contrast if necessary per protocol    Interpretation Summary  •  Left ventricular systolic function is normal. Estimated left ventricular EF = 50%  •  Left ventricular diastolic function was indeterminate.  •  A mechanical aortic valve is well-seated and functions normally  •  Estimated right ventricular systolic pressure from tricuspid regurgitation is mildly elevated (35  mmHg).  •  Bilateral pleural effusions are present      I have reviewed the medications:  Scheduled Meds:aspirin, 81 mg, Oral, Daily  atorvastatin, 80 mg, Oral, Nightly  buPROPion XL, 300 mg, Oral, Daily  carvedilol, 25 mg, Oral, BID With Meals  citalopram, 20 mg, Oral, Daily  dorzolamide-timolol, , Both Eyes, BID  famotidine, 20 mg, Oral, Daily  finasteride, 5 mg, Oral, Nightly  thiamine, 100 mg, Oral, Daily   And  multivitamin, 1 tablet, Oral, Daily   And  folic acid, 1 mg, Oral, Daily  lisinopril, 20 mg, Oral, BID  nicotine, 1 patch, Transdermal, Q24H  pharmacy consult - [place], , Does not apply, Daily  senna-docusate sodium, 2 tablet, Oral, BID  sodium chloride, 10 mL, Intravenous, Q12H  warfarin, 5 mg, Oral, Daily      Continuous Infusions:Pharmacy to dose warfarin,       PRN Meds:.•  acetaminophen **OR** acetaminophen **OR** acetaminophen  •  senna-docusate sodium **AND** polyethylene glycol **AND** bisacodyl **AND** bisacodyl  •  LORazepam **OR** LORazepam **OR** LORazepam **OR** LORazepam **OR** LORazepam **OR** LORazepam  •  magnesium sulfate **OR** magnesium sulfate **OR** magnesium sulfate  •  Pharmacy to dose warfarin  •  potassium chloride  •  sodium chloride  •  sodium chloride    Assessment & Plan   Assessment & Plan     Active Hospital Problems    Diagnosis  POA   • **Acute diastolic congestive heart failure (HCC) [I50.31]  Yes   • Urinary retention [R33.9]  Yes   • Complete heart block (HCC) [I44.2]  Yes   • Abdominal aortic aneurysm (AAA) without rupture [I71.40]  Yes   • Smoking [F17.200]  Yes   • Permanent atrial fibrillation (HCC) [I48.21]  Yes   • Pacemaker [Z95.0]  Yes   • Gastroesophageal reflux disease without esophagitis [K21.9]  Yes   • COPD (chronic obstructive pulmonary disease) (HCC) [J44.9]  Yes   • H/O mechanical aortic valve replacement [Z95.2]  Not Applicable   • Coronary artery disease involving native coronary artery of native heart with angina pectoris (HCC) [I25.119]  Yes   •  Essential hypertension [I10]  Yes   • Hyperlipidemia LDL goal <70 [E78.5]  Yes      Resolved Hospital Problems   No resolved problems to display.        Brief Hospital Course to date:  Dillon Vo is a 72 y.o. male with PMH Afib and mechanical aortic valve (on Coumadin/goal INR 2.5-3.5), AAA, pacemaker left chest wall that was removed and relocated to the right chest wall, HTN/HLD, smoking (60+ yrs), CAD s/p CABG, hx of lung cancer and colon cancer presenting with intermittent right sided chest pain, shortness of breath, and lower extremity edema for 3 days. Patient found to have CHF exacerbation.    This patient's problems and plans were partially entered by my partner and updated as appropriate by me 12/06/22.     Acute diastolic CHF   Right sided chest pain  Hx CAD s/p CABG  Hx of pacemaker with revision  - Cardiology following.  (Follows with Dr. Ronquillo outpatient). Has received IV diuresis with Lasix with improvement.  - Strict I&O, daily weights  - Troponin negative x2.  EKG no significant change from EKG 03/2022; stress test done today, read pending  - Continue ASA 81 mg daily   - 3/2021 ECHO LVEF 53, repeat ECHO 12/5/22 showing EF 50%, LV diastolic function indeterminate  - Note he does I/O cath at home and apparently was only doing once per day, could also have contributed to his volume overload state     AAA  - CTA chest showing 5 cm AAA. This has increased from [place] AAA screen 07/06/2022 where the AAA was 4.6 cm infrarenal AAA.   - CTS has seen, recommend follow up in the office in 2-4 weeks     HTN/HLD  - Coreg, Lisinopril, atorvastatin      Afib  Mechanical aortic valve  Supratherapeutic INR  - INR 4.71   - Goal INR 2.5-3.5  - Pharmacy to dose     Smoking 60+ years  - Smoking cessation education  - Nicotine patch     Depression  - Wellbutrin, Celexa     BPH  Urinary Retention  - Finasteride  - Chronic I/O cath at home.   placed Causey on 12/4/22 due to difficult cath.  Recommend to keep  Causey until outpatient  follow up.  With hx of urethral stricture of unclear etiology, planning for flexible cystoscopy in the future at some point.  Recommend to avoid anticholinergics for bladder spasms unless severe.  - Will need to arrange for follow up with Dr. Shen at discharge     Alcohol use  - [facility] protocol  - Vitamins  - Denies hx of alcohol withdrawal seizure  - Has not required Ativan here     GERD  - Pepcid    Expected Discharge Location and Transportation: home  Expected Discharge Date: 12/07/22 or possibly even today if cleared by Cardiology    DVT prophylaxis:  Medical and mechanical DVT prophylaxis orders are present.     AM-PAC 6 Clicks Score (PT): 19 (12/06/22 1436)    CODE STATUS:   Code Status and Medical Interventions:   Ordered at: 12/04/22 0491     Medical Intervention Limits:    NO intubation (DNI)     Code Status (Patient has no pulse and is not breathing):    No CPR (Do Not Attempt to Resuscitate)     Medical Interventions (Patient has pulse or is breathing):    Limited Support       Dariela Luna MD  12/06/22

## 2022-12-06 NOTE — CASE MANAGEMENT/SOCIAL WORK
Continued Stay Note  [facility]     Patient Name: Dillon Vo  MRN: [id number]  Today's Date: 12/6/2022    Admit Date: 12/4/2022    Plan: Initial Discharge Plan Assessment   Discharge Plan     Row Name 12/06/22 1033       Plan    Plan Comments [facility] with [facility]  reports they are unable to accept due to staffing. [facility] with [facility] has accepted the referral.    Final Discharge Disposition Code 06 - home with home health care               Discharge Codes    No documentation.               Expected Discharge Date and Time     Expected Discharge Date Expected Discharge Time    Dec 9, 2022             Mary Jane Ham RN

## 2022-12-07 LAB
ANION GAP SERPL CALCULATED.3IONS-SCNC: 11 MMOL/L (ref 5–15)
BUN SERPL-MCNC: 10 MG/DL (ref 8–23)
BUN/CREAT SERPL: 13.2 (ref 7–25)
CALCIUM SPEC-SCNC: 8.7 MG/DL (ref 8.6–10.5)
CHLORIDE SERPL-SCNC: 100 MMOL/L (ref 98–107)
CO2 SERPL-SCNC: 28 MMOL/L (ref 22–29)
CREAT SERPL-MCNC: 0.76 MG/DL (ref 0.76–1.27)
EGFRCR SERPLBLD CKD-EPI 2021: 95.5 ML/MIN/1.73
GLUCOSE SERPL-MCNC: 99 MG/DL (ref 65–99)
INR PPP: 2.58 (ref 0.84–1.13)
POTASSIUM SERPL-SCNC: 3.4 MMOL/L (ref 3.5–5.2)
PROTHROMBIN TIME: 27.8 SECONDS (ref 11.4–14.4)
SODIUM SERPL-SCNC: 139 MMOL/L (ref 136–145)

## 2022-12-07 PROCEDURE — B2111ZZ FLUOROSCOPY OF MULTIPLE CORONARY ARTERIES USING LOW OSMOLAR CONTRAST: ICD-10-PCS | Performed by: INTERNAL MEDICINE

## 2022-12-07 PROCEDURE — 0 IOPAMIDOL PER 1 ML: Performed by: INTERNAL MEDICINE

## 2022-12-07 PROCEDURE — 25010000002 HEPARIN (PORCINE) PER 1000 UNITS: Performed by: INTERNAL MEDICINE

## 2022-12-07 PROCEDURE — 99232 SBSQ HOSP IP/OBS MODERATE 35: CPT | Performed by: NURSE PRACTITIONER

## 2022-12-07 PROCEDURE — 97530 THERAPEUTIC ACTIVITIES: CPT

## 2022-12-07 PROCEDURE — 93454 CORONARY ARTERY ANGIO S&I: CPT | Performed by: INTERNAL MEDICINE

## 2022-12-07 PROCEDURE — 99233 SBSQ HOSP IP/OBS HIGH 50: CPT | Performed by: NURSE PRACTITIONER

## 2022-12-07 PROCEDURE — 85610 PROTHROMBIN TIME: CPT | Performed by: NURSE PRACTITIONER

## 2022-12-07 PROCEDURE — C1769 GUIDE WIRE: HCPCS | Performed by: INTERNAL MEDICINE

## 2022-12-07 PROCEDURE — 4A023N7 MEASUREMENT OF CARDIAC SAMPLING AND PRESSURE, LEFT HEART, PERCUTANEOUS APPROACH: ICD-10-PCS | Performed by: INTERNAL MEDICINE

## 2022-12-07 PROCEDURE — 80048 BASIC METABOLIC PNL TOTAL CA: CPT | Performed by: NURSE PRACTITIONER

## 2022-12-07 PROCEDURE — 25010000002 MIDAZOLAM PER 1 MG: Performed by: INTERNAL MEDICINE

## 2022-12-07 PROCEDURE — 25010000002 FENTANYL CITRATE (PF) 50 MCG/ML SOLUTION: Performed by: INTERNAL MEDICINE

## 2022-12-07 PROCEDURE — 0 POTASSIUM CHLORIDE 10 MEQ/100ML SOLUTION: Performed by: INTERNAL MEDICINE

## 2022-12-07 PROCEDURE — C1894 INTRO/SHEATH, NON-LASER: HCPCS | Performed by: INTERNAL MEDICINE

## 2022-12-07 RX ORDER — FENTANYL CITRATE 50 UG/ML
INJECTION, SOLUTION INTRAMUSCULAR; INTRAVENOUS
Status: DISCONTINUED | OUTPATIENT
Start: 2022-12-07 | End: 2022-12-07 | Stop reason: HOSPADM

## 2022-12-07 RX ORDER — HEPARIN SODIUM 1000 [USP'U]/ML
INJECTION, SOLUTION INTRAVENOUS; SUBCUTANEOUS
Status: DISCONTINUED | OUTPATIENT
Start: 2022-12-07 | End: 2022-12-07 | Stop reason: HOSPADM

## 2022-12-07 RX ORDER — LIDOCAINE HYDROCHLORIDE 10 MG/ML
INJECTION, SOLUTION EPIDURAL; INFILTRATION; INTRACAUDAL; PERINEURAL
Status: DISCONTINUED | OUTPATIENT
Start: 2022-12-07 | End: 2022-12-07 | Stop reason: HOSPADM

## 2022-12-07 RX ORDER — POTASSIUM CHLORIDE 750 MG/1
40 CAPSULE, EXTENDED RELEASE ORAL AS NEEDED
Status: DISCONTINUED | OUTPATIENT
Start: 2022-12-07 | End: 2022-12-08 | Stop reason: HOSPADM

## 2022-12-07 RX ORDER — NICARDIPINE HCL-0.9% SOD CHLOR 1 MG/10 ML
SYRINGE (ML) INTRAVENOUS
Status: DISCONTINUED | OUTPATIENT
Start: 2022-12-07 | End: 2022-12-07 | Stop reason: HOSPADM

## 2022-12-07 RX ORDER — MIDAZOLAM HYDROCHLORIDE 1 MG/ML
INJECTION INTRAMUSCULAR; INTRAVENOUS
Status: DISCONTINUED | OUTPATIENT
Start: 2022-12-07 | End: 2022-12-07 | Stop reason: HOSPADM

## 2022-12-07 RX ORDER — POTASSIUM CHLORIDE 1.5 G/1.77G
40 POWDER, FOR SOLUTION ORAL AS NEEDED
Status: DISCONTINUED | OUTPATIENT
Start: 2022-12-07 | End: 2022-12-08 | Stop reason: HOSPADM

## 2022-12-07 RX ADMIN — SENNOSIDES AND DOCUSATE SODIUM 2 TABLET: 50; 8.6 TABLET ORAL at 21:25

## 2022-12-07 RX ADMIN — Medication 10 ML: at 21:25

## 2022-12-07 RX ADMIN — POTASSIUM CHLORIDE 10 MEQ: 7.46 INJECTION, SOLUTION INTRAVENOUS at 11:44

## 2022-12-07 RX ADMIN — DORZOLAMIDE HYDROCHLORIDE: 20 SOLUTION/ DROPS OPHTHALMIC at 21:27

## 2022-12-07 RX ADMIN — POTASSIUM CHLORIDE 10 MEQ: 7.46 INJECTION, SOLUTION INTRAVENOUS at 13:15

## 2022-12-07 RX ADMIN — DORZOLAMIDE HYDROCHLORIDE: 20 SOLUTION/ DROPS OPHTHALMIC at 12:37

## 2022-12-07 RX ADMIN — CARVEDILOL 25 MG: 12.5 TABLET, FILM COATED ORAL at 10:01

## 2022-12-07 RX ADMIN — ATORVASTATIN CALCIUM 80 MG: 40 TABLET, FILM COATED ORAL at 21:24

## 2022-12-07 RX ADMIN — LISINOPRIL 20 MG: 20 TABLET ORAL at 10:01

## 2022-12-07 RX ADMIN — FAMOTIDINE 20 MG: 20 TABLET ORAL at 10:01

## 2022-12-07 RX ADMIN — LISINOPRIL 20 MG: 20 TABLET ORAL at 21:25

## 2022-12-07 RX ADMIN — Medication 1 PATCH: at 21:24

## 2022-12-07 RX ADMIN — CARVEDILOL 25 MG: 12.5 TABLET, FILM COATED ORAL at 19:17

## 2022-12-07 RX ADMIN — FINASTERIDE 5 MG: 5 TABLET, FILM COATED ORAL at 21:24

## 2022-12-07 NOTE — PLAN OF CARE
Problem: Adult Inpatient Plan of Care  Goal: Plan of Care Review  Recent Flowsheet Documentation  Taken 12/7/2022 1337 by Gloria Santiago OT  Progress: improving  Plan of Care Reviewed With: patient  Outcome Evaluation: Pt participates well in therapy. Independent for all bed mobility. Up in room/hallway with RW support and SBA. Cues for RW positioning and posture. Set-up simple UB/LB ADL activities. OT will continue to follow for strength and endurance needs to support self-care. Recommend home with spouse when medically ready.

## 2022-12-07 NOTE — THERAPY TREATMENT NOTE
Patient Name: Dillon Vo  : 1950    MRN: [id number]                              Today's Date: 2022       Admit Date: 2022    Visit Dx:     ICD-10-CM ICD-9-CM   1. Acute congestive heart failure, unspecified heart failure type (HCC)  I50.9 428.0   2. Bilateral pleural effusion  J90 511.9   3. Hypoxia  R09.02 799.02   4. Acute chest pain  R07.9 786.50   5. Hypertensive emergency  I16.1 401.9   6. History of coronary artery disease  Z86.79 V12.59   7. Acute on chronic diastolic congestive heart failure (HCC)  I50.33 428.33     428.0   8. Chronic obstructive pulmonary disease, unspecified COPD type (HCC)  J44.9 496   9. Syncope, unspecified syncope type  R55 780.2   10. Balance problem  R26.89 781.99     Patient Active Problem List   Diagnosis   • Depression   • Hyperbilirubinemia   • Hyperlipidemia LDL goal <70   • Essential hypertension   • Malignant neoplasm of overlapping sites of colon (HCC)   • Neurogenic bladder   • Tobacco use   • Coronary artery disease involving native coronary artery of native heart with angina pectoris (HCC)   • H/O mechanical aortic valve replacement   • COPD (chronic obstructive pulmonary disease) (HCC)   • Thrombocytopenia (HCC)   • Chronic anticoagulation on warfarin    • Self-catheterizes urinary bladder   • Lung cancer (S/P Left thoracotomy and lobectomy 2021)   • Lung nodule < 6cm on CT (S/P Left thoracotomy and MARKO Lobectomy)   • Gastroesophageal reflux disease without esophagitis   • Pacemaker   • S/P lobectomy of MARKO lung 21   • Syncope (R/O due to orthostatic hypotension)   • Permanent atrial fibrillation (HCC)   • Acute diastolic congestive heart failure (HCC)   • Abdominal aortic aneurysm (AAA) without rupture   • Smoking   • Urinary retention   • Complete heart block (HCC)     Past Medical History:   Diagnosis Date   • Anemia    • Atrial fibrillation (HCC) 2016    Status post AV node ablation and pacemaker placed   • Colitis    • Colon  cancer (HCC)    • Complete heart block (HCC)     Status post AV node ablation   • Coronary artery disease    • Depression    • GERD (gastroesophageal reflux disease)    • Hiatal hernia    • HTN (hypertension)    • Hyperbilirubinemia    • Hyperlipidemia    • Infectious viral hepatitis    • Kidney stone    • Lung cancer (HCC)    • Skin cancer     lip      Past Surgical History:   Procedure Laterality Date   • APPENDECTOMY     • BRONCHOSCOPY THORACOTOMY Left 4/7/2021    Procedure: BRONCHOSCOPY LEFT THORACOTOMY, LEFT LUNG RESECTION;  Surgeon: Chi Post MD;  Location: [facility];  Service: Cardiothoracic;  Laterality: Left;   • CARDIAC CATHETERIZATION     • CARDIAC SURGERY  2000    valve replacement    • COLON SURGERY     • COLONOSCOPY  up to date   • CORONARY STENT PLACEMENT     • EXTRACORPOREAL SHOCKWAVE LITHOTRIPSY (ESWL), STENT INSERTION/REMOVAL     • LUNG CANCER SURGERY     • PACEMAKER IMPLANTATION     • VARICOSE VEIN SURGERY        General Information     Row Name 12/07/22 0660          OT Time and Intention    Document Type therapy note (daily note)  -TB     Mode of Treatment occupational therapy;individual therapy  -TB     Row Name 12/07/22 2203          General Information    Patient Profile Reviewed yes  -TB     Existing Precautions/Restrictions fall  -TB     Barriers to Rehab medically complex  -TB     Row Name 12/07/22 1330          Occupational Profile    Reason for Services/Referral (Occupational Profile) Occupational decline  -TB     Row Name 12/07/22 1333          Cognition    Orientation Status (Cognition) oriented x 4  -TB     Row Name 12/07/22 1333          Safety Issues, Functional Mobility    Safety Issues Affecting Function (Mobility) awareness of need for assistance;positioning of assistive device;sequencing abilities  -TB     Impairments Affecting Function (Mobility) balance;endurance/activity tolerance;strength  -TB           User Key  (r) = Recorded By, (t) = Taken By, (c) = Cosigned By     Initials Name Provider Type    TB Gloria Santiago, OT Occupational Therapist                 Mobility/ADL's     Row Name 12/07/22 1334          Bed Mobility    Bed Mobility supine-sit;sit-supine;scooting/bridging  -TB     Scooting/Bridging [place] (Bed Mobility) independent  -TB     Supine-Sit [place] (Bed Mobility) independent  -TB     Sit-Supine [place] (Bed Mobility) independent  -TB     Assistive Device (Bed Mobility) head of bed elevated;bed rails  -TB     Comment, (Bed Mobility) Pt moves in/out of bed easily  -TB     Row Name 12/07/22 1334          Transfers    Transfers sit-stand transfer;stand-sit transfer  -TB     Row Name 12/07/22 1334          Sit-Stand Transfer    Sit-Stand [place] (Transfers) independent  -TB     Assistive Device (Sit-Stand Transfers) walker, front-wheeled  -TB     Row Name 12/07/22 1334          Stand-Sit Transfer    Stand-Sit [place] (Transfers) independent  -     Assistive Device (Stand-Sit Transfers) walker, front-wheeled  -TB     Row Name 12/07/22 1334          Functional Mobility    Functional Mobility- Ind. Level standby assist  -     Functional Mobility- Device walker, front-wheeled  -TB     Functional Mobility- Safety Issues balance decreased during turns;sequencing ability decreased  -     Functional Mobility- Comment Good effort. No LOB  -     Row Name 12/07/22 1334          Activities of Daily Living    BADL Assessment/Intervention upper body dressing;lower body dressing;grooming  -TB     Row Name 12/07/22 1334          Lower Body Dressing Assessment/Training    [place] Level (Lower Body Dressing) don;socks;set up  -TB     Position (Lower Body Dressing) edge of bed sitting  -TB     Row Name 12/07/22 1334          Grooming Assessment/Training    [place] Level (Grooming) set up;wash face, hands  -TB     Position (Grooming) sitting up in bed  -TB     Row Name 12/07/22 1334          Upper Body Dressing Assessment/Training     [place] Level (Upper Body Dressing) don;doff;pajama/robe;set up  -TB     Position (Upper Body Dressing) edge of bed sitting  -TB           User Key  (r) = Recorded By, (t) = Taken By, (c) = Cosigned By    Initials Name Provider Type    Gloria Rosales OT Occupational Therapist               Obj/Interventions     Row Name 12/07/22 1337          Balance    Balance Assessment sitting dynamic balance;sit to stand dynamic balance;standing dynamic balance  -TB     Dynamic Sitting Balance independent  -TB     Position, Sitting Balance unsupported;sitting edge of bed  -TB     Sit to Stand Dynamic Balance standby assist  -TB     Dynamic Standing Balance standby assist  -TB     Position/Device Used, Standing Balance supported;walker, front-wheeled  -TB     Balance Interventions sitting;standing;sit to stand;supported;dynamic;dynamic reaching;occupation based/functional task;UE activity with balance activity  -TB           User Key  (r) = Recorded By, (t) = Taken By, (c) = Cosigned By    Initials Name Provider Type    Gloria Rosales OT Occupational Therapist               Goals/Plan    No documentation.                Clinical Impression     Row Name 12/07/22 1330          Pain Assessment    Pretreatment Pain Rating 0/10 - no pain  -TB     Posttreatment Pain Rating 0/10 - no pain  -TB     Pre/Posttreatment Pain Comment Pt denies pain with dynamic EOB/OOB activity  -TB     Pain Intervention(s) Ambulation/increased activity;Repositioned  -TB     Row Name 12/07/22 5860          Plan of Care Review    Plan of Care Reviewed With patient  -TB     Progress improving  -TB     Outcome Evaluation Pt participates well in therapy. Independent for all bed mobility. Up in room/hallway with RW support and SBA. Cues for RW positioning and posture. Set-up simple UB/LB ADL activities. OT will continue to follow for strength and endurance needs to support self-care. Recommend home with spouse when medically ready.   -TB     Row Name 12/07/22 1337          Therapy Plan Review/Discharge Plan (OT)    Anticipated Discharge Disposition (OT) home with assist  -TB     Row Name 12/07/22 1337          Vital Signs    Pre Systolic BP Rehab --  RN cleared OT  -TB     O2 Delivery Pre Treatment room air  -TB     Pre Patient Position Supine  -TB     Intra Patient Position Standing  -TB     Post Patient Position Supine  -TB     Row Name 12/07/22 1337          Positioning and Restraints    Pre-Treatment Position in bed  -TB     Post Treatment Position bed  -TB     In Bed notified nsg;fowlers;call light within reach;encouraged to call for assist;exit alarm on  -TB           User Key  (r) = Recorded By, (t) = Taken By, (c) = Cosigned By    Initials Name Provider Type    TB Gloria Santiago, OT Occupational Therapist               Outcome Measures     Row Name 12/07/22 1341          How much help from another is currently needed...    Putting on and taking off regular lower body clothing? 3  -TB     Bathing (including washing, rinsing, and drying) 3  -TB     Toileting (which includes using toilet bed pan or urinal) 3  -TB     Putting on and taking off regular upper body clothing 3  -TB     Taking care of personal grooming (such as brushing teeth) 3  -TB     Eating meals 4  -TB     AM-PAC 6 Clicks Score (OT) 19  -TB     Row Name 12/07/22 1022          How much help from another person do you currently need...    Turning from your back to your side while in flat bed without using bedrails? 4  -FW     Moving from lying on back to sitting on the side of a flat bed without bedrails? 4  -FW     Moving to and from a bed to a chair (including a wheelchair)? 3  -FW     Standing up from a chair using your arms (e.g., wheelchair, bedside chair)? 4  -FW     Climbing 3-5 steps with a railing? 3  -FW     To walk in hospital room? 4  -FW     AM-PAC 6 Clicks Score (PT) 22  -FW     Highest level of mobility 7 --> Walked 25 feet or more  -FW     Row Name  12/07/22 1341 12/07/22 1022       Functional Assessment    Outcome Measure Options AM-PAC 6 Clicks Daily Activity (OT)  -TB AM-PAC 6 Clicks Basic Mobility (PT)  -          User Key  (r) = Recorded By, (t) = Taken By, (c) = Cosigned By    Initials Name Provider Type    TB Gloria Santiago, OT Occupational Therapist    Tai Driscoll PT Physical Therapist                Occupational Therapy Education     Title: PT OT SLP Therapies (In Progress)     Topic: Occupational Therapy (In Progress)     Point: ADL training (Done)     Description:   Instruct learner(s) on proper safety adaptation and remediation techniques during self care or transfers.   Instruct in proper use of assistive devices.              Learning Progress Summary           Patient Acceptance, E,D, VU,NR by  at 12/7/2022 1342    Acceptance, E, VU by AN at 12/5/2022 0937                   Point: Home exercise program (Not Started)     Description:   Instruct learner(s) on appropriate technique for monitoring, assisting and/or progressing therapeutic exercises/activities.              Learner Progress:  Not documented in this visit.          Point: Precautions (Done)     Description:   Instruct learner(s) on prescribed precautions during self-care and functional transfers.              Learning Progress Summary           Patient Acceptance, E, VU by AN at 12/5/2022 0937                   Point: Body mechanics (Done)     Description:   Instruct learner(s) on proper positioning and spine alignment during self-care, functional mobility activities and/or exercises.              Learning Progress Summary           Patient Acceptance, E, VU by AN at 12/5/2022 0937                               User Key     Initials Effective Dates Name Provider Type Discipline     06/16/21 -  Gloria Santiago, OT Occupational Therapist OT    LIZETTE 09/21/21 -  Teodora Serrato OT Occupational Therapist OT              OT Recommendation and Plan     Plan of  Care Review  Plan of Care Reviewed With: patient  Progress: improving  Outcome Evaluation: Pt participates well in therapy. Independent for all bed mobility. Up in room/hallway with RW support and SBA. Cues for RW positioning and posture. Set-up simple UB/LB ADL activities. OT will continue to follow for strength and endurance needs to support self-care. Recommend home with spouse when medically ready.     Time Calculation:    Time Calculation- OT     Row Name 12/07/22 1307             Time Calculation- OT    OT Start Time 1307  -TB      OT Received On 12/07/22  -TB      OT Goal Re-Cert Due Date 12/15/22  -TB         Timed Charges    94201 - OT Therapeutic Activity Minutes 23  -TB         Total Minutes    Timed Charges Total Minutes 23  -TB       Total Minutes 23  -TB            User Key  (r) = Recorded By, (t) = Taken By, (c) = Cosigned By    Initials Name Provider Type    TB Gloria Santiago OT Occupational Therapist              Therapy Charges for Today     Code Description Service Date Service Provider Modifiers Qty    [id number]  OT THERAPEUTIC ACT EA 15 MIN 12/7/2022 Gloria Santiago OT GO 2               Gloria Santiago OT  12/7/2022

## 2022-12-07 NOTE — PLAN OF CARE
Goal Outcome Evaluation:  Plan of Care Reviewed With: patient           Outcome Evaluation: Pt ambulated 360' SBA w/ a RW demonstrating a forward flexed posture and decreased knee extension bilaterally. Pt mildly dyspneic at end of gait- SpO2 100% RA. Continue PT POC. Recommend dc home w/ assist and HH/OPPT to improve functional strength, activity tolerance,balance, and AD training.

## 2022-12-07 NOTE — PLAN OF CARE
Goal Outcome Evaluation:                 Problem: Adult Inpatient Plan of Care  Goal: Absence of Hospital-Acquired Illness or Injury  Intervention: Identify and Manage Fall Risk  Recent Flowsheet Documentation  Taken 12/7/2022 0200 by Mumtaz Haddad RN  Safety Promotion/Fall Prevention:   activity supervised   safety round/check completed   toileting scheduled  Taken 12/7/2022 0000 by Mumtaz Haddad RN  Safety Promotion/Fall Prevention:   activity supervised   safety round/check completed   toileting scheduled  Taken 12/6/2022 2200 by Mumtaz Haddad RN  Safety Promotion/Fall Prevention:   activity supervised   safety round/check completed   toileting scheduled  Taken 12/6/2022 2000 by Mumtaz Haddad RN  Safety Promotion/Fall Prevention:   activity supervised   safety round/check completed   toileting scheduled  Intervention: Prevent Skin Injury  Recent Flowsheet Documentation  Taken 12/7/2022 0200 by Mumtaz Haddad RN  Body Position: supine  Taken 12/7/2022 0000 by Mumtaz Haddad RN  Body Position: supine  Taken 12/6/2022 2200 by Mumtaz Haddad RN  Body Position: supine  Taken 12/6/2022 2000 by Mumtaz Haddad RN  Body Position: supine  Intervention: Prevent and Manage VTE (Venous Thromboembolism) Risk  Recent Flowsheet Documentation  Taken 12/7/2022 0200 by Mumtaz Haddad RN  VTE Prevention/Management:   bilateral   sequential compression devices on  Taken 12/7/2022 0000 by Mumtaz Haddad RN  VTE Prevention/Management:   bilateral   sequential compression devices on  Taken 12/6/2022 2200 by Mumtaz Haddad RN  VTE Prevention/Management:   bilateral   sequential compression devices on  Taken 12/6/2022 2000 by Mumtaz Haddad RN  VTE Prevention/Management:   bilateral   sequential compression devices on  Intervention: Prevent Infection  Recent Flowsheet Documentation  Taken 12/7/2022 0200 by Mumtaz Haddad RN  Infection Prevention: environmental surveillance performed  Taken 12/7/2022 0000 by  Mumtaz Haddad RN  Infection Prevention: environmental surveillance performed  Taken 12/6/2022 2200 by Mumtaz Haddad RN  Infection Prevention: environmental surveillance performed  Taken 12/6/2022 2000 by Mumtaz Haddad RN  Infection Prevention: environmental surveillance performed  Goal: Optimal Comfort and Wellbeing  Intervention: Monitor Pain and Promote Comfort  Recent Flowsheet Documentation  Taken 12/7/2022 0200 by Mumtaz Haddad RN  Pain Management Interventions: quiet environment facilitated  Taken 12/7/2022 0000 by Mumtaz Haddad RN  Pain Management Interventions: quiet environment facilitated  Taken 12/6/2022 2200 by Mumtaz Haddad RN  Pain Management Interventions: quiet environment facilitated  Taken 12/6/2022 2000 by Mumtaz Haddad RN  Pain Management Interventions: quiet environment facilitated  Intervention: Provide Person-Centered Care  Recent Flowsheet Documentation  Taken 12/7/2022 0200 by Mumtaz Haddad RN  Trust Relationship/Rapport: care explained  Taken 12/7/2022 0000 by Mumtaz Haddad RN  Trust Relationship/Rapport: care explained  Taken 12/6/2022 2200 by Mumtaz Haddad RN  Trust Relationship/Rapport: care explained  Taken 12/6/2022 2000 by Mumtaz Haddad RN  Trust Relationship/Rapport: care explained  Goal: Absence of Hospital-Acquired Illness or Injury  Intervention: Identify and Manage Fall Risk  Recent Flowsheet Documentation  Taken 12/7/2022 0200 by Mumtaz Haddad RN  Safety Promotion/Fall Prevention:   activity supervised   safety round/check completed   toileting scheduled  Taken 12/7/2022 0000 by Mumtaz Haddad RN  Safety Promotion/Fall Prevention:   activity supervised   safety round/check completed   toileting scheduled  Taken 12/6/2022 2200 by Mumtaz Haddad RN  Safety Promotion/Fall Prevention:   activity supervised   safety round/check completed   toileting scheduled  Taken 12/6/2022 2000 by Mumtaz Haddad RN  Safety Promotion/Fall Prevention:    activity supervised   safety round/check completed   toileting scheduled  Intervention: Prevent Skin Injury  Recent Flowsheet Documentation  Taken 12/7/2022 0200 by Mumtaz Haddad RN  Body Position: supine  Taken 12/7/2022 0000 by Mumtaz Haddad RN  Body Position: supine  Taken 12/6/2022 2200 by Mumtaz Haddad RN  Body Position: supine  Taken 12/6/2022 2000 by Mumtaz Haddad RN  Body Position: supine  Intervention: Prevent and Manage VTE (Venous Thromboembolism) Risk  Recent Flowsheet Documentation  Taken 12/7/2022 0200 by Mumtaz Haddad RN  VTE Prevention/Management:   bilateral   sequential compression devices on  Taken 12/7/2022 0000 by Mumtaz Haddad RN  VTE Prevention/Management:   bilateral   sequential compression devices on  Taken 12/6/2022 2200 by Mumtaz Haddad RN  VTE Prevention/Management:   bilateral   sequential compression devices on  Taken 12/6/2022 2000 by Mumtaz Haddad RN  VTE Prevention/Management:   bilateral   sequential compression devices on  Intervention: Prevent Infection  Recent Flowsheet Documentation  Taken 12/7/2022 0200 by Mumtaz Haddad RN  Infection Prevention: environmental surveillance performed  Taken 12/7/2022 0000 by Mumtaz Haddad RN  Infection Prevention: environmental surveillance performed  Taken 12/6/2022 2200 by Mumtaz Haddad RN  Infection Prevention: environmental surveillance performed  Taken 12/6/2022 2000 by Mumtaz Haddad RN  Infection Prevention: environmental surveillance performed  Goal: Optimal Comfort and Wellbeing  Intervention: Monitor Pain and Promote Comfort  Recent Flowsheet Documentation  Taken 12/7/2022 0200 by Mumtaz Haddad RN  Pain Management Interventions: quiet environment facilitated  Taken 12/7/2022 0000 by Mumtaz Haddad RN  Pain Management Interventions: quiet environment facilitated  Taken 12/6/2022 2200 by Mumtaz Haddad RN  Pain Management Interventions: quiet environment facilitated  Taken 12/6/2022 2000 by  Mumtaz Haddad, RN  Pain Management Interventions: quiet environment facilitated  Intervention: Provide Person-Centered Care  Recent Flowsheet Documentation  Taken 12/7/2022 0200 by Mumtaz Haddad, RN  Trust Relationship/Rapport: care explained  Taken 12/7/2022 0000 by Mumtaz Haddad, RN  Trust Relationship/Rapport: care explained  Taken 12/6/2022 2200 by Mumtaz Haddad, RN  Trust Relationship/Rapport: care explained  Taken 12/6/2022 2000 by Mumtaz Haddad, RN  Trust Relationship/Rapport: care explained     VSS, voids well, rested throughout the night, pain managed with PRN medications, will continue to monitor for changes.

## 2022-12-07 NOTE — PROGRESS NOTES
"Pharmacy Consult  -  Warfarin    Dillon Vo is a  72 y.o. male   Height - 180.3 cm (70.98\")  Weight - 64 kg (141 lb)    Consulting Provider: - Hospitalist  Indication: - Atrial Fibrillation and Mechanical valve  Goal INR: - 2.5 - 3.5  Home Regimen:   - Warfarin 5 mg Mo, Tu, Th, Fr, Sa   - Warfarin 2.5 mg We, [place]    Bridge Therapy: No       Drug-Drug Interactions with current regimen:  Aspirin - may increase bleeding risk  Citalopram - may increase INR    Warfarin Dosing During Admission:    Date  12/4 12/5 12/6 12/7        INR  4.71 3.25 2.83 2.58        Dose  HOLD 5mg 5mg 5mg             Education Provided: Patient follows with [facility], will address education concerns as necessary.    Discharge Follow up:   Following Provider - [facility]   Follow up time range or appointment - 2-3 days post discharge      Labs:    Results from last 7 days   Lab Units 12/07/22  0941 12/06/22  0430 12/05/22  0416 12/04/22  2029 12/04/22  1458 12/02/22  1400   INR  2.58* 2.83* 3.25* 3.61* 4.71* 3.9*   HEMOGLOBIN g/dL  --   --  13.4  --  13.7  --    HEMATOCRIT %  --   --  39.9  --  41.4  --      Results from last 7 days   Lab Units 12/07/22  0941 12/06/22  0430 12/05/22 0416 12/04/22  1458   SODIUM mmol/L 139 138 137 139   POTASSIUM mmol/L 3.4* 3.5 4.1 4.1   CHLORIDE mmol/L 100 99 98 102   CO2 mmol/L 28.0 26.0 25.0 25.0   BUN mg/dL 10 10 11 12   CREATININE mg/dL 0.76 0.83 0.79 0.85   CALCIUM mg/dL 8.7 8.9 8.7 8.8   BILIRUBIN mg/dL  --   --   --  1.4*   ALK PHOS U/L  --   --   --  97   ALT (SGPT) U/L  --   --   --  28   AST (SGOT) U/L  --   --   --  56*   GLUCOSE mg/dL 99 97 91 121*       Current dietary intake:   Diet Order   Procedures    NPO Diet NPO Type: Strict NPO       Assessment/Plan:     Pharmacy dosing Warfarin for AVR. Goal INR 2.5-3.5.  INR today is 2.58, decreased from 2.83 yesterday. Warfarin held 12/4.  Give Warfarin 5mg tonight.  Daily PT/INR ordered.  Monitor signs/symptoms of bleeding, dietary intake, and " drug-drug interactions. Make dose adjustments as necessary.  Pharmacy will continue to follow.     Chon Moses, PharmD  12/7/2022  13:33 EST

## 2022-12-07 NOTE — THERAPY TREATMENT NOTE
Patient Name: Dillon Vo  : 1950    MRN: [id number]                              Today's Date: 2022       Admit Date: 2022    Visit Dx:     ICD-10-CM ICD-9-CM   1. Acute congestive heart failure, unspecified heart failure type (HCC)  I50.9 428.0   2. Bilateral pleural effusion  J90 511.9   3. Hypoxia  R09.02 799.02   4. Acute chest pain  R07.9 786.50   5. Hypertensive emergency  I16.1 401.9   6. History of coronary artery disease  Z86.79 V12.59   7. Acute on chronic diastolic congestive heart failure (HCC)  I50.33 428.33     428.0   8. Chronic obstructive pulmonary disease, unspecified COPD type (HCC)  J44.9 496   9. Syncope, unspecified syncope type  R55 780.2   10. Balance problem  R26.89 781.99     Patient Active Problem List   Diagnosis   • Depression   • Hyperbilirubinemia   • Hyperlipidemia LDL goal <70   • Essential hypertension   • Malignant neoplasm of overlapping sites of colon (HCC)   • Neurogenic bladder   • Tobacco use   • Coronary artery disease involving native coronary artery of native heart with angina pectoris (HCC)   • H/O mechanical aortic valve replacement   • COPD (chronic obstructive pulmonary disease) (HCC)   • Thrombocytopenia (HCC)   • Chronic anticoagulation on warfarin    • Self-catheterizes urinary bladder   • Lung cancer (S/P Left thoracotomy and lobectomy 2021)   • Lung nodule < 6cm on CT (S/P Left thoracotomy and MARKO Lobectomy)   • Gastroesophageal reflux disease without esophagitis   • Pacemaker   • S/P lobectomy of MARKO lung 21   • Syncope (R/O due to orthostatic hypotension)   • Permanent atrial fibrillation (HCC)   • Acute diastolic congestive heart failure (HCC)   • Abdominal aortic aneurysm (AAA) without rupture   • Smoking   • Urinary retention   • Complete heart block (HCC)     Past Medical History:   Diagnosis Date   • Anemia    • Atrial fibrillation (HCC) 2016    Status post AV node ablation and pacemaker placed   • Colitis    • Colon  cancer (HCC)    • Complete heart block (HCC)     Status post AV node ablation   • Coronary artery disease    • Depression    • GERD (gastroesophageal reflux disease)    • Hiatal hernia    • HTN (hypertension)    • Hyperbilirubinemia    • Hyperlipidemia    • Infectious viral hepatitis    • Kidney stone    • Lung cancer (HCC)    • Skin cancer     lip      Past Surgical History:   Procedure Laterality Date   • APPENDECTOMY     • BRONCHOSCOPY THORACOTOMY Left 4/7/2021    Procedure: BRONCHOSCOPY LEFT THORACOTOMY, LEFT LUNG RESECTION;  Surgeon: Chi Post MD;  Location: [facility];  Service: Cardiothoracic;  Laterality: Left;   • CARDIAC CATHETERIZATION     • CARDIAC SURGERY  2000    valve replacement    • COLON SURGERY     • COLONOSCOPY  up to date   • CORONARY STENT PLACEMENT     • EXTRACORPOREAL SHOCKWAVE LITHOTRIPSY (ESWL), STENT INSERTION/REMOVAL     • LUNG CANCER SURGERY     • PACEMAKER IMPLANTATION     • VARICOSE VEIN SURGERY        General Information     Row Name 12/07/22 1017          Physical Therapy Time and Intention    Document Type therapy note (daily note)  -FW     Mode of Treatment physical therapy  -FW     Row Name 12/07/22 1017          General Information    Patient Profile Reviewed yes  -FW     Existing Precautions/Restrictions fall  -FW     Row Name 12/07/22 1017          Cognition    Orientation Status (Cognition) oriented x 4  -FW     Row Name 12/07/22 1017          Safety Issues, Functional Mobility    Impairments Affecting Function (Mobility) balance;endurance/activity tolerance;strength;shortness of breath  -FW           User Key  (r) = Recorded By, (t) = Taken By, (c) = Cosigned By    Initials Name Provider Type    FW Tai Hill PT Physical Therapist               Mobility     Row Name 12/07/22 1017          Bed Mobility    Supine-Sit [place] (Bed Mobility) independent  -FW     Sit-Supine [place] (Bed Mobility) independent  -FW     Row Name 12/07/22 1017           Sit-Stand Transfer    Sit-Stand [place] (Transfers) supervision  -FW     Assistive Device (Sit-Stand Transfers) walker, front-wheeled  -FW     Row Name 12/07/22 1017          Gait/Stairs (Locomotion)    [place] (Gait) standby assist  -FW     Assistive Device (Gait) walker, front-wheeled;other (see comments)  -FW     Distance in Feet (Gait) 360  -FW     Deviations/Abnormal Patterns (Gait) base of support, narrow  -FW     Bilateral Gait Deviations forward flexed posture  -FW     Left Sided Gait Deviations decreased knee extension  -FW     Right Sided Gait Deviations decreased knee extension  -FW     Comment, (Gait/Stairs) vc to stay w/ in RW [place]; no LOB or buckling  -FW           User Key  (r) = Recorded By, (t) = Taken By, (c) = Cosigned By    Initials Name Provider Type    FW Tai Hill PT Physical Therapist               Obj/Interventions     Row Name 12/07/22 1018          Balance    Balance Assessment sitting static balance;sitting dynamic balance;standing static balance;standing dynamic balance  -FW     Static Sitting Balance independent  -FW     Dynamic Sitting Balance supervision  -FW     Position, Sitting Balance unsupported;sitting edge of bed  -FW     Static Standing Balance supervision  -FW     Dynamic Standing Balance standby assist  -FW     Position/Device Used, Standing Balance supported;walker, front-wheeled  -FW           User Key  (r) = Recorded By, (t) = Taken By, (c) = Cosigned By    Initials Name Provider Type    FW Tai Hill PT Physical Therapist               Goals/Plan    No documentation.                Clinical Impression     Row Name 12/07/22 1019          Pain    Pretreatment Pain Rating 0/10 - no pain  -FW     Posttreatment Pain Rating 0/10 - no pain  -FW     Row Name 12/07/22 1019          Plan of Care Review    Plan of Care Reviewed With patient  -FW     Outcome Evaluation Pt ambulated 360' SBA w/ a RW demonstrating a forward flexed posture and  decreased knee extension bilaterally. Pt mildly dyspneic at end of gait- SpO2 100% RA. Continue PT POC. Recommend dc home w/ assist and HH/OPPT to improve functional strength, activity tolerance,balance, and AD training.  -     Row Name 12/07/22 1019          Vital Signs    Post Systolic BP Rehab 170  -FW     Post Treatment Diastolic BP 99  -FW     O2 Delivery Pre Treatment room air  -FW     O2 Delivery Intra Treatment room air  -FW     O2 Delivery Post Treatment room air  -FW     Pre Patient Position Supine  -FW     Intra Patient Position Standing  -FW     Post Patient Position Supine  -FW     Row Name 12/07/22 1019          Positioning and Restraints    Pre-Treatment Position in bed  -FW     Post Treatment Position bed  -FW     In Bed notified nsg;call light within reach;encouraged to call for assist;exit alarm on  -FW           User Key  (r) = Recorded By, (t) = Taken By, (c) = Cosigned By    Initials Name Provider Type    FW Tai Hill PT Physical Therapist               Outcome Measures     Row Name 12/07/22 1022          How much help from another person do you currently need...    Turning from your back to your side while in flat bed without using bedrails? 4  -FW     Moving from lying on back to sitting on the side of a flat bed without bedrails? 4  -FW     Moving to and from a bed to a chair (including a wheelchair)? 3  -FW     Standing up from a chair using your arms (e.g., wheelchair, bedside chair)? 4  -FW     Climbing 3-5 steps with a railing? 3  -FW     To walk in hospital room? 4  -FW     AM-PAC 6 Clicks Score (PT) 22  -FW     Highest level of mobility 7 --> Walked 25 feet or more  -     Row Name 12/07/22 1022          Functional Assessment    Outcome Measure Options AM-PAC 6 Clicks Basic Mobility (PT)  -FW           User Key  (r) = Recorded By, (t) = Taken By, (c) = Cosigned By    Initials Name Provider Type    Tai Driscoll PT Physical Therapist                              Physical Therapy Education     Title: PT OT SLP Therapies (In Progress)     Topic: Physical Therapy (Done)     Point: Mobility training (Done)     Learning Progress Summary           Patient Acceptance, E, VU by  at 12/7/2022 1023    Eager, E, VU,NR by SC at 12/6/2022 1437    Comment: reviewed HEP    Acceptance, E, VU by  at 12/5/2022 1455                   Point: Home exercise program (Done)     Learning Progress Summary           Patient Acceptance, E, VU by  at 12/7/2022 1023    Eager, E, VU,NR by SC at 12/6/2022 1437    Comment: reviewed HEP                   Point: Body mechanics (Done)     Learning Progress Summary           Patient Acceptance, E, VU by  at 12/7/2022 1023    Eager, E, VU,NR by SC at 12/6/2022 1437    Comment: reviewed HEP    Acceptance, E, VU by  at 12/5/2022 1455                   Point: Precautions (Done)     Learning Progress Summary           Patient Acceptance, E, VU by  at 12/7/2022 1023    Eager, E, VU,NR by SC at 12/6/2022 1437    Comment: reviewed HEP    Acceptance, E, VU by  at 12/5/2022 1455                               User Key     Initials Effective Dates Name Provider Type Discipline    SC 06/16/21 -  Talat Santoyo PT Physical Therapist PT     05/05/22 -  Tai Hill PT Physical Therapist PT              PT Recommendation and Plan  Planned Therapy Interventions (PT): balance training, bed mobility training, gait training, home exercise program, patient/family education, ROM (range of motion), strengthening, stretching, transfer training  Plan of Care Reviewed With: patient  Outcome Evaluation: Pt ambulated 360' SBA w/ a RW demonstrating a forward flexed posture and decreased knee extension bilaterally. Pt mildly dyspneic at end of gait- SpO2 100% RA. Continue PT POC. Recommend dc home w/ assist and HH/OPPT to improve functional strength, activity tolerance,balance, and AD training.     Time Calculation:    PT Charges     Row Name 12/07/22 1024              Time Calculation    Start Time 1002  -FW      PT Received On 12/07/22  -FW      PT Goal Re-Cert Due Date 12/15/22  -FW         Timed Charges    76554 - PT Therapeutic Activity Minutes 14  -FW         Total Minutes    Timed Charges Total Minutes 14  -FW       Total Minutes 14  -FW            User Key  (r) = Recorded By, (t) = Taken By, (c) = Cosigned By    Initials Name Provider Type    FW Tai Hill, AVTAR Physical Therapist              Therapy Charges for Today     Code Description Service Date Service Provider Modifiers Qty    [id number] HC PT THERAPEUTIC ACT EA 15 MIN 12/7/2022 Tai Hill, AVTAR GP 1          PT G-Codes  Outcome Measure Options: AM-PAC 6 Clicks Basic Mobility (PT)  AM-PAC 6 Clicks Score (PT): 22  AM-PAC 6 Clicks Score (OT): 19  PT Discharge Summary  Anticipated Discharge Disposition (PT): home with assist, home with home health, home with outpatient therapy services    Tai Hill PT  12/7/2022

## 2022-12-07 NOTE — CASE MANAGEMENT/SOCIAL WORK
Continued Stay Note   [place]     Patient Name: Dillon Vo  MRN: [id number]  Today's Date: 12/7/2022    Admit Date: 12/4/2022    Plan: Initial Discharge Plan Assessment   Discharge Plan     Row Name 12/07/22 1032       Plan    Plan Comments Planned left heart cath later today, Carl GARCISA is following for discharge. Cm to follow               Discharge Codes    No documentation.               Expected Discharge Date and Time     Expected Discharge Date Expected Discharge Time    Dec 9, 2022             Mary Jane Ham RN

## 2022-12-07 NOTE — PROGRESS NOTES
[facility] Medicine Services  PROGRESS NOTE    Patient Name: Dillon Vo  : 1950  MRN: [id number]    Date of Admission: 2022  Primary Care Physician: Ez Cintron MD    Subjective   Subjective     CC:  F/U SOA    HPI:  No issues overnight, cardiac cath planned for this afternoon.  Has been NPO    ROS:  Gen- No fevers, chills  CV- No chest pain, palpitations  Resp- No cough, dyspnea  GI- No N/V/D, abd pain      Objective   Objective     Vital Signs:   Temp:  [97.6 °F (36.4 °C)-98.1 °F (36.7 °C)] 97.8 °F (36.6 °C)  Heart Rate:  [69] 69  Resp:  [16] 16  BP: (108-181)/(61-99) 181/93     Physical Exam:  Constitutional: No acute distress, awake, alert  HENT: NCAT, mucous membranes moist  Respiratory: Clear to auscultation bilaterally, respiratory effort normal   Cardiovascular: RRR, +click  Gastrointestinal: Positive bowel sounds, soft, nontender, nondistended  Musculoskeletal: No bilateral ankle edema  Psychiatric: Appropriate affect, cooperative, pleasant  Neurologic: Oriented x 3, VELASQUEZ, speech clear  Skin: No rashes noted      Results Reviewed:  LAB RESULTS:      Lab 22  1458 22  1400   WBC  --  8.12  --  6.75  --    HEMOGLOBIN  --  13.4  --  13.7  --    HEMATOCRIT  --  39.9  --  41.4  --    PLATELETS  --  143  --  166  --    NEUTROS ABS  --  6.59  --  5.40  --    IMMATURE GRANS (ABS)  --  0.05  --  0.03  --    LYMPHS ABS  --  0.67*  --  0.65*  --    MONOS ABS  --  0.75  --  0.60  --    EOS ABS  --  0.02  --  0.03  --    MCV  --  108.1*  --  108.4*  --    PROTIME 29.9* 33.4* 36.3* 44.8* 47.2*         Lab 12/06/22  0430 12/05/22  0416 12/04/22  1458   SODIUM 138 137 139   POTASSIUM 3.5 4.1 4.1   CHLORIDE 99 98 102   CO2 26.0 25.0 25.0   ANION GAP 13.0 14.0 12.0   BUN 10 11 12   CREATININE 0.83 0.79 0.85   EGFR 93.0 94.4 92.3   GLUCOSE 97 91 121*   CALCIUM 8.9 8.7 8.8   MAGNESIUM 1.9  --   --    HEMOGLOBIN  A1C  --   --  5.20         Lab 12/04/22  1458   TOTAL PROTEIN 6.8   ALBUMIN 3.80   GLOBULIN 3.0   ALT (SGPT) 28   AST (SGOT) 56*   BILIRUBIN 1.4*   ALK PHOS 97         Lab 12/06/22  0430 12/05/22  1322 12/05/22  0416 12/04/22 2029 12/04/22  1458 12/02/22  1400   PROBNP  --   --   --   --  3,991.0*  --    TROPONIN T  --  <0.010  --   --  <0.010  --    PROTIME 29.9*  --  33.4* 36.3* 44.8* 47.2*   INR 2.83*  --  3.25* 3.61* 4.71* 3.9*         Lab 12/05/22  0416   CHOLESTEROL 104   LDL CHOL 28   HDL CHOL 62*   TRIGLYCERIDES 61             Brief Urine Lab Results     None          Microbiology Results Abnormal     Procedure Component Value - Date/Time    COVID PRE-OP / PRE-PROCEDURE SCREENING ORDER (NO ISOLATION) - Swab, Nasopharynx [[id number]]  (Normal) Collected: 12/04/22 1458    Lab Status: Final result Specimen: Swab from Nasopharynx Updated: 12/04/22 1611    Narrative:      The following orders were created for panel order COVID PRE-OP / PRE-PROCEDURE SCREENING ORDER (NO ISOLATION) - Swab, Nasopharynx.  Procedure                               Abnormality         Status                     ---------                               -----------         ------                     COVID-19 and FLU A/B PCR...[[id number]]  Normal              Final result                 Please view results for these tests on the individual orders.    COVID-19 and FLU A/B PCR - Swab, Nasopharynx [[id number]]  (Normal) Collected: 12/04/22 1458    Lab Status: Final result Specimen: Swab from Nasopharynx Updated: 12/04/22 1611     COVID19 Not Detected     Influenza A PCR Not Detected     Influenza B PCR Not Detected    Narrative:      Fact sheet for providers: https://www.fda.gov/media/811554/download    Fact sheet for patients: https://www.fda.gov/media/651189/download    Test performed by PCR.          Adult Transthoracic Echo Complete w/ Color, Spectral and Contrast if necessary per protocol    Result Date: 12/5/2022  •  Left ventricular  systolic function is normal. Estimated left ventricular EF = 50% •  Left ventricular diastolic function was indeterminate. •  A mechanical aortic valve is well-seated and functions normally •  Estimated right ventricular systolic pressure from tricuspid regurgitation is mildly elevated (35 mmHg). •  Bilateral pleural effusions are present     CT Angiogram Abdomen Pelvis    Result Date: 12/5/2022  DATE OF EXAM:12/5/2022 5:42 PM  PROCEDURE: CT ANGIOGRAM ABDOMEN PELVIS-  INDICATIONS: AAA; I50.9-Heart failure, unspecified; S19-Efomlnz effusion, not elsewhere classified; R09.02-Hypoxemia; R07.9-Chest pain, unspecified; I16.1-Hypertensive emergency; Z86.79-Personal history of other diseases of the circulatory system; I50.33-Acute on chronic diastolic (congestive) heart failure; J44.9-Chronic obstructive pulmonary disease, unspecified; R55-Syncope and collapse; R26.89-Other abnor  COMPARISON: CT chest 12/04/2022, ultrasound abdominal aorta screening 07/06/2022, CT abdomen and pelvis contrast 05/24/2021.  TECHNIQUE: Routine transaxial slices were obtained through the abdomen without the administration of intravenous contrast. Additional scanning of the abdomen and pelvis followed by the intravenous administration of 80 mL of Isovue 370. Reconstructed coronal and sagittal images were also obtained. In addition, a 3-D volume rendered image was obtained after postprocessing. Automated exposure control and iterative reconstruction methods were used.  The radiation dose reduction device was turned on for each scan per the ALARA (As Low as Reasonably Achievable) protocol.  FINDINGS: Infrarenal abdominal aortic aneurysm measures up to 4.9 cm on coronal image 45, previously 4.2 cm measured similarly on 05/24/2021 CT. Craniocaudal extent is 4.9 cm on coronal image 45. Plaque at the origins of the celiac artery and SMA do not cause significant stenosis. There is plaque at the origin of each renal artery, causing bilateral stenosis,  right more severe than left. Aneurysmal dilatation of the right internal iliac artery measures 1.4 cm in diameter on coronal image 38. There is marked narrowing of the left internal iliac artery.  Left greater than right pleural effusions appear stable compared to yesterday's CT. There is associated partial bilateral lower lobe compressive atelectasis. Liver, gallbladder, pancreas, spleen, adrenal glands, and right kidney appear unremarkable for the phase of contrast-enhancement. Left renal cysts measure up to 2.7 cm. No abnormal bowel distention is seen. There is apparent wall thickening of the ascending colon. Urinary bladder is decompressed with a Causey catheter in place. Bilateral hydroceles are partially included. There is a small amount of free pelvic fluid.  T12 superior endplate defect with mild associated height loss is stable compared to 12/04/2022 CT, but new compared to 05/24/2021 CT, likely due to age-indeterminate fracture.      Impression: 1.  4.9 cm infrarenal abdominal aortic aneurysm, previously 4.2 cm when measured similarly on 05/24/2021. 2.  Other atherosclerotic vascular disease, as described above. 3.  Apparent wall thickening of the ascending colon, which may be due to incomplete distention or nonspecific colitis. 4.  Small amount of free pelvic fluid, nonspecific. 5.  Stable bilateral pleural effusions compared to recent CT chest. 6.  Please see above for additional incidental findings.   This report was finalized on 12/5/2022 7:18 PM by Mayte Ybarra MD.      Stress Test With Pet Myocardial Perfusion    Result Date: 12/6/2022  •  Myocardial perfusion imaging indicates a medium-sized, moderately severe area of ischemia located in the apex and lateral wall. •  Left ventricular ejection fraction is mildly reduced (Calculated EF = 47%). •  REST EF = 47% STRESS EF =  50%.       Results for orders placed during the hospital encounter of 12/04/22    Adult Transthoracic Echo Complete w/ Color,  Spectral and Contrast if necessary per protocol    Interpretation Summary  •  Left ventricular systolic function is normal. Estimated left ventricular EF = 50%  •  Left ventricular diastolic function was indeterminate.  •  A mechanical aortic valve is well-seated and functions normally  •  Estimated right ventricular systolic pressure from tricuspid regurgitation is mildly elevated (35 mmHg).  •  Bilateral pleural effusions are present      I have reviewed the medications:  Scheduled Meds:aspirin, 81 mg, Oral, Daily  atorvastatin, 80 mg, Oral, Nightly  buPROPion XL, 300 mg, Oral, Daily  carvedilol, 25 mg, Oral, BID With Meals  citalopram, 20 mg, Oral, Daily  dorzolamide-timolol, , Both Eyes, BID  famotidine, 20 mg, Oral, Daily  finasteride, 5 mg, Oral, Nightly  thiamine, 100 mg, Oral, Daily   And  multivitamin, 1 tablet, Oral, Daily   And  folic acid, 1 mg, Oral, Daily  lisinopril, 20 mg, Oral, BID  nicotine, 1 patch, Transdermal, Q24H  pharmacy consult - [place], , Does not apply, Daily  senna-docusate sodium, 2 tablet, Oral, BID  sodium chloride, 10 mL, Intravenous, Q12H  warfarin, 5 mg, Oral, Daily      Continuous Infusions:Pharmacy to dose warfarin,       PRN Meds:.•  acetaminophen **OR** acetaminophen **OR** acetaminophen  •  senna-docusate sodium **AND** polyethylene glycol **AND** bisacodyl **AND** bisacodyl  •  LORazepam **OR** LORazepam **OR** LORazepam **OR** LORazepam **OR** LORazepam **OR** LORazepam  •  magnesium sulfate **OR** magnesium sulfate **OR** magnesium sulfate  •  Pharmacy to dose warfarin  •  potassium chloride  •  sodium chloride  •  sodium chloride    Assessment & Plan   Assessment & Plan     Active Hospital Problems    Diagnosis  POA   • **Acute diastolic congestive heart failure (HCC) [I50.31]  Yes   • Urinary retention [R33.9]  Yes   • Complete heart block (HCC) [I44.2]  Yes   • Abdominal aortic aneurysm (AAA) without rupture [I71.40]  Yes   • Smoking [F17.200]  Yes   • Permanent atrial  fibrillation (HCC) [I48.21]  Yes   • Pacemaker [Z95.0]  Yes   • Gastroesophageal reflux disease without esophagitis [K21.9]  Yes   • COPD (chronic obstructive pulmonary disease) (HCC) [J44.9]  Yes   • H/O mechanical aortic valve replacement [Z95.2]  Not Applicable   • Coronary artery disease involving native coronary artery of native heart with angina pectoris (HCC) [I25.119]  Yes   • Essential hypertension [I10]  Yes   • Hyperlipidemia LDL goal <70 [E78.5]  Yes      Resolved Hospital Problems   No resolved problems to display.        Brief Hospital Course to date:  Dillon Vo is a 72 y.o. male with PMH Afib and mechanical aortic valve (on Coumadin/goal INR 2.5-3.5), AAA, pacemaker left chest wall that was removed and relocated to the right chest wall, HTN/HLD, smoking (60+ yrs), CAD s/p CABG, hx of lung cancer and colon cancer presenting with intermittent right sided chest pain, shortness of breath, and lower extremity edema for 3 days. Patient found to have CHF exacerbation.    This patient's problems and plans were partially entered by my partner and updated as appropriate by me 12/07/22.     Acute diastolic CHF   Right sided chest pain  Hx CAD s/p CABG  Hx of pacemaker with revision  - Cardiology following.  (Follows with Dr. Ronquillo outpatient). Has received IV diuresis with Lasix with improvement.  - Strict I&O, daily weights  - Troponin negative x2.  EKG no significant change from EKG 03/2022;   - Myocardial perfusion imaging indicates a medium-sized, moderately severe area of ischemia located in the apex and lateral wall.  Heart cath panding 12/7/22  - Continue ASA 81 mg daily   - 3/2021 ECHO LVEF 53, repeat ECHO 12/5/22 showing EF 50%, LV diastolic function indeterminate  - Note he does I/O cath at home and apparently was only doing once per day, could also have contributed to his volume overload state     AAA  - CTA chest showing 5 cm AAA. This has increased from US AAA screen 07/06/2022 where  the AAA was 4.6 cm infrarenal AAA.   - CTS has seen, recommend follow up in the office in 2-4 weeks     HTN/HLD  - Coreg, Lisinopril, atorvastatin      Afib  Mechanical aortic valve  Supratherapeutic INR, resolved  - INR 2.58  - Goal INR 2.5-3.5  - Pharmacy to dose.    - follow up with anticoagulation clinic     Smoking 60+ years  - Smoking cessation education  - Nicotine patch     Depression  - Wellbutrin, Celexa     BPH  Urinary Retention  - contine Finasteride  - Chronic I/O cath at home.   placed Causey on 12/4/22 due to difficult cath.  Recommend to keep Causey until outpatient  follow up.  With hx of urethral stricture of unclear etiology, planning for flexible cystoscopy in the future at some point. Recommend to avoid anticholinergics for bladder spasms unless severe.  - Will need to arrange for follow up with Dr. Shen at discharge     Alcohol use  - [facility] protocol  - Vitamins  - Denies hx of alcohol withdrawal seizure  - Has not required Ativan here     GERD  - Pepcid    Expected Discharge Location and Transportation: home  Expected Discharge Date: 12/08/22 or possibly even today if cleared by Cardiology after heart cath    DVT prophylaxis:  Medical and mechanical DVT prophylaxis orders are present.     AM-PAC 6 Clicks Score (PT): 19 (12/06/22 1436)    CODE STATUS:   Code Status and Medical Interventions:   Ordered at: 12/04/22 3035     Medical Intervention Limits:    NO intubation (DNI)     Code Status (Patient has no pulse and is not breathing):    No CPR (Do Not Attempt to Resuscitate)     Medical Interventions (Patient has pulse or is breathing):    Limited Support       DAGOBERTO Avila  12/07/22

## 2022-12-07 NOTE — PROGRESS NOTES
Cardiology Progress Note      Reason for visit:    · Acute diastolic heart failure  · Chest pain/abnormal stress test/known CAD    IDENTIFICATION: 72-year-old gentleman who resides in [place], [place]    Active Hospital Problems    Diagnosis  POA   • **Acute diastolic congestive heart failure (HCC) [I50.31]  Yes     Priority: High     · Echo (3/21/2021): LVEF 53%.  Biatrial enlargement.  Mechanical aortic valve with possible small perivalvular leak.  Moderate TR.  RVSP 53 mmHg  · Echo (12/5/2022):LVEF = 50%.  Mechanical aortic valve well-seated and functions normally.  RVSP 35 mmHg.  Bilateral pleural effusions.  LV diastolic function indeterminate     • Complete heart block (HCC) [I44.2]  Yes     Priority: Medium     Status post AV node ablation     • Abdominal aortic aneurysm (AAA) without rupture [I71.40]  Yes     Priority: Medium     · Abdominal ultrasound (7/2022):4.6 cm infrarenal abdominal aortic aneurysm, possibly very minimallyincreased in size from CT comparison.  · CT angiogram of the chest (12/4/2022): Abdominal aortic aneurysm 5 cm and enlarged from ultrasound 7/2022     • Pacemaker [Z95.0]  Yes     Priority: Medium     · SJM dual-chamber pacemaker on the right chest wall     • H/O mechanical aortic valve replacement [Z95.2]  Not Applicable     Priority: Medium     · Remote mechanical aortic valve replacement and thoracic aneurysm repair approximately 20 to 21 years ago at [facility]  · Echo (3/19/2021): LVEF 53%. Mechanical aortic valve with small perivalvular leak.  MAC with moderate MR.  RVSP 53 mmHg.  · Echo (12/5/2022):LVEF = 50%.  Mechanical aortic valve well-seated and functions normally.  RVSP 35 mmHg.  Bilateral pleural effusions.  LV diastolic function indeterminate     • Coronary artery disease involving native coronary artery of native heart with angina pectoris (HCC) [I25.119]  Yes     Priority: Medium     · Cardiac catheterization (6/2013): Mid LAD stenosis status post RK.      • Essential hypertension [I10]  Yes     Priority: Medium     • Target blood pressure <130/80 mmHg     • Gastroesophageal reflux disease without esophagitis [K21.9]  Yes     Priority: Low   • Hyperlipidemia LDL goal <70 [E78.5]  Yes     Priority: Low     • High intensity statin therapy indicated given the presence of CAD     • Urinary retention [R33.9]  Yes   • Smoking [F17.200]  Yes   • Permanent atrial fibrillation (HCC) [I48.21]  Yes     · [id number] 5 (age, CAD, HTN, CVA)   · Status post AV node ablation pacemaker implantation, 6/18/2013     • COPD (chronic obstructive pulmonary disease) (HCC) [J44.9]  Yes            Patient underwent stress testing yesterday which showed just lateral wall ischemia.  Patient is currently sitting up in the bed talking on his cell phone.  He is breathing easy on room air.  He denies any chest pain or shortness of breath.           Vital Sign Min/Max for last 24 hours  Temp  Min: 97.6 °F (36.4 °C)  Max: 98.1 °F (36.7 °C)   BP  Min: 108/61  Max: 171/99   Pulse  Min: 69  Max: 70   Resp  Min: 16  Max: 16   SpO2  Min: 93 %  Max: 98 %   No data recorded      Intake/Output Summary (Last 24 hours) at 12/7/2022 0809  Last data filed at 12/7/2022 0721  Gross per 24 hour   Intake 100 ml   Output 650 ml   Net -550 ml           Physical Exam  Constitutional:       General: He is awake.   Cardiovascular:      Rate and Rhythm: Normal rate.      Heart sounds: Murmur heard.      Comments: Ventricular paced  Greenlee S1/S2 click  Pulmonary:      Effort: Pulmonary effort is normal.      Breath sounds: Decreased breath sounds present.   Skin:     General: Skin is warm and dry.   Neurological:      Mental Status: He is alert and oriented to person, place, and time.   Psychiatric:         Behavior: Behavior is cooperative.          Tele: Paced     Results Review (reviewed the patient's recent labs in the electronic medical record):      EKG (12/5/2022): Ventricular paced     CT angiogram of the chest  (12/4/2022): Abdominal aortic aneurysm at 5 cm increase from previous ultrasound of 4.6 cm.  Small amount of ascites and increased small to moderate left and right pleural effusions.  Emphysema status post left upper lobectomy     ECHO (12/5/2022): LVEF 50%.  Mechanical aortic valve well-seated and functions normally.  RVSP 35 mmHg.  Bilateral pleural effusions.  LV diastolic dysfunction         Results from last 7 days   Lab Units 12/06/22  0430 12/05/22  0416 12/04/22  1458   SODIUM mmol/L 138 137 139   POTASSIUM mmol/L 3.5 4.1 4.1   CHLORIDE mmol/L 99 98 102   BUN mg/dL 10 11 12   CREATININE mg/dL 0.83 0.79 0.85   MAGNESIUM mg/dL 1.9  --   --      Results from last 7 days   Lab Units 12/05/22  1322 12/04/22  1458   TROPONIN T ng/mL <0.010 <0.010     Results from last 7 days   Lab Units 12/05/22  0416 12/04/22  1458   WBC 10*3/mm3 8.12 6.75   HEMOGLOBIN g/dL 13.4 13.7   HEMATOCRIT % 39.9 41.4   PLATELETS 10*3/mm3 143 166       Lab Results   Component Value Date    HGBA1C 5.20 12/04/2022       Lab Results   Component Value Date    CHOL 104 12/05/2022    CHLPL 189 01/21/2016    TRIG 61 12/05/2022    HDL 62 (H) 12/05/2022    LDL 28 12/05/2022                 Acute diastolic heart failure  • Heart failure due to neurogenic bladder/inadequate in/out urinary catheterization  • Echo 12/5/2022 LVEF 50%.  LV diastolic function indeterminate     Coronary artery disease  • Previous LAD PCI  • Biomarker negative chest pain on admission suspected due to CHF  • Denies any chest pain  • Stress test 12/6/2022 suggest lateral and apical wall ischemia     Mechanical aortic valve  • Normal functioning device on echo this admission  • Continue Coumadin with goal INR 2-3  • Continue low-dose aspirin  • Current INR 2.83     Permanent atrial fibrillation  • Rate controlled with AV node ablation  • Continue Coumadin for stroke prophylaxis     Pacemaker  • Normal functioning device     Abdominal aortic aneurysm  • Per CT  surgery     Hypertension  • Carvedilol 25 mg twice daily  • Lisinopril 20 mg twice daily     Hyperlipidemia with goal LDL <50  • Well-controlled  • Continue atorvastatin     Tobacco abuse  • Nicotine patch     History of lung cancer  • Status post left upper lobectomy     History of neurogenic bladder  • Managed with intermittent catheterization and previously followed [facility]  • Urology consulted and Causey catheter in place                   · Recommend cardiac catheterization for abnormal stress test. The risks and benefits were discussed and he is agreeable to proceed.  Awaiting INR level and less supratherapeutic will proceed with cardiac cath today.  · Further recommendations to follow    Electronically signed by DAGOBERTO Kenyon, 12/07/22, 8:09 AM EST.

## 2022-12-08 ENCOUNTER — READMISSION MANAGEMENT (OUTPATIENT)
Dept: CALL CENTER | Facility: HOSPITAL | Age: 72
End: 2022-12-08

## 2022-12-08 VITALS
HEART RATE: 70 BPM | SYSTOLIC BLOOD PRESSURE: 147 MMHG | TEMPERATURE: 98 F | HEIGHT: 71 IN | RESPIRATION RATE: 17 BRPM | BODY MASS INDEX: 19.74 KG/M2 | WEIGHT: 141 LBS | DIASTOLIC BLOOD PRESSURE: 97 MMHG | OXYGEN SATURATION: 95 %

## 2022-12-08 PROCEDURE — 99239 HOSP IP/OBS DSCHRG MGMT >30: CPT | Performed by: NURSE PRACTITIONER

## 2022-12-08 PROCEDURE — 99232 SBSQ HOSP IP/OBS MODERATE 35: CPT | Performed by: NURSE PRACTITIONER

## 2022-12-08 RX ORDER — HYDROCHLOROTHIAZIDE 25 MG/1
TABLET ORAL
Status: COMPLETED
Start: 2022-12-08 | End: 2022-12-08

## 2022-12-08 RX ORDER — HYDROCHLOROTHIAZIDE 25 MG/1
25 TABLET ORAL DAILY
Qty: 90 TABLET | Refills: 0 | Status: SHIPPED | OUTPATIENT
Start: 2022-12-08

## 2022-12-08 RX ORDER — ACETAMINOPHEN 325 MG/1
650 TABLET ORAL EVERY 4 HOURS PRN
Start: 2022-12-08

## 2022-12-08 RX ORDER — LISINOPRIL 10 MG/1
20 TABLET ORAL 2 TIMES DAILY
Qty: 180 TABLET | Refills: 3 | Status: SHIPPED | OUTPATIENT
Start: 2022-12-08

## 2022-12-08 RX ORDER — HYDROCHLOROTHIAZIDE 25 MG/1
25 TABLET ORAL DAILY
Status: DISCONTINUED | OUTPATIENT
Start: 2022-12-08 | End: 2022-12-08 | Stop reason: HOSPADM

## 2022-12-08 RX ADMIN — DORZOLAMIDE HYDROCHLORIDE: 20 SOLUTION/ DROPS OPHTHALMIC at 08:35

## 2022-12-08 RX ADMIN — Medication 10 ML: at 08:33

## 2022-12-08 RX ADMIN — FAMOTIDINE 20 MG: 20 TABLET ORAL at 08:32

## 2022-12-08 RX ADMIN — SENNOSIDES AND DOCUSATE SODIUM 2 TABLET: 50; 8.6 TABLET ORAL at 08:33

## 2022-12-08 RX ADMIN — CARVEDILOL 25 MG: 12.5 TABLET, FILM COATED ORAL at 08:32

## 2022-12-08 RX ADMIN — ASPIRIN 81 MG: 81 TABLET, COATED ORAL at 08:32

## 2022-12-08 RX ADMIN — HYDROCHLOROTHIAZIDE 25 MG: 25 TABLET ORAL at 11:51

## 2022-12-08 RX ADMIN — CITALOPRAM HYDROBROMIDE 20 MG: 20 TABLET ORAL at 08:32

## 2022-12-08 RX ADMIN — LISINOPRIL 20 MG: 20 TABLET ORAL at 08:32

## 2022-12-08 RX ADMIN — BUPROPION HYDROCHLORIDE 300 MG: 150 TABLET, FILM COATED, EXTENDED RELEASE ORAL at 08:32

## 2022-12-08 NOTE — PLAN OF CARE
Goal Outcome Evaluation:  Plan of Care Reviewed With: patient        Progress: improving  Outcome Evaluation: Patient had a heart cath without intervention yesterday. Vss, Patient has rested well tonight, ciwa has remained a 0. Patient is hopeing to be discharged home today.

## 2022-12-08 NOTE — CASE MANAGEMENT/SOCIAL WORK
Case Management Discharge Note      Final Note: Patient has discharge orders placed.  Spoke with patient at bedside regarding discharge plan.  Patient reports he drove himself to the hospital and plans to drive home and that the MD was aware.  No discharge needs verbalized.  Called [facility]  and spoke to liaison to advise of discharge who advises they have edverything they needs and will make arrangments to see patient .  Patient plan is to discharge home today with Carl  via car driving himself home.         Selected Continued Care - Admitted Since 12/4/2022     Destination    No services have been selected for the patient.              Durable Medical Equipment    No services have been selected for the patient.              Dialysis/Infusion    No services have been selected for the patient.              Home Medical Care Coordination complete.    Service Provider Selected Services Address Phone Fax Patient Preferred    [facility] ,  [facility] ,  [facility] [address] [facility] [facility] [id number] [phone] --          Therapy    No services have been selected for the patient.              Community Resources    No services have been selected for the patient.              Community & DME    No services have been selected for the patient.                       Final Discharge Disposition Code: 01 - home or self-care

## 2022-12-08 NOTE — PROGRESS NOTES
Cardiology Progress Note      Reason for visit:    · Acute diastolic heart failure  · Chest pain/abnormal stress test/known CAD    IDENTIFICATION: 72-year-old gentleman who resides in [place], [place]    Active Hospital Problems    Diagnosis  POA   • **Acute diastolic congestive heart failure (HCC) [I50.31]  Yes     Priority: High     · Echo (3/21/2021): LVEF 53%.  Biatrial enlargement.  Mechanical aortic valve with possible small perivalvular leak.  Moderate TR.  RVSP 53 mmHg  · Echo (12/5/2022):LVEF = 50%.  Mechanical aortic valve well-seated and functions normally.  RVSP 35 mmHg.  Bilateral pleural effusions.  LV diastolic function indeterminate     • Permanent atrial fibrillation (HCC) [I48.21]  Yes     Priority: High     · [id number] 5 (age, CAD, HTN, CVA)   · Status post AV node ablation pacemaker implantation, 6/18/2013     • Coronary artery disease involving native coronary artery of native heart with angina pectoris (HCC) [I25.119]  Yes     Priority: High     · Cardiac catheterization (6/2013): Mid LAD stenosis status post RK.  · Pharmacologic nuclear stress (12/6/2022): Lateral ischemia.  · Cardiac catheterization (12/7/2022): Major coronary arteries with mild CAD. Severe small vessel disease of first diagonal branch.     • Complete heart block (HCC) [I44.2]  Yes     Priority: Medium     Status post AV node ablation     • Abdominal aortic aneurysm (AAA) without rupture [I71.40]  Yes     Priority: Medium     · Abdominal ultrasound (7/2022):4.6 cm infrarenal abdominal aortic aneurysm, possibly very minimallyincreased in size from CT comparison.  · CT angiogram of the chest (12/4/2022): Abdominal aortic aneurysm 5 cm and enlarged from ultrasound 7/2022     • Pacemaker [Z95.0]  Yes     Priority: Medium     · SJM dual-chamber pacemaker on the right chest wall     • H/O mechanical aortic valve replacement [Z95.2]  Not Applicable     Priority: Medium     · Remote mechanical aortic valve replacement and  thoracic aneurysm repair approximately 20 to 21 years ago at [facility]  · Echo (3/19/2021): LVEF 53%. Mechanical aortic valve with small perivalvular leak.  MAC with moderate MR.  RVSP 53 mmHg.  · Echo (12/5/2022):LVEF = 50%.  Mechanical aortic valve well-seated and functions normally.  RVSP 35 mmHg.  Bilateral pleural effusions.  LV diastolic function indeterminate     • Essential hypertension [I10]  Yes     Priority: Medium     • Target blood pressure <130/80 mmHg     • Gastroesophageal reflux disease without esophagitis [K21.9]  Yes     Priority: Low   • Hyperlipidemia LDL goal <70 [E78.5]  Yes     Priority: Low     • High intensity statin therapy indicated given the presence of CAD     • Urinary retention [R33.9]  Yes   • Smoking [F17.200]  Yes   • COPD (chronic obstructive pulmonary disease) (HCC) [J44.9]  Yes            Patient underwent cardiac catheterization yesterday which showed no obstructive disease in the major coronary arteries but small vessel disease of the diagonal branch was noted.  He has remained hemodynamically stable overnight.          Vital Sign Min/Max for last 24 hours  Temp  Min: 97.5 °F (36.4 °C)  Max: 98.6 °F (37 °C)   BP  Min: 122/61  Max: 186/102   Pulse  Min: 69  Max: 83   Resp  Min: 16  Max: 19   SpO2  Min: 91 %  Max: 100 %   Flow (L/min)  Min: 2  Max: 2      Intake/Output Summary (Last 24 hours) at 12/8/2022 0857  Last data filed at 12/8/2022 0600  Gross per 24 hour   Intake 350 ml   Output 850 ml   Net -500 ml           Physical Exam  Constitutional:       General: He is awake.   Cardiovascular:      Rate and Rhythm: Normal rate.      Comments: Ventricular paced  [place] S1/S2 click    Pulmonary:      Effort: Pulmonary effort is normal.      Breath sounds: Decreased breath sounds present.   Skin:     General: Skin is warm and dry.   Neurological:      Mental Status: He is alert.   Psychiatric:         Behavior: Behavior is cooperative.          Tele: Paced     Results  Review (reviewed the patient's recent labs in the electronic medical record):      EKG (12/5/2022): Ventricular paced     CT angiogram of the chest (12/4/2022): Abdominal aortic aneurysm at 5 cm increase from previous ultrasound of 4.6 cm.  Small amount of ascites and increased small to moderate left and right pleural effusions.  Emphysema status post left upper lobectomy     ECHO (12/5/2022): LVEF 50%.  Mechanical aortic valve well-seated and functions normally.  RVSP 35 mmHg.  Bilateral pleural effusions.  LV diastolic dysfunction         Results from last 7 days   Lab Units 12/07/22  0941 12/06/22  0430 12/05/22  0416 12/04/22  1458   SODIUM mmol/L 139 138 137 139   POTASSIUM mmol/L 3.4* 3.5 4.1 4.1   CHLORIDE mmol/L 100 99 98 102   BUN mg/dL 10 10 11 12   CREATININE mg/dL 0.76 0.83 0.79 0.85   MAGNESIUM mg/dL  --  1.9  --   --      Results from last 7 days   Lab Units 12/05/22  1322 12/04/22  1458   TROPONIN T ng/mL <0.010 <0.010     Results from last 7 days   Lab Units 12/05/22  0416 12/04/22  1458   WBC 10*3/mm3 8.12 6.75   HEMOGLOBIN g/dL 13.4 13.7   HEMATOCRIT % 39.9 41.4   PLATELETS 10*3/mm3 143 166       Lab Results   Component Value Date    HGBA1C 5.20 12/04/2022       Lab Results   Component Value Date    CHOL 104 12/05/2022    CHLPL 189 01/21/2016    TRIG 61 12/05/2022    HDL 62 (H) 12/05/2022    LDL 28 12/05/2022                 Acute diastolic heart failure  • Heart failure due to neurogenic bladder/inadequate in/out urinary catheterization  • Echo 12/5/2022 LVEF 50%.  LV diastolic function indeterminate     Coronary artery disease  • Previous LAD PCI  • Biomarker negative chest pain on admission suspected due to CHF  • Denies any chest pain  • Stress test 12/6/2022 suggest lateral and apical wall ischemia     Mechanical aortic valve  • Normal functioning device on echo this admission  • Continue Coumadin with goal INR 2-3  • Continue low-dose aspirin  • Current INR 2.83     Permanent atrial  fibrillation  • Rate controlled with AV node ablation  • Continue Coumadin for stroke prophylaxis     Pacemaker  • Normal functioning device     Abdominal aortic aneurysm  • Per CT surgery     Hypertension  • Carvedilol 25 mg twice daily  • Lisinopril 20 mg twice daily     Hyperlipidemia with goal LDL <50  • Well-controlled  • Continue atorvastatin     Tobacco abuse  • Nicotine patch     History of lung cancer  • Status post left upper lobectomy     History of neurogenic bladder  • Managed with intermittent catheterization and previously followed [facility]  • Urology consulted and Causey catheter in place                   · Add hydrochlorothiazide 25 mg daily as blood pressure is not at goal.  · Okay for discharge home from a cardiovascular standpoint.  · Patient to have BMP in 1 week after discharge.  · Continue Coreg, aspirin, Lipitor and lisinopril  · Follow-up with cardiology in 4 weeks after discharge  · Please call cardiology with any further questions.      Mallory Loza, APRN

## 2022-12-08 NOTE — DISCHARGE SUMMARY
[facility] Medicine Services  DISCHARGE SUMMARY    Patient Name: Dillon Vo  : 1950  MRN: [id number]    Date of Admission: 2022  2:15 PM  Date of Discharge: 22  Primary Care Physician: Ez Cintron MD    Consults     Date and Time Order Name Status Description    2022 10:20 PM Inpatient Urology Consult Completed     2022  7:54 PM Inpatient Cardiothoracic Surgery Consult Completed     2022  7:54 PM Inpatient Cardiology Consult Completed           Hospital Course     Presenting Problem:   CHF (congestive heart failure) (HCC) [I50.9]    Active Hospital Problems    Diagnosis  POA   • **Acute diastolic congestive heart failure (HCC) [I50.31]  Yes   • Urinary retention [R33.9]  Yes   • Complete heart block (HCC) [I44.2]  Yes   • Abdominal aortic aneurysm (AAA) without rupture [I71.40]  Yes   • Smoking [F17.200]  Yes   • Permanent atrial fibrillation (HCC) [I48.21]  Yes   • Pacemaker [Z95.0]  Yes   • Gastroesophageal reflux disease without esophagitis [K21.9]  Yes   • COPD (chronic obstructive pulmonary disease) (HCC) [J44.9]  Yes   • H/O mechanical aortic valve replacement [Z95.2]  Not Applicable   • Coronary artery disease involving native coronary artery of native heart with angina pectoris (HCC) [I25.119]  Yes   • Essential hypertension [I10]  Yes   • Hyperlipidemia LDL goal <70 [E78.5]  Yes      Resolved Hospital Problems   No resolved problems to display.          Hospital Course:  Dillon Vo is a 72 y.o. male with PMH Afib and mechanical aortic valve (on Coumadin/goal INR 2.5-3.5), AAA, pacemaker left chest wall that was removed and relocated to the right chest wall, HTN/HLD, smoking (60+ yrs), CAD s/p CABG, hx of lung cancer and colon cancer presenting with intermittent right sided chest pain, shortness of breath, and lower extremity edema for 3 days. Patient found to have CHF exacerbation.       Acute diastolic CHF   Right  sided chest pain  Hx CAD s/p CABG  Hx of pacemaker with revision  - Cardiology following.  (Follows with Dr. Ronquillo outpatient). Has received IV diuresis with Lasix with improvement.  - Troponin negative x2.  EKG no significant change from EKG 03/2022;   - Myocardial perfusion imaging indicates a medium-sized, moderately severe area of ischemia located in the apex and lateral wall.   - Heart cath: The major epicardial arteries were normal to mildly diseased.  There was severe small vessel disease involving the first diagonal branch of the LAD.  Medical therapy recommended  - Continue ASA 81 mg daily   - 3/2021 ECHO LVEF 53, repeat ECHO 12/5/22 showing EF 50%, LV diastolic function indeterminate  - Note he does I/O cath at home and apparently was only doing once per day, could also have contributed to his volume overload state     AAA  - CTA chest showing 5 cm AAA. This has increased from [place] AAA screen 07/06/2022 where the AAA was 4.6 cm infrarenal AAA.   - CTS has seen, recommend follow up in the office in 2-4 weeks     HTN/HLD  - Coreg, Lisinopril, atorvastatin   - HCTZ added on date of dc  - continue asa     Afib  Mechanical aortic valve  Supratherapeutic INR, resolved  - INR 2.58  - Goal INR 2.5-3.5  - follow up with anticoagulation clinic     Smoking 60+ years  - Smoking cessation education  - Nicotine patch     Depression  - Wellbutrin, Celexa     BPH  Urinary Retention  - contine Finasteride  - Chronic I/O cath at home.   placed Causey on 12/4/22 due to difficult cath.  Recommend to keep Causey until outpatient  follow up.  With hx of urethral stricture of unclear etiology, planning for flexible cystoscopy in the future at some point. Recommend to avoid anticholinergics for bladder spasms unless severe.  - Will need to arrange for follow up with Dr. Shen at discharge     Alcohol use  - [facility] protocol  - Vitamins  - Denies hx of alcohol withdrawal seizure  - Has not required Ativan here     GERD  -  Pepcid      Discharge Follow Up Recommendations for outpatient labs/diagnostics:  PCP 1 week  Anticoagulation clinic for management of coumadin/INR  Cardiology 4 weeks  CTS to address AAA, 2-4 weeks      Day of Discharge     HPI:   No new issues overnight  Denies chest pain or other complaints    Review of Systems  Gen- No fevers, chills  CV- No chest pain, palpitations  Resp- No cough, dyspnea  GI- No N/V/D, abd pain      Vital Signs:   Temp:  [97.5 °F (36.4 °C)-98.6 °F (37 °C)] 97.6 °F (36.4 °C)  Heart Rate:  [69-83] 70  Resp:  [16-19] 19  BP: (122-186)/() 153/82  Flow (L/min):  [2] 2      Physical Exam:  Constitutional: No acute distress, sleeping but awakens easily  HENT: NCAT, mucous membranes moist  Respiratory: Clear to auscultation bilaterally, respiratory effort normal   Cardiovascular: RRR, no murmurs, rubs, or gallops  Gastrointestinal: Positive bowel sounds, soft, nontender, nondistended  Musculoskeletal: No bilateral ankle edema  Psychiatric: Appropriate affect, cooperative, pleasant  Neurologic: Oriented x 3, VELASQUEZ, speech clear  Skin: No rashes noted    Pertinent  and/or Most Recent Results     LAB RESULTS:      Lab 12/07/22 0941 12/06/22 0430 12/05/22 0416 12/04/22 2029 12/04/22  1458   WBC  --   --  8.12  --  6.75   HEMOGLOBIN  --   --  13.4  --  13.7   HEMATOCRIT  --   --  39.9  --  41.4   PLATELETS  --   --  143  --  166   NEUTROS ABS  --   --  6.59  --  5.40   IMMATURE GRANS (ABS)  --   --  0.05  --  0.03   LYMPHS ABS  --   --  0.67*  --  0.65*   MONOS ABS  --   --  0.75  --  0.60   EOS ABS  --   --  0.02  --  0.03   MCV  --   --  108.1*  --  108.4*   PROTIME 27.8* 29.9* 33.4* 36.3* 44.8*         Lab 12/07/22  0941 12/06/22 0430 12/05/22 0416 12/04/22  1458   SODIUM 139 138 137 139   POTASSIUM 3.4* 3.5 4.1 4.1   CHLORIDE 100 99 98 102   CO2 28.0 26.0 25.0 25.0   ANION GAP 11.0 13.0 14.0 12.0   BUN 10 10 11 12   CREATININE 0.76 0.83 0.79 0.85   EGFR 95.5 93.0 94.4 92.3   GLUCOSE 99 97 91  121*   CALCIUM 8.7 8.9 8.7 8.8   MAGNESIUM  --  1.9  --   --    HEMOGLOBIN A1C  --   --   --  5.20         Lab 12/04/22  1458   TOTAL PROTEIN 6.8   ALBUMIN 3.80   GLOBULIN 3.0   ALT (SGPT) 28   AST (SGOT) 56*   BILIRUBIN 1.4*   ALK PHOS 97         Lab 12/07/22  0941 12/06/22  0430 12/05/22  1322 12/05/22  0416 12/04/22 2029 12/04/22  1458   PROBNP  --   --   --   --   --  3,991.0*   TROPONIN T  --   --  <0.010  --   --  <0.010   PROTIME 27.8* 29.9*  --  33.4* 36.3* 44.8*   INR 2.58* 2.83*  --  3.25* 3.61* 4.71*         Lab 12/05/22  0416   CHOLESTEROL 104   LDL CHOL 28   HDL CHOL 62*   TRIGLYCERIDES 61             Brief Urine Lab Results     None        Microbiology Results (last 10 days)     Procedure Component Value - Date/Time    COVID PRE-OP / PRE-PROCEDURE SCREENING ORDER (NO ISOLATION) - Swab, Nasopharynx [[id number]]  (Normal) Collected: 12/04/22 1458    Lab Status: Final result Specimen: Swab from Nasopharynx Updated: 12/04/22 1611    Narrative:      The following orders were created for panel order COVID PRE-OP / PRE-PROCEDURE SCREENING ORDER (NO ISOLATION) - Swab, Nasopharynx.  Procedure                               Abnormality         Status                     ---------                               -----------         ------                     COVID-19 and FLU A/B PCR...[[id number]]  Normal              Final result                 Please view results for these tests on the individual orders.    COVID-19 and FLU A/B PCR - Swab, Nasopharynx [[id number]]  (Normal) Collected: 12/04/22 1458    Lab Status: Final result Specimen: Swab from Nasopharynx Updated: 12/04/22 1611     COVID19 Not Detected     Influenza A PCR Not Detected     Influenza B PCR Not Detected    Narrative:      Fact sheet for providers: https://www.fda.gov/media/105746/download    Fact sheet for patients: https://www.fda.gov/media/172176/download    Test performed by PCR.          Adult Transthoracic Echo Complete w/ Color, Spectral and  Contrast if necessary per protocol    Result Date: 12/5/2022  •  Left ventricular systolic function is normal. Estimated left ventricular EF = 50% •  Left ventricular diastolic function was indeterminate. •  A mechanical aortic valve is well-seated and functions normally •  Estimated right ventricular systolic pressure from tricuspid regurgitation is mildly elevated (35 mmHg). •  Bilateral pleural effusions are present     CT Angiogram Abdomen Pelvis    Result Date: 12/5/2022  DATE OF EXAM:12/5/2022 5:42 PM  PROCEDURE: CT ANGIOGRAM ABDOMEN PELVIS-  INDICATIONS: AAA; I50.9-Heart failure, unspecified; Q88-Iforiov effusion, not elsewhere classified; R09.02-Hypoxemia; R07.9-Chest pain, unspecified; I16.1-Hypertensive emergency; Z86.79-Personal history of other diseases of the circulatory system; I50.33-Acute on chronic diastolic (congestive) heart failure; J44.9-Chronic obstructive pulmonary disease, unspecified; R55-Syncope and collapse; R26.89-Other abnor  COMPARISON: CT chest 12/04/2022, ultrasound abdominal aorta screening 07/06/2022, CT abdomen and pelvis contrast 05/24/2021.  TECHNIQUE: Routine transaxial slices were obtained through the abdomen without the administration of intravenous contrast. Additional scanning of the abdomen and pelvis followed by the intravenous administration of 80 mL of Isovue 370. Reconstructed coronal and sagittal images were also obtained. In addition, a 3-D volume rendered image was obtained after postprocessing. Automated exposure control and iterative reconstruction methods were used.  The radiation dose reduction device was turned on for each scan per the ALARA (As Low as Reasonably Achievable) protocol.  FINDINGS: Infrarenal abdominal aortic aneurysm measures up to 4.9 cm on coronal image 45, previously 4.2 cm measured similarly on 05/24/2021 CT. Craniocaudal extent is 4.9 cm on coronal image 45. Plaque at the origins of the celiac artery and SMA do not cause significant stenosis.  There is plaque at the origin of each renal artery, causing bilateral stenosis, right more severe than left. Aneurysmal dilatation of the right internal iliac artery measures 1.4 cm in diameter on coronal image 38. There is marked narrowing of the left internal iliac artery.  Left greater than right pleural effusions appear stable compared to yesterday's CT. There is associated partial bilateral lower lobe compressive atelectasis. Liver, gallbladder, pancreas, spleen, adrenal glands, and right kidney appear unremarkable for the phase of contrast-enhancement. Left renal cysts measure up to 2.7 cm. No abnormal bowel distention is seen. There is apparent wall thickening of the ascending colon. Urinary bladder is decompressed with a Causey catheter in place. Bilateral hydroceles are partially included. There is a small amount of free pelvic fluid.  T12 superior endplate defect with mild associated height loss is stable compared to 12/04/2022 CT, but new compared to 05/24/2021 CT, likely due to age-indeterminate fracture.      1.  4.9 cm infrarenal abdominal aortic aneurysm, previously 4.2 cm when measured similarly on 05/24/2021. 2.  Other atherosclerotic vascular disease, as described above. 3.  Apparent wall thickening of the ascending colon, which may be due to incomplete distention or nonspecific colitis. 4.  Small amount of free pelvic fluid, nonspecific. 5.  Stable bilateral pleural effusions compared to recent CT chest. 6.  Please see above for additional incidental findings.   This report was finalized on 12/5/2022 7:18 PM by Mayte Ybarra MD.      Cardiac Catheterization/Vascular Study    Result Date: 12/7/2022  •  The major epicardial arteries were normal to mildly diseased.  There was severe small vessel disease involving the first diagonal branch of the LAD.  Medical therapy recommended •  No left ventriculography performed.  However, an echocardiogram performed 12/2022 demonstrated LVEF 50%.     CT  Angiogram Chest    Result Date: 12/4/2022  DATE OF EXAM: 12/4/2022 4:13 PM  PROCEDURE: CT ANGIOGRAM CHEST-  INDICATIONS: cp, hx taa. History of lung cancer and left upper lobectomy.  COMPARISON: 05/23/2022. CT abdomen pelvis 05/24/2021.  TECHNIQUE: Contiguous axial imaging was obtained from the thoracic inlet through the upper abdomen following the intravenous administration of 100 mL of Isovue 370. Reconstructed coronal and sagittal images were also obtained. Automated exposure control and iterative reconstruction methods were used.  The radiation dose reduction device was turned on for each scan per the ALARA (As Low as Reasonably Achievable) protocol.  FINDINGS: VASCULAR FINDINGS: Status post median sternotomy. There appears to have been aortic valve replacement. There is also peripheral hyperdense attenuation in the ascending aorta which may indicate prior aneurysm repair. The proximal ascending aorta appears to measure up to approximately 3.8 x 4 cm. The more distal ascending aorta appears to measure up to 4.4 x 4.2 cm. The aortic arch measures up to approximately 3.6 cm. The proximal descending aorta measures up to approximately 3.6 cm. The mid descending aorta measures up to 2.9 cm. The distal thoracic aorta measures up to approximately 2.9 cm. There is aneurysm of the abdominal aorta and inferior to the renal arteries. Appears to have a maximal diameter of approximately 5 cm on series 900 image 37. There is atherosclerosis of the mesenteric arteries with suspected mild luminal narrowing of the proximal superior mesenteric artery. There is also atherosclerosis and suspected mild to moderate luminal narrowing of the proximal renal arteries.  No definite findings of acute aortic abnormality at this time.  The central pulmonary arteries appear to enhance as expected  NONVASCULAR FINDINGS: The heart appears enlarged. Pacemaker is present. No pericardial effusion. There is reflux of contrast into the IVC and  hepatic veins which may be seen with right heart failure. No definite axillary, mediastinal, or hilar adenopathy is seen.  Status post left upper lobectomy. There is advanced emphysema. There has been interval increase in left pleural effusion, now moderate in size. There is also increased small right pleural effusion. No definite pneumothorax.  There is some new mild consolidation adjacent to the effusions. There also appears to be some new central bronchial wall thickening, groundglass opacities, and basilar septal thickening. There is suspected chronic scarring at the right apex. There are mild right apical bulla.  There are calcified gallstones. There is a small amount of ascites. Cyst is seen in the anterior left renal midpole.  No definite aggressive osseous lesion is identified.    1.  Abdominal aortic aneurysm with estimated maximal diameter of 5 cm. Surgical consultation is recommended for consideration of repair. 2.  Ascending thoracic aorta appears to measure up to 4.4 cm. No definite acute aortic abnormality is seen on this exam. 3.  Cardiomegaly with reflux of contrast into the IVC and hepatic veins. There is also a small amount of ascites and increased small to moderate left and small right pleural effusions. These findings may indicate heart failure. 4.  Mild groundglass opacities, central bronchial wall thickening, and basilar septal thickening which could be seen with edema or pneumonia. 5.  Emphysema. Status post left upper lobectomy. 6.  Cholelithiasis.  This report was finalized on 12/4/2022 4:57 PM by Oniel Callaway MD.      Stress Test With Pet Myocardial Perfusion    Result Date: 12/6/2022  •  Myocardial perfusion imaging indicates a medium-sized, moderately severe area of ischemia located in the apex and lateral wall. •  Left ventricular ejection fraction is mildly reduced (Calculated EF = 47%). •  REST EF = 47% STRESS EF =  50%.       Results for orders placed during the hospital encounter of  05/16/22    Duplex Venous Lower Extremity - Right    Interpretation Summary  · The right lower extremity venous duplex scan is negative for evidence of a DVT and SVT.      Results for orders placed during the hospital encounter of 05/16/22    Duplex Venous Lower Extremity - Right    Interpretation Summary  · The right lower extremity venous duplex scan is negative for evidence of a DVT and SVT.      Results for orders placed during the hospital encounter of 12/04/22    Adult Transthoracic Echo Complete w/ Color, Spectral and Contrast if necessary per protocol    Interpretation Summary  •  Left ventricular systolic function is normal. Estimated left ventricular EF = 50%  •  Left ventricular diastolic function was indeterminate.  •  A mechanical aortic valve is well-seated and functions normally  •  Estimated right ventricular systolic pressure from tricuspid regurgitation is mildly elevated (35 mmHg).  •  Bilateral pleural effusions are present        Discharge Details        Discharge Medications      New Medications      Instructions Start Date   acetaminophen 325 MG tablet  Commonly known as: TYLENOL   650 mg, Oral, Every 4 Hours PRN      hydroCHLOROthiazide 25 MG tablet  Commonly known as: HYDRODIURIL   25 mg, Oral, Daily         Changes to Medications      Instructions Start Date   lisinopril 10 MG tablet  Commonly known as: PRINIVIL,ZESTRIL  What changed: how much to take   20 mg, Oral, 2 Times Daily         Continue These Medications      Instructions Start Date   aspirin 81 MG EC tablet   81 mg, Oral, Daily      atorvastatin 80 MG tablet  Commonly known as: LIPITOR   80 mg, Oral, Nightly      buPROPion  MG 24 hr tablet  Commonly known as: WELLBUTRIN XL   Take 1 tablet by mouth once daily      carvedilol 12.5 MG tablet  Commonly known as: COREG   12.5 mg, Oral, 2 Times Daily With Meals      citalopram 20 MG tablet  Commonly known as: CeleXA   Take 1 tablet by mouth once daily      dorzolamide-timolol  22.3-6.8 MG/ML ophthalmic solution  Commonly known as: COSOPT   dorzolamide 22.3 mg-timolol 6.8 mg/mL eye drops   1 drop both eyes 9 am and 9 pm      famotidine 20 MG tablet  Commonly known as: PEPCID   20 mg, Oral, Daily      finasteride 5 MG tablet  Commonly known as: PROSCAR   TAKE 1 TABLET BY MOUTH ONCE DAILY AT BEDTIME      folic acid 400 MCG tablet  Commonly known as: FOLVITE   TAKE 1 TABLET BY MOUTH ONCE DAILY      nicotine 14 MG/24HR patch  Commonly known as: NICODERM CQ   1 patch, Transdermal, Every 24 Hours      tadalafil 20 MG tablet  Commonly known as: Cialis   20 mg, Oral, Daily PRN      vitamin B-12 1000 MCG tablet  Commonly known as: CYANOCOBALAMIN   1,000 mcg, Oral, Daily      warfarin 5 MG tablet  Commonly known as: COUMADIN   5 mg, Oral, Daily         Stop These Medications    furosemide 20 MG tablet  Commonly known as: LASIX     potassium chloride 10 MEQ CR tablet            No Known Allergies      Discharge Disposition:  Home or Self Care    Diet:  Hospital:  Diet Order   Procedures   • Diet: Cardiac Diets; Healthy Heart (2-3 Na+); Texture: Regular Texture (IDDSI 7); Fluid Consistency: Thin (IDDSI 0)       Activity:  Activity Instructions     Activity as Tolerated               CODE STATUS:    Code Status and Medical Interventions:   Ordered at: 12/04/22 1752     Medical Intervention Limits:    NO intubation (DNI)     Code Status (Patient has no pulse and is not breathing):    No CPR (Do Not Attempt to Resuscitate)     Medical Interventions (Patient has pulse or is breathing):    Limited Support       Future Appointments   Date Time Provider Department Center   12/16/2022  2:15 PM ANTI COAG CLINIC  [facility]   12/22/2022  9:00 AM Stacey Kraus APRN [facility]   12/30/2022 11:15 AM Margarita Weaver APRN [facility]   12/19/2023  2:30 PM Moiz Ronquillo IV, MD [facility]       Additional Instructions for the Follow-ups that You Need to Schedule     Ambulatory  Referral to Home Health   As directed      Face to Face Visit Date: 12/5/2022    Follow-up provider for Plan of Care?: I treated the patient in an acute care facility and will not continue treatment after discharge.    Follow-up provider: ARNULFO CINTRON [[id number]]    Reason/Clinical Findings: presents with balance deficits, generalized weakness, and decreased activity tolerance/dyspnea upon exertion limiting his functional mobility.    Describe mobility limitations that make leaving home difficult: Impaired Functional Mobility, Gait, Balance, and Endurance    Nursing/Therapeutic Services Requested: Physical Therapy Occupational Therapy Skilled Nursing    Skilled nursing orders: CHF management    PT orders: Therapeutic exercise Gait Training Transfer training Strengthening Home safety assessment    Weight Bearing Status: As Tolerated    Occupational orders: Activities of daily living Home safety assessment Energy conservation Strengthening    Frequency: 1 Week 1         Discharge Follow-up with PCP   As directed       Currently Documented PCP:    Arnulfo Cintron MD    PCP Phone Number:    [phone]     Follow Up Details: 1 week         Discharge Follow-up with Specialty: Cardiothoracic Surgery   As directed      Specialty: Cardiothoracic Surgery    Follow Up Details: Follow-up in 2-4 weeks         Discharge Follow-up with Specified Provider: Cardiology; 1 Month   As directed      To: Cardiology    Follow Up: 1 Month         Discharge Follow-up with Specified Provider: Hermelinda; 2 Weeks   As directed      To: Hermelinda    Follow Up: 2 Weeks         Discharge Follow-up with Specified Provider: anticoagulation clinic   As directed      To: anticoagulation clinic    Follow Up Details: 2-3 days         Basic Metabolic Panel    Dec 15, 2022 (Approximate)      Release to patient: Routine Release                 DAGOBERTO Avila  12/08/22      Time Spent on Discharge:  I spent 40 minutes on this discharge activity  which included: face-to-face encounter with the patient, reviewing the data in the system, coordination of the care with the nursing staff as well as consultants, documentation, and entering orders.

## 2022-12-09 ENCOUNTER — TRANSITIONAL CARE MANAGEMENT TELEPHONE ENCOUNTER (OUTPATIENT)
Dept: CALL CENTER | Facility: HOSPITAL | Age: 72
End: 2022-12-09

## 2022-12-09 NOTE — OUTREACH NOTE
Call Center TCM Note    Flowsheet Row Responses   [facility] [place] facility patient discharged from? [place]   Does the patient have one of the following disease processes/diagnoses(primary or secondary)? CHF   TCM attempt successful? No  [No verbal release]   Unsuccessful attempts Attempt 1           Maryjane Schneider RN    12/9/2022, 10:27 EST

## 2022-12-09 NOTE — OUTREACH NOTE
Call Center TCM Note    Flowsheet Row Responses   [facility] patient discharged from? [place]   Does the patient have one of the following disease processes/diagnoses(primary or secondary)? CHF   TCM attempt successful? No   Unsuccessful attempts Attempt 2           Maryjane Schneider RN    12/9/2022, 12:46 EST

## 2022-12-09 NOTE — OUTREACH NOTE
Prep Survey    Flowsheet Row Responses   [facility] patient discharged from? [place]   Is LACE score < 7 ? No   Emergency Room discharge w/ pulse ox? No   Eligibility [facility]   Date of Admission 12/04/22   Date of Discharge 12/08/22   Discharge Disposition Home or Self Care   Discharge diagnosis Acute diastolic congestive heart failure  [LEFT HEART CATH]   Does the patient have one of the following disease processes/diagnoses(primary or secondary)? CHF   Does the patient have Home health ordered? Yes   What is the Home health agency?  [facility]    Is there a DME ordered? No   Prep survey completed? Yes          ROXI LIM - Registered Nurse

## 2022-12-11 ENCOUNTER — TRANSITIONAL CARE MANAGEMENT TELEPHONE ENCOUNTER (OUTPATIENT)
Dept: CALL CENTER | Facility: HOSPITAL | Age: 72
End: 2022-12-11

## 2022-12-11 NOTE — OUTREACH NOTE
Call Center TCM Note    Flowsheet Row Responses   [facility] [place] patient discharged from? [place]   Does the patient have one of the following disease processes/diagnoses(primary or secondary)? CHF   TCM attempt successful? No   Unsuccessful attempts Attempt 3           Dinorah Diego RN    12/11/2022, 08:54 EST

## 2022-12-14 ENCOUNTER — HOME HEALTH ADMISSION (OUTPATIENT)
Dept: HOME HEALTH SERVICES | Facility: HOME HEALTHCARE | Age: 72
End: 2022-12-14

## 2022-12-14 ENCOUNTER — OFFICE VISIT (OUTPATIENT)
Dept: INTERNAL MEDICINE | Facility: CLINIC | Age: 72
End: 2022-12-14

## 2022-12-14 VITALS
TEMPERATURE: 97.1 F | SYSTOLIC BLOOD PRESSURE: 120 MMHG | DIASTOLIC BLOOD PRESSURE: 70 MMHG | HEART RATE: 99 BPM | WEIGHT: 138.25 LBS | BODY MASS INDEX: 19.35 KG/M2 | RESPIRATION RATE: 20 BRPM | HEIGHT: 71 IN | OXYGEN SATURATION: 99 %

## 2022-12-14 DIAGNOSIS — R33.9 URINARY RETENTION: ICD-10-CM

## 2022-12-14 DIAGNOSIS — Z09 HOSPITAL DISCHARGE FOLLOW-UP: Primary | ICD-10-CM

## 2022-12-14 DIAGNOSIS — Z95.2 H/O MECHANICAL AORTIC VALVE REPLACEMENT: ICD-10-CM

## 2022-12-14 DIAGNOSIS — Z23 ENCOUNTER FOR VACCINATION: ICD-10-CM

## 2022-12-14 DIAGNOSIS — I50.31 ACUTE DIASTOLIC CONGESTIVE HEART FAILURE: ICD-10-CM

## 2022-12-14 DIAGNOSIS — I71.40 ABDOMINAL AORTIC ANEURYSM (AAA) WITHOUT RUPTURE, UNSPECIFIED PART: ICD-10-CM

## 2022-12-14 DIAGNOSIS — D75.89 MACROCYTOSIS: ICD-10-CM

## 2022-12-14 PROCEDURE — 1111F DSCHRG MED/CURRENT MED MERGE: CPT | Performed by: STUDENT IN AN ORGANIZED HEALTH CARE EDUCATION/TRAINING PROGRAM

## 2022-12-14 PROCEDURE — 0124A PR ADM SARSCOV2 30MCG/0.3ML BST: CPT | Performed by: STUDENT IN AN ORGANIZED HEALTH CARE EDUCATION/TRAINING PROGRAM

## 2022-12-14 PROCEDURE — 90662 IIV NO PRSV INCREASED AG IM: CPT | Performed by: STUDENT IN AN ORGANIZED HEALTH CARE EDUCATION/TRAINING PROGRAM

## 2022-12-14 PROCEDURE — 91312 COVID-19 (PFIZER) BIVALENT BOOSTER 12+YRS: CPT | Performed by: STUDENT IN AN ORGANIZED HEALTH CARE EDUCATION/TRAINING PROGRAM

## 2022-12-14 PROCEDURE — 99495 TRANSJ CARE MGMT MOD F2F 14D: CPT | Performed by: STUDENT IN AN ORGANIZED HEALTH CARE EDUCATION/TRAINING PROGRAM

## 2022-12-14 PROCEDURE — G0008 ADMIN INFLUENZA VIRUS VAC: HCPCS | Performed by: STUDENT IN AN ORGANIZED HEALTH CARE EDUCATION/TRAINING PROGRAM

## 2022-12-14 NOTE — PROGRESS NOTES
Transitional Care Follow Up Visit  Subjective     Dillon Vo is a 72 y.o. male who presents for a transitional care management visit.    Within 48 business hours after discharge our office contacted him via telephone to coordinate his care and needs.      I reviewed and discussed the details of that call along with the discharge summary, hospital problems, inpatient lab results, inpatient diagnostic studies, and consultation reports with Dillon.     Current outpatient and discharge medications have been reconciled for the patient.  We discussed his current medication list and confirmed that medications on file are consistent with those that he was discharged on.  Patient does not have his medications today, and recommend that he check these against his list on his AVS when he gets home.  Reviewed by: Chon Orellana MD      Date of TCM Phone Call 12/8/2022   [facility]   Date of Admission 12/4/2022   Date of Discharge 12/8/2022   Discharge Disposition Home or Self Care     Risk for Readmission (LACE) Score: 14 (12/8/2022  6:00 AM)      History of Present Illness   Course During Hospital Stay:    Mr. Vo is a 70-year-old male with past medical history of atrial fibrillation and mechanical aortic valve (on Coumadin with goal INR 2.5-3.5), abdominal aortic aneurysm, pacemaker, hypertension, hyperlipidemia, tobacco dependence, CAD status post CABG, prior lung cancer and colon cancer patient was admitted on 12/4/2022 and discharged on 12/8/2022 to [facility].  He was admitted for acute diastolic congestive heart failure.    Diastolic congestive heart failure  Follows with Dr. Ronquillo as an outpatient.  EKG was without change from prior, troponin negative x2.  He had myocardial perfusion imaging which showed a medium size moderate to severe area of ischemia in the apex and lateral wall.  Heart catheterization was performed showing severe small vessel disease  involving the first diagonal branch of the left anterior descending artery.  It was recommended that patient continue medical therapy.  Repeat echocardiogram on 12/5/2022 showed ejection fraction 50%, indeterminant left ventricular diastolic function.  He is continued on carvedilol 12.5 mg twice daily, lisinopril 20 mg BID, atorvastatin 80 mg daily, aspirin 81 mg daily.  Hydrochlorothiazide was added, 25 mg daily.  Lipid panel showed LDL at goal during admission.  He has appointment scheduled with cardiology on 12/30/22.    Dry weight? 137lbs. No scale at home.    Monitoring weight at home? No  Restricting fluid intake? No  Salt intake? Watching somewhat.     AAA  Initially noted on 7/6/2022.  CTA chest showed increasing size to 5 cm.  Cardiothoracic surgery was consulted and recommended follow-up in 2 to 4 weeks.    BPH  Urinary Retention (?Neurogenic bladder)  Currently treated with finasteride 5 mg nightly, tadalafil 10 mg as needed, required Causey catheterization during hospitalization.  He was recommended that he keep Causey in place until outpatient urology follow-up.  He has appointment scheduled for 12/20/2022 he has appointment scheduled with    Urine output has been good at home.        The following portions of the patient's history were reviewed and updated as appropriate: allergies, current medications, past family history, past medical history, past social history, past surgical history and problem list.         Review of Systems    Objective    Vitals:    12/14/22 1053   BP: 120/70   Pulse: 99   Resp: 20   Temp: 97.1 °F (36.2 °C)   SpO2: 99%       Physical Exam  Vitals reviewed.   Constitutional:       General: He is not in acute distress.     Appearance: Normal appearance. He is not ill-appearing or toxic-appearing.   HENT:      Head: Normocephalic and atraumatic.      Right Ear: External ear normal.      Left Ear: External ear normal.   Eyes:      General: No scleral icterus.        Right eye: No  discharge.         Left eye: No discharge.      Extraocular Movements: Extraocular movements intact.      Pupils: Pupils are equal, round, and reactive to light.   Cardiovascular:      Rate and Rhythm: Normal rate and regular rhythm.      Pulses: Normal pulses.      Heart sounds: Murmur (Mechanical aortic valve click heard across precordium) heard.     No friction rub. No gallop.   Pulmonary:      Effort: Pulmonary effort is normal. No respiratory distress.      Breath sounds: Normal breath sounds. No wheezing, rhonchi or rales.   Abdominal:      General: Abdomen is flat. Bowel sounds are normal. There is no distension.      Palpations: Abdomen is soft.      Tenderness: There is no abdominal tenderness. There is no guarding.   Genitourinary:     Comments: Causey catheter in place with clear urine output  Musculoskeletal:         General: No swelling or deformity. Normal range of motion.      Cervical back: Normal range of motion. No rigidity.   Lymphadenopathy:      Cervical: No cervical adenopathy.   Skin:     General: Skin is warm and dry.      Capillary Refill: Capillary refill takes less than 2 seconds.      Coloration: Skin is not jaundiced or pale.   Neurological:      General: No focal deficit present.      Mental Status: He is alert and oriented to person, place, and time. Mental status is at baseline.   Psychiatric:         Mood and Affect: Mood normal.         Behavior: Behavior normal.         Judgment: Judgment normal.         Assessment & Plan   Diagnoses and all orders for this visit:    1. Hospital discharge follow-up (Primary)  -     Ambulatory Referral to Social Care Services (Amb Case Mgmt)    2. Acute diastolic congestive heart failure (HCC)  Assessment & Plan:  Congestive heart failure due to coronary artery disease (CAD).  Heart failure is improving with treatment.  NYHA Class III.  Continue current treatment regimen.  Heart failure will be reassessed 2 weeks, at appointment with cardiology on  12/30/2022.  Patient to continue aspirin 81 mg daily, atorvastatin 80 mg daily, Coreg 12.5 mg 2 times daily, hydrochlorothiazide 25 mg daily, lisinopril 20 mg twice daily.  Counseled patient that dry weight is 138 pounds.  Patient to weigh himself daily and contact clinic if he gains more than 3 pounds in 2 days or 5 pounds in 1 week.    Orders:  -     Ambulatory Referral to Social Care Services (Amb Case Mgmt)    3. Urinary retention  Assessment & Plan:  Patient with Causey catheter currently in place.  Endorses good urine output.  Has follow-up in 6 days with Dr. Shen for evaluation of removal.      4. Abdominal aortic aneurysm (AAA) without rupture, unspecified part  Assessment & Plan:  Blood pressures well controlled today.  Counseled patient to continue medications as above.  Patient with follow-up with cardiothoracic surgery in 8 days.      5. Encounter for vaccination  -     Fluzone High-Dose 65+yrs ([id number])  -     COVID-19 Bivalent Booster (Pfizer) 12+yrs    6. Macrocytosis  -     Vitamin B12; Future  -     Folate; Future    7. H/O mechanical aortic valve replacement  Assessment & Plan:  Patient has appointment with coagulation clinic in 2 days.  Counseled on continued follow-up.  Patient to continue warfarin 5 mg daily.

## 2022-12-14 NOTE — ASSESSMENT & PLAN NOTE
Patient with Causey catheter currently in place.  Endorses good urine output.  Has follow-up in 6 days with Dr. Shen for evaluation of removal.

## 2022-12-14 NOTE — ASSESSMENT & PLAN NOTE
Blood pressures well controlled today.  Counseled patient to continue medications as above.  Patient with follow-up with cardiothoracic surgery in 8 days.

## 2022-12-14 NOTE — PATIENT INSTRUCTIONS
Check your weight every day and keep a log. If your weight goes up 3lbs in two days or 5 lbs in a week, contact our clinic. Your Dry weight is 138lbs.     Heart Failure  Heart failure is a condition in which the heart has trouble pumping blood. This means your heart does not pump blood efficiently for your body to work well. In some cases of heart failure, fluid may back up into your lungs or you may have swelling (edema) in your lower legs. Heart failure is usually a long-term (chronic) condition. It is important for you to take good care of yourself and follow your health care provider's treatment plan.    How to cope with Congestive Heart Failure:  Congestive Heart Failure (CHF) is not an unchanging condition.  Heart Failure may deteriorate for a variety of reasons.  For instance:  excessive salt or fluid intake, illness such as flu or pneumonia, cardiac arrhythmias, and heart attack all may worsen heart failure.  Sometimes the patient with heart failure worsens for no apparent reason.  The educated patient must know how to anticipate deterioration, and to know how to react to it in order to correct the deterioration before it becomes serious.  Just as when steering a car, the heart failure patient must adjust to changes in their condition in order to stay on course.  A little too wet and they become congested and short of breathe.  A little too dry and they become weak, fatigued and dizzy.    When your provider examines your neck, he/she is looking at your veins to assess how much fluid is in the circulatory system.  You can also help monitor your fluid status by paying attention to your condition,(particularly how you feel), by noting how much swelling is present at the ankles and monitoring your body weight daily. A little bit of swelling of the ankles at the end of the day is normal and indicates sufficient fluid in the circulatory system to allow a weakened heart to pump normally.  More a than a trace of  swelling at the ankles indicates fluid excess.  This fluid may re-enter the central circulation when you lie down, awakening you with shortness of breath or forcing you to sleep on several pillows for comfort.  Similarly if you weight goes up by more than 2-3 pounds in 1-2 days or by 5 pounds over a week, the body may be retaining too much fluid and worsening heart failure may ensue.  CAUSES   Some health conditions can cause heart failure. Those health conditions include:  High blood pressure (hypertension). Hypertension causes the heart muscle to work harder than normal. When pressure in the blood vessels is high, the heart needs to pump (contract) with more force in order to circulate blood throughout the body. High blood pressure eventually causes the heart to become stiff and weak.  Coronary artery disease (CAD). CAD is the buildup of cholesterol and fat (plaque) in the arteries of the heart. The blockage in the arteries deprives the heart muscle of oxygen and blood. This can cause chest pain and may lead to a heart attack. High blood pressure can also contribute to CAD.  Heart attack (myocardial infarction). A heart attack occurs when one or more arteries in the heart become blocked. The loss of oxygen damages the muscle tissue of the heart. When this happens, part of the heart muscle dies. The injured tissue does not contract as well and weakens the heart's ability to pump blood.  Abnormal heart valves. When the heart valves do not open and close properly, it can cause heart failure. This makes the heart muscle pump harder to keep the blood flowing.  Heart muscle disease (cardiomyopathy or myocarditis). Heart muscle disease is damage to the heart muscle from a variety of causes. These can include drug or alcohol abuse, infections, or unknown reasons. These can increase the risk of heart failure.  Lung disease. Lung disease makes the heart work harder because the lungs do not work properly. This can cause a  strain on the heart, leading it to fail.  Diabetes. Diabetes increases the risk of heart failure. High blood sugar contributes to high fat (lipid) levels in the blood. Diabetes can also cause slow damage to tiny blood vessels that carry important nutrients to the heart muscle. When the heart does not get enough oxygen and food, it can cause the heart to become weak and stiff. This leads to a heart that does not contract efficiently.  Other conditions can contribute to heart failure. These include abnormal heart rhythms, thyroid problems, and low blood counts (anemia).  Certain unhealthy behaviors can increase the risk of heart failure, including:  Being overweight.  Smoking or chewing tobacco.  Eating foods high in fat and cholesterol.  Abusing illicit drugs or alcohol.  Lacking physical activity.  SYMPTOMS   Heart failure symptoms may vary and can be hard to detect. Symptoms may include:  Shortness of breath with activity, such as climbing stairs.  Persistent cough.  Swelling of the feet, ankles, legs, or abdomen.  Unexplained weight gain.  Difficulty breathing when lying flat (orthopnea).  Waking from sleep because of the need to sit up and get more air.  Rapid heartbeat.  Fatigue and loss of energy.  Feeling light-headed, dizzy, or close to fainting.  Loss of appetite.  Nausea.  Increased urination during the night (nocturia).      DIAGNOSIS   A diagnosis of heart failure is based on your history, symptoms, physical examination, and diagnostic tests. Diagnostic tests for heart failure may include:  Echocardiography.  Electrocardiography.  Chest X-ray.  Blood tests.  Exercise stress test.  Cardiac angiography.  Radionuclide scans.  TREATMENT   Treatment is aimed at managing the symptoms of heart failure. Medicines, behavioral changes, or surgical intervention may be necessary to treat heart failure.  Medicines to help treat heart failure may include:  Angiotensin-converting enzyme (ACE) inhibitors. This type of  medicine blocks the effects of a blood protein called angiotensin-converting enzyme. ACE inhibitors relax (dilate) the blood vessels and help lower blood pressure.  Angiotensin receptor blockers (ARBs). This type of medicine blocks the actions of a blood protein called angiotensin. Angiotensin receptor blockers dilate the blood vessels and help lower blood pressure.  Water pills (diuretics). Diuretics cause the kidneys to remove salt and water from the blood. The extra fluid is removed through urination. This loss of extra fluid lowers the volume of blood the heart pumps.  Beta blockers. These prevent the heart from beating too fast and improve heart muscle strength.  Digitalis. This increases the force of the heartbeat.  Healthy behavior changes include:  Obtaining and maintaining a healthy weight.  Stopping smoking or chewing tobacco.  Eating heart-healthy foods.  Limiting or avoiding alcohol.  Stopping illicit drug use.  Physical activity as directed by your health care provider.  Surgical treatment for heart failure may include:  A procedure to open blocked arteries, repair damaged heart valves, or remove damaged heart muscle tissue.  A pacemaker to improve heart muscle function and control certain abnormal heart rhythms.  An internal cardioverter defibrillator to treat certain serious abnormal heart rhythms.  A left ventricular assist device (LVAD) to assist the pumping ability of the heart.        HOME CARE INSTRUCTIONS   Take medicines only as directed by your health care provider. Medicines are important in reducing the workload of your heart, slowing the progression of heart failure, and improving your symptoms.  Do not stop taking your medicine unless directed by your health care provider.  Do not skip any dose of medicine.  Refill your prescriptions before you run out of medicine. Your medicines are needed every day.  Engage in moderate physical activity if directed by your health care provider. Moderate  physical activity can benefit some people. The elderly and people with severe heart failure should consult with a health care provider for physical activity recommendations.  Eat heart-healthy foods. Food choices should be free of trans fat and low in saturated fat, cholesterol, and salt (sodium). Healthy choices include fresh or frozen fruits and vegetables, fish, lean meats, legumes, fat-free or low-fat dairy products, and whole grain or high fiber foods. Talk to a dietitian to learn more about heart-healthy foods.  Limit sodium if directed by your health care provider. Sodium restriction may reduce symptoms of heart failure in some people. Talk to a dietitian to learn more about heart-healthy seasonings.  Use healthy cooking methods. Healthy cooking methods include roasting, grilling, broiling, baking, poaching, steaming, or stir-frying. Talk to a dietitian to learn more about healthy cooking methods.  Limit fluids if directed by your health care provider. Fluid restriction may reduce symptoms of heart failure in some people.  Weigh yourself every day. Daily weights are important in the early recognition of excess fluid. You should weigh yourself every morning after you urinate and before you eat breakfast. Wear the same amount of clothing each time you weigh yourself. Record your daily weight. Provide your health care provider with your weight record.  Monitor and record your blood pressure if directed by your health care provider.  Check your pulse if directed by your health care provider.  Lose weight if directed by your health care provider. Weight loss may reduce symptoms of heart failure in some people.  Stop smoking or chewing tobacco. Nicotine makes your heart work harder by causing your blood vessels to constrict. Do not use nicotine gum or patches before talking to your health care provider.  Keep all follow-up visits as directed by your health care provider. This is important.  Limit alcohol intake to  no more than 1 drink per day for nonpregnant women and 2 drinks per day for men. One drink equals 12 ounces of beer, 5 ounces of wine, or 1½ ounces of hard liquor. Drinking more than that is harmful to your heart. Tell your health care provider if you drink alcohol several times a week. Talk with your health care provider about whether alcohol is safe for you. If your heart has already been damaged by alcohol or you have severe heart failure, drinking alcohol should be stopped completely.  Stop illicit drug use.  Stay up-to-date with immunizations. It is especially important to prevent respiratory infections through current pneumococcal and influenza immunizations.  Manage other health conditions such as hypertension, diabetes, thyroid disease, or abnormal heart rhythms as directed by your health care provider.  Learn to manage stress.  Plan rest periods when fatigued.  Learn strategies to manage high temperatures. If the weather is extremely hot:  Avoid vigorous physical activity.  Use air conditioning or fans or seek a cooler location.  Avoid caffeine and alcohol.  Wear loose-fitting, lightweight, and light-colored clothing.  Learn strategies to manage cold temperatures. If the weather is extremely cold:  Avoid vigorous physical activity.  Layer clothes.  Wear mittens or gloves, a hat, and a scarf when going outside.  Avoid alcohol.  Obtain ongoing education and support as needed.  Participate in or seek rehabilitation as needed to maintain or improve independence and quality of life.      Medication Type Medication Name/Dose How much to take When to take it                                                                                                           To Monitor Your Own Fluid Status at Home:   1.Weigh yourself daily   2. Weigh yourself at the same time every day-before breakfast is best   3. Use the same scale all the time   4. Wear the same amount of clothes when you weigh yourself   5. Empty your  bladder before weighing   6. Record your weight on the daily record below   7. The weight at which there is just a little bit of swelling in the ankles at the end of       the day is your ideal weight- try and maintain it   8. When taking diuretics, avoid drinking too much in the way of fluids, even if your      mouth is dry and you feel thirsty.  This could counter the effect of the diuretic           and dilute the body's salts causing weakness and confusion   9. You should drink no more than 2000 ml (8 glasses or cups) of fluid per day, or          whatever amount is prescribed for you      Date Weight Exercise Duration Symptoms Better/Worse/Same Swelling Better/Worse/Same                                                                                                                                                                Congestive Heart Failure Management Guide      Green Zone: All Clear Green Zone: Actions   Your Goal Weight____________  *No Shortness of Breath  *No Swelling  *No Weight Gain  *No Chest Pain  *No Decrease in your ability to    maintain your activity level *Your symptoms are under control  *Continue taking your medications as              ordered  *Continue daily weights  *Follow low salt diet  *Keep all physician/provider     appointments     Yellow Zone: Caution Yellow Zone: Actions   If you have any of the following signs or symptoms;    *weight gain of 3 or more pounds in 2 days  *increased cough  *increased swelling  *increased shortness of breath with activity  *increased in the number of pillows to          sleep comfortably    Call your provider's medical assistant or home health nurse or nurse coordinator *Your symptoms may indicate that you           need an adjustment of your medications  *Call your provider's medical         assistant, home health nurse, or      nurse  "coordinator  *NAME______________________    *NUMBER___________________    *INSTRUCTIONS_______________________________________________________________________________________________________       Red Zone: Medical Alert Red Zone: Actions   *Unrelieved shortness of breath   *Shortness of breath at rest  *Unrelieved chest pain  *Wheezing or chest tightness at rest  *Need to sit in chair to sleep  *Weight gain or loss of more than 5       pounds in 2 days  *Confusion This indicates that you need to be evaluated by a physician right away  Call your Doctor immediately if you are going into the red zone    Doctor:______________________________  Number:_____________________________  If unable to reach Doctor, then call [phone]                        Low-Sodium Eating Plan  Sodium, which is an element that makes up salt, helps you maintain a healthy balance of fluids in your body. Too much sodium can increase your blood pressure and cause fluid and waste to be held in your body.  Your health care provider or dietitian may recommend following this plan if you have high blood pressure (hypertension), kidney disease, liver disease, or heart failure. Eating less sodium can help lower your blood pressure, reduce swelling, and protect your heart, liver, and kidneys.  What are tips for following this plan?  Reading food labels  The Nutrition Facts label lists the amount of sodium in one serving of the food. If you eat more than one serving, you must multiply the listed amount of sodium by the number of servings.  Choose foods with less than 140 mg of sodium per serving.  Avoid foods with 300 mg of sodium or more per serving.  Shopping    Look for lower-sodium products, often labeled as \"low-sodium\" or \"no salt added.\"  Always check the sodium content, even if foods are labeled as \"unsalted\" or \"no salt added.\"  Buy fresh foods.  Avoid canned foods and pre-made or frozen meals.  Avoid canned, cured, or processed meats.  Buy breads " "that have less than 80 mg of sodium per slice.  Cooking    Eat more home-cooked food and less restaurant, buffet, and fast food.  Avoid adding salt when cooking. Use salt-free seasonings or herbs instead of table salt or sea salt. Check with your health care provider or pharmacist before using salt substitutes.  Cook with plant-based oils, such as canola, sunflower, or olive oil.  Meal planning  When eating at a restaurant, ask that your food be prepared with less salt or no salt, if possible. Avoid dishes labeled as brined, pickled, cured, smoked, or made with soy sauce, miso, or teriyaki sauce.  Avoid foods that contain MSG (monosodium glutamate). MSG is sometimes added to Chinese food, bouillon, and some canned foods.  Make meals that can be grilled, baked, poached, roasted, or steamed. These are generally made with less sodium.  General information  Most people on this plan should limit their sodium intake to 1,500-2,000 mg (milligrams) of sodium each day.  What foods should I eat?  Fruits  Fresh, frozen, or canned fruit. Fruit juice.  Vegetables  Fresh or frozen vegetables. \"No salt added\" canned vegetables. \"No salt added\" tomato sauce and paste. Low-sodium or reduced-sodium tomato and vegetable juice.  Grains  Low-sodium cereals, including oats, puffed wheat and rice, and shredded wheat. Low-sodium crackers. Unsalted rice. Unsalted pasta. Low-sodium bread. Whole-grain breads and whole-grain pasta.  Meats and other proteins  Fresh or frozen (no salt added) meat, poultry, seafood, and fish. Low-sodium canned tuna and salmon. Unsalted nuts. Dried peas, beans, and lentils without added salt. Unsalted canned beans. Eggs. Unsalted nut butters.  Dairy  Milk. Soy milk. Cheese that is naturally low in sodium, such as ricotta cheese, fresh mozzarella, or Swiss cheese. Low-sodium or reduced-sodium cheese. Cream cheese. Yogurt.  Seasonings and condiments  Fresh and dried herbs and spices. Salt-free seasonings. Low-sodium " mustard and ketchup. Sodium-free salad dressing. Sodium-free light mayonnaise. Fresh or refrigerated horseradish. Lemon juice. Vinegar.  Other foods  Homemade, reduced-sodium, or low-sodium soups. Unsalted popcorn and pretzels. Low-salt or salt-free chips.  The items listed above may not be a complete list of foods and beverages you can eat. Contact a dietitian for more information.  What foods should I avoid?  Vegetables  Sauerkraut, pickled vegetables, and relishes. Olives. French fries. Onion rings. Regular canned vegetables (not low-sodium or reduced-sodium). Regular canned tomato sauce and paste (not low-sodium or reduced-sodium). Regular tomato and vegetable juice (not low-sodium or reduced-sodium). Frozen vegetables in sauces.  Grains  Instant hot cereals. Bread stuffing, pancake, and biscuit mixes. Croutons. Seasoned rice or pasta mixes. Noodle soup cups. Boxed or frozen macaroni and cheese. Regular salted crackers. Self-rising flour.  Meats and other proteins  Meat or fish that is salted, canned, smoked, spiced, or pickled. Precooked or cured meat, such as sausages or meat loaves. Lopez. Ham. Pepperoni. Hot dogs. Corned beef. Chipped beef. Salt pork. Jerky. Pickled herring. Anchovies and sardines. Regular canned tuna. Salted nuts.  Dairy  Processed cheese and cheese spreads. Hard cheeses. Cheese curds. Blue cheese. Feta cheese. String cheese. Regular cottage cheese. Buttermilk. Canned milk.  Fats and oils  Salted butter. Regular margarine. Ghee. Lopez fat.  Seasonings and condiments  Onion salt, garlic salt, seasoned salt, table salt, and sea salt. Canned and packaged gravies. Worcestershire sauce. Tartar sauce. Barbecue sauce. Teriyaki sauce. Soy sauce, including reduced-sodium. Steak sauce. Fish sauce. Oyster sauce. Cocktail sauce. Horseradish that you find on the shelf. Regular ketchup and mustard. Meat flavorings and tenderizers. Bouillon cubes. Hot sauce. Pre-made or packaged marinades. Pre-made or  packaged taco seasonings. Relishes. Regular salad dressings. Salsa.  Other foods  Salted popcorn and pretzels. Corn chips and puffs. Potato and tortilla chips. Canned or dried soups. Pizza. Frozen entrees and pot pies.  The items listed above may not be a complete list of foods and beverages you should avoid. Contact a dietitian for more information.  Summary  Eating less sodium can help lower your blood pressure, reduce swelling, and protect your heart, liver, and kidneys.  Most people on this plan should limit their sodium intake to 1,500-2,000 mg (milligrams) of sodium each day.  Canned, boxed, and frozen foods are high in sodium. Restaurant foods, fast foods, and pizza are also very high in sodium. You also get sodium by adding salt to food.  Try to cook at home, eat more fresh fruits and vegetables, and eat less fast food and canned, processed, or prepared foods.  This information is not intended to replace advice given to you by your health care provider. Make sure you discuss any questions you have with your health care provider.  Document Revised: 01/22/2021 Document Reviewed: 11/18/2020  Elsevier Patient Education © 2022 [employer]

## 2022-12-14 NOTE — ASSESSMENT & PLAN NOTE
Patient has appointment with coagulation clinic in 2 days.  Counseled on continued follow-up.  Patient to continue warfarin 5 mg daily.

## 2022-12-14 NOTE — ASSESSMENT & PLAN NOTE
Congestive heart failure due to coronary artery disease (CAD).  Heart failure is improving with treatment.  NYHA Class III.  Continue current treatment regimen.  Heart failure will be reassessed 2 weeks, at appointment with cardiology on 12/30/2022.  Patient to continue aspirin 81 mg daily, atorvastatin 80 mg daily, Coreg 12.5 mg 2 times daily, hydrochlorothiazide 25 mg daily, lisinopril 20 mg twice daily.  Counseled patient that dry weight is 138 pounds.  Patient to weigh himself daily and contact clinic if he gains more than 3 pounds in 2 days or 5 pounds in 1 week.

## 2022-12-15 ENCOUNTER — REFERRAL TRIAGE (OUTPATIENT)
Dept: CASE MANAGEMENT | Facility: OTHER | Age: 72
End: 2022-12-15

## 2022-12-15 ENCOUNTER — PATIENT OUTREACH (OUTPATIENT)
Dept: CASE MANAGEMENT | Facility: OTHER | Age: 72
End: 2022-12-15

## 2022-12-15 NOTE — OUTREACH NOTE
AMBULATORY CASE MANAGEMENT NOTE    Name and Relationship of Patient/Support Person: Meghana Vo - Emergency Contact    Patient Outreach    Received Ambulatory  Referral from PCP office, Dr. SHER Orellana, who saw patient in office yesterday, regarding CHF management.  Called patient twice today; there was no answer either time, and no voicemail available.  Called wife, Meghana, and spoke with her about patient's CHF management.  Education reviewed including Daily Weights, and parameters of wt gain that warrant notifying the doctor; compliance with daily medications as ordered.  Wife stated she has a scales patient can use, and she will tell him the doctor advises to weigh daily. Reviewed with wife, patient's upcoming appointments.  She said he drives himself to his appts.  Currently patient is out in the garage, and not available.  She said she will tell him RN-[facility] called.    This note will be routed to Dr. SHER Orellana and Dr. Cintron    Adult Patient Profile  Questions/Answers    Flowsheet Row Most Recent Value   Symptoms/Conditions Managed at Home cardiovascular   Barriers to Managing Health none   Cardiovascular Symptoms/Conditions heart failure   Cardiovascular Management Strategies medication therapy   Barriers to Taking Medication as Prescribed none   Primary Source of Support/Comfort spouse   Name of Support/Comfort Primary Source Meghana Cardenas   People in Home spouse   Name(s) of People in Home Meghana Cardenas   Current Living Arrangements home   Resource/Environmental Concerns none        Social Work Assessment  Questions/Answers    Flowsheet Row Most Recent Value   People in Home spouse   Name(s) of People in Home Meghana Cardenas   Current Living Arrangements home        Send Education  Questions/Answers    Flowsheet Row Most Recent Value   Advanced Directives: Patient Has        SDOH updated and reviewed with the patient during this program:  Transportation Needs: No Transportation Needs   • Lack of  Transportation (Medical): No   • Lack of Transportation (Non-Medical): No       Education Documentation  Weight Monitoring, taught by David Fields, RN at 12/15/2022  4:35 PM.  Learner: Family  Readiness: Acceptance  Method: Explanation  Response: Verbalizes Understanding    Medication Management, taught by David Fields, RN at 12/15/2022  4:35 PM.  Learner: Family  Readiness: Acceptance  Method: Explanation  Response: Verbalizes Understanding          DAVID MARINA  Ambulatory Case Management    12/15/2022, 16:35 EST

## 2022-12-16 ENCOUNTER — ANTICOAGULATION VISIT (OUTPATIENT)
Dept: PHARMACY | Facility: HOSPITAL | Age: 72
End: 2022-12-16

## 2022-12-16 DIAGNOSIS — I48.21 PERMANENT ATRIAL FIBRILLATION: Primary | ICD-10-CM

## 2022-12-16 LAB
INR PPP: 1.4 (ref 0.91–1.09)
PROTHROMBIN TIME: 17.2 SECONDS (ref 10–13.8)

## 2022-12-16 PROCEDURE — 85610 PROTHROMBIN TIME: CPT

## 2022-12-16 PROCEDURE — 36416 COLLJ CAPILLARY BLOOD SPEC: CPT

## 2022-12-16 PROCEDURE — G0463 HOSPITAL OUTPT CLINIC VISIT: HCPCS

## 2022-12-16 NOTE — PROGRESS NOTES
Anticoagulation Clinic Progress Note  Indication:  Mechanical aortic heart valve, persistent Afib  Referring Provider: Ez Cintron  Initial Warfarin Start Date: > 20 years  Planned Duration of Therapy: indefinite  Goal INR: 2.5-3.5  Current Drug Interactions: asa  [id number]: 2 (HTN, Age > 65)    Diet: no GLV 9/6/2022; occasional asparagus or green beans (1-2x/month) 9/9/2022  Alcohol: 2-3xnightly  Tobacco: <1/2 ppd   OTC Pain Medication: none    Anticoagulation Clinic INR History:  Date 5/11 5/16 5/19 6/10 6/24 7/8 7/22 7/27 8/3 8/10 8/18   Total Weekly Dose 35 mg 37.5mg 35mg 35mg 35 mg 37.5mg? 40mg? 40mg 40 mg 40mg 35mg 32.5 mg   INR 1.5 2.68 ED  3.1 POCT 2.9 2.2 2.2 4.9 POCT  3.66 meenakshi 1.5 2.7 4.3 5.2 1.8   Notes  1x boost, no enox    Possible extra dose enox         Date 8/23 8/30 9/6 9/9 9/16 10/3 12/2  12/4 12/6 12/16   Total Weekly Dose 37.5 mg 37.5mg 30mg 27.5 mg 30 mg 30 mg 30mg BH admission   ???   INR 2.3 4.72  6.2 POCT 5.1 3.4 2.6 2.3 3.9 12/4-12/8 4.7 2.58 1.4   Notes    Hold x1; red. x1       Held/missed doses.     Clinic Interview:  Tablet Strength: 5 mg  Verbal Release: Verbal Release Authorization signed on 5/11/2022 -- may speak with Meghana (spouse)  Patient Contact:[phone] (Mobile)      Patient Findings    Positives:  Change in health, Missed doses, Change in medications, Hospital admission   Negatives:  Signs/symptoms of thrombosis, Signs/symptoms of bleeding, Laboratory test error suspected, Change in alcohol use, Change in activity, Upcoming invasive procedure, Emergency department visit, Upcoming dental procedure, Extra doses, Change in diet/appetite, Bruising, Other complaints   Comments:  Hospitalized for CHF exacerbation 12/4-12/8. INR on admission was 4.7, doses were held inpatient on 12/4 and 12/7. Tracker updated to reflect MAR.  Potentially missed doses from 12/9 to present. Cannot recall when or how many.  Appetite returning to baseline, minimal GLV intake.   HCTZ and  APAP prescribed at discharge, Lasix and potassium supplements discontinued.   Denies changes in tobacco use.        Plan:  1. INR is SUBtherapeutic today at 1.4 (goal 2.5-3.5). Instructed patient to take a boosted dose of 7.5mg today and tomorrow, then continue warfarin 5mg daily except 2.5mg SunWed until recheck.   Mr. Vo was also instructed to begin Lovenox injections. Confirmed patient has #10 80mg/0.8mL syringes on hand. Over the phone, instructed to push 0.2mL out of the syringe prior to injection, and to do so every 12 hours until returning to the clinic (60mg q12h). Mr. Vo was able to teach back the plan while viewing Lovenox supply and has no further questions at this time.     2. Repeat INR on Tuesday, 12/20.  3. Verbal and written information provided in clnic. Dillon Vo expresses understanding by teach back and has no further questions at this time.    Rosa Lantigua, Pharmacy Intern  12/16/22   14:38 EST      I, Diana Solano, PharmD, have reviewed the note in full and agree with the assessment and plan.  12/16/22  16:24 EST

## 2022-12-19 ENCOUNTER — PATIENT OUTREACH (OUTPATIENT)
Dept: CASE MANAGEMENT | Facility: OTHER | Age: 72
End: 2022-12-19

## 2022-12-19 NOTE — OUTREACH NOTE
AMBULATORY CASE MANAGEMENT NOTE    Name and Relationship of Patient/Support Person: Dillon Vo - Self    Called patient regarding HRCM, CHF management.  Reminded patient of role of RN-ACM.  Reviewed education on CHF.  Patient said he has started weighing himself every AM, not writing it down.  Discussed the value of keeping a written record of his daily weights; of keeping up with any weight gain and notifying the MD of increases, as he directed.  Patient v/u.  Discussed limiting salt in diet. H voiced compliance with all daily medications as directed.  Patient said he is not having any shortness of breath or difficulty with breathing; is not currently having any LE edema.  Patient said he feels like he is doing better.  Said his chen catheter is supposed to come out tomorrow at Urology appt.  Reviewed upcoming appointments with patient.  He v/u, with plans to be at each.  Confirmed patient is driving himself to his appts.  No questions or concerns voiced at this time.    This note will be routed to Dr. SHER Orellana and Dr. Cintron.    Education Documentation  Weight Monitoring, taught by David Fields RN at 12/19/2022 11:40 AM.  Learner: Patient  Readiness: Acceptance  Method: Explanation  Response: Verbalizes Understanding    Provider Follow-Up, taught by David Fields RN at 12/19/2022 11:40 AM.  Learner: Patient  Readiness: Acceptance  Method: Explanation  Response: Verbalizes Understanding    Medication Management, taught by David Fields RN at 12/19/2022 11:40 AM.  Learner: Patient  Readiness: Acceptance  Method: Explanation  Response: Verbalizes Understanding    Diet Modification, taught by David Fields RN at 12/19/2022 11:40 AM.  Learner: Patient  Readiness: Acceptance  Method: Explanation  Response: Verbalizes Understanding          DAVID MARINA  Ambulatory Case Management    12/19/2022, 11:41 EST

## 2022-12-20 ENCOUNTER — OFFICE VISIT (OUTPATIENT)
Dept: UROLOGY | Facility: CLINIC | Age: 72
End: 2022-12-20

## 2022-12-20 ENCOUNTER — ANTICOAGULATION VISIT (OUTPATIENT)
Dept: PHARMACY | Facility: HOSPITAL | Age: 72
End: 2022-12-20

## 2022-12-20 VITALS — BODY MASS INDEX: 19.32 KG/M2 | WEIGHT: 138 LBS | HEIGHT: 71 IN

## 2022-12-20 DIAGNOSIS — N31.9 NEUROGENIC BLADDER: Primary | ICD-10-CM

## 2022-12-20 DIAGNOSIS — I48.21 PERMANENT ATRIAL FIBRILLATION: Primary | ICD-10-CM

## 2022-12-20 LAB
INR PPP: 2.1 (ref 0.91–1.09)
PROTHROMBIN TIME: 25.6 SECONDS (ref 10–13.8)

## 2022-12-20 PROCEDURE — 36416 COLLJ CAPILLARY BLOOD SPEC: CPT

## 2022-12-20 PROCEDURE — 51701 INSERT BLADDER CATHETER: CPT | Performed by: STUDENT IN AN ORGANIZED HEALTH CARE EDUCATION/TRAINING PROGRAM

## 2022-12-20 PROCEDURE — G0463 HOSPITAL OUTPT CLINIC VISIT: HCPCS

## 2022-12-20 PROCEDURE — 99213 OFFICE O/P EST LOW 20 MIN: CPT | Performed by: STUDENT IN AN ORGANIZED HEALTH CARE EDUCATION/TRAINING PROGRAM

## 2022-12-20 PROCEDURE — 85610 PROTHROMBIN TIME: CPT

## 2022-12-20 NOTE — PROGRESS NOTES
Follow Up Office Visit      Patient Name: Dillon Vo  : 1950   MRN: [id number]     Chief Complaint:    Chief Complaint   Patient presents with   • Urinary Retention   • Neurogenic Bladder       Referring Provider: No ref. provider found    History of Present Illness: Dillon Vo is a 72 y.o. male who presents today for follow up of neurogenic bladder and urethral stricture.  The patient has a history of incomplete bladder emptying.  He previously saw urology at [facility].  He has not been seen in over a year.  The patient was admitted to [facility] recently with heart failure, he does have a 5 cm AAA which he is following up with CT surgery for.  I did see the patient as inpatient consultation, I discussed his heart failure and fluid overload was likely exacerbated by the fact that he was only catheterizing once a day with extreme urine volume.  Per records, the patient has a possible bulbar versus membranous urethral stricture of unclear etiology and has undergone previous CT urogram and flexible cystoscopy at  for work-up.  At his last cystoscopy no evidence of urethral stricture was noted.  Today the patient presents with catheter in place.  He presents for catheter removal and intermittent catheterization reteaching.    Per [facility] by [facility] records, patient does have a few culture proven UTIs, urine culture 2021 positive for E. coli.  Urine culture 2021 positive for Staphylococcus.    Intermittent catheterization  The patient's existing Causey catheter was removed after removing 10 cc of sterile water from the balloon.  Prior to catheter removal, 200 cc of sterile water were instilled into the bladder catheter was removed intact.  Penis was swabbed with iodine.  A 14 Indonesian coudé intermittent catheter was advanced into the urethra without difficulty and into the bladder and all 200 cc of sterile water were removed.  Patient was educated on semisterile  technique, he will be provided new catheter order.     Subjective      Review of System: Review of Systems   Genitourinary: Positive for difficulty urinating.      I have reviewed the ROS documented by my clinical staff, I have updated appropriately and I agree. Floyd Shen MD    I have reviewed and the following portions of the patient's history were updated as appropriate: past family history, past medical history, past social history, past surgical history and problem list.    Medications:     Current Outpatient Medications:   •  acetaminophen (TYLENOL) 325 MG tablet, Take 2 tablets by mouth Every 4 (Four) Hours As Needed for Mild Pain., Disp: , Rfl:   •  aspirin 81 MG EC tablet, Take 1 tablet by mouth Daily., Disp: , Rfl:   •  atorvastatin (LIPITOR) 80 MG tablet, Take 1 tablet by mouth Every Night., Disp: 90 tablet, Rfl: 3  •  buPROPion XL (WELLBUTRIN XL) 300 MG 24 hr tablet, Take 1 tablet by mouth once daily, Disp: 30 tablet, Rfl: 5  •  carvedilol (COREG) 12.5 MG tablet, Take 1 tablet by mouth 2 (Two) Times a Day With Meals., Disp: 180 tablet, Rfl: 3  •  citalopram (CeleXA) 20 MG tablet, Take 1 tablet by mouth once daily, Disp: 90 tablet, Rfl: 0  •  dorzolamide-timolol (COSOPT) 22.3-6.8 MG/ML ophthalmic solution, dorzolamide 22.3 mg-timolol 6.8 mg/mL eye drops  1 drop both eyes 9 am and 9 pm, Disp: , Rfl:   •  famotidine (PEPCID) 20 MG tablet, Take 1 tablet by mouth Daily., Disp: 30 tablet, Rfl: 1  •  finasteride (PROSCAR) 5 MG tablet, TAKE 1 TABLET BY MOUTH ONCE DAILY AT BEDTIME, Disp: 90 tablet, Rfl: 1  •  folic acid (FOLVITE) 400 MCG tablet, TAKE 1 TABLET BY MOUTH ONCE DAILY, Disp: 90 tablet, Rfl: 3  •  hydroCHLOROthiazide (HYDRODIURIL) 25 MG tablet, Take 1 tablet by mouth Daily., Disp: 90 tablet, Rfl: 0  •  lisinopril (PRINIVIL,ZESTRIL) 10 MG tablet, Take 2 tablets by mouth 2 (Two) Times a Day., Disp: 180 tablet, Rfl: 3  •  nicotine (NICODERM CQ) 14 MG/24HR patch, Place 1 patch on the skin as  "directed by provider Daily., Disp: 90 patch, Rfl: 0  •  tadalafil (Cialis) 20 MG tablet, Take 1 tablet by mouth Daily As Needed for Erectile Dysfunction., Disp: 30 tablet, Rfl: 2  •  vitamin B-12 (CYANOCOBALAMIN) 1000 MCG tablet, Take 1 tablet by mouth Daily., Disp: 90 tablet, Rfl: 3  •  warfarin (COUMADIN) 5 MG tablet, Take 1 tablet by mouth Daily., Disp: 30 tablet, Rfl: 5    Allergies:   No Known Allergies      Objective     Physical Exam:   Vital Signs:   Vitals:    12/20/22 1031   Weight: 62.6 kg (138 lb)   Height: 180.3 cm (71\")     Body mass index is 19.25 kg/m².     Physical Exam  Constitutional:       Comments: Frail, thin   HENT:      Head: Normocephalic and atraumatic.      Nose: Nose normal.   Eyes:      Extraocular Movements: Extraocular movements intact.      Conjunctiva/sclera: Conjunctivae normal.      Pupils: Pupils are equal, round, and reactive to light.   Abdominal:      Comments: Large midline infraumbilical incision, no hernia present.   Genitourinary:     Comments: Circumcised phallus, orthotopic meatus, catheter in place draining clear yellow urine.  Testicles descended, no masses or lesions noted.  Musculoskeletal:         General: Normal range of motion.      Cervical back: Normal range of motion and neck supple.   Skin:     General: Skin is warm and dry.      Findings: No lesion or rash.   Neurological:      General: No focal deficit present.      Mental Status: He is alert and oriented to person, place, and time. Mental status is at baseline.   Psychiatric:         Mood and Affect: Mood normal.         Behavior: Behavior normal.         Labs:   Brief Urine Lab Results     None               Lab Results   Component Value Date    GLUCOSE 99 12/07/2022    CALCIUM 8.7 12/07/2022     12/07/2022    K 3.4 (L) 12/07/2022    CO2 28.0 12/07/2022     12/07/2022    BUN 10 12/07/2022    CREATININE 0.76 12/07/2022    EGFRIFAFRI >60 08/20/2021    EGFRIFNONA 76 02/01/2022    BCR 13.2 " 12/07/2022    ANIONGAP 11.0 12/07/2022       Lab Results   Component Value Date    WBC 8.12 12/05/2022    HGB 13.4 12/05/2022    HCT 39.9 12/05/2022    .1 (H) 12/05/2022     12/05/2022       Images:   XR Ribs Right With PA Chest    Result Date: 9/15/2022   1. No acute displaced right rib fracture is identified. 2. Small left basilar pleural fluid with adjacent scarring.  This report was finalized on 9/15/2022 3:17 PM by Sierra Middleton MD.      CT Angiogram Abdomen Pelvis    Result Date: 12/5/2022  1.  4.9 cm infrarenal abdominal aortic aneurysm, previously 4.2 cm when measured similarly on 05/24/2021. 2.  Other atherosclerotic vascular disease, as described above. 3.  Apparent wall thickening of the ascending colon, which may be due to incomplete distention or nonspecific colitis. 4.  Small amount of free pelvic fluid, nonspecific. 5.  Stable bilateral pleural effusions compared to recent CT chest. 6.  Please see above for additional incidental findings.   This report was finalized on 12/5/2022 7:18 PM by Mayte Ybarra MD.      CT Angiogram Chest    Result Date: 12/4/2022  1.  Abdominal aortic aneurysm with estimated maximal diameter of 5 cm. Surgical consultation is recommended for consideration of repair. 2.  Ascending thoracic aorta appears to measure up to 4.4 cm. No definite acute aortic abnormality is seen on this exam. 3.  Cardiomegaly with reflux of contrast into the IVC and hepatic veins. There is also a small amount of ascites and increased small to moderate left and small right pleural effusions. These findings may indicate heart failure. 4.  Mild groundglass opacities, central bronchial wall thickening, and basilar septal thickening which could be seen with edema or pneumonia. 5.  Emphysema. Status post left upper lobectomy. 6.  Cholelithiasis.  This report was finalized on 12/4/2022 4:57 PM by Oniel Callaway MD.        Measures:   Tobacco:   Dillon Vo  reports that he has been  smoking cigarettes. He started smoking about 60 years ago. He has a 14.25 pack-year smoking history. He has never used smokeless tobacco.  Assessment / Plan      Assessment/Plan:   72 y.o. male who presented today for follow up of neurogenic bladder with incomplete bladder emptying.  Recently admitted to [facility] with heart failure.  Likely exacerbated by patient's inconsistent bladder emptying.  He was only catheterizing once per day.  He has previously seen  urology.  He is unable to void naturally.  Possible etiologies of his neurogenic bladder include previous open colon resection versus chronic BPH with incomplete bladder emptying resulting in an atonic neurogenic bladder.  Review of CT demonstrates distended bladder.  He has a history of possible bulbar versus membranous urethral stricture.  I discussed with the patient the importance of 3-4 times daily catheterization to both keep his urethral stricture open and to ensure he does not have any episodes of heart failure secondary to fluid overload, we discussed he is at risk of recurrent UTI, bladder stone development, renal injury as a result of his inability to consistently catheterize.  He is provided a new catheter order and samples to take home today.  We will see him back in 3 months for follow-up.  We discussed the possibility of urinary tract infection with catheter manipulation today, he will call if he has any fevers, chills or other concerning infection signs.  He is on anticoagulation for cardiac history and he will monitor for hematuria with catheter advancement.  I discussed if he is unable to pass his catheter is easily we will need to perform a flexible cystoscopy to assess his possible history of membranous urethral stricture.    Diagnoses and all orders for this visit:    1. Neurogenic bladder (Primary)  - 14 Fr coude intermittent cathing - 3-4x daily  - new catheter order placed  - renal function preserved  - f/u 3 months          Follow Up:   Return in about 3 months (around 3/20/2023).    I spent approximately 20 minutes providing clinical care for this patient; including review of patient's chart and provider documentation, face to face time spent with patient in examination room (obtaining history, performing physical exam, discussing diagnosis and management options), placing orders, and completing patient documentation.     Floyd Shen MD  [facility] [place]

## 2022-12-20 NOTE — PROGRESS NOTES
Anticoagulation Clinic Progress Note  Indication:  Mechanical aortic heart valve, persistent Afib  Referring Provider: Ez Cintron  Initial Warfarin Start Date: > 20 years  Planned Duration of Therapy: indefinite  Goal INR: 2.5-3.5  Current Drug Interactions: asa  [id number]: 2 (HTN, Age > 65)    Diet: no GLV 9/6/2022; occasional asparagus or green beans (1-2x/month) 9/9/2022  Alcohol: 2-3xnightly  Tobacco: <1/2 ppd   OTC Pain Medication: none    Anticoagulation Clinic INR History:  Date 5/11 5/16 5/19 6/10 6/24 7/8 7/22 7/27 8/3 8/10 8/18   Total Weekly Dose 35 mg 37.5mg 35mg 35mg 35 mg 37.5mg? 40mg? 40mg 40 mg 40mg 35mg 32.5 mg   INR 1.5 2.68 ED  3.1 POCT 2.9 2.2 2.2 4.9 POCT  3.66 meenakshi 1.5 2.7 4.3 5.2 1.8   Notes  1x boost, no enox    Possible extra dose enox         Date 8/23 8/30 9/6 9/9 9/16 10/3 12/2  12/4 12/6 12/16   Total Weekly Dose 37.5 mg 37.5mg 30mg 27.5 mg 30 mg 30 mg 30mg BH admission   ???   INR 2.3 4.72  6.2 POCT 5.1 3.4 2.6 2.3 3.9 12/4-12/8 4.7 2.58 1.4   Notes    Hold x1; red. x1       Held/missed doses.       Date 12/20           Total Weekly Dose 35mg           INR 2.1           Notes 2x boost                 Clinic Interview:  Tablet Strength: 5 mg  Verbal Release: Verbal Release Authorization signed on 5/11/2022 -- may speak with Meghana (spouse)  Patient Contact:[phone] (Mobile)      Patient Findings    Negatives:  Signs/symptoms of thrombosis, Signs/symptoms of bleeding, Laboratory test error suspected, Change in health, Change in alcohol use, Change in activity, Upcoming invasive procedure, Emergency department visit, Upcoming dental procedure, Missed doses, Extra doses, Change in medications, Change in diet/appetite, Hospital admission, Bruising, Other complaints       Plan:  1. INR is SUBtherapeutic today at 2.1 but imroved (goal 2.5-3.5). Instructed patient to take a boosted dose of 7.5mg today  then continue warfarin 5mg daily except 2.5mg SunWed until recheck.   Tavo was also instructed to discoinue Lovenox injections    2. Repeat INR on 12/30 prior to HVI  3. Verbal and written information provided in clnic. Dillon Vo expresses understanding by teach back and has no further questions at this time.    Diana Solano, PharmD.  12/20/22   14:33 EST

## 2022-12-22 ENCOUNTER — OFFICE VISIT (OUTPATIENT)
Dept: CARDIAC SURGERY | Facility: CLINIC | Age: 72
End: 2022-12-22

## 2022-12-22 VITALS
OXYGEN SATURATION: 94 % | DIASTOLIC BLOOD PRESSURE: 70 MMHG | SYSTOLIC BLOOD PRESSURE: 130 MMHG | TEMPERATURE: 97.5 F | WEIGHT: 134 LBS | HEIGHT: 71 IN | HEART RATE: 56 BPM | BODY MASS INDEX: 18.76 KG/M2

## 2022-12-22 DIAGNOSIS — Z95.2 H/O MECHANICAL AORTIC VALVE REPLACEMENT: ICD-10-CM

## 2022-12-22 DIAGNOSIS — I71.21 ANEURYSM OF ASCENDING AORTA WITHOUT RUPTURE: ICD-10-CM

## 2022-12-22 DIAGNOSIS — Z90.2 S/P LOBECTOMY OF LUNG: ICD-10-CM

## 2022-12-22 DIAGNOSIS — J44.9 CHRONIC OBSTRUCTIVE PULMONARY DISEASE, UNSPECIFIED COPD TYPE: ICD-10-CM

## 2022-12-22 DIAGNOSIS — I71.43 INFRARENAL ABDOMINAL AORTIC ANEURYSM (AAA) WITHOUT RUPTURE: Primary | ICD-10-CM

## 2022-12-22 DIAGNOSIS — I25.119 CORONARY ARTERY DISEASE INVOLVING NATIVE CORONARY ARTERY OF NATIVE HEART WITH ANGINA PECTORIS: ICD-10-CM

## 2022-12-22 DIAGNOSIS — Z72.0 TOBACCO USE: ICD-10-CM

## 2022-12-22 PROCEDURE — 99214 OFFICE O/P EST MOD 30 MIN: CPT | Performed by: NURSE PRACTITIONER

## 2022-12-22 NOTE — PROGRESS NOTES
"     [facility] Cardiothoracic Surgery New Patient Office Note     Date of Encounter: 2022     Name: Dillon Vo  : 1950     Referred By: No ref. provider found  PCP: Ez Cintron MD    Chief Complaint:    Chief Complaint   Patient presents with   • Follow-up     6 month follow up with CT chest w/ w/o contrast Lung cancer upper left lobe. Pt states that he was in the hospital for 5 days for SOB and fluid build up. Here today to find out about his CT scan. Denies any SOB today or fatigue.        Subjective      History of Present Illness:  ***  Dillon Vo is a 72 y.o. male     Review of Systems:  ROS    I have reviewed the following portions of the patient's history: {history reviewed:69212::\"allergies\",\"current medications\",\"past family history\",\"past medical history\",\"past social history\",\"past surgical history\",\"problem list\"} and confirm it's accurate.    Allergies:  No Known Allergies    Medications:      Current Outpatient Medications:   •  acetaminophen (TYLENOL) 325 MG tablet, Take 2 tablets by mouth Every 4 (Four) Hours As Needed for Mild Pain., Disp: , Rfl:   •  aspirin 81 MG EC tablet, Take 1 tablet by mouth Daily., Disp: , Rfl:   •  atorvastatin (LIPITOR) 80 MG tablet, Take 1 tablet by mouth Every Night., Disp: 90 tablet, Rfl: 3  •  buPROPion XL (WELLBUTRIN XL) 300 MG 24 hr tablet, Take 1 tablet by mouth once daily, Disp: 30 tablet, Rfl: 5  •  carvedilol (COREG) 12.5 MG tablet, Take 1 tablet by mouth 2 (Two) Times a Day With Meals., Disp: 180 tablet, Rfl: 3  •  citalopram (CeleXA) 20 MG tablet, Take 1 tablet by mouth once daily, Disp: 90 tablet, Rfl: 0  •  dorzolamide-timolol (COSOPT) 22.3-6.8 MG/ML ophthalmic solution, dorzolamide 22.3 mg-timolol 6.8 mg/mL eye drops  1 drop both eyes 9 am and 9 pm, Disp: , Rfl:   •  famotidine (PEPCID) 20 MG tablet, Take 1 tablet by mouth Daily., Disp: 30 tablet, Rfl: 1  •  finasteride (PROSCAR) 5 MG tablet, TAKE 1 TABLET " BY MOUTH ONCE DAILY AT BEDTIME, Disp: 90 tablet, Rfl: 1  •  folic acid (FOLVITE) 400 MCG tablet, TAKE 1 TABLET BY MOUTH ONCE DAILY, Disp: 90 tablet, Rfl: 3  •  hydroCHLOROthiazide (HYDRODIURIL) 25 MG tablet, Take 1 tablet by mouth Daily., Disp: 90 tablet, Rfl: 0  •  lisinopril (PRINIVIL,ZESTRIL) 10 MG tablet, Take 2 tablets by mouth 2 (Two) Times a Day., Disp: 180 tablet, Rfl: 3  •  nicotine (NICODERM CQ) 14 MG/24HR patch, Place 1 patch on the skin as directed by provider Daily., Disp: 90 patch, Rfl: 0  •  tadalafil (Cialis) 20 MG tablet, Take 1 tablet by mouth Daily As Needed for Erectile Dysfunction., Disp: 30 tablet, Rfl: 2  •  vitamin B-12 (CYANOCOBALAMIN) 1000 MCG tablet, Take 1 tablet by mouth Daily., Disp: 90 tablet, Rfl: 3  •  warfarin (COUMADIN) 5 MG tablet, Take 1 tablet by mouth Daily., Disp: 30 tablet, Rfl: 5    History:   Past Medical History:   Diagnosis Date   • Anemia    • Atrial fibrillation (HCC) 05/05/2016    Status post AV node ablation and pacemaker placed   • Colitis    • Colon cancer (HCC)    • Complete heart block (HCC)     Status post AV node ablation   • Coronary artery disease    • Depression    • GERD (gastroesophageal reflux disease)    • Hiatal hernia    • HTN (hypertension)    • Hyperbilirubinemia    • Hyperlipidemia    • Infectious viral hepatitis    • Kidney stone    • Lung cancer (HCC)    • Skin cancer     lip        Past Surgical History:   Procedure Laterality Date   • APPENDECTOMY     • BRONCHOSCOPY THORACOTOMY Left 4/7/2021    Procedure: BRONCHOSCOPY LEFT THORACOTOMY, LEFT LUNG RESECTION;  Surgeon: Chi Post MD;  Location:  [facility] OR;  Service: Cardiothoracic;  Laterality: Left;   • CARDIAC CATHETERIZATION     • CARDIAC CATHETERIZATION N/A 12/7/2022    Procedure: LEFT HEART CATH;  Surgeon: Moiz Ronquillo IV, MD;  Location:  [facility] CATH INVASIVE LOCATION;  Service: Cardiovascular;  Laterality: N/A;   • CARDIAC SURGERY  2000    valve replacement    • COLON  "SURGERY     • COLONOSCOPY  up to date   • CORONARY STENT PLACEMENT     • EXTRACORPOREAL SHOCKWAVE LITHOTRIPSY (ESWL), STENT INSERTION/REMOVAL     • LUNG CANCER SURGERY     • PACEMAKER IMPLANTATION     • VARICOSE VEIN SURGERY         Social History     Socioeconomic History   • Marital status:    • Number of children: 0   Tobacco Use   • Smoking status: Every Day     Packs/day: 0.25     Years: 57.00     Pack years: 14.25     Types: Cigarettes     Start date: 1963   • Smokeless tobacco: Never   • Tobacco comments:     cut back on cigs   Vaping Use   • Vaping Use: Never used   Substance and Sexual Activity   • Alcohol use: Yes     Alcohol/week: 2.0 standard drinks     Types: 2 Shots of liquor per week     Comment: Daily   • Drug use: Yes     Types: Marijuana     Comment: occasionally   • Sexual activity: Defer     Partners: Female     Comment:          Family History   Problem Relation Age of Onset   • Cancer Mother    • Hypertension Father    • Heart disease Father    • No Known Problems Sister    • No Known Problems Brother        Objective   ***  Physical Exam:  Vitals:    12/22/22 0902   BP: 130/70   Pulse: 56   Temp: 97.5 °F (36.4 °C)   SpO2: 94%   Weight: 60.8 kg (134 lb)   Height: 180.3 cm (71\")      Body mass index is 18.69 kg/m².    Physical Exam    Imaging/Labs:  ***  CT Angiogram Abdomen Pelvis    Result Date: 12/5/2022  1.  4.9 cm infrarenal abdominal aortic aneurysm, previously 4.2 cm when measured similarly on 05/24/2021. 2.  Other atherosclerotic vascular disease, as described above. 3.  Apparent wall thickening of the ascending colon, which may be due to incomplete distention or nonspecific colitis. 4.  Small amount of free pelvic fluid, nonspecific. 5.  Stable bilateral pleural effusions compared to recent CT chest. 6.  Please see above for additional incidental findings.   This report was finalized on 12/5/2022 7:18 PM by Mayte Ybarra MD.      CT Angiogram Chest    Result Date: " 12/4/2022  1.  Abdominal aortic aneurysm with estimated maximal diameter of 5 cm. Surgical consultation is recommended for consideration of repair. 2.  Ascending thoracic aorta appears to measure up to 4.4 cm. No definite acute aortic abnormality is seen on this exam. 3.  Cardiomegaly with reflux of contrast into the IVC and hepatic veins. There is also a small amount of ascites and increased small to moderate left and small right pleural effusions. These findings may indicate heart failure. 4.  Mild groundglass opacities, central bronchial wall thickening, and basilar septal thickening which could be seen with edema or pneumonia. 5.  Emphysema. Status post left upper lobectomy. 6.  Cholelithiasis.  This report was finalized on 12/4/2022 4:57 PM by Oniel Callaway MD.           Assessment / Plan      Assessment / Plan:  There are no diagnoses linked to this encounter.   ***    Patient Education:      Follow Up:   No follow-ups on file.   Or sooner for any further concerns or worsening sign and symptoms. If unable to reach us in the office please dial 911 or go to the nearest emergency department.      DAGOBERTO Irving  [facility] Cardiothoracic Surgery    {Time Spent (Optional):55227}

## 2022-12-22 NOTE — PROGRESS NOTES
"     [facility] Cardiothoracic Surgery New Patient Office Note     Date of Encounter: 2022     Name: Dillon Vo  : 1950     Referred By: No ref. provider found  PCP: Ez Cintron MD    Chief Complaint:    Chief Complaint   Patient presents with   • Follow-up     6 month follow up with CT chest w/ w/o contrast Lung cancer upper left lobe. Pt states that he was in the hospital for 5 days for SOB and fluid build up. Here today to find out about his CT scan. Denies any SOB today or fatigue.        Subjective      History of Present Illness:    Dillon Vo is a 72 y.o. male  current smoker, with history of CAD, A. fib, HFpEF, pacemaker, mechanical heart valve on chronic anticoagulation, colon cancer,  and hypermetabolic left upper lobe lung mass s/p left thoracotomy with left upper lobectomy and lymph node sampling on 2021 with Dr. Post.  Pathology revealed adenocarcinoma, invasive and lipidic pattern, moderately differentiated, with hyalinized granuloma with central necrosis present; estimated total tumor size of 3.5 cm in maximum dimension, negative margins, negative nodes; staging consistent with pT2 N0 MX stage IB adenocarcinoma. Patient was previously established with pulmonology Dr. Kenney with recommendations to continue with surveillance CT Chest imaging every 6 months.     Mr. Vo was hospitalized @ [facility] beds at the [facility] for dyspnea, peripheral edema, and chest pain.  Dr. Post was consulted during that visit due to notation of infrarenal AAA 4.9 cm per CTA.  This had previously measured 4.2 cm on prior studies 2021.  Aneurysmal dilation of right iliac artery 1.4 cm per CTA 22.     Patient states he is feeling much better since the hospitalization: \"they took the fluid off\". It appears Mr. Vo missed his last appointment with Pulmonology.       Review of Systems:  Review of Systems   Constitutional: Negative for chills, decreased appetite, " diaphoresis, fever, malaise/fatigue, night sweats, weight gain and weight loss.   HENT: Negative for hoarse voice.    Eyes: Positive for visual disturbance (right eye for many years). Negative for blurred vision and double vision.   Cardiovascular: Positive for irregular heartbeat. Negative for chest pain, claudication, dyspnea on exertion, leg swelling, near-syncope, orthopnea, palpitations, paroxysmal nocturnal dyspnea and syncope.   Respiratory: Negative for cough, hemoptysis, shortness of breath, sputum production and wheezing.    Hematologic/Lymphatic: Positive for bleeding problem. Negative for adenopathy. Bruises/bleeds easily.   Skin: Positive for poor wound healing. Negative for color change, nail changes and rash.   Musculoskeletal: Negative for back pain, falls and muscle cramps.   Gastrointestinal: Negative for abdominal pain, dysphagia and heartburn.   Genitourinary: Negative for flank pain.   Neurological: Positive for light-headedness (when first standing up ). Negative for brief paralysis, disturbances in coordination, dizziness, focal weakness, headaches, loss of balance, numbness, paresthesias, sensory change, vertigo and weakness.   Psychiatric/Behavioral: Negative for depression and suicidal ideas.   Allergic/Immunologic: Negative for persistent infections.       I have reviewed the following portions of the patient's history: allergies, current medications, past family history, past medical history, past social history, past surgical history, problem list and ROS and confirm it's accurate.    Allergies:  No Known Allergies    Medications:      Current Outpatient Medications:   •  acetaminophen (TYLENOL) 325 MG tablet, Take 2 tablets by mouth Every 4 (Four) Hours As Needed for Mild Pain., Disp: , Rfl:   •  aspirin 81 MG EC tablet, Take 1 tablet by mouth Daily., Disp: , Rfl:   •  atorvastatin (LIPITOR) 80 MG tablet, Take 1 tablet by mouth Every Night., Disp: 90 tablet, Rfl: 3  •  buPROPion XL  (WELLBUTRIN XL) 300 MG 24 hr tablet, Take 1 tablet by mouth once daily, Disp: 30 tablet, Rfl: 5  •  carvedilol (COREG) 12.5 MG tablet, Take 1 tablet by mouth 2 (Two) Times a Day With Meals., Disp: 180 tablet, Rfl: 3  •  citalopram (CeleXA) 20 MG tablet, Take 1 tablet by mouth once daily, Disp: 90 tablet, Rfl: 0  •  dorzolamide-timolol (COSOPT) 22.3-6.8 MG/ML ophthalmic solution, dorzolamide 22.3 mg-timolol 6.8 mg/mL eye drops  1 drop both eyes 9 am and 9 pm, Disp: , Rfl:   •  famotidine (PEPCID) 20 MG tablet, Take 1 tablet by mouth Daily., Disp: 30 tablet, Rfl: 1  •  finasteride (PROSCAR) 5 MG tablet, TAKE 1 TABLET BY MOUTH ONCE DAILY AT BEDTIME, Disp: 90 tablet, Rfl: 1  •  folic acid (FOLVITE) 400 MCG tablet, TAKE 1 TABLET BY MOUTH ONCE DAILY, Disp: 90 tablet, Rfl: 3  •  hydroCHLOROthiazide (HYDRODIURIL) 25 MG tablet, Take 1 tablet by mouth Daily., Disp: 90 tablet, Rfl: 0  •  lisinopril (PRINIVIL,ZESTRIL) 10 MG tablet, Take 2 tablets by mouth 2 (Two) Times a Day., Disp: 180 tablet, Rfl: 3  •  nicotine (NICODERM CQ) 14 MG/24HR patch, Place 1 patch on the skin as directed by provider Daily., Disp: 90 patch, Rfl: 0  •  tadalafil (Cialis) 20 MG tablet, Take 1 tablet by mouth Daily As Needed for Erectile Dysfunction., Disp: 30 tablet, Rfl: 2  •  vitamin B-12 (CYANOCOBALAMIN) 1000 MCG tablet, Take 1 tablet by mouth Daily., Disp: 90 tablet, Rfl: 3  •  warfarin (COUMADIN) 5 MG tablet, Take 1 tablet by mouth Daily., Disp: 30 tablet, Rfl: 5    History:   Past Medical History:   Diagnosis Date   • Anemia    • Atrial fibrillation (HCC) 05/05/2016    Status post AV node ablation and pacemaker placed   • Colitis    • Colon cancer (HCC)    • Complete heart block (HCC)     Status post AV node ablation   • Coronary artery disease    • Depression    • GERD (gastroesophageal reflux disease)    • Hiatal hernia    • HTN (hypertension)    • Hyperbilirubinemia    • Hyperlipidemia    • Infectious viral hepatitis    • Kidney stone    •  "Lung cancer (HCC)    • Skin cancer     lip        Past Surgical History:   Procedure Laterality Date   • APPENDECTOMY     • BRONCHOSCOPY THORACOTOMY Left 4/7/2021    Procedure: BRONCHOSCOPY LEFT THORACOTOMY, LEFT LUNG RESECTION;  Surgeon: Chi Post MD;  Location:  [facility] OR;  Service: Cardiothoracic;  Laterality: Left;   • CARDIAC CATHETERIZATION     • CARDIAC CATHETERIZATION N/A 12/7/2022    Procedure: LEFT HEART CATH;  Surgeon: Moiz Ronquillo IV, MD;  Location:  [facility] CATH INVASIVE LOCATION;  Service: Cardiovascular;  Laterality: N/A;   • CARDIAC SURGERY  2000    valve replacement    • COLON SURGERY     • COLONOSCOPY  up to date   • CORONARY STENT PLACEMENT     • EXTRACORPOREAL SHOCKWAVE LITHOTRIPSY (ESWL), STENT INSERTION/REMOVAL     • LUNG CANCER SURGERY     • PACEMAKER IMPLANTATION     • VARICOSE VEIN SURGERY         Social History     Socioeconomic History   • Marital status:    • Number of children: 0   Tobacco Use   • Smoking status: Every Day     Packs/day: 0.25     Years: 57.00     Pack years: 14.25     Types: Cigarettes     Start date: 1963   • Smokeless tobacco: Never   • Tobacco comments:     cut back on his smoking 1 carton will last a month smoking 5 to 6 cigarettes a day    Vaping Use   • Vaping Use: Never used   Substance and Sexual Activity   • Alcohol use: Yes     Alcohol/week: 2.0 standard drinks     Types: 2 Shots of liquor per week     Comment: Daily   • Drug use: Yes     Types: Marijuana     Comment: occasionally   • Sexual activity: Defer     Partners: Female     Comment:          Family History   Problem Relation Age of Onset   • Cancer Mother    • Hypertension Father    • Heart disease Father    • No Known Problems Sister    • No Known Problems Brother        Objective     Physical Exam:  Vitals:    12/22/22 0902   BP: 130/70   Pulse: 56   Temp: 97.5 °F (36.4 °C)   SpO2: 94%   Weight: 60.8 kg (134 lb)   Height: 180.3 cm (71\")      Body mass index is 18.69 " kg/m².    Physical Exam  Vitals reviewed.   Constitutional:       General: He is not in acute distress.     Appearance: He is underweight. He is not toxic-appearing.   HENT:      Head: Normocephalic and atraumatic.   Neck:      Vascular: No carotid bruit.   Cardiovascular:      Rate and Rhythm: Regular rhythm. Bradycardia present.      Pulses:           Carotid pulses are 2+ on the right side and 2+ on the left side.       Dorsalis pedis pulses are 1+ on the right side and 1+ on the left side.        Posterior tibial pulses are 1+ on the right side and 1+ on the left side.      Heart sounds: S1 normal and S2 normal.      Comments: Mechanical valve click  Pulmonary:      Effort: Pulmonary effort is normal.      Breath sounds: Examination of the right-lower field reveals decreased breath sounds. Examination of the left-lower field reveals decreased breath sounds. Decreased breath sounds present. No wheezing, rhonchi or rales.   Chest:      Chest wall: No tenderness or crepitus.   Abdominal:      General: Abdomen is scaphoid. Bowel sounds are normal.      Palpations: Abdomen is soft.      Tenderness: There is no abdominal tenderness.   Musculoskeletal:      Cervical back: Normal range of motion and neck supple.      Right lower leg: No edema.      Left lower leg: No edema.   Skin:     General: Skin is warm and dry.      Capillary Refill: Capillary refill takes less than 2 seconds.   Neurological:      General: No focal deficit present.      Mental Status: He is alert and oriented to person, place, and time.   Psychiatric:         Attention and Perception: Attention normal.         Mood and Affect: Mood normal.         Speech: Speech normal.         Behavior: Behavior is cooperative.     Imaging/Labs:  CT Angiogram Abdomen Pelvis  Result Date: 12/5/2022 Personally reviewed and agree with infrarenal aortic aneurysm measurement 4.9 cm.    1.  4.9 cm infrarenal abdominal aortic aneurysm, previously 4.2 cm when measured  similarly on 05/24/2021. 2.  Other atherosclerotic vascular disease, as described above. 3.  Apparent wall thickening of the ascending colon, which may be due to incomplete distention or nonspecific colitis. 4.  Small amount of free pelvic fluid, nonspecific. 5.  Stable bilateral pleural effusions compared to recent CT chest. 6.  Please see above for additional incidental findings.   This report was finalized on 12/5/2022 7:18 PM by Mayte Ybarra MD.      CT Angiogram Chest  Result Date: 12/4/2022  Personally reviewed and agree w/ radiology interpretation  1.  Abdominal aortic aneurysm with estimated maximal diameter of 5 cm. Surgical consultation is recommended for consideration of repair. 2.  Ascending thoracic aorta appears to measure up to 4.4 cm. No definite acute aortic abnormality is seen on this exam. 3.  Cardiomegaly with reflux of contrast into the IVC and hepatic veins. There is also a small amount of ascites and increased small to moderate left and small right pleural effusions. These findings may indicate heart failure. 4.  Mild groundglass opacities, central bronchial wall thickening, and basilar septal thickening which could be seen with edema or pneumonia. 5.  Emphysema. Status post left upper lobectomy. 6.  Cholelithiasis.  This report was finalized on 12/4/2022 4:57 PM by Oniel Callaway MD.       CT Chest With & Without Contrast Diagnostic Result Date: 5/23/2022  Persistent moderate left pleural effusion with improved, now trace right pleural effusion. Groundglass changes at the lung bases are essentially resolved, with no current evidence of acute infectious or inflammatory process. Otherwise stable appearance status post left upper lobectomy with no evidence of recurrent mass or new suspicious nodularity to suggest recurrence.  This report was finalized on 5/23/2022 6:41 PM by Brennen Rubio.       CT Chest With & Without Contrast Diagnostic Result Date: 12/4/2021  Persistent left pleural  effusion, similar in extent to the 05/24/2021 exam. No new or progressive chest disease is seen. No evidence of recurrent malignancy.  D:  12/03/2021 E:  12/03/2021  This report was finalized on 12/4/2021 3:37 PM by Dr. Rufino Disla MD.       CTA Chest Result date 5/24/21:  IMPRESSION: Status post left upper lobectomy. There is a moderate subpulmonic left pleural effusion new since March. No evidence of pulmonary embolism.     NM PET Result date 3/2/21:   IMPRESSION: Compared to PET/CT 09/25/2019 and as seen on recent  diagnostic CT chest, there has been slow but interval progressive growth  of a left upper lobe pulmonary nodule now measuring approximately 13 mm  with interval increase in activity borderline hypermetabolic or  equivocal for malignancy maximum SUV 2.3 versus 0.9 on 2019 comparison.  No separate sites from this in the chest or elsewhere to suggest distant  metastasis or abnormal hypermetabolism otherwise.    Assessment / Plan      Assessment / Plan:  1. Infrarenal abdominal aortic aneurysm (AAA) without rupture (Primary)  -Ultrasound abdominal aorta 7/6/2022 4.6 infrarenal abdominal aortic aneurysm   -Inpatient CTA 12/5/2022 abdomen and pelvis measured infrarenal abdominal aortic aneurysm 4.9 cm  -Discussed this is nearing size for surgical intervention  -Since patient will be following in 6 months for CT chest we will plan for repeat CTA abdomen and pelvis to reevaluate infrarenal AAA at that time as well  -Clinic follow-up after scans  -Discussed importance of tobacco cessation as it relates to his vascular disease    2. S/P lobectomy of MARKO lung 4/7/21  - Left upper lobe lung mass with interval enlargement and elevated SUV who is s/p left thoracotomy with left upper lobectomy and lymph node sampling on 4/7/2021 with Dr. Post  -  pT2 N0 MX stage IB adenocarcinoma.   -  Most recent CT Chest with persistent groundglass opacities and no new acute concerns for malignancy.   - Will plan to see the  patient back in 6 months with repeat CT Chest for surveillance of his lung cancer.   - will also contact Pulmonology to assist patient to re-establish with Dr. Kenney    3. Tobacco use/ COPD  -  Cessation counseling as noted below    4. Aneurysm of ascending aorta without rupture s/p repair  5. H/O mechanical aortic valve replacement  -Remote repair at time of mechanical aortic valve replacement  2001    6. Coronary artery disease involving native coronary artery of native heart with angina pectoris (HCC)  -Follows with Dr. Ronquillo and cardiology  -Remote PCI 2013  -Cardiac cath updated 12/7/2022 with no intervention      Patient Education: Dillon Vo  reports that he has been smoking cigarettes. He started smoking about 60 years ago. He has a 14.25 pack-year smoking history. He has never used smokeless tobacco.. I have educated him on the risk of diseases from using tobacco products such as cancer, COPD, heart disease and PVD.     I advised him to quit and he is not willing to quit.  We discussed various methods to assist with smoking cessation such as nicotine patches and [facility] classes [facility].    I spent 3.5 minutes counseling the patient.           Follow Up:   Return in about 6 months (around 6/22/2023) for Lung cancer surveillance and AAA surveillance.   Or sooner for any further concerns or worsening sign and symptoms. If unable to reach us in the office please dial 911 or go to the nearest emergency department.      Stacey ARENAS  [facility] Cardiothoracic Surgery    Time Spent: I spent 39 minutes caring for Dillon on this date of service. This time includes time spent by me in the following activities: preparing for the visit, reviewing tests, obtaining and/or reviewing a separately obtained history, performing a medically appropriate examination and/or evaluation, counseling and educating the patient/family/caregiver, ordering medications, tests, or procedures,  documenting information in the medical record, independently interpreting results and communicating that information with the patient/family/caregiver and care coordination.

## 2022-12-29 ENCOUNTER — OUTSIDE FACILITY SERVICE (OUTPATIENT)
Dept: INTERNAL MEDICINE | Facility: CLINIC | Age: 72
End: 2022-12-29
Payer: MEDICARE

## 2022-12-29 PROCEDURE — G0180 MD CERTIFICATION HHA PATIENT: HCPCS | Performed by: INTERNAL MEDICINE

## 2023-01-14 PROCEDURE — 93294 REM INTERROG EVL PM/LDLS PM: CPT | Performed by: INTERNAL MEDICINE

## 2023-01-14 PROCEDURE — 93296 REM INTERROG EVL PM/IDS: CPT | Performed by: INTERNAL MEDICINE

## 2023-01-17 ENCOUNTER — PATIENT OUTREACH (OUTPATIENT)
Dept: CASE MANAGEMENT | Facility: OTHER | Age: 73
End: 2023-01-17
Payer: MEDICARE

## 2023-01-17 NOTE — OUTREACH NOTE
AMBULATORY CASE MANAGEMENT NOTE    Name and Relationship of Patient/Support Person: Dillon Vo - Self    Care Coordination    Unable to reach patient after 4 different attempts, to follow up for HRCM.  No voicemail is available.  Patient has called back to RN-ACM and left voicemail, but when call is returned to patient, there is no answer to phone.  Last successful outreach to patient was 12-19-22.    This note will be routed to the PCP.    DAVID MARINA  Ambulatory Case Management    1/17/2023, 10:10 EST

## 2023-01-19 ENCOUNTER — OFFICE VISIT (OUTPATIENT)
Dept: INTERNAL MEDICINE | Facility: CLINIC | Age: 73
End: 2023-01-19
Payer: MEDICARE

## 2023-01-19 ENCOUNTER — LAB (OUTPATIENT)
Dept: LAB | Facility: HOSPITAL | Age: 73
End: 2023-01-19
Payer: MEDICARE

## 2023-01-19 VITALS
SYSTOLIC BLOOD PRESSURE: 130 MMHG | BODY MASS INDEX: 19.67 KG/M2 | HEART RATE: 72 BPM | DIASTOLIC BLOOD PRESSURE: 70 MMHG | WEIGHT: 141 LBS | OXYGEN SATURATION: 98 % | RESPIRATION RATE: 20 BRPM | TEMPERATURE: 97.3 F

## 2023-01-19 DIAGNOSIS — E78.5 HYPERLIPIDEMIA, UNSPECIFIED HYPERLIPIDEMIA TYPE: ICD-10-CM

## 2023-01-19 DIAGNOSIS — I10 ESSENTIAL HYPERTENSION: ICD-10-CM

## 2023-01-19 DIAGNOSIS — I10 ESSENTIAL HYPERTENSION: Primary | ICD-10-CM

## 2023-01-19 PROCEDURE — 99214 OFFICE O/P EST MOD 30 MIN: CPT | Performed by: INTERNAL MEDICINE

## 2023-01-19 PROCEDURE — 80053 COMPREHEN METABOLIC PANEL: CPT

## 2023-01-19 PROCEDURE — 80061 LIPID PANEL: CPT

## 2023-01-19 NOTE — PROGRESS NOTES
Chief Complaint   Patient presents with   • Hypertension     Follow up       History of Present Illness    The patient presents for a follow-up related to hypertension. The patient reports that he has had no headaches, chest pain, dyspnea, edema, syncope, blurred vision or palpitations. He states that he is taking his medication as prescribed. He is not having medication side effects.    The patient presents for a follow-up related to hyperlipidemia. He is following a low fat diet. He reports that he is exercising. He is taking atorvastatin. The patient is taking his medication as prescribed. He reports no medication side effects, including muscle cramps, abdominal pain, headaches or weakness. He denies orthopnea, paroxysmal nocturnal dyspnea or dyspnea on exertion.    Medications      Current Outpatient Medications:   •  acetaminophen (TYLENOL) 325 MG tablet, Take 2 tablets by mouth Every 4 (Four) Hours As Needed for Mild Pain., Disp: , Rfl:   •  aspirin 81 MG EC tablet, Take 1 tablet by mouth Daily., Disp: , Rfl:   •  atorvastatin (LIPITOR) 80 MG tablet, Take 1 tablet by mouth Every Night., Disp: 90 tablet, Rfl: 3  •  buPROPion XL (WELLBUTRIN XL) 300 MG 24 hr tablet, Take 1 tablet by mouth once daily, Disp: 30 tablet, Rfl: 5  •  carvedilol (COREG) 12.5 MG tablet, Take 1 tablet by mouth 2 (Two) Times a Day With Meals., Disp: 180 tablet, Rfl: 3  •  citalopram (CeleXA) 20 MG tablet, Take 1 tablet by mouth once daily, Disp: 90 tablet, Rfl: 0  •  dorzolamide-timolol (COSOPT) 22.3-6.8 MG/ML ophthalmic solution, dorzolamide 22.3 mg-timolol 6.8 mg/mL eye drops  1 drop both eyes 9 am and 9 pm, Disp: , Rfl:   •  famotidine (PEPCID) 20 MG tablet, Take 1 tablet by mouth Daily., Disp: 30 tablet, Rfl: 1  •  finasteride (PROSCAR) 5 MG tablet, TAKE 1 TABLET BY MOUTH ONCE DAILY AT BEDTIME, Disp: 90 tablet, Rfl: 1  •  folic acid (FOLVITE) 400 MCG tablet, TAKE 1 TABLET BY MOUTH ONCE DAILY, Disp: 90 tablet, Rfl: 3  •   hydroCHLOROthiazide (HYDRODIURIL) 25 MG tablet, Take 1 tablet by mouth Daily., Disp: 90 tablet, Rfl: 0  •  lisinopril (PRINIVIL,ZESTRIL) 10 MG tablet, Take 2 tablets by mouth 2 (Two) Times a Day., Disp: 180 tablet, Rfl: 3  •  nicotine (NICODERM CQ) 14 MG/24HR patch, Place 1 patch on the skin as directed by provider Daily., Disp: 90 patch, Rfl: 0  •  tadalafil (Cialis) 20 MG tablet, Take 1 tablet by mouth Daily As Needed for Erectile Dysfunction., Disp: 30 tablet, Rfl: 2  •  vitamin B-12 (CYANOCOBALAMIN) 1000 MCG tablet, Take 1 tablet by mouth Daily., Disp: 90 tablet, Rfl: 3  •  warfarin (COUMADIN) 5 MG tablet, Take 1 tablet by mouth Daily., Disp: 30 tablet, Rfl: 5     Allergies    No Known Allergies    Problem List    Patient Active Problem List   Diagnosis   • Depression   • Hyperbilirubinemia   • Hyperlipidemia LDL goal <70   • Essential hypertension   • Malignant neoplasm of overlapping sites of colon (HCC)   • Neurogenic bladder   • Tobacco use   • Coronary artery disease involving native coronary artery of native heart with angina pectoris (HCC)   • H/O mechanical aortic valve replacement   • COPD (chronic obstructive pulmonary disease) (HCC)   • Thrombocytopenia (HCC)   • Chronic anticoagulation on warfarin    • Self-catheterizes urinary bladder   • Lung cancer (S/P Left thoracotomy and lobectomy 4/2021)   • Lung nodule < 6cm on CT (S/P Left thoracotomy and MARKO Lobectomy)   • Gastroesophageal reflux disease without esophagitis   • Pacemaker   • S/P lobectomy of MARKO lung 4/7/21   • Syncope (R/O due to orthostatic hypotension)   • Permanent atrial fibrillation (HCC)   • Acute diastolic congestive heart failure (HCC)   • Abdominal aortic aneurysm (AAA) without rupture   • Smoking   • Urinary retention   • Complete heart block (HCC)   • Aneurysm of ascending aorta without rupture s/p repair        Medications, Allergies, Problems List and Past History were reviewed and updated.    Physical Examination    /70  (BP Location: Right arm, Patient Position: Sitting, Cuff Size: Adult)   Pulse 72   Temp 97.3 °F (36.3 °C) (Temporal)   Resp 20   Wt 64 kg (141 lb)   SpO2 98%   BMI 19.67 kg/m²     HEENT: Head- Normocephalic Atraumatic. Facies- Within normal limits. Pinnas- Normal texture and shape bilaterally. Canals- Normal bilaterally. TMs- Normal bilaterally. Nares- Patent bilaterally. Nasal Septum- is normal. There is no tenderness to palpation over the frontal or maxillary sinuses. Lids- Normal bilaterally. Conjunctiva- Clear bilaterally. Sclera- Anicteric bilaterally. Oropharynx- Moist with no lesions. Tonsils- No enlargement, erythema or exudate.    Neck: Thyroid- non enlarged, symmetric and has no nodules. No bruits are detected. ROM- Normal Range of Motion with no rigidity.    Lungs: Auscultation- Clear to auscultation bilaterally. There are no retractions, clubbing or cyanosis. The Expiratory to Inspiratory ratio is equal.    Cardiovascular: There are no carotid bruits. Heart- Normal Rate with Regular rhythm and no murmurs. There are no gallops. There are no rubs. In the lower extremities there is no edema. The upper extremities do not have edema.    Abdomen: Soft, benign, non-tender with no masses, hernias, organomegaly or scars.    Impression and Assessment    Essential Hypertension.    Hyperlipidemia.    Plan    Essential Hypertension Plan: The patient was instructed to continue the current medications.    Hyperlipidemia Plan: The patient was instructed to exercise daily, eat a low fat diet and continue his medications.    Diagnoses and all orders for this visit:    1. Essential hypertension (Primary)  -     Comprehensive Metabolic Panel; Future    2. Hyperlipidemia, unspecified hyperlipidemia type  -     Comprehensive Metabolic Panel; Future  -     Lipid Panel; Future        Return to Office    The patient was instructed to return for follow-up in 6 months. The patient was instructed to return sooner if the  condition changes, worsens, or does not resolve.

## 2023-01-20 LAB
ALBUMIN SERPL-MCNC: 3.9 G/DL (ref 3.5–5.2)
ALBUMIN/GLOB SERPL: 1.3 G/DL
ALP SERPL-CCNC: 75 U/L (ref 39–117)
ALT SERPL W P-5'-P-CCNC: 11 U/L (ref 1–41)
ANION GAP SERPL CALCULATED.3IONS-SCNC: 11.3 MMOL/L (ref 5–15)
AST SERPL-CCNC: 22 U/L (ref 1–40)
BILIRUB SERPL-MCNC: 1.1 MG/DL (ref 0–1.2)
BUN SERPL-MCNC: 13 MG/DL (ref 8–23)
BUN/CREAT SERPL: 11.7 (ref 7–25)
CALCIUM SPEC-SCNC: 9.3 MG/DL (ref 8.6–10.5)
CHLORIDE SERPL-SCNC: 101 MMOL/L (ref 98–107)
CHOLEST SERPL-MCNC: 136 MG/DL (ref 0–200)
CO2 SERPL-SCNC: 26.7 MMOL/L (ref 22–29)
CREAT SERPL-MCNC: 1.11 MG/DL (ref 0.76–1.27)
EGFRCR SERPLBLD CKD-EPI 2021: 70.6 ML/MIN/1.73
GLOBULIN UR ELPH-MCNC: 3.1 GM/DL
GLUCOSE SERPL-MCNC: 99 MG/DL (ref 65–99)
HDLC SERPL-MCNC: 79 MG/DL (ref 40–60)
LDLC SERPL CALC-MCNC: 47 MG/DL (ref 0–100)
LDLC/HDLC SERPL: 0.62 {RATIO}
POTASSIUM SERPL-SCNC: 5 MMOL/L (ref 3.5–5.2)
PROT SERPL-MCNC: 7 G/DL (ref 6–8.5)
SODIUM SERPL-SCNC: 139 MMOL/L (ref 136–145)
TRIGL SERPL-MCNC: 42 MG/DL (ref 0–150)
VLDLC SERPL-MCNC: 10 MG/DL (ref 5–40)

## 2023-02-01 ENCOUNTER — OFFICE VISIT (OUTPATIENT)
Dept: PULMONOLOGY | Facility: CLINIC | Age: 73
End: 2023-02-01
Payer: MEDICARE

## 2023-02-01 VITALS
DIASTOLIC BLOOD PRESSURE: 60 MMHG | HEIGHT: 71 IN | TEMPERATURE: 98 F | BODY MASS INDEX: 19.74 KG/M2 | RESPIRATION RATE: 16 BRPM | WEIGHT: 141 LBS | OXYGEN SATURATION: 100 % | HEART RATE: 79 BPM | SYSTOLIC BLOOD PRESSURE: 130 MMHG

## 2023-02-01 DIAGNOSIS — C34.92 NON-SMALL CELL CANCER OF LEFT LUNG: ICD-10-CM

## 2023-02-01 DIAGNOSIS — J44.9 CHRONIC OBSTRUCTIVE PULMONARY DISEASE, UNSPECIFIED COPD TYPE: Primary | ICD-10-CM

## 2023-02-01 PROCEDURE — 94060 EVALUATION OF WHEEZING: CPT | Performed by: INTERNAL MEDICINE

## 2023-02-01 PROCEDURE — 94726 PLETHYSMOGRAPHY LUNG VOLUMES: CPT | Performed by: INTERNAL MEDICINE

## 2023-02-01 PROCEDURE — 99214 OFFICE O/P EST MOD 30 MIN: CPT | Performed by: INTERNAL MEDICINE

## 2023-02-01 PROCEDURE — 94729 DIFFUSING CAPACITY: CPT | Performed by: INTERNAL MEDICINE

## 2023-02-01 RX ORDER — ALBUTEROL SULFATE 90 UG/1
4 AEROSOL, METERED RESPIRATORY (INHALATION) ONCE
Status: COMPLETED | OUTPATIENT
Start: 2023-02-01 | End: 2023-02-01

## 2023-02-01 RX ADMIN — ALBUTEROL SULFATE 4 PUFF: 90 AEROSOL, METERED RESPIRATORY (INHALATION) at 10:45

## 2023-02-01 NOTE — PROGRESS NOTES
CC:    Abnormal CT    HPI:    68 y/o WM w/ h/o tobacco abuse ~100 py plus, prior colon cancer s/p resection 2001, mechanical AVR, CAD w/ prior stent, Afib, who presents for evaluation of abnormal CT Chest. Patient had a lung cancer screening CT 8/20/19 showing a spiculated 12 mm (I disagree with radiology reading of 9 mm) spiculated MARKO lesion.  No mediastinal or hilar LAD.  No symptoms attributable to this nodule.    Last time I saw patient in 12/11/19 he had some mild hemoptysis.  This was felt to be due to bronchitis and resolved with abx / steroids.  INR was slightly supra-therapeutic.  This resolved on follow up visit 12/27/19.    Patient was admitted in 2/19-2/24/20 w/ H1N1 flu / pna / hemoptysis.  INR was 7 at the time.  CXR abnormalities cleared rapidly.  ANCA and GBM antibodies were negative.  Echo showed EF 46%, LVH, dilated LA, dilated RV, mild to mod MR, functioning mechanical AVR, RVSP > 55.  Echo was done in setting of marked abnormalities on CXR.    Had another admision 2/26-3/7/20 with supratherapeutic INR and large rectus sheath hematoma.  This was complicated by reversal of AC and subsequent embolic strokes.    He eventually had repeat PET-CT which showed higher SUV and slight growth.  He ultimately underwent MARKO Lobectomy with Dr. Post 4/7/21 that showed Stage 1B Adenocarcinoma.  He did well post op.    INTERVAL HISTORY:    72 y.o. patient returns today for follow up.  Last seen by me 5/12/21.  He was recently admitted [facility] in Dec 2022 for CHF exacerbation with bilateral Pleural Effusions L>R.  Did well with diuresis.  Has urethral stricture, I&O caths for past 5 years or so.      Feeling better since discharge, back to baseline.  No wheezing, hemoptysis, LE edema, cough etc.  Not using any inhalers.    PMH:    Past Medical History:   Diagnosis Date   • Anemia    • Atrial fibrillation (HCC) 05/05/2016    Status post AV node ablation and pacemaker placed   • Colitis    • Colon cancer (HCC)    •  Complete heart block (HCC)     Status post AV node ablation   • Coronary artery disease    • Depression    • GERD (gastroesophageal reflux disease)    • Hiatal hernia    • HTN (hypertension)    • Hyperbilirubinemia    • Hyperlipidemia    • Infectious viral hepatitis    • Kidney stone    • Lung cancer (HCC)    • Skin cancer     lip      PSH:    Past Surgical History:   Procedure Laterality Date   • APPENDECTOMY     • BRONCHOSCOPY THORACOTOMY Left 4/7/2021    Procedure: BRONCHOSCOPY LEFT THORACOTOMY, LEFT LUNG RESECTION;  Surgeon: Chi Post MD;  Location:  [facility] OR;  Service: Cardiothoracic;  Laterality: Left;   • CARDIAC CATHETERIZATION     • CARDIAC CATHETERIZATION N/A 12/7/2022    Procedure: LEFT HEART CATH;  Surgeon: Moiz Ronquillo IV, MD;  Location:  [facility] CATH [facility]IVE [facility];  Service: Cardiovascular;  Laterality: N/A;   • CARDIAC SURGERY  2000    valve replacement    • COLON SURGERY     • COLONOSCOPY  up to date   • CORONARY STENT PLACEMENT     • EXTRACORPOREAL SHOCKWAVE LITHOTRIPSY (ESWL), STENT INSERTION/REMOVAL     • LUNG CANCER SURGERY     • PACEMAKER IMPLANTATION     • VARICOSE VEIN SURGERY       FH:    Family History   Problem Relation Age of Onset   • Cancer Mother    • Hypertension Father    • Heart disease Father    • No Known Problems Sister    • No Known Problems Brother      SH:    Social History     Socioeconomic History   • Marital status:    • Number of children: 0   Tobacco Use   • Smoking status: Every Day     Packs/day: 0.25     Years: 57.00     Pack years: 14.25     Types: Cigarettes     Start date: 1963   • Smokeless tobacco: Never   • Tobacco comments:     cut back on his smoking 1 carton will last a month smoking 5 to 6 cigarettes a day    Vaping Use   • Vaping Use: Never used   Substance and Sexual Activity   • Alcohol use: Yes     Alcohol/week: 2.0 standard drinks     Types: 2 Shots of liquor per week     Comment: Daily   • Drug use: Yes     Types: Marijuana      Comment: occasionally   • Sexual activity: Defer     Partners: Female     Comment:       ALLERGIES:    No Known Allergies  MEDICATIONS:      Current Outpatient Medications:   •  acetaminophen (TYLENOL) 325 MG tablet, Take 2 tablets by mouth Every 4 (Four) Hours As Needed for Mild Pain., Disp: , Rfl:   •  aspirin 81 MG EC tablet, Take 1 tablet by mouth Daily., Disp: , Rfl:   •  atorvastatin (LIPITOR) 80 MG tablet, Take 1 tablet by mouth Every Night., Disp: 90 tablet, Rfl: 3  •  buPROPion XL (WELLBUTRIN XL) 300 MG 24 hr tablet, Take 1 tablet by mouth once daily, Disp: 30 tablet, Rfl: 5  •  carvedilol (COREG) 12.5 MG tablet, Take 1 tablet by mouth 2 (Two) Times a Day With Meals., Disp: 180 tablet, Rfl: 3  •  citalopram (CeleXA) 20 MG tablet, Take 1 tablet by mouth once daily, Disp: 90 tablet, Rfl: 0  •  dorzolamide-timolol (COSOPT) 22.3-6.8 MG/ML ophthalmic solution, dorzolamide 22.3 mg-timolol 6.8 mg/mL eye drops  1 drop both eyes 9 am and 9 pm, Disp: , Rfl:   •  famotidine (PEPCID) 20 MG tablet, Take 1 tablet by mouth Daily., Disp: 30 tablet, Rfl: 1  •  finasteride (PROSCAR) 5 MG tablet, TAKE 1 TABLET BY MOUTH ONCE DAILY AT BEDTIME, Disp: 90 tablet, Rfl: 1  •  folic acid (FOLVITE) 400 MCG tablet, TAKE 1 TABLET BY MOUTH ONCE DAILY, Disp: 90 tablet, Rfl: 3  •  hydroCHLOROthiazide (HYDRODIURIL) 25 MG tablet, Take 1 tablet by mouth Daily., Disp: 90 tablet, Rfl: 0  •  lisinopril (PRINIVIL,ZESTRIL) 10 MG tablet, Take 2 tablets by mouth 2 (Two) Times a Day., Disp: 180 tablet, Rfl: 3  •  nicotine (NICODERM CQ) 14 MG/24HR patch, Place 1 patch on the skin as directed by provider Daily., Disp: 90 patch, Rfl: 0  •  tadalafil (Cialis) 20 MG tablet, Take 1 tablet by mouth Daily As Needed for Erectile Dysfunction., Disp: 30 tablet, Rfl: 2  •  vitamin B-12 (CYANOCOBALAMIN) 1000 MCG tablet, Take 1 tablet by mouth Daily., Disp: 90 tablet, Rfl: 3  •  warfarin (COUMADIN) 5 MG tablet, Take 1 tablet by mouth Daily., Disp: 30  tablet, Rfl: 5  No current facility-administered medications for this visit.  ROS:  Per HPI, otherwise all systems reviewed and negative.    DIAGNOSTIC DATA (Reviewed and interpreted by me unless otherwise specified):    CT Chest 8/20/19 - emphysema, scarring RUL, scattered sub-centimeter nodules, 12 mm spiculated nodule in MARKO    CT PET 9/25/19 - SUV 1.0, negative    CT Chest 11/6/19 - stable MARKO nodule, also a RUL nodule that appears inflammatory that needs to be followed    CT Chest 2/6/20 - stable MARKO and RUL nodules when going back to 8/20/19    CT Chest 8/18/20 - relatively stable MARKO nodules and RUL scarring.  Prior neg PET-CT.      CT Chest 2/12/21 - relative stable MARKO nodules, however maybe some slight growth going back to 1 year ago overall.    PET CT 3/2/21 - interval growth of primary MARKO nodule with higher SUV compared to prior    CTA Chest 12/4/22 (while hospitalized for CHF) - moderate to severe emphysema, right apical scaring, s/p MARKO Lobectomy, bilateral effusions L >> R.  Suspect secondary to CHF, left sided effusion likely larger secondary to prior MARKO Lobectomy.  Compressive atelectasis bilateral LL's.  No suspicious nodules.  Stable adenopathy.  Cardiomegaly.    CXR 12/11/19 - RUL and RML consolidation    PFT 9/17/19 - borderline mild obstruction, +BD change, hyperinflation, +air trapping, normal DLCO    PFT 2/1/23 - mod obstruction, no change w/ BD, no restriction - normal TLC and FVC, mild dec DL    Vitals:    02/01/23 1110   BP: 130/60   Pulse: 79   Resp: 16   Temp: 98 °F (36.7 °C)   SpO2: 100%       Physical Exam:    Constitutional: Alert, no Distress  Mouth/Throat: Oropharynx is clear and moist.   Cardiovascular: Normal rate, regular rhythm, mechanical click from AVR  Pulmonary/Chest: Effort normal and breath sounds normal.  No clubbing.       Assessment & Plan     1)  NSCLC s/p MARKO Lobectomy April 2021 - Stage IB adenocarcinoma.  Needs CT Chest every 6 months for 5 years.  Didn't notice  any concerning nodules on CT while hospitalized for CHF Dec 2022.  Due for repeat towards end of May.    2) Bilateral Pleural Effusions - noted on CTA Dec 2022.  Lung bases clear.  Was diuresed for CHF.  TLC and FVC normal on PFTs.  Suspect these are resolved and were secondary to CHF.  Left sided likely was larger secondary to prior MARKO Lobectomy.    3) Tobacco Abuse - counseled patient, down to 4-5 cigs per day.  Recommend NRT.    4) GOLD 2A COPD - doing well without inhaler currently.  Not interested in adding one today.  H/o I&O cath the past 5 years.   recommends try to avoid anticholinergics, though this sounds more like a mechanical problem - he is unable to urinate at all unless bladder extremely full without cathing.  If breathing worsens from COPD standpoint I think benefit of LABA/LAMA +/- ICS would outweigh risk.    RTC ~ May w/ CT Chest w/o Contrast & Marv    Payam Kenney MD  Pulmonology and Critical Care Medicine  02/01/23 11:18 EST  Electronically Signed    C.C.:  Chi Post MD, Ez Cintron MD

## 2023-02-09 ENCOUNTER — TELEPHONE (OUTPATIENT)
Dept: CARDIOLOGY | Facility: CLINIC | Age: 73
End: 2023-02-09
Payer: MEDICARE

## 2023-02-09 DIAGNOSIS — I48.0 PAROXYSMAL ATRIAL FIBRILLATION: Primary | ICD-10-CM

## 2023-02-09 NOTE — TELEPHONE ENCOUNTER
Remote reading received showing battery has reached 2.59V and B[facility]AR is 2.60V.  Remote reading shows >99% , VVIR.  Hospital admission Echo and heart cath showed normal EF.  Will setup for generator change with EP.      Called Mr Vo and no answer on both phone #s in chart and unable to leave a message.    He is on Warfarin for mechanical valve.

## 2023-02-09 NOTE — TELEPHONE ENCOUNTER
Generator change order entered for Dr Remy.  Spoke to wife and made aware.  No medications to hold per Dr Remy for generator change.

## 2023-02-15 ENCOUNTER — TELEPHONE (OUTPATIENT)
Dept: PHARMACY | Facility: HOSPITAL | Age: 73
End: 2023-02-15

## 2023-02-15 ENCOUNTER — PREP FOR SURGERY (OUTPATIENT)
Dept: OTHER | Facility: HOSPITAL | Age: 73
End: 2023-02-15
Payer: MEDICARE

## 2023-02-15 DIAGNOSIS — Z45.010 PACEMAKER BATTERY DEPLETION: Primary | ICD-10-CM

## 2023-02-15 RX ORDER — SODIUM CHLORIDE 0.9 % (FLUSH) 0.9 %
10 SYRINGE (ML) INJECTION AS NEEDED
Status: CANCELLED | OUTPATIENT
Start: 2023-02-15

## 2023-02-15 RX ORDER — ACETAMINOPHEN 325 MG/1
650 TABLET ORAL EVERY 4 HOURS PRN
Status: CANCELLED | OUTPATIENT
Start: 2023-02-15

## 2023-02-15 RX ORDER — ONDANSETRON 2 MG/ML
4 INJECTION INTRAMUSCULAR; INTRAVENOUS EVERY 6 HOURS PRN
Status: CANCELLED | OUTPATIENT
Start: 2023-02-15

## 2023-02-15 RX ORDER — SODIUM CHLORIDE 0.9 % (FLUSH) 0.9 %
3 SYRINGE (ML) INJECTION EVERY 12 HOURS SCHEDULED
Status: CANCELLED | OUTPATIENT
Start: 2023-02-15

## 2023-02-15 RX ORDER — NITROGLYCERIN 0.4 MG/1
0.4 TABLET SUBLINGUAL
Status: CANCELLED | OUTPATIENT
Start: 2023-02-15

## 2023-02-15 RX ORDER — SODIUM CHLORIDE 9 MG/ML
40 INJECTION, SOLUTION INTRAVENOUS AS NEEDED
Status: CANCELLED | OUTPATIENT
Start: 2023-02-15

## 2023-02-15 RX ORDER — CEFAZOLIN SODIUM 2 G/100ML
2 INJECTION, SOLUTION INTRAVENOUS ONCE
Status: CANCELLED | OUTPATIENT
Start: 2023-02-15 | End: 2023-02-15

## 2023-03-06 ENCOUNTER — HOSPITAL ENCOUNTER (OUTPATIENT)
Facility: HOSPITAL | Age: 73
Setting detail: HOSPITAL OUTPATIENT SURGERY
Discharge: HOME | End: 2023-03-06
Attending: STUDENT IN AN ORGANIZED HEALTH CARE EDUCATION/TRAINING PROGRAM | Admitting: STUDENT IN AN ORGANIZED HEALTH CARE EDUCATION/TRAINING PROGRAM
Payer: MEDICARE

## 2023-03-06 VITALS
SYSTOLIC BLOOD PRESSURE: 170 MMHG | WEIGHT: 145.4 LBS | TEMPERATURE: 97.5 F | HEART RATE: 72 BPM | BODY MASS INDEX: 20.35 KG/M2 | RESPIRATION RATE: 12 BRPM | HEIGHT: 71 IN | DIASTOLIC BLOOD PRESSURE: 87 MMHG | OXYGEN SATURATION: 100 %

## 2023-03-06 DIAGNOSIS — Z45.010 PACEMAKER BATTERY DEPLETION: ICD-10-CM

## 2023-03-06 DIAGNOSIS — I48.0 PAROXYSMAL ATRIAL FIBRILLATION: ICD-10-CM

## 2023-03-06 LAB
ANION GAP SERPL CALCULATED.3IONS-SCNC: 11 MMOL/L (ref 5–15)
BUN SERPL-MCNC: 8 MG/DL (ref 8–23)
BUN/CREAT SERPL: 11.3 (ref 7–25)
CALCIUM SPEC-SCNC: 8.7 MG/DL (ref 8.6–10.5)
CHLORIDE SERPL-SCNC: 106 MMOL/L (ref 98–107)
CO2 SERPL-SCNC: 24 MMOL/L (ref 22–29)
CREAT SERPL-MCNC: 0.71 MG/DL (ref 0.76–1.27)
DEPRECATED RDW RBC AUTO: 60.9 FL (ref 37–54)
EGFRCR SERPLBLD CKD-EPI 2021: 97.5 ML/MIN/1.73
ERYTHROCYTE [DISTWIDTH] IN BLOOD BY AUTOMATED COUNT: 15.1 % (ref 12.3–15.4)
GLUCOSE SERPL-MCNC: 100 MG/DL (ref 65–99)
HCT VFR BLD AUTO: 39.3 % (ref 37.5–51)
HGB BLD-MCNC: 12.8 G/DL (ref 13–17.7)
INR PPP: 1.16 (ref 0.84–1.13)
MAGNESIUM SERPL-MCNC: 1.7 MG/DL (ref 1.6–2.4)
MCH RBC QN AUTO: 35.3 PG (ref 26.6–33)
MCHC RBC AUTO-ENTMCNC: 32.6 G/DL (ref 31.5–35.7)
MCV RBC AUTO: 108.3 FL (ref 79–97)
PLATELET # BLD AUTO: 139 10*3/MM3 (ref 140–450)
PMV BLD AUTO: 11.6 FL (ref 6–12)
POTASSIUM SERPL-SCNC: 3.6 MMOL/L (ref 3.5–5.2)
PROTHROMBIN TIME: 14.7 SECONDS (ref 11.4–14.4)
RBC # BLD AUTO: 3.63 10*6/MM3 (ref 4.14–5.8)
SODIUM SERPL-SCNC: 141 MMOL/L (ref 136–145)
WBC NRBC COR # BLD: 4.56 10*3/MM3 (ref 3.4–10.8)

## 2023-03-06 PROCEDURE — 99024 POSTOP FOLLOW-UP VISIT: CPT | Performed by: STUDENT IN AN ORGANIZED HEALTH CARE EDUCATION/TRAINING PROGRAM

## 2023-03-06 PROCEDURE — 25010000002 ONDANSETRON PER 1 MG: Performed by: STUDENT IN AN ORGANIZED HEALTH CARE EDUCATION/TRAINING PROGRAM

## 2023-03-06 PROCEDURE — 25010000002 CEFAZOLIN IN DEXTROSE 2-4 GM/100ML-% SOLUTION: Performed by: PHYSICIAN ASSISTANT

## 2023-03-06 PROCEDURE — C1786 PMKR, SINGLE, RATE-RESP: HCPCS | Performed by: STUDENT IN AN ORGANIZED HEALTH CARE EDUCATION/TRAINING PROGRAM

## 2023-03-06 PROCEDURE — 25010000002 FENTANYL CITRATE (PF) 50 MCG/ML SOLUTION: Performed by: STUDENT IN AN ORGANIZED HEALTH CARE EDUCATION/TRAINING PROGRAM

## 2023-03-06 PROCEDURE — 99152 MOD SED SAME PHYS/QHP 5/>YRS: CPT | Performed by: STUDENT IN AN ORGANIZED HEALTH CARE EDUCATION/TRAINING PROGRAM

## 2023-03-06 PROCEDURE — 25010000002 MIDAZOLAM PER 1 MG: Performed by: STUDENT IN AN ORGANIZED HEALTH CARE EDUCATION/TRAINING PROGRAM

## 2023-03-06 PROCEDURE — 33227 REMOVE&REPLACE PM GEN SINGL: CPT | Performed by: STUDENT IN AN ORGANIZED HEALTH CARE EDUCATION/TRAINING PROGRAM

## 2023-03-06 PROCEDURE — C1889 IMPLANT/INSERT DEVICE, NOC: HCPCS | Performed by: STUDENT IN AN ORGANIZED HEALTH CARE EDUCATION/TRAINING PROGRAM

## 2023-03-06 PROCEDURE — 99153 MOD SED SAME PHYS/QHP EA: CPT | Performed by: STUDENT IN AN ORGANIZED HEALTH CARE EDUCATION/TRAINING PROGRAM

## 2023-03-06 PROCEDURE — 85610 PROTHROMBIN TIME: CPT | Performed by: STUDENT IN AN ORGANIZED HEALTH CARE EDUCATION/TRAINING PROGRAM

## 2023-03-06 PROCEDURE — 83735 ASSAY OF MAGNESIUM: CPT | Performed by: PHYSICIAN ASSISTANT

## 2023-03-06 PROCEDURE — 80048 BASIC METABOLIC PNL TOTAL CA: CPT | Performed by: PHYSICIAN ASSISTANT

## 2023-03-06 PROCEDURE — 85027 COMPLETE CBC AUTOMATED: CPT | Performed by: STUDENT IN AN ORGANIZED HEALTH CARE EDUCATION/TRAINING PROGRAM

## 2023-03-06 DEVICE — ENV ANTIBAC TYRX NEURO ABS MD: Type: IMPLANTABLE DEVICE | Site: HEART | Status: FUNCTIONAL

## 2023-03-06 DEVICE — GEN PM ASSURITY SR RF PM1272: Type: IMPLANTABLE DEVICE | Site: HEART | Status: FUNCTIONAL

## 2023-03-06 RX ORDER — BUPIVACAINE HYDROCHLORIDE 2.5 MG/ML
INJECTION, SOLUTION INFILTRATION; PERINEURAL
Status: DISCONTINUED | OUTPATIENT
Start: 2023-03-06 | End: 2023-03-06 | Stop reason: HOSPADM

## 2023-03-06 RX ORDER — ONDANSETRON 2 MG/ML
4 INJECTION INTRAMUSCULAR; INTRAVENOUS EVERY 6 HOURS PRN
Status: DISCONTINUED | OUTPATIENT
Start: 2023-03-06 | End: 2023-03-06 | Stop reason: HOSPADM

## 2023-03-06 RX ORDER — SODIUM CHLORIDE 9 MG/ML
INJECTION, SOLUTION INTRAVENOUS
Status: DISCONTINUED | OUTPATIENT
Start: 2023-03-06 | End: 2023-03-06 | Stop reason: HOSPADM

## 2023-03-06 RX ORDER — ACETAMINOPHEN 325 MG/1
650 TABLET ORAL EVERY 4 HOURS PRN
Status: DISCONTINUED | OUTPATIENT
Start: 2023-03-06 | End: 2023-03-06 | Stop reason: HOSPADM

## 2023-03-06 RX ORDER — FENTANYL CITRATE 50 UG/ML
INJECTION, SOLUTION INTRAMUSCULAR; INTRAVENOUS
Status: DISCONTINUED | OUTPATIENT
Start: 2023-03-06 | End: 2023-03-06 | Stop reason: HOSPADM

## 2023-03-06 RX ORDER — NITROGLYCERIN 0.4 MG/1
0.4 TABLET SUBLINGUAL
Status: DISCONTINUED | OUTPATIENT
Start: 2023-03-06 | End: 2023-03-06 | Stop reason: HOSPADM

## 2023-03-06 RX ORDER — SODIUM CHLORIDE 9 MG/ML
40 INJECTION, SOLUTION INTRAVENOUS AS NEEDED
Status: DISCONTINUED | OUTPATIENT
Start: 2023-03-06 | End: 2023-03-06 | Stop reason: HOSPADM

## 2023-03-06 RX ORDER — LIDOCAINE HYDROCHLORIDE 5 MG/ML
INJECTION, SOLUTION INFILTRATION; PERINEURAL
Status: DISCONTINUED | OUTPATIENT
Start: 2023-03-06 | End: 2023-03-06 | Stop reason: HOSPADM

## 2023-03-06 RX ORDER — CEFAZOLIN SODIUM 2 G/100ML
2 INJECTION, SOLUTION INTRAVENOUS ONCE
Status: COMPLETED | OUTPATIENT
Start: 2023-03-06 | End: 2023-03-06

## 2023-03-06 RX ORDER — LISINOPRIL 20 MG/1
20 TABLET ORAL
Status: DISCONTINUED | OUTPATIENT
Start: 2023-03-06 | End: 2023-03-06 | Stop reason: HOSPADM

## 2023-03-06 RX ORDER — ONDANSETRON 2 MG/ML
INJECTION INTRAMUSCULAR; INTRAVENOUS
Status: DISCONTINUED | OUTPATIENT
Start: 2023-03-06 | End: 2023-03-06 | Stop reason: HOSPADM

## 2023-03-06 RX ORDER — ACETAMINOPHEN 650 MG/1
650 SUPPOSITORY RECTAL EVERY 4 HOURS PRN
Status: DISCONTINUED | OUTPATIENT
Start: 2023-03-06 | End: 2023-03-06 | Stop reason: HOSPADM

## 2023-03-06 RX ORDER — MIDAZOLAM HYDROCHLORIDE 1 MG/ML
INJECTION INTRAMUSCULAR; INTRAVENOUS
Status: DISCONTINUED | OUTPATIENT
Start: 2023-03-06 | End: 2023-03-06 | Stop reason: HOSPADM

## 2023-03-06 RX ORDER — SODIUM CHLORIDE 0.9 % (FLUSH) 0.9 %
3 SYRINGE (ML) INJECTION EVERY 12 HOURS SCHEDULED
Status: DISCONTINUED | OUTPATIENT
Start: 2023-03-06 | End: 2023-03-06 | Stop reason: HOSPADM

## 2023-03-06 RX ORDER — SODIUM CHLORIDE 0.9 % (FLUSH) 0.9 %
10 SYRINGE (ML) INJECTION AS NEEDED
Status: DISCONTINUED | OUTPATIENT
Start: 2023-03-06 | End: 2023-03-06 | Stop reason: HOSPADM

## 2023-03-06 RX ADMIN — LISINOPRIL 20 MG: 20 TABLET ORAL at 10:51

## 2023-03-06 RX ADMIN — CEFAZOLIN SODIUM 2 G: 2 INJECTION, SOLUTION INTRAVENOUS at 13:10

## 2023-03-06 NOTE — H&P
Pre-procedure History and Physical  [facility] at [facility]      Patient:  Dillon Vo  :  1950  MRN: [id number]    PCP:  Ez Cintron MD  PHONE:  [phone]    DATE: 3/6/2023  ID: Dillon Vo is a 72 y.o. male resident of [place], [place]     CC: PPM at [facility]     PROBLEM LIST:   Active Hospital Problems    Diagnosis  POA   • Permanent atrial fibrillation (HCC) [I48.21]  Yes     · [id number] 5 (age, CAD, HTN, CVA)   · Status post AV node ablation pacemaker implantation, 2013     • Coronary artery disease involving native coronary artery of native heart with angina pectoris (HCC) [I25.119]  Yes     · Cardiac catheterization (2013): Mid LAD stenosis status post RK.  · Pharmacologic nuclear stress (2022): Lateral ischemia.  · Cardiac catheterization (2022): Major coronary arteries with mild CAD. Severe small vessel disease of first diagonal branch.     • H/O mechanical aortic valve replacement [Z95.2]  Not Applicable     · Remote mechanical aortic valve replacement and thoracic aneurysm repair approximately 20 to 21 years ago at [facility]  · Echo (3/19/2021): LVEF 53%. Mechanical aortic valve with small perivalvular leak.  MAC with moderate MR.  RVSP 53 mmHg.  · Echo (2022):LVEF = 50%.  Mechanical aortic valve well-seated and functions normally.  RVSP 35 mmHg.  Bilateral pleural effusions.  LV diastolic function indeterminate     • Tobacco use [Z72.0]  Yes   • Essential hypertension [I10]  Yes     • Target blood pressure <130/80 mmHg     • Hyperlipidemia LDL goal <70 [E78.5]  Yes     • High intensity statin therapy indicated given the presence of CAD         BRIEF HPI: Mr. Vo is a 71 y/o male with CAD, VHD, s/p mechanical aortic valve, permanent Afib, s/p AVN ablation and PPM implant. He was recently noted to be at BANDAR by remote interrogation. He is 100 % Vpaced and therefore presents for generator change today. He  is on Warfarin for mechanical aortic valve. He states last dose of Warfarin was about 3 days ago. He also did not take any of his regular home meds this morning.     Allergies:      No Known Allergies    MEDICATIONS:  Current Outpatient Medications   Medication Instructions   • acetaminophen (TYLENOL) 650 mg, Oral, Every 4 Hours PRN   • aspirin 81 mg, Oral, Daily   • atorvastatin (LIPITOR) 80 mg, Oral, Nightly   • buPROPion XL (WELLBUTRIN XL) 300 MG 24 hr tablet Take 1 tablet by mouth once daily   • carvedilol (COREG) 12.5 mg, Oral, 2 Times Daily With Meals   • citalopram (CeleXA) 20 MG tablet Take 1 tablet by mouth once daily   • dorzolamide-timolol (COSOPT) 22.3-6.8 MG/ML ophthalmic solution dorzolamide 22.3 mg-timolol 6.8 mg/mL eye drops   1 drop both eyes 9 am and 9 pm   • famotidine (PEPCID) 20 mg, Oral, Daily   • finasteride (PROSCAR) 5 MG tablet TAKE 1 TABLET BY MOUTH ONCE DAILY AT BEDTIME   • folic acid (FOLVITE) 400 MCG tablet TAKE 1 TABLET BY MOUTH ONCE DAILY   • hydroCHLOROthiazide (HYDRODIURIL) 25 mg, Oral, Daily   • lisinopril (PRINIVIL,ZESTRIL) 20 mg, Oral, 2 Times Daily   • nicotine (NICODERM CQ) 14 MG/24HR patch 1 patch, Transdermal, Every 24 Hours   • tadalafil (CIALIS) 20 mg, Oral, Daily PRN   • vitamin B-12 (CYANOCOBALAMIN) 1,000 mcg, Oral, Daily   • warfarin (COUMADIN) 5 mg, Oral, Daily       Past medical & surgical history, social and family history reviewed in the electronic medical record.    ROS: Pertinent positives listed in the HPI and problem list above. All others reviewed and negative.     Physical Exam:   There were no vitals taken for this visit.    Constitutional:    Alert, cooperative, in no acute distress   Neck:     No Jugular venous distention, adenopathy, or thyromegaly noted.    Heart:    Regular rhythm and normal rate, + mechanical valve click, no murmurs,gallops, rubs, or clicks. No distinct PMI noted.    Lungs:     Clear to auscultation bilaterally, respirations regular, even  and unlabored    Abdomen:     Soft nontender, nondistended, normal bowel sounds   Extremities:   No gross deformities, no edema, clubbing, or cyanosis.        Labs and Diagnostic Data:          Lab Results   Component Value Date    CHOL 136 01/19/2023    TRIG 42 01/19/2023    HDL 79 (H) 01/19/2023    LDL 47 01/19/2023    AST 22 01/19/2023    ALT 11 01/19/2023                   Tele: Vpaced     IMPRESSION:  · 73 y/o male with CAD, VHD, s/p mechanical aortic valve and permanent Afib, s/p AVN ablation and PPM implant with recent remote interrogation demonstrating PPM at BAND[facility].   · He takes Warfarin for mechanical aortic valve. Last dose was 3 days ago.     PLAN:  · Procedure to perform: [facility] PPM generator change. Risks, benefits and alternatives to the procedure explained to the patient and he understands and wishes to proceed.     Electronically signed by Veronica Avery PA-C, 03/06/23, 10:19 AM EST.

## 2023-03-13 ENCOUNTER — OFFICE VISIT (OUTPATIENT)
Dept: CARDIOLOGY | Facility: CLINIC | Age: 73
End: 2023-03-13
Payer: MEDICARE

## 2023-03-13 DIAGNOSIS — I44.2 COMPLETE HEART BLOCK: ICD-10-CM

## 2023-03-13 PROCEDURE — 99024 POSTOP FOLLOW-UP VISIT: CPT | Performed by: STUDENT IN AN ORGANIZED HEALTH CARE EDUCATION/TRAINING PROGRAM

## 2023-03-13 NOTE — PROGRESS NOTES
2023    Dillon Vo, : 1950      Fever: No    Temperature if indicated:     Wound Location: Right Infraclavicular    Dressing Removed: Removed by MA/RN      Old Dressing Appearance:  Clean, dry    Wound Appearance: Redness []                  Drainage []                  Culture obtained []        Color: Clear     Consistency:      Amount: none         Gloves used, wound cleansed with sterile 4x4 and peroxide [x]       MD notified []     MD orders:     Antibiotic started []      If checked, type     Other:       Appointment for follow-up scheduled for 3 months post procedure [x]    Future Appointments   Date Time Provider Department Center   3/22/2023 10:00 AM Floyd Shen MD [facility]   2023 12:00 PM Ez Cintron MD [facility]   2023 11:00 AM Payam Kenney MD [facility]   2023 10:30 AM Julio Remy MD [facility]   2023  8:30 AM Stacey Kraus APRN [facility]   2023  2:30 PM Moiz Ronquillo IV, MD [facility]           Shoaib Villagomez MA, 23      MD Signature:______________________________ Completed By/Date:

## 2023-03-14 RX ORDER — POTASSIUM CHLORIDE 750 MG/1
TABLET, FILM COATED, EXTENDED RELEASE ORAL
Qty: 60 TABLET | OUTPATIENT
Start: 2023-03-14

## 2023-03-22 ENCOUNTER — OFFICE VISIT (OUTPATIENT)
Dept: UROLOGY | Facility: CLINIC | Age: 73
End: 2023-03-22
Payer: MEDICARE

## 2023-03-22 VITALS — BODY MASS INDEX: 20.3 KG/M2 | HEIGHT: 71 IN | WEIGHT: 145 LBS

## 2023-03-22 DIAGNOSIS — N40.1 BPH WITH OBSTRUCTION/LOWER URINARY TRACT SYMPTOMS: ICD-10-CM

## 2023-03-22 DIAGNOSIS — N13.8 BPH WITH OBSTRUCTION/LOWER URINARY TRACT SYMPTOMS: ICD-10-CM

## 2023-03-22 DIAGNOSIS — N31.9 NEUROGENIC BLADDER: ICD-10-CM

## 2023-03-22 DIAGNOSIS — R33.9 URINARY RETENTION: Primary | ICD-10-CM

## 2023-03-22 PROCEDURE — 1160F RVW MEDS BY RX/DR IN RCRD: CPT | Performed by: STUDENT IN AN ORGANIZED HEALTH CARE EDUCATION/TRAINING PROGRAM

## 2023-03-22 PROCEDURE — 51798 US URINE CAPACITY MEASURE: CPT | Performed by: STUDENT IN AN ORGANIZED HEALTH CARE EDUCATION/TRAINING PROGRAM

## 2023-03-22 PROCEDURE — 1159F MED LIST DOCD IN RCRD: CPT | Performed by: STUDENT IN AN ORGANIZED HEALTH CARE EDUCATION/TRAINING PROGRAM

## 2023-03-22 PROCEDURE — 99213 OFFICE O/P EST LOW 20 MIN: CPT | Performed by: STUDENT IN AN ORGANIZED HEALTH CARE EDUCATION/TRAINING PROGRAM

## 2023-03-22 RX ORDER — FINASTERIDE 5 MG/1
5 TABLET, FILM COATED ORAL
Qty: 90 TABLET | Refills: 1 | Status: SHIPPED | OUTPATIENT
Start: 2023-03-22

## 2023-03-22 NOTE — PROGRESS NOTES
Follow Up Office Visit      Patient Name: Dillon Vo  : 1950   MRN: [id number]     Chief Complaint:    Chief Complaint   Patient presents with   • Neurogenic Bladder       Referring Provider: No ref. provider found    History of Present Illness: Dillon Vo is a 72 y.o. male who presents today for follow up of chronic urinary retention with incomplete bladder emptying.  The patient has an extensive past medical history including hyperlipidemia, hypertension, CAD, history of mechanical aortic valve, status post pacemaker, permanent atrial fibrillation, diastolic congestive heart failure, 4.9 cm abdominal aortic aneurysm, heart block, chronic anticoagulation.     The patient was last evaluated in my clinic in 2022.  He was previously following with urology at [facility] for neurogenic bladder.  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  [facility] cystoscopy demonstrated no obvious urethral stricture in the remote past.  He has had urinary tract infections in the past.  At his original visit he was found to have a severely distended bladder and a catheter was placed.  He has initiated intermittent catheterization 3 times daily and he reports he is doing well    Unable to naturally urinate, but does report still has a preserved sensation of a full bladder intermittently    Cath 3x daily he has no issues    No gross hematuria or dysuria.  No infection concerns.    Cr 0.71, normal GFR as of 3/6/2023.    Dec 2022 CT angiogram for history of aortic aneurysm 4.9 cm, no hydronephrosis or kidney stones at that time    Lab Results   Component Value Date    PSA 0.200 2021    PSA 0.135 08/15/2019    PSA 0.110 2019    PSA 0.200 2016     He is no longer sexually active and not using his prescribed Cialis.  We will cancel this    Subjective      Review of System: Review of Systems   Genitourinary: Negative for decreased urine volume,  difficulty urinating, dysuria, enuresis, flank pain, frequency, hematuria and urgency.      I have reviewed the ROS documented by my clinical staff, I have updated appropriately and I agree. Floyd Shen MD    I have reviewed and the following portions of the patient's history were updated as appropriate: past family history, past medical history, past social history, past surgical history and problem list.    Medications:     Current Outpatient Medications:   •  acetaminophen (TYLENOL) 325 MG tablet, Take 2 tablets by mouth Every 4 (Four) Hours As Needed for Mild Pain., Disp: , Rfl:   •  aspirin 81 MG EC tablet, Take 1 tablet by mouth Daily., Disp: , Rfl:   •  atorvastatin (LIPITOR) 80 MG tablet, Take 1 tablet by mouth Every Night., Disp: 90 tablet, Rfl: 3  •  buPROPion XL (WELLBUTRIN XL) 300 MG 24 hr tablet, Take 1 tablet by mouth once daily, Disp: 30 tablet, Rfl: 5  •  carvedilol (COREG) 12.5 MG tablet, Take 1 tablet by mouth 2 (Two) Times a Day With Meals., Disp: 180 tablet, Rfl: 3  •  citalopram (CeleXA) 20 MG tablet, Take 1 tablet by mouth once daily, Disp: 90 tablet, Rfl: 0  •  dorzolamide-timolol (COSOPT) 22.3-6.8 MG/ML ophthalmic solution, dorzolamide 22.3 mg-timolol 6.8 mg/mL eye drops  1 drop both eyes 9 am and 9 pm, Disp: , Rfl:   •  famotidine (PEPCID) 20 MG tablet, Take 1 tablet by mouth Daily., Disp: 30 tablet, Rfl: 1  •  finasteride (PROSCAR) 5 MG tablet, Take 1 tablet by mouth every night at bedtime., Disp: 90 tablet, Rfl: 1  •  folic acid (FOLVITE) 400 MCG tablet, TAKE 1 TABLET BY MOUTH ONCE DAILY, Disp: 90 tablet, Rfl: 3  •  hydroCHLOROthiazide (HYDRODIURIL) 25 MG tablet, Take 1 tablet by mouth Daily., Disp: 90 tablet, Rfl: 0  •  lisinopril (PRINIVIL,ZESTRIL) 10 MG tablet, Take 2 tablets by mouth 2 (Two) Times a Day., Disp: 180 tablet, Rfl: 3  •  nicotine (NICODERM CQ) 14 MG/24HR patch, Place 1 patch on the skin as directed by provider Daily., Disp: 90 patch, Rfl: 0  •  vitamin B-12  (CYANOCOBALAMIN) 1000 MCG tablet, Take 1 tablet by mouth Daily., Disp: 90 tablet, Rfl: 3  •  warfarin (COUMADIN) 5 MG tablet, Take 1 tablet by mouth Daily., Disp: 30 tablet, Rfl: 5    Allergies:   No Known Allergies    IPSS Questionnaire (AUA-7):  Over the past month…    1)  Incomplete Emptying:       How often have you had a sensation of not emptying you had the sensation of not emptying your bladder completely after you finished urinating?  0 - Not at all   2)  Frequency:       How often have you had the urinate again less than two hours after you finished urinating?  0 - Not at all   3)  Intermittency:       How often have you found you stopped and started again several times when you urinated?   0 - Not at all   4) Urgency:      How often have you found it difficult to postpone urination?  0 - Not at all   5) Weak Stream:      How often have you had a weak urinary stream?  0 - Not at all   6) Straining:       How often have you had to push or strain to begin urination?  0 - Not at all   7) Nocturia:      How many times did you most typically get up to urinate from the time you went to bed at night until the time you got up in the morning?  1 - 1 time   Total Score:  1   The International Prostate Symptom Score (IPSS) is used to screen, diagnose, track symptoms of benign prostatic hyperplasia (BPH).   0-7 (Mild Symptoms) 8-19 (Moderate) 20-35 (Severe)   Quality of Life (QoL):  If you were to spend the rest of your life with your urinary condition just the way it is now, how would you feel about that? 2-Mostly Satisfied   Urine Leakage (Incontinence) 0-No Leakage     Sexual Health Inventory for Men (GENET)   Over the past 6 months:     1. How do you rate your confidence that you could get and keep an erection?  2 - Low   2. When you had erections with sexual  stimulation, how often were your erections hard enough for penetration (entering your partner)?  1 - Almost never or never   3. During sexual intercourse, how  "often were you able to maintain your erection after you had penetrated (entered) your partner?  1 - Almost never or never   4. During sexual intercourse, how difficult was it to maintain your erection to completion of intercourse?  1 - Extremely difficult   5. When you attempted sexual intercourse, how often was it satisfactory for you?  1 - Almost never or never    Total Score: 6   The Sexual Health Inventory for Men further classifies ED severity with the following breakpoints:   1-7 (Severe ED) 8-11 (Moderate ED) 12-16 (Mild to Moderate ED) 17-21 (Mild ED)      Post void residual bladder scan:   0 mL     Objective     Physical Exam:   Vital Signs:   Vitals:    03/22/23 1040   Weight: 65.8 kg (145 lb)   Height: 180.3 cm (71\")     Body mass index is 20.22 kg/m².     Physical Exam  Constitutional:       Appearance: Normal appearance.   HENT:      Head: Normocephalic and atraumatic.      Nose: Nose normal.   Eyes:      Extraocular Movements: Extraocular movements intact.      Conjunctiva/sclera: Conjunctivae normal.      Pupils: Pupils are equal, round, and reactive to light.   Musculoskeletal:         General: Normal range of motion.      Cervical back: Normal range of motion and neck supple.   Skin:     General: Skin is warm and dry.      Findings: No lesion or rash.   Neurological:      General: No focal deficit present.      Mental Status: He is alert and oriented to person, place, and time. Mental status is at baseline.   Psychiatric:         Mood and Affect: Mood normal.         Behavior: Behavior normal.         Labs:   Brief Urine Lab Results     None               Lab Results   Component Value Date    GLUCOSE 100 (H) 03/06/2023    CALCIUM 8.7 03/06/2023     03/06/2023    K 3.6 03/06/2023    CO2 24.0 03/06/2023     03/06/2023    BUN 8 03/06/2023    CREATININE 0.71 (L) 03/06/2023    EGFRIFAFRI >60 08/20/2021    EGFRIFNONA 76 02/01/2022    BCR 11.3 03/06/2023    ANIONGAP 11.0 03/06/2023       Lab " Results   Component Value Date    WBC 4.56 03/06/2023    HGB 12.8 (L) 03/06/2023    HCT 39.3 03/06/2023    .3 (H) 03/06/2023     (L) 03/06/2023       Images:   CT Angiogram Abdomen Pelvis    Result Date: 12/5/2022  1.  4.9 cm infrarenal abdominal aortic aneurysm, previously 4.2 cm when measured similarly on 05/24/2021. 2.  Other atherosclerotic vascular disease, as described above. 3.  Apparent wall thickening of the ascending colon, which may be due to incomplete distention or nonspecific colitis. 4.  Small amount of free pelvic fluid, nonspecific. 5.  Stable bilateral pleural effusions compared to recent CT chest. 6.  Please see above for additional incidental findings.   This report was finalized on 12/5/2022 7:18 PM by Mayte Ybarra MD.      CT Angiogram Chest    Result Date: 12/4/2022  1.  Abdominal aortic aneurysm with estimated maximal diameter of 5 cm. Surgical consultation is recommended for consideration of repair. 2.  Ascending thoracic aorta appears to measure up to 4.4 cm. No definite acute aortic abnormality is seen on this exam. 3.  Cardiomegaly with reflux of contrast into the IVC and hepatic veins. There is also a small amount of ascites and increased small to moderate left and small right pleural effusions. These findings may indicate heart failure. 4.  Mild groundglass opacities, central bronchial wall thickening, and basilar septal thickening which could be seen with edema or pneumonia. 5.  Emphysema. Status post left upper lobectomy. 6.  Cholelithiasis.  This report was finalized on 12/4/2022 4:57 PM by Oniel Callaway MD.        Measures:   Tobacco:   Dillon Vo  reports that he has been smoking cigarettes. He started smoking about 60 years ago. He has a 14.25 pack-year smoking history. He has never used smokeless tobacco..     Assessment / Plan      Assessment/Plan:   72 y.o. male who presented today for follow up of neurogenic bladder and urinary retention of  idiopathic etiology.  Patient has severely distended likely atonic neurogenic bladder and this needs to be managed with continued intermittent catheterization 3-4 times daily.  He has no subjective complaints.  He will contact us if he runs out of catheters we will have him see me back in a year with a renal ultrasound and a BMP prior to make sure he has not developed any hydronephrosis.  He has been intermittently only catheterizing twice daily and I discussed he needs to increase his frequency.  PSA trend has been stable, we will check a final PSA and then discontinue PSA screens after this.    Continue self cathing 3x daily  PSA next available  Repeat BMP and renal US in 1 year     We will plan to keep him on finasteride as this may cause continued prostatic shrinking and reduce his risk of bleeding given his warfarin use, intermittent catheterization represents a risk of stirring up prostatic bleeding.  Finasteride based on literature should help reduce his prostatic bleeding risk.    Diagnoses and all orders for this visit:    1. Urinary retention (Primary)  -     Basic Metabolic Panel; Future  -     US Renal Bilateral; Future  -     PSA Diagnostic; Future    2. Neurogenic bladder  -     Basic Metabolic Panel; Future  -     US Renal Bilateral; Future  -     PSA Diagnostic; Future    3. BPH with obstruction/lower urinary tract symptoms  -     finasteride (PROSCAR) 5 MG tablet; Take 1 tablet by mouth every night at bedtime.  Dispense: 90 tablet; Refill: 1           Follow Up:   Return in about 1 year (around 3/22/2024) for Follow Up after Labs.    I spent approximately 20 minutes providing clinical care for this patient; including review of patient's chart and provider documentation, face to face time spent with patient in examination room (obtaining history, performing physical exam, discussing diagnosis and management options), placing orders, and completing patient documentation.     Floyd Shen,  MD  [facility]

## 2023-04-15 PROCEDURE — 93294 REM INTERROG EVL PM/LDLS PM: CPT | Performed by: INTERNAL MEDICINE

## 2023-04-15 PROCEDURE — 93296 REM INTERROG EVL PM/IDS: CPT | Performed by: INTERNAL MEDICINE

## 2023-05-04 DIAGNOSIS — I71.43 INFRARENAL ABDOMINAL AORTIC ANEURYSM (AAA) WITHOUT RUPTURE: Primary | ICD-10-CM

## 2023-05-18 ENCOUNTER — TELEPHONE (OUTPATIENT)
Dept: PHARMACY | Facility: HOSPITAL | Age: 73
End: 2023-05-18

## 2023-05-18 NOTE — TELEPHONE ENCOUNTER
Dr. Cintron    We have made multiple unsuccessful attempts to contact Mr Vo by phone and mail to schedule repeat INR and follow up. His last INR with our clinic was SUBtherapeutic at 2.1 in Dec of '22.     Per clinic policy, if we do not hear back from the patient in the next two weeks, we unfortunately will have to discharge Mr Vo from our services. We are more than happy to continue to participate in Mr Vo's care if your office is able to reach the patient and he is willing to adhere to appropriate follow up.Once discharged, he is able to be readmitted to the [facility] one additional time under a new referral.     Please contact us with any questions.      Please let us know if there is anything else that we can do at this time. Thank you for allowing us to participate in  Mr Vo's  care.    [facility]

## 2023-05-23 ENCOUNTER — OFFICE VISIT (OUTPATIENT)
Dept: PULMONOLOGY | Facility: CLINIC | Age: 73
End: 2023-05-23
Payer: MEDICARE

## 2023-05-23 VITALS
TEMPERATURE: 98 F | SYSTOLIC BLOOD PRESSURE: 170 MMHG | HEART RATE: 72 BPM | DIASTOLIC BLOOD PRESSURE: 88 MMHG | OXYGEN SATURATION: 100 % | WEIGHT: 139.8 LBS | BODY MASS INDEX: 19.57 KG/M2 | HEIGHT: 71 IN

## 2023-05-23 DIAGNOSIS — J44.9 CHRONIC OBSTRUCTIVE PULMONARY DISEASE, UNSPECIFIED COPD TYPE: Primary | ICD-10-CM

## 2023-05-23 PROCEDURE — 3077F SYST BP >= 140 MM HG: CPT | Performed by: INTERNAL MEDICINE

## 2023-05-23 PROCEDURE — 99213 OFFICE O/P EST LOW 20 MIN: CPT | Performed by: INTERNAL MEDICINE

## 2023-05-23 PROCEDURE — 3079F DIAST BP 80-89 MM HG: CPT | Performed by: INTERNAL MEDICINE

## 2023-05-23 NOTE — PROGRESS NOTES
CC:    Abnormal CT    HPI:    68 y/o WM w/ h/o tobacco abuse ~100 py plus, prior colon cancer s/p resection 2001, mechanical AVR, CAD w/ prior stent, Afib, who presents for evaluation of abnormal CT Chest. Patient had a lung cancer screening CT 8/20/19 showing a spiculated 12 mm (I disagree with radiology reading of 9 mm) spiculated MARKO lesion.  No mediastinal or hilar LAD.  No symptoms attributable to this nodule.    Last time I saw patient in 12/11/19 he had some mild hemoptysis.  This was felt to be due to bronchitis and resolved with abx / steroids.  INR was slightly supra-therapeutic.  This resolved on follow up visit 12/27/19.    Patient was admitted in 2/19-2/24/20 w/ H1N1 flu / pna / hemoptysis.  INR was 7 at the time.  CXR abnormalities cleared rapidly.  ANCA and GBM antibodies were negative.  Echo showed EF 46%, LVH, dilated LA, dilated RV, mild to mod MR, functioning mechanical AVR, RVSP > 55.  Echo was done in setting of marked abnormalities on CXR.    Had another admision 2/26-3/7/20 with supratherapeutic INR and large rectus sheath hematoma.  This was complicated by reversal of AC and subsequent embolic strokes.    He eventually had repeat PET-CT which showed higher SUV and slight growth.  He ultimately underwent MARKO Lobectomy with Dr. Post 4/7/21 that showed Stage 1B Adenocarcinoma.  He did well post op.    He was recently admitted [facility] in Dec 2022 for CHF exacerbation with bilateral Pleural Effusions L>R.  Did well with diuresis.  Has urethral stricture, I&O caths for past 5 years or so.    INTERVAL HISTORY:    72 y.o. patient returns today for follow up.  Last seen in February.  No new changes since last visit.  Still smoking 4-5 cigs / day.      PMH:    Past Medical History:   Diagnosis Date   • Anemia    • Atrial fibrillation 05/05/2016    Status post AV node ablation and pacemaker placed   • Colitis    • Colon cancer    • Complete heart block     Status post AV node ablation   • Coronary artery  disease    • Depression    • GERD (gastroesophageal reflux disease)    • Hiatal hernia    • HTN (hypertension)    • Hyperbilirubinemia    • Hyperlipidemia    • Infectious viral hepatitis    • Kidney stone    • Lung cancer    • Skin cancer     lip      PSH:    Past Surgical History:   Procedure Laterality Date   • APPENDECTOMY     • BRONCHOSCOPY THORACOTOMY Left 04/07/2021    Procedure: BRONCHOSCOPY LEFT THORACOTOMY, LEFT LUNG RESECTION;  Surgeon: Chi Post MD;  Location:  [facility] OR;  Service: Cardiothoracic;  Laterality: Left;   • CARDIAC CATHETERIZATION     • CARDIAC CATHETERIZATION N/A 12/07/2022    Procedure: LEFT HEART CATH;  Surgeon: Moiz Ronquillo IV, MD;  Location:  [facility] CATH INVASIVE LOCATION;  Service: Cardiovascular;  Laterality: N/A;   • CARDIAC ELECTROPHYSIOLOGY PROCEDURE N/A 3/6/2023    Procedure: PPM battery change- SJM- no meds to hold - 3-4 weeks;  Surgeon: Julio Remy MD;  Location:  [facility] EP INVASIVE LOCATION;  Service: Cardiology;  Laterality: N/A;   • CARDIAC SURGERY  2000    valve replacement    • COLON SURGERY     • COLONOSCOPY  up to date   • CORONARY STENT PLACEMENT     • EXTRACORPOREAL SHOCKWAVE LITHOTRIPSY (ESWL), STENT INSERTION/REMOVAL     • INSERT / REPLACE / REMOVE PACEMAKER     • LUNG CANCER SURGERY     • PACEMAKER IMPLANTATION     • VARICOSE VEIN SURGERY       FH:    Family History   Problem Relation Age of Onset   • Cancer Mother    • Hypertension Father    • Heart disease Father    • No Known Problems Sister    • No Known Problems Brother      SH:    Social History     Socioeconomic History   • Marital status:    • Number of children: 0   Tobacco Use   • Smoking status: Every Day     Packs/day: 0.25     Years: 57.00     Pack years: 14.25     Types: Cigarettes     Start date: 1963   • Smokeless tobacco: Never   • Tobacco comments:     cut back on his smoking 1 carton will last a month smoking 5 to 6 cigarettes a day    Vaping Use   • Vaping Use: Never  used   Substance and Sexual Activity   • Alcohol use: Yes     Alcohol/week: 2.0 standard drinks     Types: 2 Shots of liquor per week     Comment: daily   • Drug use: Not Currently     Types: Marijuana     Comment: occasionally   • Sexual activity: Defer     Partners: Female     Comment:       ALLERGIES:    No Known Allergies  MEDICATIONS:      Current Outpatient Medications:   •  acetaminophen (TYLENOL) 325 MG tablet, Take 2 tablets by mouth Every 4 (Four) Hours As Needed for Mild Pain., Disp: , Rfl:   •  aspirin 81 MG EC tablet, Take 1 tablet by mouth Daily., Disp: , Rfl:   •  atorvastatin (LIPITOR) 80 MG tablet, Take 1 tablet by mouth Every Night., Disp: 90 tablet, Rfl: 3  •  buPROPion XL (WELLBUTRIN XL) 300 MG 24 hr tablet, Take 1 tablet by mouth once daily, Disp: 30 tablet, Rfl: 5  •  carvedilol (COREG) 12.5 MG tablet, Take 1 tablet by mouth 2 (Two) Times a Day With Meals., Disp: 180 tablet, Rfl: 3  •  citalopram (CeleXA) 20 MG tablet, Take 1 tablet by mouth once daily, Disp: 90 tablet, Rfl: 0  •  dorzolamide-timolol (COSOPT) 22.3-6.8 MG/ML ophthalmic solution, dorzolamide 22.3 mg-timolol 6.8 mg/mL eye drops  1 drop both eyes 9 am and 9 pm, Disp: , Rfl:   •  famotidine (PEPCID) 20 MG tablet, Take 1 tablet by mouth Daily., Disp: 30 tablet, Rfl: 1  •  finasteride (PROSCAR) 5 MG tablet, Take 1 tablet by mouth every night at bedtime., Disp: 90 tablet, Rfl: 1  •  folic acid (FOLVITE) 400 MCG tablet, TAKE 1 TABLET BY MOUTH ONCE DAILY, Disp: 90 tablet, Rfl: 3  •  hydroCHLOROthiazide (HYDRODIURIL) 25 MG tablet, Take 1 tablet by mouth Daily., Disp: 90 tablet, Rfl: 0  •  lisinopril (PRINIVIL,ZESTRIL) 10 MG tablet, Take 2 tablets by mouth 2 (Two) Times a Day., Disp: 180 tablet, Rfl: 3  •  nicotine (NICODERM CQ) 14 MG/24HR patch, Place 1 patch on the skin as directed by provider Daily., Disp: 90 patch, Rfl: 0  •  vitamin B-12 (CYANOCOBALAMIN) 1000 MCG tablet, Take 1 tablet by mouth Daily., Disp: 90 tablet, Rfl: 3  •   warfarin (COUMADIN) 5 MG tablet, Take 1 tablet by mouth Daily., Disp: 30 tablet, Rfl: 5  ROS:  Per HPI, otherwise all systems reviewed and negative.    DIAGNOSTIC DATA (Reviewed and interpreted by me unless otherwise specified):    CT Chest 8/20/19 - emphysema, scarring RUL, scattered sub-centimeter nodules, 12 mm spiculated nodule in MARKO    CT PET 9/25/19 - SUV 1.0, negative    CT Chest 11/6/19 - stable MARKO nodule, also a RUL nodule that appears inflammatory that needs to be followed    CT Chest 2/6/20 - stable MARKO and RUL nodules when going back to 8/20/19    CT Chest 8/18/20 - relatively stable MARKO nodules and RUL scarring.  Prior neg PET-CT.      CT Chest 2/12/21 - relative stable MARKO nodules, however maybe some slight growth going back to 1 year ago overall.    PET CT 3/2/21 - interval growth of primary MARKO nodule with higher SUV compared to prior    CTA Chest 12/4/22 (while hospitalized for CHF) - moderate to severe emphysema, right apical scaring, s/p MARKO Lobectomy, bilateral effusions L >> R.  Suspect secondary to CHF, left sided effusion likely larger secondary to prior MARKO Lobectomy.  Compressive atelectasis bilateral LL's.  No suspicious nodules.  Stable adenopathy.  Cardiomegaly.    CXR 12/11/19 - RUL and RML consolidation    PFT 9/17/19 - borderline mild obstruction, +BD change, hyperinflation, +air trapping, normal DLCO    PFT 2/1/23 - mod obstruction, no change w/ BD, no restriction - normal TLC and FVC, mild dec DL    Vitals:    05/23/23 1048   BP: 170/88   Pulse: 72   Temp: 98 °F (36.7 °C)   SpO2: 100%       Physical Exam:    Constitutional: Alert, no Distress  Mouth/Throat: Oropharynx is clear and moist.   Cardiovascular: Normal rate, regular rhythm, mechanical click from AVR  Pulmonary/Chest: Effort normal and breath sounds normal.  No clubbing.       Assessment & Plan     1)  NSCLC s/p MARKO Lobectomy April 2021 - Stage IB adenocarcinoma.  Needs CT Chest every 6 months for 5 years.  Didn't notice  any concerning nodules on CT while hospitalized for CHF Dec 2022.  Has CT scheduled for June.    2) Bilateral Pleural Effusions - noted on CTA Dec 2022.  Lung bases clear.  Was diuresed for CHF.  TLC and FVC normal on PFTs last visit.  Suspect these are resolved and were secondary to CHF.  Left sided likely was larger secondary to prior MARKO Lobectomy.    3) Tobacco Abuse - counseled patient, down to 4-5 cigs per day.  Recommend NRT.    4) GOLD 2A COPD - doing well without inhaler currently.  Not interested in adding one today.  H/o I&O cath the past 5 years.   recommends try to avoid anticholinergics, though this sounds more like a mechanical problem - he is unable to urinate at all unless bladder extremely full without cathing.  If breathing worsens from COPD standpoint I think benefit of LABA/LAMA +/- ICS would outweigh risk.    RTC ~Dec / [place] w/ CT Chest w/o Contrast and Full PFT    Will need to be notified if CT abnormal in June    Payam Kenney MD  Pulmonology and Critical Care Medicine  05/23/23 11:12 EDT  Electronically Signed    C.C.:  No ref. provider found, Ez Cintron MD

## 2023-05-24 NOTE — TELEPHONE ENCOUNTER
Spoke with patient wife, requested that she have patient call us.  States she will give him the message when she gets home.

## 2023-05-25 DIAGNOSIS — J44.9 CHRONIC OBSTRUCTIVE PULMONARY DISEASE, UNSPECIFIED COPD TYPE: Primary | ICD-10-CM

## 2023-06-01 NOTE — TELEPHONE ENCOUNTER
Unable to LVM Re: Overdue INR check  Discharge letter sent via [employer] mail  #[phone]    Dr Cintron aware, see 5/18 tele encounter

## 2023-06-05 ENCOUNTER — OFFICE VISIT (OUTPATIENT)
Dept: INTERNAL MEDICINE | Facility: CLINIC | Age: 73
End: 2023-06-05
Payer: MEDICARE

## 2023-06-05 VITALS
HEART RATE: 76 BPM | DIASTOLIC BLOOD PRESSURE: 84 MMHG | WEIGHT: 139 LBS | BODY MASS INDEX: 19.39 KG/M2 | SYSTOLIC BLOOD PRESSURE: 164 MMHG | TEMPERATURE: 98.4 F | RESPIRATION RATE: 16 BRPM

## 2023-06-05 DIAGNOSIS — D75.89 MACROCYTOSIS: ICD-10-CM

## 2023-06-05 DIAGNOSIS — I10 ESSENTIAL HYPERTENSION: ICD-10-CM

## 2023-06-05 DIAGNOSIS — I48.91 ATRIAL FIBRILLATION, UNSPECIFIED TYPE: ICD-10-CM

## 2023-06-05 DIAGNOSIS — E78.5 HYPERLIPIDEMIA, UNSPECIFIED HYPERLIPIDEMIA TYPE: Primary | ICD-10-CM

## 2023-06-05 LAB
BASOPHILS # BLD AUTO: 0.04 10*3/MM3 (ref 0–0.2)
BASOPHILS NFR BLD AUTO: 1 % (ref 0–1.5)
DEPRECATED RDW RBC AUTO: 47.5 FL (ref 37–54)
EOSINOPHIL # BLD AUTO: 0.08 10*3/MM3 (ref 0–0.4)
EOSINOPHIL NFR BLD AUTO: 2 % (ref 0.3–6.2)
ERYTHROCYTE [DISTWIDTH] IN BLOOD BY AUTOMATED COUNT: 13.3 % (ref 12.3–15.4)
HCT VFR BLD AUTO: 36.8 % (ref 37.5–51)
HGB BLD-MCNC: 13 G/DL (ref 13–17.7)
IMM GRANULOCYTES # BLD AUTO: 0 10*3/MM3 (ref 0–0.05)
IMM GRANULOCYTES NFR BLD AUTO: 0 % (ref 0–0.5)
INR PPP: 1.13 (ref 0.89–1.12)
LYMPHOCYTES # BLD AUTO: 0.85 10*3/MM3 (ref 0.7–3.1)
LYMPHOCYTES NFR BLD AUTO: 21.7 % (ref 19.6–45.3)
MCH RBC QN AUTO: 34.9 PG (ref 26.6–33)
MCHC RBC AUTO-ENTMCNC: 35.3 G/DL (ref 31.5–35.7)
MCV RBC AUTO: 98.7 FL (ref 79–97)
MONOCYTES # BLD AUTO: 0.38 10*3/MM3 (ref 0.1–0.9)
MONOCYTES NFR BLD AUTO: 9.7 % (ref 5–12)
NEUTROPHILS NFR BLD AUTO: 2.56 10*3/MM3 (ref 1.7–7)
NEUTROPHILS NFR BLD AUTO: 65.6 % (ref 42.7–76)
NRBC BLD AUTO-RTO: 0 /100 WBC (ref 0–0.2)
PLATELET # BLD AUTO: 141 10*3/MM3 (ref 140–450)
PMV BLD AUTO: 12.3 FL (ref 6–12)
PROTHROMBIN TIME: 14.6 SECONDS (ref 12.2–14.5)
RBC # BLD AUTO: 3.73 10*6/MM3 (ref 4.14–5.8)
WBC NRBC COR # BLD: 3.91 10*3/MM3 (ref 3.4–10.8)

## 2023-06-05 PROCEDURE — 80061 LIPID PANEL: CPT | Performed by: INTERNAL MEDICINE

## 2023-06-05 PROCEDURE — 36415 COLL VENOUS BLD VENIPUNCTURE: CPT | Performed by: INTERNAL MEDICINE

## 2023-06-05 PROCEDURE — 3077F SYST BP >= 140 MM HG: CPT | Performed by: INTERNAL MEDICINE

## 2023-06-05 PROCEDURE — 82607 VITAMIN B-12: CPT | Performed by: STUDENT IN AN ORGANIZED HEALTH CARE EDUCATION/TRAINING PROGRAM

## 2023-06-05 PROCEDURE — 82746 ASSAY OF FOLIC ACID SERUM: CPT | Performed by: STUDENT IN AN ORGANIZED HEALTH CARE EDUCATION/TRAINING PROGRAM

## 2023-06-05 PROCEDURE — 80053 COMPREHEN METABOLIC PANEL: CPT | Performed by: INTERNAL MEDICINE

## 2023-06-05 PROCEDURE — 85610 PROTHROMBIN TIME: CPT | Performed by: INTERNAL MEDICINE

## 2023-06-05 PROCEDURE — 3079F DIAST BP 80-89 MM HG: CPT | Performed by: INTERNAL MEDICINE

## 2023-06-05 PROCEDURE — 99214 OFFICE O/P EST MOD 30 MIN: CPT | Performed by: INTERNAL MEDICINE

## 2023-06-05 PROCEDURE — 85025 COMPLETE CBC W/AUTO DIFF WBC: CPT | Performed by: INTERNAL MEDICINE

## 2023-06-05 RX ORDER — AMLODIPINE BESYLATE 5 MG/1
5 TABLET ORAL DAILY
Qty: 30 TABLET | Refills: 5 | Status: SHIPPED | OUTPATIENT
Start: 2023-06-05

## 2023-06-05 NOTE — PROGRESS NOTES
Chief Complaint   Patient presents with    Follow-up     Follow up chronic medical problems       History of Present Illness      The patient presents for a follow-up related to hyperlipidemia. He is following a low fat diet. He reports that he is exercising. He is taking atorvastatin. The patient is taking his medication as prescribed. He reports no medication side effects, including muscle cramps, abdominal pain, headaches or weakness. He denies chest pain, shortness of breath, orthopnea, paroxysmal nocturnal dyspnea, dyspnea on exertion, edema, palpitations or syncope.    The patient presents for a follow-up related to hypertension. The patient reports that he has had no headaches or blurred vision. He states that he is taking his medication as prescribed. He is not having medication side effects.    The patient presents for a follow-up related to chronic atrial fibrillation. He denies palpitations. The patient denies fatigue, dizziness, nausea or exercise intolerance.    Medications      Current Outpatient Medications:     aspirin 81 MG EC tablet, Take 1 tablet by mouth Daily., Disp: , Rfl:     atorvastatin (LIPITOR) 80 MG tablet, Take 1 tablet by mouth Every Night., Disp: 90 tablet, Rfl: 3    buPROPion XL (WELLBUTRIN XL) 300 MG 24 hr tablet, Take 1 tablet by mouth once daily, Disp: 30 tablet, Rfl: 5    carvedilol (COREG) 12.5 MG tablet, Take 1 tablet by mouth 2 (Two) Times a Day With Meals., Disp: 180 tablet, Rfl: 3    citalopram (CeleXA) 20 MG tablet, Take 1 tablet by mouth once daily, Disp: 90 tablet, Rfl: 0    famotidine (PEPCID) 20 MG tablet, Take 1 tablet by mouth Daily., Disp: 30 tablet, Rfl: 1    finasteride (PROSCAR) 5 MG tablet, Take 1 tablet by mouth every night at bedtime., Disp: 90 tablet, Rfl: 1    folic acid (FOLVITE) 400 MCG tablet, TAKE 1 TABLET BY MOUTH ONCE DAILY, Disp: 90 tablet, Rfl: 3    hydroCHLOROthiazide (HYDRODIURIL) 25 MG tablet, Take 1 tablet by mouth Daily., Disp: 90 tablet, Rfl: 0     lisinopril (PRINIVIL,ZESTRIL) 10 MG tablet, Take 2 tablets by mouth 2 (Two) Times a Day., Disp: 180 tablet, Rfl: 3    vitamin B-12 (CYANOCOBALAMIN) 1000 MCG tablet, Take 1 tablet by mouth Daily., Disp: 90 tablet, Rfl: 3    warfarin (COUMADIN) 5 MG tablet, Take 1 tablet by mouth Daily., Disp: 30 tablet, Rfl: 5    amLODIPine (NORVASC) 5 MG tablet, Take 1 tablet by mouth Daily., Disp: 30 tablet, Rfl: 5     Allergies    No Known Allergies    Problem List    Patient Active Problem List   Diagnosis    Depression    Hyperbilirubinemia    Hyperlipidemia LDL goal <70    Essential hypertension    Malignant neoplasm of overlapping sites of colon    Neurogenic bladder    Tobacco use    Coronary artery disease involving native coronary artery of native heart with angina pectoris    H/O mechanical aortic valve replacement    COPD (chronic obstructive pulmonary disease) (HCC)    Thrombocytopenia    Chronic anticoagulation on warfarin     Self-catheterizes urinary bladder    Lung cancer (S/P Left thoracotomy and lobectomy 4/2021)    Lung nodule < 6cm on CT (S/P Left thoracotomy and MARKO Lobectomy)    Gastroesophageal reflux disease without esophagitis    Pacemaker    S/P lobectomy of MARKO lung 4/7/21    Syncope (R/O due to orthostatic hypotension)    Permanent atrial fibrillation    Acute diastolic congestive heart failure    Abdominal aortic aneurysm (AAA) without rupture    Smoking    Urinary retention    Complete heart block    Aneurysm of ascending aorta without rupture s/p repair     Paroxysmal atrial fibrillation       Medications, Allergies, Problems List and Past History were reviewed and updated.    Physical Examination    /84 (BP Location: Left arm, Patient Position: Sitting, Cuff Size: Adult)   Pulse 76   Temp 98.4 °F (36.9 °C) (Infrared)   Resp 16   Wt 63 kg (139 lb)   BMI 19.39 kg/m²       HEENT: Head- Normocephalic Atraumatic. Facies- Within normal limits. Pinnas- Normal texture and shape bilaterally.  Canals- Normal bilaterally. TMs- Normal bilaterally. Nares- Patent bilaterally. Nasal Septum- is normal. There is no tenderness to palpation over the frontal or maxillary sinuses. Lids- Normal bilaterally. Conjunctiva- Clear bilaterally. Sclera- Anicteric bilaterally. Oropharynx- Moist with no lesions. Tonsils- No enlargement, erythema or exudate.    Neck: Thyroid- non enlarged, symmetric and has no nodules. No bruits are detected. ROM- Normal Range of Motion with no rigidity.    Lungs: Auscultation- Clear to auscultation bilaterally. There are no retractions, clubbing or cyanosis. The Expiratory to Inspiratory ratio is equal.    Cardiovascular: There are no carotid bruits. Heart- Normal Rate with Irregularly Irregular rhythm and no murmurs. There are no gallops. There are no rubs. In the lower extremities there is no edema. The upper extremities do not have edema.    Abdomen: Soft, benign, non-tender with no masses, hernias, organomegaly or scars.    Impression and Assessment    Hyperlipidemia.    Essential Hypertension.    Atrial Fibrillation.    Plan    Hyperlipidemia Plan: The patient was instructed to exercise daily, eat a low fat diet and continue his medications.    Essential Hypertension Plan: The patient was instructed to continue the current medications. A medication was added as noted.    Atrial Fibrillation Plan: The patient was instructed to continue the current medications. Further plans will be made after testing.    Diagnoses and all orders for this visit:    1. Hyperlipidemia, unspecified hyperlipidemia type (Primary)  -     Comprehensive Metabolic Panel; Future  -     Lipid Panel; Future  -     Comprehensive Metabolic Panel  -     Lipid Panel    2. Essential hypertension  -     Comprehensive Metabolic Panel; Future  -     CBC & Differential; Future  -     POC Urinalysis Dipstick, Automated; Future  -     amLODIPine (NORVASC) 5 MG tablet; Take 1 tablet by mouth Daily.  Dispense: 30 tablet; Refill:  5  -     Comprehensive Metabolic Panel  -     CBC & Differential    3. Atrial fibrillation, unspecified type  -     Protime-INR; Future  -     Protime-INR    4. Macrocytosis  -     Vitamin B12  -     Folate        Return to Office    The patient was instructed to return for follow-up in 3 months. The patient was instructed to return sooner if the condition changes, worsens, or does not resolve.

## 2023-06-06 ENCOUNTER — ANTICOAGULATION VISIT (OUTPATIENT)
Dept: PHARMACY | Facility: HOSPITAL | Age: 73
End: 2023-06-06
Payer: MEDICARE

## 2023-06-06 DIAGNOSIS — I48.21 PERMANENT ATRIAL FIBRILLATION: Primary | ICD-10-CM

## 2023-06-06 LAB
ALBUMIN SERPL-MCNC: 4 G/DL (ref 3.5–5.2)
ALBUMIN/GLOB SERPL: 1.3 G/DL
ALP SERPL-CCNC: 64 U/L (ref 39–117)
ALT SERPL W P-5'-P-CCNC: 10 U/L (ref 1–41)
ANION GAP SERPL CALCULATED.3IONS-SCNC: 10 MMOL/L (ref 5–15)
AST SERPL-CCNC: 21 U/L (ref 1–40)
BILIRUB SERPL-MCNC: 1.2 MG/DL (ref 0–1.2)
BUN SERPL-MCNC: 9 MG/DL (ref 8–23)
BUN/CREAT SERPL: 9.7 (ref 7–25)
CALCIUM SPEC-SCNC: 9.1 MG/DL (ref 8.6–10.5)
CHLORIDE SERPL-SCNC: 101 MMOL/L (ref 98–107)
CHOLEST SERPL-MCNC: 127 MG/DL (ref 0–200)
CO2 SERPL-SCNC: 29 MMOL/L (ref 22–29)
CREAT SERPL-MCNC: 0.93 MG/DL (ref 0.76–1.27)
EGFRCR SERPLBLD CKD-EPI 2021: 87.2 ML/MIN/1.73
FOLATE SERPL-MCNC: 4.96 NG/ML (ref 4.78–24.2)
GLOBULIN UR ELPH-MCNC: 3.2 GM/DL
GLUCOSE SERPL-MCNC: 109 MG/DL (ref 65–99)
HDLC SERPL-MCNC: 81 MG/DL (ref 40–60)
LDLC SERPL CALC-MCNC: 36 MG/DL (ref 0–100)
LDLC/HDLC SERPL: 0.47 {RATIO}
POTASSIUM SERPL-SCNC: 3.9 MMOL/L (ref 3.5–5.2)
PROT SERPL-MCNC: 7.2 G/DL (ref 6–8.5)
SODIUM SERPL-SCNC: 140 MMOL/L (ref 136–145)
TRIGL SERPL-MCNC: 38 MG/DL (ref 0–150)
VIT B12 BLD-MCNC: 450 PG/ML (ref 211–946)
VLDLC SERPL-MCNC: 10 MG/DL (ref 5–40)

## 2023-06-06 NOTE — PROGRESS NOTES
Anticoagulation Clinic Progress Note  Indication:  Mechanical aortic heart valve, persistent Afib  Referring Provider: Ez Cintron  Initial Warfarin Start Date: > 20 years  Planned Duration of Therapy: indefinite  Goal INR: 2.5-3.5  Current Drug Interactions: asa  [id number]: 2 (HTN, Age > 65)    Diet: no GLV 9/6/2022; occasional asparagus or green beans (1-2x/month) 9/9/2022  Alcohol: 2-3xnightly  Tobacco: <1/2 ppd   OTC Pain Medication: none    Anticoagulation Clinic INR History:  Date 5/11 5/16 5/19 6/10 6/24 7/8 7/22 7/27 8/3 8/10 8/18   Total Weekly Dose 35 mg 37.5mg 35mg 35mg 35 mg 37.5mg? 40mg? 40mg 40 mg 40mg 35mg 32.5 mg   INR 1.5 2.68 ED  3.1 POCT 2.9 2.2 2.2 4.9 POCT  3.66 meenakshi 1.5 2.7 4.3 5.2 1.8   Notes  1x boost, no enox    Possible extra dose enox         Date 8/23 8/30 9/6 9/9 9/16 10/3 12/2  12/4 12/6 12/16   Total Weekly Dose 37.5 mg 37.5mg 30mg 27.5 mg 30 mg 30 mg 30mg BH admission   ???   INR 2.3 4.72  6.2 POCT 5.1 3.4 2.6 2.3 3.9 12/4-12/8 4.7 2.58 1.4   Notes    Hold x1; red. x1       Held/missed doses.       Date 12/20 6/6             Total Weekly Dose 35mg Non compliant 20 mg             INR 2.1   1.13             Notes 2x boost                 Clinic Interview:  Tablet Strength: 5 mg  Verbal Release: Verbal Release Authorization signed on 5/11/2022 -- may speak with Meghana (spouse)  Patient Contact:[phone] (Mobile)      Patient Findings  Positives: Missed doses, Other complaints   Negatives: Signs/symptoms of thrombosis, Signs/symptoms of bleeding, Laboratory test error suspected, Change in health, Change in alcohol use, Change in activity, Upcoming invasive procedure, Emergency department visit, Upcoming dental procedure, Extra doses, Change in medications, Change in diet/appetite, Hospital admission, Bruising   Comments: Was able to reach Mr. Vo by calling his wife's mobile number. He has been lost to follow up since December, 2022.  Discussed the importance of  compliance and timely communication.  He has been taking warfarin 5 mg oral every other day (no doses on other days)  He will come to clinic on Monday for repeat INR.  He will begin taking enoxaparin today.  He verbalized understanding and stated that he has taken enoxaparin shots in the past.    Otherwise, no changes noted.     Plan:    1. INR is baseline today at 1.13 following noncompliance with clinic (goal 2.5-3.5). . Patient was due for discharge.  Instructed Mr. Vo to take a boosted dose of warfarin 10mg today tomorrow take warfarin 5 mg oral daily until recheck on Monday, 6/12.  He will begin taking enoxaparin 60 mg subcut q12h until recheck on Monday.  He has only been taking warfarin every other day (no  Weight 63 kg on 6/5; SCr 0.93 on 6/5/23  In December, noted regimen was warfarin 5mg daily except 2.5mg SunWed      2. Return to clinic on 6/12 (Monday) at 1pm.  3. Verbal information provided over the phone Dillon Vo expresses understanding by teach back and has no further questions at this time.  4. Rx for lovenox sent    Antonina Miranda, PharmD  6/8/2023  09:26 EDT

## 2023-06-08 RX ORDER — ENOXAPARIN SODIUM 100 MG/ML
60 INJECTION SUBCUTANEOUS EVERY 12 HOURS SCHEDULED
Qty: 15 ML | Refills: 1 | Status: SHIPPED | OUTPATIENT
Start: 2023-06-08

## 2023-06-12 ENCOUNTER — ANTICOAGULATION VISIT (OUTPATIENT)
Dept: PHARMACY | Facility: HOSPITAL | Age: 73
End: 2023-06-12
Payer: MEDICARE

## 2023-06-12 DIAGNOSIS — I48.21 PERMANENT ATRIAL FIBRILLATION: Primary | ICD-10-CM

## 2023-06-12 LAB
INR PPP: 4.4 (ref 0.91–1.09)
PROTHROMBIN TIME: 53 SECONDS (ref 10–13.8)

## 2023-06-12 PROCEDURE — 36416 COLLJ CAPILLARY BLOOD SPEC: CPT

## 2023-06-12 PROCEDURE — 85610 PROTHROMBIN TIME: CPT

## 2023-06-12 PROCEDURE — G0463 HOSPITAL OUTPT CLINIC VISIT: HCPCS

## 2023-06-12 NOTE — PROGRESS NOTES
Anticoagulation Clinic Progress Note  Indication:  Mechanical aortic heart valve, persistent Afib  Referring Provider: Ez Cintron  Initial Warfarin Start Date: > 20 years  Planned Duration of Therapy: indefinite  Goal INR: 2.5-3.5  Current Drug Interactions: asa  [id number]: 2 (HTN, Age > 65)    Diet: no GLV 9/6/2022; occasional asparagus or green beans (1-2x/month) 9/9/2022  Alcohol: 2-3xnightly  Tobacco: <1/2 ppd   OTC Pain Medication: none    Anticoagulation Clinic INR History:  Date 5/11 5/16 5/19 6/10 6/24 7/8 7/22 7/27 8/3 8/10 8/18   Total Weekly Dose 35 mg 37.5mg 35mg 35mg 35 mg 37.5mg? 40mg? 40mg 40 mg 40mg 35mg 32.5 mg   INR 1.5 2.68 ED  3.1 POCT 2.9 2.2 2.2 4.9 POCT  3.66 meenakshi 1.5 2.7 4.3 5.2 1.8   Notes  1x boost, no enox    Possible extra dose enox         Date 8/23 8/30 9/6 9/9 9/16 10/3 12/2  12/4 12/6 12/16   Total Weekly Dose 37.5 mg 37.5mg 30mg 27.5 mg 30 mg 30 mg 30mg BH admission   ???   INR 2.3 4.72  6.2 POCT 5.1 3.4 2.6 2.3 3.9 12/4-12/8 4.7 2.58 1.4   Notes    Hold x1; red. x1       Held/missed doses.       Date 12/20 6/6 6/12            Total Weekly Dose 35mg Non compliant 20 mg 30 mg             INR 2.1   1.13 4.4            Notes 2x boost                 Clinic Interview:  Tablet Strength: 5 mg  Verbal Release: Verbal Release Authorization signed on 5/11/2022 -- may speak with Meghana (spouse)  Patient Contact:[phone] (Mobile)      Patient Findings    Negatives: Signs/symptoms of thrombosis, Signs/symptoms of bleeding, Laboratory test error suspected, Change in health, Change in alcohol use, Change in activity, Upcoming invasive procedure, Emergency department visit, Upcoming dental procedure, Missed doses, Extra doses, Change in medications, Change in diet/appetite, Hospital admission, Bruising, Other complaints   Comments: Patient denies all findings. Have discussed stopping Lovenox shots now that he is above baseline. No signs/issues with bruising/bleeding. Historically  patient has been closer to therapeutic with 2 days of 2.5 mg. Patient has already taken a dose of warfarin 5 mg this morning, have discussed with him to take in the evening moving forward.       Plan:    1. INR is baseline today at 1.13 following noncompliance with clinic (goal 2.5-3.5). . Patient was due for discharge.  Instructed Mr. Vo to reduce dose to warfarin 5 mg daily except warfarin 2.5 mg on TuesFri until recheck.  2. Return to clinic on 6/19  3. Verbal information provided over the phone Dillon Vo expresses understanding by teach back and has no further questions at this time.    Migel Atkins,  PharmD  06/12/23  13:29 EDT

## 2023-06-19 ENCOUNTER — ANTICOAGULATION VISIT (OUTPATIENT)
Dept: PHARMACY | Facility: HOSPITAL | Age: 73
End: 2023-06-19
Payer: MEDICARE

## 2023-06-19 DIAGNOSIS — I48.21 PERMANENT ATRIAL FIBRILLATION: Primary | ICD-10-CM

## 2023-06-19 LAB
INR PPP: 1.5 (ref 0.91–1.09)
PROTHROMBIN TIME: 18.1 SECONDS (ref 10–13.8)

## 2023-06-19 PROCEDURE — G0463 HOSPITAL OUTPT CLINIC VISIT: HCPCS

## 2023-06-19 PROCEDURE — 36416 COLLJ CAPILLARY BLOOD SPEC: CPT

## 2023-06-19 PROCEDURE — 85610 PROTHROMBIN TIME: CPT

## 2023-06-19 NOTE — PROGRESS NOTES
Anticoagulation Clinic Progress Note  Indication:  Mechanical aortic heart valve, persistent Afib  Referring Provider: Ez Cintron  Initial Warfarin Start Date: > 20 years  Planned Duration of Therapy: indefinite  Goal INR: 2.5-3.5  Current Drug Interactions: asa  [id number]: 2 (HTN, Age > 65)    Diet: no GLV 9/6/2022; occasional asparagus or green beans (1-2x/month) 9/9/2022  Alcohol: 2-3xnightly  Tobacco: <1/2 ppd   OTC Pain Medication: none    Anticoagulation Clinic INR History:  Date 5/11 5/16 5/19 6/10 6/24 7/8 7/22 7/27 8/3 8/10 8/18   Total Weekly Dose 35 mg 37.5mg 35mg 35mg 35 mg 37.5mg? 40mg? 40mg 40 mg 40mg 35mg 32.5 mg   INR 1.5 2.68 ED  3.1 POCT 2.9 2.2 2.2 4.9 POCT  3.66 meenakshi 1.5 2.7 4.3 5.2 1.8   Notes  1x boost, no enox    Possible extra dose enox         Date 8/23 8/30 9/6 9/9 9/16 10/3 12/2  12/4 12/6 12/16   Total Weekly Dose 37.5 mg 37.5mg 30mg 27.5 mg 30 mg 30 mg 30mg BH admission   ???   INR 2.3 4.72  6.2 POCT 5.1 3.4 2.6 2.3 3.9 12/4-12/8 4.7 2.58 1.4   Notes    Hold x1; red. x1       Held/missed doses.       Date 12/20 6/6 6/12            Total Weekly Dose 35mg Non compliant 20 mg 30 mg             INR 2.1   1.13 4.4            Notes 2x boost                 Clinic Interview:  Tablet Strength: 5 mg  Verbal Release: Verbal Release Authorization signed on 5/11/2022 -- may speak with Meghana (spouse)  Patient Contact:[phone] (Mobile)      Patient Findings  Negatives: Signs/symptoms of thrombosis, Signs/symptoms of bleeding, Laboratory test error suspected, Change in health, Change in alcohol use, Change in activity, Upcoming invasive procedure, Emergency department visit, Upcoming dental procedure, Missed doses, Extra doses, Change in medications, Change in diet/appetite, Hospital admission, Bruising, Other complaints   Comments: Reports he has lovenox on hand       Plan:    1. INR is subtherapeutic today at 1.5 (goal 2.5-3.5).  Instructed Mr. Vo to BOOST emeka's dose to  7.5mg then increase dose to warfarin 5 mg daily except warfarin 2.5 mg on WedSat until recheck. Will additionally inject lovenox 60mg/kg q12h through Thursday AM  2. Return to clinic in 1 week, unable to return sooner  3. Verbal information provided over the phone Dillon Vo expresses understanding by teach back and has no further questions at this time.    Diana Solano PharmD.  06/19/23   14:10 EDT  ]

## 2023-06-28 PROBLEM — F17.200 SMOKING: Status: RESOLVED | Noted: 2022-12-04 | Resolved: 2023-06-28

## 2023-06-28 PROBLEM — IMO0001 SMOKING: Status: RESOLVED | Noted: 2022-12-04 | Resolved: 2023-06-28

## 2023-06-28 PROBLEM — Z90.2 S/P LOBECTOMY OF LUNG: Status: RESOLVED | Noted: 2021-05-05 | Resolved: 2023-06-28

## 2023-06-28 PROBLEM — IMO0001 LUNG NODULE < 6CM ON CT: Status: RESOLVED | Noted: 2021-03-23 | Resolved: 2023-06-28

## 2023-08-14 ENCOUNTER — APPOINTMENT (OUTPATIENT)
Dept: PHARMACY | Facility: HOSPITAL | Age: 73
End: 2023-08-14
Payer: MEDICARE

## 2023-08-15 ENCOUNTER — ANTICOAGULATION VISIT (OUTPATIENT)
Dept: PHARMACY | Facility: HOSPITAL | Age: 73
End: 2023-08-15
Payer: MEDICARE

## 2023-08-15 DIAGNOSIS — I48.21 PERMANENT ATRIAL FIBRILLATION: Primary | ICD-10-CM

## 2023-08-15 LAB
INR PPP: 1.4 (ref 0.91–1.09)
PROTHROMBIN TIME: 17 SECONDS (ref 10–13.8)

## 2023-08-15 PROCEDURE — 85610 PROTHROMBIN TIME: CPT

## 2023-08-15 PROCEDURE — G0463 HOSPITAL OUTPT CLINIC VISIT: HCPCS

## 2023-08-15 PROCEDURE — 36416 COLLJ CAPILLARY BLOOD SPEC: CPT

## 2023-08-15 NOTE — PROGRESS NOTES
Anticoagulation Clinic Progress Note  Indication:  Mechanical aortic heart valve, persistent Afib  Referring Provider: Ez Cintron  Initial Warfarin Start Date: > 20 years  Planned Duration of Therapy: indefinite  Goal INR: 2.5-3.5  Current Drug Interactions: asa  [id number]: 2 (HTN, Age > 65)    Diet: Broccoli, green beans ~2x weekly due to garden season (6/26/23)  Alcohol: 2-3xnightly  Tobacco: <1/2 ppd   OTC Pain Medication: none    Anticoagulation Clinic INR History:  Date 5/11 5/16 5/19 6/10 6/24 7/8 7/22 7/27 8/3 8/10 8/18   Total Weekly Dose 35 mg 37.5mg 35mg 35mg 35 mg 37.5mg? 40mg? 40mg 40 mg 40mg 35mg 32.5 mg   INR 1.5 2.68 ED  3.1 POCT 2.9 2.2 2.2 4.9 POCT  3.66 meenakshi 1.5 2.7 4.3 5.2 1.8   Notes  1x boost, no enox    Possible extra dose enox         Date 8/23 8/30 9/6 9/9 9/16 10/3 12/2  12/4 12/6 12/16   Total Weekly Dose 37.5 mg 37.5mg 30mg 27.5 mg 30 mg 30 mg 30mg BH admission   ???   INR 2.3 4.72  6.2 POCT 5.1 3.4 2.6 2.3 3.9 12/4-12/8 4.7 2.58 1.4   Notes    Hold x1; red. x1       Held/missed doses.       Date 12/20  6/6 6/12 6/19 6/26 6/29 7/5  7/12 7/19 8/15     Total Weekly Dose 35mg Non compliant 20 mg 30 mg   32.5 mg?? 35 mg  35mg  32.5  32.5mg     INR 2.1   1.13 4.4 1.5 1.5 1.9 4.7 POCT  3.6 Meenakshi  2.0 1.7 1.4     Notes 2x boost     enox X 3 days  Missed dose?  Inc GLV            Clinic Interview:  Tablet Strength: 5 mg  Verbal Release: Verbal Release Authorization signed on 5/11/2022 -- may speak with Meghana (spouse)  Patient Contact: [phone] (Mobile)      Patient Findings  Positives: Missed doses   Negatives: Signs/symptoms of thrombosis, Signs/symptoms of bleeding, Laboratory test error suspected, Change in health, Change in alcohol use, Change in activity, Upcoming invasive procedure, Emergency department visit, Upcoming dental procedure, Extra doses, Change in medications, Change in diet/appetite, Hospital admission, Bruising, Other complaints   Comments: Patient reports he did  miss warfarin dose last night, but is certain he didn't miss others because he uses his pillbox. He does reports he did eat green beans and cucumbers this time of year.    Patient reports that he has lovenox at home. He believes it is 60mg but will call if it isn't. Discussed it's a weight based dose.     Plan:  1. INR is subtherapeutic again today at 1.4 (goal 2.5-3.5) of note: he missed dose of warfarin last night. Instructed Mr. Vo to boost dose today to 7.5mg and tomorrow to 7.5mg then 5 mg on Thursday. He will thenincrease dose to warfarin 5mg daily. Will inject lovenox 60mg q12h last dose of lovenox Thursday night.  2. Return to clinic on Friday.  3. Verbal information provided over the phone Dillon Vo expresses understanding by teach back and has no further questions at this time.     Veronica Diego, PharmD  08/15/23   13:23 EDT

## 2023-08-18 ENCOUNTER — ANTICOAGULATION VISIT (OUTPATIENT)
Dept: PHARMACY | Facility: HOSPITAL | Age: 73
End: 2023-08-18
Payer: MEDICARE

## 2023-08-18 DIAGNOSIS — I48.21 PERMANENT ATRIAL FIBRILLATION: Primary | ICD-10-CM

## 2023-08-18 LAB
INR PPP: 2.1 (ref 0.91–1.09)
PROTHROMBIN TIME: 25.6 SECONDS (ref 10–13.8)

## 2023-08-18 PROCEDURE — 85610 PROTHROMBIN TIME: CPT

## 2023-08-18 PROCEDURE — 36416 COLLJ CAPILLARY BLOOD SPEC: CPT

## 2023-08-18 PROCEDURE — G0463 HOSPITAL OUTPT CLINIC VISIT: HCPCS

## 2023-08-18 NOTE — PROGRESS NOTES
Anticoagulation Clinic Progress Note  Indication:  Mechanical aortic heart valve, persistent Afib  Referring Provider: Ez Cintron  Initial Warfarin Start Date: > 20 years  Planned Duration of Therapy: indefinite  Goal INR: 2.5-3.5  Current Drug Interactions: asa  [id number]: 2 (HTN, Age > 65)    Diet: Broccoli, green beans ~2x weekly due to garden season (6/26/23)  Alcohol: 2-3xnightly  Tobacco: <1/2 ppd   OTC Pain Medication: none    Anticoagulation Clinic INR History:  Date 5/11 5/16 5/19 6/10 6/24 7/8 7/22 7/27 8/3 8/10 8/18   Total Weekly Dose 35 mg 37.5mg 35mg 35mg 35 mg 37.5mg? 40mg? 40mg 40 mg 40mg 35mg 32.5 mg   INR 1.5 2.68 ED  3.1 POCT 2.9 2.2 2.2 4.9 POCT  3.66 meenakshi 1.5 2.7 4.3 5.2 1.8   Notes  1x boost, no enox    Possible extra dose enox         Date 8/23 8/30 9/6 9/9 9/16 10/3 12/2  12/4 12/6 12/16   Total Weekly Dose 37.5 mg 37.5mg 30mg 27.5 mg 30 mg 30 mg 30mg BH admission   ???   INR 2.3 4.72  6.2 POCT 5.1 3.4 2.6 2.3 3.9 12/4-12/8 4.7 2.58 1.4   Notes    Hold x1; red. x1       Held/missed doses.       Date 12/20  6/6 6/12 6/19 6/26 6/29 7/5  7/12 7/19 8/15 8/18    Total Weekly Dose 35mg Non compliant 20 mg 30 mg   32.5 mg?? 35 mg  35mg  32.5  32.5mg 32.5mg    INR 2.1   1.13 4.4 1.5 1.5 1.9 4.7 POCT  3.6 Meenakshi  2.0 1.7 1.4 2.1    Notes 2x boost     enox X 3 days  Missed dose?  Inc GLV            Clinic Interview:  Tablet Strength: 5 mg  Verbal Release: Verbal Release Authorization signed on 5/11/2022 -- may speak with Meghana (spouse)  Patient Contact: [phone] (Mobile)      Patient Findings  Negatives: Signs/symptoms of thrombosis, Signs/symptoms of bleeding, Laboratory test error suspected, Change in health, Change in alcohol use, Change in activity, Upcoming invasive procedure, Emergency department visit, Upcoming dental procedure, Missed doses, Extra doses, Change in medications, Change in diet/appetite, Hospital admission, Bruising, Other complaints     1. INR is subtherapeutic  again today at 2.1, however improved (goal 2.5-3.5) Instructed Mr. Vo to increase dose to 5mg daily.   2. Return to clinic on 8/23.  3. Verbal information provided over the phone Dillon Jas Vo expresses understanding by teach back and has no further questions at this time.     Veronica Diego, PharmD  08/18/23   11:53 EDT

## 2023-08-23 ENCOUNTER — ANTICOAGULATION VISIT (OUTPATIENT)
Dept: PHARMACY | Facility: HOSPITAL | Age: 73
End: 2023-08-23
Payer: MEDICARE

## 2023-08-23 DIAGNOSIS — I48.21 PERMANENT ATRIAL FIBRILLATION: Primary | ICD-10-CM

## 2023-08-23 LAB
INR PPP: 2.4 (ref 0.91–1.09)
PROTHROMBIN TIME: 29.2 SECONDS (ref 10–13.8)

## 2023-08-23 PROCEDURE — 36416 COLLJ CAPILLARY BLOOD SPEC: CPT

## 2023-08-23 PROCEDURE — 85610 PROTHROMBIN TIME: CPT

## 2023-08-23 NOTE — PROGRESS NOTES
Anticoagulation Clinic Progress Note  Indication:  Mechanical aortic heart valve, persistent Afib  Referring Provider: Ez Cintron  Initial Warfarin Start Date: > 20 years  Planned Duration of Therapy: indefinite  Goal INR: 2.5-3.5  Current Drug Interactions: asa  [id number]: 2 (HTN, Age > 65)    Diet: Broccoli, green beans ~2x weekly due to garden season (6/26/23)  Alcohol: 2-3xnightly  Tobacco: <1/2 ppd   OTC Pain Medication: none    Anticoagulation Clinic INR History:  Date 5/11 5/16 5/19 6/10 6/24 7/8 7/22 7/27 8/3 8/10 8/18   Total Weekly Dose 35 mg 37.5mg 35mg 35mg 35 mg 37.5mg? 40mg? 40mg 40 mg 40mg 35mg 32.5 mg   INR 1.5 2.68 ED  3.1 POCT 2.9 2.2 2.2 4.9 POCT  3.66 meenakshi 1.5 2.7 4.3 5.2 1.8   Notes  1x boost, no enox    Possible extra dose enox         Date 8/23 8/30 9/6 9/9 9/16 10/3 12/2  12/4 12/6 12/16   Total Weekly Dose 37.5 mg 37.5mg 30mg 27.5 mg 30 mg 30 mg 30mg BH admission   ???   INR 2.3 4.72  6.2 POCT 5.1 3.4 2.6 2.3 3.9 12/4-12/8 4.7 2.58 1.4   Notes    Hold x1; red. x1       Held/missed doses.       Date 12/20  6/6 6/12 6/19 6/26 6/29 7/5  7/12 7/19 8/15 8/18 8/23   Total Weekly Dose 35mg Non compliant 20 mg 30 mg   32.5 mg?? 35 mg  35mg  32.5  32.5mg 32.5mg    INR 2.1   1.13 4.4 1.5 1.5 1.9 4.7 POCT  3.6 Meenakshi  2.0 1.7 1.4 2.1 2.4   Notes 2x boost     enox X 3 days  Missed dose?  Inc GLV            Clinic Interview:  Tablet Strength: 5 mg  Verbal Release: Verbal Release Authorization signed on 5/11/2022 -- may speak with Meghana (spouse)  Patient Contact: [phone] (Mobile)      Patient Findings    Negatives: Signs/symptoms of thrombosis, Signs/symptoms of bleeding, Laboratory test error suspected, Change in health, Change in alcohol use, Change in activity, Upcoming invasive procedure, Emergency department visit, Upcoming dental procedure, Missed doses, Extra doses, Change in medications, Change in diet/appetite, Hospital admission, Bruising, Other complaints       1. INR is slightly  subtherapeutic again today at 2.4, however improved (goal 2.5-3.5) Instructed Mr. Vo to continue increased dose to 5mg daily.   2. Return to clinic on 8/30. If in range he would like to start checking less frequently   3. Verbal information provided over the phone Dillon Vo expresses understanding by teach back and has no further questions at this time.     Karen Jimenez, PharmD  08/23/23   13:37 EDT

## 2023-08-30 ENCOUNTER — ANTICOAGULATION VISIT (OUTPATIENT)
Dept: PHARMACY | Facility: HOSPITAL | Age: 73
End: 2023-08-30
Payer: MEDICARE

## 2023-08-30 DIAGNOSIS — I48.21 PERMANENT ATRIAL FIBRILLATION: Primary | ICD-10-CM

## 2023-08-30 DIAGNOSIS — Z95.2 H/O AORTIC VALVE REPLACEMENT: ICD-10-CM

## 2023-08-30 DIAGNOSIS — I48.0 PAROXYSMAL ATRIAL FIBRILLATION: ICD-10-CM

## 2023-08-30 LAB
INR PPP: 2.3 (ref 0.91–1.09)
PROTHROMBIN TIME: 27.8 SECONDS (ref 10–13.8)

## 2023-08-30 PROCEDURE — G0463 HOSPITAL OUTPT CLINIC VISIT: HCPCS

## 2023-08-30 PROCEDURE — 85610 PROTHROMBIN TIME: CPT

## 2023-08-30 PROCEDURE — 36416 COLLJ CAPILLARY BLOOD SPEC: CPT

## 2023-08-30 RX ORDER — FUROSEMIDE 20 MG/1
20 TABLET ORAL DAILY
COMMUNITY

## 2023-08-30 RX ORDER — WARFARIN SODIUM 5 MG/1
TABLET ORAL
Qty: 40 TABLET | Refills: 3 | Status: SHIPPED | OUTPATIENT
Start: 2023-08-30

## 2023-08-30 RX ORDER — POTASSIUM CHLORIDE 750 MG/1
10 TABLET, FILM COATED, EXTENDED RELEASE ORAL DAILY
COMMUNITY

## 2023-08-30 NOTE — PROGRESS NOTES
Anticoagulation Clinic Progress Note  Indication:  Mechanical aortic heart valve, persistent Afib  Referring Provider: Ez Cintron  Initial Warfarin Start Date: > 20 years  Planned Duration of Therapy: indefinite  Goal INR: 2.5-3.5  Current Drug Interactions: asa  [id number]: 2 (HTN, Age > 65)    Diet: Broccoli, green beans ~2x weekly due to garden season (6/26/23)  Alcohol: 2-3xnightly  Tobacco: <1/2 ppd   OTC Pain Medication: none    Anticoagulation Clinic INR History:  Date 5/11 5/16 5/19 6/10 6/24 7/8 7/22 7/27 8/3 8/10 8/18   Total Weekly Dose 35 mg 37.5mg 35mg 35mg 35 mg 37.5mg? 40mg? 40mg 40 mg 40mg 35mg 32.5 mg   INR 1.5 2.68 ED  3.1 POCT 2.9 2.2 2.2 4.9 POCT  3.66 meenakshi 1.5 2.7 4.3 5.2 1.8   Notes  1x boost, no enox    Possible extra dose enox         Date 8/23 8/30 9/6 9/9 9/16 10/3 12/2  12/4 12/6 12/16   Total Weekly Dose 37.5 mg 37.5mg 30mg 27.5 mg 30 mg 30 mg 30mg BH admission   ???   INR 2.3 4.72  6.2 POCT 5.1 3.4 2.6 2.3 3.9 12/4-12/8 4.7 2.58 1.4   Notes    Hold x1; red. x1       Held/missed doses.     Date 12/20  6/6 6/12 6/19 6/26 6/29 7/5  7/12 7/19 8/15 8/18 8/23  8/30   Total Weekly Dose 35mg Non compliant 20 mg 30 mg   32.5 mg?? 35 mg  35mg  32.5  32.5mg 32.5mg  35 mg  35 mg   INR 2.1   1.13 4.4 1.5 1.5 1.9 4.7 POCT  3.6 Meenakshi  2.0 1.7 1.4 2.1 2.4  2.3   Notes 2x boost     enox X 3 days  Missed dose?  Inc GLV    redx1  ?  Missedx1, enox x2 enox  Less EtOH     Clinic Interview:  Tablet Strength: 5 mg tablets  Verbal Release: Verbal Release Authorization signed on 5/11/2022 -- may speak with Meghana (spouse)  Patient Contact: [phone] (Mobile)      Patient Findings  Positives: Change in alcohol use   Negatives: Signs/symptoms of thrombosis, Signs/symptoms of bleeding, Laboratory test error suspected, Change in health, Change in activity, Upcoming invasive procedure, Emergency department visit, Upcoming dental procedure, Missed doses, Extra doses, Change in medications, Change in  diet/appetite, Hospital admission, Bruising, Other complaints   Comments: Discussed the importance of consistent intake of foods that contain vitamin K.  He has been drinking less EtOH the past few weeks (usually scotch).  Discussed the potential interaction with warfarin.  Otherwise, above findings negative     1. INR is SUB therapeutic today at 2.3 (goal 2.5-3.5) Instructed Mr. Vo to increase regimen to warfarin 5mg oral daily except 7.5 mg Wednesdays.   2. Return to clinic in two weeks on 9/13 per patient preference. If in range he would like to start checking less frequently   3. Verbal information provided over the phone Dillon Vo expresses understanding by teach back and has no further questions at this time.     Antonina Miranda, PharmD  08/30/23   13:02 EDT

## 2023-09-06 ENCOUNTER — OFFICE VISIT (OUTPATIENT)
Dept: INTERNAL MEDICINE | Facility: CLINIC | Age: 73
End: 2023-09-06
Payer: MEDICARE

## 2023-09-06 VITALS
WEIGHT: 142 LBS | DIASTOLIC BLOOD PRESSURE: 64 MMHG | HEART RATE: 68 BPM | RESPIRATION RATE: 18 BRPM | BODY MASS INDEX: 19.81 KG/M2 | TEMPERATURE: 97.8 F | SYSTOLIC BLOOD PRESSURE: 122 MMHG

## 2023-09-06 DIAGNOSIS — I10 ESSENTIAL HYPERTENSION: Primary | ICD-10-CM

## 2023-09-06 DIAGNOSIS — I48.91 ATRIAL FIBRILLATION, UNSPECIFIED TYPE: ICD-10-CM

## 2023-09-06 DIAGNOSIS — E78.5 HYPERLIPIDEMIA, UNSPECIFIED HYPERLIPIDEMIA TYPE: ICD-10-CM

## 2023-09-06 LAB
ALBUMIN SERPL-MCNC: 4.3 G/DL (ref 3.5–5.2)
ALBUMIN/GLOB SERPL: 1.5 G/DL
ALP SERPL-CCNC: 76 U/L (ref 39–117)
ALT SERPL W P-5'-P-CCNC: 8 U/L (ref 1–41)
ANION GAP SERPL CALCULATED.3IONS-SCNC: 12 MMOL/L (ref 5–15)
AST SERPL-CCNC: 20 U/L (ref 1–40)
BASOPHILS # BLD AUTO: 0.07 10*3/MM3 (ref 0–0.2)
BASOPHILS NFR BLD AUTO: 1.3 % (ref 0–1.5)
BILIRUB SERPL-MCNC: 1.1 MG/DL (ref 0–1.2)
BUN SERPL-MCNC: 13 MG/DL (ref 8–23)
BUN/CREAT SERPL: 11.6 (ref 7–25)
CALCIUM SPEC-SCNC: 9.7 MG/DL (ref 8.6–10.5)
CHLORIDE SERPL-SCNC: 104 MMOL/L (ref 98–107)
CHOLEST SERPL-MCNC: 140 MG/DL (ref 0–200)
CO2 SERPL-SCNC: 27 MMOL/L (ref 22–29)
CREAT SERPL-MCNC: 1.12 MG/DL (ref 0.76–1.27)
DEPRECATED RDW RBC AUTO: 53.6 FL (ref 37–54)
EGFRCR SERPLBLD CKD-EPI 2021: 69.4 ML/MIN/1.73
EOSINOPHIL # BLD AUTO: 0.06 10*3/MM3 (ref 0–0.4)
EOSINOPHIL NFR BLD AUTO: 1.1 % (ref 0.3–6.2)
ERYTHROCYTE [DISTWIDTH] IN BLOOD BY AUTOMATED COUNT: 14.4 % (ref 12.3–15.4)
GLOBULIN UR ELPH-MCNC: 2.9 GM/DL
GLUCOSE SERPL-MCNC: 99 MG/DL (ref 65–99)
HCT VFR BLD AUTO: 40 % (ref 37.5–51)
HDLC SERPL-MCNC: 77 MG/DL (ref 40–60)
HGB BLD-MCNC: 13.7 G/DL (ref 13–17.7)
IMM GRANULOCYTES # BLD AUTO: 0.02 10*3/MM3 (ref 0–0.05)
IMM GRANULOCYTES NFR BLD AUTO: 0.4 % (ref 0–0.5)
LDLC SERPL CALC-MCNC: 54 MG/DL (ref 0–100)
LDLC/HDLC SERPL: 0.72 {RATIO}
LYMPHOCYTES # BLD AUTO: 0.65 10*3/MM3 (ref 0.7–3.1)
LYMPHOCYTES NFR BLD AUTO: 12.4 % (ref 19.6–45.3)
MCH RBC QN AUTO: 34.9 PG (ref 26.6–33)
MCHC RBC AUTO-ENTMCNC: 34.3 G/DL (ref 31.5–35.7)
MCV RBC AUTO: 102 FL (ref 79–97)
MONOCYTES # BLD AUTO: 0.33 10*3/MM3 (ref 0.1–0.9)
MONOCYTES NFR BLD AUTO: 6.3 % (ref 5–12)
NEUTROPHILS NFR BLD AUTO: 4.12 10*3/MM3 (ref 1.7–7)
NEUTROPHILS NFR BLD AUTO: 78.5 % (ref 42.7–76)
NRBC BLD AUTO-RTO: 0 /100 WBC (ref 0–0.2)
PLATELET # BLD AUTO: 151 10*3/MM3 (ref 140–450)
PMV BLD AUTO: 12 FL (ref 6–12)
POTASSIUM SERPL-SCNC: 5.1 MMOL/L (ref 3.5–5.2)
PROT SERPL-MCNC: 7.2 G/DL (ref 6–8.5)
RBC # BLD AUTO: 3.92 10*6/MM3 (ref 4.14–5.8)
SODIUM SERPL-SCNC: 143 MMOL/L (ref 136–145)
TRIGL SERPL-MCNC: 37 MG/DL (ref 0–150)
VLDLC SERPL-MCNC: 9 MG/DL (ref 5–40)
WBC NRBC COR # BLD: 5.25 10*3/MM3 (ref 3.4–10.8)

## 2023-09-06 PROCEDURE — 36415 COLL VENOUS BLD VENIPUNCTURE: CPT | Performed by: INTERNAL MEDICINE

## 2023-09-06 PROCEDURE — 99214 OFFICE O/P EST MOD 30 MIN: CPT | Performed by: INTERNAL MEDICINE

## 2023-09-06 PROCEDURE — 3078F DIAST BP <80 MM HG: CPT | Performed by: INTERNAL MEDICINE

## 2023-09-06 PROCEDURE — 85025 COMPLETE CBC W/AUTO DIFF WBC: CPT | Performed by: INTERNAL MEDICINE

## 2023-09-06 PROCEDURE — 3074F SYST BP LT 130 MM HG: CPT | Performed by: INTERNAL MEDICINE

## 2023-09-06 PROCEDURE — 80053 COMPREHEN METABOLIC PANEL: CPT | Performed by: INTERNAL MEDICINE

## 2023-09-06 PROCEDURE — 80061 LIPID PANEL: CPT | Performed by: INTERNAL MEDICINE

## 2023-09-06 NOTE — PROGRESS NOTES
Chief Complaint   Patient presents with    Follow-up     3 month follow up chronic problems       History of Present Illness      The patient presents for a follow-up related to hyperlipidemia. He is following a low fat diet. He reports that he is exercising. He is taking atorvastatin. The patient is taking his medication as prescribed. He reports no medication side effects, including muscle cramps, abdominal pain, headaches or weakness. He denies chest pain, shortness of breath, orthopnea, paroxysmal nocturnal dyspnea, dyspnea on exertion, edema, palpitations or syncope.    The patient presents for a follow-up related to hypertension. The patient reports that he has had no headaches or blurred vision. He states that he is taking his medication as prescribed. He is not having medication side effects.    The patient presents for a follow-up related to chronic atrial fibrillation. He denies palpitations. The patient denies fatigue, dizziness, nausea or exercise intolerance. He continues warfarin as prescribed.    Medications      Current Outpatient Medications:     amLODIPine (NORVASC) 5 MG tablet, Take 1 tablet by mouth Daily., Disp: 30 tablet, Rfl: 5    aspirin 81 MG EC tablet, Take 1 tablet by mouth Daily., Disp: , Rfl:     atorvastatin (LIPITOR) 80 MG tablet, Take 1 tablet by mouth Every Night., Disp: 90 tablet, Rfl: 3    buPROPion XL (WELLBUTRIN XL) 300 MG 24 hr tablet, Take 1 tablet by mouth once daily, Disp: 30 tablet, Rfl: 5    carvedilol (COREG) 12.5 MG tablet, Take 1 tablet by mouth 2 (Two) Times a Day With Meals., Disp: 180 tablet, Rfl: 3    citalopram (CeleXA) 20 MG tablet, Take 1 tablet by mouth once daily, Disp: 90 tablet, Rfl: 0    famotidine (PEPCID) 20 MG tablet, Take 1 tablet by mouth Daily., Disp: 30 tablet, Rfl: 1    finasteride (PROSCAR) 5 MG tablet, Take 1 tablet by mouth every night at bedtime., Disp: 90 tablet, Rfl: 1    folic acid (FOLVITE) 400 MCG tablet, TAKE 1 TABLET BY MOUTH ONCE DAILY,  Disp: 90 tablet, Rfl: 3    furosemide (LASIX) 20 MG tablet, Take 1 tablet by mouth Daily., Disp: , Rfl:     hydroCHLOROthiazide (HYDRODIURIL) 25 MG tablet, Take 1 tablet by mouth Daily., Disp: 90 tablet, Rfl: 0    lisinopril (PRINIVIL,ZESTRIL) 10 MG tablet, Take 2 tablets by mouth 2 (Two) Times a Day., Disp: 180 tablet, Rfl: 3    potassium chloride (K-DUR,KLOR-CON) 10 MEQ ER tablet, Take 1 tablet by mouth Daily. With furosemide, Disp: , Rfl:     vitamin B-12 (CYANOCOBALAMIN) 1000 MCG tablet, Take 1 tablet by mouth Daily., Disp: 90 tablet, Rfl: 3    warfarin (COUMADIN) 5 MG tablet, Take 1 to 1.5 tablets by mouth daily OR as directed by anticoagulation clinic, Disp: 40 tablet, Rfl: 3     Allergies    No Known Allergies    Problem List    Patient Active Problem List   Diagnosis    Depression    Hyperbilirubinemia    Hyperlipidemia LDL goal <70    Essential hypertension    Malignant neoplasm of overlapping sites of colon    Neurogenic bladder    Tobacco use    Coronary artery disease involving native coronary artery of native heart with angina pectoris    H/O mechanical aortic valve replacement    COPD (chronic obstructive pulmonary disease)    Thrombocytopenia    Chronic anticoagulation on warfarin     Self-catheterizes urinary bladder    Lung cancer (S/P Left thoracotomy and lobectomy 4/2021)    Gastroesophageal reflux disease without esophagitis    Pacemaker    Syncope (R/O due to orthostatic hypotension)    Permanent atrial fibrillation    Acute diastolic congestive heart failure    Abdominal aortic aneurysm (AAA) without rupture    Urinary retention    Complete heart block    Aneurysm of ascending aorta without rupture s/p repair     Paroxysmal atrial fibrillation       Medications, Allergies, Problems List and Past History were reviewed and updated.    Physical Examination    /64 (BP Location: Left arm, Patient Position: Sitting, Cuff Size: Adult)   Pulse 68   Temp 97.8 °F (36.6 °C) (Infrared)   Resp 18    Wt 64.4 kg (142 lb)   BMI 19.81 kg/m²       HEENT: Head- Normocephalic Atraumatic. Facies- Within normal limits. Pinnas- Normal texture and shape bilaterally. Canals- Normal bilaterally. TMs- Normal bilaterally. Nares- Patent bilaterally. Nasal Septum- is normal. There is no tenderness to palpation over the frontal or maxillary sinuses. Lids- Normal bilaterally. Conjunctiva- Clear bilaterally. Sclera- Anicteric bilaterally. Oropharynx- Moist with no lesions. Tonsils- No enlargement, erythema or exudate.    Neck: Thyroid- non enlarged, symmetric and has no nodules. No bruits are detected. ROM- Normal Range of Motion with no rigidity.    Lungs: Auscultation- Clear to auscultation bilaterally. There are no retractions, clubbing or cyanosis. The Expiratory to Inspiratory ratio is equal.    Cardiovascular: There are no carotid bruits. Heart- Normal Rate with Irregularly Irregular rhythm and no murmurs. There are no gallops. There are no rubs. In the lower extremities there is no edema. The upper extremities do not have edema.    Abdomen: Soft, benign, non-tender with no masses, hernias, organomegaly or scars.    Impression and Assessment    Hyperlipidemia.    Essential Hypertension.    Atrial Fibrillation.    Plan    Hyperlipidemia Plan: The patient was instructed to exercise daily, eat a low fat diet and continue his medications.    Essential Hypertension Plan: The patient was instructed to continue the current medications.    Atrial Fibrillation Plan: The patient was instructed to continue the current medications.    I also messaged Angela Torrez in pulmonary who is going to check on his CT scan as it was due in June. She is also going to get him re-established with a pulmonologist since Dr. Kenney has left.    Diagnoses and all orders for this visit:    1. Essential hypertension (Primary)  -     CBC & Differential; Future  -     Comprehensive Metabolic Panel; Future  -     CBC & Differential  -     Comprehensive  Metabolic Panel    2. Hyperlipidemia, unspecified hyperlipidemia type  -     Comprehensive Metabolic Panel; Future  -     Lipid Panel; Future  -     Comprehensive Metabolic Panel  -     Lipid Panel    3. Atrial fibrillation, unspecified type  -     CBC & Differential; Future  -     Comprehensive Metabolic Panel; Future  -     CBC & Differential  -     Comprehensive Metabolic Panel        Return to Office    The patient was instructed to return for follow-up in 4 months. The patient was instructed to return sooner if the condition changes, worsens, or does not resolve.

## 2023-09-12 ENCOUNTER — ANTICOAGULATION VISIT (OUTPATIENT)
Dept: PHARMACY | Facility: HOSPITAL | Age: 73
End: 2023-09-12
Payer: MEDICARE

## 2023-09-12 DIAGNOSIS — I48.21 PERMANENT ATRIAL FIBRILLATION: Primary | ICD-10-CM

## 2023-09-12 LAB
INR PPP: 4.5 (ref 0.91–1.09)
PROTHROMBIN TIME: 53.9 SECONDS (ref 10–13.8)

## 2023-09-12 PROCEDURE — G0463 HOSPITAL OUTPT CLINIC VISIT: HCPCS

## 2023-09-12 PROCEDURE — 85610 PROTHROMBIN TIME: CPT

## 2023-09-12 PROCEDURE — 36416 COLLJ CAPILLARY BLOOD SPEC: CPT

## 2023-09-12 NOTE — PROGRESS NOTES
Anticoagulation Clinic Progress Note  Indication:  Mechanical aortic heart valve, persistent Afib  Referring Provider: Ez Cintron  Initial Warfarin Start Date: > 20 years  Planned Duration of Therapy: indefinite  Goal INR: 2.5-3.5  Current Drug Interactions: asa  [id number]: 2 (HTN, Age > 65)    Diet: Broccoli, green beans ~2x weekly due to garden season (6/26/23)  Alcohol: 2-3xnightly  Tobacco: <1/2 ppd   OTC Pain Medication: none    Anticoagulation Clinic INR History:  Date 5/11 5/16 5/19 6/10 6/24 7/8 7/22 7/27 8/3 8/10 8/18   Total Weekly Dose 35 mg 37.5mg 35mg 35mg 35 mg 37.5mg? 40mg? 40mg 40 mg 40mg 35mg 32.5 mg   INR 1.5 2.68 ED  3.1 POCT 2.9 2.2 2.2 4.9 POCT  3.66 meenakshi 1.5 2.7 4.3 5.2 1.8   Notes  1x boost, no enox    Possible extra dose enox         Date 8/23 8/30 9/6 9/9 9/16 10/3 12/2  12/4 12/6 12/16   Total Weekly Dose 37.5 mg 37.5mg 30mg 27.5 mg 30 mg 30 mg 30mg BH admission   ???   INR 2.3 4.72  6.2 POCT 5.1 3.4 2.6 2.3 3.9 12/4-12/8 4.7 2.58 1.4   Notes    Hold x1; red. x1       Held/missed doses.     Date 12/20  6/6 6/12 6/19 6/26 6/29 7/5  7/12 7/19 8/15 8/18 8/23  8/30   Total Weekly Dose 35mg Non compliant 20 mg 30 mg   32.5 mg?? 35 mg  35mg  32.5  32.5mg 32.5mg  35 mg  35 mg   INR 2.1   1.13 4.4 1.5 1.5 1.9 4.7 POCT  3.6 Meenakshi  2.0 1.7 1.4 2.1 2.4  2.3   Notes 2x boost     enox X 3 days  Missed dose?  Inc GLV    redx1  ?  Missedx1, enox x2 enox  Less EtOH     Date 9/12           Total Weekly Dose            INR 4.5           Notes                Clinic Interview:  Tablet Strength: 5 mg tablets  Verbal Release: Verbal Release Authorization signed on 5/11/2022 -- may speak with Meghnaa (spouse)  Patient Contact: [phone] (Mobile)      Patient Findings    Negatives: Signs/symptoms of thrombosis, Signs/symptoms of bleeding, Laboratory test error suspected, Change in health, Change in alcohol use, Change in activity, Upcoming invasive procedure, Emergency department visit, Upcoming dental  procedure, Missed doses, Extra doses, Change in medications, Change in diet/appetite, Hospital admission, Bruising, Other complaints   Comments: Reports he took last night dose at 2 AM, usually takes at 7 PM  Patient had INR readings >4.5.  Medication Management Clinic recommended for patient to have a lab draw to confirm the readings; however, patient refused.  Patient was educated about the variability of the test strip readings at values >4.5 and was also educated to monitor for any signs excessive bleeding (including in the urine, stool, emesis, etc.).    Resumed 35mg/week rather than 37.5mg/week as directed       1. INR is SUPRAtherapeutic today at 4.5 (goal 2.5-3.5) Instructed Mr. Vo to reduce tonight's dose to 2.5mg then continue warfarin 5mg oral daily .   2. Return to clinic in two weeks per patient preference. If in range he would like to start checking less frequently   3. Verbal and written information provided in clinic  Dillon Vo expresses understanding by teach back and has no further questions at this time.       Diana Solano, Cheryl.  09/12/23   13:25 EDT

## 2023-09-13 ENCOUNTER — APPOINTMENT (OUTPATIENT)
Dept: PHARMACY | Facility: HOSPITAL | Age: 73
End: 2023-09-13
Payer: MEDICARE

## 2023-09-13 RX ORDER — CITALOPRAM 20 MG/1
20 TABLET ORAL DAILY
Qty: 90 TABLET | Refills: 0 | Status: SHIPPED | OUTPATIENT
Start: 2023-09-13

## 2023-09-13 RX ORDER — POTASSIUM CHLORIDE 750 MG/1
10 TABLET, FILM COATED, EXTENDED RELEASE ORAL DAILY
Qty: 90 TABLET | Refills: 2 | Status: SHIPPED | OUTPATIENT
Start: 2023-09-13

## 2023-09-13 RX ORDER — POTASSIUM CHLORIDE 750 MG/1
TABLET, FILM COATED, EXTENDED RELEASE ORAL
Qty: 60 TABLET | OUTPATIENT
Start: 2023-09-13

## 2023-09-13 NOTE — TELEPHONE ENCOUNTER
Caller: Dillon Vo    Relationship: Self    Best call back number: [phone]    Requested Prescriptions:   Requested Prescriptions     Pending Prescriptions Disp Refills    citalopram (CeleXA) 20 MG tablet 90 tablet 0     Sig: Take 1 tablet by mouth Daily.        Pharmacy where request should be sent: [facility] [id number] [address] - [phone]  - [phone] FX     Last office visit with prescribing clinician: 9/6/2023   Last telemedicine visit with prescribing clinician: Visit date not found   Next office visit with prescribing clinician: 1/15/2024     Additional details provided by patient: PATIENT HAS THREE PILLS LEFT    Does the patient have less than a 3 day supply:  [] Yes  [x] No    Would you like a call back once the refill request has been completed: [] Yes [x] No    If the office needs to give you a call back, can they leave a voicemail: [] Yes [x] No    Leatha Dempsey Rep   09/13/23 11:57 EDT

## 2023-09-25 DIAGNOSIS — Z95.2 H/O AORTIC VALVE REPLACEMENT: ICD-10-CM

## 2023-09-25 DIAGNOSIS — I48.0 PAROXYSMAL ATRIAL FIBRILLATION: ICD-10-CM

## 2023-09-26 RX ORDER — WARFARIN SODIUM 5 MG/1
TABLET ORAL
Qty: 90 TABLET | Refills: 1 | Status: SHIPPED | OUTPATIENT
Start: 2023-09-26

## 2023-09-29 ENCOUNTER — APPOINTMENT (OUTPATIENT)
Dept: PHARMACY | Facility: HOSPITAL | Age: 73
End: 2023-09-29
Payer: MEDICARE

## 2023-09-30 ENCOUNTER — READMISSION MANAGEMENT (OUTPATIENT)
Dept: CALL CENTER | Facility: HOSPITAL | Age: 73
End: 2023-09-30
Payer: MEDICARE

## 2023-09-30 NOTE — OUTREACH NOTE
Prep Survey      Flowsheet Row Responses   [facility] facility patient discharged from? Non-[facility]   Is LACE score < 7 ? Non[facility] Discharge   [facility]   Date of Discharge 09/30/23   Discharge diagnosis Laceration without foreign body of scalp, subsequent encounter   Does the patient have one of the following disease processes/diagnoses(primary or secondary)? Other   Prep survey completed? Yes            CHRIS LIM - Registered Nurse

## 2023-10-02 ENCOUNTER — TRANSITIONAL CARE MANAGEMENT TELEPHONE ENCOUNTER (OUTPATIENT)
Dept: CALL CENTER | Facility: HOSPITAL | Age: 73
End: 2023-10-02
Payer: MEDICARE

## 2023-10-02 ENCOUNTER — ANTICOAGULATION VISIT (OUTPATIENT)
Dept: PHARMACY | Facility: HOSPITAL | Age: 73
End: 2023-10-02
Payer: MEDICARE

## 2023-10-02 ENCOUNTER — OFFICE VISIT (OUTPATIENT)
Dept: INTERNAL MEDICINE | Facility: CLINIC | Age: 73
End: 2023-10-02
Payer: MEDICARE

## 2023-10-02 VITALS
DIASTOLIC BLOOD PRESSURE: 78 MMHG | HEART RATE: 77 BPM | WEIGHT: 134.13 LBS | SYSTOLIC BLOOD PRESSURE: 118 MMHG | TEMPERATURE: 97.1 F | RESPIRATION RATE: 20 BRPM | BODY MASS INDEX: 18.72 KG/M2

## 2023-10-02 DIAGNOSIS — I25.119 CORONARY ARTERY DISEASE INVOLVING NATIVE CORONARY ARTERY OF NATIVE HEART WITH ANGINA PECTORIS: ICD-10-CM

## 2023-10-02 DIAGNOSIS — D69.6 THROMBOCYTOPENIA: ICD-10-CM

## 2023-10-02 DIAGNOSIS — Z09 HOSPITAL DISCHARGE FOLLOW-UP: Primary | ICD-10-CM

## 2023-10-02 DIAGNOSIS — I48.21 PERMANENT ATRIAL FIBRILLATION: Primary | ICD-10-CM

## 2023-10-02 DIAGNOSIS — N17.9 ACUTE KIDNEY INJURY: ICD-10-CM

## 2023-10-02 DIAGNOSIS — Z23 ENCOUNTER FOR VACCINATION: ICD-10-CM

## 2023-10-02 DIAGNOSIS — D53.9 MACROCYTIC ANEMIA: ICD-10-CM

## 2023-10-02 DIAGNOSIS — I48.0 PAROXYSMAL ATRIAL FIBRILLATION: ICD-10-CM

## 2023-10-02 DIAGNOSIS — E83.39 LOW PHOSPHATE LEVELS: ICD-10-CM

## 2023-10-02 LAB
INR PPP: 1.7
INR PPP: 1.9 (ref 0.91–1.09)
PROTHROMBIN TIME: 23.3 SECONDS (ref 10–13.8)

## 2023-10-02 PROCEDURE — G0463 HOSPITAL OUTPT CLINIC VISIT: HCPCS

## 2023-10-02 PROCEDURE — 36416 COLLJ CAPILLARY BLOOD SPEC: CPT

## 2023-10-02 PROCEDURE — 85610 PROTHROMBIN TIME: CPT

## 2023-10-02 RX ORDER — CARVEDILOL 6.25 MG/1
6.25 TABLET ORAL 2 TIMES DAILY WITH MEALS
Qty: 180 TABLET | Refills: 0 | Status: SHIPPED | OUTPATIENT
Start: 2023-10-02

## 2023-10-02 RX ORDER — AMOXICILLIN 250 MG
CAPSULE ORAL
COMMUNITY
Start: 2023-09-27

## 2023-10-02 RX ORDER — ENOXAPARIN SODIUM 60 MG/.6ML
60 INJECTION SUBCUTANEOUS
COMMUNITY
Start: 2023-09-28

## 2023-10-02 NOTE — PROGRESS NOTES
Transitional Care Follow Up Visit  Subjective     Dillon Vo is a 73 y.o. male with complex past medical history including atrial fibrillation and mechanical aortic valve (on Coumadin with goal INR 2.5-3.5), abdominal aortic aneurysm, pacemaker, hypertension, hyperlipidemia, tobacco dependence, CAD status post CABG, prior lung cancer and colon cancer  who presents for a transitional care management visit.    Within 48 business hours after discharge our office contacted him via telephone to coordinate his care and needs.      I reviewed and discussed the details of that call along with the discharge summary, hospital problems, inpatient lab results, inpatient diagnostic studies, and consultation reports with Dillon.     Current outpatient and discharge medications have been reconciled for the patient.  Reviewed by: Chon Orellana MD          9/30/2023     7:50 PM   Date of TCM Phone Call   [facility]   Date of Discharge 9/30/2023     Risk for Readmission (LACE)   No data recorded    History of Present Illness  The patient is here today for hospital follow-up. He has agreed to utilize [facility].    Hospital follow-up.  The patient states on 09/15/2023, he experienced a fall, and hit his head on the floor. He denies LOC during this time. He was transported, by ambulance, to [facility]. He denies additional falls or dizziness. He states when standing, he makes sure he is stable before ambulating. The patient states during his hospital stay, he received medication alterations. He reports he is feeling much better since discharge. He does not check his blood pressure at home. Denies alcohol use since discharge. He has been compliant with lovenox, but has only been taking Warfarin 4mg daily. He has follow up with anticoagulation clinic today. Denies abnormal bleeding or bruising other than bruises around his injection sites from lovenox.      Course  "During Hospital Stay:    Patient was initially admitted on 9/15/2023 to [facility] and discharged on 9/20/2023 following fall with facial laceration.  I personally reviewed note by Dr. Yobani Gudino MD dated 9/28/2023 titled inpatient discharge summary.    Per discharge summary:  \"Mr. Dillon Vo is a 73 year old man with a history of atrial fibrillation and mechanical aortic valve on coumadin and a pacemaker who presented after a fall. He was admitted for workup of etiology and PT/OT evaluation. Workup significant for CEFERINO and relative hypotension. His CEFERINO resolved with IV hydration and his blood pressure improved to be within normal limits. Etiology of his CEFERINO is multifactorial with some concern for medication non-adherence as he is prescribed several antihypertensive medications that had not been filled for several months prior to his stay. He reported taking these medications intermittently. While inpatient, his prescribed blood pressure medications were held with restart of carvedilol at a reduced dose from prior, he will require close followup with his Primary Care Physician for continued adjustment. He was evaluated by PT/OT who recommended discharge to acute rehabilitation. On 9/20 he was deemed without further indication for inpatient hospitalization and was discharged to [facility].     Please see below for a problem based hospital course:     Fall with head laceration  Syncope secondary to orthostatic hypotension; RESOLVED   Presented with 1 week of generalized weakness, unsteady gate after a fall at home. CT head was without acute findings. Patient reported drinking alcohol on day of fall but doesn't feel he was intoxicated, nor does he feel he was experiencing withdrawal symptoms. His generalized weakness, and presyncope improved to near resolution with IV hydration. He was evaluated by PT/OT who recommended discharge to acute rehabilitation. 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 Patient's home prescribed medications held on admission with restart of carvedilol at a reduced dose.   Patient discharged with instruction to continue carvedilol 6.25mg BID, he will followup with his primary care physician for continued adjustment after his rehabilitation stay    Subtherapeutic INR; RESOLVED  Mechanical Prosthetic AVR  INR 1.7 on admission, pharmacy bridged with enoxaparin until warfarin within therapeutic range.     Acute Kidney Injury; RESOLVED   Baseline Creatinine 1.0, 1.99 on admission; returned to baseline with IVF suggestive of pre-renal etiology in setting of hypovolemia and hypotension.   Patient discharged to continue warfarin at previous home dose     Macrocytic anemia  Thrombocytopenia  Hyperbilirubinemia   Noted to have macrocytic anemia and chronic thrombocytopenia with slight elevation in bilirubin. This with reported history of chronic alcohol use raises concern for underlying liver disease.   US liver pending at time of discharge, will require continued evaluation as an outpatient     CHRONIC  STABLE  RESOLVED   Afib - s/p pacemaker (2013)  CAD s/p stent- continued home ASA, statin while inpatient and at discharge.  Depression - continued home citalopram while inpatient and at discharge.   GERD - continued home famotidine while inpatient and at discharge.  Neurogenic bladder w/ urethral stricture- Continued home finasteride while inpatient and at discharge. \"    Medication list at discharge:  aspirin 81 MG EC tablet  Take 1 tablet (81 mg) by mouth 1 (one) time each day.    atorvastatin 80 MG tablet  Take 1 tablet (80 mg) by mouth 1 (one) time each day.  Commonly known as: Lipitor    carvedilol 6.25 MG tablet  Take 1 tablet (6.25 mg) by mouth 2 (two) " times a day with meals.  Commonly known as: Coreg    citalopram 20 MG tablet  Take 1 tablet (20 mg) by mouth 1 (one) time each day.  Commonly known as: CeleXA    Cyanocobalamin 100 MCG lozenge  Take 100 mcg by mouth 1 (one) time each day.    finasteride 5 MG tablet  Take 1 tablet (5 mg) by mouth 1 (one) time each day.  Commonly known as: Proscar    folic acid 400 MCG tablet  Take 1 tablet (400 mcg) by mouth 1 (one) time each day.  Commonly known as: Folvite    warfarin 5 MG tablet  Take 1 tablet (5 mg) by mouth 1 (one) time each day.  Commonly known as: Coumadin       Patient was subsequently admitted to [facility] from 9/20/2023 to 9/30/2023.  I personally reviewed discharge summary dated 9/30/2023 by Dr. Ez Orlando DO.  During his stay he was evaluated by PT, OT and speech.  He tolerated therapy well and made appropriate progress.  His home amlodipine, lisinopril and furosemide were all held.  Continued on carvedilol at reduced dose.        Thrombocytopenia  Hyperbilirubinemia (resolved)  Macrocytic Anemia  -Platelets stable at 131 on 9/28/2023    Discharge medications from [facility]  Aspirin 81 mg daily  Atorvastatin 80 mg nightly  Carvedilol 6.25 mg twice daily  Citalopram 20 mg daily  Cyanocobalamin 100 mcg tablet daily  Docusate senna 50 mg - 8.6 mg 1 tab twice daily  Finasteride 5 mg daily  Folic acid 0.4 mg daily  Lovenox 60 mg subcutaneous twice daily  Polyethylene glycol 1 packet daily  Warfarin 6 mg daily.     The following portions of the patient's history were reviewed and updated as appropriate: allergies, current medications, past family history, past medical history, past social history, past surgical history, and problem list.    Review of Systems   Neurological:  Negative for dizziness and syncope.     Objective   Vitals:    10/02/23 0958   BP: 118/78   BP Location: Left arm   Patient Position: Sitting   Cuff Size: Adult   Pulse: 77   Resp: 20   Temp: 97.1 °F  (36.2 °C)   TempSrc: Temporal   Weight: 60.8 kg (134 lb 2 oz)       Physical Exam  Vitals reviewed.   Constitutional:       General: He is not in acute distress.     Appearance: Normal appearance. He is normal weight. He is not ill-appearing or toxic-appearing.   HENT:      Head: Normocephalic.      Comments: Well healed laceration over left forehead, mild bruising around left eye     Right Ear: External ear normal.      Left Ear: External ear normal.      Nose: Nose normal.      Mouth/Throat:      Mouth: Mucous membranes are moist.   Eyes:      General: No scleral icterus.        Right eye: No discharge.         Left eye: No discharge.      Extraocular Movements: Extraocular movements intact.      Pupils: Pupils are equal, round, and reactive to light.   Cardiovascular:      Rate and Rhythm: Normal rate and regular rhythm.      Pulses: Normal pulses.      Heart sounds: Murmur (Mechanical aortic valve click heard across precordium) heard.     No friction rub. No gallop.   Pulmonary:      Effort: Pulmonary effort is normal. No respiratory distress.      Breath sounds: Normal breath sounds. No wheezing, rhonchi or rales.   Abdominal:      General: Abdomen is flat. Bowel sounds are normal. There is no distension.      Palpations: Abdomen is soft. There is no mass.      Tenderness: There is no abdominal tenderness. There is no guarding.      Comments: Bruising to abdomen   Musculoskeletal:         General: No swelling or deformity. Normal range of motion.      Cervical back: Normal range of motion. No rigidity.   Skin:     General: Skin is warm and dry.      Capillary Refill: Capillary refill takes less than 2 seconds.      Coloration: Skin is not jaundiced or pale.   Neurological:      General: No focal deficit present.      Mental Status: He is alert and oriented to person, place, and time. Mental status is at baseline.   Psychiatric:         Mood and Affect: Mood normal.         Behavior: Behavior normal.          Judgment: Judgment normal.         Labs dated 9/28/2023  CBC: WBC 4.3, hemoglobin 11.5, hematocrit 34, platelets 131  Renal function panel: Sodium 137, potassium 3.8, CO2 27, chloride 104, creatinine 0.8, BUN 13, glucose 91, Phos 3.2, calcium 8.7  INR 1.9    Assessment & Plan   Problems Addressed this Visit          Cardiac and Vasculature    Coronary artery disease involving native coronary artery of native heart with angina pectoris    Relevant Medications    carvedilol (COREG) 6.25 MG tablet    Paroxysmal atrial fibrillation    Relevant Medications    carvedilol (COREG) 6.25 MG tablet       Coag and Thromboembolic    Thrombocytopenia     Noted on labs during admission, stable on repeat 9/28/23 as above. Will repeat testing in 2 weeks. Continue lovenox and warfarin until therapeutic INR, continue f/u with Anticoagulation clinic. Has pending results from [facility] during admission, records requested.         Relevant Medications    Lovenox 60 MG/0.6ML solution prefilled syringe syringe     Other Visit Diagnoses       Hospital discharge follow-up    -  Primary    Acute kidney injury        Relevant Orders    Comprehensive metabolic panel    Macrocytic anemia        Relevant Orders    CBC w AUTO Differential    Encounter for vaccination        Relevant Orders    Fluzone High-Dose 65+yrs (Completed)    Low phosphate levels        Relevant Orders    Phosphorus          Diagnoses         Codes Comments    Hospital discharge follow-up    -  Primary ICD-10-CM: Z09  ICD-9-CM: V67.59     Acute kidney injury     ICD-10-CM: N17.9  ICD-9-CM: 584.9     Macrocytic anemia     ICD-10-CM: D53.9  ICD-9-CM: 281.9     Encounter for vaccination     ICD-10-CM: Z23  ICD-9-CM: V05.9     Paroxysmal atrial fibrillation     ICD-10-CM: I48.0  ICD-9-CM: 427.31     Coronary artery disease involving native coronary artery of native heart with angina pectoris     ICD-10-CM: I25.119  ICD-9-CM: 414.01, 413.9     Thrombocytopenia     ICD-10-CM:  D69.6  ICD-9-CM: 287.5     Low phosphate levels     ICD-10-CM: E83.39  ICD-9-CM: 275.3         - patient doing well since discharge. We discussed fall precuations in the home and provided information in AVS. Recommend abstaining from alcohol. During admission he had CEFERINO which resolved, but was also noted to have hyperbilirubinemia, thrombocytopenia and macrocytosis. Liver US pending, records requested. Will repeat CMP to monitor Cr and liver enzymes in 2 weeks (most recent bili prior to discharge normal). Will check repeat CBC in two weeks to monitor macrocytosis and thrombocytopenia.  - BP well controlled, will continue coreg 6.25mg BID. Removed prior antihypertensives from medication list per discharge summary (stopped amlodipine, lasix, lisinopril).  - Advised patient to continue current medication regimen. Medication list was reviewed. Patient will receive influenza vaccination. Patient will return for labs in approximately 2 weeks.            Chon Orellana MD    Transcribed from ambient dictation for Chon Orellana MD by Queta Urbina.  10/02/23   11:23 EDT    Patient or patient representative verbalized consent to the visit recording.  I have personally performed the services described in this document as transcribed by the above individual, and it is both accurate and complete.  c

## 2023-10-02 NOTE — PROGRESS NOTES
Anticoagulation Clinic Progress Note  Indication:  Mechanical aortic heart valve, persistent Afib  Referring Provider: Ez Cintron  Initial Warfarin Start Date: > 20 years  Planned Duration of Therapy: indefinite  Goal INR: 2.5-3.5  Current Drug Interactions: asa  [id number]: 2 (HTN, Age > 65)    Diet: Broccoli, green beans ~2x weekly due to garden season (6/26/23)  Alcohol: 2-3xnightly  Tobacco: <1/2 ppd   OTC Pain Medication: none    Anticoagulation Clinic INR History:  Date 5/11 5/16 5/19 6/10 6/24 7/8 7/22 7/27 8/3 8/10 8/18   Total Weekly Dose 35 mg 37.5mg 35mg 35mg 35 mg 37.5mg? 40mg? 40mg 40 mg 40mg 35mg 32.5 mg   INR 1.5 2.68 ED  3.1 POCT 2.9 2.2 2.2 4.9 POCT  3.66 meenakshi 1.5 2.7 4.3 5.2 1.8   Notes  1x boost, no enox    Possible extra dose enox         Date 8/23 8/30 9/6 9/9 9/16 10/3 12/2  12/4 12/6 12/16   Total Weekly Dose 37.5 mg 37.5mg 30mg 27.5 mg 30 mg 30 mg 30mg BH admission   ???   INR 2.3 4.72  6.2 POCT 5.1 3.4 2.6 2.3 3.9 12/4-12/8 4.7 2.58 1.4   Notes    Hold x1; red. x1       Held/missed doses.     Date 12/20  6/6 6/12 6/19 6/26 6/29 7/5  7/12 7/19 8/15 8/18 8/23  8/30   Total Weekly Dose 35mg Non compliant 20 mg 30 mg   32.5 mg?? 35 mg  35mg  32.5  32.5mg 32.5mg  35 mg  35 mg   INR 2.1   1.13 4.4 1.5 1.5 1.9 4.7 POCT  3.6 Meenakshi  2.0 1.7 1.4 2.1 2.4  2.3   Notes 2x boost     enox X 3 days  Missed dose?  Inc GLV    redx1  ?  Missedx1, enox x2 enox  Less EtOH     Date 9/12 9/16-30 10/2         Total Weekly Dose 37.5mg [facility] 42 mg         INR 4.5  1.9         Notes  fall              Clinic Interview:  Tablet Strength: 5 mg tablets  Verbal Release: Verbal Release Authorization signed on 5/11/2022 -- may speak with Meghana (spouse)  Patient Contact: [phone] (Mobile)      Patient Findings    Positives: Change in medications, Hospital admission   Negatives: Signs/symptoms of thrombosis, Signs/symptoms of bleeding, Laboratory test error suspected, Change in health, Change in alcohol  "use, Change in activity, Upcoming invasive procedure, Emergency department visit, Upcoming dental procedure, Missed doses, Extra doses, Change in diet/appetite, Bruising, Other complaints   Comments: Admitted to [facility] 9/16-20 following a fall. Transferred to [facility]. Dosing from [facility] per note in chart    Per [facility] note \"Mechanical Prosthetic AVR  -INR 1.7 on admission to ; pharmacy bridged with enoxaparin until warfarin within therapeutic range.  -Continue warfarin at 5mg home dose  -Goal INR is 2.5-3.5  -Daily INR checks  9/21: 3.0  9/22: 2.6  - 9/23 dose increased to 6mg and lovenox started  - 9/25 INR 1.6  -Boost supplements d/c on 9/25 as could be contributing to additional VItamin K  Cont current dose. Will need TLOV bridge if drops below 2.5  9/27: INr 1.7, cont bridge  9/28 INR 1.8, continue current regimen, lovenox and coumadin at discharge  -Follow up with Coumadin Clinic at Discharge, order placed to follow up with them on Monday 10/2  \"    Appears to have been discharged on warfarin 6mg daily. Confirms he has been taking three 2mg tablets since discharge + lovenox q12h  He has not continued protein/boost drinks    Has discontinued: amlodipine, furosemide, famotidine, HCTZ, lisinopril and potassium    Confirmed he has #90 2mg tablets and #20 lovenox injections (Scr 9/20 1.04)         1. INR is SUBtherapeutic today at 1.9 (goal 2.5-3.5) following discharge from [facility]. Of note, patient on a 12% increased regimen from last clinic visit which resulted in SUPRAtherapeutic result. Instructed Mr. Vo to increase tonight's dose to 8mg then continue warfarin 6mg oral daily, will continue lovenox bridge through Wednesday PM  2. Return to clinic Monday, unfortunately has limited transportation and unable to return sooner. Discussed to monitor for s/sx of bleeding including epistaxis, hematuria, unusual bruising, hemoptysis, hematochezia as well as s/sx of stroke including impaired speech, unilateral paralysis, " blurry vision and when to seek medical attention    3. Verbal and written information provided in clinic  Dillon Vo expresses understanding by teach back and has no further questions at this time.       Garett McintoshD.  10/02/23   14:02 EDT

## 2023-10-02 NOTE — OUTREACH NOTE
Call Center TCM Note      Flowsheet Row Responses   [facility] patient discharged from? Non-BH   Does the patient have one of the following disease processes/diagnoses(primary or secondary)? Other   TCM attempt successful? No   Unsuccessful attempts Attempt 1            Ventura Mcfadden RN    10/2/2023, 10:01 EDT

## 2023-10-02 NOTE — ASSESSMENT & PLAN NOTE
Noted on labs during admission, stable on repeat 9/28/23 as above. Will repeat testing in 2 weeks. Continue lovenox and warfarin until therapeutic INR, continue f/u with Anticoagulation clinic. Has pending results from [facility] during admission, records requested.

## 2023-10-02 NOTE — PATIENT INSTRUCTIONS
Fall Prevention in the Home, Adult  Falls can cause injuries and can happen to people of all ages. There are many things you can do to make your home safe and to help prevent falls. Ask for help when making these changes.  What actions can I take to prevent falls?  General Instructions  Use good lighting in all rooms. Replace any light bulbs that burn out.  Turn on the lights in dark areas. Use night-lights.  Keep items that you use often in easy-to-reach places. Lower the shelves around your home if needed.  Set up your furniture so you have a clear path. Avoid moving your furniture around.  Do not have throw rugs or other things on the floor that can make you trip.  Avoid walking on wet floors.  If any of your floors are uneven, fix them.  Add color or contrast paint or tape to clearly giuliano and help you see:  Grab bars or handrails.  First and last steps of staircases.  Where the edge of each step is.  If you use a stepladder:  Make sure that it is fully opened. Do not climb a closed stepladder.  Make sure the sides of the stepladder are locked in place.  Ask someone to hold the stepladder while you use it.  Know where your pets are when moving through your home.  What can I do in the bathroom?         Keep the floor dry. Clean up any water on the floor right away.  Remove soap buildup in the tub or shower.  Use nonskid mats or decals on the floor of the tub or shower.  Attach bath mats securely with double-sided, nonslip rug tape.  If you need to sit down in the shower, use a plastic, nonslip stool.  Install grab bars by the toilet and in the tub and shower. Do not use towel bars as grab bars.  What can I do in the bedroom?  Make sure that you have a light by your bed that is easy to reach.  Do not use any sheets or blankets for your bed that hang to the floor.  Have a firm chair with side arms that you can use for support when you get dressed.  What can I do in the kitchen?  Clean up any spills right away.  If  you need to reach something above you, use a step stool with a grab bar.  Keep electrical cords out of the way.  Do not use floor polish or wax that makes floors slippery.  What can I do with my stairs?  Do not leave any items on the stairs.  Make sure that you have a light switch at the top and the bottom of the stairs.  Make sure that there are handrails on both sides of the stairs. Fix handrails that are broken or loose.  Install nonslip stair treads on all your stairs.  Avoid having throw rugs at the top or bottom of the stairs.  Choose a carpet that does not hide the edge of the steps on the stairs.  Check carpeting to make sure that it is firmly attached to the stairs. Fix carpet that is loose or worn.  What can I do on the outside of my home?  Use bright outdoor lighting.  Fix the edges of walkways and driveways and fix any cracks.  Remove anything that might make you trip as you walk through a door, such as a raised step or threshold.  Trim any bushes or trees on paths to your home.  Check to see if handrails are loose or broken and that both sides of all steps have handrails.  Install guardrails along the edges of any raised decks and porches.  Clear paths of anything that can make you trip, such as tools or rocks.  Have leaves, snow, or ice cleared regularly.  Use sand or salt on paths during winter.  Clean up any spills in your garage right away. This includes grease or oil spills.  What other actions can I take?  Wear shoes that:  Have a low heel. Do not wear high heels.  Have rubber bottoms.  Feel good on your feet and fit well.  Are closed at the toe. Do not wear open-toe sandals.  Use tools that help you move around if needed. These include:  Canes.  Walkers.  Scooters.  Crutches.  Review your medicines with your doctor. Some medicines can make you feel dizzy. This can increase your chance of falling.  Ask your doctor what else you can do to help prevent falls.  Where to find more information  Centers  for Disease Control and Prevention, [place]: www.cdc.gov  [place] on Aging: www.[place].nih.gov  Contact a doctor if:  You are afraid of falling at home.  You feel weak, drowsy, or dizzy at home.  You fall at home.  Summary  There are many simple things that you can do to make your home safe and to help prevent falls.  Ways to make your home safe include removing things that can make you trip and installing grab bars in the bathroom.  Ask for help when making these changes in your home.  This information is not intended to replace advice given to you by your health care provider. Make sure you discuss any questions you have with your health care provider.  Document Revised: 07/21/2021 Document Reviewed: 07/21/2021  Elsevier Patient Education © 2021 [employer]

## 2023-10-02 NOTE — OUTREACH NOTE
Call Center TCM Note      Flowsheet Row Responses   [facility] patient discharged from? Non-BH   Does the patient have one of the following disease processes/diagnoses(primary or secondary)? Other   TCM attempt successful? No   Unsuccessful attempts Attempt 2            Ventura Mcfadden RN    10/2/2023, 10:33 EDT

## 2023-10-03 ENCOUNTER — TRANSITIONAL CARE MANAGEMENT TELEPHONE ENCOUNTER (OUTPATIENT)
Dept: CALL CENTER | Facility: HOSPITAL | Age: 73
End: 2023-10-03
Payer: MEDICARE

## 2023-10-03 NOTE — OUTREACH NOTE
Call Center TCM Note      Flowsheet Row Responses   [facility] patient discharged from? Non-BH   Does the patient have one of the following disease processes/diagnoses(primary or secondary)? Other   TCM attempt successful? Yes   Discharge diagnosis Laceration without foreign body of scalp, subsequent encounter   TCM call completed? Yes   Wrap up additional comments Appt completed on 10/2/23--PCP appointment within 2 business days of DC fulfills the TCM requirement, no call necessary.            Sugey Umanzor RN    10/3/2023, 14:52 EDT

## 2023-10-09 ENCOUNTER — ANTICOAGULATION VISIT (OUTPATIENT)
Dept: PHARMACY | Facility: HOSPITAL | Age: 73
End: 2023-10-09
Payer: MEDICARE

## 2023-10-09 DIAGNOSIS — Z95.2 H/O AORTIC VALVE REPLACEMENT: ICD-10-CM

## 2023-10-09 DIAGNOSIS — I48.0 PAROXYSMAL ATRIAL FIBRILLATION: ICD-10-CM

## 2023-10-09 DIAGNOSIS — I48.21 PERMANENT ATRIAL FIBRILLATION: Primary | ICD-10-CM

## 2023-10-09 LAB
INR PPP: 1.2 (ref 0.91–1.09)
PROTHROMBIN TIME: 14.8 SECONDS (ref 10–13.8)

## 2023-10-09 PROCEDURE — G0463 HOSPITAL OUTPT CLINIC VISIT: HCPCS

## 2023-10-09 PROCEDURE — 85610 PROTHROMBIN TIME: CPT

## 2023-10-09 PROCEDURE — 36416 COLLJ CAPILLARY BLOOD SPEC: CPT

## 2023-10-09 RX ORDER — WARFARIN SODIUM 5 MG/1
TABLET ORAL
Qty: 90 TABLET | Refills: 1 | Status: SHIPPED | OUTPATIENT
Start: 2023-10-09

## 2023-10-09 NOTE — PROGRESS NOTES
Anticoagulation Clinic Progress Note  Indication:  Mechanical aortic heart valve, persistent Afib  Referring Provider: Ez Cintron  Initial Warfarin Start Date: > 20 years  Planned Duration of Therapy: indefinite  Goal INR: 2.5-3.5  Current Drug Interactions: asa  [id number]: 2 (HTN, Age > 65)    Diet: Broccoli, green beans ~2x weekly due to garden season (6/26/23)  Alcohol: 2-3xnightly  Tobacco: <1/2 ppd   OTC Pain Medication: none    Anticoagulation Clinic INR History:  Date 5/11 5/16 5/19 6/10 6/24 7/8 7/22 7/27 8/3 8/10 8/18   Total Weekly Dose 35 mg 37.5mg 35mg 35mg 35 mg 37.5mg? 40mg? 40mg 40 mg 40mg 35mg 32.5 mg   INR 1.5 2.68 ED  3.1 POCT 2.9 2.2 2.2 4.9 POCT  3.66 meenakshi 1.5 2.7 4.3 5.2 1.8   Notes  1x boost, no enox    Possible extra dose enox         Date 8/23 8/30 9/6 9/9 9/16 10/3 12/2  12/4 12/6 12/16   Total Weekly Dose 37.5 mg 37.5mg 30mg 27.5 mg 30 mg 30 mg 30mg BH admission   ???   INR 2.3 4.72  6.2 POCT 5.1 3.4 2.6 2.3 3.9 12/4-12/8 4.7 2.58 1.4   Notes    Hold x1; red. x1       Held/missed doses.     Date 12/20  6/6 6/12 6/19 6/26 6/29 7/5  7/12 7/19 8/15 8/18 8/23  8/30   Total Weekly Dose 35mg Non compliant 20 mg 30 mg   32.5 mg?? 35 mg  35mg  32.5  32.5mg 32.5mg  35 mg  35 mg   INR 2.1   1.13 4.4 1.5 1.5 1.9 4.7 POCT  3.6 Meenakshi  2.0 1.7 1.4 2.1 2.4  2.3   Notes 2x boost     enox X 3 days  Missed dose?  Inc GLV    redx1  ?  Missedx1, enox x2 enox  Less EtOH     Date 9/12 9/16-30 10/2 10/9        Total Weekly Dose 37.5mg [facility] 42 mg 44 mg        INR 4.5  1.9 1.2        Notes  fall  ensure            Clinic Interview:  Tablet Strength: 5 mg tablets  Verbal Release: Verbal Release Authorization signed on 5/11/2022 -- may speak with Meghana (spouse)  Patient Contact: [phone] (Mobile)      Patient Findings    Positives: Change in diet/appetite   Negatives: Signs/symptoms of thrombosis, Signs/symptoms of bleeding, Laboratory test error suspected, Change in health, Change in alcohol  use, Change in activity, Upcoming invasive procedure, Emergency department visit, Upcoming dental procedure, Missed doses, Extra doses, Change in medications, Hospital admission, Bruising, Other complaints   Comments: Scr 1.04, weight 60 kgs, est CrCl 54 mL/min   Confirms has has been taking 3x 2mg (lavender) tablets daily, has a few 5mg tabs remaining, refill sent-- discussed will need one peach 5mg + one lavender 2mg = 7mg dose daily   Confirms he has plenty of  lovenox-  Denies missed doses  On Friday he restarted 2 ensure daily, plans to continue use-- discussed consistency         1. INR is SUBtherapeutic today at 1.2 (goal 2.5-3.5) . Instructed Mr. Vo to increase tonight's dose to 10mg then increase dose to warfarin 7mg oral daily, will resume lovenox bridge through Thursday PM  2. Return to clinic Monday, unfortunately has limited transportation and unable to return sooner. Discussed to monitor for s/sx of bleeding including epistaxis, hematuria, unusual bruising, hemoptysis, hematochezia as well as s/sx of stroke including impaired speech, unilateral paralysis, blurry vision and when to seek medical attention    3. Verbal and written information provided in clinic  Dillon Vo expresses understanding by teach back and has no further questions at this time.       Diana Solano, GarettD.  10/09/23   14:13 EDT

## 2023-10-11 ENCOUNTER — TELEPHONE (OUTPATIENT)
Dept: CARDIOLOGY | Facility: CLINIC | Age: 73
End: 2023-10-11
Payer: MEDICARE

## 2023-10-11 NOTE — TELEPHONE ENCOUNTER
Called to speak with Indra Tavo about his merlin monitor not transmitting. There was no answer and voice mail not set up.

## 2023-10-16 ENCOUNTER — LAB (OUTPATIENT)
Dept: LAB | Facility: HOSPITAL | Age: 73
End: 2023-10-16
Payer: MEDICARE

## 2023-10-16 ENCOUNTER — ANTICOAGULATION VISIT (OUTPATIENT)
Dept: PHARMACY | Facility: HOSPITAL | Age: 73
End: 2023-10-16
Payer: MEDICARE

## 2023-10-16 DIAGNOSIS — I48.21 PERMANENT ATRIAL FIBRILLATION: ICD-10-CM

## 2023-10-16 DIAGNOSIS — I48.21 PERMANENT ATRIAL FIBRILLATION: Primary | ICD-10-CM

## 2023-10-16 LAB
INR PPP: 4.05 (ref 0.89–1.12)
INR PPP: 5.3 (ref 0.91–1.09)
PROTHROMBIN TIME: 39.6 SECONDS (ref 12.2–14.5)
PROTHROMBIN TIME: 64.1 SECONDS (ref 10–13.8)

## 2023-10-16 PROCEDURE — 36416 COLLJ CAPILLARY BLOOD SPEC: CPT

## 2023-10-16 PROCEDURE — 85610 PROTHROMBIN TIME: CPT

## 2023-10-16 PROCEDURE — G0463 HOSPITAL OUTPT CLINIC VISIT: HCPCS | Performed by: PHARMACIST

## 2023-10-16 PROCEDURE — 36415 COLL VENOUS BLD VENIPUNCTURE: CPT

## 2023-10-16 NOTE — PROGRESS NOTES
Anticoagulation Clinic Progress Note  Indication:  Mechanical aortic heart valve, persistent Afib  Referring Provider: Ez Cintron  Initial Warfarin Start Date: > 20 years  Planned Duration of Therapy: indefinite  Goal INR: 2.5-3.5  Current Drug Interactions: asa  [id number]: 2 (HTN, Age > 65)    Diet: Broccoli, green beans ~2x weekly due to garden season (6/26/23)  Alcohol: 2-3xnightly  Tobacco: <1/2 ppd   OTC Pain Medication: none    Anticoagulation Clinic INR History:  Date 5/11 5/16 5/19 6/10 6/24 7/8 7/22 7/27 8/3 8/10 8/18   Total Weekly Dose 35 mg 37.5mg 35mg 35mg 35 mg 37.5mg? 40mg? 40mg 40 mg 40mg 35mg 32.5 mg   INR 1.5 2.68 ED  3.1 POCT 2.9 2.2 2.2 4.9 POCT  3.66 meenakshi 1.5 2.7 4.3 5.2 1.8   Notes  1x boost, no enox    Possible extra dose enox         Date 8/23 8/30 9/6 9/9 9/16 10/3 12/2  12/4 12/6 12/16   Total Weekly Dose 37.5 mg 37.5mg 30mg 27.5 mg 30 mg 30 mg 30mg BH admission   ???   INR 2.3 4.72  6.2 POCT 5.1 3.4 2.6 2.3 3.9 12/4-12/8 4.7 2.58 1.4   Notes    Hold x1; red. x1       Held/missed doses.     Date 12/20  6/6 6/12 6/19 6/26 6/29 7/5  7/12 7/19 8/15 8/18 8/23  8/30   Total Weekly Dose 35mg Non compliant 20 mg 30 mg   32.5 mg?? 35 mg  35mg  32.5  32.5mg 32.5mg  35 mg  35 mg   INR 2.1   1.13 4.4 1.5 1.5 1.9 4.7 POCT  3.6 Meenakshi  2.0 1.7 1.4 2.1 2.4  2.3   Notes 2x boost     enox X 3 days  Missed dose?  Inc GLV    redx1  ?  Missedx1, enox x2 enox  Less EtOH     Date 9/12 9/16-30 10/2 10/9 10/16       Total Weekly Dose 37.5mg [facility] 42 mg 44 mg 51 mg       INR 4.5  1.9 1.2 5.3(POC), 4.05        Notes  fall  ensure            Clinic Interview:  Tablet Strength: 5 mg tablets  Verbal Release: Verbal Release Authorization signed on 5/11/2022 -- may speak with Meghana (spouse)  Patient Contact: [phone] (Mobile)      Patient Findings  Positives: Laboratory test error suspected, Other complaints   Negatives: Signs/symptoms of thrombosis, Signs/symptoms of bleeding, Change in health,  Change in alcohol use, Change in activity, Upcoming invasive procedure, Emergency department visit, Upcoming dental procedure, Missed doses, Extra doses, Change in medications, Change in diet/appetite, Hospital admission, Bruising   Comments: He has stopped the lovenox.    Need to check on the ensure use.       Plan:    1. INR is SUpratherapeutic today at 4.05 via  after poc of 5.3. (goal 2.5-3.5) . Instructed Mr. Vo to decrease today's warfarin to 2 mg and then continue warfarin 7 mg daily except 5 mg on next Monday.  This will provide 47 mg/week , about 7-8% less than the 51 mg he took last week.   2. Return to clinic Wednesday.      3. Verbal  information provided in clinic and over the telephone  Dillon Vo expresses understanding by teach back and has no further questions at this time.       Ez Doan, PharmD  10/16/23   14:09 EDT

## 2023-11-09 DIAGNOSIS — I71.40 ABDOMINAL AORTIC ANEURYSM (AAA) WITHOUT RUPTURE, UNSPECIFIED PART: Primary | ICD-10-CM

## 2023-11-16 ENCOUNTER — OFFICE VISIT (OUTPATIENT)
Dept: INTERNAL MEDICINE | Facility: CLINIC | Age: 73
End: 2023-11-16
Payer: MEDICARE

## 2023-11-16 VITALS
HEART RATE: 76 BPM | TEMPERATURE: 97.8 F | RESPIRATION RATE: 18 BRPM | BODY MASS INDEX: 19.81 KG/M2 | WEIGHT: 142 LBS | SYSTOLIC BLOOD PRESSURE: 126 MMHG | DIASTOLIC BLOOD PRESSURE: 82 MMHG

## 2023-11-16 DIAGNOSIS — I10 ESSENTIAL HYPERTENSION: Primary | ICD-10-CM

## 2023-11-16 DIAGNOSIS — E83.39 LOW PHOSPHATE LEVELS: ICD-10-CM

## 2023-11-16 DIAGNOSIS — D53.9 MACROCYTIC ANEMIA: ICD-10-CM

## 2023-11-16 DIAGNOSIS — I48.91 ATRIAL FIBRILLATION, UNSPECIFIED TYPE: ICD-10-CM

## 2023-11-16 DIAGNOSIS — N17.9 ACUTE KIDNEY INJURY: ICD-10-CM

## 2023-11-16 LAB
ALBUMIN SERPL-MCNC: 4 G/DL (ref 3.5–5.2)
ALBUMIN/GLOB SERPL: 1.3 G/DL
ALP SERPL-CCNC: 71 U/L (ref 39–117)
ALT SERPL W P-5'-P-CCNC: 13 U/L (ref 1–41)
ANION GAP SERPL CALCULATED.3IONS-SCNC: 8.1 MMOL/L (ref 5–15)
AST SERPL-CCNC: 32 U/L (ref 1–40)
BASOPHILS # BLD AUTO: 0.07 10*3/MM3 (ref 0–0.2)
BASOPHILS NFR BLD AUTO: 1.5 % (ref 0–1.5)
BILIRUB SERPL-MCNC: 1.5 MG/DL (ref 0–1.2)
BUN SERPL-MCNC: 15 MG/DL (ref 8–23)
BUN/CREAT SERPL: 15.8 (ref 7–25)
CALCIUM SPEC-SCNC: 9.2 MG/DL (ref 8.6–10.5)
CHLORIDE SERPL-SCNC: 107 MMOL/L (ref 98–107)
CO2 SERPL-SCNC: 25.9 MMOL/L (ref 22–29)
CREAT SERPL-MCNC: 0.95 MG/DL (ref 0.76–1.27)
DEPRECATED RDW RBC AUTO: 48.6 FL (ref 37–54)
EGFRCR SERPLBLD CKD-EPI 2021: 84.5 ML/MIN/1.73
EOSINOPHIL # BLD AUTO: 0.12 10*3/MM3 (ref 0–0.4)
EOSINOPHIL NFR BLD AUTO: 2.5 % (ref 0.3–6.2)
ERYTHROCYTE [DISTWIDTH] IN BLOOD BY AUTOMATED COUNT: 13.4 % (ref 12.3–15.4)
GLOBULIN UR ELPH-MCNC: 3.1 GM/DL
GLUCOSE SERPL-MCNC: 106 MG/DL (ref 65–99)
HCT VFR BLD AUTO: 35.6 % (ref 37.5–51)
HGB BLD-MCNC: 12.6 G/DL (ref 13–17.7)
IMM GRANULOCYTES # BLD AUTO: 0.01 10*3/MM3 (ref 0–0.05)
IMM GRANULOCYTES NFR BLD AUTO: 0.2 % (ref 0–0.5)
LYMPHOCYTES # BLD AUTO: 0.84 10*3/MM3 (ref 0.7–3.1)
LYMPHOCYTES NFR BLD AUTO: 17.8 % (ref 19.6–45.3)
MCH RBC QN AUTO: 35.7 PG (ref 26.6–33)
MCHC RBC AUTO-ENTMCNC: 35.4 G/DL (ref 31.5–35.7)
MCV RBC AUTO: 100.8 FL (ref 79–97)
MONOCYTES # BLD AUTO: 0.46 10*3/MM3 (ref 0.1–0.9)
MONOCYTES NFR BLD AUTO: 9.8 % (ref 5–12)
NEUTROPHILS NFR BLD AUTO: 3.21 10*3/MM3 (ref 1.7–7)
NEUTROPHILS NFR BLD AUTO: 68.2 % (ref 42.7–76)
NRBC BLD AUTO-RTO: 0 /100 WBC (ref 0–0.2)
PHOSPHATE SERPL-MCNC: 3.9 MG/DL (ref 2.5–4.5)
PLATELET # BLD AUTO: 172 10*3/MM3 (ref 140–450)
PMV BLD AUTO: 12.5 FL (ref 6–12)
POTASSIUM SERPL-SCNC: 4.7 MMOL/L (ref 3.5–5.2)
PROT SERPL-MCNC: 7.1 G/DL (ref 6–8.5)
RBC # BLD AUTO: 3.53 10*6/MM3 (ref 4.14–5.8)
SODIUM SERPL-SCNC: 141 MMOL/L (ref 136–145)
WBC NRBC COR # BLD AUTO: 4.71 10*3/MM3 (ref 3.4–10.8)

## 2023-11-16 PROCEDURE — 85025 COMPLETE CBC W/AUTO DIFF WBC: CPT | Performed by: STUDENT IN AN ORGANIZED HEALTH CARE EDUCATION/TRAINING PROGRAM

## 2023-11-16 PROCEDURE — 84100 ASSAY OF PHOSPHORUS: CPT | Performed by: STUDENT IN AN ORGANIZED HEALTH CARE EDUCATION/TRAINING PROGRAM

## 2023-11-16 PROCEDURE — 80053 COMPREHEN METABOLIC PANEL: CPT | Performed by: STUDENT IN AN ORGANIZED HEALTH CARE EDUCATION/TRAINING PROGRAM

## 2023-11-16 NOTE — PROGRESS NOTES
Chief Complaint   Patient presents with    Follow-up     6 week follow up chronic medical problems       History of Present Illness      The patient presents for a follow-up related to chronic atrial fibrillation. He denies palpitations. The patient denies fatigue, dizziness, nausea, dyspnea, edema, chest pain, syncope or exercise intolerance. The patient denies using diet pills, decongestants, alcohol, caffeine, cocaine or stimulant medications.    The patient presents for a follow-up related to hypertension. The patient reports that he has had no headaches or blurred vision. He states that he is taking his medication as prescribed. He is not having medication side effects.    Medications      Current Outpatient Medications:     aspirin 81 MG EC tablet, Take 1 tablet by mouth Daily., Disp: , Rfl:     atorvastatin (LIPITOR) 80 MG tablet, Take 1 tablet by mouth Every Night., Disp: 90 tablet, Rfl: 3    buPROPion XL (WELLBUTRIN XL) 300 MG 24 hr tablet, Take 1 tablet by mouth once daily, Disp: 30 tablet, Rfl: 5    carvedilol (COREG) 6.25 MG tablet, Take 1 tablet by mouth 2 (Two) Times a Day With Meals., Disp: 180 tablet, Rfl: 0    citalopram (CeleXA) 20 MG tablet, Take 1 tablet by mouth Daily., Disp: 90 tablet, Rfl: 0    finasteride (PROSCAR) 5 MG tablet, Take 1 tablet by mouth every night at bedtime., Disp: 90 tablet, Rfl: 1    folic acid (FOLVITE) 400 MCG tablet, TAKE 1 TABLET BY MOUTH ONCE DAILY, Disp: 90 tablet, Rfl: 3    sennosides-docusate (PERICOLACE) 8.6-50 MG per tablet, , Disp: , Rfl:     vitamin B-12 (CYANOCOBALAMIN) 1000 MCG tablet, Take 1 tablet by mouth Daily., Disp: 90 tablet, Rfl: 3    warfarin (COUMADIN) 5 MG tablet, TAKE ONE TABLET BY MOUTH DAILY, Disp: 90 tablet, Rfl: 1     Allergies    No Known Allergies    Problem List    Patient Active Problem List   Diagnosis    Depression    Hyperbilirubinemia    Hyperlipidemia LDL goal <70    Essential hypertension    Malignant neoplasm of overlapping sites of  colon    Neurogenic bladder    Tobacco use    Coronary artery disease involving native coronary artery of native heart with angina pectoris    H/O mechanical aortic valve replacement    COPD (chronic obstructive pulmonary disease)    Thrombocytopenia    Chronic anticoagulation on warfarin     Self-catheterizes urinary bladder    Lung cancer (S/P Left thoracotomy and lobectomy 4/2021)    Gastroesophageal reflux disease without esophagitis    Pacemaker    Syncope (R/O due to orthostatic hypotension)    Permanent atrial fibrillation    Acute diastolic congestive heart failure    Abdominal aortic aneurysm (AAA) without rupture    Urinary retention    Complete heart block    Aneurysm of ascending aorta without rupture s/p repair     Paroxysmal atrial fibrillation       Medications, Allergies, Problems List and Past History were reviewed and updated.    Physical Examination    /82   Pulse 76   Temp 97.8 °F (36.6 °C) (Infrared)   Resp 18   Wt 64.4 kg (142 lb)   BMI 19.81 kg/m²     Neck: Thyroid- non enlarged, symmetric and has no nodules. No bruits are detected. ROM- Normal Range of Motion with no rigidity.    Lungs: Auscultation- Clear to auscultation bilaterally. There are no retractions, clubbing or cyanosis. The Expiratory to Inspiratory ratio is equal.    Cardiovascular: There are no carotid bruits. Heart- Normal Rate with Irregularly Irregular rhythm and no murmurs. There are no gallops. There are no rubs. In the lower extremities there is no edema. The upper extremities do not have edema.    Abdomen: Soft, benign, non-tender with no masses, hernias, organomegaly or scars.    Impression and Assessment    Atrial Fibrillation.    Essential Hypertension.    Plan    Atrial Fibrillation Plan: The patient was instructed to continue the current medications.    Essential Hypertension Plan: The patient was instructed to continue the current medications.    Diagnoses and all orders for this visit:    1. Essential  hypertension (Primary)  -     CBC & Differential; Future  -     Comprehensive Metabolic Panel; Future  -     Cancel: CBC & Differential  -     Cancel: Comprehensive Metabolic Panel    2. Atrial fibrillation, unspecified type  -     CBC & Differential; Future  -     Comprehensive Metabolic Panel; Future  -     Cancel: CBC & Differential  -     Cancel: Comprehensive Metabolic Panel    3. Acute kidney injury  -     Comprehensive metabolic panel    4. Macrocytic anemia  -     CBC w AUTO Differential    5. Low phosphate levels  -     Phosphorus        Return to Office    The patient was instructed to return for follow-up at the next scheduled visit. The patient was instructed to return sooner if the condition changes, worsens, or does not resolve.

## 2024-01-04 DIAGNOSIS — I25.119 CORONARY ARTERY DISEASE INVOLVING NATIVE CORONARY ARTERY OF NATIVE HEART WITH ANGINA PECTORIS: ICD-10-CM

## 2024-01-04 DIAGNOSIS — I48.0 PAROXYSMAL ATRIAL FIBRILLATION: ICD-10-CM

## 2024-01-04 RX ORDER — CARVEDILOL 6.25 MG/1
TABLET ORAL
Qty: 180 TABLET | Refills: 0 | Status: SHIPPED | OUTPATIENT
Start: 2024-01-04

## 2024-01-15 ENCOUNTER — OFFICE VISIT (OUTPATIENT)
Dept: INTERNAL MEDICINE | Facility: CLINIC | Age: 74
End: 2024-01-15
Payer: MEDICARE

## 2024-01-15 VITALS
SYSTOLIC BLOOD PRESSURE: 156 MMHG | DIASTOLIC BLOOD PRESSURE: 84 MMHG | WEIGHT: 147 LBS | HEIGHT: 70 IN | TEMPERATURE: 97.8 F | BODY MASS INDEX: 21.05 KG/M2 | HEART RATE: 72 BPM | RESPIRATION RATE: 18 BRPM

## 2024-01-15 DIAGNOSIS — I10 ESSENTIAL HYPERTENSION: ICD-10-CM

## 2024-01-15 DIAGNOSIS — N31.9 NEUROGENIC BLADDER: ICD-10-CM

## 2024-01-15 DIAGNOSIS — Z00.00 MEDICARE ANNUAL WELLNESS VISIT, SUBSEQUENT: Primary | ICD-10-CM

## 2024-01-15 DIAGNOSIS — R33.9 URINARY RETENTION: ICD-10-CM

## 2024-01-15 DIAGNOSIS — E78.5 HYPERLIPIDEMIA, UNSPECIFIED HYPERLIPIDEMIA TYPE: ICD-10-CM

## 2024-01-15 DIAGNOSIS — C18.9 MALIGNANT NEOPLASM OF COLON, UNSPECIFIED PART OF COLON: ICD-10-CM

## 2024-01-15 DIAGNOSIS — I25.119 CORONARY ARTERY DISEASE INVOLVING NATIVE CORONARY ARTERY OF NATIVE HEART WITH ANGINA PECTORIS: ICD-10-CM

## 2024-01-15 DIAGNOSIS — I48.0 PAROXYSMAL ATRIAL FIBRILLATION: ICD-10-CM

## 2024-01-15 DIAGNOSIS — C34.12 MALIGNANT NEOPLASM OF UPPER LOBE OF LEFT LUNG: ICD-10-CM

## 2024-01-15 LAB
ALBUMIN SERPL-MCNC: 4.2 G/DL (ref 3.5–5.2)
ALBUMIN/GLOB SERPL: 1.3 G/DL
ALP SERPL-CCNC: 101 U/L (ref 39–117)
ALT SERPL W P-5'-P-CCNC: 9 U/L (ref 1–41)
ANION GAP SERPL CALCULATED.3IONS-SCNC: 10.5 MMOL/L (ref 5–15)
AST SERPL-CCNC: 20 U/L (ref 1–40)
BASOPHILS # BLD AUTO: 0.06 10*3/MM3 (ref 0–0.2)
BASOPHILS NFR BLD AUTO: 1.4 % (ref 0–1.5)
BILIRUB SERPL-MCNC: 1.1 MG/DL (ref 0–1.2)
BUN SERPL-MCNC: 11 MG/DL (ref 8–23)
BUN/CREAT SERPL: 9.5 (ref 7–25)
CALCIUM SPEC-SCNC: 9.5 MG/DL (ref 8.6–10.5)
CHLORIDE SERPL-SCNC: 103 MMOL/L (ref 98–107)
CHOLEST SERPL-MCNC: 136 MG/DL (ref 0–200)
CO2 SERPL-SCNC: 28.5 MMOL/L (ref 22–29)
CREAT SERPL-MCNC: 1.16 MG/DL (ref 0.76–1.27)
DEPRECATED RDW RBC AUTO: 44.2 FL (ref 37–54)
EGFRCR SERPLBLD CKD-EPI 2021: 66.5 ML/MIN/1.73
EOSINOPHIL # BLD AUTO: 0.1 10*3/MM3 (ref 0–0.4)
EOSINOPHIL NFR BLD AUTO: 2.3 % (ref 0.3–6.2)
ERYTHROCYTE [DISTWIDTH] IN BLOOD BY AUTOMATED COUNT: 12.4 % (ref 12.3–15.4)
GLOBULIN UR ELPH-MCNC: 3.3 GM/DL
GLUCOSE SERPL-MCNC: 112 MG/DL (ref 65–99)
HCT VFR BLD AUTO: 41.2 % (ref 37.5–51)
HDLC SERPL-MCNC: 67 MG/DL (ref 40–60)
HGB BLD-MCNC: 13.5 G/DL (ref 13–17.7)
IMM GRANULOCYTES # BLD AUTO: 0.01 10*3/MM3 (ref 0–0.05)
IMM GRANULOCYTES NFR BLD AUTO: 0.2 % (ref 0–0.5)
LDLC SERPL CALC-MCNC: 59 MG/DL (ref 0–100)
LDLC/HDLC SERPL: 0.91 {RATIO}
LYMPHOCYTES # BLD AUTO: 0.91 10*3/MM3 (ref 0.7–3.1)
LYMPHOCYTES NFR BLD AUTO: 20.9 % (ref 19.6–45.3)
MCH RBC QN AUTO: 32.4 PG (ref 26.6–33)
MCHC RBC AUTO-ENTMCNC: 32.8 G/DL (ref 31.5–35.7)
MCV RBC AUTO: 98.8 FL (ref 79–97)
MONOCYTES # BLD AUTO: 0.5 10*3/MM3 (ref 0.1–0.9)
MONOCYTES NFR BLD AUTO: 11.5 % (ref 5–12)
NEUTROPHILS NFR BLD AUTO: 2.77 10*3/MM3 (ref 1.7–7)
NEUTROPHILS NFR BLD AUTO: 63.7 % (ref 42.7–76)
NRBC BLD AUTO-RTO: 0 /100 WBC (ref 0–0.2)
PLATELET # BLD AUTO: 166 10*3/MM3 (ref 140–450)
PMV BLD AUTO: 12.1 FL (ref 6–12)
POTASSIUM SERPL-SCNC: 3.5 MMOL/L (ref 3.5–5.2)
PROT SERPL-MCNC: 7.5 G/DL (ref 6–8.5)
RBC # BLD AUTO: 4.17 10*6/MM3 (ref 4.14–5.8)
SODIUM SERPL-SCNC: 142 MMOL/L (ref 136–145)
TRIGL SERPL-MCNC: 39 MG/DL (ref 0–150)
VLDLC SERPL-MCNC: 10 MG/DL (ref 5–40)
WBC NRBC COR # BLD AUTO: 4.35 10*3/MM3 (ref 3.4–10.8)

## 2024-01-15 PROCEDURE — 80053 COMPREHEN METABOLIC PANEL: CPT | Performed by: INTERNAL MEDICINE

## 2024-01-15 PROCEDURE — 80061 LIPID PANEL: CPT | Performed by: INTERNAL MEDICINE

## 2024-01-15 PROCEDURE — 84443 ASSAY THYROID STIM HORMONE: CPT | Performed by: INTERNAL MEDICINE

## 2024-01-15 PROCEDURE — 85025 COMPLETE CBC W/AUTO DIFF WBC: CPT | Performed by: INTERNAL MEDICINE

## 2024-01-15 NOTE — PROGRESS NOTES
The ABCs of the Annual Wellness Visit  Subsequent Medicare Wellness Visit    Subjective      Dillon Vo is a 73 y.o. male who presents for a Subsequent Medicare Wellness Visit.    The following portions of the patient's history were reviewed and   updated as appropriate: allergies, current medications, past family history, past medical history, past social history, past surgical history, and problem list.    Compared to one year ago, the patient feels his physical   health is the same.    Compared to one year ago, the patient feels his mental   health is the same.    Recent Hospitalizations:  He was admitted within the past 365 days at [facility].       Current Medical Providers:  Patient Care Team:  Ez Cintron MD as PCP - General  RaulLuis Alberto MD as Consulting Physician (Radiation Oncology)  Moiz Ronquillo IV, MD as Consulting Physician (Interventional Cardiology)  Mallory Loza APRN as Nurse Practitioner (Interventional Cardiology)  Julio Remy MD as Consulting Physician (Cardiology)  Payam Kenney MD as Consulting Physician (Pulmonary Disease)    Outpatient Medications Prior to Visit   Medication Sig Dispense Refill    aspirin 81 MG EC tablet Take 1 tablet by mouth Daily.      atorvastatin (LIPITOR) 80 MG tablet Take 1 tablet by mouth Every Night. 90 tablet 3    buPROPion XL (WELLBUTRIN XL) 300 MG 24 hr tablet Take 1 tablet by mouth once daily 30 tablet 5    carvedilol (COREG) 6.25 MG tablet TAKE 1 TABLET BY MOUTH TWICE A DAY WITH A MEAL 180 tablet 0    citalopram (CeleXA) 20 MG tablet Take 1 tablet by mouth Daily. 90 tablet 0    finasteride (PROSCAR) 5 MG tablet Take 1 tablet by mouth every night at bedtime. 90 tablet 1    folic acid (FOLVITE) 400 MCG tablet TAKE 1 TABLET BY MOUTH ONCE DAILY 90 tablet 3    sennosides-docusate (PERICOLACE) 8.6-50 MG per tablet       vitamin B-12 (CYANOCOBALAMIN) 1000 MCG tablet Take 1 tablet by mouth Daily. 90 tablet  3    warfarin (COUMADIN) 5 MG tablet TAKE ONE TABLET BY MOUTH DAILY 90 tablet 1     No facility-administered medications prior to visit.       No opioid medication identified on active medication list. I have reviewed chart for other potential  high risk medication/s and harmful drug interactions in the elderly.        Aspirin is on active medication list. Aspirin use is indicated based on review of current medical condition/s. Pros and cons of this therapy have been discussed today. Benefits of this medication outweigh potential harm.  Patient has been encouraged to continue taking this medication.  .      Patient Active Problem List   Diagnosis    Depression    Hyperbilirubinemia    Hyperlipidemia LDL goal <70    Essential hypertension    Malignant neoplasm of overlapping sites of colon    Neurogenic bladder    Tobacco use    Coronary artery disease involving native coronary artery of native heart with angina pectoris    H/O mechanical aortic valve replacement    COPD (chronic obstructive pulmonary disease)    Thrombocytopenia    Chronic anticoagulation on warfarin     Self-catheterizes urinary bladder    Lung cancer (S/P Left thoracotomy and lobectomy 4/2021)    Gastroesophageal reflux disease without esophagitis    Pacemaker    Syncope (R/O due to orthostatic hypotension)    Permanent atrial fibrillation    Acute diastolic congestive heart failure    Abdominal aortic aneurysm (AAA) without rupture    Urinary retention    Complete heart block    Aneurysm of ascending aorta without rupture s/p repair     Paroxysmal atrial fibrillation     Advance Care Planning   Advance Care Planning     Advance Directive is on file.  ACP discussion was held with the patient during this visit. Patient has an advance directive in EMR which is still valid.      Objective    Vitals:    01/15/24 1446   BP: 156/84   BP Location: Left arm   Patient Position: Sitting   Cuff Size: Adult   Pulse: 72   Resp: 18   Temp: 97.8 °F (36.6 °C)  "  TempSrc: Infrared   Weight: 66.7 kg (147 lb)   Height: 177.8 cm (70\")   PainSc:   2   PainLoc: Back     Estimated body mass index is 21.09 kg/m² as calculated from the following:    Height as of this encounter: 177.8 cm (70\").    Weight as of this encounter: 66.7 kg (147 lb).    BMI is within normal parameters. No other follow-up for BMI required.    Finger Rub Hearing{Test (right ear):passed  Finger Rub Hearing{Test (left ear):passed    Does the patient have evidence of cognitive impairment?   No            HEALTH RISK ASSESSMENT    Smoking Status:  Social History     Tobacco Use   Smoking Status Every Day    Packs/day: 0.25    Years: 57.00    Additional pack years: 0.00    Total pack years: 14.25    Types: Cigarettes    Start date:     Passive exposure: Current   Smokeless Tobacco Never   Tobacco Comments    cut back on his smoking 1 carton will last a month smoking 5 to 6 cigarettes a day      Alcohol Consumption:  Social History     Substance and Sexual Activity   Alcohol Use Yes    Alcohol/week: 2.0 standard drinks of alcohol    Types: 2 Shots of liquor per week    Comment: daily     Fall Risk Screen:    PATRICIA Fall Risk Assessment was completed, and patient is at LOW risk for falls.Assessment completed on:1/15/2024    Depression Screenin/15/2024     2:53 PM   PHQ-2/PHQ-9 Depression Screening   Little Interest or Pleasure in Doing Things 0-->not at all   Feeling Down, Depressed or Hopeless 0-->not at all   PHQ-9: Brief Depression Severity Measure Score 0       Health Habits and Functional and Cognitive Screenin/15/2024     2:51 PM   Functional & Cognitive Status   Do you have difficulty preparing food and eating? No   Do you have difficulty bathing yourself, getting dressed or grooming yourself? No   Do you have difficulty using the toilet? No   Do you have difficulty moving around from place to place? No   Do you have trouble with steps or getting out of a bed or a chair? No   Current " Diet Well Balanced Diet   Dental Exam Not up to date   Eye Exam Not up to date   Exercise (times per week) 0 times per week   Current Exercises Include No Regular Exercise   Do you need help using the phone?  No   Are you deaf or do you have serious difficulty hearing?  Yes   Do you need help to go to places out of walking distance? Yes   Do you need help shopping? No   Do you need help preparing meals?  No   Do you need help with housework?  No   Do you need help with laundry? No   Do you need help taking your medications? No   Do you need help managing money? No   Do you ever drive or ride in a car without wearing a seat belt? No   Have you felt unusual stress, anger or loneliness in the last month? No   Who do you live with? Spouse   If you need help, do you have trouble finding someone available to you? No   Have you been bothered in the last four weeks by sexual problems? No   Do you have difficulty concentrating, remembering or making decisions? Yes       Age-appropriate Screening Schedule:  Refer to the list below for future screening recommendations based on patient's age, sex and/or medical conditions. Orders for these recommended tests are listed in the plan section. The patient has been provided with a written plan.    Health Maintenance   Topic Date Due    TDAP/TD VACCINES (1 - Tdap) Never done    ZOSTER VACCINE (2 of 3) 02/24/2016    ANNUAL WELLNESS VISIT  09/16/2022    COLONOSCOPY  08/07/2023    COVID-19 Vaccine (5 - 2023-24 season) 09/01/2023    LIPID PANEL  09/06/2024    HEPATITIS C SCREENING  Completed    INFLUENZA VACCINE  Completed    Pneumococcal Vaccine 65+  Completed    AAA SCREEN (ONE-TIME)  Completed                  CMS Preventative Services Quick Reference  Risk Factors Identified During Encounter:    Immunizations Discussed/Encouraged: Tdap, Shingrix, and COVID19    The above risks/problems have been discussed with the patient.  Pertinent information has been shared with the patient in the  After Visit Summary.    Diagnoses and all orders for this visit:    1. Medicare annual wellness visit, subsequent (Primary)    2. Essential hypertension  -     Comprehensive Metabolic Panel; Future  -     TSH; Future  -     POC Urinalysis Dipstick, Automated; Future  -     POC Urinalysis Dipstick, Automated  -     Comprehensive Metabolic Panel  -     TSH    3. Coronary artery disease involving native coronary artery of native heart with angina pectoris    4. Paroxysmal atrial fibrillation  -     CBC & Differential; Future  -     CBC & Differential    5. Hyperlipidemia, unspecified hyperlipidemia type  -     Comprehensive Metabolic Panel; Future  -     Lipid Panel; Future  -     Comprehensive Metabolic Panel  -     Lipid Panel    6. Malignant neoplasm of colon, unspecified part of colon  -     CBC & Differential; Future  -     Ambulatory Referral For Screening Colonoscopy  -     CBC & Differential    7. Malignant neoplasm of upper lobe of left lung  -     CT chest w wo contrast; Future    8. Neurogenic bladder  -     Cancel: Basic Metabolic Panel    9. Urinary retention  -     Cancel: Basic Metabolic Panel          Follow Up:   Next Medicare Wellness visit to be scheduled in 1 year.      An After Visit Summary and PPPS were made available to the patient.

## 2024-01-15 NOTE — PROGRESS NOTES
Chief Complaint   Patient presents with    Medicare Wellness-subsequent       History of Present Illness    The patient presents for a follow-up related to hypertension. The patient reports that he has had no headaches, chest pain, dyspnea, edema, syncope, blurred vision or palpitations. He states that he is taking his medication as prescribed. He is not having medication side effects.    The patient presents for a follow-up related to coronary artery disease. He denies diaphoresis. He takes an aspirin daily.    The patient presents for a follow-up related to chronic atrial fibrillation. He denies palpitations. The patient denies fatigue, dizziness, nausea or exercise intolerance. The patient denies using diet pills, decongestants, alcohol, caffeine, cocaine or stimulant medications.    The patient presents for a follow-up related to hyperlipidemia. He is following a low fat diet. He reports that he is not exercising. He is taking atorvastatin. The patient is taking his medication as prescribed. He reports no medication side effects, including muscle cramps, abdominal pain, headaches or weakness. He denies orthopnea, paroxysmal nocturnal dyspnea or dyspnea on exertion.    The patient presents for a follow-up related to colon cancer and lung cancer.       The lung cancer is specifically a non-small cell lung cancer. The colon cancer is specifically an adenocarcinoma.    68 y/o WM w/ h/o tobacco abuse ~100 py plus, prior colon cancer s/p resection 2001, mechanical AVR, CAD w/ prior stent, Afib, who presents for evaluation of abnormal CT Chest. Patient had a lung cancer screening CT 8/20/19 showing a spiculated 12 mm (I disagree with radiology reading of 9 mm) spiculated MARKO lesion.  No mediastinal or hilar LAD.  No symptoms attributable to this nodule. Patient was admitted in 2/19-2/24/20 w/ H1N1 flu / pna / hemoptysis.  INR was 7 at the time.  CXR abnormalities cleared rapidly.  ANCA and GBM antibodies were negative.   Echo showed EF 46%, LVH, dilated LA, dilated RV, mild to mod MR, functioning mechanical AVR, RVSP > 55.  Echo was done in setting of marked abnormalities on CXR. He eventually had repeat PET-CT which showed higher SUV and slight growth.  He ultimately underwent MARKO Lobectomy with Dr. Post 4/7/21 that showed Stage 1B Adenocarcinoma. He did well post op.     The patient denies back pain, bone pain or rib pain. There has been no associated abdominal pain, anorexia, confusion, depression, fevers, neuropathy symptoms, night sweats, pelvic pain, skin changes, urinary symptoms or unexplained weight loss. He denies cough or hemoptysis. He denies dysphagia, hematemesis, melena or rectal bleeding. The patient is not following up with another physician.    Medications      Current Outpatient Medications:     aspirin 81 MG EC tablet, Take 1 tablet by mouth Daily., Disp: , Rfl:     atorvastatin (LIPITOR) 80 MG tablet, Take 1 tablet by mouth Every Night., Disp: 90 tablet, Rfl: 3    buPROPion XL (WELLBUTRIN XL) 300 MG 24 hr tablet, Take 1 tablet by mouth once daily, Disp: 30 tablet, Rfl: 5    carvedilol (COREG) 6.25 MG tablet, TAKE 1 TABLET BY MOUTH TWICE A DAY WITH A MEAL, Disp: 180 tablet, Rfl: 0    citalopram (CeleXA) 20 MG tablet, Take 1 tablet by mouth Daily., Disp: 90 tablet, Rfl: 0    finasteride (PROSCAR) 5 MG tablet, Take 1 tablet by mouth every night at bedtime., Disp: 90 tablet, Rfl: 1    folic acid (FOLVITE) 400 MCG tablet, TAKE 1 TABLET BY MOUTH ONCE DAILY, Disp: 90 tablet, Rfl: 3    sennosides-docusate (PERICOLACE) 8.6-50 MG per tablet, , Disp: , Rfl:     vitamin B-12 (CYANOCOBALAMIN) 1000 MCG tablet, Take 1 tablet by mouth Daily., Disp: 90 tablet, Rfl: 3    warfarin (COUMADIN) 5 MG tablet, TAKE ONE TABLET BY MOUTH DAILY, Disp: 90 tablet, Rfl: 1     Allergies    No Known Allergies    Problem List    Patient Active Problem List   Diagnosis    Depression    Hyperbilirubinemia    Hyperlipidemia LDL goal <70     "Essential hypertension    Malignant neoplasm of overlapping sites of colon    Neurogenic bladder    Tobacco use    Coronary artery disease involving native coronary artery of native heart with angina pectoris    H/O mechanical aortic valve replacement    COPD (chronic obstructive pulmonary disease)    Thrombocytopenia    Chronic anticoagulation on warfarin     Self-catheterizes urinary bladder    Lung cancer (S/P Left thoracotomy and lobectomy 4/2021)    Gastroesophageal reflux disease without esophagitis    Pacemaker    Syncope (R/O due to orthostatic hypotension)    Permanent atrial fibrillation    Acute diastolic congestive heart failure    Abdominal aortic aneurysm (AAA) without rupture    Urinary retention    Complete heart block    Aneurysm of ascending aorta without rupture s/p repair     Paroxysmal atrial fibrillation       Medications, Allergies, Problems List and Past History were reviewed and updated.    Physical Examination    /84 (BP Location: Left arm, Patient Position: Sitting, Cuff Size: Adult)   Pulse 72   Temp 97.8 °F (36.6 °C) (Infrared)   Resp 18   Ht 177.8 cm (70\")   Wt 66.7 kg (147 lb)   BMI 21.09 kg/m²       HEENT: Head- Normocephalic Atraumatic. Facies- Within normal limits. Pinnas- Normal texture and shape bilaterally. Canals- Normal bilaterally. TMs- Normal bilaterally. Nares- Patent bilaterally. Nasal Septum- is normal. There is no tenderness to palpation over the frontal or maxillary sinuses. Lids- Normal bilaterally. Conjunctiva- Clear bilaterally. Sclera- Anicteric bilaterally. Oropharynx- Moist with no lesions. Tonsils- No enlargement, erythema or exudate.    Neck: Thyroid- non enlarged, symmetric and has no nodules. No bruits are detected. ROM- Normal Range of Motion with no rigidity.    Lungs: Auscultation- Clear to auscultation bilaterally. There are no retractions, clubbing or cyanosis. The Expiratory to Inspiratory ratio is equal.    Lymph Nodes: Cervical- no enlarged " lymph nodes noted. Clavicular- no enlarged supraclavicular lymph nodes noted. Axillary- no enlarged axillary lymph nodes noted. Inguinal- no enlarged inguinal lymph nodes noted.    Cardiovascular: There are no carotid bruits. Heart- Normal Rate with Irregularly Irregular rhythm and no murmurs. There are no gallops. There are no rubs. In the lower extremities there is no edema. The upper extremities do not have edema.    Abdomen: Soft, benign, non-tender with no masses, hernias, organomegaly or scars.    Impression and Assessment    Non-Small Cell Lung Cancer.    Colon Adenocarcinoma.    Essential Hypertension.    Coronary Artery Disease.    Atrial Fibrillation.    Hyperlipidemia.    Plan    Essential Hypertension Plan: The patient was instructed to continue the current medications.    Coronary Artery Disease Plan: The patient will follow-up with his cardiologist. The patient was instructed to continue the current medications.    Atrial Fibrillation Plan: The patient was instructed to continue the current medications.    Hyperlipidemia Plan: The patient was instructed to exercise daily, eat a low fat diet and continue his medications.    Colon Adenocarcinoma Plan: Further plans will be made after test results are received and reviewed.    Non-Small Cell Lung Cancer Plan: Further plans will be made after test results are received and reviewed.    Diagnoses and all orders for this visit:    1. Medicare annual wellness visit, subsequent (Primary)    2. Essential hypertension  -     Comprehensive Metabolic Panel; Future  -     TSH; Future  -     POC Urinalysis Dipstick, Automated; Future  -     POC Urinalysis Dipstick, Automated  -     Comprehensive Metabolic Panel  -     TSH    3. Coronary artery disease involving native coronary artery of native heart with angina pectoris    4. Paroxysmal atrial fibrillation  -     CBC & Differential; Future  -     CBC & Differential    5. Hyperlipidemia, unspecified hyperlipidemia  type  -     Comprehensive Metabolic Panel; Future  -     Lipid Panel; Future  -     Comprehensive Metabolic Panel  -     Lipid Panel    6. Malignant neoplasm of colon, unspecified part of colon  -     CBC & Differential; Future  -     Ambulatory Referral For Screening Colonoscopy  -     CBC & Differential    7. Malignant neoplasm of upper lobe of left lung  -     CT chest w wo contrast; Future    8. Neurogenic bladder  -     Cancel: Basic Metabolic Panel    9. Urinary retention  -     Cancel: Basic Metabolic Panel        Return to Office    The patient was instructed to return for follow-up in 6 months. The patient was instructed to return sooner if the condition changes, worsens, or does not resolve.

## 2024-01-15 NOTE — PATIENT INSTRUCTIONS
Medicare Wellness  Personal Prevention Plan of Service     Date of Office Visit:    Encounter Provider:  Ez Cintron MD  Place of Service:  [facility]  Patient Name: Dillon Vo  :  1950    As part of the Medicare Wellness portion of your visit today, we are providing you with this personalized preventive plan of services (PPPS). This plan is based upon recommendations of the [place] Preventive Services Task Force (USPSTF) and the Advisory Committee on Immunization Practices (ACIP).    This lists the preventive care services that should be considered, and provides dates of when you are due. Items listed as completed are up-to-date and do not require any further intervention.    Health Maintenance   Topic Date Due    TDAP/TD VACCINES (1 - Tdap) Never done    ZOSTER VACCINE (2 of 3) 2016    ANNUAL WELLNESS VISIT  2022    COLONOSCOPY  2023    COVID-19 Vaccine (5 - -24 season) 2023    LIPID PANEL  2024    HEPATITIS C SCREENING  Completed    INFLUENZA VACCINE  Completed    Pneumococcal Vaccine 65+  Completed    AAA SCREEN (ONE-TIME)  Completed       Orders Placed This Encounter   Procedures    CT chest w wo contrast     Standing Status:   Future     Standing Expiration Date:   1/15/2025     Order Specific Question:   Release to patient     Answer:   Routine Release [[id number]]    Comprehensive Metabolic Panel     Standing Status:   Future     Standing Expiration Date:   1/15/2025     Order Specific Question:   Release to patient     Answer:   Routine Release [[id number]]    Lipid Panel     Standing Status:   Future     Standing Expiration Date:   1/15/2025     Order Specific Question:   Release to patient     Answer:   Routine Release [[id number]]    TSH     Standing Status:   Future     Standing Expiration Date:   1/15/2025     Order Specific Question:   Release to patient     Answer:   Routine Release  [[id number]]    Ambulatory Referral For Screening Colonoscopy     Referral Priority:   Routine     Referral Type:   Diagnostic Medical     Referral Reason:   Specialty Services Required     Referred to Provider:   Luis Alberto Hood DO     Requested Specialty:   Gastroenterology     Number of Visits Requested:   1    POC Urinalysis Dipstick, Automated     Standing Status:   Future     Order Specific Question:   Release to patient     Answer:   Routine Release [[id number]]    CBC & Differential     Standing Status:   Future     Standing Expiration Date:   1/15/2025     Order Specific Question:   Manual Differential     Answer:   No     Order Specific Question:   Release to patient     Answer:   Routine Release [[id number]]       Return in about 4 months (around 5/15/2024) for Follow-up.

## 2024-01-16 LAB — TSH SERPL DL<=0.05 MIU/L-ACNC: 1.77 UIU/ML (ref 0.27–4.2)

## 2024-02-05 DIAGNOSIS — C34.12 MALIGNANT NEOPLASM OF UPPER LOBE OF LEFT LUNG: Primary | ICD-10-CM

## 2024-02-16 ENCOUNTER — TELEPHONE (OUTPATIENT)
Dept: CARDIAC SURGERY | Facility: CLINIC | Age: 74
End: 2024-02-16
Payer: MEDICARE

## 2024-03-18 ENCOUNTER — TELEPHONE (OUTPATIENT)
Dept: UROLOGY | Facility: CLINIC | Age: 74
End: 2024-03-18
Payer: MEDICARE

## 2024-03-18 NOTE — TELEPHONE ENCOUNTER
"Relay     \"Unable to leave VM, called spouse and she will let PT know appt. On 03/22/24 for a one year follow up needs to be rescheduled as Dr. Shen will be in surgery.\"                 "

## 2024-03-22 ENCOUNTER — PATIENT OUTREACH (OUTPATIENT)
Dept: CASE MANAGEMENT | Facility: OTHER | Age: 74
End: 2024-03-22
Payer: MEDICARE

## 2024-03-22 NOTE — OUTREACH NOTE
AMBULATORY CASE MANAGEMENT NOTE    Name and Relationship of Patient/Support Person: Dillon Vo - Self  Self    Patient Outreach    RN-ACM unable to reach patient after 2 different day attempts; no voicemail was available.  Called the wife's phone, spoke with her, to confirm that patient's phone number is correct in [facility] record. She confirmed that his number is correct.  Explained role of RN-ACM.  She stated that patient is very hard of hearing.  Provided RN-ACM phone number for wife to give to the patient for him to be able to call back.     Diann MARINA  Ambulatory Case Management    3/22/2024, 14:38 EDT

## 2024-05-21 ENCOUNTER — TELEPHONE (OUTPATIENT)
Dept: CARDIOLOGY | Facility: CLINIC | Age: 74
End: 2024-05-21
Payer: MEDICARE

## 2024-05-21 NOTE — TELEPHONE ENCOUNTER
Called Mr Vo due to receiving remote monitor readings from his pacemaker and no future appt.  He no showed his last appt.  Spoke with wife and she will speak with him when she returns home and call back to schedule appt.

## 2024-06-18 ENCOUNTER — TELEPHONE (OUTPATIENT)
Dept: PHARMACY | Facility: HOSPITAL | Age: 74
End: 2024-06-18

## 2024-06-18 NOTE — TELEPHONE ENCOUNTER
Called patient to inform him that he is overdue for an INR check. He has been scheduled to come to clinic Monday at 9:30 am.     Victoria Mckay CPhT   15:04 EDT 6/18/2024

## 2024-06-24 DIAGNOSIS — Z95.2 H/O AORTIC VALVE REPLACEMENT: ICD-10-CM

## 2024-06-24 DIAGNOSIS — I48.0 PAROXYSMAL ATRIAL FIBRILLATION: ICD-10-CM

## 2024-06-24 RX ORDER — WARFARIN SODIUM 5 MG/1
TABLET ORAL
Qty: 7 TABLET | Refills: 0 | Status: SHIPPED | OUTPATIENT
Start: 2024-06-24 | End: 2024-07-01 | Stop reason: SDUPTHER

## 2024-06-24 NOTE — TELEPHONE ENCOUNTER
Patient was scheduled for in-clinic visit an INR check today. Patient called to state he would like to reschedule for next week, as he has been out of warfarin and knows his INR will be low. He reports he has been taking warfarin 5 mg daily. Discussed with patient that I would send in a 7 day supply with no refills and reschedule patient for 7/1/24 for INR recheck in-clinic. He agreed with this plan.     Tiarra Heaton, PharmD  06/24/24   08:26 EDT

## 2024-06-25 ENCOUNTER — OFFICE VISIT (OUTPATIENT)
Age: 74
End: 2024-06-25
Payer: MEDICARE

## 2024-06-25 VITALS
HEIGHT: 70 IN | OXYGEN SATURATION: 98 % | SYSTOLIC BLOOD PRESSURE: 130 MMHG | WEIGHT: 136.2 LBS | BODY MASS INDEX: 19.5 KG/M2 | HEART RATE: 72 BPM | DIASTOLIC BLOOD PRESSURE: 78 MMHG | TEMPERATURE: 97.8 F

## 2024-06-25 DIAGNOSIS — I48.0 PAROXYSMAL ATRIAL FIBRILLATION: ICD-10-CM

## 2024-06-25 DIAGNOSIS — F17.210 CIGARETTE NICOTINE DEPENDENCE WITHOUT COMPLICATION: Primary | ICD-10-CM

## 2024-06-25 DIAGNOSIS — J44.9 CHRONIC OBSTRUCTIVE PULMONARY DISEASE, UNSPECIFIED COPD TYPE: ICD-10-CM

## 2024-06-25 DIAGNOSIS — K21.9 GASTROESOPHAGEAL REFLUX DISEASE WITHOUT ESOPHAGITIS: ICD-10-CM

## 2024-06-25 PROCEDURE — 1159F MED LIST DOCD IN RCRD: CPT | Performed by: NURSE PRACTITIONER

## 2024-06-25 PROCEDURE — 94729 DIFFUSING CAPACITY: CPT | Performed by: NURSE PRACTITIONER

## 2024-06-25 PROCEDURE — 3078F DIAST BP <80 MM HG: CPT | Performed by: NURSE PRACTITIONER

## 2024-06-25 PROCEDURE — 94726 PLETHYSMOGRAPHY LUNG VOLUMES: CPT | Performed by: NURSE PRACTITIONER

## 2024-06-25 PROCEDURE — 3075F SYST BP GE 130 - 139MM HG: CPT | Performed by: NURSE PRACTITIONER

## 2024-06-25 PROCEDURE — 94060 EVALUATION OF WHEEZING: CPT | Performed by: NURSE PRACTITIONER

## 2024-06-25 PROCEDURE — 99214 OFFICE O/P EST MOD 30 MIN: CPT | Performed by: NURSE PRACTITIONER

## 2024-06-25 PROCEDURE — 1160F RVW MEDS BY RX/DR IN RCRD: CPT | Performed by: NURSE PRACTITIONER

## 2024-06-25 RX ORDER — ALBUTEROL SULFATE 90 UG/1
4 AEROSOL, METERED RESPIRATORY (INHALATION) ONCE
Status: COMPLETED | OUTPATIENT
Start: 2024-06-25 | End: 2024-06-25

## 2024-06-25 RX ORDER — POTASSIUM CHLORIDE 750 MG/1
1 TABLET, FILM COATED, EXTENDED RELEASE ORAL DAILY
COMMUNITY

## 2024-06-25 RX ORDER — FAMOTIDINE 20 MG/1
1 TABLET, FILM COATED ORAL 2 TIMES DAILY
COMMUNITY

## 2024-06-25 RX ADMIN — ALBUTEROL SULFATE 4 PUFF: 90 AEROSOL, METERED RESPIRATORY (INHALATION) at 15:19

## 2024-06-25 NOTE — PROGRESS NOTES
[facility] Pulmonary Follow up    CHIEF COMPLAINT    Mild dyspnea with exertion    HISTORY OF PRESENT ILLNESS    Dillon Vo is a 73 y.o.male here today for follow-up.  He was last seen in the office by Dr. Felisa Kenney in May 2023.  In 2019 he had an abnormal CT scan of the chest that showed a 12 mm spiculated nodule in the left upper lobe.  He eventually had a positive PET which showed higher SUV and slight growth.  He had a left upper lobectomy with Dr. Post in April 2021 that showed stage Ib adenocarcinoma.    In 2020 he had multiple hospitalizations for abnormal INR.  In February 2020 he had a supratherapeutic INR and a large rectus sheath hematoma.  This was complicated by reversal of AC and subsequent embolic strokes.    He also has a history of urethral stricture and does I&O caths for approximately 5 years.    He denies any respiratory illnesses since his last appointment.  He denies any sputum production or hemoptysis.  Denies any chest pain or palpitations.  Denies any lower extremity edema or calf tenderness.    He denies any reflux symptoms.He does take Pepcid daily.  He denies any swallowing difficulties.    He denies any chest pain or palpitations.  Denies any lower extremity edema or calf tenderness.    He continues to smoke at least half pack per day.  He has a 62-pack-year history.  He is overdue for low-dose CT screening.    Patient Active Problem List   Diagnosis    Depression    Hyperbilirubinemia    Hyperlipidemia LDL goal <70    Essential hypertension    Malignant neoplasm of overlapping sites of colon    Neurogenic bladder    Cigarette nicotine dependence without complication    Coronary artery disease involving native coronary artery of native heart with angina pectoris    H/O mechanical aortic valve replacement    COPD (chronic obstructive pulmonary disease)    Thrombocytopenia    Chronic anticoagulation on warfarin     Self-catheterizes urinary bladder    Lung cancer (S/P Left  thoracotomy and lobectomy 4/2021)    Gastroesophageal reflux disease without esophagitis    Pacemaker    Syncope (R/O due to orthostatic hypotension)    Permanent atrial fibrillation    Acute diastolic congestive heart failure    Abdominal aortic aneurysm (AAA) without rupture    Urinary retention    Complete heart block    Aneurysm of ascending aorta without rupture s/p repair     Paroxysmal atrial fibrillation       No Known Allergies    Current Outpatient Medications:     aspirin 81 MG EC tablet, Take 1 tablet by mouth Daily., Disp: , Rfl:     atorvastatin (LIPITOR) 80 MG tablet, Take 1 tablet by mouth Every Night., Disp: 90 tablet, Rfl: 3    buPROPion XL (WELLBUTRIN XL) 300 MG 24 hr tablet, Take 1 tablet by mouth once daily, Disp: 30 tablet, Rfl: 5    carvedilol (COREG) 6.25 MG tablet, TAKE 1 TABLET BY MOUTH TWICE A DAY WITH A MEAL, Disp: 180 tablet, Rfl: 0    citalopram (CeleXA) 20 MG tablet, Take 1 tablet by mouth Daily., Disp: 90 tablet, Rfl: 0    famotidine (PEPCID) 20 MG tablet, Take 1 tablet by mouth 2 (Two) Times a Day., Disp: , Rfl:     finasteride (PROSCAR) 5 MG tablet, Take 1 tablet by mouth every night at bedtime., Disp: 90 tablet, Rfl: 1    folic acid (FOLVITE) 400 MCG tablet, TAKE 1 TABLET BY MOUTH ONCE DAILY, Disp: 90 tablet, Rfl: 3    potassium chloride 10 MEQ CR tablet, Take 1 tablet by mouth Daily., Disp: , Rfl:     sennosides-docusate (PERICOLACE) 8.6-50 MG per tablet, , Disp: , Rfl:     vitamin B-12 (CYANOCOBALAMIN) 1000 MCG tablet, Take 1 tablet by mouth Daily., Disp: 90 tablet, Rfl: 3    warfarin (COUMADIN) 5 MG tablet, Take one tablet by mouth daily or as directed by the Anticoagulation Clinic. Patient must recheck INR for refill., Disp: 7 tablet, Rfl: 0  MEDICATION LIST AND ALLERGIES REVIEWED.    Social History     Tobacco Use    Smoking status: Every Day     Current packs/day: 1.00     Average packs/day: 1 pack/day for 61.5 years (61.5 ttl pk-yrs)     Types: Cigarettes     Start date:  "1963     Passive exposure: Current    Smokeless tobacco: Never    Tobacco comments:     cut back on his smoking 1 carton will last a month smoking 5 to 6 cigarettes a day    Vaping Use    Vaping status: Never Used   Substance Use Topics    Alcohol use: Yes     Alcohol/week: 2.0 standard drinks of alcohol     Types: 2 Shots of liquor per week     Comment: daily    Drug use: Yes     Types: Marijuana     Comment: only one time per year       FAMILY AND SOCIAL HISTORY REVIEWED.    Review of Systems   Constitutional:  Negative for activity change, appetite change, fatigue, fever and unexpected weight change.   HENT:  Negative for congestion, postnasal drip, rhinorrhea, sinus pressure, sore throat and voice change.    Eyes:  Negative for visual disturbance.   Respiratory:  Positive for shortness of breath. Negative for cough, chest tightness and wheezing.    Cardiovascular:  Negative for chest pain, palpitations and leg swelling.   Gastrointestinal:  Negative for abdominal distention, abdominal pain, nausea and vomiting.   Endocrine: Negative for cold intolerance and heat intolerance.   Genitourinary:  Negative for difficulty urinating and urgency.   Musculoskeletal:  Negative for arthralgias, back pain and neck pain.   Skin:  Negative for color change and pallor.   Allergic/Immunologic: Negative for environmental allergies and food allergies.   Neurological:  Negative for dizziness, syncope, weakness and light-headedness.   Hematological:  Negative for adenopathy. Does not bruise/bleed easily.   Psychiatric/Behavioral:  Negative for agitation and behavioral problems.    .    /78   Pulse 72   Temp 97.8 °F (36.6 °C)   Ht 177.8 cm (70\")   Wt 61.8 kg (136 lb 3.2 oz)   SpO2 98% Comment: Room air at rest  BMI 19.54 kg/m²     Immunization History   Administered Date(s) Administered    COVID-19 (PFIZER) BIVALENT 12+YRS 12/14/2022    COVID-19 (PFIZER) Purple Cap Monovalent 01/30/2021, 02/24/2021, 11/06/2021    FLUAD " TRI 65YR+ 12/30/2019    FluMist 2-49yrs 10/26/2015    Fluzone High Dose =>65 Years ([employer] ONLY) 10/03/2016, 10/24/2017, 02/13/2019    Fluzone High-Dose 65+yrs 10/15/2021, 12/14/2022, 10/02/2023    Influenza Quad Vaccine (Inpatient) 10/19/2010, 10/03/2014    Influenza TIV (IM) 12/07/2012    Pneumococcal Conjugate 13-Valent (PCV13) 02/13/2019    Pneumococcal Polysaccharide (PPSV23) 09/16/2021    Zostavax 12/30/2015       Physical Exam  Vitals and nursing note reviewed.   Constitutional:       Appearance: He is well-developed. He is not diaphoretic.   HENT:      Head: Normocephalic and atraumatic.   Eyes:      Pupils: Pupils are equal, round, and reactive to light.   Neck:      Thyroid: No thyromegaly.   Cardiovascular:      Rate and Rhythm: Normal rate and regular rhythm.      Heart sounds: Normal heart sounds. No murmur heard.     No friction rub. No gallop.      Comments: click  Pulmonary:      Effort: Pulmonary effort is normal. No respiratory distress.      Breath sounds: Normal breath sounds. No wheezing or rales.   Chest:      Chest wall: No tenderness.   Abdominal:      General: Bowel sounds are normal.      Palpations: Abdomen is soft.      Tenderness: There is no abdominal tenderness.   Musculoskeletal:         General: No swelling. Normal range of motion.      Cervical back: Normal range of motion and neck supple.   Lymphadenopathy:      Cervical: No cervical adenopathy.   Skin:     General: Skin is warm and dry.      Capillary Refill: Capillary refill takes less than 2 seconds.   Neurological:      Mental Status: He is alert and oriented to person, place, and time.   Psychiatric:         Mood and Affect: Mood normal.         Behavior: Behavior normal.           RESULTS    Spirometry Interpretation: FVC 4.37 127% predicted, FEV1 2.32 89% predicted, FEV1/FVC 53% predicted, TLC 7.37 118% predicted, DLCO 63% predicted, mild to moderate obstruction with no postbronchodilator response, no restriction and  reduced DLCO    PROBLEM LIST    Problem List Items Addressed This Visit          Cardiac and Vasculature    Paroxysmal atrial fibrillation    Overview     Added automatically from request for surgery [id number]            Gastrointestinal Abdominal     Gastroesophageal reflux disease without esophagitis    Relevant Medications    famotidine (PEPCID) 20 MG tablet       Pulmonary and Pneumonias    COPD (chronic obstructive pulmonary disease)       Tobacco    Cigarette nicotine dependence without complication - Primary    Relevant Orders    CT Chest Low Dose Wo         DISCUSSION    Mr. Vo was here for follow-up.  He seems be doing okay from pulmonary standpoint.  He is currently not using any inhalers.  We did review his PFTs in the office today and continues to have a moderate obstruction.  He does not complain of much shortness of breath with activity.  He does try to stay active at home.    He will continue to follow-up with cardiology for his history of A-fib.  He remains on Coumadin for anticoagulation.    He will continue Pepcid daily for his GERD.  We discussed reflux precautions in the office today.    He is due for yearly CT screenings for 5 years after his lung cancer diagnosis in 2021.  He also meets criteria for yearly low-dose CT screening based on his smoking history.  I have placed the order and we will call him with results.    We did discuss smoking cessation in the office for 3 minutes.  He is trying to quit.    He will follow-up in 1 year with full PFTs.    I personally spent a total of 34 minutes on patient visit today including chart review, face to face with the patient obtaining the history and physical exam, review of pertinent images and tests, counseling and discussion and/or coordination of care as described above, and documentation.  Total time excludes time spent on other separate services such as performing procedures or test interpretation, if applicable.        Sherron Bell,  APRN  06/25/202414:57 EDT  Electronically signed     Please note that portions of this note were completed with a voice recognition program.        CC: Ez Cintron MD

## 2024-07-01 ENCOUNTER — ANTICOAGULATION VISIT (OUTPATIENT)
Dept: PHARMACY | Facility: HOSPITAL | Age: 74
End: 2024-07-01
Payer: MEDICARE

## 2024-07-01 DIAGNOSIS — I48.0 PAROXYSMAL ATRIAL FIBRILLATION: ICD-10-CM

## 2024-07-01 DIAGNOSIS — I48.21 PERMANENT ATRIAL FIBRILLATION: Primary | ICD-10-CM

## 2024-07-01 DIAGNOSIS — Z95.2 H/O AORTIC VALVE REPLACEMENT: ICD-10-CM

## 2024-07-01 LAB
INR PPP: 3.9 (ref 0.91–1.09)
PROTHROMBIN TIME: 46.5 SECONDS (ref 10–13.8)

## 2024-07-01 PROCEDURE — 85610 PROTHROMBIN TIME: CPT

## 2024-07-01 PROCEDURE — 36416 COLLJ CAPILLARY BLOOD SPEC: CPT

## 2024-07-01 PROCEDURE — G0463 HOSPITAL OUTPT CLINIC VISIT: HCPCS

## 2024-07-01 RX ORDER — WARFARIN SODIUM 5 MG/1
TABLET ORAL
Qty: 30 TABLET | Refills: 0 | Status: SHIPPED | OUTPATIENT
Start: 2024-07-01

## 2024-07-01 NOTE — PROGRESS NOTES
Anticoagulation Clinic Progress Note  Indication:  Mechanical aortic heart valve, persistent Afib  Referring Provider: Ez Cintron  Initial Warfarin Start Date: > 20 years  Planned Duration of Therapy: indefinite  Goal INR: 2.5-3.5  Current Drug Interactions: asa  [id number]: 2 (HTN, Age > 65)    Diet: Broccoli, green beans ~2x weekly due to garden season (6/26/23)  Alcohol: 2-3xnightly  Tobacco: <1/2 ppd   OTC Pain Medication: none    Anticoagulation Clinic INR History:  Date 5/11 5/16 5/19 6/10 6/24 7/8 7/22 7/27 8/3 8/10 8/18   Total Weekly Dose 35 mg 37.5mg 35mg 35mg 35 mg 37.5mg? 40mg? 40mg 40 mg 40mg 35mg 32.5 mg   INR 1.5 2.68 ED  3.1 POCT 2.9 2.2 2.2 4.9 POCT  3.66 meenakshi 1.5 2.7 4.3 5.2 1.8   Notes  1x boost, no enox    Possible extra dose enox         Date 8/23 8/30 9/6 9/9 9/16 10/3 12/2  12/4 12/6 12/16   Total Weekly Dose 37.5 mg 37.5mg 30mg 27.5 mg 30 mg 30 mg 30mg BH admission   ???   INR 2.3 4.72  6.2 POCT 5.1 3.4 2.6 2.3 3.9 12/4-12/8 4.7 2.58 1.4   Notes    Hold x1; red. x1       Held/missed doses.     Date 12/20  6/6 6/12 6/19 6/26 6/29 7/5  7/12 7/19 8/15 8/18 8/23  8/30   Total Weekly Dose 35mg Non compliant 20 mg 30 mg   32.5 mg?? 35 mg  35mg  32.5  32.5mg 32.5mg  35 mg  35 mg   INR 2.1   1.13 4.4 1.5 1.5 1.9 4.7 POCT  3.6 Meenakshi  2.0 1.7 1.4 2.1 2.4  2.3   Notes 2x boost     enox X 3 days  Missed dose?  Inc GLV    redx1  ?  Missedx1, enox x2 enox  Less EtOH     Date 9/12 9/16-30 10/2 10/9 10/16  10/23/23 7/1/24         Total Weekly Dose 37.5mg [facility] 42 mg 44 mg 51 mg DUE!!  Lost to follow up          INR 4.5  1.9 1.2 5.3(POC), 4.05   3.9         Notes  fall  ensure                Clinic Interview:  Tablet Strength: 5 mg tablets  Verbal Release: Verbal Release Authorization signed on 5/11/2022 -- may speak with Meghana (spouse)  Patient Contact: [phone] (Mobile)         Patient Findings    Positives: Missed doses, Change in diet/appetite   Negatives: Signs/symptoms of  thrombosis, Signs/symptoms of bleeding, Laboratory test error suspected, Change in health, Change in alcohol use, Change in activity, Upcoming invasive procedure, Emergency department visit, Upcoming dental procedure, Extra doses, Change in medications, Hospital admission, Bruising, Other complaints   Comments: He eats more GLV in the summer.  He has a garden. He has a serving of GLV at least every other day.    Discussed the importance of compliance and risks of out-of-range INRs.  He stated that he is no longer able to drive and has been managing himself since October.  Discussed arranging transportation as he UBERs to other appointments. Discussed that we would be unable to send more than a 30 day supply of warfarin for now.  Discussed to monitor for s/sx of bleeding including epistaxis, hematuria, unusual bruising, hemoptysis, hematochezia as well as s/sx of stroke including impaired speech, unilateral paralysis, blurry vision and when to seek medical attention  He verbalized understanding.      Otherwise, above findings negative.       Plan:    1. INR is SUPRAtherapeutic today at 3.9 (goal 2.5-3.5) . Instructed Mr. Vo to decrease today's warfarin to 2.5 mg and tomorrow resume his reported regimen of warfarin 5 mg oral daily until recheck.     2. Return to clinic on Monday, 7/8.  3. Verbal  information provided in clinic and over the telephone  Dillon Vo expresses understanding by teach back and has no further questions at this time.   4. Rx for warfarin 5 mg, #30, 0 refills sent (discussed that will only be able to send 30 day supply, no refills for now.  He verbalized understanding)      Antonina Miranda, PharmD  07/01/24   09:09 EDT

## 2024-07-08 ENCOUNTER — ANTICOAGULATION VISIT (OUTPATIENT)
Dept: PHARMACY | Facility: HOSPITAL | Age: 74
End: 2024-07-08
Payer: MEDICARE

## 2024-07-08 DIAGNOSIS — I48.21 PERMANENT ATRIAL FIBRILLATION: Primary | ICD-10-CM

## 2024-07-08 LAB
INR PPP: 3.5 (ref 0.91–1.09)
PROTHROMBIN TIME: 41.6 SECONDS (ref 10–13.8)

## 2024-07-08 PROCEDURE — G0463 HOSPITAL OUTPT CLINIC VISIT: HCPCS

## 2024-07-08 PROCEDURE — 36416 COLLJ CAPILLARY BLOOD SPEC: CPT

## 2024-07-08 PROCEDURE — 85610 PROTHROMBIN TIME: CPT

## 2024-07-08 NOTE — PROGRESS NOTES
Anticoagulation Clinic Progress Note  Indication:  Mechanical aortic heart valve, persistent Afib  Referring Provider: Ez Cintron  Initial Warfarin Start Date: > 20 years  Planned Duration of Therapy: indefinite  Goal INR: 2.5-3.5  Current Drug Interactions: asa  [id number]: 2 (HTN, Age > 65)    Diet: Broccoli, green beans ~2x weekly due to garden season (6/26/23)  Alcohol: 2-3xnightly  Tobacco: <1/2 ppd   OTC Pain Medication: none    Anticoagulation Clinic INR History:  Date 5/11 5/16 5/19 6/10 6/24 7/8 7/22 7/27 8/3 8/10 8/18   Total Weekly Dose 35 mg 37.5mg 35mg 35mg 35 mg 37.5mg? 40mg? 40mg 40 mg 40mg 35mg 32.5 mg   INR 1.5 2.68 ED  3.1 POCT 2.9 2.2 2.2 4.9 POCT  3.66 meenakshi 1.5 2.7 4.3 5.2 1.8   Notes  1x boost, no enox    Possible extra dose enox         Date 8/23 8/30 9/6 9/9 9/16 10/3 12/2  12/4 12/6 12/16   Total Weekly Dose 37.5 mg 37.5mg 30mg 27.5 mg 30 mg 30 mg 30mg BH admission   ???   INR 2.3 4.72  6.2 POCT 5.1 3.4 2.6 2.3 3.9 12/4-12/8 4.7 2.58 1.4   Notes    Hold x1; red. x1       Held/missed doses.     Date 12/20  6/6 6/12 6/19 6/26 6/29 7/5  7/12 7/19 8/15 8/18 8/23  8/30   Total Weekly Dose 35mg Non compliant 20 mg 30 mg   32.5 mg?? 35 mg  35mg  32.5  32.5mg 32.5mg  35 mg  35 mg   INR 2.1   1.13 4.4 1.5 1.5 1.9 4.7 POCT  3.6 Meenakshi  2.0 1.7 1.4 2.1 2.4  2.3   Notes 2x boost     enox X 3 days  Missed dose?  Inc GLV    redx1  ?  Missedx1, enox x2 enox  Less EtOH     Date 9/12 9/16-30 10/2 10/9 10/16  10/23/23 7/1/24 7/8/24        Total Weekly Dose 37.5mg [facility] 42 mg 44 mg 51 mg DUE!!  Lost to follow up  32.5 mg        INR 4.5  1.9 1.2 5.3(POC), 4.05   3.9 3.5        Notes  fall  ensure                Clinic Interview:  Tablet Strength: 5 mg tablets  Verbal Release: Verbal Release Authorization signed on 5/11/2022 -- may speak with Meghana (spouse)  Patient Contact: [phone] (Mobile)      Patient Findings    Positives: Change in diet/appetite   Negatives: Signs/symptoms of  thrombosis, Signs/symptoms of bleeding, Laboratory test error suspected, Change in health, Change in alcohol use, Change in activity, Upcoming invasive procedure, Emergency department visit, Upcoming dental procedure, Missed doses, Extra doses, Change in medications, Hospital admission, Bruising, Other complaints   Comments: He eats a lot of GLV in the summer. He has a garden. He has a serving of GLV at least every other day.     Plan:  1. INR is therapeutic today at 3.5 (goal 2.5-3.5) . Instructed Mr. Vo to resume his reported regimen of warfarin 5 mg oral daily until recheck.     2. Return to clinic on Monday, 7/22. At this time, send new warfarin Rx with refills.  3. Verbal  information provided in clinic and over the telephone  Dillon Vo expresses understanding by teach back and has no further questions at this time.   4. Rx for warfarin 5 mg, #30, 0 refills sent last encounter (discussed that will only be able to send 30 day supply, no refills for now.  He verbalized understanding)      Tiarra Heaton RPH  07/08/24   09:36 EDT

## 2024-07-22 ENCOUNTER — ANTICOAGULATION VISIT (OUTPATIENT)
Dept: PHARMACY | Facility: HOSPITAL | Age: 74
End: 2024-07-22
Payer: MEDICARE

## 2024-07-22 DIAGNOSIS — I48.21 PERMANENT ATRIAL FIBRILLATION: Primary | ICD-10-CM

## 2024-07-22 LAB
INR PPP: 5.1 (ref 0.91–1.09)
INR PPP: 5.3 (ref 0.91–1.09)
PROTHROMBIN TIME: 60.8 SECONDS (ref 10–13.8)
PROTHROMBIN TIME: 63.5 SECONDS (ref 10–13.8)

## 2024-07-22 PROCEDURE — G0463 HOSPITAL OUTPT CLINIC VISIT: HCPCS

## 2024-07-22 PROCEDURE — 85610 PROTHROMBIN TIME: CPT

## 2024-07-22 PROCEDURE — 36416 COLLJ CAPILLARY BLOOD SPEC: CPT

## 2024-07-22 NOTE — PROGRESS NOTES
Anticoagulation Clinic Progress Note  Indication:  Mechanical aortic heart valve, persistent Afib  Referring Provider: Ez Cintron  Initial Warfarin Start Date: > 20 years  Planned Duration of Therapy: indefinite  Goal INR: 2.5-3.5  Current Drug Interactions: asa  [id number]: 2 (HTN, Age > 65)    Diet: Broccoli, green beans ~2x weekly due to garden season (6/26/23)  Alcohol: 2-3xnightly  Tobacco: <1/2 ppd   OTC Pain Medication: none    Anticoagulation Clinic INR History:  Date 5/11 5/16 5/19 6/10 6/24 7/8 7/22 7/27 8/3 8/10 8/18   Total Weekly Dose 35 mg 37.5mg 35mg 35mg 35 mg 37.5mg? 40mg? 40mg 40 mg 40mg 35mg 32.5 mg   INR 1.5 2.68 ED  3.1 POCT 2.9 2.2 2.2 4.9 POCT  3.66 meenakshi 1.5 2.7 4.3 5.2 1.8   Notes  1x boost, no enox    Possible extra dose enox         Date 8/23 8/30 9/6 9/9 9/16 10/3 12/2  12/4 12/6 12/16   Total Weekly Dose 37.5 mg 37.5mg 30mg 27.5 mg 30 mg 30 mg 30mg BH admission   ???   INR 2.3 4.72  6.2 POCT 5.1 3.4 2.6 2.3 3.9 12/4-12/8 4.7 2.58 1.4   Notes    Hold x1; red. x1       Held/missed doses.     Date 12/20  6/6 6/12 6/19 6/26 6/29 7/5  7/12 7/19 8/15 8/18 8/23  8/30   Total Weekly Dose 35mg Non compliant 20 mg 30 mg   32.5 mg?? 35 mg  35mg  32.5  32.5mg 32.5mg  35 mg  35 mg   INR 2.1   1.13 4.4 1.5 1.5 1.9 4.7 POCT  3.6 Meenakshi  2.0 1.7 1.4 2.1 2.4  2.3   Notes 2x boost     enox X 3 days  Missed dose?  Inc GLV    redx1  ?  Missedx1, enox x2 enox  Less EtOH     Date 9/12 9/16-30 10/2 10/9 10/16  10/23/23 7/1/24 7/8/24 7/22       Total Weekly Dose 37.5mg [facility] 42 mg 44 mg 51 mg DUE!!  Lost to follow up  32.5 mg 35 mg?       INR 4.5  1.9 1.2 5.3(POC), 4.05   3.9 3.5 5.1/5.3 POCT       Notes  fall  ensure     Possible double dose?           Clinic Interview:  Tablet Strength: 5 mg tablets  Verbal Release: Verbal Release Authorization signed on 5/11/2022 -- may speak with Meghana (spouse)  Patient Contact: [phone] (Mobile)      Patient Findings    Positives: Change in  diet/appetite   Negatives: Signs/symptoms of thrombosis, Signs/symptoms of bleeding, Laboratory test error suspected, Change in health, Change in alcohol use, Change in activity, Upcoming invasive procedure, Emergency department visit, Upcoming dental procedure, Missed doses, Extra doses, Change in medications, Hospital admission, Bruising, Other complaints   Comments: He eats a lot of GLV in the summer. He has a garden. He has a serving of GLV at least every other day.     Plan:  1. INR is supratherapeutic today at 5.3 (goal 2.5-3.5). Instructed Mr. Vo to HOLD his warfarin today, and then take a reduced dose of warfarin 5 mg oral daily except for warfarin 2.5 mg Fri until recheck.     2. Return to clinic on Monday, 7/29. Patient says Mondays are really the only days that he can come in and get a ride. Patient no longer drives himself.  3. Verbal  information provided in clinic and over the telephone  Dillon Vo expresses understanding by teach back and has no further questions at this time.     Faisal Franklin, PharmD  07/22/24   09:21 EDT

## 2024-07-29 ENCOUNTER — ANTICOAGULATION VISIT (OUTPATIENT)
Dept: PHARMACY | Facility: HOSPITAL | Age: 74
End: 2024-07-29
Payer: MEDICARE

## 2024-07-29 DIAGNOSIS — I48.0 PAROXYSMAL ATRIAL FIBRILLATION: ICD-10-CM

## 2024-07-29 DIAGNOSIS — I48.21 PERMANENT ATRIAL FIBRILLATION: Primary | ICD-10-CM

## 2024-07-29 DIAGNOSIS — Z95.2 H/O AORTIC VALVE REPLACEMENT: ICD-10-CM

## 2024-07-29 LAB
INR PPP: 2.7 (ref 0.91–1.09)
INR PPP: 2.7 (ref 0.9–1.1)
PROTHROMBIN TIME: 32.3 SECONDS (ref 10–13.8)

## 2024-07-29 PROCEDURE — 85610 PROTHROMBIN TIME: CPT

## 2024-07-29 PROCEDURE — 36416 COLLJ CAPILLARY BLOOD SPEC: CPT

## 2024-07-29 PROCEDURE — G0463 HOSPITAL OUTPT CLINIC VISIT: HCPCS

## 2024-07-29 RX ORDER — WARFARIN SODIUM 5 MG/1
TABLET ORAL
Qty: 30 TABLET | Refills: 0 | Status: SHIPPED | OUTPATIENT
Start: 2024-07-29

## 2024-07-29 NOTE — PROGRESS NOTES
Anticoagulation Clinic Progress Note  Indication:  Mechanical aortic heart valve, persistent Afib  Referring Provider: Ez Cintron  Initial Warfarin Start Date: > 20 years  Planned Duration of Therapy: indefinite  Goal INR: 2.5-3.5  Current Drug Interactions: asa  [id number]: 2 (HTN, Age > 65)    Diet: Broccoli, green beans ~2x weekly due to garden season (6/26/23)  Alcohol: 2-3xnightly  Tobacco: <1/2 ppd   OTC Pain Medication: none    Anticoagulation Clinic INR History:  Date 5/11 5/16 5/19 6/10 6/24 7/8 7/22 7/27 8/3 8/10 8/18   Total Weekly Dose 35 mg 37.5mg 35mg 35mg 35 mg 37.5mg? 40mg? 40mg 40 mg 40mg 35mg 32.5 mg   INR 1.5 2.68 ED  3.1 POCT 2.9 2.2 2.2 4.9 POCT  3.66 meenakshi 1.5 2.7 4.3 5.2 1.8   Notes  1x boost, no enox    Possible extra dose enox         Date 8/23 8/30 9/6 9/9 9/16 10/3 12/2  12/4 12/6 12/16   Total Weekly Dose 37.5 mg 37.5mg 30mg 27.5 mg 30 mg 30 mg 30mg BH admission   ???   INR 2.3 4.72  6.2 POCT 5.1 3.4 2.6 2.3 3.9 12/4-12/8 4.7 2.58 1.4   Notes    Hold x1; red. x1       Held/missed doses.     Date 12/20  6/6 6/12 6/19 6/26 6/29 7/5  7/12 7/19 8/15 8/18 8/23  8/30   Total Weekly Dose 35mg Non compliant 20 mg 30 mg   32.5 mg?? 35 mg  35mg  32.5  32.5mg 32.5mg  35 mg  35 mg   INR 2.1   1.13 4.4 1.5 1.5 1.9 4.7 POCT  3.6 Meenakshi  2.0 1.7 1.4 2.1 2.4  2.3   Notes 2x boost     enox X 3 days  Missed dose?  Inc GLV    redx1  ?  Missedx1, enox x2 enox  Less EtOH     Date 9/12 9/16-30 10/2 10/9 10/16  10/23/23 7/1/24 7/8/24 7/22 7/29      Total Weekly Dose 37.5mg [facility] 42 mg 44 mg 51 mg DUE!!  Lost to follow up  32.5 mg 35 mg? 27.5mg       INR 4.5  1.9 1.2 5.3(POC), 4.05   3.9 3.5 5.1/5.3 POCT 2.7      Notes  fall  ensure     Possible double dose?           Clinic Interview:  Tablet Strength: 5 mg tablets  Verbal Release: Verbal Release Authorization signed on 5/11/2022 -- may speak with Meghana (spouse)  Patient Contact: [phone] (Mobile)        Patient Findings    Negatives:  Signs/symptoms of thrombosis, Signs/symptoms of bleeding, Laboratory test error suspected, Change in health, Change in alcohol use, Change in activity, Upcoming invasive procedure, Emergency department visit, Upcoming dental procedure, Missed doses, Extra doses, Change in medications, Change in diet/appetite, Hospital admission, Bruising, Other complaints       Plan:  1. INR is therapeutic today at 2.7 (goal 2.5-3.5). Instructed Mr. Vo to take warfarin 5mg daily except warfarin 2.5mg on Saturday (32.5mg TWD).   2. Return to clinic on Monday, 8/5. Patient says Mondays are really the only days that he can come in and get a ride. Patient no longer drives himself.  3. Verbal  information provided in clinic and over the telephone  Dillon Vo expresses understanding by teach back and has no further questions at this time.     Karen Gauthier, PharmD  07/29/24   09:52 EDT

## 2024-08-05 ENCOUNTER — ANTICOAGULATION VISIT (OUTPATIENT)
Dept: PHARMACY | Facility: HOSPITAL | Age: 74
End: 2024-08-05
Payer: MEDICARE

## 2024-08-05 DIAGNOSIS — I48.21 PERMANENT ATRIAL FIBRILLATION: Primary | ICD-10-CM

## 2024-08-05 LAB
INR PPP: 1.9 (ref 0.91–1.09)
PROTHROMBIN TIME: 23.3 SECONDS (ref 10–13.8)

## 2024-08-05 PROCEDURE — 36416 COLLJ CAPILLARY BLOOD SPEC: CPT

## 2024-08-05 PROCEDURE — 85610 PROTHROMBIN TIME: CPT

## 2024-08-05 PROCEDURE — G0463 HOSPITAL OUTPT CLINIC VISIT: HCPCS

## 2024-08-05 NOTE — PROGRESS NOTES
Anticoagulation Clinic Progress Note  Indication:  Mechanical aortic heart valve, persistent Afib  Referring Provider: Ez Cintron  Initial Warfarin Start Date: > 20 years  Planned Duration of Therapy: indefinite  Goal INR: 2.5-3.5  Current Drug Interactions: asa  [id number]: 2 (HTN, Age > 65)    Diet: Broccoli, green beans ~2x weekly due to garden season (6/26/23)  Alcohol: 2-3xnightly  Tobacco: <1/2 ppd   OTC Pain Medication: none    Anticoagulation Clinic INR History:  Date 5/11 5/16 5/19 6/10 6/24 7/8 7/22 7/27 8/3 8/10 8/18   Total Weekly Dose 35 mg 37.5mg 35mg 35mg 35 mg 37.5mg? 40mg? 40mg 40 mg 40mg 35mg 32.5 mg   INR 1.5 2.68 ED  3.1 POCT 2.9 2.2 2.2 4.9 POCT  3.66 meenakshi 1.5 2.7 4.3 5.2 1.8   Notes  1x boost, no enox    Possible extra dose enox         Date 8/23 8/30 9/6 9/9 9/16 10/3 12/2  12/4 12/6 12/16   Total Weekly Dose 37.5 mg 37.5mg 30mg 27.5 mg 30 mg 30 mg 30mg BH admission   ???   INR 2.3 4.72  6.2 POCT 5.1 3.4 2.6 2.3 3.9 12/4-12/8 4.7 2.58 1.4   Notes    Hold x1; red. x1       Held/missed doses.     Date 12/20  6/6 6/12 6/19 6/26 6/29 7/5  7/12 7/19 8/15 8/18 8/23  8/30   Total Weekly Dose 35mg Non compliant 20 mg 30 mg   32.5 mg?? 35 mg  35mg  32.5  32.5mg 32.5mg  35 mg  35 mg   INR 2.1   1.13 4.4 1.5 1.5 1.9 4.7 POCT  3.6 Meenakshi  2.0 1.7 1.4 2.1 2.4  2.3   Notes 2x boost     enox X 3 days  Missed dose?  Inc GLV    redx1  ?  Missedx1, enox x2 enox  Less EtOH     Date 9/12 9/16-30 10/2 10/9 10/16  10/23/23 7/1/24 7/8/24 7/22 7/29  8/5     Total Weekly Dose 37.5mg [facility] 42 mg 44 mg 51 mg DUE!!  Lost to follow up 35 mg 32.5 mg 35 mg? 27.5mg   32.5 mg     INR 4.5  1.9 1.2 5.3(POC), 4.05   3.9 3.5 5.1/5.3 POCT 2.7  1.9     Notes  fall  ensure   missedx2  Possible double dose?  holdx1 redx1         Clinic Interview:  Tablet Strength: 5 mg tablets  Verbal Release: Verbal Release Authorization signed on 5/11/2022 -- may speak with Meghana (spouse)  Patient Contact: [phone] (Mobile)       Patient Findings  Negatives: Signs/symptoms of thrombosis, Signs/symptoms of bleeding, Laboratory test error suspected, Change in health, Change in alcohol use, Change in activity, Upcoming invasive procedure, Emergency department visit, Upcoming dental procedure, Missed doses, Extra doses, Change in medications, Change in diet/appetite, Hospital admission, Bruising, Other complaints   Comments: He took his Saturday dose early Sunday morning.  Otherwise, above findings negative    Discussed lovenox injections due to his sub therapeutic INR.  He stated that he has syringes at home.  He will call clinic if not 60 mg syringes or if out of date.     Plan:    1. INR is SUB therapeutic today at 1.9 (goal 2.5-3.5). Instructed Mr. Vo to take warfarin 5mg oral daily until recheck (32.5mg TWD). He will inject enoxaparin 60 mg subcut q12h x 4 doses. (61.8 kg, SCr 1.16, est CrCl 1/15/24)  2. Return to clinic on Monday, 8/12. Patient says Mondays are really the only days that he can come in and get a ride. Patient no longer drives himself.  3. Verbal  information provided in clinic and over the telephone  Dillon Vo expresses understanding by teach back and has no further questions at this time.     Antonina Miranda, PharmD  08/05/24   09:43 EDT

## 2024-08-12 ENCOUNTER — ANTICOAGULATION VISIT (OUTPATIENT)
Dept: PHARMACY | Facility: HOSPITAL | Age: 74
End: 2024-08-12
Payer: MEDICARE

## 2024-08-12 DIAGNOSIS — I48.21 PERMANENT ATRIAL FIBRILLATION: Primary | ICD-10-CM

## 2024-08-12 LAB
INR PPP: 3.4 (ref 0.91–1.09)
PROTHROMBIN TIME: 41 SECONDS (ref 10–13.8)

## 2024-08-12 PROCEDURE — 36416 COLLJ CAPILLARY BLOOD SPEC: CPT

## 2024-08-12 PROCEDURE — G0463 HOSPITAL OUTPT CLINIC VISIT: HCPCS

## 2024-08-12 PROCEDURE — 85610 PROTHROMBIN TIME: CPT

## 2024-08-12 NOTE — PROGRESS NOTES
Anticoagulation Clinic Progress Note  Indication:  Mechanical aortic heart valve, persistent Afib  Referring Provider: Ez Cintron  Initial Warfarin Start Date: > 20 years  Planned Duration of Therapy: indefinite  Goal INR: 2.5-3.5  Current Drug Interactions: asa  [id number]: 2 (HTN, Age > 65)    Diet: Broccoli, green beans ~2x weekly due to garden season (6/26/23)  Alcohol: 2-3xnightly  Tobacco: <1/2 ppd   OTC Pain Medication: none    Anticoagulation Clinic INR History:  Date 5/11 5/16 5/19 6/10 6/24 7/8 7/22 7/27 8/3 8/10 8/18   Total Weekly Dose 35 mg 37.5mg 35mg 35mg 35 mg 37.5mg? 40mg? 40mg 40 mg 40mg 35mg 32.5 mg   INR 1.5 2.68 ED  3.1 POCT 2.9 2.2 2.2 4.9 POCT  3.66 meenakshi 1.5 2.7 4.3 5.2 1.8   Notes  1x boost, no enox    Possible extra dose enox         Date 8/23 8/30 9/6 9/9 9/16 10/3 12/2  12/4 12/6 12/16   Total Weekly Dose 37.5 mg 37.5mg 30mg 27.5 mg 30 mg 30 mg 30mg BH admission   ???   INR 2.3 4.72  6.2 POCT 5.1 3.4 2.6 2.3 3.9 12/4-12/8 4.7 2.58 1.4   Notes    Hold x1; red. x1       Held/missed doses.     Date 12/20  6/6 6/12 6/19 6/26 6/29 7/5  7/12 7/19 8/15 8/18 8/23  8/30   Total Weekly Dose 35mg Non compliant 20 mg 30 mg   32.5 mg?? 35 mg  35mg  32.5  32.5mg 32.5mg  35 mg  35 mg   INR 2.1   1.13 4.4 1.5 1.5 1.9 4.7 POCT  3.6 Meenakshi  2.0 1.7 1.4 2.1 2.4  2.3   Notes 2x boost     enox X 3 days  Missed dose?  Inc GLV    redx1  ?  Missedx1, enox x2 enox  Less EtOH     Date 9/12 9/16-30 10/2 10/9 10/16  10/23/23 7/1/24 7/8/24 7/22 7/29  8/5 8/12    Total Weekly Dose 37.5mg [facility] 42 mg 44 mg 51 mg DUE!!  Lost to follow up 35 mg 32.5 mg 35 mg? 27.5mg   32.5 mg 35mg     INR 4.5  1.9 1.2 5.3(POC), 4.05   3.9 3.5 5.1/5.3 POCT 2.7  1.9 3.4    Notes  fall  ensure   missedx2  Possible double dose?  holdx1 redx1         Clinic Interview:  Tablet Strength: 5 mg tablets  Verbal Release: Verbal Release Authorization signed on 5/11/2022 -- may speak with Meghana (spouse)  Patient Contact: [phone]  (Mobile)        Patient Findings:  Negatives: Signs/symptoms of thrombosis, Signs/symptoms of bleeding, Laboratory test error suspected, Change in health, Change in alcohol use, Change in activity, Upcoming invasive procedure, Emergency department visit, Upcoming dental procedure, Missed doses, Extra doses, Change in medications, Change in diet/appetite, Hospital admission, Bruising, Other complaints       Plan:  1. INR is therapeutic today at 3.4 (goal 2.5-3.5), which is a fairly large increase from 1 week ago at 1.9. Patient took Lovenox for 2 days due to subtherapeutic INR at last visit. Instructed Mr. Vo to take warfarin 5mg oral daily except 2.5mg on Fridays until recheck.  2. Return to clinic on Monday, 8/26 - can only get a ride to clinic on Mondays, does not drive. Is unable to get weekly rides and requested 2-week follow-up.   3. Verbal  information provided in clinic and over the telephone  Dillon Vo expresses understanding by teach back and has no further questions at this time.     Karen Gauthier, PharmD  08/12/24   10:15 EDT

## 2024-08-26 ENCOUNTER — ANTICOAGULATION VISIT (OUTPATIENT)
Dept: PHARMACY | Facility: HOSPITAL | Age: 74
End: 2024-08-26
Payer: MEDICARE

## 2024-08-26 DIAGNOSIS — I48.21 PERMANENT ATRIAL FIBRILLATION: Primary | ICD-10-CM

## 2024-08-26 LAB
INR PPP: 2.9 (ref 0.91–1.09)
PROTHROMBIN TIME: 35.3 SECONDS (ref 10–13.8)

## 2024-08-26 PROCEDURE — 85610 PROTHROMBIN TIME: CPT

## 2024-08-26 PROCEDURE — G0463 HOSPITAL OUTPT CLINIC VISIT: HCPCS

## 2024-08-26 PROCEDURE — 36416 COLLJ CAPILLARY BLOOD SPEC: CPT

## 2024-08-26 NOTE — PROGRESS NOTES
Anticoagulation Clinic Progress Note  Indication:  Mechanical aortic heart valve, persistent Afib  Referring Provider: Ez Cintron  Initial Warfarin Start Date: > 20 years  Planned Duration of Therapy: indefinite  Goal INR: 2.5-3.5  Current Drug Interactions: asa  [id number]: 2 (HTN, Age > 65)    Diet: Broccoli, green beans ~2x weekly due to garden season (6/26/23)  Alcohol: 2-3xnightly  Tobacco: <1/2 ppd   OTC Pain Medication: none    Anticoagulation Clinic INR History:  Date 5/11 5/16 5/19 6/10 6/24 7/8 7/22 7/27 8/3 8/10 8/18   Total Weekly Dose 35 mg 37.5mg 35mg 35mg 35 mg 37.5mg? 40mg? 40mg 40 mg 40mg 35mg 32.5 mg   INR 1.5 2.68 ED  3.1 POCT 2.9 2.2 2.2 4.9 POCT  3.66 meenakshi 1.5 2.7 4.3 5.2 1.8   Notes  1x boost, no enox    Possible extra dose enox         Date 8/23 8/30 9/6 9/9 9/16 10/3 12/2  12/4 12/6 12/16   Total Weekly Dose 37.5 mg 37.5mg 30mg 27.5 mg 30 mg 30 mg 30mg BH admission   ???   INR 2.3 4.72  6.2 POCT 5.1 3.4 2.6 2.3 3.9 12/4-12/8 4.7 2.58 1.4   Notes    Hold x1; red. x1       Held/missed doses.     Date 12/20  6/6 6/12 6/19 6/26 6/29 7/5  7/12 7/19 8/15 8/18 8/23  8/30   Total Weekly Dose 35mg Non compliant 20 mg 30 mg   32.5 mg?? 35 mg  35mg  32.5  32.5mg 32.5mg  35 mg  35 mg   INR 2.1   1.13 4.4 1.5 1.5 1.9 4.7 POCT  3.6 Meenakshi  2.0 1.7 1.4 2.1 2.4  2.3   Notes 2x boost     enox X 3 days  Missed dose?  Inc GLV    redx1  ?  Missedx1, enox x2 enox  Less EtOH     Date 9/12 9/16-30 10/2 10/9 10/16  10/23/23 7/1/24 7/8/24 7/22 7/29  8/5 8/12    Total Weekly Dose 37.5mg [facility] 42 mg 44 mg 51 mg DUE!!  Lost to follow up 35 mg 32.5 mg 35 mg? 27.5mg   32.5 mg 35mg     INR 4.5  1.9 1.2 5.3(POC), 4.05   3.9 3.5 5.1/5.3 POCT 2.7  1.9 3.4    Notes  fall  ensure   missedx2  Possible double dose?  holdx1 redx1         Clinic Interview:  Tablet Strength: 5 mg tablets  Verbal Release: Verbal Release Authorization signed on 5/11/2022 -- may speak with Meghana (spouse)  Patient Contact: [phone]  (Mobile)      Patient Findings  Negatives: Signs/symptoms of thrombosis, Signs/symptoms of bleeding, Laboratory test error suspected, Change in health, Change in alcohol use, Change in activity, Upcoming invasive procedure, Emergency department visit, Upcoming dental procedure, Missed doses, Extra doses, Change in medications, Change in diet/appetite, Hospital admission, Bruising, Other complaints   Comments: Above findings negative       Plan:    1. INR is therapeutic today at 2.9 (goal 2.5-3.5). Instructed Mr. Vo to continue recently reduced regimen of warfarin 5mg oral daily except 2.5mg Fridays until recheck.  2. Return to clinic on Monday, 9/9 - can only get a ride to clinic on Mondays, does not drive. Is unable to get weekly rides and requested 2-week follow-up.   3. Verbal and written information provided in clinic.  Dillon Vo expresses understanding by teach back and has no further questions at this time.     Antonina Miranda, PharmD  08/26/24   10:11 EDT

## 2024-08-30 PROBLEM — I50.32 CHRONIC DIASTOLIC CONGESTIVE HEART FAILURE: Status: ACTIVE | Noted: 2022-12-04

## 2024-08-30 PROBLEM — I50.42 CHRONIC COMBINED SYSTOLIC AND DIASTOLIC CONGESTIVE HEART FAILURE: Status: ACTIVE | Noted: 2022-12-04

## 2024-09-03 ENCOUNTER — TELEPHONE (OUTPATIENT)
Dept: CARDIOLOGY | Facility: CLINIC | Age: 74
End: 2024-09-03

## 2024-09-03 NOTE — TELEPHONE ENCOUNTER
Name: Dillon Vo    Relationship: Self    Best Callback Number: [phone]    Incoming call to the Hub, requesting to  Reschedule their Device Check appointment on 9/3/34.     Per Hub workflow, further review is needed before the task can be completed.    Result of Call: Transferred to [facility] at the practice

## 2024-09-09 ENCOUNTER — ANTICOAGULATION VISIT (OUTPATIENT)
Dept: PHARMACY | Facility: HOSPITAL | Age: 74
End: 2024-09-09
Payer: MEDICARE

## 2024-09-09 DIAGNOSIS — I48.21 PERMANENT ATRIAL FIBRILLATION: Primary | ICD-10-CM

## 2024-09-09 LAB
INR PPP: 1.5 (ref 0.91–1.09)
PROTHROMBIN TIME: 17.7 SECONDS (ref 10–13.8)

## 2024-09-09 PROCEDURE — 36416 COLLJ CAPILLARY BLOOD SPEC: CPT

## 2024-09-09 PROCEDURE — 85610 PROTHROMBIN TIME: CPT

## 2024-09-09 PROCEDURE — G0463 HOSPITAL OUTPT CLINIC VISIT: HCPCS

## 2024-09-09 NOTE — PROGRESS NOTES
Anticoagulation Clinic Progress Note  Indication:  Mechanical aortic heart valve, persistent Afib  Referring Provider: Ez Cintron  Initial Warfarin Start Date: > 20 years  Planned Duration of Therapy: indefinite  Goal INR: 2.5-3.5  Current Drug Interactions: asa  [id number]: 2 (HTN, Age > 65)    Diet: Broccoli, green beans ~2x weekly due to garden season (6/26/23)  Alcohol: 2-3xnightly  Tobacco: <1/2 ppd   OTC Pain Medication: none    Anticoagulation Clinic INR History:  Date 5/11 5/16 5/19 6/10 6/24 7/8 7/22 7/27 8/3 8/10 8/18   Total Weekly Dose 35 mg 37.5mg 35mg 35mg 35 mg 37.5mg? 40mg? 40mg 40 mg 40mg 35mg 32.5 mg   INR 1.5 2.68 ED  3.1 POCT 2.9 2.2 2.2 4.9 POCT  3.66 meenakshi 1.5 2.7 4.3 5.2 1.8   Notes  1x boost, no enox    Possible extra dose enox         Date 8/23 8/30 9/6 9/9 9/16 10/3 12/2  12/4 12/6 12/16   Total Weekly Dose 37.5 mg 37.5mg 30mg 27.5 mg 30 mg 30 mg 30mg BH admission   ???   INR 2.3 4.72  6.2 POCT 5.1 3.4 2.6 2.3 3.9 12/4-12/8 4.7 2.58 1.4   Notes    Hold x1; red. x1       Held/missed doses.     Date 12/20  6/6 6/12 6/19 6/26 6/29 7/5  7/12 7/19 8/15 8/18 8/23  8/30   Total Weekly Dose 35mg Non compliant 20 mg 30 mg   32.5 mg?? 35 mg  35mg  32.5  32.5mg 32.5mg  35 mg  35 mg   INR 2.1   1.13 4.4 1.5 1.5 1.9 4.7 POCT  3.6 Meenakshi  2.0 1.7 1.4 2.1 2.4  2.3   Notes 2x boost     enox X 3 days  Missed dose?  Inc GLV    redx1  ?  Missedx1, enox x2 enox  Less EtOH     Date 9/12 9/16-30 10/2 10/9 10/16  10/23/23 7/1/24 7/8/24 7/22 7/29  8/5 8/12 9/9   Total Weekly Dose 37.5mg [facility] 42 mg 44 mg 51 mg DUE!!  Lost to follow up 35 mg 32.5 mg 35 mg? 27.5mg   32.5 mg 35mg  27.5 mg   INR 4.5  1.9 1.2 5.3(POC), 4.05   3.9 3.5 5.1/5.3 POCT 2.7  1.9 3.4 1.5   Notes  fall  ensure   missedx2  Possible double dose?  holdx1 redx1  Miss x 1       Clinic Interview:  Tablet Strength: 5 mg tablets  Verbal Release: Verbal Release Authorization signed on 5/11/2022 -- may speak with Meghana (spouse)  Patient  Contact: [phone] (Mobile)      Patient Findings  Positives: Missed doses   Negatives: Signs/symptoms of thrombosis, Signs/symptoms of bleeding, Laboratory test error suspected, Change in health, Change in alcohol use, Change in activity, Upcoming invasive procedure, Emergency department visit, Upcoming dental procedure, Extra doses, Change in medications, Change in diet/appetite, Hospital admission, Bruising, Other complaints   Comments: Patient missed dose this past Thursday.    All other findings negative per patient.       Plan:    1. INR is SUBtherapeutic today at 1.5 (goal 2.5-3.5). Instructed Mr. Vo to BOOST today's dose to warfarin 10mg and tomorrow to 7.5 mg continue regimen of warfarin 5mg oral daily  except for 2.5 mg on Friday until recheck. Will do Lovenox 60mg SC Q12 for 3 days  2. Return to clinic on Monday, 9/16/24- can only get a ride to clinic on Mondays, does not drive. Is unable to get weekly rides and requested 2-week follow-up.   3. Verbal and written information provided in clinic.  Dillon Vo expresses understanding by teach back and has no further questions at this time.     Torrie Muhammad [facility]  09/09/24   10:10 EDT

## 2024-09-16 ENCOUNTER — OFFICE VISIT (OUTPATIENT)
Dept: CARDIOLOGY | Facility: CLINIC | Age: 74
End: 2024-09-16
Payer: MEDICARE

## 2024-09-16 ENCOUNTER — ANTICOAGULATION VISIT (OUTPATIENT)
Dept: PHARMACY | Facility: HOSPITAL | Age: 74
End: 2024-09-16
Payer: MEDICARE

## 2024-09-16 VITALS
OXYGEN SATURATION: 100 % | HEIGHT: 71 IN | BODY MASS INDEX: 19.71 KG/M2 | DIASTOLIC BLOOD PRESSURE: 96 MMHG | SYSTOLIC BLOOD PRESSURE: 132 MMHG | WEIGHT: 140.8 LBS | HEART RATE: 70 BPM

## 2024-09-16 DIAGNOSIS — I48.21 PERMANENT ATRIAL FIBRILLATION: Primary | ICD-10-CM

## 2024-09-16 DIAGNOSIS — Z95.0 PACEMAKER: ICD-10-CM

## 2024-09-16 DIAGNOSIS — I48.21 PERMANENT ATRIAL FIBRILLATION: ICD-10-CM

## 2024-09-16 DIAGNOSIS — I71.40 ABDOMINAL AORTIC ANEURYSM (AAA) WITHOUT RUPTURE, UNSPECIFIED PART: ICD-10-CM

## 2024-09-16 DIAGNOSIS — C34.12 MALIGNANT NEOPLASM OF UPPER LOBE OF LEFT LUNG: Primary | ICD-10-CM

## 2024-09-16 DIAGNOSIS — E78.5 HYPERLIPIDEMIA LDL GOAL <70: ICD-10-CM

## 2024-09-16 DIAGNOSIS — I10 ESSENTIAL HYPERTENSION: ICD-10-CM

## 2024-09-16 DIAGNOSIS — Z95.2 H/O MECHANICAL AORTIC VALVE REPLACEMENT: ICD-10-CM

## 2024-09-16 DIAGNOSIS — I25.119 CORONARY ARTERY DISEASE INVOLVING NATIVE CORONARY ARTERY OF NATIVE HEART WITH ANGINA PECTORIS: Primary | ICD-10-CM

## 2024-09-16 DIAGNOSIS — I50.42 CHRONIC COMBINED SYSTOLIC AND DIASTOLIC CONGESTIVE HEART FAILURE: ICD-10-CM

## 2024-09-16 PROBLEM — Z79.01 CHRONIC ANTICOAGULATION: Status: RESOLVED | Noted: 2020-02-26 | Resolved: 2024-09-16

## 2024-09-16 LAB
INR PPP: 3.6 (ref 0.91–1.09)
PROTHROMBIN TIME: 43.5 SECONDS (ref 10–13.8)

## 2024-09-16 PROCEDURE — 36416 COLLJ CAPILLARY BLOOD SPEC: CPT

## 2024-09-16 PROCEDURE — 3080F DIAST BP >= 90 MM HG: CPT | Performed by: NURSE PRACTITIONER

## 2024-09-16 PROCEDURE — 93279 PRGRMG DEV EVAL PM/LDLS PM: CPT | Performed by: NURSE PRACTITIONER

## 2024-09-16 PROCEDURE — G0463 HOSPITAL OUTPT CLINIC VISIT: HCPCS

## 2024-09-16 PROCEDURE — 1160F RVW MEDS BY RX/DR IN RCRD: CPT | Performed by: NURSE PRACTITIONER

## 2024-09-16 PROCEDURE — 85610 PROTHROMBIN TIME: CPT

## 2024-09-16 PROCEDURE — 3075F SYST BP GE 130 - 139MM HG: CPT | Performed by: NURSE PRACTITIONER

## 2024-09-16 PROCEDURE — 1159F MED LIST DOCD IN RCRD: CPT | Performed by: NURSE PRACTITIONER

## 2024-09-16 PROCEDURE — 99214 OFFICE O/P EST MOD 30 MIN: CPT | Performed by: NURSE PRACTITIONER

## 2024-09-16 RX ORDER — VALSARTAN 40 MG/1
40 TABLET ORAL DAILY
Qty: 90 TABLET | Refills: 1 | Status: SHIPPED | OUTPATIENT
Start: 2024-09-16

## 2024-12-09 DIAGNOSIS — Z95.2 H/O AORTIC VALVE REPLACEMENT: ICD-10-CM

## 2024-12-09 DIAGNOSIS — I48.0 PAROXYSMAL ATRIAL FIBRILLATION: ICD-10-CM

## 2024-12-09 RX ORDER — WARFARIN SODIUM 5 MG/1
TABLET ORAL
Qty: 90 TABLET | Refills: 1 | Status: SHIPPED | OUTPATIENT
Start: 2024-12-09

## 2024-12-09 NOTE — TELEPHONE ENCOUNTER
LOV 01/15/2024  NOV Not scheduled yet       Return to Office     The patient was instructed to return for follow-up in 6 months. The patient was instructed to return sooner if the condition changes, worsens, or does not resolve

## 2024-12-17 ENCOUNTER — TELEPHONE (OUTPATIENT)
Dept: CARDIOLOGY | Facility: CLINIC | Age: 74
End: 2024-12-17
Payer: MEDICARE

## 2024-12-17 DIAGNOSIS — I47.29 NONSUSTAINED MONOMORPHIC VENTRICULAR TACHYCARDIA: Primary | ICD-10-CM

## 2024-12-17 NOTE — TELEPHONE ENCOUNTER
Called and spoke to wife and setup for device check to check RV threshold.  Also entering in order for Echo.  Mrs Vo aware.

## 2024-12-17 NOTE — TELEPHONE ENCOUNTER
----- Message from Julio Remy sent at 12/16/2024  9:00 PM EST -----  For his nonsustained VT, can we recheck an echo.  Probably should do an in person interrogation to see what his true threshold is.

## 2024-12-18 ENCOUNTER — CLINICAL SUPPORT NO REQUIREMENTS (OUTPATIENT)
Dept: CARDIOLOGY | Facility: CLINIC | Age: 74
End: 2024-12-18
Payer: MEDICARE

## 2024-12-18 DIAGNOSIS — Z95.0 PACEMAKER: Primary | ICD-10-CM

## 2024-12-18 NOTE — PROGRESS NOTES
Pt here for in office manual pacemaker interrogation to assess RV lead threshold. Manual device interrogation performed by [employer] rep Eddie Hernandez.     Manual RV threshold measurement: 2.8 V @ 0.4 ms  RV output changed to 3.25 V @ 1 ms.     Interrogation placed on Dr. Ronquillo's desk.

## 2025-01-03 ENCOUNTER — TELEPHONE (OUTPATIENT)
Dept: INTERNAL MEDICINE | Facility: CLINIC | Age: 75
End: 2025-01-03
Payer: MEDICARE

## 2025-01-03 NOTE — TELEPHONE ENCOUNTER
"  Caller: Dillon Vo \"Lloyd\"    Relationship: Self    Best call back number: [phone]       What was the call regarding: PATIENT STATES THAT HE RECEIVED PAPERWORK THAT STATES HE WAS DENIED SUPPLEMENTAL BENEFIT WITH [facility] BECAUSE THEY DID NOT RECEIVE A RESPONSE FROM DR VALENTINE ABOUT HEART CONDITION    Is it okay if the provider responds through MyChart:       "

## 2025-01-13 NOTE — TELEPHONE ENCOUNTER
"Attempted to call patient x 2 with no answer and voicemail has not been set up.      If patient should return call please RELAY:  Dr Cintron said \"I didn't get a request that I recall.\" Please have forms sent and will complete.     Document if notified.   "

## 2025-01-13 NOTE — TELEPHONE ENCOUNTER
"Name: Dillon Vo \"Lloyd\"      Relationship: Self      Best Callback Number: [phone]       HUB PROVIDED THE RELAY MESSAGE FROM THE OFFICE      PATIENT: VOICED UNDERSTANDING AND HAS NO FURTHER QUESTIONS AT THIS TIME    ADDITIONAL INFORMATION:    PATIENT WILL ARRANGE FOR FORMS TO BE SENT TO US    "

## 2025-01-15 ENCOUNTER — HOSPITAL ENCOUNTER (OUTPATIENT)
Dept: CARDIOLOGY | Facility: HOSPITAL | Age: 75
Discharge: HOME | End: 2025-01-15
Admitting: STUDENT IN AN ORGANIZED HEALTH CARE EDUCATION/TRAINING PROGRAM
Payer: MEDICARE

## 2025-01-15 VITALS
DIASTOLIC BLOOD PRESSURE: 79 MMHG | WEIGHT: 140 LBS | HEIGHT: 71 IN | SYSTOLIC BLOOD PRESSURE: 154 MMHG | BODY MASS INDEX: 19.6 KG/M2

## 2025-01-15 DIAGNOSIS — I47.29 NONSUSTAINED MONOMORPHIC VENTRICULAR TACHYCARDIA: ICD-10-CM

## 2025-01-15 LAB
ASCENDING AORTA: 3.3 CM
AV MEAN PRESS GRAD SYS DOP V1V2: 7 MMHG
AV VMAX SYS DOP: 191 CM/SEC
BH CV ECHO MEAS - AO MAX PG: 14.6 MMHG
BH CV ECHO MEAS - AO ROOT AREA (BSA CORRECTED): 1.7 CM2
BH CV ECHO MEAS - AO ROOT DIAM: 3.1 CM
BH CV ECHO MEAS - AO V2 VTI: 37.4 CM
BH CV ECHO MEAS - AVA(I,D): 1.65 CM2
BH CV ECHO MEAS - EDV(CUBED): 125 ML
BH CV ECHO MEAS - EDV(MOD-SP2): 81.2 ML
BH CV ECHO MEAS - EDV(MOD-SP4): 109 ML
BH CV ECHO MEAS - EF(MOD-SP2): 36.9 %
BH CV ECHO MEAS - EF(MOD-SP4): 45.3 %
BH CV ECHO MEAS - ESV(CUBED): 42.9 ML
BH CV ECHO MEAS - ESV(MOD-SP2): 51.2 ML
BH CV ECHO MEAS - ESV(MOD-SP4): 59.6 ML
BH CV ECHO MEAS - FS: 30 %
BH CV ECHO MEAS - IVS/LVPW: 1 CM
BH CV ECHO MEAS - IVSD: 1.1 CM
BH CV ECHO MEAS - LA DIMENSION: 5.1 CM
BH CV ECHO MEAS - LAT PEAK E' VEL: 13.3 CM/SEC
BH CV ECHO MEAS - LV DIASTOLIC VOL/BSA (35-75): 60.1 CM2
BH CV ECHO MEAS - LV MASS(C)D: 207.1 GRAMS
BH CV ECHO MEAS - LV MAX PG: 4.8 MMHG
BH CV ECHO MEAS - LV MEAN PG: 2.25 MMHG
BH CV ECHO MEAS - LV SYSTOLIC VOL/BSA (12-30): 32.9 CM2
BH CV ECHO MEAS - LV V1 MAX: 109.5 CM/SEC
BH CV ECHO MEAS - LV V1 VTI: 19.7 CM
BH CV ECHO MEAS - LVIDD: 5 CM
BH CV ECHO MEAS - LVIDS: 3.5 CM
BH CV ECHO MEAS - LVOT AREA: 3.1 CM2
BH CV ECHO MEAS - LVOT DIAM: 2 CM
BH CV ECHO MEAS - LVPWD: 1.1 CM
BH CV ECHO MEAS - MED PEAK E' VEL: 6.5 CM/SEC
BH CV ECHO MEAS - MV DEC SLOPE: 399 CM/SEC2
BH CV ECHO MEAS - MV DEC TIME: 0.2 SEC
BH CV ECHO MEAS - MV E MAX VEL: 82.5 CM/SEC
BH CV ECHO MEAS - MV MAX PG: 3.9 MMHG
BH CV ECHO MEAS - MV MEAN PG: 1.8 MMHG
BH CV ECHO MEAS - MV P1/2T: 72.2 MSEC
BH CV ECHO MEAS - MV V2 VTI: 21.5 CM
BH CV ECHO MEAS - MVA(P1/2T): 3 CM2
BH CV ECHO MEAS - MVA(VTI): 2.9 CM2
BH CV ECHO MEAS - PA ACC TIME: 0.06 SEC
BH CV ECHO MEAS - PA V2 MAX: 76.3 CM/SEC
BH CV ECHO MEAS - RAP SYSTOLE: 3 MMHG
BH CV ECHO MEAS - RVSP: 33 MMHG
BH CV ECHO MEAS - SV(LVOT): 61.8 ML
BH CV ECHO MEAS - SV(MOD-SP2): 30 ML
BH CV ECHO MEAS - SV(MOD-SP4): 49.4 ML
BH CV ECHO MEAS - SVI(LVOT): 34.1 ML/M2
BH CV ECHO MEAS - SVI(MOD-SP2): 16.6 ML/M2
BH CV ECHO MEAS - SVI(MOD-SP4): 27.3 ML/M2
BH CV ECHO MEAS - TAPSE (>1.6): 1.83 CM
BH CV ECHO MEAS - TR MAX PG: 30 MMHG
BH CV ECHO MEAS - TR MAX VEL: 273 CM/SEC
BH CV ECHO MEASUREMENTS AVERAGE E/E' RATIO: 8.33
BH CV VAS BP LEFT ARM: NORMAL MMHG
BH CV XLRA - RV BASE: 4.2 CM
BH CV XLRA - RV LENGTH: 7.1 CM
BH CV XLRA - RV MID: 3.1 CM
BH CV XLRA - TDI S': 8.6 CM/SEC
LEFT ATRIUM VOLUME INDEX: 52.1 ML/M2
LV EF 2D ECHO EST: 50 %
LV EF BIPLANE MOD: 44 %

## 2025-01-15 PROCEDURE — 93306 TTE W/DOPPLER COMPLETE: CPT

## 2025-01-27 ENCOUNTER — TELEPHONE (OUTPATIENT)
Dept: PHARMACY | Facility: HOSPITAL | Age: 75
End: 2025-01-27

## 2025-01-27 NOTE — TELEPHONE ENCOUNTER
Called patient to inform him he is overdue for an INR check. Phone number listed in most recent encounter does not have a voice mailbox that has been set up yet.     Victoria Mckay CPhT   11:43 EST 1/27/2025

## 2025-02-17 ENCOUNTER — TELEPHONE (OUTPATIENT)
Dept: PHARMACY | Facility: HOSPITAL | Age: 75
End: 2025-02-17

## 2025-02-17 NOTE — TELEPHONE ENCOUNTER
Overdue INR Reminder Documentation    Attempted to call the patient for an INR reminder.     This is the Second attempt. . WillCall again in 3 days.     Called both listed phone numbers.    Agapito Batista   [facility]  2/17/2025 11:40 EST

## 2025-02-18 PROCEDURE — 93296 REM INTERROG EVL PM/IDS: CPT | Performed by: STUDENT IN AN ORGANIZED HEALTH CARE EDUCATION/TRAINING PROGRAM

## 2025-02-18 PROCEDURE — 93294 REM INTERROG EVL PM/LDLS PM: CPT | Performed by: STUDENT IN AN ORGANIZED HEALTH CARE EDUCATION/TRAINING PROGRAM

## 2025-02-20 ENCOUNTER — TELEPHONE (OUTPATIENT)
Dept: PHARMACY | Facility: HOSPITAL | Age: 75
End: 2025-02-20

## 2025-02-20 NOTE — TELEPHONE ENCOUNTER
Overdue INR Reminder Documentation    Attempted to call the patient for an INR reminder.     This is the Third attempt.. WillSend First Letter.     Unable to LVM.    Agapito Batista   [facility]  2/20/2025 08:24 EST

## 2025-03-06 ENCOUNTER — TELEPHONE (OUTPATIENT)
Dept: PHARMACY | Facility: HOSPITAL | Age: 75
End: 2025-03-06

## 2025-03-06 NOTE — TELEPHONE ENCOUNTER
Spoke with patient regarding overdue INR. Patient states he received previous VM but was unable to reach us in his attempts to sana us back. Patient scheduled for in clinic appointment 3/14. Patient supplied with clinic phone number in case he needs to reach clinic.        Agapito Batista   [facility]  3/6/2025 13:37 EST

## 2025-03-12 RX ORDER — VALSARTAN 40 MG/1
40 TABLET ORAL DAILY
Qty: 90 TABLET | Refills: 1 | Status: SHIPPED | OUTPATIENT
Start: 2025-03-12

## 2025-03-12 NOTE — TELEPHONE ENCOUNTER
Lab Results   Component Value Date    GLUCOSE 112 (H) 01/15/2024    BUN 11 01/15/2024    CREATININE 1.16 01/15/2024     01/15/2024    K 3.5 01/15/2024     01/15/2024    CALCIUM 9.5 01/15/2024    PROTEINTOT 7.5 01/15/2024    ALBUMIN 4.2 01/15/2024    ALT 9 01/15/2024    AST 20 01/15/2024    ALKPHOS 101 01/15/2024    BILITOT 1.1 01/15/2024    GLOB 3.3 01/15/2024    AGRATIO 1.3 01/15/2024    BCR 9.5 01/15/2024    ANIONGAP 10.5 01/15/2024    EGFR 66.5 01/15/2024

## 2025-03-18 ENCOUNTER — LAB (OUTPATIENT)
Dept: LAB | Facility: HOSPITAL | Age: 75
End: 2025-03-18
Payer: MEDICARE

## 2025-03-18 ENCOUNTER — ANTICOAGULATION VISIT (OUTPATIENT)
Dept: PHARMACY | Facility: HOSPITAL | Age: 75
End: 2025-03-18
Payer: MEDICARE

## 2025-03-18 DIAGNOSIS — I48.21 PERMANENT ATRIAL FIBRILLATION: ICD-10-CM

## 2025-03-18 DIAGNOSIS — I48.0 PAROXYSMAL ATRIAL FIBRILLATION: Primary | ICD-10-CM

## 2025-03-18 DIAGNOSIS — I48.0 PAROXYSMAL ATRIAL FIBRILLATION: ICD-10-CM

## 2025-03-18 DIAGNOSIS — I48.21 PERMANENT ATRIAL FIBRILLATION: Primary | ICD-10-CM

## 2025-03-18 LAB
INR PPP: 1.81 (ref 0.89–1.12)
PROTHROMBIN TIME: 21.1 SECONDS (ref 12.2–14.5)

## 2025-03-18 PROCEDURE — 36415 COLL VENOUS BLD VENIPUNCTURE: CPT

## 2025-03-18 PROCEDURE — 85610 PROTHROMBIN TIME: CPT

## 2025-03-18 NOTE — PROGRESS NOTES
Anticoagulation Clinic Progress Note  Indication:  Mechanical aortic heart valve, persistent Afib  Referring Provider: Ez Cintron  Initial Warfarin Start Date: > 20 years  Planned Duration of Therapy: indefinite  Goal INR: 2.5-3.5  Current Drug Interactions: asa  [id number]: 2 (HTN, Age > 65)    Diet: Broccoli, green beans ~2x weekly due to garden season (6/26/23)  Alcohol: 2-3xnightly  Tobacco: <1/2 ppd   OTC Pain Medication: none    Anticoagulation Clinic INR History:  Date 5/11 5/16 5/19 6/10 6/24 7/8 7/22 7/27 8/3 8/10 8/18   Total Weekly Dose 35 mg 37.5mg 35mg 35mg 35 mg 37.5mg? 40mg? 40mg 40 mg 40mg 35mg 32.5 mg   INR 1.5 2.68 ED  3.1 POCT 2.9 2.2 2.2 4.9 POCT  3.66 meenakshi 1.5 2.7 4.3 5.2 1.8   Notes  1x boost, no enox    Possible extra dose enox         Date 8/23 8/30 9/6 9/9 9/16 10/3 12/2  12/4 12/6 12/16   Total Weekly Dose 37.5 mg 37.5mg 30mg 27.5 mg 30 mg 30 mg 30mg BH admission   ???   INR 2.3 4.72  6.2 POCT 5.1 3.4 2.6 2.3 3.9 12/4-12/8 4.7 2.58 1.4   Notes    Hold x1; red. x1       Held/missed doses.     Date 12/20  6/6 6/12 6/19 6/26 6/29 7/5  7/12 7/19 8/15 8/18 8/23  8/30   Total Weekly Dose 35mg Non compliant 20 mg 30 mg   32.5 mg?? 35 mg  35mg  32.5  32.5mg 32.5mg  35 mg  35 mg   INR 2.1   1.13 4.4 1.5 1.5 1.9 4.7 POCT  3.6 Meenakshi  2.0 1.7 1.4 2.1 2.4  2.3   Notes 2x boost     enox X 3 days  Missed dose?  Inc GLV    redx1  ?  Missedx1, enox x2 enox  Less EtOH     Date 9/12 9/16-30 10/2 10/9 10/16  10/23/23 7/1/24 7/8/24 7/22 7/29  8/5 8/12 9/9   Total Weekly Dose 37.5mg [facility] 42 mg 44 mg 51 mg DUE!!  Lost to follow up 35 mg 32.5 mg 35 mg? 27.5mg   32.5 mg 35mg  27.5 mg   INR 4.5  1.9 1.2 5.3(POC), 4.05   3.9 3.5 5.1/5.3 POCT 2.7  1.9 3.4 1.5   Notes  fall  ensure   missedx2  Possible double dose?  holdx1 redx1  Miss x 1     Date 9/16  3/18/25             Total Weekly Dose 40mg Lost to follow-up 32.5 mg              INR 3.6  1.81             Notes                  Clinic  Interview:  Tablet Strength: 5 mg tablets  Verbal Release: Verbal Release Authorization signed on 5/11/2022 -- may speak with Meghana (spouse)  Patient Contact: [phone] (Mobile)      Patient Findings:  Positives: Change in medications   Negatives: Signs/symptoms of thrombosis, Signs/symptoms of bleeding, Laboratory test error suspected, Change in health, Change in alcohol use, Change in activity, Upcoming invasive procedure, Emergency department visit, Upcoming dental procedure, Missed doses, Extra doses, Change in diet/appetite, Hospital admission, Bruising, Other complaints   Comments: Patient started drinking Ensure 4-5x a week for about a month now, has not started any other new medications.  Feels as though he bleeds easier when he cuts himself but is able to get it to stop within a couple of minutes.  All other findings negative.     Plan:  1. INR is subtherapeutic today at 1.81 (goal 2.5-3.5). Instructed Mr. Vo to increase dose today only to warfarin 7.5 mg, then take warfarin 5mg oral daily until recheck. Patient has started drinking Ensure 4-5x weekly since last INR appt, did not realize they contained Vit K.   2. Return to clinic on Tuesday, 3/26/25  3. Verbal and written information provided in clinic.  Dillon Vo expresses understanding by teach back and has no further questions at this time.     Karen Gauthier, PharmD  03/18/25   10:22 EDT

## 2025-04-02 ENCOUNTER — ANTICOAGULATION VISIT (OUTPATIENT)
Dept: PHARMACY | Facility: HOSPITAL | Age: 75
End: 2025-04-02
Payer: MEDICARE

## 2025-04-02 DIAGNOSIS — I48.21 PERMANENT ATRIAL FIBRILLATION: Primary | ICD-10-CM

## 2025-04-02 LAB
INR PPP: 2.2 (ref 0.91–1.09)
PROTHROMBIN TIME: 26.5 SECONDS (ref 10–13.8)

## 2025-04-02 PROCEDURE — 36416 COLLJ CAPILLARY BLOOD SPEC: CPT

## 2025-04-02 PROCEDURE — G0463 HOSPITAL OUTPT CLINIC VISIT: HCPCS

## 2025-04-02 PROCEDURE — 85610 PROTHROMBIN TIME: CPT

## 2025-04-02 NOTE — PROGRESS NOTES
Anticoagulation Clinic Progress Note  Indication:  Mechanical aortic heart valve, persistent Afib  Referring Provider: Ez Cintron  Initial Warfarin Start Date: > 20 years  Planned Duration of Therapy: indefinite  Goal INR: 2.5-3.5  Current Drug Interactions: asa  [id number]: 2 (HTN, Age > 65)    Diet: Broccoli, green beans ~2x weekly due to garden season (6/26/23)  Alcohol: 2-3xnightly  Tobacco: <1/2 ppd   OTC Pain Medication: none    Anticoagulation Clinic INR History:  Date 5/11 5/16 5/19 6/10 6/24 7/8 7/22 7/27 8/3 8/10 8/18   Total Weekly Dose 35 mg 37.5mg 35mg 35mg 35 mg 37.5mg? 40mg? 40mg 40 mg 40mg 35mg 32.5 mg   INR 1.5 2.68 ED  3.1 POCT 2.9 2.2 2.2 4.9 POCT  3.66 meenakshi 1.5 2.7 4.3 5.2 1.8   Notes  1x boost, no enox    Possible extra dose enox         Date 8/23 8/30 9/6 9/9 9/16 10/3 12/2  12/4 12/6 12/16   Total Weekly Dose 37.5 mg 37.5mg 30mg 27.5 mg 30 mg 30 mg 30mg BH admission   ???   INR 2.3 4.72  6.2 POCT 5.1 3.4 2.6 2.3 3.9 12/4-12/8 4.7 2.58 1.4   Notes    Hold x1; red. x1       Held/missed doses.     Date 12/20  6/6 6/12 6/19 6/26 6/29 7/5  7/12 7/19 8/15 8/18 8/23  8/30   Total Weekly Dose 35mg Non compliant 20 mg 30 mg   32.5 mg?? 35 mg  35mg  32.5  32.5mg 32.5mg  35 mg  35 mg   INR 2.1   1.13 4.4 1.5 1.5 1.9 4.7 POCT  3.6 Meenakshi  2.0 1.7 1.4 2.1 2.4  2.3   Notes 2x boost     enox X 3 days  Missed dose?  Inc GLV    redx1  ?  Missedx1, enox x2 enox  Less EtOH     Date 9/12 9/16-30 10/2 10/9 10/16  10/23/23 7/1/24 7/8/24 7/22 7/29  8/5 8/12 9/9   Total Weekly Dose 37.5mg [facility] 42 mg 44 mg 51 mg DUE!!  Lost to follow up 35 mg 32.5 mg 35 mg? 27.5mg   32.5 mg 35mg  27.5 mg   INR 4.5  1.9 1.2 5.3(POC), 4.05   3.9 3.5 5.1/5.3 POCT 2.7  1.9 3.4 1.5   Notes  fall  ensure   missedx2  Possible double dose?  holdx1 redx1  Miss x 1     Date 9/16  3/18/25 4/2            Total Weekly Dose 40mg Lost to follow-up 32.5 mg  35 mg            INR 3.6  1.81 2.2            Notes                  Clinic  Interview:  Tablet Strength: 5 mg tablets  Verbal Release: Verbal Release Authorization signed on 5/11/2022 -- may speak with Meghana (spouse)  Patient Contact: [phone] (Mobile)      Patient Findings    Negatives: Signs/symptoms of thrombosis, Signs/symptoms of bleeding, Laboratory test error suspected, Change in health, Change in alcohol use, Change in activity, Upcoming invasive procedure, Emergency department visit, Upcoming dental procedure, Missed doses, Extra doses, Change in medications, Change in diet/appetite, Hospital admission, Bruising, Other complaints   Comments: All findings negative per patient         Plan:  1. INR is subtherapeutic today at 2.2 (goal 2.5-3.5). Instructed Mr. Vo to increase dose today only to warfarin 7.5 mg, then take warfarin 5mg oral daily until recheck. Patient has started drinking Ensure 4-5x weekly since last INR appt, did not realize they contained Vit K.   2. Return to clinic on Tuesday, 3/26/25  3. Verbal and written information provided in clinic.  Dillon Vo expresses understanding by teach back and has no further questions at this time.     Torrie Muhammad, PharmD  04/02/25   12:09 EDT

## 2025-04-16 ENCOUNTER — ANTICOAGULATION VISIT (OUTPATIENT)
Dept: PHARMACY | Facility: HOSPITAL | Age: 75
End: 2025-04-16
Payer: MEDICARE

## 2025-04-16 DIAGNOSIS — I48.21 PERMANENT ATRIAL FIBRILLATION: Primary | ICD-10-CM

## 2025-04-16 LAB
INR PPP: 2.7 (ref 0.91–1.09)
PROTHROMBIN TIME: 32.9 SECONDS (ref 10–13.8)

## 2025-04-16 PROCEDURE — 36416 COLLJ CAPILLARY BLOOD SPEC: CPT

## 2025-04-16 PROCEDURE — G0463 HOSPITAL OUTPT CLINIC VISIT: HCPCS

## 2025-04-16 PROCEDURE — 85610 PROTHROMBIN TIME: CPT

## 2025-04-16 NOTE — PROGRESS NOTES
Anticoagulation Clinic Progress Note  Indication:  Mechanical aortic heart valve, persistent Afib  Referring Provider: Ez Cintron  Initial Warfarin Start Date: > 20 years  Planned Duration of Therapy: indefinite  Goal INR: 2.5-3.5  Current Drug Interactions: asa  [id number]: 2 (HTN, Age > 65)    Diet: Broccoli, green beans ~2x weekly due to garden season (6/26/23)  Alcohol: 2-3xnightly  Tobacco: <1/2 ppd   OTC Pain Medication: none    Anticoagulation Clinic INR History:  Date 5/11 5/16 5/19 6/10 6/24 7/8 7/22 7/27 8/3 8/10 8/18   Total Weekly Dose 35 mg 37.5mg 35mg 35mg 35 mg 37.5mg? 40mg? 40mg 40 mg 40mg 35mg 32.5 mg   INR 1.5 2.68 ED  3.1 POCT 2.9 2.2 2.2 4.9 POCT  3.66 meenakshi 1.5 2.7 4.3 5.2 1.8   Notes  1x boost, no enox    Possible extra dose enox         Date 8/23 8/30 9/6 9/9 9/16 10/3 12/2  12/4 12/6 12/16   Total Weekly Dose 37.5 mg 37.5mg 30mg 27.5 mg 30 mg 30 mg 30mg BH admission   ???   INR 2.3 4.72  6.2 POCT 5.1 3.4 2.6 2.3 3.9 12/4-12/8 4.7 2.58 1.4   Notes    Hold x1; red. x1       Held/missed doses.     Date 12/20  6/6 6/12 6/19 6/26 6/29 7/5  7/12 7/19 8/15 8/18 8/23  8/30   Total Weekly Dose 35mg Non compliant 20 mg 30 mg   32.5 mg?? 35 mg  35mg  32.5  32.5mg 32.5mg  35 mg  35 mg   INR 2.1   1.13 4.4 1.5 1.5 1.9 4.7 POCT  3.6 Meenakshi  2.0 1.7 1.4 2.1 2.4  2.3   Notes 2x boost     enox X 3 days  Missed dose?  Inc GLV    redx1  ?  Missedx1, enox x2 enox  Less EtOH     Date 9/12 9/16-30 10/2 10/9 10/16  10/23/23 7/1/24 7/8/24 7/22 7/29  8/5 8/12 9/9   Total Weekly Dose 37.5mg [facility] 42 mg 44 mg 51 mg DUE!!  Lost to follow up 35 mg 32.5 mg 35 mg? 27.5mg   32.5 mg 35mg  27.5 mg   INR 4.5  1.9 1.2 5.3(POC), 4.05   3.9 3.5 5.1/5.3 POCT 2.7  1.9 3.4 1.5   Notes  fall  ensure   missedx2  Possible double dose?  holdx1 redx1  Miss x 1     Date 9/16  3/18/25 4/2 4/16           Total Weekly Dose 40mg Lost to follow-up 32.5 mg  35 mg 35 mg            INR 3.6  1.81 2.2 2.7           Notes                   Clinic Interview:  Tablet Strength: 5 mg tablets  Verbal Release: Verbal Release Authorization signed on 5/11/2022 -- may speak with Meghana (spouse)  Patient Contact: [phone] (Mobile)      Patient Findings    Negatives: Signs/symptoms of thrombosis, Signs/symptoms of bleeding, Laboratory test error suspected, Change in health, Change in alcohol use, Change in activity, Upcoming invasive procedure, Emergency department visit, Upcoming dental procedure, Missed doses, Extra doses, Change in medications, Change in diet/appetite, Hospital admission, Bruising, Other complaints   Comments: All findings negative per patient         Plan:  1. INR is therapeutic today at 2.7 (goal 2.5-3.5). Instructed Mr. Vo to continue warfarin 5mg oral daily until recheck.   2. Return to clinic 5/7/25  3. Verbal and written information provided in clinic.  Dillon Vo expresses understanding by teach back and has no further questions at this time.     Torrie Muhammad, PharmD  04/16/25   12:04 EDT

## 2025-05-07 ENCOUNTER — ANTICOAGULATION VISIT (OUTPATIENT)
Dept: PHARMACY | Facility: HOSPITAL | Age: 75
End: 2025-05-07
Payer: MEDICARE

## 2025-05-07 DIAGNOSIS — I48.21 PERMANENT ATRIAL FIBRILLATION: Primary | ICD-10-CM

## 2025-05-07 LAB
INR PPP: 3.8 (ref 0.91–1.09)
PROTHROMBIN TIME: 45.9 SECONDS (ref 10–13.8)

## 2025-05-07 PROCEDURE — 36416 COLLJ CAPILLARY BLOOD SPEC: CPT

## 2025-05-07 PROCEDURE — G0463 HOSPITAL OUTPT CLINIC VISIT: HCPCS

## 2025-05-07 PROCEDURE — 85610 PROTHROMBIN TIME: CPT

## 2025-05-07 NOTE — PROGRESS NOTES
Anticoagulation Clinic Progress Note  Indication:  Mechanical aortic heart valve, persistent Afib  Referring Provider: Ez Cintron  Initial Warfarin Start Date: > 20 years  Planned Duration of Therapy: indefinite  Goal INR: 2.5-3.5  Current Drug Interactions: asa  [id number]: 2 (HTN, Age > 65)    Diet: Broccoli, green beans ~2x weekly due to garden season (6/26/23)  Alcohol: 2-3xnightly  Tobacco: <1/2 ppd   OTC Pain Medication: none    Anticoagulation Clinic INR History:  Date 5/11 5/16 5/19 6/10 6/24 7/8 7/22 7/27 8/3 8/10 8/18   Total Weekly Dose 35 mg 37.5mg 35mg 35mg 35 mg 37.5mg? 40mg? 40mg 40 mg 40mg 35mg 32.5 mg   INR 1.5 2.68 ED  3.1 POCT 2.9 2.2 2.2 4.9 POCT  3.66 meenakshi 1.5 2.7 4.3 5.2 1.8   Notes  1x boost, no enox    Possible extra dose enox         Date 8/23 8/30 9/6 9/9 9/16 10/3 12/2  12/4 12/6 12/16   Total Weekly Dose 37.5 mg 37.5mg 30mg 27.5 mg 30 mg 30 mg 30mg BH admission   ???   INR 2.3 4.72  6.2 POCT 5.1 3.4 2.6 2.3 3.9 12/4-12/8 4.7 2.58 1.4   Notes    Hold x1; red. x1       Held/missed doses.     Date 12/20  6/6 6/12 6/19 6/26 6/29 7/5  7/12 7/19 8/15 8/18 8/23  8/30   Total Weekly Dose 35mg Non compliant 20 mg 30 mg   32.5 mg?? 35 mg  35mg  32.5  32.5mg 32.5mg  35 mg  35 mg   INR 2.1   1.13 4.4 1.5 1.5 1.9 4.7 POCT  3.6 Meenakshi  2.0 1.7 1.4 2.1 2.4  2.3   Notes 2x boost     enox X 3 days  Missed dose?  Inc GLV    redx1  ?  Missedx1, enox x2 enox  Less EtOH     Date 9/12 9/16-30 10/2 10/9 10/16  10/23/23 7/1/24 7/8/24 7/22 7/29  8/5 8/12 9/9   Total Weekly Dose 37.5mg [facility] 42 mg 44 mg 51 mg DUE!!  Lost to follow up 35 mg 32.5 mg 35 mg? 27.5mg   32.5 mg 35mg  27.5 mg   INR 4.5  1.9 1.2 5.3(POC), 4.05   3.9 3.5 5.1/5.3 POCT 2.7  1.9 3.4 1.5   Notes  fall  ensure   missedx2  Possible double dose?  holdx1 redx1  Miss x 1     Date 9/16  3/18/25 4/2 4/16 5/7          Total Weekly Dose 40mg Lost to follow-up 32.5 mg  35 mg 35 mg  35 mg          INR 3.6  1.81 2.2 2.7 3.8          Notes                   Clinic Interview:  Tablet Strength: 5 mg tablets  Verbal Release: Verbal Release Authorization signed on 5/11/2022 -- may speak with Meghana (spouse)  Patient Contact: [phone] (Mobile)      Patient Findings  Negatives: Signs/symptoms of thrombosis, Signs/symptoms of bleeding, Laboratory test error suspected, Change in health, Change in alcohol use, Change in activity, Upcoming invasive procedure, Emergency department visit, Upcoming dental procedure, Missed doses, Extra doses, Change in medications, Change in diet/appetite, Hospital admission, Bruising, Other complaints   Comments: No changes per patient. Dosing verified.       Plan:  1. INR is supratherapeutic today at 3.8 (goal 2.5-3.5). Instructed Mr. Vo to take a reduced dose of warfarin 2.5 mg today and then resume warfarin 5mg oral daily until recheck.   2. Return to clinic in 3 weeks 5/29.  3. Verbal and written information provided in clinic.  Dillon Vo expresses understanding by teach back and has no further questions at this time.     Faisal Franklin, PharmD  05/07/25   12:11 EDT

## 2025-05-14 ENCOUNTER — TELEPHONE (OUTPATIENT)
Dept: CARDIOLOGY | Facility: CLINIC | Age: 75
End: 2025-05-14
Payer: MEDICARE

## 2025-05-27 ENCOUNTER — TELEPHONE (OUTPATIENT)
Dept: INTERNAL MEDICINE | Facility: CLINIC | Age: 75
End: 2025-05-27
Payer: MEDICARE

## 2025-05-27 NOTE — TELEPHONE ENCOUNTER
Called [facility], talked to Maribel, she said there is a message that they called us and he is non par.  I asked what has happened with his recent visits and why are we getting a call about this now?  Sent me to Aminah, she said it came into her Q so she has to call, she said he should not have been seen after new insurance-he used to have [place] in January, [facility] was effective May 1st    Per Janessa, he will have to be called, he will have to reach out to insurance to see who he can see.  We can't even order a lab order. He can still test his blood to make sure it is within range, need to make sure he knows what is in range. If he can switch to PPO plan we can see him but he will have an OOP cost.  If he has an in home machine-he should be fine as long as we know he is going to establish with another provider, or needs to update PCP and establish care with someone else.  Check with him in about a week.     Reached patient at [phone]-told him that his [facility] HMO insurance is non par, he stated that he has switched back to [employer], will be effective June 1st, he does not have a home machine-had to turn it back in, he will call the anti-coag clinic and reschedule the appointment for next week.

## 2025-05-27 NOTE — TELEPHONE ENCOUNTER
I got a message from Eli saying-not sure if this should go to you, pt is scheduled for Anticoagulation tomorrow, and his insurance NON-PAR?

## 2025-06-05 ENCOUNTER — ANTICOAGULATION VISIT (OUTPATIENT)
Dept: PHARMACY | Facility: HOSPITAL | Age: 75
End: 2025-06-05
Payer: MEDICARE

## 2025-06-05 DIAGNOSIS — I48.21 PERMANENT ATRIAL FIBRILLATION: Primary | ICD-10-CM

## 2025-06-05 LAB
INR PPP: 5.3 (ref 0.91–1.09)
PROTHROMBIN TIME: 63.8 SECONDS (ref 10–13.8)

## 2025-06-05 PROCEDURE — 85610 PROTHROMBIN TIME: CPT

## 2025-06-05 PROCEDURE — G0463 HOSPITAL OUTPT CLINIC VISIT: HCPCS

## 2025-06-05 PROCEDURE — 36416 COLLJ CAPILLARY BLOOD SPEC: CPT

## 2025-06-19 ENCOUNTER — ANTICOAGULATION VISIT (OUTPATIENT)
Dept: PHARMACY | Facility: HOSPITAL | Age: 75
End: 2025-06-19
Payer: MEDICARE

## 2025-06-19 DIAGNOSIS — I48.21 PERMANENT ATRIAL FIBRILLATION: Primary | ICD-10-CM

## 2025-06-19 LAB
INR PPP: 2.5 (ref 0.91–1.09)
PROTHROMBIN TIME: 29.6 SECONDS (ref 10–13.8)

## 2025-06-19 PROCEDURE — 36416 COLLJ CAPILLARY BLOOD SPEC: CPT

## 2025-06-19 PROCEDURE — 85610 PROTHROMBIN TIME: CPT

## 2025-06-19 PROCEDURE — G0463 HOSPITAL OUTPT CLINIC VISIT: HCPCS

## 2025-06-19 NOTE — PROGRESS NOTES
Anticoagulation Clinic Progress Note  Indication:  Mechanical aortic heart valve, persistent Afib  Referring Provider: Ez Cintron  Initial Warfarin Start Date: > 20 years  Planned Duration of Therapy: indefinite  Goal INR: 2.5-3.5  Current Drug Interactions: asa  [id number]: 2 (HTN, Age > 65)    Diet: Broccoli, green beans ~2x weekly due to garden season (6/26/23)  Alcohol: 2-3xnightly  Tobacco: <1/2 ppd   OTC Pain Medication: none    Anticoagulation Clinic INR History:  Date 12/20  6/6 6/12 6/19 6/26 6/29 7/5  7/12 7/19 8/15 8/18 8/23  8/30   Total Weekly Dose 35mg Non compliant 20 mg 30 mg   32.5 mg?? 35 mg  35mg  32.5  32.5mg 32.5mg  35 mg  35 mg   INR 2.1   1.13 4.4 1.5 1.5 1.9 4.7 POCT  3.6 Ronna  2.0 1.7 1.4 2.1 2.4  2.3   Notes 2x boost     enox X 3 days  Missed dose?  Inc GLV    redx1  ?  Missedx1, enox x2 enox  Less EtOH     Date 9/12 9/16-30 10/2 10/9 10/16  10/23/23 7/1/24 7/8/24 7/22 7/29  8/5 8/12 9/9   Total Weekly Dose 37.5mg [facility] 42 mg 44 mg 51 mg DUE!!  Lost to follow up 35 mg 32.5 mg 35 mg? 27.5mg   32.5 mg 35mg  27.5 mg   INR 4.5  1.9 1.2 5.3(POC), 4.05   3.9 3.5 5.1/5.3 POCT 2.7  1.9 3.4 1.5   Notes  fall  ensure   missedx2  Possible double dose?  holdx1 redx1  Miss x 1     Date 9/16  3/18/25 4/2 4/16 5/7 6/5 6/19        Total Weekly Dose 40mg Lost to follow-up 32.5 mg  35 mg 35 mg  35 mg 35 mg  32.5 mg        INR 3.6  1.81 2.2 2.7 3.8 5.3 2.5        Notes       ???           Clinic Interview:  Tablet Strength: 5 mg tablets  Verbal Release: Verbal Release Authorization signed on 5/11/2022 -- may speak with Meghana (spouse)  Patient Contact: [phone] (Mobile)      Patient Findings:  Negatives: Signs/symptoms of thrombosis, Signs/symptoms of bleeding, Laboratory test error suspected, Change in health, Change in alcohol use, Change in activity, Upcoming invasive procedure, Emergency department visit, Upcoming dental procedure, Missed doses, Extra doses, Change in medications,  Change in diet/appetite, Hospital admission, Bruising, Other complaints   Comments: No changes per patient. Dosing verified. Still not sure why INR was so elevated at last encounter.       Plan:  1. INR is therapeutic today at 2.5 (goal 2.5-3.5). Instructed Mr. Vo to take warfarin 5 mg daily until recheck.  2. Return to clinic in 3 weeks, 7/10 to avoid 4th of July holiday.  3. Verbal and written information provided in clinic.  Dillon Vo expresses understanding by teach back and has no further questions at this time.     Faisal Franklin, PharmD  06/19/25   11:01 EDT

## 2025-07-10 ENCOUNTER — ANTICOAGULATION VISIT (OUTPATIENT)
Dept: PHARMACY | Facility: HOSPITAL | Age: 75
End: 2025-07-10
Payer: MEDICARE

## 2025-07-10 DIAGNOSIS — I48.21 PERMANENT ATRIAL FIBRILLATION: Primary | ICD-10-CM

## 2025-07-10 LAB
INR PPP: 2.7
INR PPP: 2.7 (ref 0.91–1.09)
PROTHROMBIN TIME: 32.4 SECONDS (ref 10–13.8)

## 2025-07-10 PROCEDURE — 36416 COLLJ CAPILLARY BLOOD SPEC: CPT

## 2025-07-10 PROCEDURE — G0463 HOSPITAL OUTPT CLINIC VISIT: HCPCS

## 2025-07-10 PROCEDURE — 85610 PROTHROMBIN TIME: CPT

## 2025-07-10 NOTE — PROGRESS NOTES
Anticoagulation Clinic Progress Note  Indication:  Mechanical aortic heart valve, persistent Afib  Referring Provider: Ez Cintron  Initial Warfarin Start Date: > 20 years  Planned Duration of Therapy: indefinite  Goal INR: 2.5-3.5  Current Drug Interactions: asa  [id number]: 2 (HTN, Age > 65)    Diet: Broccoli, green beans ~2x weekly due to garden season (6/26/23)  Alcohol: 2-3xnightly  Tobacco: <1/2 ppd   OTC Pain Medication: none    Anticoagulation Clinic INR History:  Date 12/20  6/6 6/12 6/19 6/26 6/29 7/5  7/12 7/19 8/15 8/18 8/23  8/30   Total Weekly Dose 35mg Non compliant 20 mg 30 mg   32.5 mg?? 35 mg  35mg  32.5  32.5mg 32.5mg  35 mg  35 mg   INR 2.1   1.13 4.4 1.5 1.5 1.9 4.7 POCT  3.6 Ronna  2.0 1.7 1.4 2.1 2.4  2.3   Notes 2x boost     enox X 3 days  Missed dose?  Inc GLV    redx1  ?  Missedx1, enox x2 enox  Less EtOH     Date 9/12 9/16-30 10/2 10/9 10/16  10/23/23 7/1/24 7/8/24 7/22 7/29  8/5 8/12 9/9   Total Weekly Dose 37.5mg [facility] 42 mg 44 mg 51 mg DUE!!  Lost to follow up 35 mg 32.5 mg 35 mg? 27.5mg   32.5 mg 35mg  27.5 mg   INR 4.5  1.9 1.2 5.3(POC), 4.05   3.9 3.5 5.1/5.3 POCT 2.7  1.9 3.4 1.5   Notes  fall  ensure   missedx2  Possible double dose?  holdx1 redx1  Miss x 1     Date 9/16  3/18/25 4/2 4/16 5/7 6/5 6/19 7/10       Total Weekly Dose 40mg Lost to follow-up 32.5 mg  35 mg 35 mg  35 mg 35 mg  32.5 mg 35 mg        INR 3.6  1.81 2.2 2.7 3.8 5.3 2.5 2.7       Notes       ???           Clinic Interview:  Tablet Strength: 5 mg tablets  Verbal Release: Verbal Release Authorization signed on 5/11/2022 -- may speak with Meghana (spouse)  Patient Contact: [phone] (Mobile)      Patient Findings:  Negatives: Signs/symptoms of thrombosis, Signs/symptoms of bleeding, Laboratory test error suspected, Change in health, Change in alcohol use, Change in activity, Upcoming invasive procedure, Emergency department visit, Upcoming dental procedure, Missed doses, Extra doses, Change in  medications, Change in diet/appetite, Hospital admission, Bruising, Other complaints   Comments: No changes per patient. Dosing verified.        Plan:  1. INR is therapeutic today at 2.7 (goal 2.5-3.5). Instructed Mr. Vo to take warfarin 5 mg daily until recheck.  2. Return to clinic in 4 weeks, 8/7   3. Verbal and written information provided in clinic.  Dillon Vo expresses understanding by teach back and has no further questions at this time.     Karen Gauthier, PharmD  07/10/25   11:45 EDT

## 2025-07-15 NOTE — PROGRESS NOTES
"    Cardiology Outpatient Visit      Identification: Dillon Vo is a 74 y.o. male who resides in [place], [place].    Reason for visit:  Mechanical aortic valve  HFpEF  CAD  Atrial fibrillation      Subjective      Lloyd returns to the office today.  Feeling well with no angina or heart failure symptoms.  No TIA or stroke symptoms.  He has a history of AAA which has not been assessed since 2022 last measurement 5 cm.    Review of Systems   Cardiovascular:  Negative for chest pain, claudication, cyanosis, dyspnea on exertion, irregular heartbeat, leg swelling (both feet, occasional), near-syncope, orthopnea, palpitations, paroxysmal nocturnal dyspnea and syncope.   Respiratory:  Negative for cough, hemoptysis, shortness of breath, sleep disturbances due to breathing, snoring, sputum production and wheezing.        No Known Allergies      Current Outpatient Medications   Medication Instructions    aspirin 81 mg, Oral, Daily    atorvastatin (LIPITOR) 80 mg, Oral, Nightly    buPROPion XL (WELLBUTRIN XL) 300 MG 24 hr tablet Take 1 tablet by mouth once daily    carvedilol (COREG) 6.25 mg, Oral, 2 Times Daily With Meals    citalopram (CELEXA) 20 mg, Oral, Daily    famotidine (PEPCID) 20 MG tablet 1 tablet, 2 Times Daily    finasteride (PROSCAR) 5 mg, Oral, Every Night at Bedtime    folic acid (FOLVITE) 400 MCG tablet TAKE 1 TABLET BY MOUTH ONCE DAILY    potassium chloride 10 MEQ CR tablet 1 tablet, Daily    sennosides-docusate (PERICOLACE) 8.6-50 MG per tablet     valsartan (DIOVAN) 40 mg, Oral, Daily    vitamin B-12 (CYANOCOBALAMIN) 1,000 mcg, Oral, Daily    warfarin (COUMADIN) 5 MG tablet TAKE 1 TABLET BY MOUTH DAILY         Objective     /70 (BP Location: Left arm, Patient Position: Sitting, Cuff Size: Adult)   Pulse 70   Ht 180.3 cm (71\")   Wt 63.3 kg (139 lb 9.6 oz)   SpO2 97%   BMI 19.47 kg/m²       Constitutional:       Appearance: Healthy appearance.   Eyes:      General: No scleral " icterus.  Neck:      Thyroid: No thyroid mass.      Vascular: No carotid bruit or JVD. JVD normal.   Pulmonary:      Effort: Pulmonary effort is normal.      Breath sounds: Normal breath sounds.   Cardiovascular:      Normal rate. Irregularly irregular rhythm.      Murmurs: There is no murmur.      No gallop.       Comments: Mechanical S1/S2 sound  Edema:     Peripheral edema absent.   Skin:     General: Skin is warm. There is no cyanosis.   Neurological:      General: No focal deficit present.      Mental Status: Alert.   Psychiatric:         Attention and Perception: Attention normal.         Result Review  (reviewed with patient):            Lab Results   Component Value Date    GLUCOSE 112 (H) 01/15/2024    BUN 11 01/15/2024    CREATININE 1.16 01/15/2024     01/15/2024    K 3.5 01/15/2024     01/15/2024    CALCIUM 9.5 01/15/2024    PROTEINTOT 7.5 01/15/2024    ALBUMIN 4.2 01/15/2024    ALT 9 01/15/2024    AST 20 01/15/2024    ALKPHOS 101 01/15/2024    BILITOT 1.1 01/15/2024    GLOB 3.3 01/15/2024    AGRATIO 1.3 01/15/2024    BCR 9.5 01/15/2024    ANIONGAP 10.5 01/15/2024    EGFR 66.5 01/15/2024     Lab Results   Component Value Date    WBC 4.35 01/15/2024    HGB 13.5 01/15/2024    HCT 41.2 01/15/2024    MCV 98.8 (H) 01/15/2024     01/15/2024     Lab Results   Component Value Date    CHOL 136 01/15/2024    CHLPL 189 01/21/2016    TRIG 39 01/15/2024    HDL 67 (H) 01/15/2024    LDL 59 01/15/2024     Lab Results   Component Value Date    HGBA1C 5.20 12/04/2022     @lpa@        Assessment     Diagnoses and all orders for this visit:    1. Heart failure with preserved left ventricular function (HFpEF) (Primary)  Overview:  Echo (3/21/2021): LVEF 53%.  Biatrial enlargement.  Mechanical aortic valve with possible small perivalvular leak.  Moderate TR.  RVSP 53 mmHg  Echo (12/5/2022): LVEF 50%.  Mechanical aortic valve well-seated and functions normally.  RVSP 35 mmHg.  Bilateral pleural effusions.  LV  diastolic function indeterminate  Echo (9/18/2023): LVEF 40-50%.  Mechanical aortic valve in place with trace AI and mean gradient 7 mmHg  Echo (1/15/2025): LVEF 50%. Left atrial cavity is severely dilated. Mechanical aortic valve is present.  The valve functions normally.    Assessment & Plan:  Stage C heart failure with improved ejection fraction  Stable NYHA class II symptoms  Continue carvedilol, valsartan    Orders:  -     valsartan (DIOVAN) 40 MG tablet; Take 1 tablet by mouth Daily.  Dispense: 90 tablet; Refill: 1  -     carvedilol (COREG) 6.25 MG tablet; Take 1 tablet by mouth 2 (Two) Times a Day With Meals.  Dispense: 180 tablet; Refill: 3    2. H/O mechanical aortic valve replacement  Overview:  Remote mechanical aortic valve replacement and thoracic aneurysm repair approximately 20 to 21 years ago at   Echo (3/19/2021): LVEF 53%. Mechanical aortic valve with small perivalvular leak.  MAC with moderate MR.  RVSP 53 mmHg.  Echo (12/5/2022): LVEF 50%.  Mechanical aortic valve well-seated and functions normally.  RVSP 35 mmHg.   Echo (1/15/2025): LVEF 50%. Left atrial cavity is severely dilated. Mechanical aortic valve is present.  The valve functions normally.    Assessment & Plan:  Normal functioning valve on recent echo  Continue warfarin with target INR 2.5-3  Continue low-dose aspirin  SBE prophylaxis prior to dental cleaning      3. Aneurysm of ascending aorta without rupture s/p repair   -     CT Chest With Contrast; Future    4. Infrarenal abdominal aortic aneurysm (AAA) without rupture  Overview:  Abdominal ultrasound (7/2022):4.6 cm infrarenal abdominal aortic aneurysm, possibly very minimallyincreased in size from CT comparison.  CT angiogram of the chest (12/4/2022): Abdominal aortic aneurysm 5 cm and enlarged from ultrasound 7/2022    Orders:  -     CT Angiogram Abdomen Pelvis; Future    5. Coronary artery disease involving native coronary artery of native heart with angina  pectoris  Overview:  Cardiac catheterization (6/2013): Mid LAD stenosis status post RK.  Pharmacologic nuclear stress (12/6/2022): Lateral ischemia.  Cardiac catheterization (12/7/2022): Major coronary arteries with mild CAD. Severe small vessel disease of first diagonal branch.    Orders:  -     carvedilol (COREG) 6.25 MG tablet; Take 1 tablet by mouth 2 (Two) Times a Day With Meals.  Dispense: 180 tablet; Refill: 3    6. Permanent atrial fibrillation  Overview:  [id number] (age, CAD, HTN, CVA)   Status post AV node ablation pacemaker implantation, 6/18/2013    Assessment & Plan:  Asymptomatic  Continue warfarin for stroke prophylaxis  Continue carvedilol for rate control    Orders:  -     carvedilol (COREG) 6.25 MG tablet; Take 1 tablet by mouth 2 (Two) Times a Day With Meals.  Dispense: 180 tablet; Refill: 3    7. Hyperlipidemia LDL goal <70  Overview:  High intensity statin therapy indicated given the presence of CAD    Assessment & Plan:  Controlled  Continue atorvastatin    Orders:  -     Comprehensive Metabolic Panel; Future  -     LDL Cholesterol, Direct; Future  -     atorvastatin (LIPITOR) 80 MG tablet; Take 1 tablet by mouth Every Night.  Dispense: 90 tablet; Refill: 3  -     Lipoprotein A (LPA); Future          Plan   CT angiogram of chest abdomen pelvis to assess AAA  Otherwise continue present medical therapy  Smoking cessation recommended  Labs including CMP, direct LDL, LP(a), CBC      Follow-up   Return in about 1 year (around 7/16/2026).        Cr Ronquillo MD, [facility], [facility]  7/17/2025

## 2025-07-16 ENCOUNTER — OFFICE VISIT (OUTPATIENT)
Dept: CARDIOLOGY | Facility: CLINIC | Age: 75
End: 2025-07-16
Payer: MEDICARE

## 2025-07-16 VITALS
WEIGHT: 139.6 LBS | HEART RATE: 70 BPM | HEIGHT: 71 IN | DIASTOLIC BLOOD PRESSURE: 70 MMHG | BODY MASS INDEX: 19.54 KG/M2 | OXYGEN SATURATION: 97 % | SYSTOLIC BLOOD PRESSURE: 118 MMHG

## 2025-07-16 DIAGNOSIS — I48.21 PERMANENT ATRIAL FIBRILLATION: ICD-10-CM

## 2025-07-16 DIAGNOSIS — I25.119 CORONARY ARTERY DISEASE INVOLVING NATIVE CORONARY ARTERY OF NATIVE HEART WITH ANGINA PECTORIS: ICD-10-CM

## 2025-07-16 DIAGNOSIS — I71.43 INFRARENAL ABDOMINAL AORTIC ANEURYSM (AAA) WITHOUT RUPTURE: ICD-10-CM

## 2025-07-16 DIAGNOSIS — I71.21 ANEURYSM OF ASCENDING AORTA WITHOUT RUPTURE: ICD-10-CM

## 2025-07-16 DIAGNOSIS — E78.5 HYPERLIPIDEMIA LDL GOAL <70: ICD-10-CM

## 2025-07-16 DIAGNOSIS — Z95.2 H/O MECHANICAL AORTIC VALVE REPLACEMENT: ICD-10-CM

## 2025-07-16 DIAGNOSIS — I50.30 HEART FAILURE WITH PRESERVED LEFT VENTRICULAR FUNCTION (HFPEF): Primary | ICD-10-CM

## 2025-07-16 RX ORDER — VALSARTAN 40 MG/1
40 TABLET ORAL DAILY
Qty: 90 TABLET | Refills: 1 | Status: SHIPPED | OUTPATIENT
Start: 2025-07-16

## 2025-07-16 RX ORDER — ATORVASTATIN CALCIUM 80 MG/1
80 TABLET, FILM COATED ORAL NIGHTLY
Qty: 90 TABLET | Refills: 3 | Status: SHIPPED | OUTPATIENT
Start: 2025-07-16

## 2025-07-16 RX ORDER — CARVEDILOL 6.25 MG/1
6.25 TABLET ORAL 2 TIMES DAILY WITH MEALS
Qty: 180 TABLET | Refills: 3 | Status: SHIPPED | OUTPATIENT
Start: 2025-07-16

## 2025-07-17 NOTE — ASSESSMENT & PLAN NOTE
Normal functioning valve on recent echo  Continue warfarin with target INR 2.5-3  Continue low-dose aspirin  SBE prophylaxis prior to dental cleaning

## 2025-07-17 NOTE — ASSESSMENT & PLAN NOTE
Stage C heart failure with improved ejection fraction  Stable NYHA class II symptoms  Continue carvedilol, valsartan

## 2025-08-07 ENCOUNTER — HOSPITAL ENCOUNTER (OUTPATIENT)
Dept: CT IMAGING | Facility: HOSPITAL | Age: 75
Discharge: HOME | End: 2025-08-07
Payer: MEDICARE

## 2025-08-07 ENCOUNTER — APPOINTMENT (OUTPATIENT)
Dept: PHARMACY | Facility: HOSPITAL | Age: 75
End: 2025-08-07
Payer: MEDICARE

## 2025-08-07 DIAGNOSIS — I71.21 ANEURYSM OF ASCENDING AORTA WITHOUT RUPTURE: ICD-10-CM

## 2025-08-07 DIAGNOSIS — I71.43 INFRARENAL ABDOMINAL AORTIC ANEURYSM (AAA) WITHOUT RUPTURE: ICD-10-CM

## 2025-08-07 PROCEDURE — 74174 CTA ABD&PLVS W/CONTRAST: CPT

## 2025-08-07 PROCEDURE — 71260 CT THORAX DX C+: CPT

## 2025-08-07 PROCEDURE — 25510000001 IOPAMIDOL PER 1 ML: Performed by: INTERNAL MEDICINE

## 2025-08-07 RX ORDER — IOPAMIDOL 755 MG/ML
100 INJECTION, SOLUTION INTRAVASCULAR
Status: COMPLETED | OUTPATIENT
Start: 2025-08-07 | End: 2025-08-07

## 2025-08-07 RX ADMIN — IOPAMIDOL 85 ML: 755 INJECTION, SOLUTION INTRAVENOUS at 15:49

## 2025-08-14 LAB
MDC_IDC_MSMT_BATTERY_REMAINING_LONGEVITY: 95 MO
MDC_IDC_MSMT_BATTERY_REMAINING_PERCENTAGE: 81 %
MDC_IDC_MSMT_BATTERY_RRT_TRIGGER: 2.6
MDC_IDC_MSMT_BATTERY_STATUS: NORMAL
MDC_IDC_MSMT_BATTERY_VOLTAGE: 3.01
MDC_IDC_MSMT_LEADCHNL_RV_DTM: NORMAL
MDC_IDC_MSMT_LEADCHNL_RV_IMPEDANCE_VALUE: 950
MDC_IDC_MSMT_LEADCHNL_RV_PACING_THRESHOLD_AMPLITUDE: 1.75
MDC_IDC_MSMT_LEADCHNL_RV_PACING_THRESHOLD_POLARITY: NORMAL
MDC_IDC_MSMT_LEADCHNL_RV_PACING_THRESHOLD_PULSEWIDTH: 1
MDC_IDC_MSMT_LEADCHNL_RV_SENSING_INTR_AMPL: 7.5
MDC_IDC_PG_IMPLANT_DTM: NORMAL
MDC_IDC_PG_MFG: NORMAL
MDC_IDC_PG_MODEL: NORMAL
MDC_IDC_PG_SERIAL: NORMAL
MDC_IDC_PG_TYPE: NORMAL
MDC_IDC_SESS_DTM: NORMAL
MDC_IDC_SESS_TYPE: NORMAL
MDC_IDC_SET_BRADY_LOWRATE: 70
MDC_IDC_SET_BRADY_MAX_SENSOR_RATE: 130
MDC_IDC_SET_BRADY_MODE: NORMAL
MDC_IDC_SET_LEADCHNL_RV_PACING_AMPLITUDE: 2
MDC_IDC_SET_LEADCHNL_RV_PACING_POLARITY: NORMAL
MDC_IDC_SET_LEADCHNL_RV_PACING_PULSEWIDTH: 1
MDC_IDC_SET_LEADCHNL_RV_SENSING_POLARITY: NORMAL
MDC_IDC_SET_LEADCHNL_RV_SENSING_SENSITIVITY: 2
MDC_IDC_STAT_BRADY_RV_PERCENT_PACED: 99

## 2025-08-25 ENCOUNTER — ANTICOAGULATION VISIT (OUTPATIENT)
Dept: PHARMACY | Facility: HOSPITAL | Age: 75
End: 2025-08-25
Payer: MEDICARE

## 2025-08-25 DIAGNOSIS — I48.21 PERMANENT ATRIAL FIBRILLATION: Primary | ICD-10-CM

## 2025-08-25 LAB
INR PPP: 3 (ref 0.91–1.09)
PROTHROMBIN TIME: 35.5 SECONDS (ref 10–13.8)

## 2025-08-25 PROCEDURE — 36416 COLLJ CAPILLARY BLOOD SPEC: CPT

## 2025-08-25 PROCEDURE — 85610 PROTHROMBIN TIME: CPT

## 2025-08-25 PROCEDURE — G0463 HOSPITAL OUTPT CLINIC VISIT: HCPCS

## (undated) DEVICE — 32 FR STRAIGHT – SOFT PVC CATHETER: Brand: PVC THORACIC CATHETERS

## (undated) DEVICE — MODEL AT P65, P/N 701554-001KIT CONTENTS: HAND CONTROLLER, 3-WAY HIGH-PRESSURE STOPCOCK WITH ROTATING END AND PREMIUM HIGH-PRESSURE TUBING: Brand: ANGIOTOUCH® KIT

## (undated) DEVICE — CATH DIAG EXPO .056 FL3.5 6F 100CM

## (undated) DEVICE — SET PRIMARY GRVTY 10DP MALE LL 104IN

## (undated) DEVICE — GW PERIPH GUIDERIGHT STD/EXCHNG/J/TIP SS 0.035IN 5X260CM

## (undated) DEVICE — SUT VIC 1 CTX 36IN OBGYN VCP977H

## (undated) DEVICE — IRRIGATOR BULB ASEPTO 60CC STRL

## (undated) DEVICE — DECANT BG O JET

## (undated) DEVICE — CLTH CLENS READYCLEANSE PERI CARE PK/5

## (undated) DEVICE — CANN NASL CO2 DIVIDED A/

## (undated) DEVICE — SPNG GZ WOVN 4X4IN 12PLY 10/BX STRL

## (undated) DEVICE — TRAP,MUCUS SPECIMEN,40CC: Brand: MEDLINE

## (undated) DEVICE — PK THORACOTOMY 10

## (undated) DEVICE — CONN REDUCING CANN/PUMP 3/8X3/8X3/8

## (undated) DEVICE — SUT VIC 4/0 SH 27IN J415H

## (undated) DEVICE — PLASMABLADE PS210-030S 3.0S LOCK: Brand: PLASMABLADE™

## (undated) DEVICE — OASIS DRAIN, SINGLE, INLINE & ATS COMPATIBLE: Brand: OASIS

## (undated) DEVICE — UNDYED BRAIDED (POLYGLACTIN 910), SYNTHETIC ABSORBABLE SUTURE: Brand: COATED VICRYL

## (undated) DEVICE — LEX ELECTRO PHYSIOLOGY: Brand: MEDLINE INDUSTRIES, INC.

## (undated) DEVICE — TOTAL TRAY, 16FR 10ML SIL FOLEY, URN: Brand: MEDLINE

## (undated) DEVICE — DEV COMP RAD PRELUDESYNC 24CM

## (undated) DEVICE — POWER SHELL: Brand: SIGNIA

## (undated) DEVICE — SUT PROLN 3/0 SH D/A 36IN 8522H

## (undated) DEVICE — GLV SURG PREMIERPRO MIC LTX PF SZ7 BRN

## (undated) DEVICE — SAFESECURE,SECUREMENT,FOLEY CATH,STERILE: Brand: MEDLINE

## (undated) DEVICE — DRSNG SURG AQUACEL AG/ADVNTGE 9X15CM 3.5X6IN

## (undated) DEVICE — ST EXT IV SMRTSTE 2VLV FIX M LL 6ML 41

## (undated) DEVICE — NDL ANGIOGR ADV THN SMOTH SGLWALL 21G 1.5

## (undated) DEVICE — ELECTRD RETRN/GRND MEGADYNE SGL/PLT W/CORD 9FT DISP

## (undated) DEVICE — SOL NACL 0.9PCT 1000ML

## (undated) DEVICE — CAUTERY TIP POLISHER: Brand: DEVON

## (undated) DEVICE — LIMB HOLDER, WRIST/ANKLE: Brand: DEROYAL

## (undated) DEVICE — PK CATH CARD 10

## (undated) DEVICE — ST INF PRI SMRTSTE 20DRP 2VLV 24ML 117

## (undated) DEVICE — CATH DIAG EXPO M/ PK 6FR FL4/FR4 PIG 3PK

## (undated) DEVICE — TUBING, SUCTION, 1/4" X 10', STRAIGHT: Brand: MEDLINE

## (undated) DEVICE — MEDI-VAC YANKAUER SUCTION HANDLE W/BULBOUS TIP: Brand: CARDINAL HEALTH

## (undated) DEVICE — GLIDESHEATH BASIC HYDROPHILIC COATED INTRODUCER SHEATH: Brand: GLIDESHEATH

## (undated) DEVICE — ADULT, W/LG. BACK PAD, RADIOTRANSPARENT ELEMENT AND LEAD WIRE COMPATIBLE W/: Brand: DEFIBRILLATION ELECTRODES

## (undated) DEVICE — NDL PERC 1PART ECHOTIP WO/BASEPLT 18G 7CM

## (undated) DEVICE — CVR TRANSD FLX 3DIMEN 14X29.2CM LF STRL

## (undated) DEVICE — MODEL BT2000 P/N 700287-012KIT CONTENTS: MANIFOLD WITH SALINE AND CONTRAST PORTS, SALINE TUBING WITH SPIKE AND HAND SYRINGE, TRANSDUCER: Brand: BT2000 AUTOMATED MANIFOLD KIT

## (undated) DEVICE — SUCTION CANISTER, 2500CC, RIGID: Brand: DEROYAL